# Patient Record
Sex: FEMALE | Race: WHITE | Employment: OTHER | URBAN - METROPOLITAN AREA
[De-identification: names, ages, dates, MRNs, and addresses within clinical notes are randomized per-mention and may not be internally consistent; named-entity substitution may affect disease eponyms.]

---

## 2017-01-06 DIAGNOSIS — F41.9 ANXIETY: ICD-10-CM

## 2017-01-09 RX ORDER — CLONAZEPAM 0.5 MG/1
TABLET ORAL
Qty: 270 TABLET | Refills: 0 | Status: SHIPPED | OUTPATIENT
Start: 2017-01-09 | End: 2017-05-02 | Stop reason: SDUPTHER

## 2017-02-14 ENCOUNTER — OFFICE VISIT (OUTPATIENT)
Dept: FAMILY MEDICINE CLINIC | Age: 72
End: 2017-02-14

## 2017-02-14 DIAGNOSIS — Z72.89 OTHER PROBLEMS RELATED TO LIFESTYLE: ICD-10-CM

## 2017-02-14 DIAGNOSIS — Z72.0 TOBACCO ABUSE: ICD-10-CM

## 2017-02-14 DIAGNOSIS — Z00.00 ROUTINE GENERAL MEDICAL EXAMINATION AT A HEALTH CARE FACILITY: ICD-10-CM

## 2017-02-14 DIAGNOSIS — Z00.00 MEDICARE ANNUAL WELLNESS VISIT, SUBSEQUENT: Primary | ICD-10-CM

## 2017-02-14 DIAGNOSIS — F41.9 ANXIETY: ICD-10-CM

## 2017-02-14 DIAGNOSIS — I10 ESSENTIAL HYPERTENSION: Chronic | ICD-10-CM

## 2017-02-14 DIAGNOSIS — E03.9 HYPOTHYROIDISM, UNSPECIFIED TYPE: ICD-10-CM

## 2017-02-14 DIAGNOSIS — M85.80 OSTEOPENIA: ICD-10-CM

## 2017-02-14 DIAGNOSIS — J44.9 COPD, MODERATE (HCC): ICD-10-CM

## 2017-02-14 DIAGNOSIS — I25.10 CAD IN NATIVE ARTERY: ICD-10-CM

## 2017-02-14 DIAGNOSIS — Z12.11 SCREENING FOR COLORECTAL CANCER: ICD-10-CM

## 2017-02-14 DIAGNOSIS — G45.1 TIA INVOLVING RIGHT INTERNAL CAROTID ARTERY: ICD-10-CM

## 2017-02-14 DIAGNOSIS — F10.21 OTHER AND UNSPECIFIED ALCOHOL DEPENDENCE, IN REMISSION: ICD-10-CM

## 2017-02-14 DIAGNOSIS — J44.9 CHRONIC OBSTRUCTIVE PULMONARY DISEASE, UNSPECIFIED COPD TYPE (HCC): ICD-10-CM

## 2017-02-14 DIAGNOSIS — Z12.12 SCREENING FOR COLORECTAL CANCER: ICD-10-CM

## 2017-02-14 PROCEDURE — G0438 PPPS, INITIAL VISIT: HCPCS | Performed by: FAMILY MEDICINE

## 2017-02-14 RX ORDER — VENLAFAXINE HYDROCHLORIDE 150 MG/1
150 TABLET, EXTENDED RELEASE ORAL
COMMUNITY
End: 2017-05-09 | Stop reason: ALTCHOICE

## 2017-02-14 RX ORDER — ATENOLOL 50 MG/1
50 TABLET ORAL DAILY
COMMUNITY
End: 2017-04-20

## 2017-02-14 RX ORDER — ATORVASTATIN CALCIUM 40 MG/1
40 TABLET, FILM COATED ORAL DAILY
Qty: 90 TABLET | Refills: 3 | Status: SHIPPED | OUTPATIENT
Start: 2017-02-14 | End: 2018-05-15 | Stop reason: DRUGHIGH

## 2017-02-14 ASSESSMENT — PATIENT HEALTH QUESTIONNAIRE - PHQ9
SUM OF ALL RESPONSES TO PHQ QUESTIONS 1-9: 2
SUM OF ALL RESPONSES TO PHQ QUESTIONS 1-9: 2

## 2017-02-14 ASSESSMENT — LIFESTYLE VARIABLES: HOW OFTEN DO YOU HAVE A DRINK CONTAINING ALCOHOL: 0

## 2017-02-14 ASSESSMENT — ANXIETY QUESTIONNAIRES: GAD7 TOTAL SCORE: 1

## 2017-02-23 RX ORDER — ATENOLOL 50 MG/1
TABLET ORAL
Qty: 90 TABLET | Refills: 0 | Status: SHIPPED | OUTPATIENT
Start: 2017-02-23 | End: 2017-03-20 | Stop reason: CLARIF

## 2017-03-16 ENCOUNTER — TELEPHONE (OUTPATIENT)
Dept: FAMILY MEDICINE CLINIC | Age: 72
End: 2017-03-16

## 2017-04-05 DIAGNOSIS — E03.9 HYPOTHYROIDISM: ICD-10-CM

## 2017-04-05 RX ORDER — LEVOTHYROXINE SODIUM 0.1 MG/1
TABLET ORAL
Qty: 90 TABLET | Refills: 3 | Status: SHIPPED | OUTPATIENT
Start: 2017-04-05 | End: 2018-04-04 | Stop reason: SDUPTHER

## 2017-04-23 ENCOUNTER — CARE COORDINATION (OUTPATIENT)
Dept: CASE MANAGEMENT | Age: 72
End: 2017-04-23

## 2017-04-24 ENCOUNTER — TELEPHONE (OUTPATIENT)
Dept: FAMILY MEDICINE CLINIC | Age: 72
End: 2017-04-24

## 2017-04-24 ENCOUNTER — TELEPHONE (OUTPATIENT)
Dept: PHARMACY | Age: 72
End: 2017-04-24

## 2017-05-02 DIAGNOSIS — F41.9 ANXIETY: ICD-10-CM

## 2017-05-02 RX ORDER — CLONAZEPAM 0.5 MG/1
TABLET ORAL
Qty: 30 TABLET | Refills: 0 | Status: SHIPPED | OUTPATIENT
Start: 2017-05-02 | End: 2017-05-09 | Stop reason: SDUPTHER

## 2017-05-09 ENCOUNTER — OFFICE VISIT (OUTPATIENT)
Dept: FAMILY MEDICINE CLINIC | Age: 72
End: 2017-05-09

## 2017-05-09 VITALS
SYSTOLIC BLOOD PRESSURE: 164 MMHG | BODY MASS INDEX: 18.06 KG/M2 | OXYGEN SATURATION: 93 % | HEART RATE: 77 BPM | DIASTOLIC BLOOD PRESSURE: 100 MMHG | WEIGHT: 118.8 LBS

## 2017-05-09 DIAGNOSIS — I10 HTN (HYPERTENSION), BENIGN: ICD-10-CM

## 2017-05-09 DIAGNOSIS — E87.1 HYPONATREMIA: ICD-10-CM

## 2017-05-09 DIAGNOSIS — F41.9 ANXIETY: ICD-10-CM

## 2017-05-09 DIAGNOSIS — N39.0 URINARY TRACT INFECTION WITHOUT HEMATURIA, SITE UNSPECIFIED: Primary | ICD-10-CM

## 2017-05-09 DIAGNOSIS — J18.9 CAP (COMMUNITY ACQUIRED PNEUMONIA): ICD-10-CM

## 2017-05-09 DIAGNOSIS — I10 ESSENTIAL HYPERTENSION: ICD-10-CM

## 2017-05-09 DIAGNOSIS — G47.00 INSOMNIA, UNSPECIFIED TYPE: ICD-10-CM

## 2017-05-09 PROCEDURE — 99214 OFFICE O/P EST MOD 30 MIN: CPT | Performed by: FAMILY MEDICINE

## 2017-05-09 RX ORDER — RISPERIDONE 2 MG/1
2 TABLET, FILM COATED ORAL DAILY
Qty: 90 TABLET | Refills: 3 | Status: SHIPPED | OUTPATIENT
Start: 2017-05-09 | End: 2018-05-04 | Stop reason: SDUPTHER

## 2017-05-09 RX ORDER — SERTRALINE HYDROCHLORIDE 100 MG/1
100 TABLET, FILM COATED ORAL DAILY
Status: ON HOLD | COMMUNITY
End: 2018-05-01 | Stop reason: HOSPADM

## 2017-05-09 RX ORDER — CLONIDINE HYDROCHLORIDE 0.2 MG/1
TABLET ORAL
Qty: 180 TABLET | Refills: 3 | Status: CANCELLED | OUTPATIENT
Start: 2017-05-09

## 2017-05-09 RX ORDER — RISPERIDONE 2 MG/1
TABLET, FILM COATED ORAL
Qty: 30 TABLET | Refills: 4 | Status: CANCELLED | OUTPATIENT
Start: 2017-05-09

## 2017-05-09 RX ORDER — CLONAZEPAM 0.5 MG/1
TABLET ORAL
Qty: 90 TABLET | Refills: 0 | Status: CANCELLED | OUTPATIENT
Start: 2017-05-09

## 2017-05-09 RX ORDER — CLONAZEPAM 0.5 MG/1
0.5 TABLET ORAL 3 TIMES DAILY PRN
Qty: 90 TABLET | Refills: 3 | Status: SHIPPED | OUTPATIENT
Start: 2017-05-09 | End: 2017-10-04 | Stop reason: SDUPTHER

## 2017-05-09 RX ORDER — TRAZODONE HYDROCHLORIDE 50 MG/1
TABLET ORAL
Qty: 90 TABLET | Refills: 3 | Status: ON HOLD | OUTPATIENT
Start: 2017-05-09 | End: 2018-06-19 | Stop reason: HOSPADM

## 2017-05-15 ENCOUNTER — HOSPITAL ENCOUNTER (OUTPATIENT)
Dept: OTHER | Age: 72
Discharge: HOME | End: 2017-05-15
Attending: FAMILY MEDICINE | Admitting: FAMILY MEDICINE

## 2017-05-15 DIAGNOSIS — J18.9 CAP (COMMUNITY ACQUIRED PNEUMONIA): ICD-10-CM

## 2017-05-15 LAB
ANION GAP SERPL CALCULATED.3IONS-SCNC: 17 MMOL/L (ref 3–16)
BUN BLDV-MCNC: 12 MG/DL (ref 7–20)
CALCIUM SERPL-MCNC: 8.8 MG/DL (ref 8.3–10.6)
CHLORIDE BLD-SCNC: 98 MMOL/L (ref 99–110)
CO2: 27 MMOL/L (ref 21–32)
CREAT SERPL-MCNC: 0.7 MG/DL (ref 0.6–1.2)
GFR AFRICAN AMERICAN: >60
GFR NON-AFRICAN AMERICAN: >60
GLUCOSE BLD-MCNC: 132 MG/DL (ref 70–99)
POTASSIUM SERPL-SCNC: 3.9 MMOL/L (ref 3.5–5.1)
SODIUM BLD-SCNC: 142 MMOL/L (ref 136–145)

## 2017-05-16 RX ORDER — AMOXICILLIN AND CLAVULANATE POTASSIUM 875; 125 MG/1; MG/1
1 TABLET, FILM COATED ORAL 2 TIMES DAILY
Qty: 20 TABLET | Refills: 0 | Status: SHIPPED | OUTPATIENT
Start: 2017-05-16 | End: 2017-05-26

## 2017-05-18 ENCOUNTER — OFFICE VISIT (OUTPATIENT)
Dept: PULMONOLOGY | Age: 72
End: 2017-05-18

## 2017-05-18 VITALS
RESPIRATION RATE: 18 BRPM | WEIGHT: 116 LBS | SYSTOLIC BLOOD PRESSURE: 183 MMHG | HEART RATE: 64 BPM | BODY MASS INDEX: 18.21 KG/M2 | TEMPERATURE: 96.9 F | DIASTOLIC BLOOD PRESSURE: 100 MMHG | HEIGHT: 67 IN | OXYGEN SATURATION: 96 %

## 2017-05-18 DIAGNOSIS — J90 PLEURAL EFFUSION ON LEFT: ICD-10-CM

## 2017-05-18 DIAGNOSIS — J98.19 TRAPPED LUNG: ICD-10-CM

## 2017-05-18 DIAGNOSIS — J43.2 CENTRILOBULAR EMPHYSEMA (HCC): ICD-10-CM

## 2017-05-18 DIAGNOSIS — Z09 HOSPITAL DISCHARGE FOLLOW-UP: Primary | ICD-10-CM

## 2017-05-18 DIAGNOSIS — J44.9 COPD, MODERATE (HCC): ICD-10-CM

## 2017-05-18 PROCEDURE — 99214 OFFICE O/P EST MOD 30 MIN: CPT | Performed by: INTERNAL MEDICINE

## 2017-05-24 RX ORDER — ATENOLOL 50 MG/1
TABLET ORAL
Qty: 90 TABLET | Refills: 1 | Status: SHIPPED | OUTPATIENT
Start: 2017-05-24 | End: 2017-08-01 | Stop reason: ALTCHOICE

## 2017-05-31 ENCOUNTER — HOSPITAL ENCOUNTER (OUTPATIENT)
Dept: PULMONOLOGY | Age: 72
Discharge: HOME | End: 2017-05-31
Attending: INTERNAL MEDICINE | Admitting: INTERNAL MEDICINE

## 2017-05-31 VITALS — OXYGEN SATURATION: 96 % | HEART RATE: 70 BPM

## 2017-05-31 DIAGNOSIS — J98.19 TRAPPED LUNG: ICD-10-CM

## 2017-05-31 DIAGNOSIS — J90 PLEURAL EFFUSION, NOT ELSEWHERE CLASSIFIED: ICD-10-CM

## 2017-05-31 DIAGNOSIS — J90 PLEURAL EFFUSION ON LEFT: ICD-10-CM

## 2017-05-31 RX ORDER — ALBUTEROL SULFATE 90 UG/1
4 AEROSOL, METERED RESPIRATORY (INHALATION) ONCE
Status: COMPLETED | OUTPATIENT
Start: 2017-05-31 | End: 2017-05-31

## 2017-05-31 RX ADMIN — ALBUTEROL SULFATE 4 PUFF: 90 AEROSOL, METERED RESPIRATORY (INHALATION) at 15:41

## 2017-06-21 ENCOUNTER — OFFICE VISIT (OUTPATIENT)
Dept: PULMONOLOGY | Age: 72
End: 2017-06-21

## 2017-06-21 VITALS
RESPIRATION RATE: 18 BRPM | OXYGEN SATURATION: 97 % | BODY MASS INDEX: 18.83 KG/M2 | HEIGHT: 67 IN | SYSTOLIC BLOOD PRESSURE: 180 MMHG | HEART RATE: 60 BPM | WEIGHT: 120 LBS | DIASTOLIC BLOOD PRESSURE: 97 MMHG

## 2017-06-21 DIAGNOSIS — J44.9 COPD, MODERATE (HCC): Primary | ICD-10-CM

## 2017-06-21 DIAGNOSIS — J43.2 CENTRILOBULAR EMPHYSEMA (HCC): ICD-10-CM

## 2017-06-21 DIAGNOSIS — J98.19 TRAPPED LUNG: ICD-10-CM

## 2017-06-21 PROCEDURE — 99214 OFFICE O/P EST MOD 30 MIN: CPT | Performed by: INTERNAL MEDICINE

## 2017-07-14 DIAGNOSIS — I10 ESSENTIAL HYPERTENSION: ICD-10-CM

## 2017-07-14 RX ORDER — CLONIDINE HYDROCHLORIDE 0.2 MG/1
TABLET ORAL
Qty: 180 TABLET | Refills: 1 | Status: SHIPPED | OUTPATIENT
Start: 2017-07-14 | End: 2017-11-30 | Stop reason: SDUPTHER

## 2017-07-24 DIAGNOSIS — I10 ESSENTIAL HYPERTENSION: ICD-10-CM

## 2017-07-24 RX ORDER — LISINOPRIL 20 MG/1
TABLET ORAL
Qty: 180 TABLET | Refills: 0 | Status: SHIPPED | OUTPATIENT
Start: 2017-07-24 | End: 2017-11-30 | Stop reason: SDUPTHER

## 2017-08-01 ENCOUNTER — OFFICE VISIT (OUTPATIENT)
Dept: FAMILY MEDICINE CLINIC | Age: 72
End: 2017-08-01

## 2017-08-01 VITALS
SYSTOLIC BLOOD PRESSURE: 138 MMHG | HEART RATE: 66 BPM | OXYGEN SATURATION: 94 % | TEMPERATURE: 96.5 F | BODY MASS INDEX: 18.42 KG/M2 | WEIGHT: 117.6 LBS | DIASTOLIC BLOOD PRESSURE: 100 MMHG

## 2017-08-01 DIAGNOSIS — N39.0 URINARY TRACT INFECTION WITHOUT HEMATURIA, SITE UNSPECIFIED: Primary | ICD-10-CM

## 2017-08-01 DIAGNOSIS — R41.0 CONFUSION: ICD-10-CM

## 2017-08-01 LAB
BACTERIA URINE, POC: NORMAL
BILIRUBIN URINE: 0 MG/DL
BLOOD, URINE: NEGATIVE
CASTS URINE, POC: NEGATIVE
CLARITY: CLEAR
COLOR: YELLOW
CRYSTALS URINE, POC: NEGATIVE
EPI CELLS URINE, POC: NORMAL
GLUCOSE URINE: NEGATIVE
KETONES, URINE: NEGATIVE
LEUKOCYTE EST, POC: NORMAL
NITRITE, URINE: NEGATIVE
PH UA: 7 (ref 4.5–8)
PROTEIN UA: NEGATIVE
RBC URINE, POC: NORMAL
SPECIFIC GRAVITY UA: 1.01 (ref 1–1.03)
UROBILINOGEN, URINE: NORMAL
WBC URINE, POC: NORMAL
YEAST URINE, POC: NEGATIVE

## 2017-08-01 PROCEDURE — 81000 URINALYSIS NONAUTO W/SCOPE: CPT | Performed by: FAMILY MEDICINE

## 2017-08-01 PROCEDURE — 99213 OFFICE O/P EST LOW 20 MIN: CPT | Performed by: FAMILY MEDICINE

## 2017-08-01 RX ORDER — NITROFURANTOIN 25; 75 MG/1; MG/1
100 CAPSULE ORAL 2 TIMES DAILY
Qty: 14 CAPSULE | Refills: 0 | Status: SHIPPED | OUTPATIENT
Start: 2017-08-01 | End: 2017-08-08

## 2017-08-03 LAB
ORGANISM: ABNORMAL
URINE CULTURE, ROUTINE: ABNORMAL

## 2017-10-02 ENCOUNTER — OFFICE VISIT (OUTPATIENT)
Dept: FAMILY MEDICINE CLINIC | Age: 72
End: 2017-10-02

## 2017-10-02 VITALS
OXYGEN SATURATION: 94 % | TEMPERATURE: 96.7 F | WEIGHT: 118 LBS | BODY MASS INDEX: 18.48 KG/M2 | DIASTOLIC BLOOD PRESSURE: 94 MMHG | SYSTOLIC BLOOD PRESSURE: 142 MMHG | HEART RATE: 64 BPM

## 2017-10-02 DIAGNOSIS — N39.0 URINARY TRACT INFECTION WITHOUT HEMATURIA, SITE UNSPECIFIED: Primary | ICD-10-CM

## 2017-10-02 DIAGNOSIS — I10 ESSENTIAL HYPERTENSION: Chronic | ICD-10-CM

## 2017-10-02 LAB
BACTERIA URINE, POC: NEGATIVE
BILIRUBIN URINE: 0 MG/DL
BLOOD, URINE: NEGATIVE
CASTS URINE, POC: NEGATIVE
CLARITY: CLEAR
COLOR: YELLOW
CRYSTALS URINE, POC: NEGATIVE
EPI CELLS URINE, POC: NEGATIVE
GLUCOSE URINE: NEGATIVE
KETONES, URINE: NEGATIVE
LEUKOCYTE EST, POC: NEGATIVE
NITRITE, URINE: NEGATIVE
PH UA: 7 (ref 4.5–8)
PROTEIN UA: NEGATIVE
RBC URINE, POC: NEGATIVE
SPECIFIC GRAVITY UA: 1.01 (ref 1–1.03)
UROBILINOGEN, URINE: NORMAL
WBC URINE, POC: NEGATIVE
YEAST URINE, POC: NEGATIVE

## 2017-10-02 PROCEDURE — 99214 OFFICE O/P EST MOD 30 MIN: CPT | Performed by: PHYSICIAN ASSISTANT

## 2017-10-02 PROCEDURE — 81000 URINALYSIS NONAUTO W/SCOPE: CPT | Performed by: PHYSICIAN ASSISTANT

## 2017-10-02 RX ORDER — CIPROFLOXACIN 500 MG/1
500 TABLET, FILM COATED ORAL 2 TIMES DAILY
Qty: 20 TABLET | Refills: 0 | Status: SHIPPED | OUTPATIENT
Start: 2017-10-02 | End: 2017-10-12

## 2017-10-02 ASSESSMENT — ENCOUNTER SYMPTOMS
DIARRHEA: 0
NAUSEA: 0
CONSTIPATION: 0
VOMITING: 0
BACK PAIN: 0
ABDOMINAL PAIN: 0
SHORTNESS OF BREATH: 0

## 2017-10-02 NOTE — MR AVS SNAPSHOT
After Visit Summary             Francisco Ruth   10/2/2017 9:00 AM   Office Visit    Description:  Female : 1945   Provider:  LESLIE Rocha   Department:  [facility] and Future Appointments         Below is a list of your follow-up and future appointments. This may not be a complete list as you may have made appointments directly with providers that we are not aware of or your providers may have made some for you. Please call your providers to confirm appointments. It is important to keep your appointments. Please bring your current insurance card, photo ID, co-pay, and all medication bottles to your appointment. If self-pay, payment is expected at the time of service. Your To-Do List     Future Appointments Provider Department Dept Phone    10/16/2017 9:20 AM Mikala Arrington, [address] [place] [address] [phone]    Please arrive 15 minutes prior to appointment, bring photo ID and insurance card. Follow-Up    Return in about 2 weeks (around 10/16/2017). Information from Your Visit        Department     Name Address Phone Fax    [address]  [phone]      You Were Seen for:         Comments    Urinary tract infection without hematuria, site unspecified   [[id number]]         Vital Signs     Blood Pressure Pulse Temperature Weight Oxygen Saturation Body Mass Index    142/94 (Site: Left Arm, Position: Sitting, Cuff Size: Medium Adult) 64 96.7 °F (35.9 °C) (Tympanic) 118 lb (53.5 kg) 94% 18.48 kg/m2    Smoking Status                   Current Every Day Smoker           Additional Information about your Body Mass Index (BMI)           Your BMI as listed above is considered underweight (less than 18.5). BMI is an estimate of body fat, calculated from your height and weight. Risks of low BMI include increased risk for infection, anemia, and bone loss. ciprofloxacin (CIPRO) 500 MG tablet Take 1 tablet by mouth 2 times daily for 10 days    lisinopril (PRINIVIL;ZESTRIL) 20 MG tablet TAKE ONE TABLET BY MOUTH TWICE A DAY    cloNIDine (CATAPRES) 0.2 MG tablet TAKE ONE TABLET BY MOUTH TWICE A DAY    sertraline (ZOLOFT) 100 MG tablet Take 100 mg by mouth daily    risperiDONE (RISPERDAL) 2 MG tablet Take 1 tablet by mouth daily    traZODone (DESYREL) 50 MG tablet TAKE ONE TABLET BY MOUTH ONCE NIGHTLY    clonazePAM (KLONOPIN) 0.5 MG tablet Take 1 tablet by mouth 3 times daily as needed for Anxiety    budesonide-formoterol (SYMBICORT) 160-4.5 MCG/ACT AERO Inhale 2 puffs into the lungs 2 times daily    albuterol sulfate  (90 BASE) MCG/ACT inhaler Inhale 2 puffs into the lungs every 6 hours as needed for Wheezing    ipratropium (ATROVENT HFA) 17 MCG/ACT inhaler Inhale 1 puff into the lungs 3 times daily    levothyroxine (SYNTHROID) 100 MCG tablet TAKE ONE TABLET BY MOUTH DAILY    atorvastatin (LIPITOR) 40 MG tablet Take 1 tablet by mouth daily    mirtazapine (REMERON) 30 MG tablet TAKE ONE TABLET BY MOUTH ONCE NIGHTLY    oxybutynin (DITROPAN) 5 MG tablet TAKE ONE TABLET BY MOUTH TWICE A DAY    metoprolol tartrate (LOPRESSOR) 25 MG tablet TAKE ONE TABLET BY MOUTH TWICE A DAY    aspirin 81 MG chewable tablet Take 1 tablet by mouth daily    Multiple Vitamins-Minerals (CENTRUM SILVER ADULT 50+ PO) Take by mouth daily.       Allergies              Lipitor [Atorvastatin] Other (See Comments)    Weakness, severe    Hctz [Hydrochlorothiazide]     Hyponatremia, severe    Hydralazine     Dizzy,fatigue,headaches,palpitations,loss of appetite    Morphine     Norvasc [Amlodipine Besylate] Swelling    Penicillins Hives    Shellfish-derived Products Swelling    rash    Tramadol Itching      We Ordered/Performed the following           POC URINE with Microscopic     URINE CULTURE          Additional Information        Basic Information results, renew your prescriptions, schedule appointments, view visit notes, and more. How Do I Sign Up? 1. In your Internet browser, go to [email or web address]  2. Click on the Sign Up Now link in the Sign In box. You will see the New Member Sign Up page. 3. Enter your [employer] Access Code exactly as it appears below. You will not need to use this code after youve completed the sign-up process. If you do not sign up before the expiration date, you must request a new code. [employer] Access Code: [email or web address]  Expires: 12/1/2017  9:18 AM    4. Enter your Social Security Number ([id number]) and Date of Birth (mm/dd/yyyy) as indicated and click Submit. You will be taken to the next sign-up page. 5. Create a [employer] ID. This will be your [employer] login ID and cannot be changed, so think of one that is secure and easy to remember. 6. Create a [employer] password. You can change your password at any time. 7. Enter your Password Reset Question and Answer. This can be used at a later time if you forget your password. 8. Enter your e-mail address. You will receive e-mail notification when new information is available in [address]. 9. Click Sign Up. You can now view your medical record. Additional Information  If you have questions, please contact the physician practice where you receive care. Remember, [employer] is NOT to be used for urgent needs. For medical emergencies, dial 911. For questions regarding your [employer] account call [phone]. If you have a clinical question, please call your doctor's office.

## 2017-10-02 NOTE — PATIENT INSTRUCTIONS
Urology    [facility]  Mariam Leos, MD Tracie Slim, MD Milan Knapp, MD Pamella Moritz, MD  [facility]  [address]  [phone] Urology, Ev Monae.   Dr. Gibbons [place]  [address]  [facility], [address]  [phone]      Urology  Parul Morrison (female)  Dr. Elsi Cardenas  [phone]      Alycia Covington was seen today for urinary tract infection. Diagnoses and all orders for this visit:    Urinary tract infection without hematuria, site unspecified  -     POC URINE with Microscopic  -     URINE CULTURE  -     ciprofloxacin (CIPRO) 500 MG tablet; Take 1 tablet by mouth 2 times daily for 10 days    Essential hypertension          Monitor BP's and keep a log. Return to see Dr. Sarah Bernard with BP log in about 2 weeks. See urology.

## 2017-10-02 NOTE — PROGRESS NOTES
Subjective:      Patient ID: Andrew Blanco is a 66 y.o. female. HPI Patient is here today with a 2 week history of UTI symptoms. She has urinary frequency, urine odor, and sometimes disoriented. She felt this way the last UTI she had. No fever known. No blood in urine, abdominal or back pain. She does drink a lot of water. 1 Coke daily. She drinks a lot of water. She does wear Depends and has leaking sometimes. She has had 2 other UTI's this year so far. Review of Systems   Constitutional: Negative for appetite change, chills and fever. Respiratory: Negative for shortness of breath. Cardiovascular: Negative for chest pain. Gastrointestinal: Negative for abdominal pain, constipation, diarrhea, nausea and vomiting. Genitourinary: Positive for frequency. Negative for difficulty urinating, dysuria, hematuria and urgency. Urine odor   Musculoskeletal: Negative for back pain. Neurological:        Confusion sometimes       Objective:   Physical Exam   Constitutional: She is oriented to person, place, and time. Vital signs are normal. She appears well-developed and well-nourished. She is cooperative. Cardiovascular: Normal rate, regular rhythm and normal heart sounds. Pulmonary/Chest: Effort normal and breath sounds normal. No respiratory distress. She has no decreased breath sounds. Abdominal: Soft. Normal appearance and bowel sounds are normal. She exhibits no distension and no mass. There is no hepatosplenomegaly. There is no tenderness. There is no rigidity, no rebound, no guarding and no CVA tenderness. No hernia. Neurological: She is alert and oriented to person, place, and time. Assessment:      Trinidad Olvera was seen today for urinary tract infection. Diagnoses and all orders for this visit:    Urinary tract infection without hematuria, site unspecified  -     POC URINE with Microscopic  -     URINE CULTURE  -     ciprofloxacin (CIPRO) 500 MG tablet;  Take 1 tablet by mouth 2 times daily for 10 days    Essential hypertension             Plan:      Monitor BP's and keep a log, return to see Dr. Gian Hernandez in 2 weeks for that. UA negative here but will treat until I get culture back. See urology.

## 2017-10-03 LAB — URINE CULTURE, ROUTINE: NORMAL

## 2017-10-04 DIAGNOSIS — F41.9 ANXIETY: ICD-10-CM

## 2017-10-09 RX ORDER — CLONAZEPAM 0.5 MG/1
TABLET ORAL
Qty: 90 TABLET | Refills: 0 | Status: SHIPPED | OUTPATIENT
Start: 2017-10-09 | End: 2017-11-20 | Stop reason: SDUPTHER

## 2017-10-16 ENCOUNTER — OFFICE VISIT (OUTPATIENT)
Dept: FAMILY MEDICINE CLINIC | Age: 72
End: 2017-10-16

## 2017-10-16 VITALS
DIASTOLIC BLOOD PRESSURE: 102 MMHG | SYSTOLIC BLOOD PRESSURE: 160 MMHG | OXYGEN SATURATION: 98 % | WEIGHT: 118.6 LBS | HEART RATE: 60 BPM | BODY MASS INDEX: 18.58 KG/M2

## 2017-10-16 DIAGNOSIS — I10 HYPERTENSION, UNSPECIFIED TYPE: Primary | ICD-10-CM

## 2017-10-16 DIAGNOSIS — Z23 NEED FOR INFLUENZA VACCINATION: ICD-10-CM

## 2017-10-16 PROCEDURE — G0008 ADMIN INFLUENZA VIRUS VAC: HCPCS | Performed by: FAMILY MEDICINE

## 2017-10-16 PROCEDURE — 99213 OFFICE O/P EST LOW 20 MIN: CPT | Performed by: FAMILY MEDICINE

## 2017-10-16 PROCEDURE — 90662 IIV NO PRSV INCREASED AG IM: CPT | Performed by: FAMILY MEDICINE

## 2017-10-16 RX ORDER — ESCITALOPRAM OXALATE 10 MG/1
10 TABLET ORAL DAILY
COMMUNITY
End: 2018-01-05 | Stop reason: SDUPTHER

## 2017-10-16 NOTE — PROGRESS NOTES
Subjective:      Patient ID: Tai Irwin is a 66 y.o. female. HPI     Patient presents for follow-up UTI symptoms. She was seen on 10/2/2017 with urinary frequency, urine odor, and confusion. Urinalysis and urine culture were negative. However, before culture resulted, she was treated with Cipro and symptoms improved. Currently, energy is improved and urinary symptoms are resolved. Blood pressure noted to be elevated last visit. She does experience fluctuations of blood pressure at home, mainly spikes in blood pressure. When this occurs, she takes an extra clonidine. She denies chest pain, shortness of breath, and headache.     Review of Systems    Patient Active Problem List   Diagnosis    Incontinence    Hypothyroidism    Hypertension    Smoker    Anxiety    History of alcoholism ([facility] [address]    Hyperlipidemia    DIMAS (dyspnea on exertion)    Acute DVT (deep venous thrombosis) (HCC)    DVT (deep venous thrombosis) (HCC)    SOB (shortness of breath)    Fatigue    Pleural effusion    Weakness generalized    Centrilobular emphysema (HCC)    Essential hypertension    Chronic fatigue    Chest pain    COPD, moderate (HCC)    Left arm weakness    TIA involving right internal carotid artery    HTN (hypertension), benign    CAD in native artery    Osteopenia    Community acquired pneumonia    Acute cystitis with hematuria    Recurrent left pleural effusion    Trapped lung     Outpatient Prescriptions Marked as Taking for the 10/16/17 encounter (Office Visit) with Wendy Earl MD   Medication Sig Dispense Refill    clonazePAM (KLONOPIN) 0.5 MG tablet TAKE ONE TABLET BY MOUTH THREE TIMES A DAY AS NEEDED FOR ANXIETY 90 tablet 0    cloNIDine (CATAPRES) 0.2 MG tablet TAKE ONE TABLET BY MOUTH TWICE A  tablet 1    sertraline (ZOLOFT) 100 MG tablet Take 100 mg by mouth daily      risperiDONE (RISPERDAL) 2 MG tablet Take 1 tablet by mouth daily 90 tablet 3    albuterol sulfate HFA 108 (90 BASE) MCG/ACT inhaler Inhale 2 puffs into the lungs every 6 hours as needed for Wheezing 1 Inhaler 2    levothyroxine (SYNTHROID) 100 MCG tablet TAKE ONE TABLET BY MOUTH DAILY 90 tablet 3    aspirin 81 MG chewable tablet Take 1 tablet by mouth daily 30 tablet 3     Allergies   Allergen Reactions    Lipitor [Atorvastatin] Other (See Comments)     Weakness, severe    Hctz [Hydrochlorothiazide]      Hyponatremia, severe      Hydralazine      Dizzy,fatigue,headaches,palpitations,loss of appetite    Morphine     Norvasc [Amlodipine Besylate] Swelling    Penicillins Hives    Shellfish-Derived Products Swelling     rash    Tramadol Itching       Social History   Substance Use Topics    Smoking status: Current Every Day Smoker     Packs/day: 2.00     Years: 52.00     Types: Cigarettes    Smokeless tobacco: Never Used      Comment: started smoking at age 26 / smoked up to 2 p,p,d / now smoking 4 to 8 cigarettes a day,    Alcohol use No      Comment: patient reports she is an alcoholic hasn't had anything to drink 3-4 months     Objective:   BP (!) 160/102   Pulse 60   Wt 118 lb 9.6 oz (53.8 kg)   SpO2 98%   BMI 18.58 kg/m²       Physical Exam   Constitutional: She is oriented to person, place, and time. She appears well-developed and well-nourished. No distress. Cardiovascular: Normal rate, regular rhythm and normal heart sounds. No murmur heard. Pulmonary/Chest: Effort normal and breath sounds normal. She has no wheezes. She has no rales. Neurological: She is alert and oriented to person, place, and time. Psychiatric: She has a normal mood and affect. Her behavior is normal.     Assessment:     1. Hypertension, unspecified type     2. Need for influenza vaccination  INFLUENZA, HIGH DOSE, 65 YRS +, IM, PF, PREFILL SYR, 0.5ML (FLUZONE HD)     Plan:     Pressure elevated today. Unfortunately, allergies and low normal pulse limit options for adjusting medications.  Referral to nephrology for consult hypertension. Influenza vaccine today.

## 2017-10-17 ENCOUNTER — TELEPHONE (OUTPATIENT)
Dept: FAMILY MEDICINE CLINIC | Age: 72
End: 2017-10-17

## 2017-10-17 NOTE — TELEPHONE ENCOUNTER
Patient called concerning her referral to Dr. Reggie Watkins. They told her that she also needs an Insurance referral as well as the referral that was sent to Dr. Reggie Watkins    Please call her at [phone] when this is completed.

## 2017-11-20 DIAGNOSIS — F41.9 ANXIETY: ICD-10-CM

## 2017-11-22 RX ORDER — OXYBUTYNIN CHLORIDE 5 MG/1
TABLET ORAL
Qty: 180 TABLET | Refills: 0 | Status: SHIPPED | OUTPATIENT
Start: 2017-11-22 | End: 2018-02-19 | Stop reason: SDUPTHER

## 2017-11-24 RX ORDER — CLONAZEPAM 0.5 MG/1
TABLET ORAL
Qty: 90 TABLET | Refills: 1 | Status: SHIPPED | OUTPATIENT
Start: 2017-11-24 | End: 2018-02-08 | Stop reason: SDUPTHER

## 2017-11-30 ENCOUNTER — OFFICE VISIT (OUTPATIENT)
Dept: FAMILY MEDICINE CLINIC | Age: 72
End: 2017-11-30

## 2017-11-30 VITALS
WEIGHT: 120.6 LBS | HEART RATE: 62 BPM | SYSTOLIC BLOOD PRESSURE: 114 MMHG | DIASTOLIC BLOOD PRESSURE: 80 MMHG | OXYGEN SATURATION: 97 % | BODY MASS INDEX: 18.89 KG/M2

## 2017-11-30 DIAGNOSIS — E78.5 HYPERLIPIDEMIA, UNSPECIFIED HYPERLIPIDEMIA TYPE: ICD-10-CM

## 2017-11-30 DIAGNOSIS — I10 ESSENTIAL HYPERTENSION: Primary | ICD-10-CM

## 2017-11-30 DIAGNOSIS — E03.9 HYPOTHYROIDISM, UNSPECIFIED TYPE: ICD-10-CM

## 2017-11-30 DIAGNOSIS — Z72.0 TOBACCO USE: ICD-10-CM

## 2017-11-30 PROCEDURE — 99214 OFFICE O/P EST MOD 30 MIN: CPT | Performed by: FAMILY MEDICINE

## 2017-11-30 RX ORDER — LISINOPRIL 20 MG/1
TABLET ORAL
Qty: 180 TABLET | Refills: 3 | Status: ON HOLD | OUTPATIENT
Start: 2017-11-30 | End: 2018-04-24 | Stop reason: HOSPADM

## 2017-11-30 RX ORDER — CLONIDINE HYDROCHLORIDE 0.2 MG/1
TABLET ORAL
Qty: 180 TABLET | Refills: 3 | Status: SHIPPED | OUTPATIENT
Start: 2017-11-30 | End: 2018-04-03 | Stop reason: SDUPTHER

## 2017-11-30 NOTE — PROGRESS NOTES
Subjective:      Patient ID: Irvin Barreto is a 67 y.o. female. HPI    Here because [address] came out to her house and told her it was time for blood work. Patient is a history of hypertension. She reports her blood sugar pressures at home have been running \"better\". She denies chest pain, shortness of breath, palpitations, lightheadedness, and dizziness. Down to 1/2 ppd from 2 ppd smoking. She has noticed improvement of her COPD symptoms since cutting back. She follows with Dr. Lis Goldberg are for COPD and pleural effusion. Patient has a history of hypothyroidism. She is compliant with her levothyroxin. No bowel changes or temperature intolerance. History of hyperlipidemia. Compliant with Lipitor without myalgias. Taking aspirin 81 mg daily.     Review of Systems    Patient Active Problem List   Diagnosis    Incontinence    Hypothyroidism    Hypertension    Smoker    Anxiety    [facility] of [facility]ism ([facility] [address]    Hyperlipidemia    DIMAS (dyspnea on exertion)    Acute DVT (deep venous thrombosis) (HCC)    DVT (deep venous thrombosis) (HCC)    SOB (shortness of breath)    Fatigue    Pleural effusion    Weakness generalized    Centrilobular emphysema (HCC)    Essential hypertension    Chronic fatigue    Chest pain    COPD, moderate (HCC)    Left arm weakness    TIA involving right internal carotid artery    HTN (hypertension), benign    CAD in native artery    Osteopenia    Community acquired pneumonia    Acute cystitis with hematuria    Recurrent left pleural effusion    Trapped lung       Outpatient Prescriptions Marked as Taking for the 11/30/17 encounter (Office Visit) with Nydia Jordan MD   Medication Sig Dispense Refill    clonazePAM (KLONOPIN) 0.5 MG tablet TAKE ONE TABLET BY MOUTH THREE TIMES A DAY AS NEEDED FOR ANXIETY 90 tablet 1    oxybutynin (DITROPAN) 5 MG tablet TAKE ONE TABLET BY MOUTH TWICE A  tablet 0    escitalopram (LEXAPRO) 10 MG tablet Take 10 mg by mouth daily      lisinopril (PRINIVIL;ZESTRIL) 20 MG tablet TAKE ONE TABLET BY MOUTH TWICE A  tablet 0    cloNIDine (CATAPRES) 0.2 MG tablet TAKE ONE TABLET BY MOUTH TWICE A  tablet 1    sertraline (ZOLOFT) 100 MG tablet Take 100 mg by mouth daily      risperiDONE (RISPERDAL) 2 MG tablet Take 1 tablet by mouth daily 90 tablet 3    traZODone (DESYREL) 50 MG tablet TAKE ONE TABLET BY MOUTH ONCE NIGHTLY 90 tablet 3    budesonide-formoterol (SYMBICORT) 160-4.5 MCG/ACT AERO Inhale 2 puffs into the lungs 2 times daily 1 Inhaler 2    albuterol sulfate  (90 BASE) MCG/ACT inhaler Inhale 2 puffs into the lungs every 6 hours as needed for Wheezing 1 Inhaler 2    ipratropium (ATROVENT HFA) 17 MCG/ACT inhaler Inhale 1 puff into the lungs 3 times daily 1 Inhaler 3    levothyroxine (SYNTHROID) 100 MCG tablet TAKE ONE TABLET BY MOUTH DAILY 90 tablet 3    atorvastatin (LIPITOR) 40 MG tablet Take 1 tablet by mouth daily 90 tablet 3    metoprolol tartrate (LOPRESSOR) 25 MG tablet TAKE ONE TABLET BY MOUTH TWICE A  tablet 3    aspirin 81 MG chewable tablet Take 1 tablet by mouth daily 30 tablet 3    Multiple Vitamins-Minerals (CENTRUM SILVER ADULT 50+ PO) Take by mouth daily.          Allergies   Allergen Reactions    Lipitor [Atorvastatin] Other (See Comments)     Weakness, severe    Hctz [Hydrochlorothiazide]      Hyponatremia, severe      Hydralazine      Dizzy,fatigue,headaches,palpitations,loss of appetite    Morphine     Norvasc [Amlodipine Besylate] Swelling    Penicillins Hives    Shellfish-Derived Products Swelling     rash    Tramadol Itching       Social History   Substance Use Topics    Smoking status: Current Every Day Smoker     Packs/day: 2.00     Years: 52.00     Types: Cigarettes    Smokeless tobacco: Never Used      Comment: started smoking at age 26 / smoked up to 2 p,p,d / now smoking 4 to 8 cigarettes a day,    Alcohol use No      Comment: patient reports she

## 2017-12-12 LAB
A/G RATIO: 1.3 (CALC) (ref 1–2.5)
ALBUMIN SERPL-MCNC: 4.1 G/DL (ref 3.6–5.1)
ALP BLD-CCNC: 54 U/L (ref 33–130)
ALT SERPL-CCNC: 9 U/L (ref 6–29)
AST SERPL-CCNC: 15 U/L (ref 10–35)
BILIRUB SERPL-MCNC: 0.5 MG/DL (ref 0.2–1.2)
BUN / CREAT RATIO: NORMAL (CALC) (ref 6–22)
BUN BLDV-MCNC: 11 MG/DL (ref 7–25)
CALCIUM SERPL-MCNC: 9.1 MG/DL (ref 8.6–10.4)
CHLORIDE BLD-SCNC: 100 MMOL/L (ref 98–110)
CHOLESTEROL, TOTAL: 202 MG/DL
CHOLESTEROL/HDL RATIO: 2.9 (CALC)
CHOLESTEROL: 133 MG/DL (CALC)
CO2: 31 MMOL/L (ref 20–31)
CREAT SERPL-MCNC: 0.75 MG/DL (ref 0.6–0.93)
GFR AFRICAN AMERICAN: 92 ML/MIN/1.73M2
GFR SERPL CREATININE-BSD FRML MDRD: 79 ML/MIN/1.73M2
GLOBULIN: 3.2 G/DL (CALC) (ref 1.9–3.7)
GLUCOSE BLD-MCNC: 85 MG/DL (ref 65–99)
HDLC SERPL-MCNC: 69 MG/DL
LDL CHOLESTEROL CALCULATED: 114 MG/DL (CALC)
POTASSIUM SERPL-SCNC: 3.7 MMOL/L (ref 3.5–5.3)
SODIUM BLD-SCNC: 142 MMOL/L (ref 135–146)
TOTAL PROTEIN: 7.3 G/DL (ref 6.1–8.1)
TRIGL SERPL-MCNC: 93 MG/DL
TSH ULTRASENSITIVE: 2.66 MIU/L (ref 0.4–4.5)

## 2018-01-05 RX ORDER — ESCITALOPRAM OXALATE 10 MG/1
10 TABLET ORAL DAILY
Qty: 30 TABLET | Refills: 0 | Status: SHIPPED | OUTPATIENT
Start: 2018-01-05 | End: 2018-02-09 | Stop reason: SDUPTHER

## 2018-02-08 DIAGNOSIS — F41.9 ANXIETY: ICD-10-CM

## 2018-02-08 RX ORDER — CLONAZEPAM 0.5 MG/1
TABLET ORAL
Qty: 90 TABLET | Refills: 0 | OUTPATIENT
Start: 2018-02-08 | End: 2018-03-01 | Stop reason: SDUPTHER

## 2018-02-09 ENCOUNTER — TELEPHONE (OUTPATIENT)
Dept: FAMILY MEDICINE CLINIC | Age: 73
End: 2018-02-09

## 2018-02-09 RX ORDER — ESCITALOPRAM OXALATE 10 MG/1
10 TABLET ORAL DAILY
Qty: 30 TABLET | Refills: 0 | Status: SHIPPED | OUTPATIENT
Start: 2018-02-09 | End: 2018-03-08 | Stop reason: SDUPTHER

## 2018-02-12 ENCOUNTER — TELEPHONE (OUTPATIENT)
Dept: FAMILY MEDICINE CLINIC | Age: 73
End: 2018-02-12

## 2018-02-12 DIAGNOSIS — F41.9 ANXIETY: ICD-10-CM

## 2018-02-12 RX ORDER — CLONAZEPAM 0.5 MG/1
TABLET ORAL
Qty: 90 TABLET | Refills: 0 | OUTPATIENT
Start: 2018-02-12 | End: 2018-03-11

## 2018-02-19 RX ORDER — OXYBUTYNIN CHLORIDE 5 MG/1
TABLET ORAL
Qty: 180 TABLET | Refills: 1 | Status: SHIPPED | OUTPATIENT
Start: 2018-02-19 | End: 2018-07-23 | Stop reason: SDUPTHER

## 2018-02-20 PROBLEM — J44.1 COPD EXACERBATION (HCC): Status: ACTIVE | Noted: 2018-02-20

## 2018-02-22 ENCOUNTER — TELEPHONE (OUTPATIENT)
Dept: FAMILY MEDICINE CLINIC | Age: 73
End: 2018-02-22

## 2018-02-23 ENCOUNTER — CARE COORDINATION (OUTPATIENT)
Dept: CASE MANAGEMENT | Age: 73
End: 2018-02-23

## 2018-02-23 NOTE — CARE COORDINATION
[facility] Transitions Initial Follow Up Call    Call within 2 business days of discharge: Yes    Patient: Fe West Patient : 1945   MRN: [id number]  Reason for Admission: COPD exacerbation  Discharge Date: 18 RARS: [facility] Risk Score: 24.75     Spoke with: pt's daughter    Facility: [facility]  Non-face-to-face services provided:  Obtained and reviewed discharge summary and/or continuity of care documents    Care Transitions 24 Hour Call    Do you have any ongoing symptoms?:  No  Do you have a copy of your discharge instructions?:  Yes  Do you have all of your prescriptions and are they filled?:  Yes  Have you been contacted by a [address]?:  No  Have you scheduled your follow up appointment?:  Yes  How are you going to get to your appointment?:  Car - family or friend to transport  Were you discharged with any Home Care or Post Acute Services:  Yes  Post Acute Services:  Home Health (Comment: [facility])  Do you have support at home?:  Partner/Spouse/SO  Do you feel like you have everything you need to keep you well at home?:  Yes  Are you an active caregiver in your home?:  No  Care Transitions Interventions  No Identified Needs       Pt's daughter states pt is sleeping but doing well, no issues or concerns. Taking the antibiotic that was prescribed. [facility] will be out.  Agreed to more CTC f/u calls      Follow Up  Future Appointments  Date Time Provider Dami Sims   3/1/2018 10:20 AM Clara Rock MD [facility] ADRINANE Bill RN

## 2018-02-26 ENCOUNTER — CARE COORDINATION (OUTPATIENT)
Dept: CASE MANAGEMENT | Age: 73
End: 2018-02-26

## 2018-02-26 NOTE — CARE COORDINATION
[facility] Transitions Follow Up Call    2018    Patient: Brenton Rosario  Patient : 1945   MRN: [id number]  Reason for Admission: COPD exacerbation  Discharge Date: 18 RARS: Risk Score: 24.75     Follow up call attempted, left contact info on vm      Follow Up  Future Appointments  Date Time Provider Dami Sims   3/1/2018 10:20 AM Melva Hitchcock MD [facility] Leopoldo Shingles, RN

## 2018-02-28 NOTE — CARE COORDINATION
[facility] Transitions Follow Up Call    2018    Patient: Ruiz Le  Patient : 1945   MRN: [id number]  Reason for Admission: There are no discharge diagnoses documented for the most recent discharge. Discharge Date: 18 RARS: Risk Score: 24.75       3rd and final attempt at a Follow up call, left contact info on vm.  Has f/u appt with PCP tomorrow, 3/1/18        Follow Up  Future Appointments  Date Time Provider Dami Sims   3/1/2018 10:20 AM Key Calderon MD [facility] ADRIANNE Leiva RN

## 2018-03-01 ENCOUNTER — OFFICE VISIT (OUTPATIENT)
Dept: FAMILY MEDICINE CLINIC | Age: 73
End: 2018-03-01

## 2018-03-01 VITALS
DIASTOLIC BLOOD PRESSURE: 84 MMHG | SYSTOLIC BLOOD PRESSURE: 126 MMHG | HEART RATE: 76 BPM | OXYGEN SATURATION: 95 % | BODY MASS INDEX: 19.01 KG/M2 | WEIGHT: 121.4 LBS

## 2018-03-01 DIAGNOSIS — J44.1 COPD EXACERBATION (HCC): ICD-10-CM

## 2018-03-01 DIAGNOSIS — N39.0 URINARY TRACT INFECTION WITHOUT HEMATURIA, SITE UNSPECIFIED: Primary | ICD-10-CM

## 2018-03-01 DIAGNOSIS — F41.9 ANXIETY: ICD-10-CM

## 2018-03-01 PROCEDURE — 99213 OFFICE O/P EST LOW 20 MIN: CPT | Performed by: FAMILY MEDICINE

## 2018-03-01 RX ORDER — CIPROFLOXACIN 250 MG/1
250 TABLET, FILM COATED ORAL 2 TIMES DAILY
Qty: 10 TABLET | Refills: 0 | Status: CANCELLED | OUTPATIENT
Start: 2018-03-01 | End: 2018-03-06

## 2018-03-01 RX ORDER — ARIPIPRAZOLE 5 MG/1
5 TABLET ORAL DAILY
Status: ON HOLD | COMMUNITY
Start: 2018-02-09 | End: 2018-06-19 | Stop reason: HOSPADM

## 2018-03-01 RX ORDER — CLONAZEPAM 0.5 MG/1
TABLET ORAL
Qty: 90 TABLET | Refills: 0 | Status: SHIPPED | OUTPATIENT
Start: 2018-03-01 | End: 2018-04-19 | Stop reason: ALTCHOICE

## 2018-03-01 ASSESSMENT — PATIENT HEALTH QUESTIONNAIRE - PHQ9
SUM OF ALL RESPONSES TO PHQ QUESTIONS 1-9: 2
SUM OF ALL RESPONSES TO PHQ9 QUESTIONS 1 & 2: 2
2. FEELING DOWN, DEPRESSED OR HOPELESS: 1
1. LITTLE INTEREST OR PLEASURE IN DOING THINGS: 1

## 2018-03-01 NOTE — PROGRESS NOTES
Patient is here for a follow-up with no new concerns.
Comment: started smoking at age 26 / smoked up to 2 p,p,d / now smoking 4 to 8 cigarettes a day,    Alcohol use No      Comment: patient reports she is an alcoholic hasn't had anything to drink 3-4 months       Objective:   /84   Pulse 76   Wt 121 lb 6.4 oz (55.1 kg)   SpO2 95%   BMI 19.01 kg/m²     Physical Exam   Constitutional: She is oriented to person, place, and time. She appears well-developed and well-nourished. No distress. Cardiovascular: Normal rate, regular rhythm and normal heart sounds. No murmur heard. Pulmonary/Chest: Effort normal and breath sounds normal. She has no wheezes. She has no rales. Neurological: She is alert and oriented to person, place, and time. Psychiatric: She has a normal mood and affect. Her behavior is normal.     Assessment:     1. Urinary tract infection without hematuria, site unspecified     2. COPD exacerbation ([address]     3. Anxiety  clonazePAM (KLONOPIN) 0.5 MG tablet     Plan:      Re culture urine to make sure infection is resolved. COPD exacerbation improved. Refilled clonazepam but had stephani discussion with patient and daughter that this can be a sedating medication. Although it would not be wise to abruptly discontinue this medication, we should work towards tapering it down. Would suggest cutting the 0.5mg pills in half. Discussed increased risk of falls with this type of medication. Controlled Substances Monitoring: The Prescription Monitoring Report for this patient was reviewed today. (Flores Emerson MD)    Possible medication side effects, risk of tolerance/dependence & alternative treatments discussed., No signs of potential drug abuse or diversion identified. (Florse Emerson MD)  Medication contract signed today.  (Flores Emerson MD)

## 2018-03-03 LAB — URINE CULTURE, ROUTINE: NORMAL

## 2018-03-09 RX ORDER — ESCITALOPRAM OXALATE 10 MG/1
TABLET ORAL
Qty: 30 TABLET | Refills: 5 | Status: ON HOLD | OUTPATIENT
Start: 2018-03-09 | End: 2018-05-01 | Stop reason: HOSPADM

## 2018-03-12 RX ORDER — ESCITALOPRAM OXALATE 10 MG/1
TABLET ORAL
Qty: 30 TABLET | Refills: 0 | OUTPATIENT
Start: 2018-03-12

## 2018-03-15 ENCOUNTER — TELEPHONE (OUTPATIENT)
Dept: FAMILY MEDICINE CLINIC | Age: 73
End: 2018-03-15

## 2018-03-28 ENCOUNTER — TELEPHONE (OUTPATIENT)
Dept: FAMILY MEDICINE CLINIC | Age: 73
End: 2018-03-28

## 2018-04-03 ENCOUNTER — TELEPHONE (OUTPATIENT)
Dept: FAMILY MEDICINE CLINIC | Age: 73
End: 2018-04-03

## 2018-04-03 ENCOUNTER — OFFICE VISIT (OUTPATIENT)
Dept: FAMILY MEDICINE CLINIC | Age: 73
End: 2018-04-03

## 2018-04-03 VITALS
DIASTOLIC BLOOD PRESSURE: 91 MMHG | WEIGHT: 118.6 LBS | SYSTOLIC BLOOD PRESSURE: 131 MMHG | BODY MASS INDEX: 18.58 KG/M2 | OXYGEN SATURATION: 97 % | HEART RATE: 63 BPM

## 2018-04-03 DIAGNOSIS — I10 HYPERTENSION, UNSPECIFIED TYPE: Primary | ICD-10-CM

## 2018-04-03 DIAGNOSIS — I10 HYPERTENSION, UNSPECIFIED TYPE: ICD-10-CM

## 2018-04-03 PROCEDURE — 1111F DSCHRG MED/CURRENT MED MERGE: CPT | Performed by: FAMILY MEDICINE

## 2018-04-03 PROCEDURE — 99213 OFFICE O/P EST LOW 20 MIN: CPT | Performed by: FAMILY MEDICINE

## 2018-04-03 RX ORDER — CLONIDINE HYDROCHLORIDE 0.2 MG/1
0.2 TABLET ORAL 3 TIMES DAILY
Qty: 270 TABLET | Refills: 3 | Status: ON HOLD
Start: 2018-04-03 | End: 2018-04-24 | Stop reason: HOSPADM

## 2018-04-03 RX ORDER — CLONIDINE HYDROCHLORIDE 0.2 MG/1
0.2 TABLET ORAL 3 TIMES DAILY
Qty: 270 TABLET | Refills: 3 | Status: SHIPPED | OUTPATIENT
Start: 2018-04-03 | End: 2018-04-03 | Stop reason: SDUPTHER

## 2018-04-04 DIAGNOSIS — E03.9 HYPOTHYROIDISM, UNSPECIFIED TYPE: ICD-10-CM

## 2018-04-04 RX ORDER — LEVOTHYROXINE SODIUM 0.1 MG/1
TABLET ORAL
Qty: 90 TABLET | Refills: 1 | Status: SHIPPED | OUTPATIENT
Start: 2018-04-04 | End: 2018-07-23 | Stop reason: SDUPTHER

## 2018-04-17 ENCOUNTER — NURSE ONLY (OUTPATIENT)
Dept: FAMILY MEDICINE CLINIC | Age: 73
End: 2018-04-17

## 2018-04-17 ENCOUNTER — TELEPHONE (OUTPATIENT)
Dept: FAMILY MEDICINE CLINIC | Age: 73
End: 2018-04-17

## 2018-04-17 DIAGNOSIS — R35.0 FREQUENT URINATION: Primary | ICD-10-CM

## 2018-04-17 LAB
BACTERIA URINE, POC: NEGATIVE
BILIRUBIN URINE: 0 MG/DL
BLOOD, URINE: NEGATIVE
CASTS URINE, POC: NEGATIVE
CLARITY: CLEAR
COLOR: YELLOW
CRYSTALS URINE, POC: NEGATIVE
EPI CELLS URINE, POC: NEGATIVE
GLUCOSE URINE: NEGATIVE
KETONES, URINE: NEGATIVE
LEUKOCYTE EST, POC: NEGATIVE
NITRITE, URINE: NEGATIVE
PH UA: 6 (ref 4.5–8)
PROTEIN UA: NEGATIVE
RBC URINE, POC: NEGATIVE
SPECIFIC GRAVITY UA: 1.02 (ref 1–1.03)
UROBILINOGEN, URINE: NORMAL
WBC URINE, POC: NEGATIVE
YEAST URINE, POC: NEGATIVE

## 2018-04-17 PROCEDURE — 81000 URINALYSIS NONAUTO W/SCOPE: CPT | Performed by: FAMILY MEDICINE

## 2018-04-19 PROBLEM — G93.40 ACUTE ENCEPHALOPATHY: Status: ACTIVE | Noted: 2018-04-19

## 2018-04-19 PROBLEM — J44.1 COPD EXACERBATION (HCC): Status: RESOLVED | Noted: 2018-02-20 | Resolved: 2018-04-19

## 2018-04-20 PROBLEM — I63.9 ISCHEMIC STROKE OF FRONTAL LOBE (HCC): Status: ACTIVE | Noted: 2018-04-20

## 2018-04-23 PROBLEM — Z45.09 ENCOUNTER FOR ELECTRONIC ANALYSIS OF REVEAL EVENT RECORDER: Status: ACTIVE | Noted: 2018-04-23

## 2018-04-26 ENCOUNTER — TELEPHONE (OUTPATIENT)
Dept: FAMILY MEDICINE CLINIC | Age: 73
End: 2018-04-26

## 2018-04-26 ENCOUNTER — CARE COORDINATION (OUTPATIENT)
Dept: CASE MANAGEMENT | Age: 73
End: 2018-04-26

## 2018-04-26 DIAGNOSIS — G93.40 ACUTE ENCEPHALOPATHY: Primary | ICD-10-CM

## 2018-04-26 PROCEDURE — 1111F DSCHRG MED/CURRENT MED MERGE: CPT | Performed by: FAMILY MEDICINE

## 2018-04-27 RX ORDER — CEPHALEXIN 500 MG/1
500 CAPSULE ORAL 3 TIMES DAILY
Qty: 30 CAPSULE | Refills: 0 | Status: ON HOLD | OUTPATIENT
Start: 2018-04-27 | End: 2018-04-28

## 2018-04-28 PROBLEM — L03.90 CELLULITIS: Status: ACTIVE | Noted: 2018-04-28

## 2018-04-30 ENCOUNTER — TELEPHONE (OUTPATIENT)
Dept: INFECTIOUS DISEASES | Age: 73
End: 2018-04-30

## 2018-04-30 PROBLEM — Z71.41 ALCOHOL ABUSE COUNSELING AND SURVEILLANCE: Status: ACTIVE | Noted: 2018-04-23

## 2018-05-02 ENCOUNTER — TELEPHONE (OUTPATIENT)
Dept: CARDIOLOGY CLINIC | Age: 73
End: 2018-05-02

## 2018-05-02 ENCOUNTER — CARE COORDINATION (OUTPATIENT)
Dept: CASE MANAGEMENT | Age: 73
End: 2018-05-02

## 2018-05-02 DIAGNOSIS — L03.115 CELLULITIS OF RIGHT LOWER EXTREMITY: Primary | ICD-10-CM

## 2018-05-02 PROCEDURE — 1111F DSCHRG MED/CURRENT MED MERGE: CPT | Performed by: FAMILY MEDICINE

## 2018-05-04 ENCOUNTER — TELEPHONE (OUTPATIENT)
Dept: FAMILY MEDICINE CLINIC | Age: 73
End: 2018-05-04

## 2018-05-04 DIAGNOSIS — F41.9 ANXIETY: ICD-10-CM

## 2018-05-04 RX ORDER — RISPERIDONE 2 MG/1
2 TABLET, FILM COATED ORAL DAILY
Qty: 90 TABLET | Refills: 0 | Status: ON HOLD | OUTPATIENT
Start: 2018-05-04 | End: 2018-06-19 | Stop reason: HOSPADM

## 2018-05-08 ENCOUNTER — PROCEDURE VISIT (OUTPATIENT)
Dept: CARDIOLOGY CLINIC | Age: 73
End: 2018-05-08

## 2018-05-08 ENCOUNTER — CARE COORDINATION (OUTPATIENT)
Dept: CASE MANAGEMENT | Age: 73
End: 2018-05-08

## 2018-05-08 DIAGNOSIS — I63.40 CEREBRAL INFARCTION DUE TO EMBOLISM OF CEREBRAL ARTERY (HCC): ICD-10-CM

## 2018-05-08 DIAGNOSIS — Z45.09 ENCOUNTER FOR ELECTRONIC ANALYSIS OF REVEAL EVENT RECORDER: Primary | ICD-10-CM

## 2018-05-08 PROCEDURE — 93291 INTERROG DEV EVAL SCRMS IP: CPT | Performed by: INTERNAL MEDICINE

## 2018-05-11 ENCOUNTER — CARE COORDINATION (OUTPATIENT)
Dept: CASE MANAGEMENT | Age: 73
End: 2018-05-11

## 2018-05-15 ENCOUNTER — OFFICE VISIT (OUTPATIENT)
Dept: FAMILY MEDICINE CLINIC | Age: 73
End: 2018-05-15

## 2018-05-15 VITALS
HEART RATE: 74 BPM | WEIGHT: 121 LBS | DIASTOLIC BLOOD PRESSURE: 68 MMHG | BODY MASS INDEX: 19.53 KG/M2 | SYSTOLIC BLOOD PRESSURE: 112 MMHG | OXYGEN SATURATION: 93 %

## 2018-05-15 DIAGNOSIS — E78.5 HYPERLIPIDEMIA, UNSPECIFIED HYPERLIPIDEMIA TYPE: Primary | ICD-10-CM

## 2018-05-15 DIAGNOSIS — D64.9 ANEMIA, UNSPECIFIED TYPE: ICD-10-CM

## 2018-05-15 DIAGNOSIS — R35.0 URINARY FREQUENCY: ICD-10-CM

## 2018-05-15 DIAGNOSIS — F41.9 ANXIETY: ICD-10-CM

## 2018-05-15 DIAGNOSIS — I25.10 CAD IN NATIVE ARTERY: ICD-10-CM

## 2018-05-15 DIAGNOSIS — I10 ESSENTIAL HYPERTENSION: ICD-10-CM

## 2018-05-15 LAB
BACTERIA URINE, POC: 0
BILIRUBIN URINE: 0 MG/DL
BLOOD, URINE: NEGATIVE
CASTS URINE, POC: 0
CLARITY: CLEAR
COLOR: YELLOW
CRYSTALS URINE, POC: 0
EPI CELLS URINE, POC: 0
GLUCOSE URINE: NEGATIVE
KETONES, URINE: NEGATIVE
LEUKOCYTE EST, POC: NORMAL
NITRITE, URINE: NEGATIVE
PH UA: 6.5 (ref 4.5–8)
PROTEIN UA: NEGATIVE
RBC URINE, POC: 0
SPECIFIC GRAVITY UA: 1.03 (ref 1–1.03)
UROBILINOGEN, URINE: NORMAL
WBC URINE, POC: 0
YEAST URINE, POC: 0

## 2018-05-15 PROCEDURE — 99214 OFFICE O/P EST MOD 30 MIN: CPT | Performed by: FAMILY MEDICINE

## 2018-05-15 PROCEDURE — 81000 URINALYSIS NONAUTO W/SCOPE: CPT | Performed by: FAMILY MEDICINE

## 2018-05-15 RX ORDER — ATORVASTATIN CALCIUM 40 MG/1
80 TABLET, FILM COATED ORAL DAILY
Qty: 90 TABLET | Refills: 3 | Status: SHIPPED | OUTPATIENT
Start: 2018-05-15 | End: 2018-07-17 | Stop reason: SDUPTHER

## 2018-05-15 RX ORDER — CLONAZEPAM 0.5 MG/1
TABLET ORAL
Qty: 60 TABLET | Refills: 1 | Status: ON HOLD | OUTPATIENT
Start: 2018-05-15 | End: 2018-06-19 | Stop reason: HOSPADM

## 2018-05-16 ENCOUNTER — CARE COORDINATION (OUTPATIENT)
Dept: CASE MANAGEMENT | Age: 73
End: 2018-05-16

## 2018-05-17 ASSESSMENT — ENCOUNTER SYMPTOMS
COUGH: 0
SHORTNESS OF BREATH: 0

## 2018-05-18 ENCOUNTER — TELEPHONE (OUTPATIENT)
Dept: FAMILY MEDICINE CLINIC | Age: 73
End: 2018-05-18

## 2018-05-18 LAB
ORGANISM: ABNORMAL
URINE CULTURE, ROUTINE: ABNORMAL
URINE CULTURE, ROUTINE: ABNORMAL

## 2018-05-18 RX ORDER — CIPROFLOXACIN 250 MG/1
250 TABLET, FILM COATED ORAL 2 TIMES DAILY
Qty: 6 TABLET | Refills: 0 | Status: SHIPPED | OUTPATIENT
Start: 2018-05-18 | End: 2018-05-21

## 2018-06-05 ENCOUNTER — NURSE ONLY (OUTPATIENT)
Dept: CARDIOLOGY CLINIC | Age: 73
End: 2018-06-05

## 2018-06-05 DIAGNOSIS — I63.9 ISCHEMIC STROKE OF FRONTAL LOBE (HCC): ICD-10-CM

## 2018-06-05 DIAGNOSIS — Z45.09 ENCOUNTER FOR ELECTRONIC ANALYSIS OF REVEAL EVENT RECORDER: ICD-10-CM

## 2018-06-05 PROCEDURE — 93299 PR REM INTERROG ICPMS/SCRMS <30 D TECH REVIEW: CPT | Performed by: INTERNAL MEDICINE

## 2018-06-05 PROCEDURE — 93298 REM INTERROG DEV EVAL SCRMS: CPT | Performed by: INTERNAL MEDICINE

## 2018-06-14 PROBLEM — R29.90 STROKE-LIKE SYMPTOMS: Status: ACTIVE | Noted: 2018-06-14

## 2018-06-15 PROBLEM — Z86.73 CHRONIC ISCHEMIC MULTIFOCAL MULTIPLE VASCULAR TERRITORIES STROKE: Status: ACTIVE | Noted: 2018-04-20

## 2018-06-15 PROBLEM — I69.30 CHRONIC ISCHEMIC MULTIFOCAL MULTIPLE VASCULAR TERRITORIES STROKE: Status: ACTIVE | Noted: 2018-04-20

## 2018-07-01 PROBLEM — R26.2 UNABLE TO AMBULATE: Status: ACTIVE | Noted: 2018-07-01

## 2018-07-02 ENCOUNTER — TELEPHONE (OUTPATIENT)
Dept: FAMILY MEDICINE CLINIC | Age: 73
End: 2018-07-02

## 2018-07-02 NOTE — TELEPHONE ENCOUNTER
Joe Ramírez from [employer] called to verify verbal orders, and to check on a follow appt for pt.

## 2018-07-03 NOTE — TELEPHONE ENCOUNTER
I talked to Ashley Encinas and she does not need orders since patient is in the hospital.  She wants to know if you are going to still sign orders?   Please advise

## 2018-07-09 ENCOUNTER — OFFICE VISIT (OUTPATIENT)
Dept: CARDIOLOGY CLINIC | Age: 73
End: 2018-07-09

## 2018-07-09 ENCOUNTER — CARE COORDINATION (OUTPATIENT)
Dept: CASE MANAGEMENT | Age: 73
End: 2018-07-09

## 2018-07-09 ENCOUNTER — PROCEDURE VISIT (OUTPATIENT)
Dept: CARDIOLOGY CLINIC | Age: 73
End: 2018-07-09

## 2018-07-09 VITALS
HEART RATE: 66 BPM | WEIGHT: 124.4 LBS | SYSTOLIC BLOOD PRESSURE: 138 MMHG | DIASTOLIC BLOOD PRESSURE: 88 MMHG | HEIGHT: 68 IN | BODY MASS INDEX: 18.85 KG/M2

## 2018-07-09 DIAGNOSIS — R29.90 STROKE-LIKE SYMPTOMS: ICD-10-CM

## 2018-07-09 DIAGNOSIS — I10 ESSENTIAL HYPERTENSION: ICD-10-CM

## 2018-07-09 DIAGNOSIS — Z45.09 ENCOUNTER FOR ELECTRONIC ANALYSIS OF REVEAL EVENT RECORDER: ICD-10-CM

## 2018-07-09 DIAGNOSIS — R26.2 UNABLE TO AMBULATE: Primary | ICD-10-CM

## 2018-07-09 DIAGNOSIS — E78.2 MIXED HYPERLIPIDEMIA: ICD-10-CM

## 2018-07-09 DIAGNOSIS — J44.9 COPD, MODERATE (HCC): ICD-10-CM

## 2018-07-09 DIAGNOSIS — Z86.73 HISTORY OF STROKE: Primary | ICD-10-CM

## 2018-07-09 PROCEDURE — 99214 OFFICE O/P EST MOD 30 MIN: CPT | Performed by: INTERNAL MEDICINE

## 2018-07-09 PROCEDURE — 1111F DSCHRG MED/CURRENT MED MERGE: CPT

## 2018-07-09 PROCEDURE — 93291 INTERROG DEV EVAL SCRMS IP: CPT | Performed by: INTERNAL MEDICINE

## 2018-07-09 NOTE — PROGRESS NOTES
BASE) MCG/ACT inhaler Inhale 2 puffs into the lungs every 6 hours as needed for Wheezing 1 Inhaler 2    aspirin 81 MG chewable tablet Take 1 tablet by mouth daily 30 tablet 3    Multiple Vitamins-Minerals (CENTRUM SILVER ADULT 50+ PO) Take 1 tablet by mouth daily       clonazePAM (KLONOPIN) 0.5 MG tablet Take 1 tablet by mouth nightly as needed for Anxiety for up to 5 days. Jonna Denison 5 tablet 0    ipratropium (ATROVENT HFA) 17 MCG/ACT inhaler Inhale 1 puff into the lungs 3 times daily 1 Inhaler 3     No current facility-administered medications for this visit. Assessment and plan:     - Stroke, MRI with multiple acute infarctions    No prior history of arrhythmia.     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 She was seen by cardiothoracic surgery and hospital and anticoagulation recommended at the time. I discussed anticoagulation to decrease the risk of thromboembolic events including stroke. Benefits and alternatives were discussed with patient. Risk of bleeding was discussed. Patient verbalized understanding. However she's not interested in taking anticoagulations at this time. She prefers to continue with aspirin and Plavix. If atrial fibrillation is found by ILR, anticoagulations highly recommended. Continue with follow up with device clinic. Lipitor 40 mg daily    - HTN:  Controlled. Continue current medications. - Tobacco abuse. Smoking cessation has been discussed. Discussed option of anticoagulation, at this time patient would prefer to remain on aspirin since no arrhythmia has been found  Continue with statin. FU in 6 months with Mali[facility] PAT    Thank you for allowing me to participate in the care of Zoey Mason. Further evaluation will be based upon the patient's clinical course and testing results.      All questions and concerns were addressed to the patient/family. Alternatives to my treatment were discussed. I have discussed the above stated plan and the patient verbalized understanding and agreed with the plan. NOTE: This report was transcribed using voice recognition software. Every effort was made to ensure accuracy, however, inadvertent computerized transcription errors may be present. Scribe's attestation: This note was scribed in the presence of Fernie Jara M.D. by Tawanda Pham RN.      Physician Attestation: I, Dr. Fernie Jara, confirm that the scribe's documentation has been prepared under my direction and personally reviewed by me in its entirety. I also confirm that the note above accurately reflects all work, treatment, procedures, and medical decision making performed by me.      Fernie Jara MD, MPH  [place] [address]   Office: [phone]

## 2018-07-10 NOTE — CARE COORDINATION
[facility] Transitions Initial Follow Up Call    Call within 2 business days of discharge: Yes    Patient: Kellie Preciado Patient : 1945   MRN: [id number]  Reason for Admission: Weakness, unable to ambulate  Discharge Date: 18 RARS: Readmission Risk Score: 18       Facility: [address] [place] [address] attempted first 24 hour Care transition follow up call, no answer.     Follow Up  Future Appointments  Date Time Provider Dami Sims   2018 3:00 PM Trini WELLER FF [facility]   2018 3:15 PM Joe Cabezas MD [facility] PLANO [facility]   2018 2:20 PM Lin Ivey MD [facility]   2018 10:00 AM SCHEDULE, [place] PHONE TRANSMISSION FF [facility]       Pat Sarah, RN
TRANSMISSION FF Cardio ADRIANNE Pereira, RN

## 2018-07-11 ENCOUNTER — TELEPHONE (OUTPATIENT)
Dept: FAMILY MEDICINE CLINIC | Age: 73
End: 2018-07-11

## 2018-07-17 ENCOUNTER — TELEPHONE (OUTPATIENT)
Dept: FAMILY MEDICINE CLINIC | Age: 73
End: 2018-07-17

## 2018-07-17 RX ORDER — ATORVASTATIN CALCIUM 80 MG/1
80 TABLET, FILM COATED ORAL DAILY
Qty: 30 TABLET | Refills: 0 | Status: SHIPPED | OUTPATIENT
Start: 2018-07-17 | End: 2018-07-23 | Stop reason: SDUPTHER

## 2018-07-17 NOTE — TELEPHONE ENCOUNTER
Per answering service pt's spouse called 7/17/18 @ 8:29a \"would like clarification on his wife's medication. There have been recent changes and he wants to make sure he's doing it correctly\".     Please call

## 2018-07-23 ENCOUNTER — OFFICE VISIT (OUTPATIENT)
Dept: FAMILY MEDICINE CLINIC | Age: 73
End: 2018-07-23

## 2018-07-23 VITALS
DIASTOLIC BLOOD PRESSURE: 80 MMHG | HEART RATE: 67 BPM | OXYGEN SATURATION: 96 % | SYSTOLIC BLOOD PRESSURE: 120 MMHG | WEIGHT: 127.6 LBS | BODY MASS INDEX: 19.4 KG/M2

## 2018-07-23 DIAGNOSIS — F32.A DEPRESSION, UNSPECIFIED DEPRESSION TYPE: ICD-10-CM

## 2018-07-23 DIAGNOSIS — I10 ESSENTIAL HYPERTENSION: Primary | ICD-10-CM

## 2018-07-23 DIAGNOSIS — E03.9 HYPOTHYROIDISM, UNSPECIFIED TYPE: ICD-10-CM

## 2018-07-23 DIAGNOSIS — I67.9 CEREBROVASCULAR DISEASE: ICD-10-CM

## 2018-07-23 DIAGNOSIS — Z23 NEED FOR VACCINATION: ICD-10-CM

## 2018-07-23 PROCEDURE — 99213 OFFICE O/P EST LOW 20 MIN: CPT | Performed by: FAMILY MEDICINE

## 2018-07-24 RX ORDER — OXYBUTYNIN CHLORIDE 5 MG/1
TABLET ORAL
Qty: 180 TABLET | Refills: 3 | Status: SHIPPED | OUTPATIENT
Start: 2018-07-24 | End: 2019-08-07 | Stop reason: SDUPTHER

## 2018-07-24 RX ORDER — ATORVASTATIN CALCIUM 80 MG/1
80 TABLET, FILM COATED ORAL DAILY
Qty: 90 TABLET | Refills: 3 | Status: SHIPPED | OUTPATIENT
Start: 2018-07-24 | End: 2019-10-01 | Stop reason: SDUPTHER

## 2018-07-24 RX ORDER — CALCIUM CARBONATE 500(1250)
500 TABLET ORAL DAILY
Qty: 30 TABLET | Refills: 3 | Status: ON HOLD | OUTPATIENT
Start: 2018-07-24 | End: 2021-04-11 | Stop reason: ALTCHOICE

## 2018-07-24 RX ORDER — MIRTAZAPINE 15 MG/1
15 TABLET, FILM COATED ORAL NIGHTLY
Qty: 30 TABLET | Refills: 3 | Status: SHIPPED | OUTPATIENT
Start: 2018-07-24 | End: 2018-12-01 | Stop reason: SDUPTHER

## 2018-07-24 RX ORDER — CLONIDINE HYDROCHLORIDE 0.3 MG/1
0.3 TABLET ORAL 3 TIMES DAILY
Qty: 90 TABLET | Refills: 3 | Status: SHIPPED | OUTPATIENT
Start: 2018-07-24 | End: 2019-03-12 | Stop reason: SDUPTHER

## 2018-07-24 RX ORDER — LEVOTHYROXINE SODIUM 0.1 MG/1
TABLET ORAL
Qty: 90 TABLET | Refills: 3 | Status: SHIPPED | OUTPATIENT
Start: 2018-07-24 | End: 2019-08-07 | Stop reason: SDUPTHER

## 2018-07-24 RX ORDER — ASPIRIN 81 MG/1
81 TABLET, CHEWABLE ORAL DAILY
Qty: 30 TABLET | Refills: 3 | Status: SHIPPED | OUTPATIENT
Start: 2018-07-24 | End: 2019-01-14 | Stop reason: SDUPTHER

## 2018-07-24 NOTE — PROGRESS NOTES
Subjective:      Patient ID: Donny Alamo is a 67 y.o. female. HPI  Patient presents for hospital follow-up. She was admitted from 7/1/2018 through 7/8/2018 at [facility]. She presented with fatigue and generalized weakness. She also had trouble swallowing.  notes that she had been without her dentures for about 6 weeks. When she finally did get her dentures replaced, she tried to eat a hamburger and had trouble swallowing this. There is some consideration that patient would need a PEG, but, ultimately, her symptoms completely resolved. She is now swallowing well.  is present at today's visit. He is helping patient manage her medications. There is some question previously as to whether or not patient was taking these appropriately. There was some concern that this may have contributed to his altered mental status on and off. History of hypertension. Blood pressure has been better controlled recently. Mood seems overall good. Appetite is good. Patient is feeling mentally clear.   Review of Systems    Patient Active Problem List   Diagnosis    Incontinence    Hypothyroidism    Smoker    Anxiety    History of alcoholism ([address]    Hyperlipidemia    DIMAS (dyspnea on exertion)    Acute DVT (deep venous thrombosis) (HCC)    DVT (deep venous thrombosis) (formerly [facility])    SOB (shortness of breath)    Fatigue    General weakness    Centrilobular emphysema ([address]    Essential hypertension    Chronic fatigue    COPD, moderate ([address]    Encephalopathy    CAD in native artery    Osteopenia    Trapped lung    Acute encephalopathy    Chronic ischemic multifocal multiple vascular territories stroke    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 ([address]    Cardiac arrhythmia    Alcohol abuse counseling and surveillance    Cellulitis    Right forearm cellulitis    Respiratory distress    Superficial thrombophlebitis of right upper extremity    Severe infection    Tobacco abuse counseling    Therapeutic drug monitoring    Complex care coordination    Stroke-like symptoms    History of recent stroke    Depression    Unable to ambulate    [facility] ([facility]A) [facility]    Oropharyngeal dysphagia    HTN (hypertension), benign    Dyslipidemia       Outpatient Prescriptions Marked as Taking for the 7/23/18 encounter (Office Visit) with Fernanda Lynn MD   Medication Sig Dispense Refill    atorvastatin (LIPITOR) 80 MG tablet Take 1 tablet by mouth daily 30 tablet 0    ARIPiprazole (ABILIFY) 2 MG tablet Take 1 tablet by mouth daily 30 tablet 3    calcium elemental (OSCAL) 500 MG TABS tablet Take 1 tablet by mouth daily 30 tablet 3    mirtazapine (REMERON) 15 MG tablet Take 1 tablet by mouth nightly 30 tablet 3    lisinopril (PRINIVIL;ZESTRIL) 40 MG tablet Take 1 tablet by mouth daily 30 tablet 3    escitalopram (LEXAPRO) 10 MG tablet Take 1 tablet by mouth daily 30 tablet 0    clopidogrel (PLAVIX) 75 MG tablet Take 1 tablet by mouth daily 30 tablet 3    labetalol (NORMODYNE) 100 MG tablet Take 1 tablet by mouth every 12 hours 60 tablet 3    cloNIDine (CATAPRES) 0.3 MG tablet Take 1 tablet by mouth 3 times daily 60 tablet 3    levothyroxine (SYNTHROID) 100 MCG tablet TAKE ONE TABLET BY MOUTH DAILY 90 tablet 1    oxybutynin (DITROPAN) 5 MG tablet TAKE ONE TABLET BY MOUTH TWICE A  tablet 1    budesonide-formoterol (SYMBICORT) 160-4.5 MCG/ACT AERO Inhale 2 puffs into the lungs 2 times daily 1 Inhaler 2    albuterol sulfate  (90 BASE) MCG/ACT inhaler Inhale 2 puffs into the lungs every 6 hours as needed for Wheezing 1 Inhaler 2    aspirin 81 MG chewable tablet Take 1 tablet by mouth daily 30 tablet 3    Multiple Vitamins-Minerals (CENTRUM SILVER ADULT 50+ PO) Take 1 tablet by mouth daily          Allergies   Allergen Reactions    Lipitor [Atorvastatin] Other (See Comments)     Weakness, severe    Morphine Shortness Of Breath    Keflex

## 2018-08-21 ENCOUNTER — NURSE ONLY (OUTPATIENT)
Dept: CARDIOLOGY CLINIC | Age: 73
End: 2018-08-21

## 2018-08-21 DIAGNOSIS — Z45.09 ENCOUNTER FOR ELECTRONIC ANALYSIS OF REVEAL EVENT RECORDER: ICD-10-CM

## 2018-08-21 DIAGNOSIS — Z86.73 HISTORY OF RECENT STROKE: ICD-10-CM

## 2018-08-21 PROCEDURE — 93296 REM INTERROG EVL PM/IDS: CPT | Performed by: INTERNAL MEDICINE

## 2018-08-21 PROCEDURE — 93294 REM INTERROG EVL PM/LDLS PM: CPT | Performed by: INTERNAL MEDICINE

## 2018-08-21 NOTE — LETTER
[address] [facility] [phone]  [address] [address] [phone]    [facility]          08/28/18        [address] [id number]        Dear George Patel    This letter is to inform you that we received the transmission from your monitor at home that checks your pacemaker and/or defibrillator, or implanted heart monitor. The next date your monitor will automatically transmit will be 9/25/18. Please do not send additional routine transmissions unless specifically requested. Your device and monitor are wireless and most transmit cellularly, but please periodically check your monitor is still plugged in to the electrical outlet. If you still use the telephone land line to send please ensure the connection to the phone stephanie is secure. This will help to ensure successful automatic transmissions in the future. Also, the monitor needs to be close to you while sleeping at night. Please be aware that the remote device transmission sites are periodically monitored only during regular business hours during which simultaneous in-office device clinics are being run. If your transmission requires attention, we will contact you as soon as possible. Thank you.             [facility]

## 2018-08-27 ENCOUNTER — TELEPHONE (OUTPATIENT)
Dept: FAMILY MEDICINE CLINIC | Age: 73
End: 2018-08-27

## 2018-08-27 NOTE — TELEPHONE ENCOUNTER
[facility] states that pt will be discharged on 8/27 due to completion of care.

## 2018-08-28 PROBLEM — Z45.09 ENCOUNTER FOR ELECTRONIC ANALYSIS OF REVEAL EVENT RECORDER: Status: ACTIVE | Noted: 2018-08-28

## 2018-08-28 NOTE — PROGRESS NOTES
Carelink transmission shows normal Linq/ILR function. Battery function stable. No symptoms recorded. AF: NSR with frequent PAC's. See Paceart report under cardiology tab. Recheck 1 mos.  mk

## 2018-09-17 RX ORDER — LABETALOL 100 MG/1
100 TABLET, FILM COATED ORAL EVERY 12 HOURS SCHEDULED
Qty: 60 TABLET | Refills: 1 | Status: SHIPPED | OUTPATIENT
Start: 2018-09-17 | End: 2018-11-12 | Stop reason: SDUPTHER

## 2018-09-24 RX ORDER — ESCITALOPRAM OXALATE 10 MG/1
10 TABLET ORAL DAILY
Qty: 30 TABLET | Refills: 2 | Status: SHIPPED | OUTPATIENT
Start: 2018-09-24 | End: 2019-01-14 | Stop reason: SDUPTHER

## 2018-09-25 ENCOUNTER — NURSE ONLY (OUTPATIENT)
Dept: CARDIOLOGY CLINIC | Age: 73
End: 2018-09-25
Payer: COMMERCIAL

## 2018-09-25 DIAGNOSIS — I63.9 CEREBROVASCULAR ACCIDENT (CVA), UNSPECIFIED MECHANISM (HCC): ICD-10-CM

## 2018-09-25 DIAGNOSIS — Z45.09 ENCOUNTER FOR ELECTRONIC ANALYSIS OF REVEAL EVENT RECORDER: ICD-10-CM

## 2018-09-25 PROCEDURE — 93299 PR REM INTERROG ICPMS/SCRMS <30 D TECH REVIEW: CPT | Performed by: INTERNAL MEDICINE

## 2018-09-25 PROCEDURE — 93298 REM INTERROG DEV EVAL SCRMS: CPT | Performed by: INTERNAL MEDICINE

## 2018-09-25 NOTE — LETTER
[address] [facility] [phone]  [address] [address] [phone]    [facility]Def[facility]illator Clinic          09/28/18        [address] [address]        Dear Matthew Sanon    This letter is to inform you that we received the transmission from your monitor at home that checks your pacemaker and/or defibrillator, or implanted heart monitor. Your report shows no abnormal rhythms. The next date your monitor will automatically transmit will be 10-30-18. Please do not send additional routine transmissions unless specifically requested. Your device and monitor are wireless and most transmit cellularly, but please periodically check your monitor is still plugged in to the electrical outlet. If you still use the telephone land line to send please ensure the connection to the phone stephanie is secure. This will help to ensure successful automatic transmissions in the future. Also, the monitor needs to be close to you while sleeping at night. Please be aware that the remote device transmission sites are periodically monitored only during regular business hours during which simultaneous in-office device clinics are being run. If your transmission requires attention, we will contact you as soon as possible. Thank you.             [facility] [place]

## 2018-10-30 ENCOUNTER — NURSE ONLY (OUTPATIENT)
Dept: CARDIOLOGY CLINIC | Age: 73
End: 2018-10-30
Payer: COMMERCIAL

## 2018-10-30 DIAGNOSIS — Z45.09 ENCOUNTER FOR ELECTRONIC ANALYSIS OF REVEAL EVENT RECORDER: ICD-10-CM

## 2018-10-30 DIAGNOSIS — R29.90 STROKE-LIKE SYMPTOMS: ICD-10-CM

## 2018-10-30 PROCEDURE — 93298 REM INTERROG DEV EVAL SCRMS: CPT | Performed by: INTERNAL MEDICINE

## 2018-10-30 PROCEDURE — 93299 PR REM INTERROG ICPMS/SCRMS <30 D TECH REVIEW: CPT | Performed by: INTERNAL MEDICINE

## 2018-11-12 RX ORDER — LABETALOL 100 MG/1
100 TABLET, FILM COATED ORAL EVERY 12 HOURS SCHEDULED
Qty: 60 TABLET | Refills: 0 | Status: SHIPPED | OUTPATIENT
Start: 2018-11-12 | End: 2018-12-13 | Stop reason: SDUPTHER

## 2018-12-01 ENCOUNTER — OFFICE VISIT (OUTPATIENT)
Dept: FAMILY MEDICINE CLINIC | Age: 73
End: 2018-12-01
Payer: COMMERCIAL

## 2018-12-01 VITALS
BODY MASS INDEX: 20.1 KG/M2 | HEART RATE: 63 BPM | OXYGEN SATURATION: 97 % | DIASTOLIC BLOOD PRESSURE: 80 MMHG | SYSTOLIC BLOOD PRESSURE: 140 MMHG | WEIGHT: 132.2 LBS

## 2018-12-01 DIAGNOSIS — Z23 NEED FOR VACCINATION: ICD-10-CM

## 2018-12-01 DIAGNOSIS — N39.0 URINARY TRACT INFECTION WITHOUT HEMATURIA, SITE UNSPECIFIED: ICD-10-CM

## 2018-12-01 DIAGNOSIS — E78.5 HYPERLIPIDEMIA, UNSPECIFIED HYPERLIPIDEMIA TYPE: ICD-10-CM

## 2018-12-01 DIAGNOSIS — F10.21 HISTORY OF ALCOHOLISM (HCC): ICD-10-CM

## 2018-12-01 DIAGNOSIS — F41.9 ANXIETY: ICD-10-CM

## 2018-12-01 DIAGNOSIS — E55.9 VITAMIN D DEFICIENCY: ICD-10-CM

## 2018-12-01 DIAGNOSIS — I67.9 CVD (CEREBROVASCULAR DISEASE): ICD-10-CM

## 2018-12-01 DIAGNOSIS — R82.90 BAD ODOR OF URINE: ICD-10-CM

## 2018-12-01 DIAGNOSIS — Z12.39 BREAST CANCER SCREENING: ICD-10-CM

## 2018-12-01 DIAGNOSIS — F32.A DEPRESSION, UNSPECIFIED DEPRESSION TYPE: Primary | ICD-10-CM

## 2018-12-01 LAB
BACTERIA URINE, POC: NORMAL
BILIRUBIN URINE: 0 MG/DL
BLOOD, URINE: POSITIVE
CASTS URINE, POC: 0
CLARITY: CLEAR
COLOR: YELLOW
CRYSTALS URINE, POC: 0
EPI CELLS URINE, POC: 0
GLUCOSE URINE: NEGATIVE
KETONES, URINE: NEGATIVE
LEUKOCYTE EST, POC: NORMAL
NITRITE, URINE: NEGATIVE
PH UA: 7 (ref 4.5–8)
PROTEIN UA: NEGATIVE
RBC URINE, POC: NORMAL
SPECIFIC GRAVITY UA: 1.01 (ref 1–1.03)
UROBILINOGEN, URINE: NORMAL
WBC URINE, POC: NORMAL
YEAST URINE, POC: 0

## 2018-12-01 PROCEDURE — G0008 ADMIN INFLUENZA VIRUS VAC: HCPCS | Performed by: FAMILY MEDICINE

## 2018-12-01 PROCEDURE — 99214 OFFICE O/P EST MOD 30 MIN: CPT | Performed by: FAMILY MEDICINE

## 2018-12-01 PROCEDURE — 90662 IIV NO PRSV INCREASED AG IM: CPT | Performed by: FAMILY MEDICINE

## 2018-12-01 PROCEDURE — 81000 URINALYSIS NONAUTO W/SCOPE: CPT | Performed by: FAMILY MEDICINE

## 2018-12-01 RX ORDER — MIRTAZAPINE 15 MG/1
15 TABLET, FILM COATED ORAL NIGHTLY
Qty: 30 TABLET | Refills: 11 | Status: ON HOLD | OUTPATIENT
Start: 2018-12-01 | End: 2019-07-26 | Stop reason: HOSPADM

## 2018-12-01 RX ORDER — NITROFURANTOIN 25; 75 MG/1; MG/1
100 CAPSULE ORAL 2 TIMES DAILY
Qty: 20 CAPSULE | Refills: 0 | Status: SHIPPED | OUTPATIENT
Start: 2018-12-01 | End: 2018-12-11

## 2018-12-01 RX ORDER — CLONAZEPAM 0.5 MG/1
0.5 TABLET ORAL DAILY PRN
Qty: 10 TABLET | Refills: 2 | Status: SHIPPED | OUTPATIENT
Start: 2018-12-01 | End: 2019-02-11 | Stop reason: SDUPTHER

## 2018-12-01 RX ORDER — CLONAZEPAM 0.5 MG/1
0.5 TABLET ORAL DAILY PRN
Qty: 10 TABLET | Refills: 2 | Status: SHIPPED | OUTPATIENT
Start: 2018-12-01 | End: 2018-12-01 | Stop reason: SDUPTHER

## 2018-12-01 NOTE — PROGRESS NOTES
Vaccine Information Sheet, \"Influenza - Inactivated\"  given to Reilly Gallego, or parent/legal guardian of  Reilly Gallego and verbalized understanding. Patient responses:    Have you ever had a reaction to a flu vaccine? No  Are you able to eat eggs without adverse effects? Yes  Do you have any current illness? No  Have you ever had Guillian Eveleth Syndrome? No    Flu vaccine given per order. Please see immunization tab.     Immunization(s) given during visit:     Immunizations     Name Date Dose Route    Influenza, High Dose (Fluzone 65 yrs and older) 12/1/2018 0.5 mL Intramuscular    Site: Deltoid- Left    Lot: [id number]    NDC: 69268-116-63

## 2018-12-01 NOTE — PROGRESS NOTES
Tobacco abuse counseling    Therapeutic drug monitoring    Complex care coordination    Stroke-like symptoms    History of recent stroke    Depression    Unable to ambulate    C[facility]brovascular [facility] ([facility]A) [facility]    Oropharyngeal dysphagia    HTN (hypertension), benign    Dyslipidemia    Encounter for electronic analysis of reveal event recorder       Outpatient Prescriptions Marked as Taking for the 12/1/18 encounter (Office Visit) with Chiquis Hernandez MD   Medication Sig Dispense Refill    labetalol (NORMODYNE) 100 MG tablet Take 1 tablet by mouth every 12 hours 60 tablet 0    escitalopram (LEXAPRO) 10 MG tablet Take 1 tablet by mouth daily 30 tablet 2    mirtazapine (REMERON) 15 MG tablet Take 1 tablet by mouth nightly 30 tablet 3    cloNIDine (CATAPRES) 0.3 MG tablet Take 1 tablet by mouth 3 times daily 90 tablet 3    calcium elemental (OSCAL) 500 MG TABS tablet Take 1 tablet by mouth daily 30 tablet 3    oxybutynin (DITROPAN) 5 MG tablet TAKE ONE TABLET BY MOUTH TWICE A  tablet 3    aspirin 81 MG chewable tablet Take 1 tablet by mouth daily 30 tablet 3    levothyroxine (SYNTHROID) 100 MCG tablet TAKE ONE TABLET BY MOUTH DAILY 90 tablet 3    lisinopril (PRINIVIL;ZESTRIL) 40 MG tablet Take 1 tablet by mouth daily 30 tablet 3    budesonide-formoterol (SYMBICORT) 160-4.5 MCG/ACT AERO Inhale 2 puffs into the lungs 2 times daily 1 Inhaler 2    Multiple Vitamins-Minerals (CENTRUM SILVER ADULT 50+ PO) Take 1 tablet by mouth daily          Allergies   Allergen Reactions    Lipitor [Atorvastatin] Other (See Comments)     Weakness, severe    Morphine Shortness Of Breath    Keflex [Cephalexin] Other (See Comments)     SOB & bilateral arms shaking     Hctz [Hydrochlorothiazide] Other (See Comments)     Hyponatremia, severe      Norvasc [Amlodipine Besylate] Swelling     Of neck    Penicillins Hives    Tramadol Itching    Hydralazine Palpitations and Other (See Comments) deficiency  VITAMIN D 25 HYDROXY   9. Anxiety  clonazePAM (KLONOPIN) 0.5 MG tablet       10. Urinary tract infection without hematuria, site unspecified  Urine Culture       Plan:     Depression is currently well controlled. Continue Remeron. Will add clonazepam at patient's request for anxiety. Discussed that appropriate use of this would be no more frequent than twice a week. Influenza vaccine today. Mammogram ordered. Check FLP and CMP. Commended on continued abstinence from alcohol. Continue statin, aspirin, and blood pressure control. UA convincing for UTI. Begin Macrobid 100 mg twice a day for 10 days. Send urine for culture. Check vitamin D level. Controlled Substances Monitoring:     RX Monitoring 12/1/2018   Attestation The Prescription Monitoring Report for this patient was reviewed today. Documentation Possible medication side effects, risk of tolerance/dependence & alternative treatments discussed. ;No signs of potential drug abuse or diversion identified.    Medication Contracts -

## 2018-12-03 ENCOUNTER — TELEPHONE (OUTPATIENT)
Dept: FAMILY MEDICINE CLINIC | Age: 73
End: 2018-12-03

## 2018-12-03 LAB
ORGANISM: ABNORMAL
URINE CULTURE, ROUTINE: ABNORMAL
URINE CULTURE, ROUTINE: ABNORMAL

## 2018-12-04 ENCOUNTER — NURSE ONLY (OUTPATIENT)
Dept: CARDIOLOGY CLINIC | Age: 73
End: 2018-12-04
Payer: COMMERCIAL

## 2018-12-04 DIAGNOSIS — Z45.09 ENCOUNTER FOR ELECTRONIC ANALYSIS OF REVEAL EVENT RECORDER: ICD-10-CM

## 2018-12-04 DIAGNOSIS — I63.9 CEREBROVASCULAR ACCIDENT (CVA), UNSPECIFIED MECHANISM (HCC): ICD-10-CM

## 2018-12-04 PROCEDURE — 93298 REM INTERROG DEV EVAL SCRMS: CPT | Performed by: INTERNAL MEDICINE

## 2018-12-04 PROCEDURE — 93299 PR REM INTERROG ICPMS/SCRMS <30 D TECH REVIEW: CPT | Performed by: INTERNAL MEDICINE

## 2018-12-13 RX ORDER — LABETALOL 100 MG/1
100 TABLET, FILM COATED ORAL EVERY 12 HOURS SCHEDULED
Qty: 60 TABLET | Refills: 0 | Status: SHIPPED | OUTPATIENT
Start: 2018-12-13 | End: 2019-01-29 | Stop reason: SDUPTHER

## 2018-12-17 ENCOUNTER — APPOINTMENT (OUTPATIENT)
Dept: CT IMAGING | Age: 73
End: 2018-12-17
Payer: COMMERCIAL

## 2018-12-17 ENCOUNTER — HOSPITAL ENCOUNTER (EMERGENCY)
Age: 73
Discharge: HOME | End: 2018-12-17
Attending: EMERGENCY MEDICINE
Payer: COMMERCIAL

## 2018-12-17 ENCOUNTER — APPOINTMENT (OUTPATIENT)
Dept: GENERAL RADIOLOGY | Age: 73
End: 2018-12-17
Payer: COMMERCIAL

## 2018-12-17 VITALS
HEIGHT: 68 IN | TEMPERATURE: 98 F | HEART RATE: 72 BPM | WEIGHT: 124 LBS | DIASTOLIC BLOOD PRESSURE: 69 MMHG | RESPIRATION RATE: 16 BRPM | SYSTOLIC BLOOD PRESSURE: 158 MMHG | OXYGEN SATURATION: 93 % | BODY MASS INDEX: 18.79 KG/M2

## 2018-12-17 DIAGNOSIS — N30.00 ACUTE CYSTITIS WITHOUT HEMATURIA: Primary | ICD-10-CM

## 2018-12-17 DIAGNOSIS — R42 DIZZINESS: ICD-10-CM

## 2018-12-17 LAB
A/G RATIO: 1.1 (ref 1.1–2.2)
ALBUMIN SERPL-MCNC: 4.1 G/DL (ref 3.4–5)
ALP BLD-CCNC: 53 U/L (ref 40–129)
ALT SERPL-CCNC: 8 U/L (ref 10–40)
ANION GAP SERPL CALCULATED.3IONS-SCNC: 11 MMOL/L (ref 3–16)
AST SERPL-CCNC: 20 U/L (ref 15–37)
BACTERIA: ABNORMAL /HPF
BASOPHILS ABSOLUTE: 0 K/UL (ref 0–0.2)
BASOPHILS RELATIVE PERCENT: 0.2 %
BILIRUB SERPL-MCNC: 0.5 MG/DL (ref 0–1)
BILIRUBIN URINE: NEGATIVE
BLOOD, URINE: ABNORMAL
BUN BLDV-MCNC: 6 MG/DL (ref 7–20)
CALCIUM SERPL-MCNC: 9.4 MG/DL (ref 8.3–10.6)
CHLORIDE BLD-SCNC: 98 MMOL/L (ref 99–110)
CLARITY: CLEAR
CO2: 28 MMOL/L (ref 21–32)
COLOR: YELLOW
CREAT SERPL-MCNC: 0.6 MG/DL (ref 0.6–1.2)
EKG ATRIAL RATE: 66 BPM
EKG DIAGNOSIS: NORMAL
EKG P AXIS: 41 DEGREES
EKG P-R INTERVAL: 106 MS
EKG Q-T INTERVAL: 412 MS
EKG QRS DURATION: 76 MS
EKG QTC CALCULATION (BAZETT): 431 MS
EKG R AXIS: 26 DEGREES
EKG T AXIS: 40 DEGREES
EKG VENTRICULAR RATE: 66 BPM
EOSINOPHILS ABSOLUTE: 0.1 K/UL (ref 0–0.6)
EOSINOPHILS RELATIVE PERCENT: 1.1 %
EPITHELIAL CELLS, UA: 0 /HPF (ref 0–5)
GFR AFRICAN AMERICAN: >60
GFR NON-AFRICAN AMERICAN: >60
GLOBULIN: 3.6 G/DL
GLUCOSE BLD-MCNC: 107 MG/DL (ref 70–99)
GLUCOSE URINE: NEGATIVE MG/DL
HCT VFR BLD CALC: 41 % (ref 36–48)
HEMOGLOBIN: 14.1 G/DL (ref 12–16)
HYALINE CASTS: 0 /LPF (ref 0–8)
KETONES, URINE: NEGATIVE MG/DL
LACTIC ACID: 1.1 MMOL/L (ref 0.4–2)
LEUKOCYTE ESTERASE, URINE: ABNORMAL
LYMPHOCYTES ABSOLUTE: 0.7 K/UL (ref 1–5.1)
LYMPHOCYTES RELATIVE PERCENT: 7.2 %
MCH RBC QN AUTO: 34 PG (ref 26–34)
MCHC RBC AUTO-ENTMCNC: 34.3 G/DL (ref 31–36)
MCV RBC AUTO: 99 FL (ref 80–100)
MICROSCOPIC EXAMINATION: YES
MONOCYTES ABSOLUTE: 0.9 K/UL (ref 0–1.3)
MONOCYTES RELATIVE PERCENT: 8.8 %
NEUTROPHILS ABSOLUTE: 8.2 K/UL (ref 1.7–7.7)
NEUTROPHILS RELATIVE PERCENT: 82.7 %
NITRITE, URINE: NEGATIVE
PDW BLD-RTO: 14.5 % (ref 12.4–15.4)
PH UA: 8
PLATELET # BLD: 318 K/UL (ref 135–450)
PMV BLD AUTO: 8.3 FL (ref 5–10.5)
POTASSIUM REFLEX MAGNESIUM: 3.8 MMOL/L (ref 3.5–5.1)
PROTEIN UA: NEGATIVE MG/DL
RBC # BLD: 4.14 M/UL (ref 4–5.2)
RBC UA: 2 /HPF (ref 0–4)
SODIUM BLD-SCNC: 137 MMOL/L (ref 136–145)
SPECIFIC GRAVITY UA: <1.005
TOTAL PROTEIN: 7.7 G/DL (ref 6.4–8.2)
TROPONIN: <0.01 NG/ML
URINE REFLEX TO CULTURE: YES
URINE TYPE: ABNORMAL
UROBILINOGEN, URINE: 0.2 E.U./DL
WBC # BLD: 9.9 K/UL (ref 4–11)
WBC UA: 22 /HPF (ref 0–5)

## 2018-12-17 PROCEDURE — 80053 COMPREHEN METABOLIC PANEL: CPT

## 2018-12-17 PROCEDURE — 96374 THER/PROPH/DIAG INJ IV PUSH: CPT

## 2018-12-17 PROCEDURE — 6360000002 HC RX W HCPCS: Performed by: EMERGENCY MEDICINE

## 2018-12-17 PROCEDURE — 87186 SC STD MICRODIL/AGAR DIL: CPT

## 2018-12-17 PROCEDURE — 81001 URINALYSIS AUTO W/SCOPE: CPT

## 2018-12-17 PROCEDURE — 99285 EMERGENCY DEPT VISIT HI MDM: CPT

## 2018-12-17 PROCEDURE — 71045 X-RAY EXAM CHEST 1 VIEW: CPT

## 2018-12-17 PROCEDURE — 85025 COMPLETE CBC W/AUTO DIFF WBC: CPT

## 2018-12-17 PROCEDURE — 84484 ASSAY OF TROPONIN QUANT: CPT

## 2018-12-17 PROCEDURE — 93010 ELECTROCARDIOGRAM REPORT: CPT | Performed by: INTERNAL MEDICINE

## 2018-12-17 PROCEDURE — 6370000000 HC RX 637 (ALT 250 FOR IP): Performed by: EMERGENCY MEDICINE

## 2018-12-17 PROCEDURE — 93005 ELECTROCARDIOGRAM TRACING: CPT | Performed by: EMERGENCY MEDICINE

## 2018-12-17 PROCEDURE — 83605 ASSAY OF LACTIC ACID: CPT

## 2018-12-17 PROCEDURE — 87086 URINE CULTURE/COLONY COUNT: CPT

## 2018-12-17 PROCEDURE — 70450 CT HEAD/BRAIN W/O DYE: CPT

## 2018-12-17 PROCEDURE — 2580000003 HC RX 258: Performed by: EMERGENCY MEDICINE

## 2018-12-17 PROCEDURE — 87077 CULTURE AEROBIC IDENTIFY: CPT

## 2018-12-17 RX ORDER — CIPROFLOXACIN 500 MG/1
500 TABLET, FILM COATED ORAL 2 TIMES DAILY
Qty: 20 TABLET | Refills: 0 | Status: SHIPPED | OUTPATIENT
Start: 2018-12-17 | End: 2018-12-27

## 2018-12-17 RX ORDER — LISINOPRIL 10 MG/1
40 TABLET ORAL ONCE
Status: COMPLETED | OUTPATIENT
Start: 2018-12-17 | End: 2018-12-17

## 2018-12-17 RX ORDER — CLONIDINE HYDROCHLORIDE 0.1 MG/1
0.3 TABLET ORAL ONCE
Status: COMPLETED | OUTPATIENT
Start: 2018-12-17 | End: 2018-12-17

## 2018-12-17 RX ORDER — SODIUM CHLORIDE 9 MG/ML
INJECTION, SOLUTION INTRAVENOUS ONCE
Status: COMPLETED | OUTPATIENT
Start: 2018-12-17 | End: 2018-12-17

## 2018-12-17 RX ORDER — CIPROFLOXACIN 500 MG/1
500 TABLET, FILM COATED ORAL ONCE
Status: COMPLETED | OUTPATIENT
Start: 2018-12-17 | End: 2018-12-17

## 2018-12-17 RX ORDER — LABETALOL 200 MG/1
100 TABLET, FILM COATED ORAL ONCE
Status: COMPLETED | OUTPATIENT
Start: 2018-12-17 | End: 2018-12-17

## 2018-12-17 RX ORDER — ONDANSETRON 4 MG/1
4-8 TABLET, ORALLY DISINTEGRATING ORAL EVERY 8 HOURS PRN
Qty: 12 TABLET | Refills: 0 | Status: SHIPPED | OUTPATIENT
Start: 2018-12-17 | End: 2019-02-10 | Stop reason: SDUPTHER

## 2018-12-17 RX ORDER — ONDANSETRON 2 MG/ML
4 INJECTION INTRAMUSCULAR; INTRAVENOUS ONCE
Status: COMPLETED | OUTPATIENT
Start: 2018-12-17 | End: 2018-12-17

## 2018-12-17 RX ADMIN — LABETALOL HYDROCHLORIDE 100 MG: 200 TABLET, FILM COATED ORAL at 08:08

## 2018-12-17 RX ADMIN — CLONIDINE HYDROCHLORIDE 0.3 MG: 0.1 TABLET ORAL at 08:08

## 2018-12-17 RX ADMIN — ONDANSETRON 4 MG: 2 INJECTION INTRAMUSCULAR; INTRAVENOUS at 08:08

## 2018-12-17 RX ADMIN — LISINOPRIL 40 MG: 10 TABLET ORAL at 08:08

## 2018-12-17 RX ADMIN — SODIUM CHLORIDE: 9 INJECTION, SOLUTION INTRAVENOUS at 08:07

## 2018-12-17 RX ADMIN — CIPROFLOXACIN HYDROCHLORIDE 500 MG: 500 TABLET, FILM COATED ORAL at 10:26

## 2018-12-17 NOTE — ED PROVIDER NOTES
TECHNIQUE: CT of the head was performed without the administration of intravenous contrast. Dose modulation, iterative reconstruction, and/or weight based adjustment of the mA/kV was utilized to reduce the radiation dose to as low as reasonably achievable. COMPARISON: 07/02/2018 HISTORY: ORDERING SYSTEM PROVIDED HISTORY: dizzy TECHNOLOGIST PROVIDED HISTORY: Has a \"code stroke\" or \"stroke alert\" been called? ->No Ordering Physician Provided Reason for Exam: dizziness Acuity: Unknown Type of Exam: Unknown FINDINGS: BRAIN/VENTRICLES: There is no acute intracranial hemorrhage, mass effect or midline shift. No abnormal extra-axial fluid collection. The gray-white differentiation is maintained without evidence of an acute infarct. There is no evidence of hydrocephalus. Chronic atrophic and ischemic white matter changes appear similar to the prior exam. ORBITS: The visualized portion of the orbits demonstrate no acute abnormality. SINUSES: The visualized paranasal sinuses and mastoid air cells demonstrate no acute abnormality. SOFT TISSUES/SKULL:  No acute abnormality of the visualized skull or soft tissues. No acute intracranial abnormality. Xr Chest Portable    Result Date: 12/17/2018  EXAMINATION: SINGLE XRAY VIEW OF THE CHEST 12/17/2018 7:25 am COMPARISON: 07/05/2018 HISTORY: ORDERING SYSTEM PROVIDED HISTORY: CP TECHNOLOGIST PROVIDED HISTORY: Reason for exam:->CP Ordering Physician Provided Reason for Exam: Dizziness (Pt brought in by EMS from home. A/ox4 c/o dizziness x 1 week. Pt states increased frequency in urination as well. Pt incontinent of urine. +nausea) Acuity: Unknown Type of Exam: Unknown FINDINGS: Telemetry leads overlie the chest.  Loop recorder projects over the left chest.  Cardiac and mediastinal contours are unchanged with atherosclerotic calcification of the thoracic aorta.   There is a left pleural effusion with associated left basilar airspace disease, unchanged from the previous examination. No pneumothorax. Chronic left basilar pleural-parenchymal disease, unchanged from the previous examination. No new opacities identified. ED COURSE:    Medications administered:  Medications   0.9 % sodium chloride infusion ( Intravenous Stopped 12/17/18 1209)   ondansetron (ZOFRAN) injection 4 mg (4 mg Intravenous Given 12/17/18 0808)   cloNIDine (CATAPRES) tablet 0.3 mg (0.3 mg Oral Given 12/17/18 0808)   labetalol (NORMODYNE) tablet 100 mg (100 mg Oral Given 12/17/18 0808)   lisinopril (PRINIVIL;ZESTRIL) tablet 40 mg (40 mg Oral Given 12/17/18 0808)   ciprofloxacin (CIPRO) tablet 500 mg (500 mg Oral Given 12/17/18 1026)     Vitals:    12/17/18 0812 12/17/18 0815 12/17/18 0830 12/17/18 1130   BP: (!) 180/113 (!) 198/108 (!) 192/85 (!) 158/69   Pulse: 84 73 69 72   Resp:       Temp:       TempSrc:       SpO2: 94% 94% 96% 93%   Weight:       Height:         PROCEDURES:  None    CRITICAL CARE:  None    CONSULTATIONS:  None    MEDICAL DECISION MAKING: Steve Duran is a 75 y.o. female who presented because of dizziness. She has a UTI. She prefers outpatient management and was steady with her gait. Based on history, physical exam, and testing my suspicion is low for hypotension, ACS, malignant dysrhythmia, PE, ICH, CVA, meningitis, vascular occlusion or dissection, sepsis, electrolyte imbalance, overdose, amongst other emergent conditions. Steve Duran was given appropriate discharge instructions. Referral given for follow up care.     Discharge Medication List as of 12/17/2018 12:09 PM      START taking these medications    Details   ciprofloxacin (CIPRO) 500 MG tablet Take 1 tablet by mouth 2 times daily for 10 days, Disp-20 tablet, R-0Print      ondansetron (ZOFRAN ODT) 4 MG disintegrating tablet Take 1-2 tablets by mouth every 8 hours as needed for Nausea May substitute the non ODT tablets if not covered financially by the insurance plan., Disp-12 tablet, R-0Print           FOLLOW UP:

## 2018-12-17 NOTE — ED NOTES
Pt. Presents to ED with c/o dizziness as she reports as side to side dizziness, not room spinning dizziness. Pt. States dizziness x3-4 days. Pt. Also c/o urinary frequency without pain or blood noted to urine. Pt. States just finished abx tx for UTI last week. Pt. Alert and oriented upon assessment. HTN noted , provider notified. Pt. placed in gown and on telemetry monitor per protocol. EKG completed and shown to MD. Updated vs obtained. Bed locked with side rails up x2. Call light within reach. No other needs verbalized at this time. Will continue to monitor.           Ezra Garber RN  12/17/18 3539

## 2018-12-19 LAB
ORGANISM: ABNORMAL
URINE CULTURE, ROUTINE: ABNORMAL
URINE CULTURE, ROUTINE: ABNORMAL

## 2019-01-08 ENCOUNTER — NURSE ONLY (OUTPATIENT)
Dept: CARDIOLOGY CLINIC | Age: 74
End: 2019-01-08
Payer: COMMERCIAL

## 2019-01-08 DIAGNOSIS — Z45.09 ENCOUNTER FOR ELECTRONIC ANALYSIS OF REVEAL EVENT RECORDER: ICD-10-CM

## 2019-01-08 DIAGNOSIS — I63.9 CEREBROVASCULAR ACCIDENT (CVA), UNSPECIFIED MECHANISM (HCC): ICD-10-CM

## 2019-01-08 PROCEDURE — 93298 REM INTERROG DEV EVAL SCRMS: CPT | Performed by: INTERNAL MEDICINE

## 2019-01-08 PROCEDURE — 93299 PR REM INTERROG ICPMS/SCRMS <30 D TECH REVIEW: CPT | Performed by: INTERNAL MEDICINE

## 2019-01-14 RX ORDER — ESCITALOPRAM OXALATE 10 MG/1
10 TABLET ORAL DAILY
Qty: 30 TABLET | Refills: 2 | Status: SHIPPED | OUTPATIENT
Start: 2019-01-14 | End: 2019-03-19 | Stop reason: SDUPTHER

## 2019-01-14 RX ORDER — ASPIRIN 81 MG/1
81 TABLET, CHEWABLE ORAL DAILY
Qty: 30 TABLET | Refills: 3 | Status: SHIPPED | OUTPATIENT
Start: 2019-01-14 | End: 2019-04-15 | Stop reason: SDUPTHER

## 2019-01-29 RX ORDER — LABETALOL 100 MG/1
100 TABLET, FILM COATED ORAL EVERY 12 HOURS SCHEDULED
Qty: 60 TABLET | Refills: 3 | Status: SHIPPED | OUTPATIENT
Start: 2019-01-29 | End: 2019-05-13 | Stop reason: SDUPTHER

## 2019-02-06 ENCOUNTER — TELEPHONE (OUTPATIENT)
Dept: FAMILY MEDICINE CLINIC | Age: 74
End: 2019-02-06

## 2019-02-10 ENCOUNTER — HOSPITAL ENCOUNTER (EMERGENCY)
Age: 74
Discharge: HOME | End: 2019-02-10
Payer: COMMERCIAL

## 2019-02-10 VITALS
RESPIRATION RATE: 14 BRPM | OXYGEN SATURATION: 99 % | BODY MASS INDEX: 18.85 KG/M2 | SYSTOLIC BLOOD PRESSURE: 154 MMHG | TEMPERATURE: 98.6 F | DIASTOLIC BLOOD PRESSURE: 83 MMHG | WEIGHT: 124 LBS | HEART RATE: 62 BPM

## 2019-02-10 DIAGNOSIS — R82.71 BACTERIURIA: ICD-10-CM

## 2019-02-10 DIAGNOSIS — R11.0 NAUSEA: Primary | ICD-10-CM

## 2019-02-10 LAB
A/G RATIO: 1.2 (ref 1.1–2.2)
ALBUMIN SERPL-MCNC: 4.1 G/DL (ref 3.4–5)
ALP BLD-CCNC: 58 U/L (ref 40–129)
ALT SERPL-CCNC: 7 U/L (ref 10–40)
ANION GAP SERPL CALCULATED.3IONS-SCNC: 13 MMOL/L (ref 3–16)
AST SERPL-CCNC: 13 U/L (ref 15–37)
BACTERIA: ABNORMAL /HPF
BASOPHILS ABSOLUTE: 0 K/UL (ref 0–0.2)
BASOPHILS RELATIVE PERCENT: 0.5 %
BILIRUB SERPL-MCNC: 0.3 MG/DL (ref 0–1)
BILIRUBIN URINE: NEGATIVE
BLOOD, URINE: ABNORMAL
BUN BLDV-MCNC: 6 MG/DL (ref 7–20)
CALCIUM SERPL-MCNC: 9 MG/DL (ref 8.3–10.6)
CHLORIDE BLD-SCNC: 95 MMOL/L (ref 99–110)
CLARITY: CLEAR
CO2: 26 MMOL/L (ref 21–32)
COLOR: YELLOW
COMMENT UA: ABNORMAL
CREAT SERPL-MCNC: 0.6 MG/DL (ref 0.6–1.2)
EOSINOPHILS ABSOLUTE: 0.1 K/UL (ref 0–0.6)
EOSINOPHILS RELATIVE PERCENT: 1.1 %
EPITHELIAL CELLS, UA: ABNORMAL /HPF
GFR AFRICAN AMERICAN: >60
GFR NON-AFRICAN AMERICAN: >60
GLOBULIN: 3.5 G/DL
GLUCOSE BLD-MCNC: 125 MG/DL (ref 70–99)
GLUCOSE URINE: NEGATIVE MG/DL
HCT VFR BLD CALC: 42.8 % (ref 36–48)
HEMOGLOBIN: 14.6 G/DL (ref 12–16)
KETONES, URINE: NEGATIVE MG/DL
LEUKOCYTE ESTERASE, URINE: ABNORMAL
LIPASE: 18 U/L (ref 13–60)
LYMPHOCYTES ABSOLUTE: 0.5 K/UL (ref 1–5.1)
LYMPHOCYTES RELATIVE PERCENT: 5.5 %
MCH RBC QN AUTO: 33.5 PG (ref 26–34)
MCHC RBC AUTO-ENTMCNC: 34.1 G/DL (ref 31–36)
MCV RBC AUTO: 98.2 FL (ref 80–100)
MICROSCOPIC EXAMINATION: YES
MONOCYTES ABSOLUTE: 0.7 K/UL (ref 0–1.3)
MONOCYTES RELATIVE PERCENT: 7.8 %
NEUTROPHILS ABSOLUTE: 7.9 K/UL (ref 1.7–7.7)
NEUTROPHILS RELATIVE PERCENT: 85.1 %
NITRITE, URINE: NEGATIVE
PDW BLD-RTO: 13.8 % (ref 12.4–15.4)
PH UA: 6.5
PLATELET # BLD: 335 K/UL (ref 135–450)
PMV BLD AUTO: 8.5 FL (ref 5–10.5)
POTASSIUM REFLEX MAGNESIUM: 3.7 MMOL/L (ref 3.5–5.1)
PROTEIN UA: 30 MG/DL
RBC # BLD: 4.36 M/UL (ref 4–5.2)
RBC UA: ABNORMAL /HPF (ref 0–2)
SODIUM BLD-SCNC: 134 MMOL/L (ref 136–145)
SPECIFIC GRAVITY UA: 1.01
TOTAL PROTEIN: 7.6 G/DL (ref 6.4–8.2)
URINE REFLEX TO CULTURE: YES
URINE TYPE: ABNORMAL
UROBILINOGEN, URINE: 0.2 E.U./DL
WBC # BLD: 9.3 K/UL (ref 4–11)
WBC UA: ABNORMAL /HPF (ref 0–5)

## 2019-02-10 PROCEDURE — 81001 URINALYSIS AUTO W/SCOPE: CPT

## 2019-02-10 PROCEDURE — 99283 EMERGENCY DEPT VISIT LOW MDM: CPT

## 2019-02-10 PROCEDURE — 6360000002 HC RX W HCPCS: Performed by: NURSE PRACTITIONER

## 2019-02-10 PROCEDURE — 80053 COMPREHEN METABOLIC PANEL: CPT

## 2019-02-10 PROCEDURE — 87086 URINE CULTURE/COLONY COUNT: CPT

## 2019-02-10 PROCEDURE — 83690 ASSAY OF LIPASE: CPT

## 2019-02-10 PROCEDURE — 96374 THER/PROPH/DIAG INJ IV PUSH: CPT

## 2019-02-10 PROCEDURE — 85025 COMPLETE CBC W/AUTO DIFF WBC: CPT

## 2019-02-10 RX ORDER — ONDANSETRON 2 MG/ML
4 INJECTION INTRAMUSCULAR; INTRAVENOUS EVERY 30 MIN PRN
Status: DISCONTINUED | OUTPATIENT
Start: 2019-02-10 | End: 2019-02-10 | Stop reason: HOSPADM

## 2019-02-10 RX ORDER — ONDANSETRON 4 MG/1
4-8 TABLET, ORALLY DISINTEGRATING ORAL EVERY 12 HOURS PRN
Qty: 12 TABLET | Refills: 0 | Status: SHIPPED | OUTPATIENT
Start: 2019-02-10 | End: 2019-02-11 | Stop reason: SDUPTHER

## 2019-02-10 RX ORDER — NITROFURANTOIN 25; 75 MG/1; MG/1
100 CAPSULE ORAL 2 TIMES DAILY
Qty: 10 CAPSULE | Refills: 0 | Status: SHIPPED | OUTPATIENT
Start: 2019-02-10 | End: 2019-02-15

## 2019-02-10 RX ADMIN — ONDANSETRON 4 MG: 2 INJECTION INTRAMUSCULAR; INTRAVENOUS at 07:28

## 2019-02-10 ASSESSMENT — ENCOUNTER SYMPTOMS
ABDOMINAL PAIN: 0
SORE THROAT: 0
NAUSEA: 1
RHINORRHEA: 0
DIARRHEA: 0
CONSTIPATION: 0
SHORTNESS OF BREATH: 0
VOMITING: 0
BLOOD IN STOOL: 0

## 2019-02-11 ENCOUNTER — OFFICE VISIT (OUTPATIENT)
Dept: FAMILY MEDICINE CLINIC | Age: 74
End: 2019-02-11
Payer: COMMERCIAL

## 2019-02-11 VITALS
SYSTOLIC BLOOD PRESSURE: 130 MMHG | OXYGEN SATURATION: 93 % | WEIGHT: 130.2 LBS | DIASTOLIC BLOOD PRESSURE: 90 MMHG | BODY MASS INDEX: 19.8 KG/M2 | HEART RATE: 74 BPM

## 2019-02-11 DIAGNOSIS — R11.0 NAUSEA: ICD-10-CM

## 2019-02-11 DIAGNOSIS — F41.9 ANXIETY: ICD-10-CM

## 2019-02-11 DIAGNOSIS — N39.0 URINARY TRACT INFECTION WITHOUT HEMATURIA, SITE UNSPECIFIED: Primary | ICD-10-CM

## 2019-02-11 LAB — URINE CULTURE, ROUTINE: NORMAL

## 2019-02-11 PROCEDURE — 99213 OFFICE O/P EST LOW 20 MIN: CPT | Performed by: FAMILY MEDICINE

## 2019-02-11 RX ORDER — ONDANSETRON 8 MG/1
8 TABLET, ORALLY DISINTEGRATING ORAL EVERY 8 HOURS PRN
Qty: 10 TABLET | Refills: 0 | Status: SHIPPED | OUTPATIENT
Start: 2019-02-11 | End: 2019-06-17 | Stop reason: ALTCHOICE

## 2019-02-11 RX ORDER — CLONAZEPAM 0.5 MG/1
0.5 TABLET ORAL NIGHTLY PRN
Qty: 30 TABLET | Refills: 2 | Status: SHIPPED | OUTPATIENT
Start: 2019-02-11 | End: 2019-05-13

## 2019-02-11 ASSESSMENT — ENCOUNTER SYMPTOMS
NAUSEA: 1
ABDOMINAL PAIN: 0
SHORTNESS OF BREATH: 0
COUGH: 0
BLOOD IN STOOL: 0
CONSTIPATION: 0
ABDOMINAL DISTENTION: 0
DIARRHEA: 0

## 2019-02-12 ENCOUNTER — NURSE ONLY (OUTPATIENT)
Dept: CARDIOLOGY CLINIC | Age: 74
End: 2019-02-12
Payer: COMMERCIAL

## 2019-02-12 DIAGNOSIS — I48.91 ATRIAL FIBRILLATION, UNSPECIFIED TYPE (HCC): ICD-10-CM

## 2019-02-12 DIAGNOSIS — Z45.09 ENCOUNTER FOR ELECTRONIC ANALYSIS OF REVEAL EVENT RECORDER: ICD-10-CM

## 2019-02-12 PROCEDURE — 93298 REM INTERROG DEV EVAL SCRMS: CPT | Performed by: INTERNAL MEDICINE

## 2019-02-12 PROCEDURE — 93299 PR REM INTERROG ICPMS/SCRMS <30 D TECH REVIEW: CPT | Performed by: INTERNAL MEDICINE

## 2019-02-28 ENCOUNTER — OFFICE VISIT (OUTPATIENT)
Dept: CARDIOLOGY CLINIC | Age: 74
End: 2019-02-28
Payer: COMMERCIAL

## 2019-02-28 VITALS
DIASTOLIC BLOOD PRESSURE: 68 MMHG | HEIGHT: 67 IN | BODY MASS INDEX: 21.08 KG/M2 | OXYGEN SATURATION: 95 % | WEIGHT: 134.3 LBS | HEART RATE: 64 BPM | SYSTOLIC BLOOD PRESSURE: 118 MMHG

## 2019-02-28 DIAGNOSIS — I10 HTN (HYPERTENSION), BENIGN: ICD-10-CM

## 2019-02-28 DIAGNOSIS — I63.9 CEREBROVASCULAR ACCIDENT (CVA), UNSPECIFIED MECHANISM (HCC): Primary | ICD-10-CM

## 2019-02-28 PROCEDURE — 99213 OFFICE O/P EST LOW 20 MIN: CPT | Performed by: NURSE PRACTITIONER

## 2019-03-13 RX ORDER — CLONIDINE HYDROCHLORIDE 0.3 MG/1
0.3 TABLET ORAL 3 TIMES DAILY
Qty: 90 TABLET | Refills: 0 | Status: SHIPPED | OUTPATIENT
Start: 2019-03-13 | End: 2019-04-13 | Stop reason: SDUPTHER

## 2019-03-19 RX ORDER — ESCITALOPRAM OXALATE 10 MG/1
10 TABLET ORAL DAILY
Qty: 90 TABLET | Refills: 0 | Status: SHIPPED | OUTPATIENT
Start: 2019-03-19 | End: 2019-05-13 | Stop reason: ALTCHOICE

## 2019-03-26 ENCOUNTER — NURSE ONLY (OUTPATIENT)
Dept: CARDIOLOGY CLINIC | Age: 74
End: 2019-03-26
Payer: COMMERCIAL

## 2019-03-26 ENCOUNTER — HOSPITAL ENCOUNTER (INPATIENT)
Age: 74
LOS: 3 days | Discharge: HOME | DRG: 291 | End: 2019-03-30
Attending: EMERGENCY MEDICINE | Admitting: HOSPITALIST
Payer: COMMERCIAL

## 2019-03-26 ENCOUNTER — APPOINTMENT (OUTPATIENT)
Dept: GENERAL RADIOLOGY | Age: 74
DRG: 291 | End: 2019-03-26
Payer: COMMERCIAL

## 2019-03-26 DIAGNOSIS — I63.9 CEREBROVASCULAR ACCIDENT (CVA), UNSPECIFIED MECHANISM (HCC): ICD-10-CM

## 2019-03-26 DIAGNOSIS — I50.21 ACUTE SYSTOLIC CONGESTIVE HEART FAILURE (HCC): Primary | ICD-10-CM

## 2019-03-26 DIAGNOSIS — Z45.09 ENCOUNTER FOR ELECTRONIC ANALYSIS OF REVEAL EVENT RECORDER: ICD-10-CM

## 2019-03-26 LAB
A/G RATIO: 1.1 (ref 1.1–2.2)
ALBUMIN SERPL-MCNC: 4.3 G/DL (ref 3.4–5)
ALP BLD-CCNC: 64 U/L (ref 40–129)
ALT SERPL-CCNC: 17 U/L (ref 10–40)
ANION GAP SERPL CALCULATED.3IONS-SCNC: 15 MMOL/L (ref 3–16)
AST SERPL-CCNC: 25 U/L (ref 15–37)
BASOPHILS ABSOLUTE: 0 K/UL (ref 0–0.2)
BASOPHILS RELATIVE PERCENT: 0.4 %
BILIRUB SERPL-MCNC: 0.5 MG/DL (ref 0–1)
BUN BLDV-MCNC: 7 MG/DL (ref 7–20)
CALCIUM SERPL-MCNC: 8.6 MG/DL (ref 8.3–10.6)
CHLORIDE BLD-SCNC: 90 MMOL/L (ref 99–110)
CO2: 25 MMOL/L (ref 21–32)
CREAT SERPL-MCNC: 0.5 MG/DL (ref 0.6–1.2)
EOSINOPHILS ABSOLUTE: 0.1 K/UL (ref 0–0.6)
EOSINOPHILS RELATIVE PERCENT: 0.6 %
GFR AFRICAN AMERICAN: >60
GFR NON-AFRICAN AMERICAN: >60
GLOBULIN: 3.8 G/DL
GLUCOSE BLD-MCNC: 154 MG/DL (ref 70–99)
HCT VFR BLD CALC: 45.2 % (ref 36–48)
HEMOGLOBIN: 15.2 G/DL (ref 12–16)
LYMPHOCYTES ABSOLUTE: 0.3 K/UL (ref 1–5.1)
LYMPHOCYTES RELATIVE PERCENT: 2.5 %
MCH RBC QN AUTO: 33.1 PG (ref 26–34)
MCHC RBC AUTO-ENTMCNC: 33.6 G/DL (ref 31–36)
MCV RBC AUTO: 98.6 FL (ref 80–100)
MONOCYTES ABSOLUTE: 0.8 K/UL (ref 0–1.3)
MONOCYTES RELATIVE PERCENT: 7.6 %
NEUTROPHILS ABSOLUTE: 9.6 K/UL (ref 1.7–7.7)
NEUTROPHILS RELATIVE PERCENT: 88.9 %
PDW BLD-RTO: 14.5 % (ref 12.4–15.4)
PLATELET # BLD: 319 K/UL (ref 135–450)
PMV BLD AUTO: 8.2 FL (ref 5–10.5)
POTASSIUM REFLEX MAGNESIUM: 3.9 MMOL/L (ref 3.5–5.1)
PRO-BNP: 1662 PG/ML (ref 0–449)
RBC # BLD: 4.59 M/UL (ref 4–5.2)
SODIUM BLD-SCNC: 130 MMOL/L (ref 136–145)
TOTAL PROTEIN: 8.1 G/DL (ref 6.4–8.2)
TROPONIN: 0.07 NG/ML
WBC # BLD: 10.8 K/UL (ref 4–11)

## 2019-03-26 PROCEDURE — 83880 ASSAY OF NATRIURETIC PEPTIDE: CPT

## 2019-03-26 PROCEDURE — 93005 ELECTROCARDIOGRAM TRACING: CPT | Performed by: EMERGENCY MEDICINE

## 2019-03-26 PROCEDURE — 84484 ASSAY OF TROPONIN QUANT: CPT

## 2019-03-26 PROCEDURE — 6370000000 HC RX 637 (ALT 250 FOR IP): Performed by: EMERGENCY MEDICINE

## 2019-03-26 PROCEDURE — 94640 AIRWAY INHALATION TREATMENT: CPT

## 2019-03-26 PROCEDURE — 2700000000 HC OXYGEN THERAPY PER DAY

## 2019-03-26 PROCEDURE — 80053 COMPREHEN METABOLIC PANEL: CPT

## 2019-03-26 PROCEDURE — 36415 COLL VENOUS BLD VENIPUNCTURE: CPT

## 2019-03-26 PROCEDURE — 85025 COMPLETE CBC W/AUTO DIFF WBC: CPT

## 2019-03-26 PROCEDURE — 93299 PR REM INTERROG ICPMS/SCRMS <30 D TECH REVIEW: CPT | Performed by: INTERNAL MEDICINE

## 2019-03-26 PROCEDURE — 93298 REM INTERROG DEV EVAL SCRMS: CPT | Performed by: INTERNAL MEDICINE

## 2019-03-26 PROCEDURE — 99285 EMERGENCY DEPT VISIT HI MDM: CPT

## 2019-03-26 PROCEDURE — 71045 X-RAY EXAM CHEST 1 VIEW: CPT

## 2019-03-26 RX ORDER — METHYLPREDNISOLONE SODIUM SUCCINATE 125 MG/2ML
125 INJECTION, POWDER, LYOPHILIZED, FOR SOLUTION INTRAMUSCULAR; INTRAVENOUS ONCE
Status: DISCONTINUED | OUTPATIENT
Start: 2019-03-26 | End: 2019-03-26

## 2019-03-26 RX ORDER — FUROSEMIDE 10 MG/ML
40 INJECTION INTRAMUSCULAR; INTRAVENOUS ONCE
Status: COMPLETED | OUTPATIENT
Start: 2019-03-27 | End: 2019-03-27

## 2019-03-26 RX ORDER — FUROSEMIDE 10 MG/ML
40 INJECTION INTRAMUSCULAR; INTRAVENOUS ONCE
Status: DISCONTINUED | OUTPATIENT
Start: 2019-03-27 | End: 2019-03-26

## 2019-03-26 RX ORDER — IPRATROPIUM BROMIDE AND ALBUTEROL SULFATE 2.5; .5 MG/3ML; MG/3ML
1 SOLUTION RESPIRATORY (INHALATION) ONCE
Status: COMPLETED | OUTPATIENT
Start: 2019-03-26 | End: 2019-03-26

## 2019-03-26 RX ORDER — NITROGLYCERIN 0.4 MG/1
0.4 TABLET SUBLINGUAL ONCE
Status: DISCONTINUED | OUTPATIENT
Start: 2019-03-27 | End: 2019-03-30 | Stop reason: HOSPADM

## 2019-03-26 RX ADMIN — IPRATROPIUM BROMIDE AND ALBUTEROL SULFATE 1 AMPULE: .5; 3 SOLUTION RESPIRATORY (INHALATION) at 23:21

## 2019-03-27 LAB
ANION GAP SERPL CALCULATED.3IONS-SCNC: 11 MMOL/L (ref 3–16)
APTT: 26.8 SEC (ref 26–36)
APTT: 40.9 SEC (ref 26–36)
APTT: 47.9 SEC (ref 26–36)
BUN BLDV-MCNC: 7 MG/DL (ref 7–20)
CALCIUM SERPL-MCNC: 8.4 MG/DL (ref 8.3–10.6)
CHLORIDE BLD-SCNC: 93 MMOL/L (ref 99–110)
CO2: 27 MMOL/L (ref 21–32)
CREAT SERPL-MCNC: 0.5 MG/DL (ref 0.6–1.2)
EKG ATRIAL RATE: 130 BPM
EKG DIAGNOSIS: NORMAL
EKG P AXIS: 41 DEGREES
EKG P-R INTERVAL: 114 MS
EKG Q-T INTERVAL: 338 MS
EKG QRS DURATION: 86 MS
EKG QTC CALCULATION (BAZETT): 497 MS
EKG R AXIS: 30 DEGREES
EKG T AXIS: 71 DEGREES
EKG VENTRICULAR RATE: 130 BPM
GFR AFRICAN AMERICAN: >60
GFR NON-AFRICAN AMERICAN: >60
GLUCOSE BLD-MCNC: 127 MG/DL (ref 70–99)
HCT VFR BLD CALC: 38.1 % (ref 36–48)
HEMOGLOBIN: 13.1 G/DL (ref 12–16)
INR BLD: 1.15 (ref 0.86–1.14)
LEFT VENTRICULAR EJECTION FRACTION HIGH VALUE: 50 %
LEFT VENTRICULAR EJECTION FRACTION MODE: NORMAL
LV EF: 50 %
LVEF MODALITY: NORMAL
MAGNESIUM: 1.8 MG/DL (ref 1.8–2.4)
MCH RBC QN AUTO: 33.6 PG (ref 26–34)
MCHC RBC AUTO-ENTMCNC: 34.3 G/DL (ref 31–36)
MCV RBC AUTO: 98 FL (ref 80–100)
PDW BLD-RTO: 14.5 % (ref 12.4–15.4)
PLATELET # BLD: 267 K/UL (ref 135–450)
PMV BLD AUTO: 8.6 FL (ref 5–10.5)
POTASSIUM SERPL-SCNC: 3.9 MMOL/L (ref 3.5–5.1)
PROTHROMBIN TIME: 13.1 SEC (ref 9.8–13)
RBC # BLD: 3.89 M/UL (ref 4–5.2)
SODIUM BLD-SCNC: 131 MMOL/L (ref 136–145)
T4 FREE: 1.4 NG/DL (ref 0.9–1.8)
TROPONIN: 0.12 NG/ML
TROPONIN: 0.21 NG/ML
TSH SERPL DL<=0.05 MIU/L-ACNC: 1.32 UIU/ML (ref 0.27–4.2)
WBC # BLD: 8.4 K/UL (ref 4–11)

## 2019-03-27 PROCEDURE — 93010 ELECTROCARDIOGRAM REPORT: CPT | Performed by: INTERNAL MEDICINE

## 2019-03-27 PROCEDURE — 2140000000 HC CCU INTERMEDIATE R&B

## 2019-03-27 PROCEDURE — 99222 1ST HOSP IP/OBS MODERATE 55: CPT | Performed by: INTERNAL MEDICINE

## 2019-03-27 PROCEDURE — 85027 COMPLETE CBC AUTOMATED: CPT

## 2019-03-27 PROCEDURE — 94762 N-INVAS EAR/PLS OXIMTRY CONT: CPT

## 2019-03-27 PROCEDURE — 84443 ASSAY THYROID STIM HORMONE: CPT

## 2019-03-27 PROCEDURE — 6360000002 HC RX W HCPCS: Performed by: HOSPITALIST

## 2019-03-27 PROCEDURE — 85610 PROTHROMBIN TIME: CPT

## 2019-03-27 PROCEDURE — 80048 BASIC METABOLIC PNL TOTAL CA: CPT

## 2019-03-27 PROCEDURE — 85730 THROMBOPLASTIN TIME PARTIAL: CPT

## 2019-03-27 PROCEDURE — 94760 N-INVAS EAR/PLS OXIMETRY 1: CPT

## 2019-03-27 PROCEDURE — 83735 ASSAY OF MAGNESIUM: CPT

## 2019-03-27 PROCEDURE — 6360000002 HC RX W HCPCS: Performed by: EMERGENCY MEDICINE

## 2019-03-27 PROCEDURE — 2700000000 HC OXYGEN THERAPY PER DAY

## 2019-03-27 PROCEDURE — 6370000000 HC RX 637 (ALT 250 FOR IP): Performed by: HOSPITALIST

## 2019-03-27 PROCEDURE — 93306 TTE W/DOPPLER COMPLETE: CPT

## 2019-03-27 PROCEDURE — 94640 AIRWAY INHALATION TREATMENT: CPT

## 2019-03-27 PROCEDURE — 84439 ASSAY OF FREE THYROXINE: CPT

## 2019-03-27 PROCEDURE — 2580000003 HC RX 258: Performed by: HOSPITALIST

## 2019-03-27 PROCEDURE — 96374 THER/PROPH/DIAG INJ IV PUSH: CPT

## 2019-03-27 PROCEDURE — 84484 ASSAY OF TROPONIN QUANT: CPT

## 2019-03-27 RX ORDER — FAMOTIDINE 20 MG/1
20 TABLET, FILM COATED ORAL 2 TIMES DAILY
Status: DISCONTINUED | OUTPATIENT
Start: 2019-03-27 | End: 2019-03-30 | Stop reason: HOSPADM

## 2019-03-27 RX ORDER — POTASSIUM CHLORIDE 20 MEQ/1
40 TABLET, EXTENDED RELEASE ORAL PRN
Status: DISCONTINUED | OUTPATIENT
Start: 2019-03-27 | End: 2019-03-30 | Stop reason: HOSPADM

## 2019-03-27 RX ORDER — LISINOPRIL 20 MG/1
40 TABLET ORAL DAILY
Status: DISCONTINUED | OUTPATIENT
Start: 2019-03-27 | End: 2019-03-30 | Stop reason: HOSPADM

## 2019-03-27 RX ORDER — ACETAMINOPHEN 325 MG/1
650 TABLET ORAL EVERY 4 HOURS PRN
Status: DISCONTINUED | OUTPATIENT
Start: 2019-03-27 | End: 2019-03-30 | Stop reason: HOSPADM

## 2019-03-27 RX ORDER — IPRATROPIUM BROMIDE AND ALBUTEROL SULFATE 2.5; .5 MG/3ML; MG/3ML
1 SOLUTION RESPIRATORY (INHALATION)
Status: DISCONTINUED | OUTPATIENT
Start: 2019-03-27 | End: 2019-03-30 | Stop reason: HOSPADM

## 2019-03-27 RX ORDER — LEVOTHYROXINE SODIUM 0.1 MG/1
1 TABLET ORAL DAILY
Status: DISCONTINUED | OUTPATIENT
Start: 2019-03-27 | End: 2019-03-27 | Stop reason: SDUPTHER

## 2019-03-27 RX ORDER — ASPIRIN 81 MG/1
81 TABLET, CHEWABLE ORAL DAILY
Status: DISCONTINUED | OUTPATIENT
Start: 2019-03-27 | End: 2019-03-30 | Stop reason: HOSPADM

## 2019-03-27 RX ORDER — HEPARIN SODIUM 1000 [USP'U]/ML
60 INJECTION, SOLUTION INTRAVENOUS; SUBCUTANEOUS PRN
Status: DISCONTINUED | OUTPATIENT
Start: 2019-03-27 | End: 2019-03-28

## 2019-03-27 RX ORDER — ESCITALOPRAM OXALATE 10 MG/1
10 TABLET ORAL DAILY
Status: DISCONTINUED | OUTPATIENT
Start: 2019-03-27 | End: 2019-03-30 | Stop reason: HOSPADM

## 2019-03-27 RX ORDER — HEPARIN SODIUM 1000 [USP'U]/ML
30 INJECTION, SOLUTION INTRAVENOUS; SUBCUTANEOUS PRN
Status: DISCONTINUED | OUTPATIENT
Start: 2019-03-27 | End: 2019-03-28

## 2019-03-27 RX ORDER — HEPARIN SODIUM 10000 [USP'U]/100ML
12 INJECTION, SOLUTION INTRAVENOUS CONTINUOUS
Status: DISCONTINUED | OUTPATIENT
Start: 2019-03-27 | End: 2019-03-28 | Stop reason: ALTCHOICE

## 2019-03-27 RX ORDER — CLONIDINE HYDROCHLORIDE 0.1 MG/1
0.3 TABLET ORAL 3 TIMES DAILY
Status: DISCONTINUED | OUTPATIENT
Start: 2019-03-27 | End: 2019-03-30 | Stop reason: HOSPADM

## 2019-03-27 RX ORDER — ATORVASTATIN CALCIUM 80 MG/1
80 TABLET, FILM COATED ORAL NIGHTLY
Status: DISCONTINUED | OUTPATIENT
Start: 2019-03-27 | End: 2019-03-30 | Stop reason: HOSPADM

## 2019-03-27 RX ORDER — ACETAMINOPHEN 80 MG
TABLET,CHEWABLE ORAL
Status: DISPENSED
Start: 2019-03-27 | End: 2019-03-28

## 2019-03-27 RX ORDER — SODIUM CHLORIDE 0.9 % (FLUSH) 0.9 %
10 SYRINGE (ML) INJECTION PRN
Status: DISCONTINUED | OUTPATIENT
Start: 2019-03-27 | End: 2019-03-30 | Stop reason: HOSPADM

## 2019-03-27 RX ORDER — SODIUM CHLORIDE 0.9 % (FLUSH) 0.9 %
10 SYRINGE (ML) INJECTION EVERY 12 HOURS SCHEDULED
Status: DISCONTINUED | OUTPATIENT
Start: 2019-03-27 | End: 2019-03-30 | Stop reason: HOSPADM

## 2019-03-27 RX ORDER — HEPARIN SODIUM 1000 [USP'U]/ML
60 INJECTION, SOLUTION INTRAVENOUS; SUBCUTANEOUS ONCE
Status: COMPLETED | OUTPATIENT
Start: 2019-03-27 | End: 2019-03-27

## 2019-03-27 RX ORDER — FUROSEMIDE 10 MG/ML
40 INJECTION INTRAMUSCULAR; INTRAVENOUS DAILY
Status: DISCONTINUED | OUTPATIENT
Start: 2019-03-27 | End: 2019-03-29

## 2019-03-27 RX ORDER — NITROGLYCERIN 0.4 MG/1
0.4 TABLET SUBLINGUAL EVERY 5 MIN PRN
Status: DISCONTINUED | OUTPATIENT
Start: 2019-03-27 | End: 2019-03-30 | Stop reason: HOSPADM

## 2019-03-27 RX ORDER — MAGNESIUM SULFATE 1 G/100ML
1 INJECTION INTRAVENOUS PRN
Status: DISCONTINUED | OUTPATIENT
Start: 2019-03-27 | End: 2019-03-30 | Stop reason: HOSPADM

## 2019-03-27 RX ORDER — POTASSIUM CHLORIDE 7.45 MG/ML
10 INJECTION INTRAVENOUS PRN
Status: DISCONTINUED | OUTPATIENT
Start: 2019-03-27 | End: 2019-03-30 | Stop reason: HOSPADM

## 2019-03-27 RX ORDER — CLONAZEPAM 1 MG/1
0.5 TABLET ORAL NIGHTLY PRN
Status: DISCONTINUED | OUTPATIENT
Start: 2019-03-27 | End: 2019-03-30 | Stop reason: HOSPADM

## 2019-03-27 RX ORDER — ONDANSETRON 2 MG/ML
4 INJECTION INTRAMUSCULAR; INTRAVENOUS EVERY 6 HOURS PRN
Status: DISCONTINUED | OUTPATIENT
Start: 2019-03-27 | End: 2019-03-30 | Stop reason: HOSPADM

## 2019-03-27 RX ORDER — METHYLPREDNISOLONE SODIUM SUCCINATE 40 MG/ML
40 INJECTION, POWDER, LYOPHILIZED, FOR SOLUTION INTRAMUSCULAR; INTRAVENOUS EVERY 6 HOURS
Status: DISCONTINUED | OUTPATIENT
Start: 2019-03-27 | End: 2019-03-29

## 2019-03-27 RX ORDER — MIRTAZAPINE 15 MG/1
15 TABLET, FILM COATED ORAL NIGHTLY
Status: DISCONTINUED | OUTPATIENT
Start: 2019-03-27 | End: 2019-03-30 | Stop reason: HOSPADM

## 2019-03-27 RX ORDER — CALCIUM CARBONATE 500(1250)
500 TABLET ORAL DAILY
Status: DISCONTINUED | OUTPATIENT
Start: 2019-03-27 | End: 2019-03-30 | Stop reason: HOSPADM

## 2019-03-27 RX ORDER — LABETALOL 200 MG/1
100 TABLET, FILM COATED ORAL EVERY 12 HOURS SCHEDULED
Status: DISCONTINUED | OUTPATIENT
Start: 2019-03-27 | End: 2019-03-30 | Stop reason: HOSPADM

## 2019-03-27 RX ORDER — IPRATROPIUM BROMIDE AND ALBUTEROL SULFATE 2.5; .5 MG/3ML; MG/3ML
1 SOLUTION RESPIRATORY (INHALATION) EVERY 4 HOURS PRN
Status: DISCONTINUED | OUTPATIENT
Start: 2019-03-27 | End: 2019-03-30 | Stop reason: HOSPADM

## 2019-03-27 RX ORDER — LEVOTHYROXINE SODIUM 0.1 MG/1
100 TABLET ORAL
Status: DISCONTINUED | OUTPATIENT
Start: 2019-03-27 | End: 2019-03-30 | Stop reason: HOSPADM

## 2019-03-27 RX ORDER — OXYBUTYNIN CHLORIDE 5 MG/1
5 TABLET ORAL 2 TIMES DAILY
Status: DISCONTINUED | OUTPATIENT
Start: 2019-03-27 | End: 2019-03-30 | Stop reason: HOSPADM

## 2019-03-27 RX ADMIN — FAMOTIDINE 20 MG: 20 TABLET ORAL at 20:25

## 2019-03-27 RX ADMIN — IPRATROPIUM BROMIDE AND ALBUTEROL SULFATE 1 AMPULE: .5; 3 SOLUTION RESPIRATORY (INHALATION) at 13:01

## 2019-03-27 RX ADMIN — FUROSEMIDE 40 MG: 10 INJECTION, SOLUTION INTRAMUSCULAR; INTRAVENOUS at 10:29

## 2019-03-27 RX ADMIN — LABETALOL HCL 100 MG: 200 TABLET, FILM COATED ORAL at 20:25

## 2019-03-27 RX ADMIN — MIRTAZAPINE 15 MG: 15 TABLET, FILM COATED ORAL at 20:26

## 2019-03-27 RX ADMIN — CALCIUM 500 MG: 500 TABLET ORAL at 10:28

## 2019-03-27 RX ADMIN — Medication 10 ML: at 10:29

## 2019-03-27 RX ADMIN — IPRATROPIUM BROMIDE AND ALBUTEROL SULFATE 1 AMPULE: .5; 3 SOLUTION RESPIRATORY (INHALATION) at 20:08

## 2019-03-27 RX ADMIN — ASPIRIN 81 MG 81 MG: 81 TABLET ORAL at 10:29

## 2019-03-27 RX ADMIN — LISINOPRIL 40 MG: 20 TABLET ORAL at 10:28

## 2019-03-27 RX ADMIN — IPRATROPIUM BROMIDE AND ALBUTEROL SULFATE 1 AMPULE: .5; 3 SOLUTION RESPIRATORY (INHALATION) at 08:32

## 2019-03-27 RX ADMIN — OXYBUTYNIN CHLORIDE 5 MG: 5 TABLET ORAL at 10:28

## 2019-03-27 RX ADMIN — HEPARIN SODIUM 3500 UNITS: 1000 INJECTION INTRAVENOUS; SUBCUTANEOUS at 05:51

## 2019-03-27 RX ADMIN — ACETAMINOPHEN 650 MG: 325 TABLET, FILM COATED ORAL at 01:42

## 2019-03-27 RX ADMIN — METHYLPREDNISOLONE SODIUM SUCCINATE 40 MG: 40 INJECTION, POWDER, FOR SOLUTION INTRAMUSCULAR; INTRAVENOUS at 20:26

## 2019-03-27 RX ADMIN — LEVOTHYROXINE SODIUM 100 MCG: 100 TABLET ORAL at 05:47

## 2019-03-27 RX ADMIN — ATORVASTATIN CALCIUM 80 MG: 80 TABLET, FILM COATED ORAL at 20:26

## 2019-03-27 RX ADMIN — LABETALOL HCL 100 MG: 200 TABLET, FILM COATED ORAL at 10:33

## 2019-03-27 RX ADMIN — Medication 10 ML: at 20:26

## 2019-03-27 RX ADMIN — METHYLPREDNISOLONE SODIUM SUCCINATE 40 MG: 40 INJECTION, POWDER, FOR SOLUTION INTRAMUSCULAR; INTRAVENOUS at 14:29

## 2019-03-27 RX ADMIN — CLONIDINE HYDROCHLORIDE 0.3 MG: 0.1 TABLET ORAL at 14:28

## 2019-03-27 RX ADMIN — HEPARIN SODIUM 1750 UNITS: 1000 INJECTION INTRAVENOUS; SUBCUTANEOUS at 20:13

## 2019-03-27 RX ADMIN — FAMOTIDINE 20 MG: 20 TABLET ORAL at 10:29

## 2019-03-27 RX ADMIN — HEPARIN SODIUM 1750 UNITS: 1000 INJECTION INTRAVENOUS; SUBCUTANEOUS at 11:38

## 2019-03-27 RX ADMIN — ESCITALOPRAM OXALATE 10 MG: 10 TABLET, FILM COATED ORAL at 10:28

## 2019-03-27 RX ADMIN — OXYBUTYNIN CHLORIDE 5 MG: 5 TABLET ORAL at 20:26

## 2019-03-27 RX ADMIN — HEPARIN SODIUM AND DEXTROSE 12 UNITS/KG/HR: 10000; 5 INJECTION INTRAVENOUS at 05:53

## 2019-03-27 RX ADMIN — METHYLPREDNISOLONE SODIUM SUCCINATE 40 MG: 40 INJECTION, POWDER, FOR SOLUTION INTRAMUSCULAR; INTRAVENOUS at 05:47

## 2019-03-27 RX ADMIN — CLONIDINE HYDROCHLORIDE 0.3 MG: 0.1 TABLET ORAL at 10:28

## 2019-03-27 RX ADMIN — FAMOTIDINE 20 MG: 20 TABLET ORAL at 01:42

## 2019-03-27 RX ADMIN — FUROSEMIDE 40 MG: 10 INJECTION, SOLUTION INTRAMUSCULAR; INTRAVENOUS at 00:02

## 2019-03-27 ASSESSMENT — PAIN DESCRIPTION - LOCATION: LOCATION: HEAD

## 2019-03-27 ASSESSMENT — PAIN SCALES - GENERAL
PAINLEVEL_OUTOF10: 0
PAINLEVEL_OUTOF10: 5
PAINLEVEL_OUTOF10: 0

## 2019-03-27 ASSESSMENT — PAIN DESCRIPTION - PAIN TYPE: TYPE: ACUTE PAIN

## 2019-03-27 ASSESSMENT — PAIN DESCRIPTION - DESCRIPTORS: DESCRIPTORS: HEADACHE

## 2019-03-27 NOTE — PROGRESS NOTES
Progress Note  Admit Date: 3/26/2019      PCP: Orville Miles MD     CC: F/U for sob     SUBJECTIVE / Interval History:     Pt breathing better, has some productive cough with white mucoid sputum       Allergies  Lipitor [atorvastatin]; Morphine; Keflex [cephalexin]; Hctz [hydrochlorothiazide]; Norvasc [amlodipine besylate]; Penicillins; Tramadol; Hydralazine; and Shellfish-derived products    Medications    Scheduled Meds:   aspirin  81 mg Oral Daily    atorvastatin  80 mg Oral Nightly    calcium elemental  500 mg Oral Daily    cloNIDine  0.3 mg Oral TID    escitalopram  10 mg Oral Daily    labetalol  100 mg Oral 2 times per day    lisinopril  40 mg Oral Daily    mirtazapine  15 mg Oral Nightly    oxybutynin  5 mg Oral BID    sodium chloride flush  10 mL Intravenous 2 times per day    famotidine  20 mg Oral BID    furosemide  40 mg Intravenous Daily    ipratropium-albuterol  1 ampule Inhalation Q6H WA    methylPREDNISolone  40 mg Intravenous Q6H    levothyroxine  100 mcg Oral QAM AC    nitroGLYCERIN  0.4 mg Sublingual Once       Continuous Infusions:   heparin (porcine) 14 Units/kg/hr (03/27/19 1141)       PRN Meds:  clonazePAM, sodium chloride flush, magnesium hydroxide, ondansetron, potassium chloride **OR** potassium alternative oral replacement **OR** potassium chloride, magnesium sulfate, acetaminophen, nitroGLYCERIN, ipratropium-albuterol, heparin (porcine), heparin (porcine)    Vitals       Vitals:    03/27/19 1302   BP:    Pulse:    Resp: 18   Temp:    SpO2: 98%         24HR INTAKE/OUTPUT:      Intake/Output Summary (Last 24 hours) at 3/27/2019 1335  Last data filed at 3/27/2019 0555  Gross per 24 hour   Intake --   Output 925 ml   Net -925 ml       Exam:    Gen: No distress. NC 02 1L   Eyes: PERRL. No sclera icterus. No conjunctival injection. ENT: No discharge. Pharynx clear. External appearance of ears and nose normal.  Neck: supple   Resp: Clear to auscultation.  No crackles / Rhonchi. No wheezes. CV: Regular rate. Regular rhythm. No murmur or rub  GI: soft, Non-tender. Non-distended. No hernia. BS +  Skin: Warm, dry, normal texture. No rashes   Lymph: No cervical LAD. No supraclavicular LAD. M/S: No joint deformity. Neuro: Moves all four extremities. CN 2-12 tested, no defect noted. Psych: Oriented x 3. No anxiety. Awake. Alert. Intact judgement and insight. Data    LABS  CBC:   Recent Labs     03/26/19 2317 03/27/19  0550   WBC 10.8 8.4   HGB 15.2 13.1   HCT 45.2 38.1   MCV 98.6 98.0    267     BMP:   Recent Labs     03/26/19 2317 03/27/19  0335   * 131*   K 3.9 3.9   CL 90* 93*   CO2 25 27   BUN 7 7   CREATININE 0.5* 0.5*       POC GLUCOSE:  No results for input(s): POCGLU in the last 72 hours. LIVER PROFILE:   Recent Labs     03/26/19 2317   AST 25   ALT 17   LABALBU 4.3   BILITOT 0.5   ALKPHOS 64       PT/INR: @LABRCNT(protime:3,in      Microbiology:  Nasal Culture: )No results for input(s): ORG, MRSAPCR in the last 72 hours. Blood Culture: No results for input(s): BC, BLOODCULT2 in the last 72 hours. UA:No results for input(s): NITRITE, COLORU, PHUR, LABCAST, WBCUA, RBCUA, MUCUS, TRICHOMONAS, YEAST, BACTERIA, CLARITYU, SPECGRAV, LEUKOCYTESUR, UROBILINOGEN, BILIRUBINUR, BLOODU, GLUCOSEU, KETUA, AMORPHOUS in the last 72 hours. Urine Culture:No results for input(s): Price Sorrel in the last 72 hours. RADIOLOGY:    XR CHEST PORTABLE   Final Result   Pulmonary edema.              CONSULTS:    IP CONSULT TO HOSPITALIST  IP CONSULT TO DIETITIAN  IP CONSULT TO CARDIOLOGY  IP CONSULT TO FINANCIAL COUNSELOR    ASSESSMENT AND PLAN:      Active Problems:    Hypothyroidism    Smoker    Anxiety    Hyperlipidemia    DVT (deep venous thrombosis) (HCC)    SOB (shortness of breath)    Centrilobular emphysema (HCC)    Essential hypertension    COPD, moderate (HCC)    CAD in native artery    Respiratory distress    Depression    HTN (hypertension), benign    Dyslipidemia  Resolved Problems:    * No resolved hospital problems. *      Acute exacerbation COPD   IV steroids and nebs   Aggressive pulmonary toilet   Encourage IS    Acute CHF exacerbation :   [place] placement, Strict I/Os, Daily weights  Repeat echo reviewed with EF around 50%  Diuretics, Monitor electrolytes     Elevated Troponin  -trend troponins, on heparin drip   -cardiology consulted     Hyponatremia   -fluid restriction, pt also on lasix, monitor     Hx CVA  -asa, statin     Hypothyroidism  -synthyroid     HTN  -continue home meds     DVT Prophylaxis :  heparin    Diet : cardiac         Code status : full   PT/OT and ambulatory Eval Status: Ambulate with Assist   Probable LOS & future Disposition planned post discharge  - home in 2-3 days     The patient and / or the family were informed of the results of any tests, a time was given to answer questions, a plan was proposed and they agreed with plan. Discussed with consulting physicians, nursing and social work     The note was completed using EMR. Every effort was made to ensure accuracy; however, inadvertent computerized transcription errors may be present.        Ankur Richardson MD

## 2019-03-27 NOTE — PROGRESS NOTES
Perfect serve sent to [facility] regarding bump in trop, 0.07 to 0.21. Verbal verification that he would like the heparin gtt initiated. Initial bolus given and gtt started at 12u/kg/hr. Pt continues to deny chest pain. Resting in bed comfortably, no signs of distress. Weaning o2 as tolerated. Continuing to monitor.

## 2019-03-27 NOTE — CONSULTS
[address] [facility]  [phone]        Reason for Consultation/Chief Complaint: \" Elevated troponin, shortness of breath\"    History of Present Illness:  Héctor Madrigal is a 75 y.o. patient who presented to the hospital with complaints of severe shortness of breath with associated orthopnea. She states that she has COPD and started smoking again. She developed severe shortness of breath last night. No complaints of chest discomfort. Has also noted a cough with productive whitish sputum. Currently breathing has significantly improved. She does have a history of mitral regurgitation and last year underwent transthoracic as well as transesophageal echocardiography overall suggestive of moderate mitral regurgitation. Past Medical History:   has a past medical history of Alcohol abuse, Anxiety, CAD in native artery, Cerebral artery occlusion with cerebral infarction ([address] COPD (chronic obstructive pulmonary disease) ([address] Depression, DVT, lower extremity ([address] Hypercholesteremia, Hypertension, Hyperthyroidism, Mammogram abnormal, Neuromuscular disorder ([address] Pneumonia, Thyroid disease, and Tobacco abuse. Surgical History:   has a past surgical history that includes Tonsillectomy; Colonoscopy (1/19/2012); Toe Surgery (Right); Shoulder arthroscopy (Right, 6/18/14); and eye surgery. Social History:   reports that she has been smoking cigarettes. She has a 26.00 pack-year smoking history. She has never used smokeless tobacco. She reports that she does not drink alcohol or use drugs. Family History:  family history includes Alcohol Abuse in her father; Heart Disease in her father. Home Medications:  Were reviewed and are listed in nursing record. and/or listed below  Prior to Admission medications    Medication Sig Start Date End Date Taking?  Authorizing Provider   escitalopram (LEXAPRO) 10 MG tablet Take 1 tablet by mouth daily 3/19/19  Yes Bailey Wiggins MD   cloNIDine (CATAPRES) 0.3 MG tablet Take 1 tablet by mouth 3 times daily 3/13/19  Yes Suella Ahumada, MD   clonazePAM (KLONOPIN) 0.5 MG tablet Take 1 tablet by mouth nightly as needed (insomnia). . 2/11/19 2/11/20 Yes Sunny Nunez MD   ondansetron (ZOFRAN-ODT) 8 MG TBDP disintegrating tablet Take 1 tablet by mouth every 8 hours as needed for Nausea May Sub regular tablet (non-ODT) if insurance does not cover ODT. 2/11/19  Yes Sunny Nunez MD   labetalol (NORMODYNE) 100 MG tablet Take 1 tablet by mouth every 12 hours 1/29/19  Yes Sunny Nunez MD   aspirin 81 MG chewable tablet Take 1 tablet by mouth daily 1/14/19  Yes Sunny Nunez MD   mirtazapine (REMERON) 15 MG tablet Take 1 tablet by mouth nightly 12/1/18  Yes Sunny Nunez MD   calcium elemental (OSCAL) 500 MG TABS tablet Take 1 tablet by mouth daily 7/24/18  Yes Sunny Nunez MD   oxybutynin (DITROPAN) 5 MG tablet TAKE ONE TABLET BY MOUTH TWICE A DAY 7/24/18  Yes Sunny Nunez MD   atorvastatin (LIPITOR) 80 MG tablet Take 1 tablet by mouth daily 7/24/18  Yes Sunny Nunez MD   levothyroxine (SYNTHROID) 100 MCG tablet TAKE ONE TABLET BY MOUTH DAILY 7/24/18  Yes Sunny Nunez MD   zoster recombinant adjuvanted vaccine [facility]) 50 MCG SUSR injection . 5mL IM X 1 followed by . 5mL IM 2-6 months after dose #1 7/24/18  Yes Sunny Nunez MD   lisinopril (PRINIVIL;ZESTRIL) 40 MG tablet Take 1 tablet by mouth daily 6/20/18  Yes Nicole Epps MD   budesonide-formoterol [facility]) 160-4.5 MCG/ACT AERO Inhale 2 puffs into the lungs 2 times daily 4/21/17  Yes Esvin Wang MD   Multiple Vitamins-Minerals (CENTRUM SILVER ADULT 50+ PO) Take 1 tablet by mouth daily    Yes Sara Hernandez MD   ipratropium (ATROVENT HFA) 17 MCG/ACT inhaler Inhale 1 puff into the lungs 3 times daily 4/21/17 12/17/18  Esvin Wang MD        Allergies:  Lipitor [atorvastatin]; Morphine; Keflex [cephalexin]; Hctz [hydrochlorothiazide]; Norvasc [amlodipine besylate]; Penicillins; Tramadol; Hydralazine; and Shellfish-derived products     Review of Systems:   A complete review of systems has been reviewed and updated today and is negative except as noted in the history of present illness.       Physical Examination:    Vitals:    03/27/19 1033   BP: 129/76   Pulse: 76   Resp:    Temp:    SpO2:     Weight: 128 lb 12 oz (58.4 kg)         General Appearance:  Alert, cooperative, no distress, appears stated age   Head:  Normocephalic, without obvious abnormality, atraumatic   Eyes:  EOMI, conjunctiva/corneas clear       Nose: Nares normal   Throat: Lips normal   Neck: Supple, symmetrical, trachea midline,  no carotid bruit,  JVD 5 cm        Lungs:   Decreased breath sounds bilaterally, respirations unlabored   Chest Wall:  No tenderness or deformity   Heart:  Regular rate and rhythm, S1, S2 normal, 2/6 systolic murmur, rub or gallop   Abdomen:   Soft, non-tender, bowel sounds active all four quadrants,  no masses, no organomegaly           Extremities: Extremities normal, atraumatic, no cyanosis or edema   Pulses: 1+ and symmetric   Skin: Skin color, texture, turgor normal, no rashes or lesions   Pysch: Normal mood and affect   Neurologic: Normal gross motor and sensory exam.         Labs  CBC:   Lab Results   Component Value Date    WBC 8.4 03/27/2019    RBC 3.89 03/27/2019    HGB 13.1 03/27/2019    HCT 38.1 03/27/2019    MCV 98.0 03/27/2019    RDW 14.5 03/27/2019     03/27/2019     CMP:    Lab Results   Component Value Date     03/27/2019    K 3.9 03/27/2019    K 3.9 03/26/2019    CL 93 03/27/2019    CO2 27 03/27/2019    BUN 7 03/27/2019    CREATININE 0.5 03/27/2019    GFRAA >60 03/27/2019    GFRAA >60 05/02/2013    AGRATIO 1.1 03/26/2019    LABGLOM >60 03/27/2019    LABGLOM 79 12/11/2017    GLUCOSE 127 03/27/2019    PROT 8.1 03/26/2019    PROT 7.1 10/18/2012    CALCIUM 8.4 03/27/2019    BILITOT 0.5 03/26/2019    ALKPHOS 64 03/26/2019    AST 25 03/26/2019    ALT 17 03/26/2019     PT/INR:  No results found for: PTINR  Lab Results   Component Value Date    CKTOTAL 37 06/14/2018    TROPONINI 0.12 (H) 03/27/2019       EKG:  I have reviewed EKG with the following interpretation:  Impression:  26-MAR-2019 22:58:15 [facility] ROUTINE RECORD  Sinus tachycardia with frequent Premature ventricular complexes  Baseline artifact  Possible Left atrial enlargement  Abnormal ECG  No significant change except sinus rate    Echo 3/27/19:  Summary   -Borderline global ejection fraction estimated at 50%.  -Basal and mid anteroseptal, basal and mid inferoseptal, basal and mid   inferior, and mid anterior wall hypokinesis noted.   -Moderate septal hypertrophy.   -Aortic valve appears sclerotic but opens adequately. Trivial aortic regurgitation.   -Thickened mitral valve without evidence of stenosis.   -There is at least moderate, eccentric, posteriorly directed mitral regurgitation noted.   -There is mild tricuspid regurgitation with a RVSP estimation of 36 mmHg.   -The IVC is dilated .   -Normal diastolic function. Avg. E/e'=14.05    Carotid US: April '18:  Summary        -The right internal carotid artery appears to have a <50% diameter reducing    stenosis based on velocity criteria.    -The left internal carotid artery demonstrates no significant stenosis.        Echo: April '18:  Summary   Normal left ventricle size and systolic function with an estimated ejection   fraction of 55%.      There are linear mobile density on the aortic valve leaflet consistent with   lambl's Excrescences.      The left atrium appears dilated. There is no evidence of mass or thrombus in   the left atrium or appendage.  A bubble study was performed and showed no   evidence of shunting     Myoview Stress Test: July '15:   Summary       No EKG evidence for ischemia with lexiscan       LV systolic function is preserved     Myocardial perfusion imaging with no evidence for ischemia with lexiscan           Assessment/Plan:  Patient Active Hospital Problem List:   SOB (shortness of breath) (11/3/2015)    Assessment: Etiology unclear. Likely multifactorial.      Plan: Diuresis as tolerated. Suggests pulmonary consultation as well. CAD in native artery ()    Assessment: Details not currently known. Troponin mildly elevated but trending down. Plan: Continue Heparin for now. Will need further cardiac risk stratification once pulmonary status better. Elevated troponin      Assessment: Trending down. Plan: Will need further evaluation. Mitral regurgitation      Assessment: Moderate range previously. Tobacco use was discussed with the patient and educated on the negative effects. Thank you for allowing us to participate in the care of Aretha Calle. Further evaluation will be based upon the patient's clinical course and testing results. All questions and concerns were addressed to the patient/family. Alternatives to my treatment were discussed. The note was completed using EMR. Every effort was made to ensure accuracy; however, inadvertent computerized transcription errors may be present.     Nancy Figueroa M.D.

## 2019-03-27 NOTE — ED NOTES
Bed: 13  Expected date:   Expected time:   Means of arrival: [facility]  Comments:  FF 2020 Alexi Ortiz, [facility]  03/26/19 3800

## 2019-03-27 NOTE — PROGRESS NOTES
APTT 47.9, 1,750 units (half initial bolus) heparin given and infusion increased by 2 u/kg/hr per protocol order. Heparin infusing at 14 u/kg/hr. Pt up to chair. Pt with strong productive cough. continuing to wean O2, pt now on 1.5L, sats 97%.   Will monitor

## 2019-03-27 NOTE — PROGRESS NOTES
Pt admitted to [facility] 177 2095. Attached to tele NSR on monitor. VSS. Gasca catheter in place. High flow NC at 7L. No obvious signs of distress. Educated on call light, oriented to room and unit. Alert and oriented.   Electronically signed by Regina Pineda RN on 3/27/2019 at 1:15 AM

## 2019-03-27 NOTE — ED PROVIDER NOTES
[address] [facility]  eMERGENCY dEPARTMENTeNCOUnter      Pt Name: Liza Lobo  MRN: [id number]  [place] 1945  Date ofevaluation: 3/26/2019  Provider: Celestino Jackson MD    CHIEF COMPLAINT       Chief Complaint   Patient presents with    Shortness of Breath     pt with cough x3 days. pt states she ran out of albuterol inhailer and has been having difficutly breathing x2 hours. pt on non-rebreather per squad. pt hypertensive and tachycardic upon triage. HISTORY OF PRESENT ILLNESS   (Location/Symptom, Timing/Onset,Context/Setting, Quality, Duration, Modifying Factors, Severity)  Note limiting factors. Liza Lobo is a 75 y.o. female who presents to the emergency department shortness of breath and a cough for 3 days. She denies any chest pain. She denies any fevers or chills. She states she ran out of her albuterol inhaler. She denies any chest pain. HPI    NursingNotes were reviewed. REVIEW OF SYSTEMS    (2-9 systems for level 4, 10 or more for level 5)     Review of Systems  Negative for GI  musculoskeletal neurological pulmonary and cardiac complaints and all others reviewed and negative  General Appearance:  Alert, cooperative, no distress, appears stated age. Head:  Normocephalic, without obvious abnormality, atraumatic. Eyes:  conjunctiva/corneas clear, EOM's intact. Sclera anicteric. ENT: Mucous membranes moist.   Neck: Supple, symmetrical, trachea midline, no adenopathy. No jugular venous distention. Lungs:   No Respiratory Distress. Chest Wall:  X-ray wheezes bilaterally. Positive crackles. Heart:  Regular rate rhythm with no murmurs rubs or gallops    Abdomen:   Soft and benign    Extremities:  Full range of motion. Pulses: Good throughout    Skin:  No rashes or lesions to exposed skin. Neurologic: Alert and oriented X 3. Motor grossly normal.  Speech clear.         Except as noted above the remainder of the review of systems was reviewed and negative. PAST MEDICAL HISTORY     Past Medical History:   Diagnosis Date    Alcohol abuse     Anxiety     CAD in native artery     Cerebral artery occlusion with cerebral infarction ([facility]     states hx of multiple mini strokes    COPD (chronic obstructive pulmonary disease) ([facility]     Depression     DVT, lower extremity (HCC)     Hypercholesteremia     Hypertension     Hyperthyroidism     Mammogram abnormal 2/7/2012    Neuromuscular disorder ([facility]     Pneumonia     Thyroid disease     Tobacco abuse          SURGICALHISTORY       Past Surgical History:   Procedure Laterality Date    COLONOSCOPY  1/19/2012    Dr. Dorene Vidal; repeat 5 years    EYE SURGERY      cateracts removed    SHOULDER ARTHROSCOPY Right 6/18/14    SUBACROMIAL DECOMPRESSION, ROTATOR CUFF REPAIR    TOE SURGERY Right     TONSILLECTOMY           CURRENT MEDICATIONS       Previous Medications    ASPIRIN 81 MG CHEWABLE TABLET    Take 1 tablet by mouth daily    ATORVASTATIN (LIPITOR) 80 MG TABLET    Take 1 tablet by mouth daily    BUDESONIDE-FORMOTEROL (SYMBICORT) 160-4.5 MCG/ACT AERO    Inhale 2 puffs into the lungs 2 times daily    CALCIUM ELEMENTAL (OSCAL) 500 MG TABS TABLET    Take 1 tablet by mouth daily    CLONAZEPAM (KLONOPIN) 0.5 MG TABLET    Take 1 tablet by mouth nightly as needed (insomnia). .    CLONIDINE (CATAPRES) 0.3 MG TABLET    Take 1 tablet by mouth 3 times daily    ESCITALOPRAM (LEXAPRO) 10 MG TABLET    Take 1 tablet by mouth daily    IPRATROPIUM (ATROVENT HFA) 17 MCG/ACT INHALER    Inhale 1 puff into the lungs 3 times daily    LABETALOL (NORMODYNE) 100 MG TABLET    Take 1 tablet by mouth every 12 hours    LEVOTHYROXINE (SYNTHROID) 100 MCG TABLET    TAKE ONE TABLET BY MOUTH DAILY    LISINOPRIL (PRINIVIL;ZESTRIL) 40 MG TABLET    Take 1 tablet by mouth daily    MIRTAZAPINE (REMERON) 15 MG TABLET    Take 1 tablet by mouth nightly    MULTIPLE VITAMINS-MINERALS (CENTRUM SILVER ADULT 50+ PO) FOR LOW K - Abnormal; Notable for the following components:    Sodium 130 (*)     Chloride 90 (*)     Glucose 154 (*)     CREATININE 0.5 (*)     All other components within normal limits    Narrative:     Performed at:  [facility] [address],  [facility], [address]   Phone 21  - Abnormal; Notable for the following components:    Pro-BNP 1,662 (*)     All other components within normal limits    Narrative:     Performed at:  [facility] [address],  [facility], [address]   Phone [phone]   TROPONIN - Abnormal; Notable for the following components:    Troponin 0.07 (*)     All other components within normal limits    Narrative:     Performed at:  [facility] [address],  [facility], [address]   Phone [phone]       All other labs were within normal range or not returned as of this dictation. EMERGENCY DEPARTMENT COURSE and DIFFERENTIAL DIAGNOSIS/MDM:   Vitals:    Vitals:    03/26/19 2252 03/26/19 2253 03/26/19 2300 03/26/19 2323   BP: (!) 170/121 (!) 170/121 (!) 166/109    Pulse: 123 120 117    Resp: (!) 31 (!) 40 (!) 37    Temp:  98.5 °F (36.9 °C)     TempSrc:  Oral     SpO2: 99% 97% 95% 97%   Weight:  134 lb (60.8 kg)     Height:  5' 7\" (1.702 m)         The patient has remained stable throughout her hospital course. A workup is consistent with congestive heart failure. She has an elevated troponin and is tachypneic. I started her on nitroglycerin as well as Lasix and she'll be admitted for further care and observation. MDM      REASSESSMENT              CONSULTS:  IP CONSULT TO HOSPITALIST    PROCEDURES:  Unless otherwise noted below, none     Procedures    FINAL IMPRESSION      1.  Acute systolic congestive heart failure [facility])          DISPOSITION/PLAN   DISPOSITION Decision To Admit 03/27/2019 12:02:40 AM      PATIENT REFERREDTO:  No follow-up provider specified. DISCHARGEMEDICATIONS:  New Prescriptions    No medications on file     Controlled Substances Monitoring:     RX Monitoring 2/11/2019   Attestation The Prescription Monitoring Report for this patient was reviewed today. Chronic Pain Routine Monitoring Possible medication side effects, risk of tolerance/dependence & alternative treatments discussed. ;No signs of potential drug abuse or diversion identified.        (Please note that portions of this note were completed with a voice recognition program.  Efforts were made to edit the dictations but occasionally words are mis-transcribed.)    Satnam Cabello MD (electronically signed)  Attending Emergency Physician          Satnam Cabello MD  03/27/19 0002

## 2019-03-27 NOTE — PROGRESS NOTES
Nutrition Assessment (Low Risk)    Type and Reason for Visit: Consult, Patient Education    Nutrition Assessment:  Consult for CHF diet education. Provided heart failure diet education. Education included low sodium diet guidelines (3-4 gm Na+/day) and fluid restriction (64 oz/day). Reviewed foods to choose and foods to avoid, along with label reading and ways to add flavor to food. Pt does not currently follow a low sodium diet at home. However pt has been educated on a low sodium diet before. Pt states understanding of education. Time spent with patient: 5 minutes.      Nutrition Intervention:  Nutrition Education/Counseling/Coordination of Care:  Education Completed(CHF 5 mins)    Electronically signed by Jesus Cope RD, CNSC, LD on 3/27/19 at 9:52 AM    Contact Number: [phone]

## 2019-03-27 NOTE — ED NOTES
Pt seen on monitor by Jennifer Cooper RN, Monitor tech in NSR. Pt name and box number confirmed.       Estelle Kawasaki, RN  03/27/19 9052

## 2019-03-27 NOTE — PLAN OF CARE
Problem: Pain:  Goal: Pain level will decrease  Description  Pain level will decrease  Outcome: Ongoing  Note:   Pt alert and oriented, educated on pain rating scale and verbalizes understanding. Pt able to communicate pain needs appropriately. Pt c/o acute headache, PRN tylenol given for relief (see MAR). Pt resting in bed, instructed to alert RN for persistent and untreated pain, verbalizes understanding. Problem: Falls - Risk of:  Goal: Will remain free from falls  Description  Will remain free from falls  Outcome: Ongoing  Note:   Pt remains free of falls. Fall risk protocol in place. Bed locked in lowest position. Call light in reach. Non skid socks in place, side rails up 3/4. Bed alarm engaged. Gasca in place. Pt instructed to call for assistance, verbalizes understanding. Will continue to monitor. Problem: HEMODYNAMIC STATUS  Goal: Patient has stable vital signs and fluid balance  Outcome: Ongoing  Note:   Pulse rate and rhythm, peripheral pulses, and capillary refill assessed every shift with assessment. General color and body temperature monitored throughout shift and with vitals. Assess for edema with head to toe assessment. Administer treatments and medications as ordered. Monitor patient's weight.

## 2019-03-27 NOTE — H&P
_    Mau Villegas HOSPITALIST HISTORY AND PHYSICAL    3/27/2019 1:00 AM        Patient Information:  Rod Gonzalez is a 75 y.o. female [id number]    PCP:  Rajan Henning MD (Tel: [phone] )        Date of Service:   Pt seen/examined on 3/27/2019   Admitted to Inpatient with expected LOS greater than two midnights due to medical therapy. Chief complaint:    Chief Complaint   Patient presents with    Shortness of Breath     pt with cough x3 days. pt states she ran out of albuterol inhailer and has been having difficutly breathing x2 hours. pt on non-rebreather per squad. pt hypertensive and tachycardic upon triage. History of Present Illness:  Qian Brito is a 75 y.o. female who presented with   · SOB X few days   · Productive cough X few days   · No chest pain reported   · Feeling weak in general X few days   · Home nebulizer nebs ran out few days ago and hasnt been able to use them @ Home   · Came to ED since has not been feeling well at all since evening       History obtained from   · Patient  \   · Prior chart  As well       So far, ED Findings/Workup/Labs shows :   · BP. HTN   · HR. Tachycardia   · Saturations. Hypoxemia   · Temperature. Afebrile     · Imaging done in ED as below. And is/are s/o pulmonary edema   · Pertinent Abnormal Labs and assessment as below. While in ED, patient care and medications given :   · Imaging  CXR  , done in ED   · Lasix X 1   · Gasca placed   · Nebs X 1       Currently, on my evaluation, patient is :   · Since arrival, post above Rx, patient remains on NC O2   · Denies any chest pain at all   · SOB stable and controlled for now   ·       REVIEW OF SYSTEMS:          Constitutional:  Negative for fever, chills or night sweats  ENT:   Negative for rhinorrhea, epistaxis, hoarseness, sore throat.   Respiratory:  + as above   Cardiovascular:  Negative for  chest pain, palpitations   Gastrointestinal:   Negative for nausea, vomiting, diarrhea  Genitourinary:   Negative for polyuria, dysuria   Hematologic/Lymphatic:   Negative for  bleeding tendency, easy bruising  Musculoskeletal:   Negative for myalgias and arthralgias  Neurologic:   Negative for  Confusion, dysarthria. Skin:   Negative for itching,rash  Psychiatric:  Negative for depression, anxiety, agitation. Endocrine:  Negative for polydipsia, polyuria,heat /cold intolerance. Past Medical History:         has a past medical history of Alcohol abuse, Anxiety, CAD in native artery, Cerebral artery occlusion with cerebral infarction ([address] COPD (chronic obstructive pulmonary disease) ([address] Depression, DVT, lower extremity ([address] Hypercholesteremia, Hypertension, Hyperthyroidism, Mammogram abnormal, Neuromuscular disorder ([address] Pneumonia, Thyroid disease, and Tobacco abuse. Past Surgical History:         has a past surgical history that includes Tonsillectomy; Colonoscopy (1/19/2012); Toe Surgery (Right); Shoulder arthroscopy (Right, 6/18/14); and eye surgery. Medications:        Current Outpatient Medications on File Prior to Encounter   Medication Sig Dispense Refill    escitalopram (LEXAPRO) 10 MG tablet Take 1 tablet by mouth daily 90 tablet 0    cloNIDine (CATAPRES) 0.3 MG tablet Take 1 tablet by mouth 3 times daily 90 tablet 0    clonazePAM (KLONOPIN) 0.5 MG tablet Take 1 tablet by mouth nightly as needed (insomnia). . 30 tablet 2    ondansetron (ZOFRAN-ODT) 8 MG TBDP disintegrating tablet Take 1 tablet by mouth every 8 hours as needed for Nausea May Sub regular tablet (non-ODT) if insurance does not cover ODT.  10 tablet 0    labetalol (NORMODYNE) 100 MG tablet Take 1 tablet by mouth every 12 hours 60 tablet 3    aspirin 81 MG chewable tablet Take 1 tablet by mouth daily 30 tablet 3    mirtazapine (REMERON) 15 MG tablet Take 1 tablet by mouth nightly 30 tablet 11    calcium elemental (OSCAL) 500 MG TABS tablet Take 1 tablet by mouth daily 30 tablet 3    Eyes:  Sclera clear, pupils equal  ENT:  Dry  mucus membranes, Trachea midline. Cardiovascular: Regular rhythm, normal S1, S2. No murmur, gallop, rub. No edema in lower extremities   Respiratory:  Noted Dec AE B/ L . Gastrointestinal:  Abdomen soft,  non-tender,  not distended,  normal bowel sounds   Musculoskeletal:  No cyanosis in digits, neck supple  Neurology:  Cranial nerves grossly intact. Alert and oriented in time, place and person. No speech or motor deficits   Psychiatry:  Appropriate affect. Not agitated  Skin:  Warm, dry, normal turgor, no rash       Labs:     Recent Labs     03/26/19 2317   WBC 10.8   HGB 15.2   HCT 45.2        Recent Labs     03/26/19 2317   *   K 3.9   CL 90*   CO2 25   BUN 7   CREATININE 0.5*   CALCIUM 8.6     Recent Labs     03/26/19 2317   AST 25   ALT 17   BILITOT 0.5   ALKPHOS 64     No results for input(s): INR in the last 72 hours. Recent Labs     03/26/19 2317   TROPONINI 0.07*         Urinalysis:      Lab Results   Component Value Date    NITRU Negative 02/10/2019    WBCUA 3-5 02/10/2019    BACTERIA 1+ 02/10/2019    RBCUA 0-2 02/10/2019    BLOODU TRACE-INTACT 02/10/2019    SPECGRAV 1.015 02/10/2019    GLUCOSEU Negative 02/10/2019    GLUCOSEU NEGATIVE 05/08/2011         Radiology:     CXR:   I have reviewed the CXR  Pulmonary edema     EKG/Telemonitor :    I have reviewed the EKG  ST +     IMAGING :     XR CHEST PORTABLE   Final Result   Pulmonary edema.                ASSESSMENT / PLAN     Active Hospital Problems    Diagnosis Date Noted    HTN (hypertension), benign [I10]     Dyslipidemia [E78.5]     Depression [F32.9]     Respiratory distress [R06.03]     CAD in native artery [I25.10]     COPD, moderate ([facility] [J44.9] 06/09/2016    Essential hypertension [I10]     Centrilobular emphysema (HCC) [J43.2]     SOB (shortness of breath) [R06.02] 11/03/2015    DVT (deep venous thrombosis) ([facility] [I82.409]     Hyperlipidemia [E78.5] 03/19/2013    Hypothyroidism [E03.9] 10/16/2012    Smoker [F17.200] 10/16/2012    Anxiety [F41.9] 10/16/2012         · AE COPD : Is Started on systemic steroids and nebs prn/ifrah while in patient. Will c/w Aggressive pulmonary toilet and Pulmonary rehab while IP. Encourage IS. Accapella prn >>  While inpatient, Will target towards improved ambulation and exercise capacity, and decrease and stable O2 needs while IP,  as we are able and as tolerated by patient . · Suspected acute on chronic Diastolic CHF exacerbation : Admission BNP 1600s . Gasca placement . Strict I/Os. Daily weights. Last Echo shows EF 55 % . Repeat echo planned in AM. Diuretics per orders. Monitor electrolytes and RFTs closely while inpatient. · Detectable cardiac enzymes [ Present on arrival/admission ]  : 0.07 on admission . Will f/u trend during inpatient stay and monitor closely. ? Suspected Type II MI in v/o issues [ Hypoxemia + AE COPD + CHF ] above . Cardiology c/sed . Will hold off on AC until trend upwards noted further . Further recommendations per cardiology in AM . NO CHEST PAIN reported per patient and no acute ST T changes s/o acute ischemia noted on EKG in ED   · Anxiety and depression   · Urinary incontinence   · H/o CVA in past : Resumed home medications of ASA + statins   · Hypothyroidism : Resumed home medications of synthroid   · Mixed Hyperlipidemia : Resumed home medications of statins   · Essential Hypertension : Resumed home medications of BB + ACE I + clonidine pO     · Home medications for Chronic medical problems reviewed. · Home medications Held/Resumed, and Pertinent changes made in home medications on admission, as needed, according to current medical status, as mentioned above.  Please see EPIC orders for detailed recommendations of plan and medications       · DVT Prophylaxis :  Lovenox   S/q + SCDs   · GI Prophylaxis : H2RB as per orders   · Diet : cardiac       · Code status : full   · PT/OT and ambulatory Eval Status: Ambulate with Assist   · Probable LOS & future Disposition planned post discharge  - home in 2-3 days       Medical Decision Making : high     Total patient  Care time spent in evaluating the patient an discussing plan with appropriate staff/patient/family members is 62 min       Ike Jara MD    3/27/2019 1:00 AM

## 2019-03-28 LAB
ANION GAP SERPL CALCULATED.3IONS-SCNC: 13 MMOL/L (ref 3–16)
ANION GAP SERPL CALCULATED.3IONS-SCNC: 13 MMOL/L (ref 3–16)
APTT: 61.4 SEC (ref 26–36)
APTT: 95.9 SEC (ref 26–36)
BASOPHILS ABSOLUTE: 0 K/UL (ref 0–0.2)
BASOPHILS RELATIVE PERCENT: 0 %
BILIRUBIN URINE: NEGATIVE
BLOOD, URINE: ABNORMAL
BUN BLDV-MCNC: 18 MG/DL (ref 7–20)
BUN BLDV-MCNC: 24 MG/DL (ref 7–20)
CALCIUM SERPL-MCNC: 8.9 MG/DL (ref 8.3–10.6)
CALCIUM SERPL-MCNC: 9 MG/DL (ref 8.3–10.6)
CHLORIDE BLD-SCNC: 89 MMOL/L (ref 99–110)
CHLORIDE BLD-SCNC: 89 MMOL/L (ref 99–110)
CHOLESTEROL, TOTAL: 138 MG/DL (ref 0–199)
CLARITY: CLEAR
CO2: 27 MMOL/L (ref 21–32)
CO2: 27 MMOL/L (ref 21–32)
COLOR: YELLOW
CREAT SERPL-MCNC: 0.6 MG/DL (ref 0.6–1.2)
CREAT SERPL-MCNC: 0.7 MG/DL (ref 0.6–1.2)
EOSINOPHILS ABSOLUTE: 0 K/UL (ref 0–0.6)
EOSINOPHILS RELATIVE PERCENT: 0 %
EPITHELIAL CELLS, UA: 0 /HPF (ref 0–5)
GFR AFRICAN AMERICAN: >60
GFR AFRICAN AMERICAN: >60
GFR NON-AFRICAN AMERICAN: >60
GFR NON-AFRICAN AMERICAN: >60
GLUCOSE BLD-MCNC: 134 MG/DL (ref 70–99)
GLUCOSE BLD-MCNC: 143 MG/DL (ref 70–99)
GLUCOSE URINE: NEGATIVE MG/DL
HCT VFR BLD CALC: 39.3 % (ref 36–48)
HDLC SERPL-MCNC: 67 MG/DL (ref 40–60)
HEMOGLOBIN: 13.5 G/DL (ref 12–16)
HYALINE CASTS: 0 /LPF (ref 0–8)
KETONES, URINE: NEGATIVE MG/DL
LDL CHOLESTEROL CALCULATED: 63 MG/DL
LEUKOCYTE ESTERASE, URINE: ABNORMAL
LYMPHOCYTES ABSOLUTE: 0.3 K/UL (ref 1–5.1)
LYMPHOCYTES RELATIVE PERCENT: 3.4 %
MAGNESIUM: 2 MG/DL (ref 1.8–2.4)
MCH RBC QN AUTO: 33.1 PG (ref 26–34)
MCHC RBC AUTO-ENTMCNC: 34.2 G/DL (ref 31–36)
MCV RBC AUTO: 96.8 FL (ref 80–100)
MICROSCOPIC EXAMINATION: YES
MONOCYTES ABSOLUTE: 0.4 K/UL (ref 0–1.3)
MONOCYTES RELATIVE PERCENT: 4.5 %
NEUTROPHILS ABSOLUTE: 7.2 K/UL (ref 1.7–7.7)
NEUTROPHILS RELATIVE PERCENT: 92.1 %
NITRITE, URINE: NEGATIVE
PDW BLD-RTO: 14.1 % (ref 12.4–15.4)
PH UA: 6 (ref 5–8)
PLATELET # BLD: 261 K/UL (ref 135–450)
PMV BLD AUTO: 8.2 FL (ref 5–10.5)
POTASSIUM SERPL-SCNC: 3.8 MMOL/L (ref 3.5–5.1)
POTASSIUM SERPL-SCNC: 4.1 MMOL/L (ref 3.5–5.1)
PROTEIN UA: NEGATIVE MG/DL
RBC # BLD: 4.06 M/UL (ref 4–5.2)
RBC UA: 1 /HPF (ref 0–4)
SODIUM BLD-SCNC: 129 MMOL/L (ref 136–145)
SODIUM BLD-SCNC: 129 MMOL/L (ref 136–145)
SPECIFIC GRAVITY UA: 1 (ref 1–1.03)
TRIGL SERPL-MCNC: 41 MG/DL (ref 0–150)
UROBILINOGEN, URINE: 0.2 E.U./DL
VLDLC SERPL CALC-MCNC: 8 MG/DL
WBC # BLD: 7.8 K/UL (ref 4–11)
WBC UA: 2 /HPF (ref 0–5)

## 2019-03-28 PROCEDURE — 83735 ASSAY OF MAGNESIUM: CPT

## 2019-03-28 PROCEDURE — 6360000002 HC RX W HCPCS: Performed by: HOSPITALIST

## 2019-03-28 PROCEDURE — 84300 ASSAY OF URINE SODIUM: CPT

## 2019-03-28 PROCEDURE — 85025 COMPLETE CBC W/AUTO DIFF WBC: CPT

## 2019-03-28 PROCEDURE — 94640 AIRWAY INHALATION TREATMENT: CPT

## 2019-03-28 PROCEDURE — 81001 URINALYSIS AUTO W/SCOPE: CPT

## 2019-03-28 PROCEDURE — 99232 SBSQ HOSP IP/OBS MODERATE 35: CPT | Performed by: INTERNAL MEDICINE

## 2019-03-28 PROCEDURE — 85730 THROMBOPLASTIN TIME PARTIAL: CPT

## 2019-03-28 PROCEDURE — 2580000003 HC RX 258: Performed by: HOSPITALIST

## 2019-03-28 PROCEDURE — 6370000000 HC RX 637 (ALT 250 FOR IP): Performed by: HOSPITALIST

## 2019-03-28 PROCEDURE — 83930 ASSAY OF BLOOD OSMOLALITY: CPT

## 2019-03-28 PROCEDURE — 2140000000 HC CCU INTERMEDIATE R&B

## 2019-03-28 PROCEDURE — 80048 BASIC METABOLIC PNL TOTAL CA: CPT

## 2019-03-28 PROCEDURE — 2700000000 HC OXYGEN THERAPY PER DAY

## 2019-03-28 PROCEDURE — 94762 N-INVAS EAR/PLS OXIMTRY CONT: CPT

## 2019-03-28 PROCEDURE — 6360000002 HC RX W HCPCS: Performed by: INTERNAL MEDICINE

## 2019-03-28 PROCEDURE — 82570 ASSAY OF URINE CREATININE: CPT

## 2019-03-28 PROCEDURE — 80061 LIPID PANEL: CPT

## 2019-03-28 PROCEDURE — 94760 N-INVAS EAR/PLS OXIMETRY 1: CPT

## 2019-03-28 RX ORDER — GUAIFENESIN/DEXTROMETHORPHAN 100-10MG/5
5 SYRUP ORAL EVERY 6 HOURS PRN
Status: DISCONTINUED | OUTPATIENT
Start: 2019-03-28 | End: 2019-03-30 | Stop reason: HOSPADM

## 2019-03-28 RX ORDER — BENZONATATE 100 MG/1
100 CAPSULE ORAL 3 TIMES DAILY PRN
Status: DISCONTINUED | OUTPATIENT
Start: 2019-03-28 | End: 2019-03-30 | Stop reason: HOSPADM

## 2019-03-28 RX ADMIN — CLONIDINE HYDROCHLORIDE 0.3 MG: 0.1 TABLET ORAL at 20:26

## 2019-03-28 RX ADMIN — CLONIDINE HYDROCHLORIDE 0.3 MG: 0.1 TABLET ORAL at 00:39

## 2019-03-28 RX ADMIN — ENOXAPARIN SODIUM 40 MG: 40 INJECTION SUBCUTANEOUS at 20:26

## 2019-03-28 RX ADMIN — Medication 10 ML: at 10:52

## 2019-03-28 RX ADMIN — CALCIUM 500 MG: 500 TABLET ORAL at 10:51

## 2019-03-28 RX ADMIN — GUAIFENESIN AND DEXTROMETHORPHAN 5 ML: 100; 10 SYRUP ORAL at 23:54

## 2019-03-28 RX ADMIN — IPRATROPIUM BROMIDE AND ALBUTEROL SULFATE 1 AMPULE: .5; 3 SOLUTION RESPIRATORY (INHALATION) at 20:21

## 2019-03-28 RX ADMIN — LISINOPRIL 40 MG: 20 TABLET ORAL at 10:50

## 2019-03-28 RX ADMIN — METHYLPREDNISOLONE SODIUM SUCCINATE 40 MG: 40 INJECTION, POWDER, FOR SOLUTION INTRAMUSCULAR; INTRAVENOUS at 02:19

## 2019-03-28 RX ADMIN — IPRATROPIUM BROMIDE AND ALBUTEROL SULFATE 1 AMPULE: .5; 3 SOLUTION RESPIRATORY (INHALATION) at 09:11

## 2019-03-28 RX ADMIN — FUROSEMIDE 40 MG: 10 INJECTION, SOLUTION INTRAMUSCULAR; INTRAVENOUS at 10:51

## 2019-03-28 RX ADMIN — OXYBUTYNIN CHLORIDE 5 MG: 5 TABLET ORAL at 20:26

## 2019-03-28 RX ADMIN — CLONIDINE HYDROCHLORIDE 0.3 MG: 0.1 TABLET ORAL at 10:49

## 2019-03-28 RX ADMIN — IPRATROPIUM BROMIDE AND ALBUTEROL SULFATE 1 AMPULE: .5; 3 SOLUTION RESPIRATORY (INHALATION) at 13:48

## 2019-03-28 RX ADMIN — ASPIRIN 81 MG 81 MG: 81 TABLET ORAL at 10:50

## 2019-03-28 RX ADMIN — FAMOTIDINE 20 MG: 20 TABLET ORAL at 20:26

## 2019-03-28 RX ADMIN — CLONIDINE HYDROCHLORIDE 0.3 MG: 0.1 TABLET ORAL at 16:41

## 2019-03-28 RX ADMIN — OXYBUTYNIN CHLORIDE 5 MG: 5 TABLET ORAL at 10:59

## 2019-03-28 RX ADMIN — ESCITALOPRAM OXALATE 10 MG: 10 TABLET, FILM COATED ORAL at 10:50

## 2019-03-28 RX ADMIN — ATORVASTATIN CALCIUM 80 MG: 80 TABLET, FILM COATED ORAL at 20:26

## 2019-03-28 RX ADMIN — HEPARIN SODIUM AND DEXTROSE 14 UNITS/KG/HR: 10000; 5 INJECTION INTRAVENOUS at 05:56

## 2019-03-28 RX ADMIN — METHYLPREDNISOLONE SODIUM SUCCINATE 40 MG: 40 INJECTION, POWDER, FOR SOLUTION INTRAMUSCULAR; INTRAVENOUS at 16:42

## 2019-03-28 RX ADMIN — MIRTAZAPINE 15 MG: 15 TABLET, FILM COATED ORAL at 20:26

## 2019-03-28 RX ADMIN — FAMOTIDINE 20 MG: 20 TABLET ORAL at 10:50

## 2019-03-28 RX ADMIN — METHYLPREDNISOLONE SODIUM SUCCINATE 40 MG: 40 INJECTION, POWDER, FOR SOLUTION INTRAMUSCULAR; INTRAVENOUS at 10:51

## 2019-03-28 RX ADMIN — LABETALOL HCL 100 MG: 200 TABLET, FILM COATED ORAL at 10:51

## 2019-03-28 RX ADMIN — LABETALOL HCL 100 MG: 200 TABLET, FILM COATED ORAL at 20:26

## 2019-03-28 RX ADMIN — LEVOTHYROXINE SODIUM 100 MCG: 100 TABLET ORAL at 05:56

## 2019-03-28 ASSESSMENT — PAIN SCALES - GENERAL
PAINLEVEL_OUTOF10: 0

## 2019-03-28 NOTE — PROGRESS NOTES
Introduced self to patient. Initial shift assessment completed, see complex assessment in doc flowsheet. Questions answered. Call light within reach. Bed in low position with wheels locked. Encouraged to call for nurse as needed throughout the shift. VSS. Denies pain.

## 2019-03-28 NOTE — PROGRESS NOTES
Department of Internal Medicine  General Internal Medicine   Progress Note      CC: F/U for sob      SUBJECTIVE / Interval History:     Pt's breathing is better, 95% on RA per RN. Cough is improving mostly dry now.  No cp, abd pain, n/v/f/c.     OBJECTIVE      Medications    Current Facility-Administered Medications: aspirin chewable tablet 81 mg, 81 mg, Oral, Daily  atorvastatin (LIPITOR) tablet 80 mg, 80 mg, Oral, Nightly  calcium elemental (OSCAL) tablet 500 mg, 500 mg, Oral, Daily  clonazePAM (KLONOPIN) tablet 0.5 mg, 0.5 mg, Oral, Nightly PRN  cloNIDine (CATAPRES) tablet 0.3 mg, 0.3 mg, Oral, TID  escitalopram (LEXAPRO) tablet 10 mg, 10 mg, Oral, Daily  labetalol (NORMODYNE) tablet 100 mg, 100 mg, Oral, 2 times per day  lisinopril (PRINIVIL;ZESTRIL) tablet 40 mg, 40 mg, Oral, Daily  mirtazapine (REMERON) tablet 15 mg, 15 mg, Oral, Nightly  oxybutynin (DITROPAN) tablet 5 mg, 5 mg, Oral, BID  sodium chloride flush 0.9 % injection 10 mL, 10 mL, Intravenous, 2 times per day  sodium chloride flush 0.9 % injection 10 mL, 10 mL, Intravenous, PRN  magnesium hydroxide (MILK OF MAGNESIA) 400 MG/5ML suspension 30 mL, 30 mL, Oral, Daily PRN  ondansetron (ZOFRAN) injection 4 mg, 4 mg, Intravenous, Q6H PRN  potassium chloride (KLOR-CON M) extended release tablet 40 mEq, 40 mEq, Oral, PRN **OR** potassium bicarb-citric acid (EFFER-K) effervescent tablet 40 mEq, 40 mEq, Oral, PRN **OR** potassium chloride 10 mEq/100 mL IVPB (Peripheral Line), 10 mEq, Intravenous, PRN  magnesium sulfate 1 g in dextrose 5% 100 mL IVPB, 1 g, Intravenous, PRN  famotidine (PEPCID) tablet 20 mg, 20 mg, Oral, BID  acetaminophen (TYLENOL) tablet 650 mg, 650 mg, Oral, Q4H PRN  furosemide (LASIX) injection 40 mg, 40 mg, Intravenous, Daily  nitroGLYCERIN (NITROSTAT) SL tablet 0.4 mg, 0.4 mg, Sublingual, Q5 Min PRN  ipratropium-albuterol (DUONEB) nebulizer solution 1 ampule, 1 ampule, Inhalation, Q4H PRN  ipratropium-albuterol (DUONEB) nebulizer solution 1    HTN  -Continue home meds      DVT Prophylaxis :  heparin    Diet : cardiac         Code status : full

## 2019-03-28 NOTE — PROGRESS NOTES
Results for Meghan Singh (MRN [id number]) as of 3/27/2019 20:12   Ref. Range 3/27/2019 18:45   aPTT Latest Ref Range: 26.0 - 36.0 sec 40.9 (H)     APTT 40.9. Half initial bolus given and Hep infusion increased by 2u/kg/hr. Infusing at 16u/kg/hr. Will recheck in 6 hours. Pt up to chair resting comfortably. Denies presence of pain. Titrated to RA, o2 sats 5%.    Electronically signed by Tom Aguirre RN on 3/27/2019 at 8:29 PM

## 2019-03-28 NOTE — PLAN OF CARE
Problem: Pain:  Goal: Pain level will decrease  Description  Pain level will decrease  Outcome: Ongoing  Note:   Pt is alert and oriented, educated on pain rating scale and verbalizes understanding. Pt continues to deny pain to this shift. Continuing to monitor and assess pain presence and will treat appropriately. Problem: Falls - Risk of:  Goal: Will remain free from falls  Description  Will remain free from falls  Outcome: Ongoing  Note:   Pt remains free of falls. Fall risk protocol in place. Bed locked in lowest position. Call light in reach. Non skid socks in place, side rails up 3/4 while in bed. Bed alarm engaged. Gasca catheter in place. Pt instructed to call for assistance, verbalizes understanding. Will continue to monitor. Problem: HEMODYNAMIC STATUS  Goal: Patient has stable vital signs and fluid balance  Outcome: Ongoing  Note:   Pulse rate and rhythm, peripheral pulses, and capillary refill assessed every shift with assessment. General color and body temperature monitored throughout shift and with vitals. Assess for edema with head to toe assessment. Administer treatments and medications as ordered. Monitor patient's weight.

## 2019-03-28 NOTE — CONSULTS
disease) ([facility]     Depression     DVT, lower extremity ([facility]     Hypercholesteremia     Hypertension     Hyperthyroidism     Mammogram abnormal 2012    Neuromuscular disorder ([facility]     Pneumonia     Thyroid disease     Tobacco abuse        Past Surgical History:        Procedure Laterality Date    COLONOSCOPY  2012    Dr. Sherry Mota; repeat 5 years    EYE SURGERY      cateracts removed    SHOULDER ARTHROSCOPY Right 13    SUBACROMIAL DECOMPRESSION, ROTATOR CUFF REPAIR    TOE SURGERY Right     TONSILLECTOMY         Allergies:  Lipitor [atorvastatin]; Morphine; Keflex [cephalexin]; Hctz [hydrochlorothiazide]; Norvasc [amlodipine besylate];  Penicillins; Tramadol; Hydralazine; and Shellfish-derived products    Social History:    Social History     Socioeconomic History    Marital status:      Spouse name: Not on file    Number of children: Not on file    Years of education: Not on file    Highest education level: Not on file   Occupational History    Not on file   Social Needs    Financial resource strain: Not on file    Food insecurity:     Worry: Not on file     Inability: Not on file    Transportation needs:     Medical: Not on file     Non-medical: Not on file   Tobacco Use    Smoking status: Current Every Day Smoker     Packs/day: 0.50     Years: 52.00     Pack years: 26.00     Types: Cigarettes     Last attempt to quit: 2018     Years since quittin.7    Smokeless tobacco: Never Used    Tobacco comment: started smoking at age 26 / smoked up to 2 p,p,d / now smoking 4 to 8 cigarettes a day,   Substance and Sexual Activity    Alcohol use: No     Alcohol/week: 0.0 oz     Comment: patient reports she is an alcoholic hasn't had anything to drink 3-4 months    Drug use: No    Sexual activity: Yes     Partners: Male   Lifestyle    Physical activity:     Days per week: Not on file     Minutes per session: Not on file    Stress: Not on file   Relationships    Social times daily 3/13/19  Yes Chrystal Lo MD   clonazePAM (KLONOPIN) 0.5 MG tablet Take 1 tablet by mouth nightly as needed (insomnia). . 2/11/19 2/11/20 Yes Aishwarya Izaguirre MD   ondansetron (ZOFRAN-ODT) 8 MG TBDP disintegrating tablet Take 1 tablet by mouth every 8 hours as needed for Nausea May Sub regular tablet (non-ODT) if insurance does not cover ODT. 2/11/19  Yes Aishwarya Izaguirre MD   labetalol (NORMODYNE) 100 MG tablet Take 1 tablet by mouth every 12 hours 1/29/19  Yes Aishwarya Izaguirre MD   aspirin 81 MG chewable tablet Take 1 tablet by mouth daily 1/14/19  Yes Aishwarya Izaguirre MD   mirtazapine (REMERON) 15 MG tablet Take 1 tablet by mouth nightly 12/1/18  Yes Aishwarya Izaguirre MD   calcium elemental (OSCAL) 500 MG TABS tablet Take 1 tablet by mouth daily 7/24/18  Yes Aishwarya Izaguirre MD   oxybutynin (DITROPAN) 5 MG tablet TAKE ONE TABLET BY MOUTH TWICE A DAY 7/24/18  Yes Aishwarya Izaguirre MD   atorvastatin (LIPITOR) 80 MG tablet Take 1 tablet by mouth daily 7/24/18  Yes Aishwarya Izaguirre MD   levothyroxine (SYNTHROID) 100 MCG tablet TAKE ONE TABLET BY MOUTH DAILY 7/24/18  Yes Aishwarya Izaguirre MD   zoster recombinant adjuvanted vaccine [facility]) 50 MCG SUSR injection . 5mL IM X 1 followed by . 5mL IM 2-6 months after dose #1 7/24/18  Yes Aishwarya Izaguirre MD   lisinopril (PRINIVIL;ZESTRIL) 40 MG tablet Take 1 tablet by mouth daily 6/20/18  Yes Joe Marcus MD   budesonide-formoterol [facility]) 160-4.5 MCG/ACT AERO Inhale 2 puffs into the lungs 2 times daily 4/21/17  Yes Shane Vivas MD   Multiple Vitamins-Minerals (CENTRUM SILVER ADULT 50+ PO) Take 1 tablet by mouth daily    Yes Historical MD Mary   ipratropium (ATROVENT HFA) 17 MCG/ACT inhaler Inhale 1 puff into the lungs 3 times daily 4/21/17 12/17/18  Shane Vivas MD     Scheduled Meds:   enoxaparin  40 mg Subcutaneous Daily    aspirin  81 mg Oral Daily    02/10/2019    RBCUA 0-2 02/10/2019    YEAST 0 12/01/2018    BACTERIA 1+ 02/10/2019    CLARITYU Clear 02/10/2019    SPECGRAV 1.015 02/10/2019    LEUKOCYTESUR TRACE 02/10/2019    UROBILINOGEN 0.2 02/10/2019    BILIRUBINUR Negative 02/10/2019    BILIRUBINUR NEGATIVE 05/08/2011    BLOODU TRACE-INTACT 02/10/2019    GLUCOSEU Negative 02/10/2019    GLUCOSEU NEGATIVE 05/08/2011

## 2019-03-28 NOTE — PLAN OF CARE
Problem: Falls - Risk of:  Goal: Will remain free from falls  Description  Will remain free from falls  3/28/2019 1230 by Josemanuel Horne RN  Outcome: Ongoing  Note:   Pt remained free of falls, call light within reach, instructed pt to call for assistance, toiletting in advance, orange blanket on bed, fall risk bracelet on , bed at lowest level   3/28/2019 0121 by Merced Wen RN  Outcome: Ongoing  Note:   Pt remains free of falls. Fall risk protocol in place. Bed locked in lowest position. Call light in reach. Non skid socks in place, side rails up 3/4 while in bed. Bed alarm engaged. Gasca catheter in place. Pt instructed to call for assistance, verbalizes understanding. Will continue to monitor. Problem: HEMODYNAMIC STATUS  Goal: Patient has stable vital signs and fluid balance  3/28/2019 0121 by Merced Wen RN  Outcome: Ongoing  Note:   Pulse rate and rhythm, peripheral pulses, and capillary refill assessed every shift with assessment. General color and body temperature monitored throughout shift and with vitals. Assess for edema with head to toe assessment. Administer treatments and medications as ordered. Monitor patient's weight.

## 2019-03-28 NOTE — PROGRESS NOTES
[address] Daily Progress Note      Admit Date:  3/26/2019    Subjective:  Ms. Doug Benitez is feeling much better. She denies chest pain.     Objective:   BP (!) 154/99   Pulse 68   Temp 97.9 °F (36.6 °C) (Temporal)   Resp 18   Ht 5' 7\" (1.702 m)   Wt 128 lb 12 oz (58.4 kg)   SpO2 99%   BMI 20.16 kg/m²       Intake/Output Summary (Last 24 hours) at 3/28/2019 1205  Last data filed at 3/28/2019 2099  Gross per 24 hour   Intake 270 ml   Output 850 ml   Net -580 ml       TELEMETRY: Sinus     Physical Exam:  General:  Awake, alert, oriented x 3, NAD  Skin:  Warm and dry  Neck:  JVD normal  Chest:  decreased air exchange bilaterally but improving  Cardiovascular:  RRR S1S2, no S3,   Abdomen:  Soft, ND, NT, No HSM  Extremities:  No edema    Medications:    aspirin  81 mg Oral Daily    atorvastatin  80 mg Oral Nightly    calcium elemental  500 mg Oral Daily    cloNIDine  0.3 mg Oral TID    escitalopram  10 mg Oral Daily    labetalol  100 mg Oral 2 times per day    lisinopril  40 mg Oral Daily    mirtazapine  15 mg Oral Nightly    oxybutynin  5 mg Oral BID    sodium chloride flush  10 mL Intravenous 2 times per day    famotidine  20 mg Oral BID    furosemide  40 mg Intravenous Daily    ipratropium-albuterol  1 ampule Inhalation Q6H WA    methylPREDNISolone  40 mg Intravenous Q6H    levothyroxine  100 mcg Oral QAM AC    nitroGLYCERIN  0.4 mg Sublingual Once      heparin (porcine) 14 Units/kg/hr (03/28/19 0939)     clonazePAM, sodium chloride flush, magnesium hydroxide, ondansetron, potassium chloride **OR** potassium alternative oral replacement **OR** potassium chloride, magnesium sulfate, acetaminophen, nitroGLYCERIN, ipratropium-albuterol, heparin (porcine), heparin (porcine)    Lab Data:  CBC:   Recent Labs     03/26/19  2317 03/27/19  0550 03/28/19  0530   WBC 10.8 8.4 7.8   HGB 15.2 13.1 13.5   HCT 45.2 38.1 39.3   MCV 98.6 98.0 96.8    267 261     BMP:   Recent Labs for ischemia with lexiscan              Assessment/Plan:  Patient Active Hospital Problem List:    SOB (shortness of breath) (11/3/2015)    Assessment: Etiology unclear. Likely multifactorial.  Improving. Plan: Diuresis as tolerated. Suggest pulmonary consultation as well. CAD in native artery ()    Assessment: Details not currently known. Troponin mildly elevated but trending down. Plan: Discontinue Heparin. Cardiac risk stratification with Lexiscan myoview stress test tomorrow. cardiac cath discussed but patient would like to proceed with stress testing first.          Elevated troponin      Assessment: Trending down. Plan:  Cardiac risk stratification with Lexiscan myoview stress test tomorrow.        Mitral regurgitation      Assessment: Moderate range previously.         Julia Castrejon MD 3/28/2019 12:05 PM

## 2019-03-29 LAB
ANION GAP SERPL CALCULATED.3IONS-SCNC: 12 MMOL/L (ref 3–16)
ANION GAP SERPL CALCULATED.3IONS-SCNC: 13 MMOL/L (ref 3–16)
ANION GAP SERPL CALCULATED.3IONS-SCNC: 13 MMOL/L (ref 3–16)
BUN BLDV-MCNC: 21 MG/DL (ref 7–20)
BUN BLDV-MCNC: 23 MG/DL (ref 7–20)
BUN BLDV-MCNC: 24 MG/DL (ref 7–20)
CALCIUM SERPL-MCNC: 8.5 MG/DL (ref 8.3–10.6)
CALCIUM SERPL-MCNC: 8.5 MG/DL (ref 8.3–10.6)
CALCIUM SERPL-MCNC: 8.8 MG/DL (ref 8.3–10.6)
CHLORIDE BLD-SCNC: 89 MMOL/L (ref 99–110)
CHLORIDE BLD-SCNC: 91 MMOL/L (ref 99–110)
CHLORIDE BLD-SCNC: 92 MMOL/L (ref 99–110)
CO2: 26 MMOL/L (ref 21–32)
CO2: 27 MMOL/L (ref 21–32)
CO2: 29 MMOL/L (ref 21–32)
CREAT SERPL-MCNC: 0.7 MG/DL (ref 0.6–1.2)
CREAT SERPL-MCNC: 0.7 MG/DL (ref 0.6–1.2)
CREAT SERPL-MCNC: 0.8 MG/DL (ref 0.6–1.2)
CREATININE URINE: 21.8 MG/DL (ref 28–259)
GFR AFRICAN AMERICAN: >60
GFR NON-AFRICAN AMERICAN: >60
GLUCOSE BLD-MCNC: 111 MG/DL (ref 70–99)
GLUCOSE BLD-MCNC: 127 MG/DL (ref 70–99)
GLUCOSE BLD-MCNC: 135 MG/DL (ref 70–99)
LV EF: 59 %
LVEF MODALITY: NORMAL
MAGNESIUM: 2.1 MG/DL (ref 1.8–2.4)
OSMOLALITY URINE: 163 MOSM/KG (ref 390–1070)
OSMOLALITY: 275 MOSM/KG (ref 278–305)
PLATELET # BLD: 293 K/UL (ref 135–450)
POTASSIUM SERPL-SCNC: 4.1 MMOL/L (ref 3.5–5.1)
POTASSIUM SERPL-SCNC: 4.1 MMOL/L (ref 3.5–5.1)
POTASSIUM SERPL-SCNC: 4.3 MMOL/L (ref 3.5–5.1)
PRO-BNP: 7313 PG/ML (ref 0–449)
SODIUM BLD-SCNC: 128 MMOL/L (ref 136–145)
SODIUM BLD-SCNC: 131 MMOL/L (ref 136–145)
SODIUM BLD-SCNC: 133 MMOL/L (ref 136–145)
SODIUM URINE: <20 MMOL/L

## 2019-03-29 PROCEDURE — 6360000002 HC RX W HCPCS: Performed by: INTERNAL MEDICINE

## 2019-03-29 PROCEDURE — 6370000000 HC RX 637 (ALT 250 FOR IP): Performed by: HOSPITALIST

## 2019-03-29 PROCEDURE — 85049 AUTOMATED PLATELET COUNT: CPT

## 2019-03-29 PROCEDURE — 6360000002 HC RX W HCPCS: Performed by: HOSPITALIST

## 2019-03-29 PROCEDURE — 3430000000 HC RX DIAGNOSTIC RADIOPHARMACEUTICAL: Performed by: INTERNAL MEDICINE

## 2019-03-29 PROCEDURE — 83735 ASSAY OF MAGNESIUM: CPT

## 2019-03-29 PROCEDURE — A9502 TC99M TETROFOSMIN: HCPCS | Performed by: INTERNAL MEDICINE

## 2019-03-29 PROCEDURE — 94640 AIRWAY INHALATION TREATMENT: CPT

## 2019-03-29 PROCEDURE — 78452 HT MUSCLE IMAGE SPECT MULT: CPT | Performed by: INTERNAL MEDICINE

## 2019-03-29 PROCEDURE — 83880 ASSAY OF NATRIURETIC PEPTIDE: CPT

## 2019-03-29 PROCEDURE — 94760 N-INVAS EAR/PLS OXIMETRY 1: CPT

## 2019-03-29 PROCEDURE — 36415 COLL VENOUS BLD VENIPUNCTURE: CPT

## 2019-03-29 PROCEDURE — 83935 ASSAY OF URINE OSMOLALITY: CPT

## 2019-03-29 PROCEDURE — 93017 CV STRESS TEST TRACING ONLY: CPT | Performed by: INTERNAL MEDICINE

## 2019-03-29 PROCEDURE — 2140000000 HC CCU INTERMEDIATE R&B

## 2019-03-29 PROCEDURE — 80048 BASIC METABOLIC PNL TOTAL CA: CPT

## 2019-03-29 PROCEDURE — 2580000003 HC RX 258: Performed by: HOSPITALIST

## 2019-03-29 PROCEDURE — 99232 SBSQ HOSP IP/OBS MODERATE 35: CPT | Performed by: INTERNAL MEDICINE

## 2019-03-29 RX ORDER — METHYLPREDNISOLONE SODIUM SUCCINATE 40 MG/ML
40 INJECTION, POWDER, LYOPHILIZED, FOR SOLUTION INTRAMUSCULAR; INTRAVENOUS DAILY
Status: DISCONTINUED | OUTPATIENT
Start: 2019-03-30 | End: 2019-03-30

## 2019-03-29 RX ORDER — METHYLPREDNISOLONE SODIUM SUCCINATE 40 MG/ML
40 INJECTION, POWDER, LYOPHILIZED, FOR SOLUTION INTRAMUSCULAR; INTRAVENOUS EVERY 12 HOURS
Status: DISCONTINUED | OUTPATIENT
Start: 2019-03-29 | End: 2019-03-29 | Stop reason: SDUPTHER

## 2019-03-29 RX ORDER — FUROSEMIDE 10 MG/ML
40 INJECTION INTRAMUSCULAR; INTRAVENOUS 2 TIMES DAILY
Status: DISCONTINUED | OUTPATIENT
Start: 2019-03-30 | End: 2019-03-30 | Stop reason: HOSPADM

## 2019-03-29 RX ORDER — AMINOPHYLLINE DIHYDRATE 25 MG/ML
100 INJECTION, SOLUTION INTRAVENOUS ONCE
Status: COMPLETED | OUTPATIENT
Start: 2019-03-29 | End: 2019-03-29

## 2019-03-29 RX ORDER — PREDNISONE 20 MG/1
40 TABLET ORAL DAILY
Status: DISCONTINUED | OUTPATIENT
Start: 2019-03-30 | End: 2019-03-29

## 2019-03-29 RX ADMIN — Medication 10 ML: at 20:06

## 2019-03-29 RX ADMIN — MIRTAZAPINE 15 MG: 15 TABLET, FILM COATED ORAL at 20:05

## 2019-03-29 RX ADMIN — TETROFOSMIN 10 MILLICURIE: 0.23 INJECTION, POWDER, LYOPHILIZED, FOR SOLUTION INTRAVENOUS at 10:15

## 2019-03-29 RX ADMIN — IPRATROPIUM BROMIDE AND ALBUTEROL SULFATE 1 AMPULE: .5; 3 SOLUTION RESPIRATORY (INHALATION) at 19:40

## 2019-03-29 RX ADMIN — CLONIDINE HYDROCHLORIDE 0.3 MG: 0.1 TABLET ORAL at 14:23

## 2019-03-29 RX ADMIN — LEVOTHYROXINE SODIUM 100 MCG: 100 TABLET ORAL at 07:46

## 2019-03-29 RX ADMIN — POTASSIUM BICARBONATE 40 MEQ: 782 TABLET, EFFERVESCENT ORAL at 16:35

## 2019-03-29 RX ADMIN — ASPIRIN 81 MG 81 MG: 81 TABLET ORAL at 14:22

## 2019-03-29 RX ADMIN — REGADENOSON 0.4 MG: 0.08 INJECTION, SOLUTION INTRAVENOUS at 11:20

## 2019-03-29 RX ADMIN — IPRATROPIUM BROMIDE AND ALBUTEROL SULFATE 1 AMPULE: .5; 3 SOLUTION RESPIRATORY (INHALATION) at 13:57

## 2019-03-29 RX ADMIN — ESCITALOPRAM OXALATE 10 MG: 10 TABLET, FILM COATED ORAL at 15:24

## 2019-03-29 RX ADMIN — CLONIDINE HYDROCHLORIDE 0.3 MG: 0.1 TABLET ORAL at 20:05

## 2019-03-29 RX ADMIN — IPRATROPIUM BROMIDE AND ALBUTEROL SULFATE 1 AMPULE: .5; 3 SOLUTION RESPIRATORY (INHALATION) at 08:52

## 2019-03-29 RX ADMIN — OXYBUTYNIN CHLORIDE 5 MG: 5 TABLET ORAL at 14:22

## 2019-03-29 RX ADMIN — AMINOPHYLLINE 100 MG: 25 INJECTION, SOLUTION INTRAVENOUS at 11:22

## 2019-03-29 RX ADMIN — LABETALOL HCL 100 MG: 200 TABLET, FILM COATED ORAL at 20:05

## 2019-03-29 RX ADMIN — ATORVASTATIN CALCIUM 80 MG: 80 TABLET, FILM COATED ORAL at 20:05

## 2019-03-29 RX ADMIN — FAMOTIDINE 20 MG: 20 TABLET ORAL at 20:05

## 2019-03-29 RX ADMIN — TETROFOSMIN 30 MILLICURIE: 0.23 INJECTION, POWDER, LYOPHILIZED, FOR SOLUTION INTRAVENOUS at 11:25

## 2019-03-29 RX ADMIN — LISINOPRIL 40 MG: 20 TABLET ORAL at 11:50

## 2019-03-29 RX ADMIN — Medication 10 ML: at 14:29

## 2019-03-29 RX ADMIN — METHYLPREDNISOLONE SODIUM SUCCINATE 40 MG: 40 INJECTION, POWDER, FOR SOLUTION INTRAMUSCULAR; INTRAVENOUS at 07:46

## 2019-03-29 RX ADMIN — GUAIFENESIN AND DEXTROMETHORPHAN 5 ML: 100; 10 SYRUP ORAL at 15:24

## 2019-03-29 RX ADMIN — GUAIFENESIN AND DEXTROMETHORPHAN 5 ML: 100; 10 SYRUP ORAL at 08:32

## 2019-03-29 RX ADMIN — ENOXAPARIN SODIUM 40 MG: 40 INJECTION SUBCUTANEOUS at 20:05

## 2019-03-29 RX ADMIN — FUROSEMIDE 40 MG: 10 INJECTION, SOLUTION INTRAMUSCULAR; INTRAVENOUS at 14:28

## 2019-03-29 RX ADMIN — LABETALOL HCL 100 MG: 200 TABLET, FILM COATED ORAL at 11:49

## 2019-03-29 RX ADMIN — FAMOTIDINE 20 MG: 20 TABLET ORAL at 14:22

## 2019-03-29 RX ADMIN — OXYBUTYNIN CHLORIDE 5 MG: 5 TABLET ORAL at 20:05

## 2019-03-29 ASSESSMENT — PAIN SCALES - GENERAL
PAINLEVEL_OUTOF10: 0
PAINLEVEL_OUTOF10: 0

## 2019-03-29 NOTE — PROGRESS NOTES
Instructed on Lexiscan Stress Test Procedure including possible side effects/ adverse reactions. Patient verbalizes  understanding and denies having any questions . See [address] [place] [address] Cardiology

## 2019-03-29 NOTE — PLAN OF CARE
Problem: Pain:  Goal: Pain level will decrease  Description  Pain level will decrease  Outcome: Ongoing     Problem: Pain:  Goal: Control of acute pain  Description  Control of acute pain  Outcome: Ongoing     Problem: Falls - Risk of:  Goal: Will remain free from falls  Description  Will remain free from falls  3/28/2019 2033 by Sandoval Saravia RN  Outcome: Ongoing  3/28/2019 1230 by Kathi Jamil RN  Outcome: Ongoing  Note:   Pt remained free of falls, call light within reach, instructed pt to call for assistance, toiletting in advance, orange blanket on bed, fall risk bracelet on , bed at lowest level      Problem: HEMODYNAMIC STATUS  Goal: Patient has stable vital signs and fluid balance  Outcome: Ongoing

## 2019-03-29 NOTE — PROGRESS NOTES
Nephrology Progress Note        [phone]        [email or web address]          Assessment/Plan:    Hyponatremia:  -Etiology of hyponatremia due to CHF (UOsm 163, Tammy+ <20), with likely component of excess free water consumption  -Na+ slowly improving with diuresis  -Increase IV lasix to BID  -Continue 1L fluid restriction  -Reduce BMPs to a30ulbrt  -Do not correct by >0.5mEq/L per hour or >6-8mEq/L per day     Hypertension:  -Near goal  -Increase lasix as above  -Continue current regimen     COPD Exacerbation:  -On IV steroids  -Management per Primary Team     Acute on Chronic Diastolic CHF Exacerbation:  -Diuresis as above  -Management per Cardiology            Indira Nunez MD, ANGELINA  3/29/2019  [facility]                                                                                                                                                                                                                                                                                                    Summary:  75 y.o. female with no significant renal history, admitted for acute hypoxic respiratory failure attributed to COPD exacerbation and acute on chronic diastolic CHF exacerbation      Subjective:  -No acute events overnight  -Na+ slowly improving this AM  -Stress test planned for today      Review of Systems: No new or active complaints  Social History: No family present at bedside      Medications:  Scheduled Meds:   [START ON 3/30/2019] methylPREDNISolone  40 mg Intravenous Daily    enoxaparin  40 mg Subcutaneous Nightly    aspirin  81 mg Oral Daily    atorvastatin  80 mg Oral Nightly    calcium elemental  500 mg Oral Daily    cloNIDine  0.3 mg Oral TID    escitalopram  10 mg Oral Daily    labetalol  100 mg Oral 2 times per day    lisinopril  40 mg Oral Daily    mirtazapine  15 mg Oral Nightly    oxybutynin  5 mg Oral BID    sodium chloride flush  10 mL Intravenous 2 times per GFRAA >60 03/29/2019    GFRAA >60 05/02/2013    LABGLOM >60 03/29/2019    LABGLOM 79 12/11/2017    GLUCOSE 111 03/29/2019     U/A:    Lab Results   Component Value Date    COLORU YELLOW 03/28/2019    PROTEINU Negative 03/28/2019    PHUR 6.0 03/28/2019    WBCUA 2 03/28/2019    RBCUA 1 03/28/2019    YEAST 0 12/01/2018    BACTERIA 1+ 02/10/2019    CLARITYU Clear 03/28/2019    SPECGRAV 1.005 03/28/2019    LEUKOCYTESUR TRACE 03/28/2019    UROBILINOGEN 0.2 03/28/2019    BILIRUBINUR Negative 03/28/2019    BILIRUBINUR NEGATIVE 05/08/2011    BLOODU SMALL 03/28/2019    GLUCOSEU Negative 03/28/2019    GLUCOSEU NEGATIVE 05/08/2011

## 2019-03-29 NOTE — PROGRESS NOTES
tablet 0.4 mg, 0.4 mg, Sublingual, Q5 Min PRN  ipratropium-albuterol (DUONEB) nebulizer solution 1 ampule, 1 ampule, Inhalation, Q4H PRN  ipratropium-albuterol (DUONEB) nebulizer solution 1 ampule, 1 ampule, Inhalation, Q6H WA  methylPREDNISolone sodium (SOLU-MEDROL) injection 40 mg, 40 mg, Intravenous, Q6H  levothyroxine (SYNTHROID) tablet 100 mcg, 100 mcg, Oral, QAM AC  nitroGLYCERIN (NITROSTAT) SL tablet 0.4 mg, 0.4 mg, Sublingual, Once  Physical    VITALS:  BP (!) 161/94   Pulse 69   Temp 98.4 °F (36.9 °C) (Temporal)   Resp 15   Ht 5' 7\" (1.702 m)   Wt 130 lb 8.2 oz (59.2 kg)   SpO2 92%   BMI 20.44 kg/m²   Gen: No distress. NC 02 1L   Eyes: PERRL. No sclera icterus. No conjunctival injection. ENT: No discharge. Pharynx clear. External appearance of ears and nose normal.  Neck: supple   Resp: No crackles / Rhonchi. Wheezes noted bilaterally   CV: Regular rate. Regular rhythm. No murmur or rub  GI: soft, Non-tender. Non-distended. No hernia. BS +  Skin: Warm, dry, normal texture. No rashes   Lymph: No cervical LAD. No supraclavicular LAD. M/S: No joint deformity. Neuro: Moves all four extremities. CN 2-12 tested, no defect noted. Psych: Oriented x 3. No anxiety. Awake. Alert. Intact judgement and insight.       RADIOLOGY:     XR CHEST PORTABLE   Final Result   Pulmonary edema. ASSESSMENT AND PLAN      Acute exacerbation COPD   -Much improved but still has wheezing   -Cut down IV steroids to once daily tomorrow   -Cont nebs   -Aggressive pulmonary toilet   -Encourage IS  -No O2 use at baseline.  Currently on RA  -F/u with Dr. Rosaline Slater as o/p      Acute CHF exacerbation   -Gasca placement, Strict I/Os, Daily weights  -On iv lasix   -Repeat echo reviewed with EF around 50%  -Diuretics, Monitor electrolytes      Elevated Troponin  -Cardiology consulted   -Stress test today         Hyponatremia   -Na 131 today   -Fluid restriction and iv lasix for now   -Nephrology following      Hx CVA  -Cont asa, statin      Hypothyroidism  -Cont synthyroid      HTN  -Continue home meds      DVT Prophylaxis : lovenox   Diet : cardiac   Code status : full

## 2019-03-29 NOTE — PROGRESS NOTES
[facility] Daily Progress Note      Admit Date:  3/26/2019    Subjective:  Ms. Santiago Mayo is feeling much better. She denies chest pain.     Objective:   BP (!) 161/94   Pulse 69   Temp 98.4 °F (36.9 °C) (Temporal)   Resp 15   Ht 5' 7\" (1.702 m)   Wt 130 lb 8.2 oz (59.2 kg)   SpO2 97%   BMI 20.44 kg/m²       Intake/Output Summary (Last 24 hours) at 3/29/2019 3301  Last data filed at 3/29/2019 2652  Gross per 24 hour   Intake 747 ml   Output 3080 ml   Net -2333 ml       TELEMETRY: Sinus     Physical Exam:  General:  Awake, alert, oriented x 3, NAD  Skin:  Warm and dry  Neck:  JVD normal  Chest:  decreased air exchange bilaterally but improving  Cardiovascular:  RRR S1S2, no S3,   Abdomen:  Soft, ND, NT, No HSM  Extremities:  No edema    Medications:    enoxaparin  40 mg Subcutaneous Nightly    aspirin  81 mg Oral Daily    atorvastatin  80 mg Oral Nightly    calcium elemental  500 mg Oral Daily    cloNIDine  0.3 mg Oral TID    escitalopram  10 mg Oral Daily    labetalol  100 mg Oral 2 times per day    lisinopril  40 mg Oral Daily    mirtazapine  15 mg Oral Nightly    oxybutynin  5 mg Oral BID    sodium chloride flush  10 mL Intravenous 2 times per day    famotidine  20 mg Oral BID    furosemide  40 mg Intravenous Daily    ipratropium-albuterol  1 ampule Inhalation Q6H WA    methylPREDNISolone  40 mg Intravenous Q6H    levothyroxine  100 mcg Oral QAM AC    nitroGLYCERIN  0.4 mg Sublingual Once       regadenoson, guaiFENesin-dextromethorphan, benzonatate, clonazePAM, sodium chloride flush, magnesium hydroxide, ondansetron, potassium chloride **OR** potassium alternative oral replacement **OR** potassium chloride, magnesium sulfate, acetaminophen, nitroGLYCERIN, ipratropium-albuterol    Lab Data:  CBC:   Recent Labs     03/26/19  2317 03/27/19  0550 03/28/19  0530 03/29/19  0655   WBC 10.8 8.4 7.8  --    HGB 15.2 13.1 13.5  --    HCT 45.2 38.1 39.3  --    MCV 98.6 98.0 96.8  --     267 261 293     BMP:   Recent Labs     03/28/19  1630 03/29/19  0000 03/29/19  0655   * 128* 131*   K 3.8 4.1 4.1   CL 89* 89* 92*   CO2 27 26 27   BUN 24* 24* 21*   CREATININE 0.7 0.8 0.7     LIVER PROFILE:   Recent Labs     03/26/19  2317   AST 25   ALT 17   BILITOT 0.5   ALKPHOS 64     PT/INR:   Recent Labs     03/27/19  0550   PROTIME 13.1*   INR 1.15*     APTT:   Recent Labs     03/27/19  1845 03/28/19  0215 03/28/19  0900   APTT 40.9* 95.9* 61.4*     BNP:  No results for input(s): BNP in the last 72 hours. IMAGING:     Echo 3/27/19:  Summary   -Borderline global ejection fraction estimated at 50%.  -Basal and mid anteroseptal, basal and mid inferoseptal, basal and mid   inferior, and mid anterior wall hypokinesis noted.   -Moderate septal hypertrophy.   -Aortic valve appears sclerotic but opens adequately. Trivial aortic regurgitation.   -Thickened mitral valve without evidence of stenosis.   -There is at least moderate, eccentric, posteriorly directed mitral regurgitation noted.   -There is mild tricuspid regurgitation with a RVSP estimation of 36 mmHg.   -The IVC is dilated .   -Normal diastolic function. Avg. E/e'=14.05     Carotid US: April '18:  Summary        -The right internal carotid artery appears to have a <50% diameter reducing    stenosis based on velocity criteria.    -The left internal carotid artery demonstrates no significant stenosis.        Echo: April '18:  Summary   Normal left ventricle size and systolic function with an estimated ejection   fraction of 55%.      There are linear mobile density on the aortic valve leaflet consistent with   lambl's Excrescences.      The left atrium appears dilated. There is no evidence of mass or thrombus in   the left atrium or appendage.  A bubble study was performed and showed no   evidence of shunting     Myoview Stress Test: July '15:   Summary       No EKG evidence for ischemia with lexiscan       LV systolic function is preserved     Myocardial perfusion imaging with no evidence for ischemia with lexiscan              Assessment/Plan:  Patient Active Hospital Problem List:    SOB (shortness of breath) (11/3/2015)    Assessment: Etiology unclear. Likely multifactorial.  Improving. Plan: Diuresis as tolerated. CAD in native artery ()    Assessment: Details not currently known. Troponin mildly elevated but trending down. Plan: Cardiac risk stratification with Lexiscan myoview stress test today. Cardiac cath discussed but patient would like to proceed with stress testing first.          Elevated troponin      Assessment: Trending down. Plan:  Cardiac risk stratification with Lexiscan myoview stress test today.        Mitral regurgitation      Assessment: Moderate range previously. If stress test has no significant ischemia, the patient can be managed medically and would be okay for discharge from a cardiology standpoint.     Luis Gamble MD 3/29/2019 8:33 AM

## 2019-03-30 VITALS
HEIGHT: 67 IN | HEART RATE: 72 BPM | BODY MASS INDEX: 20.17 KG/M2 | RESPIRATION RATE: 20 BRPM | WEIGHT: 128.53 LBS | TEMPERATURE: 98 F | OXYGEN SATURATION: 95 % | DIASTOLIC BLOOD PRESSURE: 66 MMHG | SYSTOLIC BLOOD PRESSURE: 112 MMHG

## 2019-03-30 PROBLEM — I50.33 ACUTE ON CHRONIC DIASTOLIC HEART FAILURE (HCC): Status: ACTIVE | Noted: 2019-03-30

## 2019-03-30 LAB
ANION GAP SERPL CALCULATED.3IONS-SCNC: 11 MMOL/L (ref 3–16)
BUN BLDV-MCNC: 21 MG/DL (ref 7–20)
CALCIUM SERPL-MCNC: 8.5 MG/DL (ref 8.3–10.6)
CHLORIDE BLD-SCNC: 93 MMOL/L (ref 99–110)
CO2: 31 MMOL/L (ref 21–32)
CREAT SERPL-MCNC: 0.7 MG/DL (ref 0.6–1.2)
GFR AFRICAN AMERICAN: >60
GFR NON-AFRICAN AMERICAN: >60
GLUCOSE BLD-MCNC: 94 MG/DL (ref 70–99)
MAGNESIUM: 2.2 MG/DL (ref 1.8–2.4)
POTASSIUM SERPL-SCNC: 3.9 MMOL/L (ref 3.5–5.1)
SODIUM BLD-SCNC: 135 MMOL/L (ref 136–145)

## 2019-03-30 PROCEDURE — 2580000003 HC RX 258: Performed by: HOSPITALIST

## 2019-03-30 PROCEDURE — 94760 N-INVAS EAR/PLS OXIMETRY 1: CPT

## 2019-03-30 PROCEDURE — 6370000000 HC RX 637 (ALT 250 FOR IP): Performed by: HOSPITALIST

## 2019-03-30 PROCEDURE — 6360000002 HC RX W HCPCS: Performed by: INTERNAL MEDICINE

## 2019-03-30 PROCEDURE — 94640 AIRWAY INHALATION TREATMENT: CPT

## 2019-03-30 PROCEDURE — 80048 BASIC METABOLIC PNL TOTAL CA: CPT

## 2019-03-30 PROCEDURE — 83735 ASSAY OF MAGNESIUM: CPT

## 2019-03-30 RX ORDER — DOXAZOSIN 2 MG/1
2 TABLET ORAL EVERY 12 HOURS SCHEDULED
Qty: 60 TABLET | Refills: 0 | Status: SHIPPED | OUTPATIENT
Start: 2019-03-30 | End: 2019-03-30 | Stop reason: HOSPADM

## 2019-03-30 RX ORDER — BENZONATATE 100 MG/1
100 CAPSULE ORAL 3 TIMES DAILY PRN
Qty: 30 CAPSULE | Refills: 0 | Status: SHIPPED | OUTPATIENT
Start: 2019-03-30 | End: 2019-05-13 | Stop reason: ALTCHOICE

## 2019-03-30 RX ORDER — DOXAZOSIN MESYLATE 1 MG/1
2 TABLET ORAL EVERY 12 HOURS SCHEDULED
Status: DISCONTINUED | OUTPATIENT
Start: 2019-03-30 | End: 2019-03-30

## 2019-03-30 RX ORDER — FUROSEMIDE 40 MG/1
40 TABLET ORAL DAILY
Qty: 30 TABLET | Refills: 0 | Status: SHIPPED | OUTPATIENT
Start: 2019-03-30 | End: 2019-05-14 | Stop reason: SDUPTHER

## 2019-03-30 RX ADMIN — LISINOPRIL 40 MG: 20 TABLET ORAL at 07:58

## 2019-03-30 RX ADMIN — LABETALOL HCL 100 MG: 200 TABLET, FILM COATED ORAL at 08:10

## 2019-03-30 RX ADMIN — ESCITALOPRAM OXALATE 10 MG: 10 TABLET, FILM COATED ORAL at 07:58

## 2019-03-30 RX ADMIN — CLONIDINE HYDROCHLORIDE 0.3 MG: 0.1 TABLET ORAL at 14:38

## 2019-03-30 RX ADMIN — METHYLPREDNISOLONE SODIUM SUCCINATE 40 MG: 40 INJECTION, POWDER, FOR SOLUTION INTRAMUSCULAR; INTRAVENOUS at 07:56

## 2019-03-30 RX ADMIN — IPRATROPIUM BROMIDE AND ALBUTEROL SULFATE 1 AMPULE: .5; 3 SOLUTION RESPIRATORY (INHALATION) at 07:43

## 2019-03-30 RX ADMIN — FUROSEMIDE 40 MG: 10 INJECTION, SOLUTION INTRAMUSCULAR; INTRAVENOUS at 07:57

## 2019-03-30 RX ADMIN — Medication 10 ML: at 07:57

## 2019-03-30 RX ADMIN — ASPIRIN 81 MG 81 MG: 81 TABLET ORAL at 07:58

## 2019-03-30 RX ADMIN — CLONAZEPAM 0.5 MG: 1 TABLET ORAL at 07:57

## 2019-03-30 RX ADMIN — CLONIDINE HYDROCHLORIDE 0.3 MG: 0.1 TABLET ORAL at 07:58

## 2019-03-30 RX ADMIN — LEVOTHYROXINE SODIUM 100 MCG: 100 TABLET ORAL at 05:37

## 2019-03-30 RX ADMIN — FAMOTIDINE 20 MG: 20 TABLET ORAL at 07:57

## 2019-03-30 RX ADMIN — OXYBUTYNIN CHLORIDE 5 MG: 5 TABLET ORAL at 07:57

## 2019-03-30 RX ADMIN — CALCIUM 500 MG: 500 TABLET ORAL at 07:58

## 2019-03-30 ASSESSMENT — PAIN SCALES - GENERAL
PAINLEVEL_OUTOF10: 0

## 2019-03-30 NOTE — PROGRESS NOTES
Pt. Refuses discharge to rehab unit. She has discharge orders. Fair po food/fluids. Gasca catheter removed at 1100. Awaiting pt. To void and will discharge to home. OK for discharge from cardiac and nephrology stand point per notes.

## 2019-03-30 NOTE — PROGRESS NOTES
Nephrology Attending  Progress Note        SUMMARY :  We are following this patient for Hyponatremia     75 y.o. female with no significant renal history, admitted for acute hypoxic respiratory failure attributed to COPD exacerbation and acute on chronic diastolic CHF exacerbation. SUBJECTIVE:   Patient progress reviewed.  The patient sitting in a chair  No dyspnea  Non productive cough    Physical Exam:    VITALS:  BP (!) 185/102   Pulse 69   Temp 98.1 °F (36.7 °C) (Oral)   Resp 20   Ht 5' 7\" (1.702 m)   Wt 128 lb 8.5 oz (58.3 kg)   SpO2 99%   BMI 20.13 kg/m²   BLOOD PRESSURE RANGE:  Systolic (16NQJ), AOT:012 , Min:128 , CJC:991   ; Diastolic (16BCN), YMF:75, Min:74, Max:102    24HR INTAKE/OUTPUT:      Intake/Output Summary (Last 24 hours) at 3/30/2019 1004  Last data filed at 3/30/2019 0542  Gross per 24 hour   Intake 780 ml   Output 3750 ml   Net -2970 ml       Constitutional:  Alert, oriented x 3  PERRL , EOM +  Pallor +, No icterus   Cardiovascular/Edema: RRR, no murmur/rub   Respiratory:  B/ L air entry, normal breath sounds, no crackles   Gastrointestinal: soft, bowel sounds +,   No edema  No rash  No focal neurological deficit       DATA:    CBC with Differential:    Lab Results   Component Value Date    WBC 7.8 03/28/2019    RBC 4.06 03/28/2019    HGB 13.5 03/28/2019    HCT 39.3 03/28/2019     03/29/2019    MCV 96.8 03/28/2019    MCH 33.1 03/28/2019    MCHC 34.2 03/28/2019    RDW 14.1 03/28/2019    SEGSPCT 79.1 04/19/2016    BANDSPCT 1 04/28/2018    LYMPHOPCT 3.4 03/28/2019    MONOPCT 4.5 03/28/2019    EOSPCT 1.3 05/08/2011    BASOPCT 0.0 03/28/2019    MONOSABS 0.4 03/28/2019    LYMPHSABS 0.3 03/28/2019    EOSABS 0.0 03/28/2019    BASOSABS 0.0 03/28/2019    DIFFTYPE Auto 05/02/2013     CMP:    Lab Results   Component Value Date     03/30/2019    K 3.9 03/30/2019    K 3.9 03/26/2019    CL 93 03/30/2019    CO2 31 03/30/2019    BUN 21 03/30/2019    CREATININE 0.7 03/30/2019    GFRAA

## 2019-03-30 NOTE — PROGRESS NOTES
Discharge Note:  Pt. Discharged to home with spouse at 26. Pt. Discharged through main entrance per nurse in a wheelchair with all belongings and discharge instructions. Pt. Placed safely in a waiting vehicle.

## 2019-03-30 NOTE — DISCHARGE SUMMARY
Hospital Discharge Summary    Patient's PCP: Felipe Duncan MD  Admit Date: 3/26/2019   Discharge Date: 3/30/2019    Admitting Physician: Dr. Raz Edwards MD  Discharge Physician: Dr. Larissa Mercado:   [facility] CONSULT TO NEPHROLOGY    Brief HPI: Patient admitted with COPD and CHF exacerbation. Brief hospital course: A 2year-old female with history of CHF and COPD, who presents to the emergency room with complaints of shortness of breath, noted to have pulmonary edema on chest imaging. She was admitted for diuretics and breathing treatments for both CHF and COPD exacerbations, respectively. Diuresis, appears euvolemic at this time. She is being changed to by mouth Lasix, with plan to continue 40 mg daily as outpatient. She was on steroid and breathing treatments. She does not have any wheezing at this time. She does not need steroid discharge. She will continue breathing treatments as outpatient. She did have elevated troponin, likely secondary to demand ischemia. Cardiology evaluated, recommended stress test which was done and was negative for reversible ischemia. Cardiology has recommended medical management. Overall, she remained stable and she is discharged home. Invasive procedures:  None.     Discharge Diagnoses:   Principal Problem:    COPD exacerbation ([facility] [address]  Active Problems:    Acute on chronic diastolic heart failure (HCC)    Hypothyroidism    Smoker    Anxiety    Hyperlipidemia    COPD exacerbation (HCC)    Centrilobular emphysema (HCC)    Essential hypertension    COPD, moderate (HCC)    CAD in native artery    Depression    HTN (hypertension), benign    Dyslipidemia  Resolved Problems:    Respiratory distress      Physical Exam: /66   Pulse 72   Temp 98 °F (36.7 °C) (Temporal)   Resp 20   Ht 5' 7\" (1.702 m)   Wt 128 lb 8.5 oz (58.3 kg) Nurse              Mandi Martinez, LIS Interpreting        Ligia Santana MD,                     Kathi Gomez, LIS  Physician           [facility]   Ordering Physician   The procedure was explained in detail to the patient. Risks,  complications and alternative treatments were reviewed. Written consent  was obtained. Procedure Procedure Type:   Nuclear Stress Test:NM MYOCARDIAL SPECT REST EXERCISE OR RX   Study location: [facility] - Nuclear Medicine   Indications: Dyspnea upon exertion and Lightheadedness. Hospital Status: Inpatient. Risk Factors   The patient risk factors include:cerebrovascular disease, physical activity,  Current/Recent(w/in 1 year) tobacco use, treated and controlled  hypercholesterolemia, treated and controlled hypertension, family history of  premature CAD, chronic lung disease and dyslipidemia. Conclusions   Summary  Normal myocardial perfusion study. Normal LV size and systolic function. Stress Protocols   Resting ECG  Normal sinus rhythm. Normal ECG. Resting HR:67 bpm     Resting BP:148/106 mmHg   Pre-stress physical exam: Resting pulse ox 94%. Stress Protocol:Pharmacologic - Lexiscan's  Peak HR:98 bpm                               HR/BP product:72975  Peak BP:167/106 mmHg  Predicted HR: 146 bpm  % of predicted HR: 67  Test duration: 4 min  Reason for termination:Completed   ECG Findings  Normal response to lexiscan . Symptoms  Developed symptoms likely related to [address] [place] [address]. There was stress induced lightheadedness. Symptoms resolved with aminophylline. Denies chest pain or discomfort. Complications  Procedure complication was none. Stress Interpretation  Appropriate hemodynamic response to lexiscan with no significant ST  changes. Procedure Medications   - Lexiscan I.V. 0.4 mg.10 sec   - Aminophylline I.V. 100 .   Imaging Protocols   - One Day   Rest                          Stress   Isotope:Myoview/Tetrofosmin   Isotope: Myoview/Tetrofosmin Isotope dose:10.3 mCi         Isotope dose:32 mCi  Administration Route:I.V. Administration Route:I.V.  Date:03/29/2019 10:15         Date:03/29/2019 11:25                                 Technique:      Gated  Imaging Results    Stress ejection    Ejection fraction:59 %    EDV :112 ml    ESV :46 ml    Stroke volume :66 ml    LV mass :151 gr  Medical History  Signatures   ------------------------------------------------------------------  Electronically signed by Sania Covington MD, [facility] (Interpreting  physician) on 03/29/2019 at 12:59  ------------------------------------------------------------------              Treatments: As above. Discharge Medications:     Medication List      START taking these medications    benzonatate 100 MG capsule  Commonly known as:  TESSALON  Take 1 capsule by mouth 3 times daily as needed for Cough     furosemide 40 MG tablet  Commonly known as:  LASIX  Take 1 tablet by mouth daily     ipratropium 0.02 % nebulizer solution  Commonly known as:  ATROVENT  Take 2.5 mLs by nebulization 3 times daily        CONTINUE taking these medications    aspirin 81 MG chewable tablet  Take 1 tablet by mouth daily     atorvastatin 80 MG tablet  Commonly known as:  LIPITOR  Take 1 tablet by mouth daily     budesonide-formoterol 160-4.5 MCG/ACT Aero  Commonly known as:  SYMBICORT  Inhale 2 puffs into the lungs 2 times daily     calcium elemental 500 MG Tabs tablet  Commonly known as:  OSCAL  Take 1 tablet by mouth daily     CENTRUM SILVER ADULT 50+ PO     clonazePAM 0.5 MG tablet  Commonly known as:  KLONOPIN  Take 1 tablet by mouth nightly as needed (insomnia). .     cloNIDine 0.3 MG tablet  Commonly known as:  CATAPRES  Take 1 tablet by mouth 3 times daily     escitalopram 10 MG tablet  Commonly known as:  LEXAPRO  Take 1 tablet by mouth daily     labetalol 100 MG tablet  Commonly known as:  NORMODYNE  Take 1 tablet by mouth every 12 hours     levothyroxine 100 MCG tablet  Commonly known as: patients care.

## 2019-03-30 NOTE — PROGRESS NOTES
Up around room with standby assist. VSS, afebrile. Productive cough. Gasca out and voided small amount will wait for a good void then discharge to home. Awaiting lunch up in chair.

## 2019-03-31 ENCOUNTER — CARE COORDINATION (OUTPATIENT)
Dept: CASE MANAGEMENT | Age: 74
End: 2019-03-31

## 2019-04-02 ENCOUNTER — TELEPHONE (OUTPATIENT)
Dept: OTHER | Age: 74
End: 2019-04-02

## 2019-04-02 NOTE — TELEPHONE ENCOUNTER
[address] FAILURE PROGRAM  TELEPHONE ENCOUNTER FORM    Dimitri Ivey 1945    Attempted to call patient for HF follow-up. No answer at this time. Next MD/ Clinic appointment: Patient has not follow up scheduled.        GEORGINA LAURA 4/2/2019 10:28 AM

## 2019-04-10 ENCOUNTER — CARE COORDINATION (OUTPATIENT)
Dept: CASE MANAGEMENT | Age: 74
End: 2019-04-10

## 2019-04-15 ENCOUNTER — CARE COORDINATION (OUTPATIENT)
Dept: CASE MANAGEMENT | Age: 74
End: 2019-04-15

## 2019-04-15 NOTE — CARE COORDINATION
[facility], [address]  [address] [place] [address]  [phone]            Chief Complaint:    Chief Complaint   Patient presents with   • Follow-up     medication refill       History of Present Illness:  Mr. Alexander is a 59 year old male who presented to my office for physical. He has no concerns. He has not been eating healthier lately and he has not mentioned his blood sugar. He gained few pounds, his diabetes is uncontrolled, he has elevated microalbumin now. Blood pressure is stable. His thyroid is stable.    He denies fever or chills, no change in vision or hearing.  No chest pain or trouble breathing.  No problems with bowel movements or urination.  Denies any rash.  No joint pain.    Review of Systems:  12 point review of systems is negative other than that mentioned above.    Past Medical History:      Essential (primary) hypertension                              Vertigo, benign paroxysmal                                    Paroxysmal SVT (supraventricular tachycardia) *               Diabetes mellitus (CMS/HCC)                                   Anemia                                                        Anxiety                                                       High cholesterol                                              Past Surgical History/Anesthesia Complications:       TONSILLECTOMY AND ADENOIDECTOMY                               OCCULT BLOOD TEST TUBE                          04/12/2015    HERNIA REPAIR                                                 INGUINAL HERNIA REPAIR                          6-3-2016        Comment: laparoscopic with mesh  Dr Donahue    COLONOSCOPY                                                   COLONOSCOPY                                     12/15/2016      Comment: repeat in 5 years tubular adenoma    Medications:    Current Outpatient Medications   Medication Sig  Frantz 45 Transitions FINAL Call    4/15/2019    Patient: Latonia Harris  Patient : 1945   MRN: <[id number]>  Reason for Admission: CHF, COPD  Discharge Date: 3/30/19 RARS: Readmission Risk Score: 20       Unable to reach for final CTC outreach. Episode resolved.      Follow Up  Future Appointments   Date Time Provider Dami Sims   2019  8:30 AM Nancy Rice PHONE TRANSMISSION  [facility]   2019  9:30 AM QUITA Faulkner - CNP  [facility]       David Lake RN Dispense Refill   • levothyroxine (SYNTHROID, LEVOTHROID) 150 MCG tablet Take 1 tablet by mouth daily. 30 tablet 1   • metoPROLOL succinate (TOPROL-XL) 100 MG 24 hr tablet Take 1 tablet by mouth daily. 30 tablet 1   • glipiZIDE (GLUCOTROL ER) 10 MG 24 hr tablet Take 2 tablets by mouth daily. 30 tablet 1   • ONETOUCH DELICA LANCETS 33G Misc Twice daily 100 each 4   • blood glucose (ONETOUCH VERIO) test strip Test  Blood  Sugars   2 Times  Daily  E 11.65 100 strip 2   • pravastatin (PRAVACHOL) 20 MG tablet TAKE 1 TABLET BY MOUTH DAILY 30 tablet 5   • hydrochlorothiazide (HYDRODIURIL) 25 MG tablet TAKE 1 TABLET BY MOUTH DAILY 90 tablet 3   • dilTIAZem (TIAZAC) 240 MG 24 hr capsule TAKE 1 CAPSULE BY MOUTH DAILY 90 capsule 3   • Multiple Vitamin (MULTIVITAMIN) capsule Take 1 capsule by mouth daily.     • Omega-3 Fatty Acids (FISH OIL) 1000 MG capsule Take 1 g by mouth daily.      • ibuprofen (MOTRIN) 200 MG tablet Take 200 mg by mouth every 6 hours as needed. PRN     • aspirin EC 81 MG EC tablet Take 1 tablet by mouth daily. 30 tablet 6   • sitaGLIPtin (JANUVIA) 100 MG tablet Take 1 tablet by mouth daily. 30 tablet 1   • olmesartan (BENICAR) 5 MG tablet Take 1 tablet by mouth daily. 30 tablet 1     No current facility-administered medications for this visit.         Allergies/Latex allergies:    Allergies as of 02/04/2019 - Reviewed 02/04/2019   Allergen Reaction Noted   • Metformin RASH 11/03/2017       Social History:    Social History     Socioeconomic History   • Marital status: Single     Spouse name: Not on file   • Number of children: Not on file   • Years of education: Not on file   • Highest education level: Not on file   Social Needs   • Financial resource strain: Not on file   • Food insecurity - worry: Not on file   • Food insecurity - inability: Not on file   • Transportation needs - medical: Not on file   • Transportation needs - non-medical: Not on file   Occupational History   • Not on file   Tobacco Use    • Smoking status: Former Smoker   • Smokeless tobacco: Never Used   Substance and Sexual Activity   • Alcohol use: Yes     Alcohol/week: 0.0 oz     Comment: 0 - 1 drink a month on average   • Drug use: No   • Sexual activity: No   Other Topics Concern   • Not on file   Social History Narrative   • Not on file        Family History:    Family History   Problem Relation Age of Onset   • High blood pressure Sister    • Cancer Brother         testicular   • Kidney disease Mother         only has one kidney   • Parkinsonism Mother    • Heart disease Father    • Stroke Father    • Diabetes Paternal Grandmother        Health Maintainace:   Colonoscopy: Up-to-date  Immunization:  Pneumovax and Influenza, declines    Physical Exam:    General:  Patient is sitting up in the chair in no acute distress.  VITAL SIGNS:    Visit Vitals  /82 (BP [place]: [facility] [place] Position: Sitting, Cuff Size: Large Adult)   Pulse 92   Temp 98.2 °F (36.8 °C) (Tympanic)   Ht 6' 2\" (1.88 m)   Wt 116.3 kg   BMI 32.91 kg/m²     HENT:  Head is atraumatic, normocephalic.               Ears:  Tympanic membranes are visualized and intact.                Nose:  Normal mucosa, no septal deviation.               Mouth:  Oral mucosa is moist, no exudates.  Teeth and gums normal.    Eyes:  PERRLA (Pupils equal, round, reactive to light and accommodation), EOMI (extraocular movements intact).  Conjunctivae normal.  No discharge.    Neck:  Supple, trachea in midline.  No goiter.   Cardiovascular:  Heart is regular rate and rhythm, no murmurs.  No edema.  Respiratory:  Breathing is normal.  Lungs are clear to auscultation bilaterally.  No wheezing.  Gastrointestinal:  Abdomen is soft, nontender.  No hepatosplenomegaly.  Lymphatic:  No lymphadenopathy.  Genitourinary:  Deferred.  Neurologic:  Alert and oriented to time, place, and person. No focal deficits noted.  Musculoskeletal:  Good range of motion in all major joints.  Gait is normal.  Skin:  Warm  and dry.  No significant rash or masses.  Psychiatric:  Normal mood and affect.  Behavior is appropriate.    Lab Services on 02/03/2019   Component Date Value Ref Range Status   • TSH 02/03/2019 2.912  0.350 - 5.000 mcUnits/mL Final   • Hemoglobin A1C 02/03/2019 8.3* 4.5 - 5.6 % Final   • FASTING STATUS 02/03/2019 12  hrs Final   • CHOLESTEROL 02/03/2019 171  <200 mg/dL Final   • CALCULATED LDL 02/03/2019 69  <130 mg/dL Final   • HDL 02/03/2019 30* >39 mg/dL Final   • TRIGLYCERIDE 02/03/2019 360* <150 mg/dL Final   • CALCULATED NON HDL 02/03/2019 141  mg/dL Final   • CHOL/HDL 02/03/2019 5.7* <4.5 Final   • Fasting Status 02/03/2019 12  hrs Final   • Sodium 02/03/2019 139  135 - 145 mmol/L Final   • Potassium 02/03/2019 4.3  3.4 - 5.1 mmol/L Final   • Chloride 02/03/2019 98  98 - 107 mmol/L Final   • Carbon Dioxide 02/03/2019 30  21 - 32 mmol/L Final   • Anion Gap 02/03/2019 15  10 - 20 mmol/L Final   • Glucose 02/03/2019 188* 65 - 99 mg/dL Final   • BUN 02/03/2019 14  6 - 20 mg/dL Final   • Creatinine 02/03/2019 0.99  0.67 - 1.17 mg/dL Final   • GFR Estimate,  02/03/2019 >90   Final   • GFR Estimate, Non  02/03/2019 82   Final   • BUN/Creatinine Ratio 02/03/2019 14  7 - 25 Final   • CALCIUM 02/03/2019 9.3  8.4 - 10.2 mg/dL Final   • TOTAL BILIRUBIN 02/03/2019 0.6  0.2 - 1.0 mg/dL Final   • AST/SGOT 02/03/2019 32  <38 Units/L Final   • ALT/SGPT 02/03/2019 48  <79 Units/L Final   • ALK PHOSPHATASE 02/03/2019 93  45 - 117 Units/L Final   • TOTAL PROTEIN 02/03/2019 7.8  6.4 - 8.2 g/dL Final   • Albumin 02/03/2019 4.4  3.6 - 5.1 g/dL Final   • GLOBULIN 02/03/2019 3.4  2.0 - 4.0 g/dL Final   • A/G Ratio, Serum 02/03/2019 1.3  1.0 - 2.4 Final   • MICROALBUMIN, UA (TTL) 02/03/2019 7.38  mg/dL Final   • CREATININE, URINE (TOTAL) 02/03/2019 178.00  mg/dL Final   • MICROALBUMIN/CREATININE 02/03/2019 41.5* <30 mg/g Final        Assessment:  1. Encounter for wellness examination    2. Uncontrolled  diabetes mellitus with microalbuminuria, without long-term current use of insulin (CMS/[facility])    3. Essential hypertension, benign    4. Hyperlipemia, mixed    5. Acquired hypothyroidism    6. Type 2 diabetes mellitus without complication, without long-term current use of insulin (CMS/[facility])        Plan:  Was advised to see dietitian however he declined  He was advised to eat healthy, exercise regularly and lose weight  Orders Placed This Encounter   • levothyroxine (SYNTHROID, LEVOTHROID) 150 MCG tablet   • metoPROLOL succinate (TOPROL-XL) 100 MG 24 hr tablet   • glipiZIDE (GLUCOTROL ER) 10 MG 24 hr tablet   • sitaGLIPtin (JANUVIA) 100 MG tablet   • olmesartan (BENICAR) 5 MG tablet   • ONETOUCH DELICA LANCETS 33G Misc   • blood glucose (ONETOUCH VERIO) test strip        Return in about 4 weeks (around 3/4/2019), or if symptoms worsen or fail to improve.    Patient Instructions   Check your blood sugar 2 times a day:  Before breakfast and 2 hours after a meal, blood sugar should be less than 150 on average.  Please bring the readings with you to next visit.    Take baby Aspirin (80 or 81 mg) 1 pill daily with food to prevent heart disease.    Please remember to have an eye exam at least once every year and as needed.    Please remember to have any scheduled blood work a few days prior to your appointment.    FOR TREATMENT OF HYPOGLYCEMIA    · When you feel symptoms like dizziness, blurry vision, cold sweat:    1. Check blood sugar at the time you feel symptoms.    2. Take 15 - 30 grams of carbohydrates (4 - 5 glucose tablets or glucose gel, 8 ounces regular soda, milk or juice, 5 - 6 Lifesavers).    3. Retest glucose in 15 minutes.    4. Repeat above steps if glucose still under 70.    · To prevent hypoglycemia and complications:    1. Always eat when taking your short-acting insulin (Novolog, Humalog, etc.) and don't skip meals if you take oral hypoglycemic medicines (Glyburide, Glipizide, Glimepiride)    2. Check your  blood sugar each time before you drive, and don't drive if you are less than 80.    3. Carry a sugar source (glucose tablets/gel, hard candy) with you at all times.    · Always carry medical identification that identifies you as being hypoglycemic.          CARING FOR YOUR FEET    Since you have diabetes you need to take special care of your feet.  Poor circulation, nerve damage, and trouble fighting infections can make foot problems very serious.  These simple guidelines will help prevent complications.    Wash your feet every day in warm - not hot - water and dry thoroughly.  Trim nails straight across with a nail clippers after washing and drying feet. Then, smooth the nail with an emery board.  If you have trouble cutting nails because they are thick or you cannot see them, have nails trimmed by a foot doctor.  Trim corns and calluses gently with a pumice stone.  Never use liquid corn and callus remover.  Do not cut them with a razor blade.  If you get a wart, do not treat it yourself.  See your doctor.  Always wear hard soled shoe or slippers.  Keep slippers by your bed in case you get up during the night.  Check inside your shoes before putting them on to make certain there is nothing in the shoe.  Wear clean socks every day.  Choose padded socks.  Look for ones with no seams, or smooth flat seams.  Do not use heating pads or hot water bottles on your feet.  Shop for shoes in the evening when your feet are normally a little swollen.  Use a foot cream on your feet after each bath or shower.  Do not put lotion between your toes as this area is already moist.  Prop your feet up when you are sitting.  Don't cross your legs for long periods of time.  Don't smoke.  Smoking contributes to poor circulation.         The patient indicates understanding of these issues and agrees with the plan.  All questions have been answered to the patient's satisfaction.

## 2019-04-17 ENCOUNTER — TELEPHONE (OUTPATIENT)
Dept: OTHER | Age: 74
End: 2019-04-17

## 2019-04-18 RX ORDER — ASPIRIN 81 MG/1
TABLET, CHEWABLE ORAL
Qty: 30 TABLET | Refills: 2 | Status: SHIPPED | OUTPATIENT
Start: 2019-04-18 | End: 2019-08-05 | Stop reason: SDUPTHER

## 2019-04-18 RX ORDER — CLONIDINE HYDROCHLORIDE 0.3 MG/1
TABLET ORAL
Qty: 90 TABLET | Refills: 0 | Status: SHIPPED | OUTPATIENT
Start: 2019-04-18 | End: 2019-05-13 | Stop reason: SDUPTHER

## 2019-04-18 RX ORDER — ESCITALOPRAM OXALATE 10 MG/1
TABLET ORAL
Qty: 30 TABLET | Refills: 1 | Status: SHIPPED | OUTPATIENT
Start: 2019-04-18 | End: 2019-05-13 | Stop reason: ALTCHOICE

## 2019-04-30 ENCOUNTER — NURSE ONLY (OUTPATIENT)
Dept: CARDIOLOGY CLINIC | Age: 74
End: 2019-04-30
Payer: COMMERCIAL

## 2019-04-30 DIAGNOSIS — Z45.09 ENCOUNTER FOR ELECTRONIC ANALYSIS OF REVEAL EVENT RECORDER: ICD-10-CM

## 2019-04-30 DIAGNOSIS — I63.40 CEREBRAL INFARCTION DUE TO EMBOLISM OF CEREBRAL ARTERY (HCC): ICD-10-CM

## 2019-04-30 DIAGNOSIS — I63.9 CEREBROVASCULAR ACCIDENT (CVA), UNSPECIFIED MECHANISM (HCC): ICD-10-CM

## 2019-04-30 PROCEDURE — 93298 REM INTERROG DEV EVAL SCRMS: CPT | Performed by: INTERNAL MEDICINE

## 2019-04-30 PROCEDURE — 93299 PR REM INTERROG ICPMS/SCRMS <30 D TECH REVIEW: CPT | Performed by: INTERNAL MEDICINE

## 2019-05-08 ENCOUNTER — HOSPITAL ENCOUNTER (OUTPATIENT)
Age: 74
Setting detail: OBSERVATION
Discharge: HOME | End: 2019-05-09
Attending: EMERGENCY MEDICINE | Admitting: INTERNAL MEDICINE
Payer: COMMERCIAL

## 2019-05-08 ENCOUNTER — APPOINTMENT (OUTPATIENT)
Dept: GENERAL RADIOLOGY | Age: 74
End: 2019-05-08
Payer: COMMERCIAL

## 2019-05-08 ENCOUNTER — NURSE TRIAGE (OUTPATIENT)
Dept: OTHER | Facility: CLINIC | Age: 74
End: 2019-05-08

## 2019-05-08 ENCOUNTER — APPOINTMENT (OUTPATIENT)
Dept: CT IMAGING | Age: 74
End: 2019-05-08
Payer: COMMERCIAL

## 2019-05-08 DIAGNOSIS — Z86.73 HISTORY OF STROKE: ICD-10-CM

## 2019-05-08 DIAGNOSIS — R42 DIZZINESS: Primary | ICD-10-CM

## 2019-05-08 DIAGNOSIS — R53.81 PHYSICAL DECONDITIONING: ICD-10-CM

## 2019-05-08 PROBLEM — I48.0 PAF (PAROXYSMAL ATRIAL FIBRILLATION) (HCC): Status: ACTIVE | Noted: 2019-05-08

## 2019-05-08 LAB
A/G RATIO: 1.3 (ref 1.1–2.2)
ALBUMIN SERPL-MCNC: 3.9 G/DL (ref 3.4–5)
ALP BLD-CCNC: 52 U/L (ref 40–129)
ALT SERPL-CCNC: 17 U/L (ref 10–40)
ANION GAP SERPL CALCULATED.3IONS-SCNC: 13 MMOL/L (ref 3–16)
AST SERPL-CCNC: 24 U/L (ref 15–37)
BASOPHILS ABSOLUTE: 0 K/UL (ref 0–0.2)
BASOPHILS RELATIVE PERCENT: 0.5 %
BILIRUB SERPL-MCNC: 0.5 MG/DL (ref 0–1)
BILIRUBIN URINE: NEGATIVE
BLOOD, URINE: NEGATIVE
BUN BLDV-MCNC: 9 MG/DL (ref 7–20)
CALCIUM SERPL-MCNC: 9.2 MG/DL (ref 8.3–10.6)
CHLORIDE BLD-SCNC: 93 MMOL/L (ref 99–110)
CLARITY: CLEAR
CO2: 29 MMOL/L (ref 21–32)
COLOR: YELLOW
CREAT SERPL-MCNC: 0.6 MG/DL (ref 0.6–1.2)
EOSINOPHILS ABSOLUTE: 0.1 K/UL (ref 0–0.6)
EOSINOPHILS RELATIVE PERCENT: 0.8 %
GFR AFRICAN AMERICAN: >60
GFR NON-AFRICAN AMERICAN: >60
GLOBULIN: 2.9 G/DL
GLUCOSE BLD-MCNC: 133 MG/DL (ref 70–99)
GLUCOSE URINE: NEGATIVE MG/DL
HCT VFR BLD CALC: 39.5 % (ref 36–48)
HEMOGLOBIN: 13.6 G/DL (ref 12–16)
KETONES, URINE: NEGATIVE MG/DL
LACTIC ACID, SEPSIS: 0.8 MMOL/L (ref 0.4–1.9)
LACTIC ACID, SEPSIS: 1.1 MMOL/L (ref 0.4–1.9)
LEUKOCYTE ESTERASE, URINE: NEGATIVE
LYMPHOCYTES ABSOLUTE: 0.6 K/UL (ref 1–5.1)
LYMPHOCYTES RELATIVE PERCENT: 6.6 %
MCH RBC QN AUTO: 33.8 PG (ref 26–34)
MCHC RBC AUTO-ENTMCNC: 34.5 G/DL (ref 31–36)
MCV RBC AUTO: 98.1 FL (ref 80–100)
MICROSCOPIC EXAMINATION: NORMAL
MONOCYTES ABSOLUTE: 0.7 K/UL (ref 0–1.3)
MONOCYTES RELATIVE PERCENT: 7.4 %
NEUTROPHILS ABSOLUTE: 7.8 K/UL (ref 1.7–7.7)
NEUTROPHILS RELATIVE PERCENT: 84.7 %
NITRITE, URINE: NEGATIVE
PDW BLD-RTO: 15.3 % (ref 12.4–15.4)
PH UA: 7 (ref 5–8)
PLATELET # BLD: 274 K/UL (ref 135–450)
PMV BLD AUTO: 8.4 FL (ref 5–10.5)
POTASSIUM SERPL-SCNC: 3.8 MMOL/L (ref 3.5–5.1)
PRO-BNP: 676 PG/ML (ref 0–449)
PROTEIN UA: NEGATIVE MG/DL
RBC # BLD: 4.03 M/UL (ref 4–5.2)
SODIUM BLD-SCNC: 135 MMOL/L (ref 136–145)
SPECIFIC GRAVITY UA: <1.005 (ref 1–1.03)
TOTAL PROTEIN: 6.8 G/DL (ref 6.4–8.2)
TROPONIN: <0.01 NG/ML
URINE REFLEX TO CULTURE: NORMAL
URINE TYPE: NORMAL
UROBILINOGEN, URINE: 0.2 E.U./DL
WBC # BLD: 9.2 K/UL (ref 4–11)

## 2019-05-08 PROCEDURE — 6370000000 HC RX 637 (ALT 250 FOR IP): Performed by: NURSE PRACTITIONER

## 2019-05-08 PROCEDURE — 70450 CT HEAD/BRAIN W/O DYE: CPT

## 2019-05-08 PROCEDURE — 71046 X-RAY EXAM CHEST 2 VIEWS: CPT

## 2019-05-08 PROCEDURE — 94640 AIRWAY INHALATION TREATMENT: CPT

## 2019-05-08 PROCEDURE — G0378 HOSPITAL OBSERVATION PER HR: HCPCS

## 2019-05-08 PROCEDURE — 2580000003 HC RX 258: Performed by: INTERNAL MEDICINE

## 2019-05-08 PROCEDURE — 99223 1ST HOSP IP/OBS HIGH 75: CPT | Performed by: PSYCHIATRY & NEUROLOGY

## 2019-05-08 PROCEDURE — 93005 ELECTROCARDIOGRAM TRACING: CPT | Performed by: NURSE PRACTITIONER

## 2019-05-08 PROCEDURE — 83880 ASSAY OF NATRIURETIC PEPTIDE: CPT

## 2019-05-08 PROCEDURE — 83605 ASSAY OF LACTIC ACID: CPT

## 2019-05-08 PROCEDURE — 85025 COMPLETE CBC W/AUTO DIFF WBC: CPT

## 2019-05-08 PROCEDURE — 94760 N-INVAS EAR/PLS OXIMETRY 1: CPT

## 2019-05-08 PROCEDURE — 6370000000 HC RX 637 (ALT 250 FOR IP): Performed by: INTERNAL MEDICINE

## 2019-05-08 PROCEDURE — 99285 EMERGENCY DEPT VISIT HI MDM: CPT

## 2019-05-08 PROCEDURE — 36415 COLL VENOUS BLD VENIPUNCTURE: CPT

## 2019-05-08 PROCEDURE — 6360000002 HC RX W HCPCS: Performed by: INTERNAL MEDICINE

## 2019-05-08 PROCEDURE — 96360 HYDRATION IV INFUSION INIT: CPT

## 2019-05-08 PROCEDURE — 87040 BLOOD CULTURE FOR BACTERIA: CPT

## 2019-05-08 PROCEDURE — 84484 ASSAY OF TROPONIN QUANT: CPT

## 2019-05-08 PROCEDURE — 80053 COMPREHEN METABOLIC PANEL: CPT

## 2019-05-08 PROCEDURE — 81003 URINALYSIS AUTO W/O SCOPE: CPT

## 2019-05-08 PROCEDURE — 99221 1ST HOSP IP/OBS SF/LOW 40: CPT | Performed by: INTERNAL MEDICINE

## 2019-05-08 PROCEDURE — 2580000003 HC RX 258: Performed by: EMERGENCY MEDICINE

## 2019-05-08 RX ORDER — CLONAZEPAM 0.5 MG/1
0.5 TABLET ORAL NIGHTLY PRN
Status: DISCONTINUED | OUTPATIENT
Start: 2019-05-08 | End: 2019-05-09 | Stop reason: HOSPADM

## 2019-05-08 RX ORDER — OXYBUTYNIN CHLORIDE 5 MG/1
5 TABLET ORAL 2 TIMES DAILY
Status: DISCONTINUED | OUTPATIENT
Start: 2019-05-08 | End: 2019-05-09 | Stop reason: HOSPADM

## 2019-05-08 RX ORDER — M-VIT,TX,IRON,MINS/CALC/FOLIC 27MG-0.4MG
1 TABLET ORAL DAILY
Status: DISCONTINUED | OUTPATIENT
Start: 2019-05-09 | End: 2019-05-09 | Stop reason: HOSPADM

## 2019-05-08 RX ORDER — BENZONATATE 100 MG/1
100 CAPSULE ORAL 3 TIMES DAILY PRN
Status: DISCONTINUED | OUTPATIENT
Start: 2019-05-08 | End: 2019-05-09 | Stop reason: HOSPADM

## 2019-05-08 RX ORDER — ONDANSETRON 2 MG/ML
4 INJECTION INTRAMUSCULAR; INTRAVENOUS EVERY 6 HOURS PRN
Status: DISCONTINUED | OUTPATIENT
Start: 2019-05-08 | End: 2019-05-09 | Stop reason: HOSPADM

## 2019-05-08 RX ORDER — ACETAMINOPHEN 80 MG
TABLET,CHEWABLE ORAL
Status: DISPENSED
Start: 2019-05-08 | End: 2019-05-09

## 2019-05-08 RX ORDER — 0.9 % SODIUM CHLORIDE 0.9 %
500 INTRAVENOUS SOLUTION INTRAVENOUS ONCE
Status: COMPLETED | OUTPATIENT
Start: 2019-05-08 | End: 2019-05-08

## 2019-05-08 RX ORDER — CLONIDINE HYDROCHLORIDE 0.3 MG/1
0.3 TABLET ORAL 3 TIMES DAILY
Status: DISCONTINUED | OUTPATIENT
Start: 2019-05-08 | End: 2019-05-09 | Stop reason: HOSPADM

## 2019-05-08 RX ORDER — SODIUM CHLORIDE 0.9 % (FLUSH) 0.9 %
10 SYRINGE (ML) INJECTION PRN
Status: DISCONTINUED | OUTPATIENT
Start: 2019-05-08 | End: 2019-05-09 | Stop reason: HOSPADM

## 2019-05-08 RX ORDER — MIRTAZAPINE 15 MG/1
15 TABLET, FILM COATED ORAL NIGHTLY
Status: DISCONTINUED | OUTPATIENT
Start: 2019-05-08 | End: 2019-05-09 | Stop reason: HOSPADM

## 2019-05-08 RX ORDER — LISINOPRIL 20 MG/1
40 TABLET ORAL DAILY
Status: DISCONTINUED | OUTPATIENT
Start: 2019-05-08 | End: 2019-05-09 | Stop reason: HOSPADM

## 2019-05-08 RX ORDER — FUROSEMIDE 40 MG/1
40 TABLET ORAL DAILY
Status: DISCONTINUED | OUTPATIENT
Start: 2019-05-08 | End: 2019-05-09 | Stop reason: HOSPADM

## 2019-05-08 RX ORDER — CALCIUM CARBONATE 500(1250)
500 TABLET ORAL DAILY
Status: DISCONTINUED | OUTPATIENT
Start: 2019-05-09 | End: 2019-05-09 | Stop reason: HOSPADM

## 2019-05-08 RX ORDER — SODIUM CHLORIDE 0.9 % (FLUSH) 0.9 %
10 SYRINGE (ML) INJECTION EVERY 12 HOURS SCHEDULED
Status: DISCONTINUED | OUTPATIENT
Start: 2019-05-08 | End: 2019-05-09 | Stop reason: HOSPADM

## 2019-05-08 RX ORDER — ATORVASTATIN CALCIUM 80 MG/1
80 TABLET, FILM COATED ORAL NIGHTLY
Status: DISCONTINUED | OUTPATIENT
Start: 2019-05-08 | End: 2019-05-09 | Stop reason: HOSPADM

## 2019-05-08 RX ORDER — LEVOTHYROXINE SODIUM 0.1 MG/1
100 TABLET ORAL DAILY
Status: DISCONTINUED | OUTPATIENT
Start: 2019-05-09 | End: 2019-05-09 | Stop reason: HOSPADM

## 2019-05-08 RX ORDER — ESCITALOPRAM OXALATE 10 MG/1
10 TABLET ORAL DAILY
Status: DISCONTINUED | OUTPATIENT
Start: 2019-05-09 | End: 2019-05-09 | Stop reason: HOSPADM

## 2019-05-08 RX ORDER — LABETALOL 200 MG/1
100 TABLET, FILM COATED ORAL EVERY 12 HOURS SCHEDULED
Status: DISCONTINUED | OUTPATIENT
Start: 2019-05-08 | End: 2019-05-09 | Stop reason: HOSPADM

## 2019-05-08 RX ORDER — ASPIRIN 325 MG
325 TABLET ORAL ONCE
Status: COMPLETED | OUTPATIENT
Start: 2019-05-08 | End: 2019-05-08

## 2019-05-08 RX ADMIN — APIXABAN 5 MG: 5 TABLET, FILM COATED ORAL at 20:45

## 2019-05-08 RX ADMIN — Medication 10 ML: at 20:49

## 2019-05-08 RX ADMIN — Medication 2 PUFF: at 20:23

## 2019-05-08 RX ADMIN — ATORVASTATIN CALCIUM 80 MG: 80 TABLET, FILM COATED ORAL at 20:45

## 2019-05-08 RX ADMIN — IPRATROPIUM BROMIDE 0.5 MG: 0.5 SOLUTION RESPIRATORY (INHALATION) at 20:23

## 2019-05-08 RX ADMIN — CLONIDINE HYDROCHLORIDE 0.3 MG: 0.3 TABLET ORAL at 16:44

## 2019-05-08 RX ADMIN — CLONAZEPAM 0.5 MG: 0.5 TABLET ORAL at 20:45

## 2019-05-08 RX ADMIN — CLONIDINE HYDROCHLORIDE 0.3 MG: 0.3 TABLET ORAL at 20:46

## 2019-05-08 RX ADMIN — IPRATROPIUM BROMIDE 0.5 MG: 0.5 SOLUTION RESPIRATORY (INHALATION) at 16:03

## 2019-05-08 RX ADMIN — LABETALOL HYDROCHLORIDE 100 MG: 200 TABLET, FILM COATED ORAL at 20:46

## 2019-05-08 RX ADMIN — MIRTAZAPINE 15 MG: 15 TABLET, FILM COATED ORAL at 20:46

## 2019-05-08 RX ADMIN — SODIUM CHLORIDE 500 ML: 9 INJECTION, SOLUTION INTRAVENOUS at 12:37

## 2019-05-08 RX ADMIN — OXYBUTYNIN CHLORIDE 5 MG: 5 TABLET ORAL at 20:46

## 2019-05-08 RX ADMIN — ASPIRIN 325 MG ORAL TABLET 325 MG: 325 PILL ORAL at 12:37

## 2019-05-08 ASSESSMENT — ENCOUNTER SYMPTOMS
NAUSEA: 0
VOMITING: 0
CHEST TIGHTNESS: 0
DIARRHEA: 0
SHORTNESS OF BREATH: 0
ABDOMINAL PAIN: 0

## 2019-05-08 ASSESSMENT — PAIN SCALES - GENERAL
PAINLEVEL_OUTOF10: 5
PAINLEVEL_OUTOF10: 0
PAINLEVEL_OUTOF10: 0

## 2019-05-08 ASSESSMENT — PAIN DESCRIPTION - LOCATION: LOCATION: HEAD

## 2019-05-08 NOTE — CONSULTS
NEUROLOGY CONSULTATION     Patient's Name :   Francie Hoffmann        YOB: 1945                    Date of Consultation : 5/8/2019 5:31 PM    Referring Physician :  Julianne Encinas MD    Reason for Consultation :    Memory impairment    HISTORY OF PRESENT ILLNESS :    Aislinn Galarza is a 75 y.o. female   History was obtained from patient and from dictations in the chart. Additional history was obtained from the patient's nurse. Patient states that she had frequent urination and had as strong smelling urine and came to the hospital.  Family had noticed occasional confusion. Patient admits that she gets confused on and off and has decreased memory compared to a few years ago. Onset was gradual and the symptoms have been present for a number of months. No clear aggravating or relieving factors for the memory problems. Patient is at multiple strokes in the past.  She recently was diagnosed with paroxysmal atrial fibrillation. She has just been started on Eliquis by the cardiologist.      REVIEW OF SYSTEMS  Complains of generalized weakness. Denies any chest pain palpitations breathlessness abdominal pain fever or weight loss. Rest of the 14 system review was reviewed as much as possible and was negative except for the symptoms noted above.     Past Medical History:   Diagnosis Date    Alcohol abuse     Anxiety     CAD in native artery     Cerebral artery occlusion with cerebral infarction (HCC)     states hx of multiple mini strokes    COPD (chronic obstructive pulmonary disease) (HCC)     Depression     DVT, lower extremity (HCC)     Hypercholesteremia     Hypertension     Hyperthyroidism     Mammogram abnormal 2/7/2012    Neuromuscular disorder ([facility] [address]     Pneumonia     Thyroid disease     Tobacco abuse      Family History   Problem Relation Age of Onset    Heart Disease Father         MI @ 64    Alcohol Abuse Father      Social History     Socioeconomic History    Marital status:      Spouse name: None    Number of children: None    Years of education: None    Highest education level: None   Occupational History    None   Social Needs    Financial resource strain: None    Food insecurity:     Worry: None     Inability: None    Transportation needs:     Medical: None     Non-medical: None   Tobacco Use    Smoking status: Current Every Day Smoker     Packs/day: 0.50     Years: 52.00     Pack years: 26.00     Types: Cigarettes     Last attempt to quit: 2018     Years since quittin.8    Smokeless tobacco: Never Used    Tobacco comment: started smoking at age 26 / smoked up to 2 p,p,d / now smoking 4 to 8 cigarettes a day,   Substance and Sexual Activity    Alcohol use: No     Alcohol/week: 0.0 oz     Comment: patient reports she is an alcoholic hasn't had anything to drink 3-4 months    Drug use: No    Sexual activity: Yes     Partners: Male   Lifestyle    Physical activity:     Days per week: None     Minutes per session: None    Stress: None   Relationships    Social connections:     Talks on phone: None     Gets together: None     Attends Mu-ism service: None     Active member of club or organization: None     Attends meetings of clubs or organizations: None     Relationship status: None    Intimate partner violence:     Fear of current or ex partner: None     Emotionally abused: None     Physically abused: None     Forced sexual activity: None   Other Topics Concern    None   Social History Narrative    None     Current Facility-Administered Medications   Medication Dose Route Frequency Provider Last Rate Last Dose    atorvastatin (LIPITOR) tablet 80 mg  80 mg Oral Nightly Sahvon Freire MD        benzonatate (TESSALON) capsule 100 mg  100 mg Oral TID PRN Shavon Freire MD        mometasone-formoterol (DULERA) 200-5 MCG/ACT inhaler 2 puff  2 puff Inhalation BID Nita [Hydrochlorothiazide] Other (See Comments)     Hyponatremia, severe      Norvasc [Amlodipine Besylate] Swelling     Of neck    Penicillins Hives    Tramadol Itching    Hydralazine Palpitations and Other (See Comments)     Dizzy,fatigue,headaches,palpitations,loss of appetite    Shellfish-Derived Products Swelling and Rash     Swelling of neck       PHYSICAL EXAMINATION:  BP (!) 190/94   Pulse 62   Temp 97.3 °F (36.3 °C) (Temporal)   Resp 18   Ht 5' 7\" (1.702 m)   Wt 127 lb 6.4 oz (57.8 kg)   SpO2 98%   BMI 19.95 kg/m²   Appearance: Well appearing, well nourished and in no distress  Mental Status Exam: Patient is alert, oriented to person, place and time. Recent memory was impaired. Could recall 1 out of 3 objects in 3 minutes. Patient had difficulty with serial sevens. Fund of Knowledge is normal  Attention/concentration is normal.   Speech : No dysarthria  Language : No aphasia  Funduscopic Exam: sharp disc margins  Cranial Nerves:   II: Visual fields:  Full to confrontation  III: Pupils:  equal, round, reactive to light  III,IV,VI: Extra Ocular Movements are intact. No nystagmus  V: Facial sensation is intact to pin prick and light touch  VII: Facial strength and movements: intact and symmetric smile,cheek puffing and eyebrow elevation  VIII: Hearing:  Intact to finger rub bilaterally  IX: Palate  elevation is symmetric  XI: Shoulder shrug is intact  XII: Tongue movements are normal  Motor:  Muscle tone and bulk are normal.   Strength is symmetrical 4 /5 in all four extremities. Sensory: Intact to light touch and  pin prick in all four extremities  Coordination:  Normal  Finger to Nose and Heel to Shin bilaterally    . Reflexes:  DTR 1 and symmetric bilaterally  Plantar response: Flexor bilaterally  Gait: Gait is slightly impaired  Romberg: negative  Vascular: No carotid bruit bilaterally        DATA:  LABS:  General Labs:    CBC:   Lab Results   Component Value Date    WBC 9.2 05/08/2019    RBC 4.03 05/08/2019    HGB 13.6 05/08/2019    HCT 39.5 05/08/2019    MCV 98.1 05/08/2019    MCH 33.8 05/08/2019    MCHC 34.5 05/08/2019    RDW 15.3 05/08/2019     05/08/2019    MPV 8.4 05/08/2019     BMP:    Lab Results   Component Value Date     05/08/2019    K 3.8 05/08/2019    K 3.9 03/26/2019    CL 93 05/08/2019    CO2 29 05/08/2019    BUN 9 05/08/2019    LABALBU 3.9 05/08/2019    CREATININE 0.6 05/08/2019    CALCIUM 9.2 05/08/2019    GFRAA >60 05/08/2019    GFRAA >60 05/02/2013    LABGLOM >60 05/08/2019    LABGLOM 79 12/11/2017    GLUCOSE 133 05/08/2019     RADIOLOGY REVIEW:  I have reviewed radiology image(s) and reports(s) of:  CT head    IMPRESSION :  Mild dementia probably of the vascular type. CT head shows significant atrophy. There is also chronic white matter changes. There is previous history of alcoholism which could also play a role.   TSH and B12 levels have been checked and were normal.  Patient Active Problem List   Diagnosis    Incontinence    Hypothyroidism    Smoker    Anxiety    History of alcoholism ([address]    Hyperlipidemia    DIMAS (dyspnea on exertion)    Acute DVT (deep venous thrombosis) (HCC)    DVT (deep venous thrombosis) (HCC)    COPD exacerbation (HCC)    Fatigue    General weakness    Centrilobular emphysema ([address]    Essential hypertension    Chronic fatigue    COPD, moderate ([address]    Encephalopathy    CAD in native artery    Osteopenia    Trapped lung    COPD exacerbation (HCC)    Acute encephalopathy    Chronic ischemic multifocal multiple vascular territories stroke    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 ([address]    Cardiac arrhythmia    Alcohol abuse counseling and surveillance    Cellulitis    Right forearm cellulitis    Superficial thrombophlebitis of right upper extremity    Severe infection    Tobacco abuse counseling    Therapeutic drug monitoring    Complex care coordination    Stroke-like symptoms    History of recent stroke    Depression    Unable to ambulate    Cerebrovascular accident ([facility]A) ([facility] [address]    Oropharyngeal dysphagia    HTN (hypertension), benign    Dyslipidemia    Encounter for electronic analysis of reveal event recorder    Acute on chronic diastolic heart failure (HCC)    Physical deconditioning    PAF (paroxysmal atrial fibrillation) ([facility] [address]     RECOMMENDATIONS :  Discussed with patient  Discussed with patient's nurse  Would not recommend starting any medication at this time for her dementia symptoms  Patient has just been started on Eliquis. [place] Her heart rate is in the low 60s and Aricept might worsen bradycardia. Will sign off at this time. Please call if needed. Please note a portion of this chart was generated using dragon dictation software. Although every effort was made to ensure the accuracy of this automated transcription, some errors in transcription may have occurred.

## 2019-05-08 NOTE — PROGRESS NOTES
4 Eyes Skin Assessment     The patient is being assess for  Admission    I agree that 2 RN's have performed a thorough Head to Toe Skin Assessment on the patient. ALL assessment sites listed below have been assessed. Areas assessed by both nurses:   [x]   Head, Face, and Ears   [x]   Shoulders, Back, and Chest  [x]   Arms, Elbows, and Hands   [x]   Coccyx, Sacrum, and IschIum  [x]   Legs, Feet, and Heels        Does the Patient have Skin Breakdown?   Yes LDA WOUND CARE was Initiated documentation include the Carmella-wound, Wound Assessment, Measurements, Dressing Treatment, Drainage, and Color\",         Rosendo Prevention initiated:  Yes   Wound Care Orders initiated:  NA      [facility] nurse consulted for Pressure Injury (Stage 3,4, Unstageable, DTI, NWPT, and Complex wounds), New and Established Ostomies:  NA      Nurse 1 eSignature: Electronically signed by Leila Lira RN on 5/8/19 at 2:52 PM    **SHARE this note so that the co-signing nurse is able to place an eSignature**    Nurse 2 eSignature: Electronically signed by Makenzie Fragoso RN on 5/8/19 at 5:24 PM

## 2019-05-08 NOTE — ED PROVIDER NOTES
I independently performed a history and physical on Aretha Calle. All diagnostic, treatment, and disposition decisions were made by myself in conjunction with the advanced practice provider. Briefly, this is a 75 y.o. female here for multitude of symptoms, but the patient tells me, personally at the most bothersome symptom was actually the dizziness. This really concerned her. It is evident on chart review that she does have history of strokes, sometimes seen on MRI only, often with negative CT head. She is also reporting concern for urinary tract infection but no real true urinary abnormalities including hesitancy, dysuria. Symptoms are mild to moderate. Ongoing for the past few days. .  See OTTO note      On exam:  Constitutional:  Well developed, well nourished, non-toxic appearance   Eyes:  PERRL, conjunctiva normal   HENT:  Atraumatic, external ears normal, nosenormal, oropharynx moist, no pharyngeal exudates. Neck- normal range of motion, supple   Respiratory:  No respiratory distress, normal breath sounds, no rales, no wheezing   Cardiovascular:  Normal rate, normal rhythm, no murmurs,   GI:  Soft, nondistended, nontender, no obvious organomegaly, no mass, no rebound, no guarding   Musculoskeletal:  No tenderness, no deformities. Integument:  no rashes on exposed surfaces  Neurologic:  Alert & oriented x 3,  normalmotor function, normal sensory function, no focal deficits noted   Psychiatric:  Speech and behavior appropriate. No agitation. EKG     EKG 12 Lead (Preliminary result)    Component (Lab Inquiry)   Collection Time Result Time Ventricular Rate Atrial Rate P-R Interval QRS Duration Q-T Interval QTc Calculation (Bazett) P Axis R Axis T Axis Diagnosis   05/08/19 10:45:38 05/08/19 10:52:17 61 61 140 76 450 453 55 31 49 Normal sinus rhythmNormal ECG           Screenings  NIH Stroke Scale  Interval: Hand-off/transfer  Level of Consciousness (1a. ): Alert  LOC Questions (1b. ):  Answers one correctly  LOC Commands (1c. ): Obeys both correctly  Best Gaze (2. ): Normal  Visual (3. ): No visual loss  Facial Palsy (4. ): Normal  Motor Arm, Left (5a. ): No drift  Motor Arm, Right (5b. ): No drift  Motor Leg, Left (6a. ): No drift  Motor Leg, Right (6b. ): No drift  Limb Ataxia (7. ): Absent  Sensory (8. ): (!) Partial loss  Best Language (9. ): No aphasia  Dysarthria (10. ): Normal  Extinction and Inattention (11): No neglect  Total: 2Glasgow Coma Scale  Eye Opening: Spontaneous  Best Verbal Response: Oriented  Best Motor Response: Obeys commands  Ed Coma Scale Score: 15        MDM    Tough disposition on this patient as she has some vague symptoms. Nothing appears overly that within the knee stroke window. Case discussed with hospitalist, who can further evaluate the patient for dizziness and rule out stroke from an inpatient/hospitalized setting. SEE OTTO NOTE      Patient Referrals:  Harriet Romero, [address] [place] [address]  [phone]            Discharge Medications:  Discharge Medication List as of 5/9/2019  4:18 PM      START taking these medications    Details   apixaban (ELIQUIS) 5 MG TABS tablet Take 1 tablet by mouth 2 times daily, Disp-60 tablet, R-2Print             FINAL IMPRESSION  1. Dizziness    2. History of stroke    3. Physical deconditioning        Blood pressure (!) 144/84, pulse 58, temperature 97 °F (36.1 °C), temperature source Temporal, resp. rate 20, height 5' 7\" (1.702 m), weight 126 lb 4 oz (57.3 kg), SpO2 94 %, not currently breastfeeding. For further details of [facility] emergency department encounter, please see documentation by advanced practice provider.         Ethyl Ou III, DO  05/12/19 3300

## 2019-05-08 NOTE — PROGRESS NOTES
Admission assessment complete. Lung sounds clear, diminished at bases. NSR on tele. Sacrum red but blanchable. Has old burn and scars on back-says it is from her heating pad.

## 2019-05-08 NOTE — TELEPHONE ENCOUNTER
Reason for Disposition   Caller has already spoken with the PCP (or office), and has no further questions    Protocols used: NO CONTACT OR DUPLICATE CONTACT CALL-ADULT-[place]    Patient directed to ED by office staff.

## 2019-05-08 NOTE — CONSULTS
History and Physical  [place] [id number]   Cardiology    Chief Complaint:  paf and cvas    HPI:     Patient is a 75 y.o. female presented with strong smelling urine and disorientation. She is now lucid and oriented. Cards asked to revisit the issue of multiple cvsa, paf, and anticoagulation. She had previously been seen by colleagues without paf documented. However, since then 3/9 and 3/10 probably 10 and 14 mintues of paf. She has a [id number] score of 5.            Past Medical History:   Diagnosis Date    Alcohol abuse     Anxiety     CAD in native artery     Cerebral artery occlusion with cerebral infarction ([facility] [address]     states hx of multiple mini strokes    COPD (chronic obstructive pulmonary disease) (HCC)     Depression     DVT, lower extremity (HCC)     Hypercholesteremia     Hypertension     Hyperthyroidism     Mammogram abnormal 2/7/2012    Neuromuscular disorder ([facility] [address]     Pneumonia     Thyroid disease     Tobacco abuse       Past Surgical History:   Procedure Laterality Date    COLONOSCOPY  1/19/2012    Dr. Sotero Conde; repeat 5 years    EYE SURGERY      cateracts removed    SHOULDER ARTHROSCOPY Right 6/18/14    SUBACROMIAL DECOMPRESSION, ROTATOR CUFF REPAIR    TOE SURGERY Right     TONSILLECTOMY          Medications Prior to Admission: aspirin 81 MG chewable tablet, CHEW ONE TABLET BY MOUTH DAILY  ipratropium (ATROVENT) 0.02 % nebulizer solution, Take 2.5 mLs by nebulization 3 times daily  escitalopram (LEXAPRO) 10 MG tablet, Take 1 tablet by mouth daily  clonazePAM (KLONOPIN) 0.5 MG tablet, Take 1 tablet by mouth nightly as needed (insomnia). .  ondansetron (ZOFRAN-ODT) 8 MG TBDP disintegrating tablet, Take 1 tablet by mouth every 8 hours as needed for Nausea May Sub regular tablet (non-ODT) if insurance does not cover ODT.  mirtazapine (REMERON) 15 MG tablet, Take 1 tablet by mouth nightly  budesonide-formoterol (SYMBICORT) 160-4.5 MCG/ACT AERO, Inhale 2 puffs into the lungs 2 times daily  cloNIDine (CATAPRES) 0.3 MG tablet, TAKE ONE TABLET BY MOUTH THREE TIMES A DAY  escitalopram (LEXAPRO) 10 MG tablet, TAKE ONE TABLET BY MOUTH DAILY  benzonatate (TESSALON) 100 MG capsule, Take 1 capsule by mouth 3 times daily as needed for Cough  furosemide (LASIX) 40 MG tablet, Take 1 tablet by mouth daily  labetalol (NORMODYNE) 100 MG tablet, Take 1 tablet by mouth every 12 hours  calcium elemental (OSCAL) 500 MG TABS tablet, Take 1 tablet by mouth daily  oxybutynin (DITROPAN) 5 MG tablet, TAKE ONE TABLET BY MOUTH TWICE A DAY  atorvastatin (LIPITOR) 80 MG tablet, Take 1 tablet by mouth daily  levothyroxine (SYNTHROID) 100 MCG tablet, TAKE ONE TABLET BY MOUTH DAILY  zoster recombinant adjuvanted vaccine (SHINGRIX) 50 MCG SUSR injection, . 5mL IM X 1 followed by . 5mL IM 2-6 months after dose #1  lisinopril (PRINIVIL;ZESTRIL) 40 MG tablet, Take 1 tablet by mouth daily  Multiple Vitamins-Minerals (CENTRUM SILVER ADULT 50+ PO), Take 1 tablet by mouth daily     Allergies   Allergen Reactions    Lipitor [Atorvastatin] Other (See Comments)     Weakness, severe    Morphine Shortness Of Breath    Keflex [Cephalexin] Other (See Comments)     SOB & bilateral arms shaking     Hctz [Hydrochlorothiazide] Other (See Comments)     Hyponatremia, severe      Norvasc [Amlodipine Besylate] Swelling     Of neck    Penicillins Hives    Tramadol Itching    Hydralazine Palpitations and Other (See Comments)     Dizzy,fatigue,headaches,palpitations,loss of appetite    Shellfish-Derived Products Swelling and Rash     Swelling of neck      Social History     Tobacco Use    Smoking status: Current Every Day Smoker     Packs/day: 0.50     Years: 52.00     Pack years: 26.00     Types: Cigarettes     Last attempt to quit: 2018     Years since quittin.8    Smokeless tobacco: Never Used    Tobacco comment: started smoking at age 26 / smoked up to 2 p,p,d / now smoking 4 to 8 cigarettes a day,   Substance Use Topics    Alcohol use: No     Alcohol/week: 0.0 oz     Comment: patient reports she is an alcoholic hasn't had anything to drink 3-4 months      Family History   Problem Relation Age of Onset    Heart Disease Father         MI @ 64    Alcohol Abuse Father         Review of Systems:  · Constitutional: No Fever or Weight Loss  · Eyes: No decreased vision  · ENT: No Headaches, Hearing Loss or Vertigo  · Cardiovascular: No chest pain, dyspnea on exertion, palpitations or loss of consciousness  · Respiratory: No cough or wheezing    · Gastrointestinal: No abdominal pain, appetite loss, blood in stools, constipation, diarrhea or heartburn  · Genitourinary: No dysuria, trouble voiding, or hematuria, urine strong odor per patient  · Musculoskeletal:  No gait disturbance, weakness or joint complaints  · Integumentary: No rash or pruritis  · Neurological: No TIA or stroke symptoms  · Psychiatric: No anxiety or depression  · Endocrine: No malaise, fatigue or temperature intolerance  · Hematologic/Lymphatic: No bleeding problems, blood clots or swollen lymph nodes  · Allergic/Immunologic: No nasal congestion or hives      Objective Data:     BP (!) 178/84   Pulse 63   Temp 96.8 °F (36 °C) (Temporal)   Resp 16   Ht 5' 7\" (1.702 m)   Wt 127 lb 6.4 oz (57.8 kg)   SpO2 (!) 60%   BMI 19.95 kg/m²     General appearance: alert, appears stated age and cooperative  Alert, awake, oriented x 3  Eyes:  No erythema  Head: atraumatic  Neck:  no JVD  Lungs: clear to auscultation bilaterally  Heart: normal apical impulse, regular rate and rhythm and systolic murmur: late systolic 2/6, blowing at 2nd left intercostal space, at apex  Abdomen: soft, non-tender; bowel sounds normal; no masses,  no organomegaly  Extremities: extremities normal, atraumatic, no cyanosis or edema  Skin: Skin color, texture, turgor normal. No rashes or lesions  Hematologic: no remarkable bruising       ECG: normal sinus rhythm     Data Review    MRI 7/3/18 INTRACRANIAL STRUCTURES/VENTRICLES: Roland Racer is no acute infarct. No evidence   of intracranial hemorrhage.  The ventricles are unchanged in size and   configuration.  There remains disproportionate ventricular enlargement. Normal intracranial arterial flow voids are preserved.  Stable remote   bilateral cerebellar lacunar infarcts.  Stable mild pontine white matter   disease.  Unchanged remote right basal ganglia infarct.  Unchanged moderate   confluent periventricular white matter disease.  There is evidence of a small   remote parasagittal right occipital cortical infarct.  Small remote superior   right frontal cortical infarct.  Postcontrast imaging reveals no abnormal   enhancement.  Sellar and suprasellar regions are unremarkable.  The   cerebellar tonsils are normal in position. RANDALL:   Summary   Normal left ventricle size and systolic function with an estimated ejection   fraction of 55%.      There are linear mobile density on the aortic valve leaflet consistent with   lambl's Excrescences.      The left atrium appears dilated. There is no evidence of mass or thrombus in   the left atrium or appendage.  A bubble study was performed and showed no   evidence of shunting.      Signature      ------------------------------------------------------------------   Electronically signed by Tc Watkins MD (Interpreting   physician) on 04/23/2018 at 05:45 PM   ------------------------------------------------------------------    Recent Labs     05/08/19  1037   *   K 3.8   CL 93*   CO2 29   BUN 9   CREATININE 0.6     Recent Labs     05/08/19  1037   WBC 9.2   HGB 13.6   HCT 39.5   MCV 98.1        Lab Results   Component Value Date    CKTOTAL 37 06/14/2018    TROPONINI <0.01 05/08/2019       CC CG = 64 ml/min  Assessment:     Active Problems:    Chronic ischemic multifocal multiple vascular territories stroke    Physical deconditioning    PAF (paroxysmal atrial fibrillation) ([facility] [address]  Resolved Problems:    * No resolved hospital problems. *      Plan:     1. The patient is agreeable to taking anticoagulation thus could start Eliquis 5 mg bid or xarelto 20 mg daily or coumadin. Pharmacy consult to review with patient best option. Suggest stopping Plavix when anticoagulantion started. Reviewed with patient and RN.

## 2019-05-08 NOTE — ED PROVIDER NOTES
[facility]        Pt Name: Rubens Reyes  MRN: [id number]  [place] 1945  Date of evaluation: 5/8/2019  Provider: QUITA Barrett CNP  PCP: Kellie Rowley MD    This patient was seen and evaluated by the attending physician Conrad Garcia III, [address] [place] [address]       Chief Complaint   Patient presents with    Urinary Tract Infection     complains of confusion, and urinary symptoms. States if feels like previous uti. Also with headache. HISTORY OF PRESENT ILLNESS   (Location/Symptom, Timing/Onset, Context/Setting, Quality, Duration, Modifying Factors, Severity)  Note limiting factors. Rubens Reyes is a 75 y.o. female presents to the emergency department complaining of some confusion. Patient's concerned that she may have a urinary tract infection other she denies burning, hesitancy, frequency or urgency. She reports history of stroke ×3, denies any new focal weakness, numbness or tingling. She walks without a cane or a walker although at baseline she hold onto the wall and is wobbly per patient report. She denies double vision or any difficulty seeing. She is concerned about dizziness that is not generally present. States that the dizziness is present at rest.     Denies any headache, fever, lightheadedness, visual disturbances. No chest pain or pressure. No neck or back pain. No shortness of breath, cough, or congestion. No abdominal pain, nausea, vomiting, diarrhea, constipation, or dysuria. No rash. Nursing Notes were all reviewed and agreed with or any disagreements were addressed  in the HPI. REVIEW OF SYSTEMS    (2-9 systems for level 4, 10 or more for level 5)     Review of Systems   Constitutional: Negative for activity change, chills and fever. Respiratory: Negative for chest tightness and shortness of breath. Cardiovascular: Negative for chest pain. ESCITALOPRAM (LEXAPRO) 10 MG TABLET    TAKE ONE TABLET BY MOUTH DAILY    FUROSEMIDE (LASIX) 40 MG TABLET    Take 1 tablet by mouth daily    IPRATROPIUM (ATROVENT) 0.02 % NEBULIZER SOLUTION    Take 2.5 mLs by nebulization 3 times daily    LABETALOL (NORMODYNE) 100 MG TABLET    Take 1 tablet by mouth every 12 hours    LEVOTHYROXINE (SYNTHROID) 100 MCG TABLET    TAKE ONE TABLET BY MOUTH DAILY    LISINOPRIL (PRINIVIL;ZESTRIL) 40 MG TABLET    Take 1 tablet by mouth daily    MIRTAZAPINE (REMERON) 15 MG TABLET    Take 1 tablet by mouth nightly    MULTIPLE VITAMINS-MINERALS (CENTRUM SILVER ADULT 50+ PO)    Take 1 tablet by mouth daily     ONDANSETRON (ZOFRAN-ODT) 8 MG TBDP DISINTEGRATING TABLET    Take 1 tablet by mouth every 8 hours as needed for Nausea May Sub regular tablet (non-ODT) if insurance does not cover ODT. OXYBUTYNIN (DITROPAN) 5 MG TABLET    TAKE ONE TABLET BY MOUTH TWICE A DAY    ZOSTER RECOMBINANT ADJUVANTED VACCINE (SHINGRIX) 50 MCG SUSR INJECTION    . 5mL IM X 1 followed by . 5mL IM 2-6 months after dose #1         ALLERGIES     Lipitor [atorvastatin]; Morphine; Keflex [cephalexin]; Hctz [hydrochlorothiazide]; Norvasc [amlodipine besylate];  Penicillins; Tramadol; Hydralazine; and Shellfish-derived products    FAMILYHISTORY       Family History   Problem Relation Age of Onset    Heart Disease Father         MI @ 64    Alcohol Abuse Father           SOCIAL HISTORY       Social History     Socioeconomic History    Marital status:      Spouse name: None    Number of children: None    Years of education: None    Highest education level: None   Occupational History    None   Social Needs    Financial resource strain: None    Food insecurity:     Worry: None     Inability: None    Transportation needs:     Medical: None     Non-medical: None   Tobacco Use    Smoking status: Current Every Day Smoker     Packs/day: 0.50     Years: 52.00     Pack years: 26.00     Types: Cigarettes     Last attempt to quit: 2018     Years since quittin.8    Smokeless tobacco: Never Used    Tobacco comment: started smoking at age 26 / smoked up to 2 p,p,d / now smoking 4 to 8 cigarettes a day,   Substance and Sexual Activity    Alcohol use: No     Alcohol/week: 0.0 oz     Comment: patient reports she is an alcoholic hasn't had anything to drink 3-4 months    Drug use: No    Sexual activity: Yes     Partners: Male   Lifestyle    Physical activity:     Days per week: None     Minutes per session: None    Stress: None   Relationships    Social connections:     Talks on phone: None     Gets together: None     Attends Protestant service: None     Active member of club or organization: None     Attends meetings of clubs or organizations: None     Relationship status: None    Intimate partner violence:     Fear of current or ex partner: None     Emotionally abused: None     Physically abused: None     Forced sexual activity: None   Other Topics Concern    None   Social History Narrative    None       SCREENINGS             PHYSICAL EXAM    (up to 7 for level 4, 8 or more for level 5)     ED Triage Vitals [19 1014]   BP Temp Temp src Pulse Resp SpO2 Height Weight   (!) 145/88 96.7 °F (35.9 °C) -- 62 15 98 % 5' 8\" (1.727 m) 130 lb (59 kg)       Physical Exam   Constitutional: She is oriented to person, place, and time. She appears well-developed and well-nourished. No distress. HENT:   Head: Normocephalic and atraumatic. Right Ear: External ear normal.   Left Ear: External ear normal.   Eyes: Pupils are equal, round, and reactive to light. Conjunctivae and EOM are normal. Right eye exhibits no discharge. Left eye exhibits no discharge. Neck: Normal range of motion. Neck supple. No JVD present. Cardiovascular: Normal rate and regular rhythm. Exam reveals no friction rub. No murmur heard. Pulmonary/Chest: Effort normal and breath sounds normal. No stridor. No respiratory distress. She has no wheezes. Abdominal: Soft. She exhibits no mass. There is no tenderness. Musculoskeletal: Normal range of motion. Neurological: She is alert and oriented to person, place, and time. She has normal strength. No cranial nerve deficit or sensory deficit. Coordination normal.   Skin: Skin is warm and dry. She is not diaphoretic. No pallor. Psychiatric: She has a normal mood and affect. Her behavior is normal.   Nursing note and vitals reviewed.       DIAGNOSTIC RESULTS   LABS:    Labs Reviewed   CBC WITH AUTO DIFFERENTIAL - Abnormal; Notable for the following components:       Result Value    Neutrophils # 7.8 (*)     Lymphocytes # 0.6 (*)     All other components within normal limits    Narrative:     Performed at:  [facility] [address]   Phone [phone]   COMPREHENSIVE METABOLIC PANEL - Abnormal; Notable for the following components:    Sodium 135 (*)     Chloride 93 (*)     Glucose 133 (*)     All other components within normal limits    Narrative:     Performed at:  [facility] [address]   Phone [phone]   BRAIN NATRIURETIC PEPTIDE - Abnormal; Notable for the following components:    Pro- (*)     All other components within normal limits    Narrative:     Performed at:  [facility] [address] [employer] [employer]   Phone [phone]   CULTURE BLOOD #2   CULTURE BLOOD #1   LACTATE, SEPSIS    Narrative:     Performed at:  [facility] [address] [employer]   Phone [phone]   URINE RT REFLEX TO CULTURE    Narrative:     Performed at:  [facility] [address]   Phone [phone]   TROPONIN    Narrative:     Performed at:  [facility] [address] [place] [employer]   Phone [phone] LACTATE, SEPSIS       All other labs were within normal range or not returned as of this dictation. EKG: All EKG's are interpreted by the Emergency Department Physician who either signs orCo-signs this chart in the absence of a cardiologist.  Please see their note for interpretation of EKG. RADIOLOGY:   Non-plain film images such as CT, Ultrasound and MRI are read by the radiologist. Raquel Leiva radiographic images are visualized andpreliminarily interpreted by the  ED Provider with the below findings:        Interpretation perthe Radiologist below, if available at the time of this note:    CT Head WO Contrast   Final Result   No acute intracranial abnormality. Diffuse atrophic changes with findings suggesting chronic microvascular   ischemia         XR CHEST STANDARD (2 VW)   Final Result   Chronic left basilar airspace disease and pleural effusion. No acute process. Ct Head Wo Contrast    Result Date: 5/8/2019  EXAMINATION: CT OF THE HEAD WITHOUT CONTRAST  5/8/2019 11:27 am TECHNIQUE: CT of the head was performed without the administration of intravenous contrast. Dose modulation, iterative reconstruction, and/or weight based adjustment of the mA/kV was utilized to reduce the radiation dose to as low as reasonably achievable. COMPARISON: 12/17/2018 HISTORY: ORDERING SYSTEM PROVIDED HISTORY: CONFUSION/DELIRIUM, ALTERED LOC, UNEXPLAINED TECHNOLOGIST PROVIDED HISTORY: If patient is on cardiac monitor and/or pulse ox, they may be taken off cardiac monitor and pulse ox, left on O2 if currently on. All monitors reattached when patient returns to room. Has a \"code stroke\" or \"stroke alert\" been called? ->No Ordering Physician Provided Reason for Exam: Confusion/delirium, altered loc, unexplained Acuity: Unknown Type of Exam: Unknown FINDINGS: BRAIN/VENTRICLES: The ventricles and sulci are diffusely enlarged. Low attenuation is seen in the periventricular and subcortical white matter.   No acute intracranial hemorrhage or acute infarct is identified. ORBITS: The visualized portion of the orbits demonstrate no acute abnormality. SINUSES: The visualized paranasal sinuses and mastoid air cells demonstrate no acute abnormality. SOFT TISSUES/SKULL:  No acute abnormality of the visualized skull or soft tissues. No acute intracranial abnormality. Diffuse atrophic changes with findings suggesting chronic microvascular ischemia          PROCEDURES   Unless otherwise noted below, none     Procedures    CRITICAL CARE TIME   N/A    CONSULTS:  None      EMERGENCY DEPARTMENT COURSE and DIFFERENTIALDIAGNOSIS/MDM:   Vitals:    Vitals:    05/08/19 1014   BP: (!) 145/88   Pulse: 62   Resp: 15   Temp: 96.7 °F (35.9 °C)   SpO2: 98%   Weight: 130 lb (59 kg)   Height: 5' 8\" (1.727 m)       Patient was given thefollowing medications:  Medications   0.9 % sodium chloride bolus (has no administration in time range)   aspirin tablet 325 mg (has no administration in time range)       Briefly, this is a 75year old female that presents to the emergency department complaining of some confusion. Patient's concerned that she may have a urinary tract infection other she denies burning, hesitancy, frequency or urgency. She reports history of stroke ×3, denies any new focal weakness, numbness or tingling. She walks without a cane or a walker although at baseline she hold onto the wall and is wobbly per patient report. She denies double vision or any difficulty seeing. She is concerned about dizziness that is not generally present. States that the dizziness is present at rest.     Lactate 0.8. CBC is unremarkable. CMP unremarkable. UA shows no infection. Troponin negative. . CT head without contrast:  Impression:    No acute intracranial abnormality. Diffuse atrophic changes with findings suggesting chronic microvascular  ischemia        Impression:    Chronic left basilar airspace disease and pleural effusion.  No acute process. Patient will be admitted for stroke rule out with dizziness and history of previous strokes. Hospitalist consult for admission this patient. The patient's  regarding plan of care. She was given aspirin in the ER. FINAL IMPRESSION      1. Dizziness          DISPOSITION/PLAN   DISPOSITION Decision To Admit 05/08/2019 12:21:46 PM      PATIENT REFERREDTO:  No follow-up provider specified.     DISCHARGE MEDICATIONS:  New Prescriptions    No medications on file       DISCONTINUED MEDICATIONS:  Discontinued Medications    No medications on file              (Please note that portions ofthis note were completed with a voice recognition program.  Efforts were made to edit the dictations but occasionally words are mis-transcribed.)    QUITA Corral CNP (electronically signed)           QUITA Corral CNP  05/08/19 0074

## 2019-05-08 NOTE — H&P
Hospital Medicine History & Physical      PCP: Angela Guillen MD    Date of Admission: 5/8/2019    Date of Service: Pt seen/examined on 5/8/19  and  Placed in Observation. Chief Complaint:  dizziness      History Of Present Illness:      75 y.o. female with h/o CAD, multiple strokes and TIA's-s/p ILR placement in 2018, COPD, depression, htn, hypothyroidism has been getting confused lately along with memory impairment for few months. She lives at home with daughter and . She noticed her urine has strong odor and her daughter suggested to get to hospital for eval. Denies any fever or abd  Pain or dysuria. Has chronic dizziness that happens while she stands up too quickly. No recent illness like diarrhea or URI. No chest pain or SOB. Past Medical History:          Diagnosis Date    Alcohol abuse     Anxiety     CAD in native artery     Cerebral artery occlusion with cerebral infarction (HCC)     states hx of multiple mini strokes    COPD (chronic obstructive pulmonary disease) (HCC)     Depression     D[facility] lower extremity ([facility]     Hypercholesteremia     Hypertension     Hyperthyroidism     Mammogram abnormal 2/7/2012    Neuromuscular disorder ([facility] [address]     Pneumonia     Thyroid disease     Tobacco abuse        Past Surgical History:          Procedure Laterality Date    COLONOSCOPY  1/19/2012    Dr. Carlton Tyson; repeat 5 years    EYE SURGERY      cateracts removed    SHOULDER ARTHROSCOPY Right 6/18/14    SUBACROMIAL DECOMPRESSION, ROTATOR CUFF REPAIR    TOE SURGERY Right     TONSILLECTOMY         Medications Prior to Admission:      Prior to Admission medications    Medication Sig Start Date End Date Taking?  Authorizing Provider   aspirin 81 MG chewable tablet CHEW ONE TABLET BY MOUTH DAILY 4/18/19  Yes Lore Lin MD   ipratropium (ATROVENT) 0.02 % nebulizer solution Take 2.5 mLs by nebulization 3 times daily 3/30/19  Yes Debbi Banks MD   escitalopram (LEXAPRO) 10 MG tablet Take 1 tablet by mouth daily 3/19/19  Yes Polo Andres MD   clonazePAM [facility].) 0.5 MG tablet Take 1 tablet by mouth nightly as needed (insomnia). . 2/11/19 2/11/20 Yes Polo Andres MD   ondansetron (ZOFRAN-ODT) 8 MG TBDP disintegrating tablet Take 1 tablet by mouth every 8 hours as needed for Nausea May Sub regular tablet (non-ODT) if insurance does not cover ODT. 2/11/19  Yes Polo Andres MD   mirtazapine (REMERON) 15 MG tablet Take 1 tablet by mouth nightly 12/1/18  Yes Polo Andres MD   budesonide-formoterol [facility]) 160-4.5 MCG/ACT AERO Inhale 2 puffs into the lungs 2 times daily 4/21/17  Yes Funmi Parr MD   cloNIDine (CATAPRES) 0.3 MG tablet TAKE ONE TABLET BY MOUTH THREE TIMES A DAY 4/18/19   Polo Andres MD   escitalopram (LEXAPRO) 10 MG tablet TAKE ONE TABLET BY MOUTH DAILY 4/18/19   Polo Andres MD   benzonatate (TESSALON) 100 MG capsule Take 1 capsule by mouth 3 times daily as needed for Cough 3/30/19   Aniket Dougherty MD   furosemide (LASIX) 40 MG tablet Take 1 tablet by mouth daily 3/30/19   Aniket Dougherty MD   labetalol (NORMODYNE) 100 MG tablet Take 1 tablet by mouth every 12 hours 1/29/19   Polo Andres MD   calcium elemental (OSCAL) 500 MG TABS tablet Take 1 tablet by mouth daily 7/24/18   Polo Andres MD   oxybutynin (DITROPAN) 5 MG tablet TAKE ONE TABLET BY MOUTH TWICE A DAY 7/24/18   Polo Andres MD   atorvastatin (LIPITOR) 80 MG tablet Take 1 tablet by mouth daily 7/24/18   Polo Andres MD   levothyroxine (SYNTHROID) 100 MCG tablet TAKE ONE TABLET BY MOUTH DAILY 7/24/18   Polo Andres MD   zoster recombinant adjuvanted vaccine [facility]) 50 MCG SUSR injection . 5mL IM X 1 followed by . 5mL IM 2-6 months after dose #1 7/24/18   Polo Andres MD   lisinopril (PRINIVIL;ZESTRIL) 40 MG tablet Take 1 tablet by mouth daily 6/20/18   Mirna Marin MD   Multiple Vitamins-Minerals (CENTRUM SILVER ADULT 50+ PO) Take 1 tablet by mouth daily     Historical Provider, MD       Allergies:  Lipitor [atorvastatin]; Morphine; Keflex [cephalexin]; Hctz [hydrochlorothiazide]; Norvasc [amlodipine besylate]; Penicillins; Tramadol; Hydralazine; and Shellfish-derived products    Social History:      The patient currently lives with family     TOBACCO:   reports that she has been smoking cigarettes. She has a 26.00 pack-year smoking history. She has never used smokeless tobacco.  ETOH:   reports that she does not drink alcohol. Family History:       Reviewed in detail and negative for DM, CAD, Cancer, CVA. Positive as follows:        Problem Relation Age of Onset    Heart Disease Father         MI @ 64    Alcohol Abuse Father        REVIEW OF SYSTEMS:   Pertinent positives as noted in the HPI. All other systems reviewed and negative. PHYSICAL EXAM PERFORMED:    BP (!) 157/82   Pulse 57   Temp 96.7 °F (35.9 °C)   Resp 15   Ht 5' 8\" (1.727 m)   Wt 130 lb (59 kg)   SpO2 99%   BMI 19.77 kg/m²     General appearance:  No apparent distress, appears stated age and cooperative. HEENT:  Normal cephalic, atraumatic without obvious deformity. Pupils equal, round, and reactive to light. Extra ocular muscles intact. Conjunctivae/corneas clear. Neck: Supple, with full range of motion. No jugular venous distention. Trachea midline. Respiratory:  Normal respiratory effort. Clear to auscultation, bilaterally without Rales/Wheezes/Rhonchi. Cardiovascular:  Regular rate and rhythm with normal S1/S2 without murmurs, rubs or gallops. Abdomen: Soft, non-tender, non-distended with normal bowel sounds. Musculoskeletal:  No clubbing, cyanosis or edema bilaterally. Full range of motion without deformity. Skin: Skin color, texture, turgor normal.  No rashes or lesions.   Neurologic:   ranial nerves: II-XII intact, left leg weakness- chronic from prior stroke. Psychiatric:  Alert and oriented to place and person but not time, thought content appropriate,memory gaps noted. Capillary Refill: Brisk,< 3 seconds   Peripheral Pulses: +2 palpable, equal bilaterally       Labs:     Recent Labs     05/08/19  1037   WBC 9.2   HGB 13.6   HCT 39.5        Recent Labs     05/08/19  1037   *   K 3.8   CL 93*   CO2 29   BUN 9   CREATININE 0.6   CALCIUM 9.2     Recent Labs     05/08/19  1037   AST 24   ALT 17   BILITOT 0.5   ALKPHOS 52     No results for input(s): INR in the last 72 hours. Recent Labs     05/08/19  1037   TROPONINI <0.01       Urinalysis:      Lab Results   Component Value Date    NITRU Negative 05/08/2019    WBCUA 2 03/28/2019    BACTERIA 1+ 02/10/2019    RBCUA 1 03/28/2019    BLOODU Negative 05/08/2019    SPECGRAV <1.005 05/08/2019    GLUCOSEU Negative 05/08/2019    GLUCOSEU NEGATIVE 05/08/2011       Radiology:   EKG:  I have reviewed the EKG with the following interpretation: NSR     CT Head WO Contrast   Final Result   No acute intracranial abnormality. Diffuse atrophic changes with findings suggesting chronic microvascular   ischemia         XR CHEST STANDARD (2 VW)   Final Result   Chronic left basilar airspace disease and pleural effusion. No acute process. ASSESSMENT:    Active Hospital Problems    Diagnosis Date Noted    Physical deconditioning [R53.81] 05/08/2019         PLAN:    Deconditioning    Memory impairment: expect from prior stroke and possibly vascular dementia. Get neuro on board. CT head non acute. Recent TSH ok. Check folate . H/o CVA-multiple: ILR interrogations showed AF -33 events so far. Start on eliquis . Recent echo reviewed. Get EP on board. CAD: stable    COPD: stable. Cont home inhalers    Hypothyroidism: synthroid    Htn: controlled     DVT Prophylaxis: eliquis  Diet: low salt diet  Code Status: full     PT/OT Eval Status: order     Dispo - medical floor. chari Moya De Rubio MD    Thank you Eloy Grant MD for the opportunity to be involved in this patient's care. If you have any questions or concerns please feel free to contact me at [phone].

## 2019-05-09 VITALS
OXYGEN SATURATION: 94 % | HEART RATE: 58 BPM | TEMPERATURE: 97 F | WEIGHT: 126.25 LBS | RESPIRATION RATE: 20 BRPM | HEIGHT: 67 IN | SYSTOLIC BLOOD PRESSURE: 144 MMHG | BODY MASS INDEX: 19.82 KG/M2 | DIASTOLIC BLOOD PRESSURE: 84 MMHG

## 2019-05-09 LAB
EKG ATRIAL RATE: 61 BPM
EKG DIAGNOSIS: NORMAL
EKG P AXIS: 55 DEGREES
EKG P-R INTERVAL: 140 MS
EKG Q-T INTERVAL: 450 MS
EKG QRS DURATION: 76 MS
EKG QTC CALCULATION (BAZETT): 453 MS
EKG R AXIS: 31 DEGREES
EKG T AXIS: 49 DEGREES
EKG VENTRICULAR RATE: 61 BPM

## 2019-05-09 PROCEDURE — 6370000000 HC RX 637 (ALT 250 FOR IP): Performed by: INTERNAL MEDICINE

## 2019-05-09 PROCEDURE — 93010 ELECTROCARDIOGRAM REPORT: CPT | Performed by: INTERNAL MEDICINE

## 2019-05-09 PROCEDURE — 97116 GAIT TRAINING THERAPY: CPT

## 2019-05-09 PROCEDURE — 2580000003 HC RX 258: Performed by: INTERNAL MEDICINE

## 2019-05-09 PROCEDURE — G0378 HOSPITAL OBSERVATION PER HR: HCPCS

## 2019-05-09 PROCEDURE — 97535 SELF CARE MNGMENT TRAINING: CPT

## 2019-05-09 PROCEDURE — 94760 N-INVAS EAR/PLS OXIMETRY 1: CPT

## 2019-05-09 PROCEDURE — 6360000002 HC RX W HCPCS: Performed by: INTERNAL MEDICINE

## 2019-05-09 PROCEDURE — 97165 OT EVAL LOW COMPLEX 30 MIN: CPT

## 2019-05-09 PROCEDURE — 94640 AIRWAY INHALATION TREATMENT: CPT

## 2019-05-09 PROCEDURE — 97161 PT EVAL LOW COMPLEX 20 MIN: CPT

## 2019-05-09 RX ADMIN — MULTIPLE VITAMINS W/ MINERALS TAB 1 TABLET: TAB at 09:10

## 2019-05-09 RX ADMIN — APIXABAN 5 MG: 5 TABLET, FILM COATED ORAL at 09:10

## 2019-05-09 RX ADMIN — LABETALOL HYDROCHLORIDE 100 MG: 200 TABLET, FILM COATED ORAL at 09:10

## 2019-05-09 RX ADMIN — OXYBUTYNIN CHLORIDE 5 MG: 5 TABLET ORAL at 09:10

## 2019-05-09 RX ADMIN — ESCITALOPRAM OXALATE 10 MG: 10 TABLET ORAL at 09:10

## 2019-05-09 RX ADMIN — IPRATROPIUM BROMIDE 0.5 MG: 0.5 SOLUTION RESPIRATORY (INHALATION) at 09:35

## 2019-05-09 RX ADMIN — LISINOPRIL 40 MG: 20 TABLET ORAL at 09:10

## 2019-05-09 RX ADMIN — CLONIDINE HYDROCHLORIDE 0.3 MG: 0.3 TABLET ORAL at 15:19

## 2019-05-09 RX ADMIN — CLONIDINE HYDROCHLORIDE 0.3 MG: 0.3 TABLET ORAL at 09:10

## 2019-05-09 RX ADMIN — FUROSEMIDE 40 MG: 40 TABLET ORAL at 09:10

## 2019-05-09 RX ADMIN — LEVOTHYROXINE SODIUM 100 MCG: 100 TABLET ORAL at 05:09

## 2019-05-09 RX ADMIN — CALCIUM 500 MG: 500 TABLET ORAL at 09:10

## 2019-05-09 RX ADMIN — Medication 2 PUFF: at 09:34

## 2019-05-09 RX ADMIN — Medication 10 ML: at 09:11

## 2019-05-09 ASSESSMENT — PAIN SCALES - GENERAL
PAINLEVEL_OUTOF10: 0
PAINLEVEL_OUTOF10: 0

## 2019-05-09 NOTE — PROGRESS NOTES
Occupational Therapy   Occupational Therapy Initial Assessment  Date: 2019   Patient Name: Rivas Mcconnell  MRN: [id number]     : 1945    Date of Service: 2019    Discharge Recommendations:  Outpatient OT per patient request  OT Equipment Recommendations  Equipment Needed: No   Rivas Mcconnell scored a 21/24 on the [address] form. Current research shows that an AM-PAC score of 18 or greater is typically associated with a discharge to the patient's home setting. Based on the patients AM-PAC score and their current ADL deficits, it is recommended that the patient have 2-3 sessions per week of Occupational Therapy at d/c to increase the patients independence. Assessment   Performance deficits / Impairments: Decreased functional mobility ; Decreased cognition;Decreased high-level IADLs;Decreased ADL status; Decreased ROM; Decreased fine motor control;Decreased strength;Decreased balance;Decreased coordination;Decreased safe awareness  Treatment Diagnosis: decreased independence with ADL due to late affects of multiple CVA, dementia  Prognosis: Good  Decision Making: Low Complexity  Exam: rom, home environment, cognition, ADL  Assistance / Modification: supervision/ stand by assistance  Patient Education: OT eval, plan of care, DC planning   Barriers to Learning: memory  REQUIRES OT FOLLOW UP: Yes  Activity Tolerance  Activity Tolerance: Patient Tolerated treatment well  Safety Devices  Safety Devices in place: Yes  Type of devices: All fall risk precautions in place; Patient at risk for falls;Call light within reach; Left in chair;Chair alarm in place;Nurse notified           Patient Diagnosis(es): The primary encounter diagnosis was Dizziness. A diagnosis of History of stroke was also pertinent to this visit.      has a past medical history of Alcohol abuse, Anxiety, CAD in native artery, Cerebral artery occlusion with cerebral infarction [facility]), COPD (chronic obstructive pulmonary disease) ([address] Depression, DVT, lower extremity ([address] Hypercholesteremia, Hypertension, Hyperthyroidism, Mammogram abnormal, Neuromuscular disorder ([address] Pneumonia, Thyroid disease, and Tobacco abuse.   has a past surgical history that includes Tonsillectomy; Colonoscopy (1/19/2012); Toe Surgery (Right); Shoulder arthroscopy (Right, 6/18/14); and eye surgery. Treatment Diagnosis: decreased independence with ADL due to late affects of multiple CVA, dementia      Restrictions       Subjective   General  Chart Reviewed: Yes  Additional Pertinent Hx: 75 y.o. female with h/o CAD, multiple strokes and TIA's-s/p ILR placement in 2018, COPD, depression, htn, hypothyroidism has been getting confused lately along with memory impairment for few months. She lives at home with daughter and . She noticed her urine has strong odor and her daughter suggested to get to hospital for eval. Denies any fever or abd  Pain or dysuria. Has chronic dizziness that happens while she stands up too quickly. No recent illness like diarrhea or URI. No chest pain or SOB. General Comment  Comments: Griffin Crump is a 75 y.o. female presents to the emergency department complaining of some confusion. Patient's concerned that she may have a urinary tract infection other she denies burning, hesitancy, frequency or urgency. She reports history of stroke ×3, denies any new focal weakness, numbness or tingling. She walks without a cane or a walker although at baseline she hold onto the wall and is wobbly per patient report. She denies double vision or any difficulty seeing. She is concerned about dizziness that is not generally present.   States that the dizziness is present at rest.   Pain Assessment  Pain Assessment: 0-10  Pain Level: 0  Social/Functional History  Social/Functional History  Lives With: Spouse(daughter and two great grand children, 14/15 )  Type of Home: House  Home Layout: Multi-level(tri level)  Home Access: Stairs to enter description: Left UE;Decreased accuracy; Decreased speed     Bed mobility  Supine to Sit: Supervision  Sit to Supine: Supervision  Transfers  Sit to stand: Supervision  Stand to sit: Supervision  Vision - Basic Assessment  Visual Field Cut: Left  Cognition  Overall Cognitive Status: Exceptions  Arousal/Alertness: Appropriate responses to stimuli  Following Commands:  Follows all commands without difficulty  Attention Span: Appears intact  Memory: Decreased recall of recent events  Safety Judgement: Decreased awareness of need for safety  Problem Solving: Assistance required to generate solutions  Insights: Decreased awareness of deficits  Initiation: Does not require cues  Sequencing: Does not require cues  Perception  Overall Perceptual Status: Impaired  Unilateral Attention: Cues to attend to left side of body     Sensation  Overall Sensation Status: WFL        LUE AROM (degrees)  LUE General AROM: limited shoulder flexion to 130 degrees  LUE Strength  LUE Strength Comment: 4- left ue                   Plan   Plan  Times per week: 3-5  Times per day: Daily  Current Treatment Recommendations: Safety Education & Training, Self-Care / ADL    G-Code     OutComes Score                                                  AM-PAC Score        AM-[facility] Inpatient Daily Activity Raw Score: 21  AM-PAC Inpatient ADL T-Scale Score : 44.27  ADL Inpatient CMS 0-100% Score: 32.79  ADL Inpatient CMS G-Code Modifier : CJ    Goals  Short term goals  Short term goal 1: independent functional mobility  Short term goal 2: independent simple meal prep  Short term goal 3: modified independent toileting  Short term goal 4: modified independent bathing and dressing  Short term goal 5: increase am pac score to 23  Long term goals  Long term goal 1: supervision with left ue home exc program to increase scapular stability and shoulder rom  Patient Goals   Patient goals : patient's goal is to improve balance       Therapy Time   Individual Concurrent Group Co-treatment   Time In 0810         Time Out 0850         Minutes 40         Timed Code Treatment Minutes: [address] Arturo Davidson, OT

## 2019-05-09 NOTE — PROGRESS NOTES
Clinical pharmacy note: Eliquis vx. [facility] consulted to run insurance benefits for Eliquis for the patient Kendall Schreiber who is a 75 y.o. female . COST  Cost is $170.53/month for both Eliquis or Xarelto based on patient's current benefit information. Patient is eligible for Eliquis free 30day trial card which can be provided by outpatient pharmacy with an active prescription. If patient cannot afford further payments beyond 30 days, would recommend initiation of warfarin. EDUCATION  Eliquis handout provided. Discussed indication, dose/route/frequency/duration, side effects and methods to prevent/minimize side effects. Discussed signs and symptoms of DVT and PE. The patient verbalized/repeated back an understanding. There were no barriers to the education process.     Anaid Long, PharmD  [id number]

## 2019-05-09 NOTE — PLAN OF CARE
Problem: Falls - Risk of:  Goal: Will remain free from falls  Description  Will remain free from falls  Outcome: Ongoing  Note:   Fall precautions in place, call light and belongings in reach, bed in lowest position, wheels locked in place, side rails up x 2, walkways free of clutter       Problem: Risk for Impaired Skin Integrity  Goal: Tissue integrity - skin and mucous membranes  Description  Structural intactness and normal physiological function of skin and  mucous membranes.   Outcome: Ongoing

## 2019-05-09 NOTE — PROGRESS NOTES
Physical Therapy    Facility/Department: [facility]  Initial Assessment    NAME: Marisa Wen  :   MRN: [id number]    Date of Service: 2019    Discharge Recommendations: Marisa Wen scored a 21/24 on the AM-PAC short mobility form. Current research shows that an AM-PAC score of 18 or greater is typically associated with a discharge to the patient's home setting. Based on the patients AM-PAC score and their current functional mobility deficits, it is recommended that the patient have 2-3 sessions per week of Physical Therapy at d/c to increase the patients independence. Pt would benefit from OP PT to address balance deficits with dynamic functional mobility. PT Equipment Recommendations  Equipment Needed: No(pt has cane at home)    Assessment   Body structures, Functions, Activity limitations: Decreased balance;Decreased endurance  Assessment: Pt presents slightly below baseline functional mobility with impaired endurance and decreased dynamic standing balance. Pt would benefit from acute PT to address deficits. Treatment Diagnosis: impaired gait, transfers, and balance  Prognosis: Good  Decision Making: Low Complexity  Clinical Presentation: stable  Patient Education: PT POC and long, d/c recommendations, use of cane at home for balance  Barriers to Learning: none  REQUIRES PT FOLLOW UP: Yes  Activity Tolerance  Activity Tolerance: Patient Tolerated treatment well       Patient Diagnosis(es): The primary encounter diagnosis was Dizziness. Diagnoses of History of stroke and Physical deconditioning were also pertinent to this visit.      has a past medical history of Alcohol abuse, Anxiety, CAD in native artery, Cerebral artery occlusion with cerebral infarction ([address] COPD (chronic obstructive pulmonary disease) ([address] Depression, DVT, lower extremity ([address] Hypercholesteremia, Hypertension, Hyperthyroidism, Mammogram abnormal, Neuromuscular disorder ([address] Pneumonia, Thyroid disease, and Tobacco abuse.   has a past surgical history that includes Tonsillectomy; Colonoscopy (1/19/2012); Toe Surgery (Right); Shoulder arthroscopy (Right, 6/18/14); and eye surgery. Restrictions  Restrictions/Precautions  Restrictions/Precautions: Fall Risk(HIGH FALL RISK)  Position Activity Restriction  Other position/activity restrictions: Nila Blair is a 75 y.o. female presents to the emergency department complaining of some confusion. Patient's concerned that she may have a urinary tract infection other she denies burning, hesitancy, frequency or urgency. She reports history of stroke ×3, denies any new focal weakness, numbness or tingling. She walks without a cane or a walker although at baseline she hold onto the wall and is wobbly per patient report. She denies double vision or any difficulty seeing. She is concerned about dizziness that is not generally present. States that the dizziness is present at rest.     Vision/Hearing  Vision: Impaired  Vision Exceptions: Wears glasses for reading  Hearing: Within functional limits       Subjective  General  Chart Reviewed: Yes  Response To Previous Treatment: Not applicable  Family / Caregiver Present: No  Diagnosis: Physical deconditioning  Follows Commands: Within Functional Limits  General Comment  Comments: Pt supine in bed upon arrival with bed alarm off. Pt agreeable to PT eval.  Subjective  Subjective: Pt denying pain or dizziness at this time.    Pain Screening  Patient Currently in Pain: Denies  Vital Signs  Patient Currently in Pain: Denies       Orientation  Orientation  Overall Orientation Status: Within Functional Limits     Social/Functional History  Social/Functional History  Lives With: Spouse(daughter and two great grand children, 14/15 )  Type of Home: House  Home Layout: Multi-level(tri level)  Home Access: Stairs to enter without rails  Entrance Stairs - Number of Steps: 1  Bathroom Shower/Tub: Walk-in shower, Shower chair without back  Bathroom Toilet: Standard  Bathroom Accessibility: Walker accessible  Home Equipment: Cane(normally ambulates without a device)  Receives Help From: Family(laundry, cooking, cleaning)  ADL Assistance: Independent  Homemaking Responsibilities: No(sometimes she will dust)  Ambulation Assistance: Independent  Transfer Assistance: Independent  Active : No(pt. made choice to stop driving)  Patient's  Info: Family(patient's whole family drives her )  Leisure & Hobbies: used to love to garden however she has not been able to due to balance and weakness  Additional Comments: no falls in the past three months, she has had near falls, patient has two dogs and a cat    Objective  Observation/Palpation  Posture: Fair  Observation: mild kyphosis    AROM RLE (degrees)  RLE AROM: WFL  AROM LLE (degrees)  LLE AROM : WFL  Strength RLE  Strength RLE: WFL  Comment: grossly 4/5  Strength LLE  Strength LLE: WFL  Comment: grossly 4/5  Tone RLE  RLE Tone: Normotonic  Tone LLE  LLE Tone: Normotonic  Motor Control  Gross Motor?: WFL  Sensation  Overall Sensation Status: WFL     Bed mobility  Supine to Sit: Supervision  Sit to Supine: Supervision  Transfers  Sit to Stand: Supervision  Stand to sit: Supervision  Comment: x1 EOB, x1 toilet with grab bar  Ambulation  Ambulation?: Yes  Ambulation 1  Surface: level tile  Device: No Device  Assistance: Contact guard assistance;Stand by assistance  Quality of Gait: narrow Will, slight scissoring of gait noted, decreased B foot clearance  Distance: 300'  Comments: Pt able to negotiate obstacles without assist. Pt with 1 slight LOB requiring CGA/Min A to correct. Pt intermittently reaching for muir rail to assist with balance. Stairs/Curb  Stairs?: Yes  Stairs  # Steps : 6  Stairs Height: 6\"  Rails: Bilateral  Device: No Device  Assistance: Stand by assistance  Comment: Negotiated with reciprocal gait pattern. No LOB.       Balance  Posture: Good  Sitting - Static: Good  Sitting -

## 2019-05-09 NOTE — DISCHARGE SUMMARY
Hospital Medicine Discharge Summary    Patient ID: Rivas Mcconnell      Patient's PCP: Soha Mae MD    Admit Date: 5/8/2019     Discharge Date:   5/9/19     Admitting Physician: Inez Montelongo MD     Discharge Physician: Inez Montelongo MD     Discharge Diagnoses: Active Hospital Problems    Diagnosis Date Noted    Physical deconditioning [R53.81] 05/08/2019    PAF (paroxysmal atrial fibrillation) (HCC) [I48.0] 05/08/2019    Vascular dementia, uncomplicated [N22.41]     Chronic ischemic multifocal multiple vascular territories stroke [I69.30] 04/20/2018       The patient was seen and examined on day of discharge and this discharge summary is in conjunction with any daily progress note from day of discharge. History Of Present Illness:       75 y.o. female with h/o CAD, multiple strokes and TIA's-s/p ILR placement in 2018, COPD, depression, htn, hypothyroidism has been getting confused lately along with memory impairment for few months. She lives at home with daughter and . She noticed her urine has strong odor and her daughter suggested to get to hospital for eval. Denies any fever or abd  Pain or dysuria. Has chronic dizziness that happens while she stands up too quickly. No recent illness like diarrhea or URI. No chest pain or SOB.            Hospital Course:     Deconditioning: ptot consulted- home      Memory impairment: expected from prior stroke and possibly vascular dementia. neuro consulted. CT head non acute. Recent TSH ok.  folate  Wnl. Advised f/u with neurology as needed       H/o CVA-multiple: prior ILR interrogations showed AF -33 events so far. Started on eliquis . Recent echo reviewed.    EP consulted- agreed with plan.      CAD: stable     COPD: stable. home inhalers continued.      Hypothyroidism: synthroid     Htn: controlled     Physical Exam Performed:     BP (!) 157/95   Pulse 71   Temp 97.1 °F (36.2 °C) (Temporal)   Resp 20   Ht 5' 7\" pleural effusion. No acute process. Consults:     IP CONSULT TO HOSPITALIST  IP CONSULT TO CARDIOLOGY  IP CONSULT TO NEUROLOGY  PHARMACY TO DOSE MEDICATION    Disposition:  Home      Condition at Discharge: Stable    Discharge Instructions/Follow-up:  PCP in 1 week     Code Status:  Full Code     Activity: activity as tolerated    Diet: regular diet      Discharge Medications:     Current Discharge Medication List           Details   apixaban (ELIQUIS) 5 MG TABS tablet Take 1 tablet by mouth 2 times daily  Qty: 60 tablet, Refills: 2              Details   cloNIDine (CATAPRES) 0.3 MG tablet TAKE ONE TABLET BY MOUTH THREE TIMES A DAY  Qty: 90 tablet, Refills: 0      !! escitalopram (LEXAPRO) 10 MG tablet TAKE ONE TABLET BY MOUTH DAILY  Qty: 30 tablet, Refills: 1      aspirin 81 MG chewable tablet CHEW ONE TABLET BY MOUTH DAILY  Qty: 30 tablet, Refills: 2      ipratropium (ATROVENT) 0.02 % nebulizer solution Take 2.5 mLs by nebulization 3 times daily  Qty: 360 mL, Refills: 0      !! escitalopram (LEXAPRO) 10 MG tablet Take 1 tablet by mouth daily  Qty: 90 tablet, Refills: 0    Comments: NEEDS AN APPOINTMENT,PLEASE CALL OFFICE. clonazePAM (KLONOPIN) 0.5 MG tablet Take 1 tablet by mouth nightly as needed (insomnia). Priscella Palin: 30 tablet, Refills: 2    Associated Diagnoses: Anxiety      ondansetron (ZOFRAN-ODT) 8 MG TBDP disintegrating tablet Take 1 tablet by mouth every 8 hours as needed for Nausea May Sub regular tablet (non-ODT) if insurance does not cover ODT.   Qty: 10 tablet, Refills: 0    Associated Diagnoses: Nausea      labetalol (NORMODYNE) 100 MG tablet Take 1 tablet by mouth every 12 hours  Qty: 60 tablet, Refills: 3      mirtazapine (REMERON) 15 MG tablet Take 1 tablet by mouth nightly  Qty: 30 tablet, Refills: 11    Associated Diagnoses: Depression, unspecified depression type      oxybutynin (DITROPAN) 5 MG tablet TAKE ONE TABLET BY MOUTH TWICE A DAY  Qty: 180 tablet, Refills: 3 atorvastatin (LIPITOR) 80 MG tablet Take 1 tablet by mouth daily  Qty: 90 tablet, Refills: 3      levothyroxine (SYNTHROID) 100 MCG tablet TAKE ONE TABLET BY MOUTH DAILY  Qty: 90 tablet, Refills: 3    Associated Diagnoses: Hypothyroidism, unspecified type      budesonide-formoterol (SYMBICORT) 160-4.5 MCG/ACT AERO Inhale 2 puffs into the lungs 2 times daily  Qty: 1 Inhaler, Refills: 2      Multiple Vitamins-Minerals (CENTRUM SILVER ADULT 50+ PO) Take 1 tablet by mouth daily       benzonatate (TESSALON) 100 MG capsule Take 1 capsule by mouth 3 times daily as needed for Cough  Qty: 30 capsule, Refills: 0      furosemide (LASIX) 40 MG tablet Take 1 tablet by mouth daily  Qty: 30 tablet, Refills: 0      calcium elemental (OSCAL) 500 MG TABS tablet Take 1 tablet by mouth daily  Qty: 30 tablet, Refills: 3      zoster recombinant adjuvanted vaccine (SHINGRIX) 50 MCG SUSR injection . 5mL IM X 1 followed by . 5mL IM 2-6 months after dose #1  Qty: 0.5 mL, Refills: 0    Associated Diagnoses: Need for vaccination      lisinopril (PRINIVIL;ZESTRIL) 40 MG tablet Take 1 tablet by mouth daily  Qty: 30 tablet, Refills: 3       !! - Potential duplicate medications found. Please discuss with provider. Time Spent on discharge is more than 30 minutes in the examination, evaluation, counseling and review of medications and discharge plan. Signed:    Maryellen Brannon MD   5/9/2019      Thank you Kira Blackmon MD for the opportunity to be involved in this patient's care. If you have any questions or concerns please feel free to contact me at [phone].

## 2019-05-09 NOTE — PROGRESS NOTES
CLINICAL PHARMACY NOTE: MEDS TO [address] [facility] Patient?: No  Total # of Prescriptions Filled: 1   The following medications were delivered to the patient:  · Eliquis  Total # of Interventions Completed: 1  Time Spent (min): 60    Additional Documentation:    Delivered to patient  Micah

## 2019-05-10 ENCOUNTER — TELEPHONE (OUTPATIENT)
Dept: FAMILY MEDICINE CLINIC | Age: 74
End: 2019-05-10

## 2019-05-10 NOTE — TELEPHONE ENCOUNTER
Pt is needing a hospital f/u and is requesting today or the next soonest The pt was admitted on 05/08/2019 for Chronic ischemic multifocal multiple vascular territories stroke and discharged on 05/09/2019. Per Rodney Zapata is also wanting to talk to provider about the clonazePAM (KLONOPIN) tablet 0.5 mg  as well. Benito Kennedy also wanted to provide her daughters number as well.     [phone]

## 2019-05-11 NOTE — PROGRESS NOTES
Occupational Therapy Discharge Summary    Name: Jacqueline Hinojosa  : 1945    The pt was evaluated by OT on 19 and seen for initial evaluation only, prior to DC home on 19, per MD order. The pt's acute therapy goals were:    Short term goals  Short term goal 1: independent functional mobility  Short term goal 2: independent simple meal prep  Short term goal 3: modified independent toileting  Short term goal 4: modified independent bathing and dressing  Short term goal 5: increase am pac score to 23  Long term goals  Long term goal 1: supervision with left ue home exc program to increase scapular stability and shoulder rom     Patient met 0 goals during stay. Number of Refusals:0  Number of Holds: 0    During this hospitalization, the patient was educated on:  Patient Education: BREANNA alves, plan of care, [address] [place] [address] pt from OT caseload at this time. Thank you!     Yeny Lackey, OTR/L, [id number]

## 2019-05-13 ENCOUNTER — OFFICE VISIT (OUTPATIENT)
Dept: FAMILY MEDICINE CLINIC | Age: 74
End: 2019-05-13
Payer: COMMERCIAL

## 2019-05-13 VITALS
SYSTOLIC BLOOD PRESSURE: 140 MMHG | BODY MASS INDEX: 20.11 KG/M2 | DIASTOLIC BLOOD PRESSURE: 86 MMHG | OXYGEN SATURATION: 98 % | HEART RATE: 81 BPM | WEIGHT: 128.4 LBS

## 2019-05-13 DIAGNOSIS — G47.09 OTHER INSOMNIA: ICD-10-CM

## 2019-05-13 DIAGNOSIS — I10 HYPERTENSION, UNSPECIFIED TYPE: ICD-10-CM

## 2019-05-13 DIAGNOSIS — R40.4 TRANSIENT ALTERATION OF AWARENESS: Primary | ICD-10-CM

## 2019-05-13 DIAGNOSIS — F41.9 ANXIETY: ICD-10-CM

## 2019-05-13 LAB
BLOOD CULTURE, ROUTINE: NORMAL
CULTURE, BLOOD 2: NORMAL

## 2019-05-13 PROCEDURE — 99214 OFFICE O/P EST MOD 30 MIN: CPT | Performed by: FAMILY MEDICINE

## 2019-05-13 RX ORDER — LABETALOL 100 MG/1
100 TABLET, FILM COATED ORAL EVERY 12 HOURS SCHEDULED
Qty: 60 TABLET | Refills: 3 | Status: ON HOLD | OUTPATIENT
Start: 2019-05-13 | End: 2019-07-26 | Stop reason: HOSPADM

## 2019-05-13 RX ORDER — CLONIDINE HYDROCHLORIDE 0.3 MG/1
TABLET ORAL
Qty: 90 TABLET | Refills: 5 | Status: ON HOLD | OUTPATIENT
Start: 2019-05-13 | End: 2019-07-26 | Stop reason: HOSPADM

## 2019-05-13 RX ORDER — CLONAZEPAM 0.5 MG/1
0.25 TABLET ORAL NIGHTLY PRN
Qty: 15 TABLET | Refills: 2 | Status: SHIPPED | OUTPATIENT
Start: 2019-05-13 | End: 2019-08-16 | Stop reason: SDUPTHER

## 2019-05-13 NOTE — PROGRESS NOTES
Patient is here for a hospital follow-up from [facility] for dizziness for 1 day. Patient states that she is no longer dizzy but wobbly and unbalanced.

## 2019-05-13 NOTE — PROGRESS NOTES
Subjective:      Patient ID: Héctor Madrigal is a 75 y.o. female. HPI    Here today for hospital follow up. Admitted 5/8/2019-5/9/2019 after presenting to the ER with increased confusion. Urine had a strong odor. She has a hx of UTIs in the past that present with these symptoms. In hospital, evaluation included CT head with no acute findings but significant atrophy. Thought to have vascular dementia by neurology. EP evaluated patient and ILR interrogations showed PAF. Begun on Eliquis and Plavix d/c. Requesting refill on clonazepam. Has been without it for days and is not able to sleep without it. Urine culture was not performed. Review of Systems   Constitutional: Negative for chills and fever. Genitourinary: Positive for frequency (chronic). Negative for dysuria.         Malodorous urine       Patient Active Problem List   Diagnosis    Incontinence    Hypothyroidism    Smoker    Anxiety    History of alcoholism ([address]    Hyperlipidemia    DIMAS (dyspnea on exertion)    Acute DVT (deep venous thrombosis) (HCC)    [facility]    COPD exacerbation (HCC)    Fatigue    General weakness    Centrilobular emphysema ([address]    Essential hypertension    Chronic fatigue    COPD, moderate ([address]    Encephalopathy    CAD in native artery    Osteopenia    Trapped lung    COPD exacerbation ([address]    Acute encephalopathy    Chronic ischemic multifocal multiple vascular territories stroke    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 (HCC)    Cardiac arrhythmia    Alcohol abuse counseling and surveillance    Cellulitis    Right forearm cellulitis    Superficial thrombophlebitis of right upper extremity    Severe infection    Tobacco abuse counseling    Therapeutic drug monitoring    Complex care coordination    Stroke-like symptoms    History of recent stroke    Depression    Unable to ambulate    Cerebrovascular accident (CVA) ([address]    Oropharyngeal dysphagia    HTN (hypertension), benign    Dyslipidemia    Encounter for electronic analysis of reveal event recorder    Acute on chronic diastolic heart failure (HCC)    Physical deconditioning    PAF (paroxysmal atrial fibrillation) (HCC)    Vascular dementia, uncomplicated       Outpatient Medications Marked as Taking for the 5/13/19 encounter (Office Visit) with Osmar Cormier MD   Medication Sig Dispense Refill    apixaban (ELIQUIS) 5 MG TABS tablet Take 1 tablet by mouth 2 times daily 60 tablet 2    cloNIDine (CATAPRES) 0.3 MG tablet TAKE ONE TABLET BY MOUTH THREE TIMES A DAY 90 tablet 0    aspirin 81 MG chewable tablet CHEW ONE TABLET BY MOUTH DAILY 30 tablet 2    ipratropium (ATROVENT) 0.02 % nebulizer solution Take 2.5 mLs by nebulization 3 times daily 360 mL 0    furosemide (LASIX) 40 MG tablet Take 1 tablet by mouth daily 30 tablet 0    clonazePAM (KLONOPIN) 0.5 MG tablet Take 1 tablet by mouth nightly as needed (insomnia). . 30 tablet 2    ondansetron (ZOFRAN-ODT) 8 MG TBDP disintegrating tablet Take 1 tablet by mouth every 8 hours as needed for Nausea May Sub regular tablet (non-ODT) if insurance does not cover ODT.  10 tablet 0    labetalol (NORMODYNE) 100 MG tablet Take 1 tablet by mouth every 12 hours 60 tablet 3    mirtazapine (REMERON) 15 MG tablet Take 1 tablet by mouth nightly 30 tablet 11    calcium elemental (OSCAL) 500 MG TABS tablet Take 1 tablet by mouth daily 30 tablet 3    oxybutynin (DITROPAN) 5 MG tablet TAKE ONE TABLET BY MOUTH TWICE A  tablet 3    atorvastatin (LIPITOR) 80 MG tablet Take 1 tablet by mouth daily 90 tablet 3    levothyroxine (SYNTHROID) 100 MCG tablet TAKE ONE TABLET BY MOUTH DAILY 90 tablet 3    budesonide-formoterol (SYMBICORT) 160-4.5 MCG/ACT AERO Inhale 2 puffs into the lungs 2 times daily 1 Inhaler 2    Multiple Vitamins-Minerals (CENTRUM SILVER ADULT 50+ PO) Take 1 tablet by mouth daily          Allergies   Allergen Reactions  Lipitor [Atorvastatin] Other (See Comments)     Weakness, severe    Morphine Shortness Of Breath    Keflex [Cephalexin] Other (See Comments)     SOB & bilateral arms shaking     Hctz [Hydrochlorothiazide] Other (See Comments)     Hyponatremia, severe      Norvasc [Amlodipine Besylate] Swelling     Of neck    Penicillins Hives    Tramadol Itching    Hydralazine Palpitations and Other (See Comments)     Dizzy,fatigue,headaches,palpitations,loss of appetite    Shellfish-Derived Products Swelling and Rash     Swelling of neck       Social History     Tobacco Use    Smoking status: Current Every Day Smoker     Packs/day: 0.50     Years: 52.00     Pack years: 26.00     Types: Cigarettes     Last attempt to quit: 2018     Years since quittin.8    Smokeless tobacco: Never Used    Tobacco comment: started smoking at age 26 / smoked up to 2 p,p,d / now smoking 4 to 8 cigarettes a day,   Substance Use Topics    Alcohol use: No     Alcohol/week: 0.0 oz     Comment: patient reports she is an alcoholic hasn't had anything to drink 3-4 months       Objective:   BP (!) 140/86 (Site: Right Upper Arm, Position: Sitting, Cuff Size: Medium Adult)   Pulse 81   Wt 128 lb 6.4 oz (58.2 kg)   SpO2 98%   BMI 20.11 kg/m²     Physical Exam   Constitutional: She appears well-developed and well-nourished. No distress. Cardiovascular: Normal rate, regular rhythm and normal heart sounds. No murmur heard. Pulmonary/Chest: Effort normal and breath sounds normal. No respiratory distress. She has no wheezes. She has no rales. Psychiatric: She has a normal mood and affect. Her behavior is normal.     Assessment:      Diagnosis Orders   1. Transient alteration of awareness  URINE CULTURE   2. Anxiety  clonazePAM (KLONOPIN) 0.5 MG tablet   3. Other insomnia     4. Hypertension, unspecified type  labetalol (NORMODYNE) 100 MG tablet    cloNIDine (CATAPRES) 0.3 MG tablet     Plan:     Urine culture ordered.   Clonazepam cut

## 2019-05-14 RX ORDER — FUROSEMIDE 40 MG/1
40 TABLET ORAL DAILY
Qty: 30 TABLET | Refills: 0 | Status: SHIPPED | OUTPATIENT
Start: 2019-05-14 | End: 2019-06-05 | Stop reason: SDUPTHER

## 2019-05-14 RX ORDER — POTASSIUM CHLORIDE 20 MEQ/1
20 TABLET, EXTENDED RELEASE ORAL DAILY
Qty: 30 TABLET | Refills: 0 | Status: SHIPPED | OUTPATIENT
Start: 2019-05-14 | End: 2019-06-11 | Stop reason: SDUPTHER

## 2019-05-17 LAB
ORGANISM: ABNORMAL
URINE CULTURE, ROUTINE: ABNORMAL
URINE CULTURE, ROUTINE: ABNORMAL

## 2019-06-04 ENCOUNTER — NURSE ONLY (OUTPATIENT)
Dept: CARDIOLOGY CLINIC | Age: 74
End: 2019-06-04
Payer: COMMERCIAL

## 2019-06-04 DIAGNOSIS — I63.9 CEREBROVASCULAR ACCIDENT (CVA), UNSPECIFIED MECHANISM (HCC): ICD-10-CM

## 2019-06-04 DIAGNOSIS — Z45.09 ENCOUNTER FOR ELECTRONIC ANALYSIS OF REVEAL EVENT RECORDER: ICD-10-CM

## 2019-06-04 DIAGNOSIS — I63.40 CEREBRAL INFARCTION DUE TO EMBOLISM OF CEREBRAL ARTERY (HCC): ICD-10-CM

## 2019-06-04 PROCEDURE — 93298 REM INTERROG DEV EVAL SCRMS: CPT | Performed by: INTERNAL MEDICINE

## 2019-06-04 PROCEDURE — 93299 PR REM INTERROG ICPMS/SCRMS <30 D TECH REVIEW: CPT | Performed by: INTERNAL MEDICINE

## 2019-06-04 NOTE — LETTER
[address] [facility] [phone]  [address] [facility] [phone]    Pacemaker/Defibrillator Clinic          06/04/19        [address]  [place] [address]        Dear Jo Meyer    This letter is to inform you that we received the transmission from your monitor at home that checks your pacemaker and/or defibrillator, or implanted heart monitor. Your report shows no abnormal rhythms. The next date your monitor will automatically transmit will be 7/16/19. Your device and monitor are wireless and most transmit cellularly, but please periodically check your monitor is still plugged in to the electrical outlet. If you still use the telephone land line to send please ensure the connection to the phone stephanie is secure. This will help to ensure successful automatic transmissions in the future. Also, the monitor needs to be close to you while sleeping at night. Please be aware that the remote device transmission sites are periodically monitored only during regular business hours during which simultaneous in-office device clinics are being run. If your transmission requires attention, we will contact you as soon as possible. Thank you.             [address]

## 2019-06-04 NOTE — PROGRESS NOTES
Carelink/loop recorder shows normal function. No arrhythmias noted. Implanted for stroke. See PACEART report under cardiology tab.

## 2019-06-06 RX ORDER — FUROSEMIDE 40 MG/1
TABLET ORAL
Qty: 30 TABLET | Refills: 1 | Status: ON HOLD | OUTPATIENT
Start: 2019-06-06 | End: 2019-08-06 | Stop reason: HOSPADM

## 2019-06-12 RX ORDER — POTASSIUM CHLORIDE 1500 MG/1
TABLET, FILM COATED, EXTENDED RELEASE ORAL
Qty: 30 TABLET | Refills: 0 | Status: SHIPPED | OUTPATIENT
Start: 2019-06-12 | End: 2019-07-05 | Stop reason: SDUPTHER

## 2019-06-17 ENCOUNTER — OFFICE VISIT (OUTPATIENT)
Dept: FAMILY MEDICINE CLINIC | Age: 74
End: 2019-06-17
Payer: COMMERCIAL

## 2019-06-17 VITALS
SYSTOLIC BLOOD PRESSURE: 120 MMHG | TEMPERATURE: 98.9 F | HEART RATE: 69 BPM | DIASTOLIC BLOOD PRESSURE: 90 MMHG | OXYGEN SATURATION: 98 % | WEIGHT: 129.8 LBS | BODY MASS INDEX: 20.33 KG/M2

## 2019-06-17 DIAGNOSIS — R35.0 FREQUENT URINATION: ICD-10-CM

## 2019-06-17 DIAGNOSIS — B37.0 THRUSH: Primary | ICD-10-CM

## 2019-06-17 LAB
BACTERIA URINE, POC: NEGATIVE
BILIRUBIN URINE: 0 MG/DL
BLOOD, URINE: POSITIVE
CASTS URINE, POC: NEGATIVE
CLARITY: CLEAR
COLOR: YELLOW
CRYSTALS URINE, POC: NEGATIVE
EPI CELLS URINE, POC: NORMAL
GLUCOSE URINE: NEGATIVE
KETONES, URINE: NEGATIVE
LEUKOCYTE EST, POC: NEGATIVE
NITRITE, URINE: NEGATIVE
PH UA: 5.5 (ref 4.5–8)
PROTEIN UA: NEGATIVE
RBC URINE, POC: NORMAL
SPECIFIC GRAVITY UA: 1.01 (ref 1–1.03)
UROBILINOGEN, URINE: NORMAL
WBC URINE, POC: NORMAL
YEAST URINE, POC: NEGATIVE

## 2019-06-17 PROCEDURE — 99213 OFFICE O/P EST LOW 20 MIN: CPT | Performed by: FAMILY MEDICINE

## 2019-06-17 PROCEDURE — 81000 URINALYSIS NONAUTO W/SCOPE: CPT | Performed by: FAMILY MEDICINE

## 2019-06-17 RX ORDER — CLOTRIMAZOLE 10 MG/1
10 LOZENGE ORAL; TOPICAL
Qty: 50 TABLET | Refills: 0 | Status: SHIPPED | OUTPATIENT
Start: 2019-06-17 | End: 2019-06-27

## 2019-06-17 RX ORDER — FLUCONAZOLE 150 MG/1
150 TABLET ORAL ONCE
Qty: 1 TABLET | Refills: 1 | Status: SHIPPED | OUTPATIENT
Start: 2019-06-17 | End: 2019-06-17

## 2019-06-17 RX ORDER — ESCITALOPRAM OXALATE 10 MG/1
10 TABLET ORAL DAILY
COMMUNITY
End: 2019-08-13 | Stop reason: ALTCHOICE

## 2019-06-17 NOTE — PROGRESS NOTES
Patient complains of a possible UTI  For two weeks. Associated symptoms consist of frequency and her urine has a foul smell. Patient is not currently taking any OTC medication. Patient also complains of gum pain for over a month. It is gradually worsening. It burns when she eats. Patient states that it may be related to her inhaler.

## 2019-06-17 NOTE — PROGRESS NOTES
Subjective:      Patient ID: Melyssa Torres is a 75 y.o. female. HPI     Concern for thrush. Uses symbicort inhaler as directed. Washes mouth out with water afterward. Has noticed white patches under tongue and on cheeks. Painful. Has frequent urination. This is typical for her. Always likes to check her urine while here due to hx of recurrent UTIs. No dysuria. No fever. No urgency. Requesting increase in dose of klonopin. Uses this to help her sleep.     Review of Systems    Patient Active Problem List   Diagnosis    Incontinence    Hypothyroidism    Smoker    Anxiety    History of alcoholism ([address]    Hyperlipidemia    DIMAS (dyspnea on exertion)    [facility]    D[facility] thrombosis) [facility]    COPD exacerbation ([facility])    Fatigue    General weakness    Centrilobular emphysema ([address]    Essential hypertension    Chronic fatigue    COPD, moderate ([address]    Encephalopathy    CAD in native artery    Osteopenia    Trapped lung    COPD exacerbation (HCC)    Acute encephalopathy    Chronic ischemic multifocal multiple vascular territories stroke    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 ([facility])    Cardiac arrhythmia    Alcohol abuse counseling and surveillance    Cellulitis    Right forearm cellulitis    Superficial thrombophlebitis of right upper extremity    Severe infection    Tobacco abuse counseling    Therapeutic drug monitoring    Complex care coordination    Stroke-like symptoms    History of recent stroke    Depression    Unable to ambulate    Cerebrovascular accident (CVA) ([address]    Oropharyngeal dysphagia    HTN (hypertension), benign    Dyslipidemia    Encounter for electronic analysis of reveal event recorder    Acute on chronic diastolic heart failure (HCC)    Physical deconditioning    PAF [facility]ox[facility]mal atrial fibrillation) ([facility]    Vascular dementia, uncomplicated    Other insomnia    Transient alteration of  Hctz [Hydrochlorothiazide] Other (See Comments)     Hyponatremia, severe      Norvasc [Amlodipine Besylate] Swelling     Of neck    Penicillins Hives    Tramadol Itching    Hydralazine Palpitations and Other (See Comments)     Dizzy,fatigue,headaches,palpitations,loss of appetite    Shellfish-Derived Products Swelling and Rash     Swelling of neck       Social History     Tobacco Use    Smoking status: Current Every Day Smoker     Packs/day: 0.50     Years: 52.00     Pack years: 26.00     Types: Cigarettes     Last attempt to quit: 2018     Years since quittin.9    Smokeless tobacco: Never Used    Tobacco comment: started smoking at age 26 / smoked up to 2 p,p,d / now smoking 4 to 8 cigarettes a day,   Substance Use Topics    Alcohol use: No     Alcohol/week: 0.0 oz     Comment: patient reports she is an alcoholic hasn't had anything to drink 3-4 months       Objective:   BP (!) 120/90 (Site: Right Upper Arm, Position: Sitting, Cuff Size: Medium Adult)   Pulse 69   Temp 98.9 °F (37.2 °C) (Temporal)   Wt 129 lb 12.8 oz (58.9 kg)   SpO2 98%   BMI 20.33 kg/m²     Physical Exam   Constitutional: She appears well-developed and well-nourished. No distress. HENT:   White patches under left tongue and in left inner cheek. Psychiatric: She has a normal mood and affect. Her behavior is normal.       Assessment:      Diagnosis Orders   1. Thrush  fluconazole (DIFLUCAN) 150 MG tablet    clotrimazole (MYCELEX) 10 MG adrianna   2. Frequent urination  POC URINE with Microscopic    Urine Culture       Plan:     Treat thrush with diflucan and clotrimazole. Check urine culture due to hx of recurrent UTIs. Discussed reasons that increasing klonopin would not be a good idea due to age.

## 2019-06-20 LAB
ORGANISM: ABNORMAL
URINE CULTURE, ROUTINE: ABNORMAL
URINE CULTURE, ROUTINE: ABNORMAL

## 2019-06-24 RX ORDER — NITROFURANTOIN 25; 75 MG/1; MG/1
100 CAPSULE ORAL 2 TIMES DAILY
Qty: 10 CAPSULE | Refills: 0 | Status: SHIPPED | OUTPATIENT
Start: 2019-06-24 | End: 2019-06-29

## 2019-06-24 NOTE — TELEPHONE ENCOUNTER
----- Message from Nia Griffin MD sent at 6/20/2019  7:12 AM EDT -----  Macrobid 100 mg twice daily #10

## 2019-07-05 RX ORDER — POTASSIUM CHLORIDE 1500 MG/1
TABLET, FILM COATED, EXTENDED RELEASE ORAL
Qty: 30 TABLET | Refills: 0 | Status: ON HOLD | OUTPATIENT
Start: 2019-07-05 | End: 2019-08-06 | Stop reason: HOSPADM

## 2019-07-09 ENCOUNTER — TELEPHONE (OUTPATIENT)
Dept: PHARMACY | Facility: CLINIC | Age: 74
End: 2019-07-09

## 2019-07-15 ENCOUNTER — OFFICE VISIT (OUTPATIENT)
Dept: FAMILY MEDICINE CLINIC | Age: 74
End: 2019-07-15
Payer: COMMERCIAL

## 2019-07-15 VITALS
BODY MASS INDEX: 21.14 KG/M2 | HEART RATE: 73 BPM | SYSTOLIC BLOOD PRESSURE: 109 MMHG | DIASTOLIC BLOOD PRESSURE: 70 MMHG | WEIGHT: 135 LBS | TEMPERATURE: 98.6 F

## 2019-07-15 DIAGNOSIS — K13.6 IRRITATION OF ORAL CAVITY: ICD-10-CM

## 2019-07-15 DIAGNOSIS — N39.0 URINARY TRACT INFECTION WITHOUT HEMATURIA, SITE UNSPECIFIED: Primary | ICD-10-CM

## 2019-07-15 LAB
BACTERIA URINE, POC: ABNORMAL
BILIRUBIN URINE: 0 MG/DL
BLOOD, URINE: POSITIVE
CASTS URINE, POC: ABNORMAL
CLARITY: ABNORMAL
COLOR: YELLOW
CRYSTALS URINE, POC: ABNORMAL
EPI CELLS URINE, POC: ABNORMAL
GLUCOSE URINE: NEGATIVE
KETONES, URINE: NEGATIVE
LEUKOCYTE EST, POC: POSITIVE
NITRITE, URINE: NEGATIVE
PH UA: 6 (ref 4.5–8)
PROTEIN UA: NEGATIVE
RBC URINE, POC: ABNORMAL
SPECIFIC GRAVITY UA: 1.01 (ref 1–1.03)
UROBILINOGEN, URINE: NORMAL
WBC URINE, POC: ABNORMAL
YEAST URINE, POC: ABNORMAL

## 2019-07-15 PROCEDURE — 99213 OFFICE O/P EST LOW 20 MIN: CPT | Performed by: FAMILY MEDICINE

## 2019-07-15 PROCEDURE — 81000 URINALYSIS NONAUTO W/SCOPE: CPT | Performed by: FAMILY MEDICINE

## 2019-07-15 RX ORDER — NITROFURANTOIN 25; 75 MG/1; MG/1
100 CAPSULE ORAL DAILY
Qty: 30 CAPSULE | Refills: 11 | Status: ON HOLD | OUTPATIENT
Start: 2019-07-15 | End: 2019-07-26 | Stop reason: HOSPADM

## 2019-07-15 RX ORDER — CIPROFLOXACIN 250 MG/1
250 TABLET, FILM COATED ORAL 2 TIMES DAILY
Qty: 6 TABLET | Refills: 0 | Status: SHIPPED | OUTPATIENT
Start: 2019-07-15 | End: 2019-07-18

## 2019-07-15 RX ORDER — POTASSIUM CHLORIDE 1500 MG/1
TABLET, FILM COATED, EXTENDED RELEASE ORAL
Qty: 30 TABLET | Refills: 5 | Status: ON HOLD | OUTPATIENT
Start: 2019-07-15 | End: 2019-07-26 | Stop reason: HOSPADM

## 2019-07-15 NOTE — PROGRESS NOTES
Subjective:      Patient ID: Bk Diaz is a 75 y.o. female. HPI  Patient presents with concern for urinary tract infection. She has a history of recurrent urinary tract infections, some of which have required hospitalization. Her only symptoms seem to be foul-smelling urine when she develops an infection. Today, she does have this symptom. She denies dysuria. She does urinate frequently at baseline as she is quite a bit of water throughout the day. She continues to have some sores in her mouth. She was treated for thrush as she does use steroid inhalers, but this did not significantly help.   Review of Systems    Patient Active Problem List   Diagnosis    Incontinence    Hypothyroidism    Smoker    Anxiety    History of alcoholism ([address]    Hyperlipidemia    DIMAS (dyspnea on exertion)    Acute DVT (deep venous thrombosis) (HCC)    DVT (deep venous thrombosis) (HCC)    COPD exacerbation (HCC)    Fatigue    General weakness    Centrilobular emphysema ([address]    Essential hypertension    Chronic fatigue    COPD, moderate ([address]    Encephalopathy    CAD in native artery    Osteopenia    Trapped lung    COPD exacerbation (HCC)    Acute encephalopathy    Chronic ischemic multifocal multiple vascular territories stroke    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 (HCC)    Cardiac arrhythmia    Alcohol abuse counseling and surveillance    Cellulitis    Right forearm cellulitis    Superficial thrombophlebitis of right upper extremity    Severe infection    Tobacco abuse counseling    Therapeutic drug monitoring    Complex care coordination    Stroke-like symptoms    History of recent stroke    Depression    Unable to ambulate    Cerebrovascular accident (CVA) ([address]    Oropharyngeal dysphagia    HTN (hypertension), benign    Dyslipidemia    Encounter for electronic analysis of reveal event recorder    Acute on chronic diastolic heart failure ([address]    Physical

## 2019-07-16 ENCOUNTER — NURSE ONLY (OUTPATIENT)
Dept: CARDIOLOGY CLINIC | Age: 74
End: 2019-07-16
Payer: COMMERCIAL

## 2019-07-16 DIAGNOSIS — I63.9 CEREBROVASCULAR ACCIDENT (CVA), UNSPECIFIED MECHANISM (HCC): ICD-10-CM

## 2019-07-16 DIAGNOSIS — Z45.09 ENCOUNTER FOR ELECTRONIC ANALYSIS OF REVEAL EVENT RECORDER: ICD-10-CM

## 2019-07-16 PROCEDURE — 93298 REM INTERROG DEV EVAL SCRMS: CPT | Performed by: INTERNAL MEDICINE

## 2019-07-16 PROCEDURE — 93299 PR REM INTERROG ICPMS/SCRMS <30 D TECH REVIEW: CPT | Performed by: INTERNAL MEDICINE

## 2019-07-18 LAB
ORGANISM: ABNORMAL
URINE CULTURE, ROUTINE: ABNORMAL
URINE CULTURE, ROUTINE: ABNORMAL

## 2019-07-21 ENCOUNTER — HOSPITAL ENCOUNTER (INPATIENT)
Age: 74
LOS: 6 days | Discharge: HOME HEALTH | DRG: 644 | End: 2019-07-27
Attending: FAMILY MEDICINE | Admitting: INTERNAL MEDICINE
Payer: COMMERCIAL

## 2019-07-21 ENCOUNTER — APPOINTMENT (OUTPATIENT)
Dept: GENERAL RADIOLOGY | Age: 74
DRG: 644 | End: 2019-07-21
Payer: COMMERCIAL

## 2019-07-21 DIAGNOSIS — E87.1 HYPONATREMIA: Primary | ICD-10-CM

## 2019-07-21 LAB
A/G RATIO: 1.3 (ref 1.1–2.2)
ALBUMIN SERPL-MCNC: 4.5 G/DL (ref 3.4–5)
ALP BLD-CCNC: 69 U/L (ref 40–129)
ALT SERPL-CCNC: 15 U/L (ref 10–40)
ANION GAP SERPL CALCULATED.3IONS-SCNC: 16 MMOL/L (ref 3–16)
AST SERPL-CCNC: 27 U/L (ref 15–37)
BASOPHILS ABSOLUTE: 0 K/UL (ref 0–0.2)
BASOPHILS RELATIVE PERCENT: 0.2 %
BILIRUB SERPL-MCNC: 1 MG/DL (ref 0–1)
BUN BLDV-MCNC: 12 MG/DL (ref 7–20)
CALCIUM SERPL-MCNC: 9.2 MG/DL (ref 8.3–10.6)
CHLORIDE BLD-SCNC: 71 MMOL/L (ref 99–110)
CO2: 25 MMOL/L (ref 21–32)
CREAT SERPL-MCNC: 0.5 MG/DL (ref 0.6–1.2)
EOSINOPHILS ABSOLUTE: 0 K/UL (ref 0–0.6)
EOSINOPHILS RELATIVE PERCENT: 0.3 %
GFR AFRICAN AMERICAN: >60
GFR NON-AFRICAN AMERICAN: >60
GLOBULIN: 3.6 G/DL
GLUCOSE BLD-MCNC: 125 MG/DL (ref 70–99)
HCT VFR BLD CALC: 41.7 % (ref 36–48)
HEMOGLOBIN: 14.7 G/DL (ref 12–16)
LACTIC ACID: 1.5 MMOL/L (ref 0.4–2)
LIPASE: 15 U/L (ref 13–60)
LYMPHOCYTES ABSOLUTE: 0.4 K/UL (ref 1–5.1)
LYMPHOCYTES RELATIVE PERCENT: 3.4 %
MCH RBC QN AUTO: 33.6 PG (ref 26–34)
MCHC RBC AUTO-ENTMCNC: 35.3 G/DL (ref 31–36)
MCV RBC AUTO: 95.2 FL (ref 80–100)
MONOCYTES ABSOLUTE: 0.8 K/UL (ref 0–1.3)
MONOCYTES RELATIVE PERCENT: 6.2 %
NEUTROPHILS ABSOLUTE: 11.9 K/UL (ref 1.7–7.7)
NEUTROPHILS RELATIVE PERCENT: 89.9 %
PDW BLD-RTO: 13 % (ref 12.4–15.4)
PLATELET # BLD: 347 K/UL (ref 135–450)
PMV BLD AUTO: 8.5 FL (ref 5–10.5)
POTASSIUM SERPL-SCNC: 3.3 MMOL/L (ref 3.5–5.1)
RBC # BLD: 4.38 M/UL (ref 4–5.2)
SODIUM BLD-SCNC: 112 MMOL/L (ref 136–145)
SODIUM BLD-SCNC: 113 MMOL/L (ref 136–145)
TOTAL PROTEIN: 8.1 G/DL (ref 6.4–8.2)
TROPONIN: <0.01 NG/ML
URINE TYPE: NORMAL
WBC # BLD: 13.2 K/UL (ref 4–11)

## 2019-07-21 PROCEDURE — 80053 COMPREHEN METABOLIC PANEL: CPT

## 2019-07-21 PROCEDURE — 36415 COLL VENOUS BLD VENIPUNCTURE: CPT

## 2019-07-21 PROCEDURE — 6360000002 HC RX W HCPCS: Performed by: PHYSICIAN ASSISTANT

## 2019-07-21 PROCEDURE — 2580000003 HC RX 258: Performed by: PHYSICIAN ASSISTANT

## 2019-07-21 PROCEDURE — 6370000000 HC RX 637 (ALT 250 FOR IP): Performed by: FAMILY MEDICINE

## 2019-07-21 PROCEDURE — 96361 HYDRATE IV INFUSION ADD-ON: CPT

## 2019-07-21 PROCEDURE — 84295 ASSAY OF SERUM SODIUM: CPT

## 2019-07-21 PROCEDURE — 87040 BLOOD CULTURE FOR BACTERIA: CPT

## 2019-07-21 PROCEDURE — 83690 ASSAY OF LIPASE: CPT

## 2019-07-21 PROCEDURE — 83605 ASSAY OF LACTIC ACID: CPT

## 2019-07-21 PROCEDURE — 85025 COMPLETE CBC W/AUTO DIFF WBC: CPT

## 2019-07-21 PROCEDURE — 84484 ASSAY OF TROPONIN QUANT: CPT

## 2019-07-21 PROCEDURE — 71045 X-RAY EXAM CHEST 1 VIEW: CPT

## 2019-07-21 PROCEDURE — 6360000002 HC RX W HCPCS: Performed by: FAMILY MEDICINE

## 2019-07-21 PROCEDURE — 1200000000 HC SEMI PRIVATE

## 2019-07-21 PROCEDURE — 96374 THER/PROPH/DIAG INJ IV PUSH: CPT

## 2019-07-21 PROCEDURE — 99291 CRITICAL CARE FIRST HOUR: CPT

## 2019-07-21 PROCEDURE — 2580000003 HC RX 258: Performed by: FAMILY MEDICINE

## 2019-07-21 PROCEDURE — 93005 ELECTROCARDIOGRAM TRACING: CPT | Performed by: PHYSICIAN ASSISTANT

## 2019-07-21 RX ORDER — ATORVASTATIN CALCIUM 80 MG/1
80 TABLET, FILM COATED ORAL DAILY
Status: DISCONTINUED | OUTPATIENT
Start: 2019-07-21 | End: 2019-07-27 | Stop reason: HOSPADM

## 2019-07-21 RX ORDER — LABETALOL 200 MG/1
100 TABLET, FILM COATED ORAL EVERY 12 HOURS SCHEDULED
Status: DISCONTINUED | OUTPATIENT
Start: 2019-07-21 | End: 2019-07-23

## 2019-07-21 RX ORDER — SODIUM CHLORIDE 0.9 % (FLUSH) 0.9 %
10 SYRINGE (ML) INJECTION PRN
Status: DISCONTINUED | OUTPATIENT
Start: 2019-07-21 | End: 2019-07-25 | Stop reason: SDUPTHER

## 2019-07-21 RX ORDER — LEVOTHYROXINE SODIUM 0.1 MG/1
100 TABLET ORAL
Status: DISCONTINUED | OUTPATIENT
Start: 2019-07-22 | End: 2019-07-27 | Stop reason: HOSPADM

## 2019-07-21 RX ORDER — 0.9 % SODIUM CHLORIDE 0.9 %
1000 INTRAVENOUS SOLUTION INTRAVENOUS ONCE
Status: COMPLETED | OUTPATIENT
Start: 2019-07-21 | End: 2019-07-21

## 2019-07-21 RX ORDER — ACETAMINOPHEN 80 MG
TABLET,CHEWABLE ORAL
Status: COMPLETED
Start: 2019-07-21 | End: 2019-07-21

## 2019-07-21 RX ORDER — ASPIRIN 81 MG/1
81 TABLET, CHEWABLE ORAL DAILY
Status: DISCONTINUED | OUTPATIENT
Start: 2019-07-21 | End: 2019-07-27 | Stop reason: HOSPADM

## 2019-07-21 RX ORDER — ONDANSETRON 2 MG/ML
4 INJECTION INTRAMUSCULAR; INTRAVENOUS EVERY 6 HOURS PRN
Status: DISCONTINUED | OUTPATIENT
Start: 2019-07-21 | End: 2019-07-23

## 2019-07-21 RX ORDER — ONDANSETRON 2 MG/ML
4 INJECTION INTRAMUSCULAR; INTRAVENOUS ONCE
Status: COMPLETED | OUTPATIENT
Start: 2019-07-21 | End: 2019-07-21

## 2019-07-21 RX ORDER — SODIUM CHLORIDE 0.9 % (FLUSH) 0.9 %
10 SYRINGE (ML) INJECTION EVERY 12 HOURS SCHEDULED
Status: DISCONTINUED | OUTPATIENT
Start: 2019-07-21 | End: 2019-07-25 | Stop reason: SDUPTHER

## 2019-07-21 RX ORDER — CLONAZEPAM 0.5 MG/1
0.25 TABLET ORAL NIGHTLY PRN
Status: DISCONTINUED | OUTPATIENT
Start: 2019-07-21 | End: 2019-07-27 | Stop reason: HOSPADM

## 2019-07-21 RX ORDER — BUDESONIDE AND FORMOTEROL FUMARATE DIHYDRATE 160; 4.5 UG/1; UG/1
2 AEROSOL RESPIRATORY (INHALATION) 2 TIMES DAILY
Status: DISCONTINUED | OUTPATIENT
Start: 2019-07-21 | End: 2019-07-21 | Stop reason: CLARIF

## 2019-07-21 RX ORDER — OXYBUTYNIN CHLORIDE 5 MG/1
5 TABLET ORAL 2 TIMES DAILY
Status: DISCONTINUED | OUTPATIENT
Start: 2019-07-21 | End: 2019-07-27 | Stop reason: HOSPADM

## 2019-07-21 RX ORDER — ESCITALOPRAM OXALATE 10 MG/1
10 TABLET ORAL DAILY
Status: DISCONTINUED | OUTPATIENT
Start: 2019-07-22 | End: 2019-07-27 | Stop reason: HOSPADM

## 2019-07-21 RX ORDER — SODIUM CHLORIDE 9 MG/ML
INJECTION, SOLUTION INTRAVENOUS CONTINUOUS
Status: DISPENSED | OUTPATIENT
Start: 2019-07-21 | End: 2019-07-22

## 2019-07-21 RX ADMIN — LABETALOL HYDROCHLORIDE 100 MG: 200 TABLET, FILM COATED ORAL at 21:42

## 2019-07-21 RX ADMIN — APIXABAN 5 MG: 5 TABLET, FILM COATED ORAL at 21:45

## 2019-07-21 RX ADMIN — Medication: at 21:50

## 2019-07-21 RX ADMIN — SODIUM CHLORIDE 1000 ML: 9 INJECTION, SOLUTION INTRAVENOUS at 17:51

## 2019-07-21 RX ADMIN — ATORVASTATIN CALCIUM 80 MG: 80 TABLET, FILM COATED ORAL at 21:45

## 2019-07-21 RX ADMIN — SODIUM CHLORIDE: 9 INJECTION, SOLUTION INTRAVENOUS at 21:47

## 2019-07-21 RX ADMIN — ASPIRIN 81 MG 81 MG: 81 TABLET ORAL at 21:45

## 2019-07-21 RX ADMIN — CLONAZEPAM 0.25 MG: 0.5 TABLET ORAL at 21:46

## 2019-07-21 RX ADMIN — Medication 10 ML: at 21:50

## 2019-07-21 RX ADMIN — ONDANSETRON 4 MG: 2 INJECTION INTRAMUSCULAR; INTRAVENOUS at 17:52

## 2019-07-21 RX ADMIN — ONDANSETRON 4 MG: 2 INJECTION INTRAMUSCULAR; INTRAVENOUS at 20:26

## 2019-07-21 RX ADMIN — OXYBUTYNIN CHLORIDE 5 MG: 5 TABLET ORAL at 21:41

## 2019-07-21 ASSESSMENT — ENCOUNTER SYMPTOMS
ABDOMINAL PAIN: 0
NAUSEA: 1
WHEEZING: 0
SHORTNESS OF BREATH: 0
VOMITING: 1
DIARRHEA: 0
RHINORRHEA: 0
COUGH: 0

## 2019-07-21 NOTE — ED PROVIDER NOTES
History    None   Social Needs    Financial resource strain: None    Food insecurity:     Worry: None     Inability: None    Transportation needs:     Medical: None     Non-medical: None   Tobacco Use    Smoking status: Current Every Day Smoker     Packs/day: 1.50     Years: 52.00     Pack years: 78.00     Types: Cigarettes     Last attempt to quit: 2018     Years since quittin.0    Smokeless tobacco: Never Used    Tobacco comment: started smoking at age 26 / smoked up to 2 p,p,d / now smoking 4 to 8 cigarettes a day,   Substance and Sexual Activity    Alcohol use: No     Alcohol/week: 0.0 standard drinks     Comment: patient reports she is an alcoholic hasn't had anything to drink 3-4 months    Drug use: No    Sexual activity: Yes     Partners: Male   Lifestyle    Physical activity:     Days per week: None     Minutes per session: None    Stress: None   Relationships    Social connections:     Talks on phone: None     Gets together: None     Attends Rastafarian service: None     Active member of club or organization: None     Attends meetings of clubs or organizations: None     Relationship status: None    Intimate partner violence:     Fear of current or ex partner: None     Emotionally abused: None     Physically abused: None     Forced sexual activity: None   Other Topics Concern    None   Social History Narrative    None       SCREENINGS             PHYSICAL EXAM    (up to 7 for level 4, 8 or more for level 5)     ED Triage Vitals [19 1643]   BP Temp Temp Source Pulse Resp SpO2 Height Weight   (!) 182/99 97.9 °F (36.6 °C) Oral 77 22 95 % 5' 8\" (1.727 m) 130 lb (59 kg)       Physical Exam   Constitutional: She is oriented to person, place, and time. She appears well-developed and well-nourished. HENT:   Head: Normocephalic and atraumatic. Right Ear: External ear normal.   Left Ear: External ear normal.   Nose: Nose normal.   Eyes: Right eye exhibits no discharge.  Left eye exhibits

## 2019-07-21 NOTE — ED NOTES
Report called to 5T, RN verbalized understanding and denied any need for further information, patient visible on tele monitor on unit, patient to be transported to unit      Minerva Vaughn RN  07/21/19 9117

## 2019-07-22 ENCOUNTER — APPOINTMENT (OUTPATIENT)
Dept: CT IMAGING | Age: 74
DRG: 644 | End: 2019-07-22
Payer: COMMERCIAL

## 2019-07-22 LAB
ANION GAP SERPL CALCULATED.3IONS-SCNC: 11 MMOL/L (ref 3–16)
ANION GAP SERPL CALCULATED.3IONS-SCNC: 13 MMOL/L (ref 3–16)
BILIRUBIN URINE: NEGATIVE
BLOOD, URINE: ABNORMAL
BUN BLDV-MCNC: 10 MG/DL (ref 7–20)
BUN BLDV-MCNC: 10 MG/DL (ref 7–20)
CALCIUM SERPL-MCNC: 8.1 MG/DL (ref 8.3–10.6)
CALCIUM SERPL-MCNC: 8.3 MG/DL (ref 8.3–10.6)
CHLORIDE BLD-SCNC: 75 MMOL/L (ref 99–110)
CHLORIDE BLD-SCNC: 79 MMOL/L (ref 99–110)
CLARITY: CLEAR
CO2: 21 MMOL/L (ref 21–32)
CO2: 22 MMOL/L (ref 21–32)
COLOR: YELLOW
CREAT SERPL-MCNC: 0.5 MG/DL (ref 0.6–1.2)
CREAT SERPL-MCNC: <0.5 MG/DL (ref 0.6–1.2)
EKG ATRIAL RATE: 71 BPM
EKG DIAGNOSIS: NORMAL
EKG P AXIS: 67 DEGREES
EKG P-R INTERVAL: 160 MS
EKG Q-T INTERVAL: 458 MS
EKG QRS DURATION: 88 MS
EKG QTC CALCULATION (BAZETT): 497 MS
EKG R AXIS: 56 DEGREES
EKG T AXIS: 64 DEGREES
EKG VENTRICULAR RATE: 71 BPM
EPITHELIAL CELLS, UA: 1 /HPF (ref 0–5)
GFR AFRICAN AMERICAN: >60
GFR AFRICAN AMERICAN: >60
GFR NON-AFRICAN AMERICAN: >60
GFR NON-AFRICAN AMERICAN: >60
GLUCOSE BLD-MCNC: 100 MG/DL (ref 70–99)
GLUCOSE BLD-MCNC: 94 MG/DL (ref 70–99)
GLUCOSE URINE: NEGATIVE MG/DL
HYALINE CASTS: 1 /LPF (ref 0–8)
KETONES, URINE: NEGATIVE MG/DL
LEUKOCYTE ESTERASE, URINE: NEGATIVE
MICROSCOPIC EXAMINATION: YES
NITRITE, URINE: NEGATIVE
OSMOLALITY URINE: 367 MOSM/KG (ref 390–1070)
PH UA: 7 (ref 5–8)
POTASSIUM SERPL-SCNC: 3.2 MMOL/L (ref 3.5–5.1)
POTASSIUM SERPL-SCNC: 3.5 MMOL/L (ref 3.5–5.1)
POTASSIUM, UR: 48.8 MMOL/L
PROTEIN UA: NEGATIVE MG/DL
RBC UA: 8 /HPF (ref 0–4)
SODIUM BLD-SCNC: 110 MMOL/L (ref 136–145)
SODIUM BLD-SCNC: 110 MMOL/L (ref 136–145)
SODIUM BLD-SCNC: 111 MMOL/L (ref 136–145)
SODIUM BLD-SCNC: 111 MMOL/L (ref 136–145)
SODIUM BLD-SCNC: 115 MMOL/L (ref 136–145)
SODIUM BLD-SCNC: 115 MMOL/L (ref 136–145)
SODIUM BLD-SCNC: 117 MMOL/L (ref 136–145)
SODIUM BLD-SCNC: 117 MMOL/L (ref 136–145)
SODIUM URINE: 64 MMOL/L
SPECIFIC GRAVITY UA: 1.01 (ref 1–1.03)
TSH REFLEX: 1.5 UIU/ML (ref 0.27–4.2)
URIC ACID, SERUM: 3 MG/DL (ref 2.6–6)
URINE TYPE: ABNORMAL
UROBILINOGEN, URINE: 0.2 E.U./DL
WBC UA: 1 /HPF (ref 0–5)

## 2019-07-22 PROCEDURE — 2060000000 HC ICU INTERMEDIATE R&B

## 2019-07-22 PROCEDURE — 2500000003 HC RX 250 WO HCPCS: Performed by: INTERNAL MEDICINE

## 2019-07-22 PROCEDURE — 6360000002 HC RX W HCPCS: Performed by: FAMILY MEDICINE

## 2019-07-22 PROCEDURE — 84550 ASSAY OF BLOOD/URIC ACID: CPT

## 2019-07-22 PROCEDURE — 02HV33Z INSERTION OF INFUSION DEVICE INTO SUPERIOR VENA CAVA, PERCUTANEOUS APPROACH: ICD-10-PCS | Performed by: INTERNAL MEDICINE

## 2019-07-22 PROCEDURE — 2580000003 HC RX 258: Performed by: FAMILY MEDICINE

## 2019-07-22 PROCEDURE — 6370000000 HC RX 637 (ALT 250 FOR IP): Performed by: INTERNAL MEDICINE

## 2019-07-22 PROCEDURE — 84443 ASSAY THYROID STIM HORMONE: CPT

## 2019-07-22 PROCEDURE — 36415 COLL VENOUS BLD VENIPUNCTURE: CPT

## 2019-07-22 PROCEDURE — C1751 CATH, INF, PER/CENT/MIDLINE: HCPCS

## 2019-07-22 PROCEDURE — 36569 INSJ PICC 5 YR+ W/O IMAGING: CPT

## 2019-07-22 PROCEDURE — 6370000000 HC RX 637 (ALT 250 FOR IP): Performed by: FAMILY MEDICINE

## 2019-07-22 PROCEDURE — 94640 AIRWAY INHALATION TREATMENT: CPT

## 2019-07-22 PROCEDURE — 2580000003 HC RX 258: Performed by: INTERNAL MEDICINE

## 2019-07-22 PROCEDURE — 84295 ASSAY OF SERUM SODIUM: CPT

## 2019-07-22 PROCEDURE — 6360000002 HC RX W HCPCS: Performed by: INTERNAL MEDICINE

## 2019-07-22 PROCEDURE — 93010 ELECTROCARDIOGRAM REPORT: CPT | Performed by: INTERNAL MEDICINE

## 2019-07-22 PROCEDURE — 70450 CT HEAD/BRAIN W/O DYE: CPT

## 2019-07-22 PROCEDURE — 84133 ASSAY OF URINE POTASSIUM: CPT

## 2019-07-22 PROCEDURE — 81001 URINALYSIS AUTO W/SCOPE: CPT

## 2019-07-22 PROCEDURE — 84300 ASSAY OF URINE SODIUM: CPT

## 2019-07-22 PROCEDURE — 94760 N-INVAS EAR/PLS OXIMETRY 1: CPT

## 2019-07-22 PROCEDURE — 80048 BASIC METABOLIC PNL TOTAL CA: CPT

## 2019-07-22 PROCEDURE — 83935 ASSAY OF URINE OSMOLALITY: CPT

## 2019-07-22 RX ORDER — LORAZEPAM 2 MG/ML
2 INJECTION INTRAMUSCULAR EVERY 4 HOURS PRN
Status: DISCONTINUED | OUTPATIENT
Start: 2019-07-22 | End: 2019-07-27 | Stop reason: HOSPADM

## 2019-07-22 RX ORDER — KETOROLAC TROMETHAMINE 30 MG/ML
15 INJECTION, SOLUTION INTRAMUSCULAR; INTRAVENOUS ONCE
Status: COMPLETED | OUTPATIENT
Start: 2019-07-22 | End: 2019-07-22

## 2019-07-22 RX ORDER — CLONIDINE HYDROCHLORIDE 0.1 MG/1
0.1 TABLET ORAL 2 TIMES DAILY
Status: DISCONTINUED | OUTPATIENT
Start: 2019-07-22 | End: 2019-07-23

## 2019-07-22 RX ORDER — 3% SODIUM CHLORIDE 3 G/100ML
50 INJECTION, SOLUTION INTRAVENOUS CONTINUOUS
Status: DISCONTINUED | OUTPATIENT
Start: 2019-07-22 | End: 2019-07-23

## 2019-07-22 RX ORDER — POTASSIUM CHLORIDE 750 MG/1
20 TABLET, FILM COATED, EXTENDED RELEASE ORAL 3 TIMES DAILY
Status: DISCONTINUED | OUTPATIENT
Start: 2019-07-22 | End: 2019-07-23

## 2019-07-22 RX ORDER — LIDOCAINE HYDROCHLORIDE 10 MG/ML
5 INJECTION, SOLUTION EPIDURAL; INFILTRATION; INTRACAUDAL; PERINEURAL ONCE
Status: DISCONTINUED | OUTPATIENT
Start: 2019-07-22 | End: 2019-07-27 | Stop reason: HOSPADM

## 2019-07-22 RX ORDER — SODIUM CHLORIDE 0.9 % (FLUSH) 0.9 %
10 SYRINGE (ML) INJECTION EVERY 12 HOURS SCHEDULED
Status: DISCONTINUED | OUTPATIENT
Start: 2019-07-22 | End: 2019-07-27 | Stop reason: HOSPADM

## 2019-07-22 RX ORDER — PROMETHAZINE HYDROCHLORIDE 25 MG/ML
12.5 INJECTION, SOLUTION INTRAMUSCULAR; INTRAVENOUS EVERY 6 HOURS PRN
Status: DISCONTINUED | OUTPATIENT
Start: 2019-07-22 | End: 2019-07-23

## 2019-07-22 RX ORDER — 3% SODIUM CHLORIDE 3 G/100ML
30 INJECTION, SOLUTION INTRAVENOUS CONTINUOUS
Status: DISCONTINUED | OUTPATIENT
Start: 2019-07-22 | End: 2019-07-22

## 2019-07-22 RX ORDER — LABETALOL HYDROCHLORIDE 5 MG/ML
10 INJECTION, SOLUTION INTRAVENOUS ONCE
Status: COMPLETED | OUTPATIENT
Start: 2019-07-22 | End: 2019-07-22

## 2019-07-22 RX ORDER — SODIUM CHLORIDE 0.9 % (FLUSH) 0.9 %
10 SYRINGE (ML) INJECTION PRN
Status: DISCONTINUED | OUTPATIENT
Start: 2019-07-22 | End: 2019-07-27 | Stop reason: HOSPADM

## 2019-07-22 RX ADMIN — ONDANSETRON 4 MG: 2 INJECTION INTRAMUSCULAR; INTRAVENOUS at 04:25

## 2019-07-22 RX ADMIN — PROMETHAZINE HYDROCHLORIDE 12.5 MG: 25 INJECTION INTRAMUSCULAR; INTRAVENOUS at 09:32

## 2019-07-22 RX ADMIN — APIXABAN 5 MG: 5 TABLET, FILM COATED ORAL at 20:30

## 2019-07-22 RX ADMIN — ASPIRIN 81 MG 81 MG: 81 TABLET ORAL at 07:45

## 2019-07-22 RX ADMIN — Medication 10 ML: at 07:46

## 2019-07-22 RX ADMIN — Medication 10 ML: at 20:30

## 2019-07-22 RX ADMIN — LORAZEPAM 2 MG: 2 INJECTION INTRAMUSCULAR; INTRAVENOUS at 09:33

## 2019-07-22 RX ADMIN — Medication 2 PUFF: at 09:13

## 2019-07-22 RX ADMIN — ATORVASTATIN CALCIUM 80 MG: 80 TABLET, FILM COATED ORAL at 07:46

## 2019-07-22 RX ADMIN — Medication 2 PUFF: at 19:56

## 2019-07-22 RX ADMIN — KETOROLAC TROMETHAMINE 15 MG: 30 INJECTION, SOLUTION INTRAMUSCULAR at 09:32

## 2019-07-22 RX ADMIN — SODIUM CHLORIDE 50 ML/HR: 3 INJECTION, SOLUTION INTRAVENOUS at 10:37

## 2019-07-22 RX ADMIN — ONDANSETRON 4 MG: 2 INJECTION INTRAMUSCULAR; INTRAVENOUS at 17:42

## 2019-07-22 RX ADMIN — POTASSIUM CHLORIDE 20 MEQ: 750 TABLET, FILM COATED, EXTENDED RELEASE ORAL at 14:01

## 2019-07-22 RX ADMIN — APIXABAN 5 MG: 5 TABLET, FILM COATED ORAL at 09:00

## 2019-07-22 RX ADMIN — CLONIDINE HYDROCHLORIDE 0.1 MG: 0.1 TABLET ORAL at 20:30

## 2019-07-22 RX ADMIN — LEVOTHYROXINE SODIUM 100 MCG: 100 TABLET ORAL at 05:42

## 2019-07-22 RX ADMIN — CLONIDINE HYDROCHLORIDE 0.1 MG: 0.1 TABLET ORAL at 10:36

## 2019-07-22 RX ADMIN — LABETALOL HYDROCHLORIDE 100 MG: 200 TABLET, FILM COATED ORAL at 20:29

## 2019-07-22 RX ADMIN — OXYBUTYNIN CHLORIDE 5 MG: 5 TABLET ORAL at 20:29

## 2019-07-22 RX ADMIN — OXYBUTYNIN CHLORIDE 5 MG: 5 TABLET ORAL at 07:45

## 2019-07-22 RX ADMIN — POTASSIUM CHLORIDE 20 MEQ: 750 TABLET, FILM COATED, EXTENDED RELEASE ORAL at 20:28

## 2019-07-22 RX ADMIN — ONDANSETRON 4 MG: 2 INJECTION INTRAMUSCULAR; INTRAVENOUS at 20:28

## 2019-07-22 RX ADMIN — LABETALOL HYDROCHLORIDE 10 MG: 5 INJECTION, SOLUTION INTRAVENOUS at 06:35

## 2019-07-22 RX ADMIN — Medication 10 ML: at 20:29

## 2019-07-22 RX ADMIN — ESCITALOPRAM OXALATE 10 MG: 10 TABLET ORAL at 07:46

## 2019-07-22 RX ADMIN — LABETALOL HYDROCHLORIDE 100 MG: 200 TABLET, FILM COATED ORAL at 07:45

## 2019-07-22 ASSESSMENT — PAIN SCALES - GENERAL
PAINLEVEL_OUTOF10: 9
PAINLEVEL_OUTOF10: 5
PAINLEVEL_OUTOF10: 6
PAINLEVEL_OUTOF10: 0

## 2019-07-22 ASSESSMENT — PAIN DESCRIPTION - LOCATION
LOCATION: HEAD

## 2019-07-22 ASSESSMENT — PAIN DESCRIPTION - PAIN TYPE
TYPE: ACUTE PAIN

## 2019-07-22 NOTE — CONSULTS
Nephrology Attending  Progress Note        SUMMARY :  We are following this patient for Hyponatremia/ Hypokalemia  Na 112 7/21. Na 110 today   On hypertonic saline     Presented to ER with weakness , falls and vomitting  Recent Diagnosis of UTI  Was on Furosemide and Synthyroid     SUBJECTIVE:   Patient progress reviewed.  The patient denies headache, no longer nauseous     Physical Exam:    VITALS:  BP (!) 157/82   Pulse 71   Temp 97.4 °F (36.3 °C) (Oral)   Resp 16   Ht 5' 8\" (1.727 m)   Wt 135 lb 3.2 oz (61.3 kg)   SpO2 93%   BMI 20.56 kg/m²   BLOOD PRESSURE RANGE:  Systolic (98IGS), QIB:449 , Min:150 , YEK:790   ; Diastolic (32OFG), OGY:45, Min:76, Max:99    24HR INTAKE/OUTPUT:      Intake/Output Summary (Last 24 hours) at 7/22/2019 1225  Last data filed at 7/22/2019 0641  Gross per 24 hour   Intake --   Output 150 ml   Net -150 ml       Constitutional:  Alert, oriented x 2  PERRL , EOM +  Pallor +, No icterus   JVP not raised   Cardiovascular/Edema:  RRR, Occ Ectopic, No murmur/ rub   Respiratory:  B/ L air entry, normal breath sounds  Gastrointestinal:  Soft, bowel sounds +, no organomegaly   Other:  No rash  No focal neurological deficit     DATA:    CBC with Differential:    Lab Results   Component Value Date    WBC 13.2 07/21/2019    RBC 4.38 07/21/2019    HGB 14.7 07/21/2019    HCT 41.7 07/21/2019     07/21/2019    MCV 95.2 07/21/2019    MCH 33.6 07/21/2019    MCHC 35.3 07/21/2019    RDW 13.0 07/21/2019    SEGSPCT 79.1 04/19/2016    BANDSPCT 1 04/28/2018    LYMPHOPCT 3.4 07/21/2019    MONOPCT 6.2 07/21/2019    EOSPCT 1.3 05/08/2011    BASOPCT 0.2 07/21/2019    MONOSABS 0.8 07/21/2019    LYMPHSABS 0.4 07/21/2019    EOSABS 0.0 07/21/2019    BASOSABS 0.0 07/21/2019    DIFFTYPE Auto 05/02/2013     CMP:    Lab Results   Component Value Date     07/22/2019    K 3.2 07/22/2019    K 3.9 03/26/2019    CL 75 07/22/2019    CO2 22 07/22/2019    BUN 10 07/22/2019    CREATININE 0.5 07/22/2019

## 2019-07-22 NOTE — PROGRESS NOTES
Nephrology Attending  Progress Note        SUMMARY :  We are following this patient for Hyponatremia/ Hypokalemia  Na 112 /. , Na 110 today   On hypertonic saline     Presented to ER with weakness , falls and vomitting  Recent Diagnosis of UTI  Was on Furosemide and Synthyroid     SUBJECTIVE:   Patient progress reviewed.  The patient denies headache, no longer nauseous   Na up at 115     Physical Exam:    VITALS:  BP (!) 154/89   Pulse 72   Temp 98 °F (36.7 °C) (Axillary)   Resp 16   Ht 5' 8\" (1.727 m)   Wt 135 lb 3.2 oz (61.3 kg)   SpO2 92%   BMI 20.56 kg/m²   BLOOD PRESSURE RANGE:  Systolic (87ULT), CMM:806 , Min:150 , TBI:790   ; Diastolic (61IVN), OH, Min:76, Max:99    24HR INTAKE/OUTPUT:      Intake/Output Summary (Last 24 hours) at 2019 1438  Last data filed at 2019 0641  Gross per 24 hour   Intake --   Output 150 ml   Net -150 ml       Constitutional:  Alert, oriented x 2  PERRL , EOM +  Pallor +, No icterus   JVP not raised   Cardiovascular/Edema:  RRR, Occ Ectopic, No murmur/ rub   Respiratory:  B/ L air entry, normal breath sounds  Gastrointestinal:  Soft, bowel sounds +, no organomegaly   Other:  No rash  No focal neurological deficit     DATA:    CBC with Differential:    Lab Results   Component Value Date    WBC 13.2 2019    RBC 4.38 2019    HGB 14.7 2019    HCT 41.7 2019     2019    MCV 95.2 2019    MCH 33.6 2019    MCHC 35.3 2019    RDW 13.0 2019    SEGSPCT 79.1 2016    BANDSPCT 1 2018    LYMPHOPCT 3.4 2019    MONOPCT 6.2 2019    EOSPCT 1.3 2011    BASOPCT 0.2 2019    MONOSABS 0.8 2019    LYMPHSABS 0.4 2019    EOSABS 0.0 2019    BASOSABS 0.0 2019    DIFFTYPE Auto 2013     CMP:    Lab Results   Component Value Date     2019    K 3.2 2019    K 3.9 2019    CL 75 2019    CO2 22 2019    BUN 10 2019    CREATININE 0.5 07/22/2019    GFRAA >60 07/22/2019    GFRAA >60 05/02/2013    AGRATIO 1.3 07/21/2019    LABGLOM >60 07/22/2019    LABGLOM 79 12/11/2017    GLUCOSE 100 07/22/2019    PROT 8.1 07/21/2019    PROT 7.1 10/18/2012    LABALBU 4.5 07/21/2019    CALCIUM 8.3 07/22/2019    BILITOT 1.0 07/21/2019    ALKPHOS 69 07/21/2019    AST 27 07/21/2019    ALT 15 07/21/2019     Phosphorus:    Lab Results   Component Value Date    PHOS 4.0 06/15/2018     Uric Acid:    Lab Results   Component Value Date    LABURIC 3.0 07/22/2019     Last 3 Troponin:    Lab Results   Component Value Date    TROPONINI <0.01 07/21/2019    TROPONINI <0.01 05/08/2019    TROPONINI 0.12 03/27/2019     U/A:    Lab Results   Component Value Date    COLORU YELLOW 07/22/2019    PROTEINU Negative 07/22/2019    PHUR 7.0 07/22/2019    WBCUA 1 07/22/2019    RBCUA 8 07/22/2019    YEAST none 07/15/2019    BACTERIA 3+ 07/15/2019    BACTERIA 1+ 02/10/2019    CLARITYU Clear 07/22/2019    SPECGRAV 1.011 07/22/2019    LEUKOCYTESUR Negative 07/22/2019    UROBILINOGEN 0.2 07/22/2019    BILIRUBINUR Negative 07/22/2019    BILIRUBINUR NEGATIVE 05/08/2011    BLOODU SMALL 07/22/2019    GLUCOSEU Negative 07/22/2019    GLUCOSEU NEGATIVE 05/08/2011         IMPRESSION/RECOMMENDATIONS:      Diagnosis and Plan     1. Hyponatremia   Severe  Symptomatic  Na 135 in May 2019   Goal - Not to exceed 6-8  meq rise in 24 HRS/ 18 over 24 Hrs   Uosm 367, Serum Na 110-112 Likely SIADH , Serum uric acid- 3   Na up at 115 , Reduce 3 % Sodium chloride  rate to 30 ml/hr - monitor  2. Hypokalemia  To correct   3. UTI  4. Hypothyroidism   5. Hyperlipidemia   6. COPD  7.  dCHF       Prognosis : Guarded     Alice Salguero

## 2019-07-22 NOTE — H&P
extremity ([facility] [address] Hypercholesteremia, Hypertension, Hyperthyroidism, Mammogram abnormal, Neuromuscular disorder ([facility] [address] Pneumonia, Thyroid disease, and Tobacco abuse. Past Surgical History:   has a past surgical history that includes Tonsillectomy; Colonoscopy (1/19/2012); Toe Surgery (Right); Shoulder arthroscopy (Right, 6/18/14); and eye surgery. Medications:  No current facility-administered medications on file prior to encounter. Current Outpatient Medications on File Prior to Encounter   Medication Sig Dispense Refill    potassium chloride (KLOR-CON M) 20 MEQ TBCR extended release tablet TAKE ONE TABLET BY MOUTH DAILY 30 tablet 5    nitrofurantoin, macrocrystal-monohydrate, (MACROBID) 100 MG capsule Take 1 capsule by mouth daily 30 capsule 11    potassium chloride (KLOR-CON M) 20 MEQ TBCR extended release tablet TAKE ONE TABLET BY MOUTH DAILY 30 tablet 0    furosemide (LASIX) 40 MG tablet TAKE ONE TABLET BY MOUTH DAILY 30 tablet 1    labetalol (NORMODYNE) 100 MG tablet Take 1 tablet by mouth every 12 hours 60 tablet 3    cloNIDine (CATAPRES) 0.3 MG tablet TAKE ONE TABLET BY MOUTH THREE TIMES A DAY 90 tablet 5    clonazePAM (KLONOPIN) 0.5 MG tablet Take 0.5 tablets by mouth nightly as needed (insomnia).  15 tablet 2    aspirin 81 MG chewable tablet CHEW ONE TABLET BY MOUTH DAILY 30 tablet 2    ipratropium (ATROVENT) 0.02 % nebulizer solution Take 2.5 mLs by nebulization 3 times daily 360 mL 0    mirtazapine (REMERON) 15 MG tablet Take 1 tablet by mouth nightly 30 tablet 11    calcium elemental (OSCAL) 500 MG TABS tablet Take 1 tablet by mouth daily 30 tablet 3    oxybutynin (DITROPAN) 5 MG tablet TAKE ONE TABLET BY MOUTH TWICE A  tablet 3    atorvastatin (LIPITOR) 80 MG tablet Take 1 tablet by mouth daily 90 tablet 3    levothyroxine (SYNTHROID) 100 MCG tablet TAKE ONE TABLET BY MOUTH DAILY 90 tablet 3    lisinopril (PRINIVIL;ZESTRIL) 40 MG tablet Take 1 tablet by mouth daily 30 tablet 3    budesonide-formoterol (SYMBICORT) 160-4.5 MCG/ACT AERO Inhale 2 puffs into the lungs 2 times daily 1 Inhaler 2    Multiple Vitamins-Minerals (CENTRUM SILVER ADULT 50+ PO) Take 1 tablet by mouth daily       escitalopram (LEXAPRO) 10 MG tablet Take 10 mg by mouth daily      apixaban (ELIQUIS) 5 MG TABS tablet Take 1 tablet by mouth 2 times daily 60 tablet 2    zoster recombinant adjuvanted vaccine (SHINGRIX) 50 MCG SUSR injection . 5mL IM X 1 followed by . 5mL IM 2-6 months after dose #1 0.5 mL 0       Allergies: Allergies   Allergen Reactions    Lipitor [Atorvastatin] Other (See Comments)     Weakness, severe    Morphine Shortness Of Breath    Keflex [Cephalexin] Other (See Comments)     SOB & bilateral arms shaking     Hctz [Hydrochlorothiazide] Other (See Comments)     Hyponatremia, severe      Norvasc [Amlodipine Besylate] Swelling     Of neck    Penicillins Hives    Tramadol Itching    Hydralazine Palpitations and Other (See Comments)     Dizzy,fatigue,headaches,palpitations,loss of appetite    Shellfish-Derived Products Swelling and Rash     Swelling of neck        Social History:   reports that she has been smoking cigarettes. She has a 78.00 pack-year smoking history. She has never used smokeless tobacco. She reports that she does not drink alcohol or use drugs. Family History:  family history includes Alcohol Abuse in her father; Heart Disease in her father. ,     Physical Exam:  BP (!) 170/91   Pulse 70   Temp 97.8 °F (36.6 °C) (Oral)   Resp 18   Ht 5' 8\" (1.727 m)   Wt 130 lb (59 kg)   SpO2 95%   BMI 19.77 kg/m²     General appearance:  Appears comfortable. Well nourished  Eyes: Sclera clear, pupils equal  ENT: Moist mucus membranes, no thrush. Trachea midline. Cardiovascular: Regular rhythm, normal S1, S2. No murmur, gallop, rub.  No edema in lower extremities  Respiratory: Clear to auscultation bilaterally, no wheeze, good inspiratory effort  Gastrointestinal: Abdomen

## 2019-07-22 NOTE — PROGRESS NOTES
midline, no adenopathy. Cardiovascular: Regular rhythm, normal S1, S2. No murmur. No edema in lower extremities  Respiratory: Not using accessory muscles. Good inspiratory effort. Clear to auscultation bilaterally, no wheeze or crackles. GI: Abdomen soft, no tenderness, not distended, normal bowel sounds  Musculoskeletal: No cyanosis in digits, neck supple  Neurology: CN 2-12 grossly intact. No speech or motor deficits  Psych: Normal affect. Alert and oriented in time, place and person  Skin: Warm, dry, normal turgor  Extremity exam shows brisk capillary refill. Peripheral pulses are palpable in lower extremities     Labs and Tests:  CBC:   Recent Labs     07/21/19  1715   WBC 13.2*   HGB 14.7        BMP:  Recent Labs     07/21/19  1715 07/21/19  2045 07/22/19  0800 07/22/19  0936   * 113* 110* 110*   K 3.3*  --  3.2*  --    CL 71*  --  75*  --    CO2 25  --  22  --    BUN 12  --  10  --    CREATININE 0.5*  --  0.5*  --    GLUCOSE 125*  --  100*  --      Hepatic: Recent Labs     07/21/19  1715   AST 27   ALT 15   BILITOT 1.0   ALKPHOS 69     XR CHEST PORTABLE   Final Result   Persistent opacification at the left lung base, not significantly changed   most likely representing scarring or atelectasis. Otherwise stable chest         CT HEAD WO CONTRAST    (Results Pending)         Problem List  Active Problems:    Hyponatremia  Resolved Problems:    * No resolved hospital problems. *       Assessment & Plan:   Symptomatic severe hyponatremia serum sodium 110.   Will obtain TSH and urine sodium this is an emergency received 100 mL of 3% normal saline bolus followed by an hypertonic saline drip at 50 ml per hour sodium every 2 hours target increase 6 mg to 24 hours nephrology consulted received normal saline in the emergency room    Headache most likely related symptomatic hyponatremia could have cerebral edema CT of the head stat    H/o DVT anticoagulated with Eliquis    Hypertension controlled      Diet: DIET GENERAL;  Code:Full Code  DVT PPX eliquis  Disposition DC pending      Yusuf Vernon MD   7/22/2019 12:25 PM

## 2019-07-22 NOTE — PROGRESS NOTES
PICC line education:    -Risks  -Benefits  -Alternatives  -Procedure    Discussed the above with patient, verbalized understanding, answered all questions. Provided with information on PICC care to review. PICC tip verified via 3cg.  Reported off to patient's  Nurse Jessica Campos

## 2019-07-22 NOTE — PROGRESS NOTES
Patient given toradol, ativan and phenergan at this time. C/o severe headache and nausea. Will continue to monitor.

## 2019-07-23 LAB
ANION GAP SERPL CALCULATED.3IONS-SCNC: 11 MMOL/L (ref 3–16)
ANION GAP SERPL CALCULATED.3IONS-SCNC: 9 MMOL/L (ref 3–16)
BUN BLDV-MCNC: 12 MG/DL (ref 7–20)
BUN BLDV-MCNC: 8 MG/DL (ref 7–20)
CALCIUM SERPL-MCNC: 8.3 MG/DL (ref 8.3–10.6)
CALCIUM SERPL-MCNC: 8.4 MG/DL (ref 8.3–10.6)
CHLORIDE BLD-SCNC: 79 MMOL/L (ref 99–110)
CHLORIDE BLD-SCNC: 81 MMOL/L (ref 99–110)
CO2: 27 MMOL/L (ref 21–32)
CO2: 27 MMOL/L (ref 21–32)
CORTISOL - AM: 15.3 UG/DL (ref 4.3–22.4)
CREAT SERPL-MCNC: 0.5 MG/DL (ref 0.6–1.2)
CREAT SERPL-MCNC: <0.5 MG/DL (ref 0.6–1.2)
GFR AFRICAN AMERICAN: >60
GFR AFRICAN AMERICAN: >60
GFR NON-AFRICAN AMERICAN: >60
GFR NON-AFRICAN AMERICAN: >60
GLUCOSE BLD-MCNC: 101 MG/DL (ref 70–99)
GLUCOSE BLD-MCNC: 169 MG/DL (ref 70–99)
POTASSIUM SERPL-SCNC: 3.4 MMOL/L (ref 3.5–5.1)
POTASSIUM SERPL-SCNC: 3.7 MMOL/L (ref 3.5–5.1)
SODIUM BLD-SCNC: 116 MMOL/L (ref 136–145)
SODIUM BLD-SCNC: 116 MMOL/L (ref 136–145)
SODIUM BLD-SCNC: 117 MMOL/L (ref 136–145)
SODIUM BLD-SCNC: 118 MMOL/L (ref 136–145)
SODIUM BLD-SCNC: 119 MMOL/L (ref 136–145)
TSH REFLEX: 1.94 UIU/ML (ref 0.27–4.2)

## 2019-07-23 PROCEDURE — 6370000000 HC RX 637 (ALT 250 FOR IP): Performed by: INTERNAL MEDICINE

## 2019-07-23 PROCEDURE — 94760 N-INVAS EAR/PLS OXIMETRY 1: CPT

## 2019-07-23 PROCEDURE — 82533 TOTAL CORTISOL: CPT

## 2019-07-23 PROCEDURE — 97166 OT EVAL MOD COMPLEX 45 MIN: CPT

## 2019-07-23 PROCEDURE — 2580000003 HC RX 258: Performed by: INTERNAL MEDICINE

## 2019-07-23 PROCEDURE — 2580000003 HC RX 258: Performed by: FAMILY MEDICINE

## 2019-07-23 PROCEDURE — 36415 COLL VENOUS BLD VENIPUNCTURE: CPT

## 2019-07-23 PROCEDURE — 97162 PT EVAL MOD COMPLEX 30 MIN: CPT

## 2019-07-23 PROCEDURE — 6360000002 HC RX W HCPCS: Performed by: INTERNAL MEDICINE

## 2019-07-23 PROCEDURE — 94761 N-INVAS EAR/PLS OXIMETRY MLT: CPT

## 2019-07-23 PROCEDURE — 97530 THERAPEUTIC ACTIVITIES: CPT

## 2019-07-23 PROCEDURE — 6370000000 HC RX 637 (ALT 250 FOR IP): Performed by: FAMILY MEDICINE

## 2019-07-23 PROCEDURE — 84295 ASSAY OF SERUM SODIUM: CPT

## 2019-07-23 PROCEDURE — 94640 AIRWAY INHALATION TREATMENT: CPT

## 2019-07-23 PROCEDURE — 97535 SELF CARE MNGMENT TRAINING: CPT

## 2019-07-23 PROCEDURE — 2060000000 HC ICU INTERMEDIATE R&B

## 2019-07-23 PROCEDURE — 6360000002 HC RX W HCPCS: Performed by: FAMILY MEDICINE

## 2019-07-23 PROCEDURE — 80048 BASIC METABOLIC PNL TOTAL CA: CPT

## 2019-07-23 RX ORDER — CLONIDINE HYDROCHLORIDE 0.1 MG/1
0.1 TABLET ORAL ONCE
Status: COMPLETED | OUTPATIENT
Start: 2019-07-23 | End: 2019-07-23

## 2019-07-23 RX ORDER — ACETAMINOPHEN 325 MG/1
650 TABLET ORAL EVERY 4 HOURS PRN
Status: DISCONTINUED | OUTPATIENT
Start: 2019-07-23 | End: 2019-07-27 | Stop reason: HOSPADM

## 2019-07-23 RX ORDER — CLONIDINE HYDROCHLORIDE 0.1 MG/1
0.1 TABLET ORAL 3 TIMES DAILY
Status: DISCONTINUED | OUTPATIENT
Start: 2019-07-23 | End: 2019-07-27 | Stop reason: HOSPADM

## 2019-07-23 RX ORDER — ONDANSETRON 2 MG/ML
4 INJECTION INTRAMUSCULAR; INTRAVENOUS ONCE
Status: COMPLETED | OUTPATIENT
Start: 2019-07-23 | End: 2019-07-23

## 2019-07-23 RX ORDER — LABETALOL 200 MG/1
200 TABLET, FILM COATED ORAL EVERY 12 HOURS SCHEDULED
Status: DISCONTINUED | OUTPATIENT
Start: 2019-07-23 | End: 2019-07-27 | Stop reason: HOSPADM

## 2019-07-23 RX ORDER — PROMETHAZINE HYDROCHLORIDE 25 MG/ML
12.5 INJECTION, SOLUTION INTRAMUSCULAR; INTRAVENOUS EVERY 4 HOURS PRN
Status: DISCONTINUED | OUTPATIENT
Start: 2019-07-23 | End: 2019-07-27 | Stop reason: HOSPADM

## 2019-07-23 RX ORDER — SODIUM CHLORIDE 1000 MG
1 TABLET, SOLUBLE MISCELLANEOUS
Status: DISCONTINUED | OUTPATIENT
Start: 2019-07-23 | End: 2019-07-27 | Stop reason: HOSPADM

## 2019-07-23 RX ORDER — POTASSIUM CHLORIDE 750 MG/1
40 TABLET, FILM COATED, EXTENDED RELEASE ORAL 3 TIMES DAILY
Status: DISCONTINUED | OUTPATIENT
Start: 2019-07-23 | End: 2019-07-24

## 2019-07-23 RX ORDER — LABETALOL 200 MG/1
200 TABLET, FILM COATED ORAL ONCE
Status: COMPLETED | OUTPATIENT
Start: 2019-07-23 | End: 2019-07-23

## 2019-07-23 RX ORDER — ONDANSETRON 2 MG/ML
4 INJECTION INTRAMUSCULAR; INTRAVENOUS EVERY 4 HOURS PRN
Status: DISCONTINUED | OUTPATIENT
Start: 2019-07-23 | End: 2019-07-27 | Stop reason: HOSPADM

## 2019-07-23 RX ADMIN — ONDANSETRON 4 MG: 2 INJECTION INTRAMUSCULAR; INTRAVENOUS at 12:57

## 2019-07-23 RX ADMIN — ACETAMINOPHEN 650 MG: 325 TABLET, FILM COATED ORAL at 05:16

## 2019-07-23 RX ADMIN — APIXABAN 5 MG: 5 TABLET, FILM COATED ORAL at 09:09

## 2019-07-23 RX ADMIN — OXYBUTYNIN CHLORIDE 5 MG: 5 TABLET ORAL at 09:10

## 2019-07-23 RX ADMIN — ESCITALOPRAM OXALATE 10 MG: 10 TABLET ORAL at 09:10

## 2019-07-23 RX ADMIN — ONDANSETRON 4 MG: 2 INJECTION INTRAMUSCULAR; INTRAVENOUS at 08:58

## 2019-07-23 RX ADMIN — Medication 10 ML: at 20:29

## 2019-07-23 RX ADMIN — LABETALOL HYDROCHLORIDE 200 MG: 200 TABLET, FILM COATED ORAL at 09:23

## 2019-07-23 RX ADMIN — LEVOTHYROXINE SODIUM 100 MCG: 100 TABLET ORAL at 05:16

## 2019-07-23 RX ADMIN — Medication 2 PUFF: at 19:01

## 2019-07-23 RX ADMIN — PROMETHAZINE HYDROCHLORIDE 12.5 MG: 25 INJECTION INTRAMUSCULAR; INTRAVENOUS at 07:04

## 2019-07-23 RX ADMIN — ONDANSETRON 4 MG: 2 INJECTION INTRAMUSCULAR; INTRAVENOUS at 02:54

## 2019-07-23 RX ADMIN — ASPIRIN 81 MG 81 MG: 81 TABLET ORAL at 09:09

## 2019-07-23 RX ADMIN — POTASSIUM CHLORIDE 40 MEQ: 750 TABLET, FILM COATED, EXTENDED RELEASE ORAL at 14:39

## 2019-07-23 RX ADMIN — CLONIDINE HYDROCHLORIDE 0.1 MG: 0.1 TABLET ORAL at 14:39

## 2019-07-23 RX ADMIN — Medication 10 ML: at 20:28

## 2019-07-23 RX ADMIN — POTASSIUM CHLORIDE 20 MEQ: 750 TABLET, FILM COATED, EXTENDED RELEASE ORAL at 09:22

## 2019-07-23 RX ADMIN — CLONIDINE HYDROCHLORIDE 0.1 MG: 0.1 TABLET ORAL at 09:23

## 2019-07-23 RX ADMIN — Medication 10 ML: at 09:11

## 2019-07-23 RX ADMIN — PROMETHAZINE HYDROCHLORIDE 12.5 MG: 25 INJECTION INTRAMUSCULAR; INTRAVENOUS at 21:05

## 2019-07-23 RX ADMIN — ONDANSETRON 4 MG: 2 INJECTION INTRAMUSCULAR; INTRAVENOUS at 19:10

## 2019-07-23 RX ADMIN — OXYBUTYNIN CHLORIDE 5 MG: 5 TABLET ORAL at 20:29

## 2019-07-23 RX ADMIN — APIXABAN 5 MG: 5 TABLET, FILM COATED ORAL at 20:28

## 2019-07-23 RX ADMIN — Medication 2 PUFF: at 07:42

## 2019-07-23 RX ADMIN — SODIUM CHLORIDE TAB 1 GM 1 G: 1 TAB at 14:49

## 2019-07-23 RX ADMIN — SODIUM CHLORIDE TAB 1 GM 1 G: 1 TAB at 17:01

## 2019-07-23 RX ADMIN — POTASSIUM CHLORIDE 40 MEQ: 750 TABLET, FILM COATED, EXTENDED RELEASE ORAL at 20:28

## 2019-07-23 RX ADMIN — PROMETHAZINE HYDROCHLORIDE 12.5 MG: 25 INJECTION INTRAMUSCULAR; INTRAVENOUS at 14:39

## 2019-07-23 RX ADMIN — CLONIDINE HYDROCHLORIDE 0.1 MG: 0.1 TABLET ORAL at 20:28

## 2019-07-23 RX ADMIN — ATORVASTATIN CALCIUM 80 MG: 80 TABLET, FILM COATED ORAL at 09:10

## 2019-07-23 RX ADMIN — LABETALOL HYDROCHLORIDE 200 MG: 200 TABLET, FILM COATED ORAL at 20:28

## 2019-07-23 ASSESSMENT — PAIN SCALES - GENERAL
PAINLEVEL_OUTOF10: 0
PAINLEVEL_OUTOF10: 2
PAINLEVEL_OUTOF10: 6

## 2019-07-23 ASSESSMENT — PAIN DESCRIPTION - LOCATION
LOCATION: HEAD

## 2019-07-23 ASSESSMENT — PAIN DESCRIPTION - PAIN TYPE
TYPE: ACUTE PAIN

## 2019-07-23 NOTE — PROGRESS NOTES
[facility]    7/23/2019 11:41 AM        Name: Sanjuanita Weldon . Admitted: 7/21/2019  Primary Care Provider: Shasta Juarez MD (Tel: [phone])        Subjective: Headache is resolved. Continues have nausea and vomiting.   On hypertonic saline drip today's morning serum sodium was 118      Reviewed interval ancillary notes    Current Medications    acetaminophen (TYLENOL) tablet 650 mg Q4H PRN   cloNIDine (CATAPRES) tablet 0.1 mg TID   labetalol (NORMODYNE) tablet 200 mg 2 times per day   promethazine (PHENERGAN) injection 12.5 mg Q4H PRN   ondansetron (ZOFRAN) injection 4 mg Q4H PRN   LORazepam (ATIVAN) injection 2 mg Q4H PRN   potassium chloride (KLOR-CON) extended release tablet 20 mEq TID   lidocaine PF 1 % injection 5 mL Once   sodium chloride flush 0.9 % injection 10 mL 2 times per day   sodium chloride flush 0.9 % injection 10 mL PRN   apixaban (ELIQUIS) tablet 5 mg BID   aspirin chewable tablet 81 mg Daily   atorvastatin (LIPITOR) tablet 80 mg Daily   clonazePAM (KLONOPIN) tablet 0.25 mg Nightly PRN   escitalopram (LEXAPRO) tablet 10 mg Daily   levothyroxine (SYNTHROID) tablet 100 mcg QAM AC   oxybutynin (DITROPAN) tablet 5 mg BID   sodium chloride flush 0.9 % injection 10 mL 2 times per day   sodium chloride flush 0.9 % injection 10 mL PRN   magnesium hydroxide (MILK OF MAGNESIA) 400 MG/5ML suspension 30 mL Daily PRN   mometasone-formoterol (DULERA) 200-5 MCG/ACT inhaler 2 puff BID       Objective:  BP (!) 187/92   Pulse 70   Temp 98.3 °F (36.8 °C) (Oral)   Resp 16   Ht 5' 8\" (1.727 m)   Wt 136 lb 14.4 oz (62.1 kg)   SpO2 96%   BMI 20.82 kg/m²     Intake/Output Summary (Last 24 hours) at 7/23/2019 1141  Last data filed at 7/23/2019 0928  Gross per 24 hour   Intake 1173 ml   Output --   Net 1173 ml      Wt Readings from Last 3 Encounters:   07/23/19 136 lb 14.4 oz (62.1 kg) received hypertonic saline 100 mL bolus followed by infusion 50 mL/h serum sodium today 118 goal correction 6 to 8 mg to 24 hours. Hypertonic saline currently on fluid restriction BMP every 4 hours if overshoot the target will give hypotonic solution. discussed with nephrology.      Headache resolved CT head does not show any signs of cerebral edema    H/o DVT anticoagulated with Eliquis    Hypertension controlled    Discharge home tomorrow    Physical Therapy      Diet: DIET GENERAL; Daily Fluid Restriction: 1200 ml  Code:Full Code  DVT PPX eliquis  Disposition DC Tomorrow      Vanna Petty MD   7/23/2019 11:41 AM

## 2019-07-23 NOTE — CONSULTS
[address]                     [facility], [address]                                  CONSULTATION    PATIENT NAME: Nicolette Munoz                      :        1945  MED REC NO:   [id number]                          ROOM:       9247  ACCOUNT NO:   [de-identified]                           ADMIT DATE: 2019  PROVIDER:     Fred Caballero MD    [address]:  2019    LOCATION:  Room #5510. REASON FOR CONSULTATION:  Hyponatremia (sodium 110-112). HISTORY OF PRESENT ILLNESS:  This 50-year-old  female was  brought to the ER by [facility]. She has had nausea and  vomiting for two days. Prior to that, she had been diagnosed with a UTI  and was on antibiotics. She has had weakness for a week and one  witnessed mechanical fall. There has been no loss of consciousness. There has been no seizures or tongue bite or urinary or fecal  incontinence. The patient was seen in the ER on  and at 5:15,  sodium was 112 with a K of 3.3 and a creatinine of 0.5. At 10:45 p.m.,  sodium was 113; this morning it is 110 hence the consult. The patient  denies any seizure activity. She had a headache, but none at this time. There has been dysuria, no gross hematuria. REVIEW OF SYSTEMS:  A 10-point review of systems was done. Positive  features as in the history of present illness, rest negative. PAST MEDICAL HISTORY:  1.  CVA. 2.  Coronary artery disease. 3.  COPD. 4.  DVT. 5.  Hyperlipidemia. 6.  Hypothyroidism. 7.  Status post tonsillectomy. 8.  Status post right shoulder surgery. MEDICATIONS AT HOME:  Potassium chloride, nitrofurantoin, Lasix,  Normodyne, Catapres, Klonopin, aspirin, Atrovent, Remeron, _____,  Ditropan, Lipitor, Synthroid, Zestril, multivitamins, Lexapro, Eliquis,  _____. ALLERGIES:  To MORPHINE.   HYDROCHLOROTHIAZIDE is listed because last  time she had hypernatremia

## 2019-07-23 NOTE — PROGRESS NOTES
Occupational Therapy   Occupational Therapy Initial Assessment  Date: 2019   Patient Name: Debby Carmona  MRN: [id number]     : 1945    Date of Service: 2019    Discharge Recommendations:  Debby Carmona scored a 14/24 on the AM-PAC ADL Inpatient form. Current research shows that an AM-PAC score of 17 or less is typically not associated with a discharge to the patient's home setting. Based on the patients AM-PAC score and their current ADL deficits, it is recommended that the patient have 3-5 sessions per week of Occupational Therapy at d/c to increase the patients independence. HOME HEALTH CARE: LEVEL 3 SAFETY     - Initial home health evaluation to occur within 24-48 hours, in patient home   - Therapy evaluations in home within 24-48 hours of discharge; including DME and home safety   - Frontload therapy 5 days, then 3x a week   - Therapy to evaluate if patient has [address] needs for personal care   -  evaluation within 24-48 hours, includes evaluation of resources and insurance to determine [place], [place], LTC, and Medicaid options     Pt declines placement in a SNF upon discharge as recommended, but is open to home therapy services. OT Equipment Recommendations  Equipment Needed: Yes  Mobility Devices: Alfonse Prince; ADL Assistive Devices  Walker: Rolling  ADL Assistive Devices: Long-handled Sponge;Sock-Aid Hard;Long-handled Shoe Horn;Grab Bars - shower;Grab Bars - toilet    Assessment   Performance deficits / Impairments: Decreased functional mobility ; Decreased safe awareness;Decreased balance;Decreased coordination;Decreased ADL status; Decreased cognition;Decreased ROM; Decreased endurance;Decreased high-level IADLs;Decreased strength;Decreased fine motor control  Assessment: Pt is currently limited in ADLs and functional mobility d/t the above deficits. Pt would benefit from skilled acute OT services to address these deficits and increase safety and independence.  Pt declines chair)  Assist Level: Moderate assistance  ADL  Feeding: Setup(Opened packaging and poured water to assist, pt scooped food and brought to mouth independently)  Grooming: Minimal assistance;Setup;Verbal cueing(Seated in chair; Pt used mouthwash, combed hair, and washed face with wash cloth with setup and verbal cues, pt required assistance to wash back of head with washcloth)  UE Bathing: Setup;Stand by assistance(Pt washed arms and underarms with wipes seated EOB)  LE Bathing: Setup; Moderate assistance(Pt washed upper LE with assistance needed to reach feet)  UE Dressing: Minimal assistance;Setup;Verbal cueing  LE Dressing: Maximum assistance;Setup(Pt assisted in pulling up briefs with max A required, pt attempted to don socks with mod-max A required to put over toes and pull up)  Toileting: Dependent/Total(external catheter and briefs; pt wiped perineal area with wipes with setup and VC)  Additional Comments: Pt limited in ADLs by decreased endurance, ROM, strength, cognition, and impaired use of LUE d/t reemerging symptoms of previous CVA. Tone RUE  RUE Tone: Normotonic  Tone LUE  LUE Tone: Hypotonic  Coordination  Movements Are Fluid And Coordinated: No  Coordination and Movement description: Decreased speed;Decreased accuracy; Left UE     Bed mobility  Rolling to Left: Modified independent(HOB elevated, bed rail use)  Rolling to Right: Minimal assistance(Min A of 1, vc/tc to have pt grab handrail, Pt had poor LUE strength )  Supine to Sit: Stand by assistance(Poor trunk control, use of bed rails)  Scooting: Stand by assistance(Poor trunk control.)  Comment: HOB elevated. Pt had poor trunk control with right and mild posterior lean. Transfers  Stand Step Transfers: Moderate assistance  Sit to stand: Minimal assistance  Stand to sit: Contact guard assistance  Transfer Comments: Pt has difficulty maintaining midline and distributing weight equally d/t right lean and mild posterior lean.  Pt had very poor safety awareness when sitting and did not look or reach back before sitting down; therapist had to quickly guide hips into chair to avoid sitting on chair arm. Vision - Basic Assessment  Prior Vision: Wears glasses only for reading  Patient Visual Report: No visual complaint reported. Oculo Motor Control: WNL  Vision Comments: Tracking assessed with no abnormal results; reemerging left neglect suspect rather than left visual field cut  Cognition  Overall Cognitive Status: Exceptions  Arousal/Alertness: Delayed responses to stimuli  Following Commands: Follows one step commands with increased time; Follows one step commands with repetition  Attention Span: Attends with cues to redirect  Memory: Decreased recall of precautions;Decreased short term memory  Safety Judgement: Decreased awareness of need for assistance;Decreased awareness of need for safety  Problem Solving: Decreased awareness of errors;Assistance required to generate solutions;Assistance required to identify errors made;Assistance required to correct errors made;Assistance required to implement solutions  Insights: Decreased awareness of deficits  Initiation: Requires cues for some  Sequencing: Requires cues for some  Cognition Comment: Pt has some reemerging symptoms of a previous R CVA with significant deficits in safety awareness. Perception  Overall Perceptual Status: Impaired  Initiation: Cues to initiate tasks  Motor Planning: Cues to use objects appropriately     Sensation  Overall Sensation Status: WFL        LUE PROM (degrees)  LUE General PROM: Pt increased ROM to ~100 degrees with PROM from ~80 degrees AROM.   Left Hand AROM (degrees)  Left Hand AROM: WFL  RUE AROM (degrees)  RUE AROM : WFL  Right Hand AROM (degrees)  Right Hand AROM: WFL  LUE Strength  L Hand General: 2+/5  RUE Strength  R Hand General: 3-/5                   Plan   Plan  Times per week: 3-5  Times per day: Daily  Current Treatment Recommendations: Strengthening, Endurance

## 2019-07-23 NOTE — PROGRESS NOTES
LLE: WFL  Comment: Generally 3+/5 but able to amb. Sensation  Overall Sensation Status: WFL  Bed mobility  Rolling to Left: Modified independent(Use of Handrail)  Rolling to Right: Minimal assistance(Min A of 1, vc/tc to have pt grab handrail, Pt had poor LUE strength )  Supine to Sit: Stand by assistance(Poor trunk control, use of bed rails)  Scooting: Stand by assistance(Poor trunk control.)  Comment: HOB elevated. Pt had poor trunk control with right and mild posterior lean  Transfers  Sit to Stand: Contact guard assistance  Stand to sit: Contact guard assistance  Bed to Chair: Moderate assistance(Mod A of 1)  Comment: Pt impulsive and began to try to sit in chair with no warning and prior to pt being over the chair. Pt began to try to sit on chair handrail and mod A of 1 was required to get pt to sit in chair properly. Pt then performed a sit to/from stand with CGA and multiple vc/tc for safety. Ambulation  Ambulation?: Yes  More Ambulation?: No  Ambulation 1  Device: Rolling Walker  Assistance: Moderate assistance(Mod A of 1 for walker control and correction of right posterior lean.)  Quality of Gait: Pt unsteady on feet and LOB towards right posterior -- pt was able to correct with Mod A of 1  Gait Deviations: Slow Kerrie; Increased KYLE; Decreased step length;Decreased step height  Distance: 5 Steps to chair  Comments: Pt did not put much weight through LUE and LLE causing the walker to tip towards the R and backwards. Pt noticed the walker coming off the ground with vc/tc and was able to correct for a brief amount of time before reverting. Stairs/Curb  Stairs?: No     Balance  Posture: Fair  Sitting - Static: Fair  Sitting - Dynamic: Fair;-  Standing - Static: Poor  Standing - Dynamic: Poor  Comments: Pt presented with Right posterior lean which was able to be corrected with vc/tc during but would revert back to leaning within a minute.  Pt had excessive leaning during standing and amb and PT needed to

## 2019-07-23 NOTE — PLAN OF CARE
Nausea improved after IM phenergan tolerating po intake -ate frost brought in by - call light in reach bed alarm on

## 2019-07-24 ENCOUNTER — APPOINTMENT (OUTPATIENT)
Dept: CT IMAGING | Age: 74
DRG: 644 | End: 2019-07-24
Payer: COMMERCIAL

## 2019-07-24 LAB
ANION GAP SERPL CALCULATED.3IONS-SCNC: 8 MMOL/L (ref 3–16)
ANION GAP SERPL CALCULATED.3IONS-SCNC: 8 MMOL/L (ref 3–16)
ANION GAP SERPL CALCULATED.3IONS-SCNC: 9 MMOL/L (ref 3–16)
BUN BLDV-MCNC: 12 MG/DL (ref 7–20)
BUN BLDV-MCNC: 12 MG/DL (ref 7–20)
BUN BLDV-MCNC: 13 MG/DL (ref 7–20)
CALCIUM SERPL-MCNC: 8.2 MG/DL (ref 8.3–10.6)
CALCIUM SERPL-MCNC: 8.2 MG/DL (ref 8.3–10.6)
CALCIUM SERPL-MCNC: 8.4 MG/DL (ref 8.3–10.6)
CHLORIDE BLD-SCNC: 85 MMOL/L (ref 99–110)
CHLORIDE BLD-SCNC: 89 MMOL/L (ref 99–110)
CHLORIDE BLD-SCNC: 89 MMOL/L (ref 99–110)
CO2: 25 MMOL/L (ref 21–32)
CO2: 26 MMOL/L (ref 21–32)
CO2: 27 MMOL/L (ref 21–32)
CREAT SERPL-MCNC: 0.5 MG/DL (ref 0.6–1.2)
CREAT SERPL-MCNC: 0.6 MG/DL (ref 0.6–1.2)
CREAT SERPL-MCNC: 0.6 MG/DL (ref 0.6–1.2)
GFR AFRICAN AMERICAN: >60
GFR NON-AFRICAN AMERICAN: >60
GLUCOSE BLD-MCNC: 108 MG/DL (ref 70–99)
GLUCOSE BLD-MCNC: 149 MG/DL (ref 70–99)
GLUCOSE BLD-MCNC: 94 MG/DL (ref 70–99)
MAGNESIUM: 1.9 MG/DL (ref 1.8–2.4)
POTASSIUM SERPL-SCNC: 4.7 MMOL/L (ref 3.5–5.1)
POTASSIUM SERPL-SCNC: 4.8 MMOL/L (ref 3.5–5.1)
POTASSIUM SERPL-SCNC: 5.1 MMOL/L (ref 3.5–5.1)
SODIUM BLD-SCNC: 120 MMOL/L (ref 136–145)
SODIUM BLD-SCNC: 122 MMOL/L (ref 136–145)
SODIUM BLD-SCNC: 124 MMOL/L (ref 136–145)

## 2019-07-24 PROCEDURE — 6370000000 HC RX 637 (ALT 250 FOR IP): Performed by: INTERNAL MEDICINE

## 2019-07-24 PROCEDURE — 6360000004 HC RX CONTRAST MEDICATION: Performed by: PHYSICIAN ASSISTANT

## 2019-07-24 PROCEDURE — 6360000002 HC RX W HCPCS: Performed by: INTERNAL MEDICINE

## 2019-07-24 PROCEDURE — 2580000003 HC RX 258: Performed by: FAMILY MEDICINE

## 2019-07-24 PROCEDURE — 2060000000 HC ICU INTERMEDIATE R&B

## 2019-07-24 PROCEDURE — 6370000000 HC RX 637 (ALT 250 FOR IP): Performed by: FAMILY MEDICINE

## 2019-07-24 PROCEDURE — 80048 BASIC METABOLIC PNL TOTAL CA: CPT

## 2019-07-24 PROCEDURE — 83735 ASSAY OF MAGNESIUM: CPT

## 2019-07-24 PROCEDURE — 74176 CT ABD & PELVIS W/O CONTRAST: CPT

## 2019-07-24 PROCEDURE — 94640 AIRWAY INHALATION TREATMENT: CPT

## 2019-07-24 PROCEDURE — 2580000003 HC RX 258: Performed by: INTERNAL MEDICINE

## 2019-07-24 PROCEDURE — C9113 INJ PANTOPRAZOLE SODIUM, VIA: HCPCS | Performed by: INTERNAL MEDICINE

## 2019-07-24 RX ORDER — PANTOPRAZOLE SODIUM 40 MG/10ML
40 INJECTION, POWDER, LYOPHILIZED, FOR SOLUTION INTRAVENOUS DAILY
Status: DISCONTINUED | OUTPATIENT
Start: 2019-07-24 | End: 2019-07-27 | Stop reason: HOSPADM

## 2019-07-24 RX ADMIN — Medication 10 ML: at 21:22

## 2019-07-24 RX ADMIN — CLONIDINE HYDROCHLORIDE 0.1 MG: 0.1 TABLET ORAL at 21:22

## 2019-07-24 RX ADMIN — APIXABAN 5 MG: 5 TABLET, FILM COATED ORAL at 21:22

## 2019-07-24 RX ADMIN — CLONIDINE HYDROCHLORIDE 0.1 MG: 0.1 TABLET ORAL at 08:19

## 2019-07-24 RX ADMIN — SODIUM CHLORIDE TAB 1 GM 1 G: 1 TAB at 17:03

## 2019-07-24 RX ADMIN — ONDANSETRON 4 MG: 2 INJECTION INTRAMUSCULAR; INTRAVENOUS at 17:46

## 2019-07-24 RX ADMIN — POTASSIUM CHLORIDE 40 MEQ: 750 TABLET, FILM COATED, EXTENDED RELEASE ORAL at 08:19

## 2019-07-24 RX ADMIN — IOHEXOL 50 ML: 240 INJECTION, SOLUTION INTRATHECAL; INTRAVASCULAR; INTRAVENOUS; ORAL at 17:03

## 2019-07-24 RX ADMIN — APIXABAN 5 MG: 5 TABLET, FILM COATED ORAL at 08:20

## 2019-07-24 RX ADMIN — CLONIDINE HYDROCHLORIDE 0.1 MG: 0.1 TABLET ORAL at 13:25

## 2019-07-24 RX ADMIN — CLONAZEPAM 0.25 MG: 0.5 TABLET ORAL at 21:21

## 2019-07-24 RX ADMIN — SODIUM CHLORIDE TAB 1 GM 1 G: 1 TAB at 08:20

## 2019-07-24 RX ADMIN — Medication 2 PUFF: at 19:39

## 2019-07-24 RX ADMIN — ATORVASTATIN CALCIUM 80 MG: 80 TABLET, FILM COATED ORAL at 08:20

## 2019-07-24 RX ADMIN — PROMETHAZINE HYDROCHLORIDE 12.5 MG: 25 INJECTION INTRAMUSCULAR; INTRAVENOUS at 12:05

## 2019-07-24 RX ADMIN — ONDANSETRON 4 MG: 2 INJECTION INTRAMUSCULAR; INTRAVENOUS at 21:21

## 2019-07-24 RX ADMIN — ESCITALOPRAM OXALATE 10 MG: 10 TABLET ORAL at 08:19

## 2019-07-24 RX ADMIN — OXYBUTYNIN CHLORIDE 5 MG: 5 TABLET ORAL at 21:22

## 2019-07-24 RX ADMIN — Medication 10 ML: at 08:20

## 2019-07-24 RX ADMIN — Medication 2 PUFF: at 09:54

## 2019-07-24 RX ADMIN — PANTOPRAZOLE SODIUM 40 MG: 40 INJECTION, POWDER, FOR SOLUTION INTRAVENOUS at 14:14

## 2019-07-24 RX ADMIN — OXYBUTYNIN CHLORIDE 5 MG: 5 TABLET ORAL at 08:20

## 2019-07-24 RX ADMIN — LEVOTHYROXINE SODIUM 100 MCG: 100 TABLET ORAL at 06:04

## 2019-07-24 RX ADMIN — SODIUM CHLORIDE TAB 1 GM 1 G: 1 TAB at 12:06

## 2019-07-24 RX ADMIN — ONDANSETRON 4 MG: 2 INJECTION INTRAMUSCULAR; INTRAVENOUS at 08:19

## 2019-07-24 RX ADMIN — LABETALOL HYDROCHLORIDE 200 MG: 200 TABLET, FILM COATED ORAL at 21:22

## 2019-07-24 RX ADMIN — LABETALOL HYDROCHLORIDE 200 MG: 200 TABLET, FILM COATED ORAL at 08:19

## 2019-07-24 RX ADMIN — ASPIRIN 81 MG 81 MG: 81 TABLET ORAL at 08:19

## 2019-07-24 RX ADMIN — LORAZEPAM 2 MG: 2 INJECTION INTRAMUSCULAR; INTRAVENOUS at 13:25

## 2019-07-24 ASSESSMENT — PAIN SCALES - GENERAL
PAINLEVEL_OUTOF10: 0

## 2019-07-24 NOTE — PLAN OF CARE
Problem: Falls - Risk of:  Goal: Will remain free from falls  Description  Will remain free from falls  Outcome: Ongoing  Note:   Pt remains free of falls at this time. Problem: Risk for Impaired Skin Integrity  Goal: Tissue integrity - skin and mucous membranes  Description  Structural intactness and normal physiological function of skin and  mucous membranes. Outcome: Ongoing  Note:   Skin remains dry and intact, no evidence of skin breakdown. Problem: Nausea/Vomiting:  Goal: Absence of nausea/vomiting  Description  Absence of nausea/vomiting  Outcome: Ongoing  Note:   Pt continues with nausea this morning. PRN Zofran administered.

## 2019-07-24 NOTE — PROGRESS NOTES
07/24/2019    BUN 12 07/24/2019    CREATININE 0.5 07/24/2019    GFRAA >60 07/24/2019    GFRAA >60 05/02/2013    AGRATIO 1.3 07/21/2019    LABGLOM >60 07/24/2019    LABGLOM 79 12/11/2017    GLUCOSE 108 07/24/2019    PROT 8.1 07/21/2019    PROT 7.1 10/18/2012    LABALBU 4.5 07/21/2019    CALCIUM 8.4 07/24/2019    BILITOT 1.0 07/21/2019    ALKPHOS 69 07/21/2019    AST 27 07/21/2019    ALT 15 07/21/2019     Phosphorus:    Lab Results   Component Value Date    PHOS 4.0 06/15/2018     Uric Acid:    Lab Results   Component Value Date    LABURIC 3.0 07/22/2019     Last 3 Troponin:    Lab Results   Component Value Date    TROPONINI <0.01 07/21/2019    TROPONINI <0.01 05/08/2019    TROPONINI 0.12 03/27/2019     U/A:    Lab Results   Component Value Date    COLORU YELLOW 07/22/2019    PROTEINU Negative 07/22/2019    PHUR 7.0 07/22/2019    WBCUA 1 07/22/2019    RBCUA 8 07/22/2019    YEAST none 07/15/2019    BACTERIA 3+ 07/15/2019    BACTERIA 1+ 02/10/2019    CLARITYU Clear 07/22/2019    SPECGRAV 1.011 07/22/2019    LEUKOCYTESUR Negative 07/22/2019    UROBILINOGEN 0.2 07/22/2019    BILIRUBINUR Negative 07/22/2019    BILIRUBINUR NEGATIVE 05/08/2011    BLOODU SMALL 07/22/2019    GLUCOSEU Negative 07/22/2019    GLUCOSEU NEGATIVE 05/08/2011         IMPRESSION/RECOMMENDATIONS:      Diagnosis and Plan     1. Hyponatremia   Severe, Symptomatic  Na 135 in May 2019   Goal - not to exceed 8 meq over  24 Hrs   Uosm 367, U na 64 , U K 49 Serum Na 110-112 Likely SIADH , Serum uric acid- 3   Na 124   On fluid restriction 1200 ml/ 24 HRS and salt tablets  Continue to monitor    2. Hypokalemia  Corrected   Hold replacement and monitor   3. UTI  4. Hypothyroidism   TSH 1.5   5. Hyperlipidemia   6. COPD  7. dCHF   8.  HTN  BP up       Prognosis : Guarded     Donald Duverney

## 2019-07-24 NOTE — PROGRESS NOTES
[facility]    7/24/2019 10:45 AM        Name: Edu Magallanes . Admitted: 7/21/2019  Primary Care Provider: Tim Guajardo MD (Tel: [phone])        Subjective: Headache is resolved. Continues have nausea .   He is on fluid restriction tolerating diet no abdominal pain no fever      Reviewed interval ancillary notes    Current Medications    acetaminophen (TYLENOL) tablet 650 mg Q4H PRN   cloNIDine (CATAPRES) tablet 0.1 mg TID   labetalol (NORMODYNE) tablet 200 mg 2 times per day   promethazine (PHENERGAN) injection 12.5 mg Q4H PRN   ondansetron (ZOFRAN) injection 4 mg Q4H PRN   sodium chloride tablet 1 g TID WC   LORazepam (ATIVAN) injection 2 mg Q4H PRN   lidocaine PF 1 % injection 5 mL Once   sodium chloride flush 0.9 % injection 10 mL 2 times per day   sodium chloride flush 0.9 % injection 10 mL PRN   apixaban (ELIQUIS) tablet 5 mg BID   aspirin chewable tablet 81 mg Daily   atorvastatin (LIPITOR) tablet 80 mg Daily   clonazePAM (KLONOPIN) tablet 0.25 mg Nightly PRN   escitalopram (LEXAPRO) tablet 10 mg Daily   levothyroxine (SYNTHROID) tablet 100 mcg QAM AC   oxybutynin (DITROPAN) tablet 5 mg BID   sodium chloride flush 0.9 % injection 10 mL 2 times per day   sodium chloride flush 0.9 % injection 10 mL PRN   magnesium hydroxide (MILK OF MAGNESIA) 400 MG/5ML suspension 30 mL Daily PRN   mometasone-formoterol (DULERA) 200-5 MCG/ACT inhaler 2 puff BID       Objective:  BP (!) 173/96   Pulse 70   Temp 98.2 °F (36.8 °C) (Oral)   Resp 18   Ht 5' 8\" (1.727 m)   Wt 139 lb 11.2 oz (63.4 kg)   SpO2 99%   BMI 21.24 kg/m²     Intake/Output Summary (Last 24 hours) at 7/24/2019 1045  Last data filed at 7/24/2019 0818  Gross per 24 hour   Intake 780 ml   Output 600 ml   Net 180 ml      Wt Readings from Last 3 Encounters:   07/24/19 139 lb 11.2 oz (63.4 kg)   07/15/19 135 lb (61.2 kg)   06/17/19

## 2019-07-24 NOTE — CONSULTS
Dispense Refill    potassium chloride (KLOR-CON M) 20 MEQ TBCR extended release tablet TAKE ONE TABLET BY MOUTH DAILY 30 tablet 5    nitrofurantoin, macrocrystal-monohydrate, (MACROBID) 100 MG capsule Take 1 capsule by mouth daily 30 capsule 11    potassium chloride (KLOR-CON M) 20 MEQ TBCR extended release tablet TAKE ONE TABLET BY MOUTH DAILY 30 tablet 0    furosemide (LASIX) 40 MG tablet TAKE ONE TABLET BY MOUTH DAILY 30 tablet 1    labetalol (NORMODYNE) 100 MG tablet Take 1 tablet by mouth every 12 hours 60 tablet 3    cloNIDine (CATAPRES) 0.3 MG tablet TAKE ONE TABLET BY MOUTH THREE TIMES A DAY 90 tablet 5    clonazePAM (KLONOPIN) 0.5 MG tablet Take 0.5 tablets by mouth nightly as needed (insomnia). 15 tablet 2    aspirin 81 MG chewable tablet CHEW ONE TABLET BY MOUTH DAILY 30 tablet 2    ipratropium (ATROVENT) 0.02 % nebulizer solution Take 2.5 mLs by nebulization 3 times daily 360 mL 0    mirtazapine (REMERON) 15 MG tablet Take 1 tablet by mouth nightly 30 tablet 11    calcium elemental (OSCAL) 500 MG TABS tablet Take 1 tablet by mouth daily 30 tablet 3    oxybutynin (DITROPAN) 5 MG tablet TAKE ONE TABLET BY MOUTH TWICE A  tablet 3    atorvastatin (LIPITOR) 80 MG tablet Take 1 tablet by mouth daily 90 tablet 3    levothyroxine (SYNTHROID) 100 MCG tablet TAKE ONE TABLET BY MOUTH DAILY 90 tablet 3    lisinopril (PRINIVIL;ZESTRIL) 40 MG tablet Take 1 tablet by mouth daily 30 tablet 3    budesonide-formoterol (SYMBICORT) 160-4.5 MCG/ACT AERO Inhale 2 puffs into the lungs 2 times daily 1 Inhaler 2    Multiple Vitamins-Minerals (CENTRUM SILVER ADULT 50+ PO) Take 1 tablet by mouth daily       escitalopram (LEXAPRO) 10 MG tablet Take 10 mg by mouth daily      apixaban (ELIQUIS) 5 MG TABS tablet Take 1 tablet by mouth 2 times daily 60 tablet 2    zoster recombinant adjuvanted vaccine (SHINGRIX) 50 MCG SUSR injection . 5mL IM X 1 followed by . 5mL IM 2-6 months after dose #1 0.5 mL 0 Allergies  Allergies   Allergen Reactions    Lipitor [Atorvastatin] Other (See Comments)     Weakness, severe    Morphine Shortness Of Breath    Keflex [Cephalexin] Other (See Comments)     SOB & bilateral arms shaking     Hctz [Hydrochlorothiazide] Other (See Comments)     Hyponatremia, severe      Norvasc [Amlodipine Besylate] Swelling     Of neck    Penicillins Hives    Tramadol Itching    Hydralazine Palpitations and Other (See Comments)     Dizzy,fatigue,headaches,palpitations,loss of appetite    Shellfish-Derived Products Swelling and Rash     Swelling of neck      Social   Social History     Tobacco Use    Smoking status: Current Every Day Smoker     Packs/day: 1.50     Years: 52.00     Pack years: 78.00     Types: Cigarettes     Last attempt to quit: 2018     Years since quittin.0    Smokeless tobacco: Never Used    Tobacco comment: started smoking at age 26 / smoked up to 2 p,p,d / now smoking 4 to 8 cigarettes a day,   Substance Use Topics    Alcohol use: No     Alcohol/week: 0.0 standard drinks     Comment: patient reports she is an alcoholic hasn't had anything to drink 3-4 months        Family History   Problem Relation Age of Onset    Heart Disease Father         MI @ 64    Alcohol Abuse Father         Review of Systems  A comprehensive review of systems was negative. Physical Exam  Blood pressure (!) 173/96, pulse 70, temperature 98.2 °F (36.8 °C), temperature source Oral, resp. rate 18, height 5' 8\" (1.727 m), weight 139 lb 11.2 oz (63.4 kg), SpO2 99 %, not currently breastfeeding. General appearance: alert, cooperative, no distress, appears stated age  Eyes: Anicteric  Head: Normocephalic, without obvious abnormality  Lungs: clear to auscultation bilaterally, Normal [place]  Heart: regular rate and rhythm, normal S1 and S2, no murmurs or rubs  Abdomen: soft, non-tender. Bowel sounds normal. No masses,  no organomegaly.    Extremities: atraumatic, no cyanosis or

## 2019-07-25 LAB
ANION GAP SERPL CALCULATED.3IONS-SCNC: 8 MMOL/L (ref 3–16)
BUN BLDV-MCNC: 12 MG/DL (ref 7–20)
BUN BLDV-MCNC: 12 MG/DL (ref 7–20)
BUN BLDV-MCNC: 9 MG/DL (ref 7–20)
CALCIUM SERPL-MCNC: 8.2 MG/DL (ref 8.3–10.6)
CALCIUM SERPL-MCNC: 8.2 MG/DL (ref 8.3–10.6)
CALCIUM SERPL-MCNC: 8.4 MG/DL (ref 8.3–10.6)
CHLORIDE BLD-SCNC: 90 MMOL/L (ref 99–110)
CHLORIDE BLD-SCNC: 91 MMOL/L (ref 99–110)
CHLORIDE BLD-SCNC: 92 MMOL/L (ref 99–110)
CO2: 24 MMOL/L (ref 21–32)
CO2: 26 MMOL/L (ref 21–32)
CO2: 26 MMOL/L (ref 21–32)
CREAT SERPL-MCNC: 0.6 MG/DL (ref 0.6–1.2)
CREAT SERPL-MCNC: 0.6 MG/DL (ref 0.6–1.2)
CREAT SERPL-MCNC: <0.5 MG/DL (ref 0.6–1.2)
GFR AFRICAN AMERICAN: >60
GFR NON-AFRICAN AMERICAN: >60
GLUCOSE BLD-MCNC: 113 MG/DL (ref 70–99)
GLUCOSE BLD-MCNC: 87 MG/DL (ref 70–99)
GLUCOSE BLD-MCNC: 95 MG/DL (ref 70–99)
POTASSIUM SERPL-SCNC: 4.7 MMOL/L (ref 3.5–5.1)
POTASSIUM SERPL-SCNC: 4.8 MMOL/L (ref 3.5–5.1)
POTASSIUM SERPL-SCNC: 4.9 MMOL/L (ref 3.5–5.1)
SODIUM BLD-SCNC: 123 MMOL/L (ref 136–145)
SODIUM BLD-SCNC: 124 MMOL/L (ref 136–145)
SODIUM BLD-SCNC: 126 MMOL/L (ref 136–145)

## 2019-07-25 PROCEDURE — 2060000000 HC ICU INTERMEDIATE R&B

## 2019-07-25 PROCEDURE — 6370000000 HC RX 637 (ALT 250 FOR IP): Performed by: INTERNAL MEDICINE

## 2019-07-25 PROCEDURE — 6370000000 HC RX 637 (ALT 250 FOR IP): Performed by: FAMILY MEDICINE

## 2019-07-25 PROCEDURE — 2580000003 HC RX 258: Performed by: INTERNAL MEDICINE

## 2019-07-25 PROCEDURE — 80048 BASIC METABOLIC PNL TOTAL CA: CPT

## 2019-07-25 PROCEDURE — 94761 N-INVAS EAR/PLS OXIMETRY MLT: CPT

## 2019-07-25 PROCEDURE — C9113 INJ PANTOPRAZOLE SODIUM, VIA: HCPCS | Performed by: INTERNAL MEDICINE

## 2019-07-25 PROCEDURE — 6360000002 HC RX W HCPCS: Performed by: INTERNAL MEDICINE

## 2019-07-25 PROCEDURE — 2580000003 HC RX 258: Performed by: FAMILY MEDICINE

## 2019-07-25 PROCEDURE — 94640 AIRWAY INHALATION TREATMENT: CPT

## 2019-07-25 RX ADMIN — ACETAMINOPHEN 650 MG: 325 TABLET, FILM COATED ORAL at 08:36

## 2019-07-25 RX ADMIN — CLONIDINE HYDROCHLORIDE 0.1 MG: 0.1 TABLET ORAL at 08:36

## 2019-07-25 RX ADMIN — LABETALOL HYDROCHLORIDE 200 MG: 200 TABLET, FILM COATED ORAL at 22:48

## 2019-07-25 RX ADMIN — Medication 2 PUFF: at 19:44

## 2019-07-25 RX ADMIN — Medication 10 ML: at 08:36

## 2019-07-25 RX ADMIN — CLONIDINE HYDROCHLORIDE 0.1 MG: 0.1 TABLET ORAL at 12:44

## 2019-07-25 RX ADMIN — Medication 10 ML: at 08:39

## 2019-07-25 RX ADMIN — Medication 2 PUFF: at 07:06

## 2019-07-25 RX ADMIN — APIXABAN 5 MG: 5 TABLET, FILM COATED ORAL at 22:48

## 2019-07-25 RX ADMIN — CLONIDINE HYDROCHLORIDE 0.1 MG: 0.1 TABLET ORAL at 22:48

## 2019-07-25 RX ADMIN — LABETALOL HYDROCHLORIDE 200 MG: 200 TABLET, FILM COATED ORAL at 08:36

## 2019-07-25 RX ADMIN — SODIUM CHLORIDE TAB 1 GM 1 G: 1 TAB at 08:36

## 2019-07-25 RX ADMIN — OXYBUTYNIN CHLORIDE 5 MG: 5 TABLET ORAL at 08:36

## 2019-07-25 RX ADMIN — OXYBUTYNIN CHLORIDE 5 MG: 5 TABLET ORAL at 22:48

## 2019-07-25 RX ADMIN — Medication 10 ML: at 17:51

## 2019-07-25 RX ADMIN — SODIUM CHLORIDE TAB 1 GM 1 G: 1 TAB at 17:52

## 2019-07-25 RX ADMIN — LEVOTHYROXINE SODIUM 100 MCG: 100 TABLET ORAL at 05:43

## 2019-07-25 RX ADMIN — APIXABAN 5 MG: 5 TABLET, FILM COATED ORAL at 08:36

## 2019-07-25 RX ADMIN — CLONAZEPAM 0.25 MG: 0.5 TABLET ORAL at 19:53

## 2019-07-25 RX ADMIN — ATORVASTATIN CALCIUM 80 MG: 80 TABLET, FILM COATED ORAL at 08:36

## 2019-07-25 RX ADMIN — ONDANSETRON 4 MG: 2 INJECTION INTRAMUSCULAR; INTRAVENOUS at 08:55

## 2019-07-25 RX ADMIN — ESCITALOPRAM OXALATE 10 MG: 10 TABLET ORAL at 08:36

## 2019-07-25 RX ADMIN — PANTOPRAZOLE SODIUM 40 MG: 40 INJECTION, POWDER, FOR SOLUTION INTRAVENOUS at 08:36

## 2019-07-25 RX ADMIN — Medication 10 ML: at 22:49

## 2019-07-25 RX ADMIN — ASPIRIN 81 MG 81 MG: 81 TABLET ORAL at 08:36

## 2019-07-25 RX ADMIN — SODIUM CHLORIDE TAB 1 GM 1 G: 1 TAB at 12:44

## 2019-07-25 ASSESSMENT — PAIN SCALES - GENERAL
PAINLEVEL_OUTOF10: 0
PAINLEVEL_OUTOF10: 5
PAINLEVEL_OUTOF10: 0

## 2019-07-25 NOTE — PROGRESS NOTES
Assessment completed. Pt denies any pain or nausea. Pt has had no vomiting. Denies needs at this time.

## 2019-07-25 NOTE — PROGRESS NOTES
[facility]    7/25/2019 11:38 AM        Name: Tonio Farrar . Admitted: 7/21/2019  Primary Care Provider: Shagufta Hurtado MD (Tel: [phone])        Subjective: Headache is resolved. Nausea resolved tolerating solid diet.   Tolerating PT no other complain fluid restriction removed yesterday      Reviewed interval ancillary notes    Current Medications    pantoprazole (PROTONIX) injection 40 mg Daily   acetaminophen (TYLENOL) tablet 650 mg Q4H PRN   cloNIDine (CATAPRES) tablet 0.1 mg TID   labetalol (NORMODYNE) tablet 200 mg 2 times per day   promethazine (PHENERGAN) injection 12.5 mg Q4H PRN   ondansetron (ZOFRAN) injection 4 mg Q4H PRN   sodium chloride tablet 1 g TID WC   LORazepam (ATIVAN) injection 2 mg Q4H PRN   lidocaine PF 1 % injection 5 mL Once   sodium chloride flush 0.9 % injection 10 mL 2 times per day   sodium chloride flush 0.9 % injection 10 mL PRN   apixaban (ELIQUIS) tablet 5 mg BID   aspirin chewable tablet 81 mg Daily   atorvastatin (LIPITOR) tablet 80 mg Daily   clonazePAM (KLONOPIN) tablet 0.25 mg Nightly PRN   escitalopram (LEXAPRO) tablet 10 mg Daily   levothyroxine (SYNTHROID) tablet 100 mcg QAM AC   oxybutynin (DITROPAN) tablet 5 mg BID   sodium chloride flush 0.9 % injection 10 mL 2 times per day   sodium chloride flush 0.9 % injection 10 mL PRN   magnesium hydroxide (MILK OF MAGNESIA) 400 MG/5ML suspension 30 mL Daily PRN   mometasone-formoterol (DULERA) 200-5 MCG/ACT inhaler 2 puff BID       Objective:  BP (!) 153/73   Pulse 67   Temp 97.6 °F (36.4 °C) (Oral)   Resp 16   Ht 5' 8\" (1.727 m)   Wt 130 lb 11.2 oz (59.3 kg)   SpO2 95%   BMI 19.87 kg/m²     Intake/Output Summary (Last 24 hours) at 7/25/2019 1138  Last data filed at 7/25/2019 0043  Gross per 24 hour   Intake 250 ml   Output 1700 ml   Net -1450 ml      Wt Readings from Last 3 Encounters:   07/25/19

## 2019-07-25 NOTE — PROGRESS NOTES
[id number] home care referral. Spoke with pt and re: home care plan of care/services. Agreeable. Will follow for home care.

## 2019-07-25 NOTE — PROGRESS NOTES
chronic N/V. Takes ASA but no other NSAIDs. TSH normal. CT abd/pelvis without source of nausea. Suspect N/V was from hyponatremia. No further vomiting. Tolerating diet. Denies nausea today. Hyponatremia - Na was 112 at admission. Now up to 124 today. [place] to be from SIADH per nephrology eval.     PLAN :  - antiemetics  - If nausea persists despite normalization of sodium, could plan EGD.      Will sign off. Please call if symptoms recur despite normal sodium. Discussed with Dr. Jigna Ortiz, [address]    I have personally performed a face to face diagnostic evaluation on this patient. I have interviewed and examined the patient and I agree with the findings and recommended plan of care. In summary, my findings and plan are the following: I spoke with aneshtesia today and no egd until na improved unless emergency. In meantime nausea reoslved, tolerating po, abd soft. rec cont tx hyponatremia, diet as tolerated. If nausea recurs and na stable for egd please recall. Otherwise will sign off, hold off egd if no further sx.        Spencer Ivey MD  [address]

## 2019-07-26 LAB
ANION GAP SERPL CALCULATED.3IONS-SCNC: 10 MMOL/L (ref 3–16)
ANION GAP SERPL CALCULATED.3IONS-SCNC: 8 MMOL/L (ref 3–16)
ANION GAP SERPL CALCULATED.3IONS-SCNC: 9 MMOL/L (ref 3–16)
BLOOD CULTURE, ROUTINE: NORMAL
BUN BLDV-MCNC: 12 MG/DL (ref 7–20)
BUN BLDV-MCNC: 13 MG/DL (ref 7–20)
BUN BLDV-MCNC: 15 MG/DL (ref 7–20)
CALCIUM SERPL-MCNC: 7.9 MG/DL (ref 8.3–10.6)
CALCIUM SERPL-MCNC: 8 MG/DL (ref 8.3–10.6)
CALCIUM SERPL-MCNC: 8.3 MG/DL (ref 8.3–10.6)
CHLORIDE BLD-SCNC: 93 MMOL/L (ref 99–110)
CHLORIDE BLD-SCNC: 93 MMOL/L (ref 99–110)
CHLORIDE BLD-SCNC: 95 MMOL/L (ref 99–110)
CO2: 25 MMOL/L (ref 21–32)
CREAT SERPL-MCNC: 0.5 MG/DL (ref 0.6–1.2)
CREAT SERPL-MCNC: 0.5 MG/DL (ref 0.6–1.2)
CREAT SERPL-MCNC: 0.6 MG/DL (ref 0.6–1.2)
CULTURE, BLOOD 2: NORMAL
GFR AFRICAN AMERICAN: >60
GFR NON-AFRICAN AMERICAN: >60
GLUCOSE BLD-MCNC: 126 MG/DL (ref 70–99)
GLUCOSE BLD-MCNC: 132 MG/DL (ref 70–99)
GLUCOSE BLD-MCNC: 165 MG/DL (ref 70–99)
POTASSIUM SERPL-SCNC: 4.1 MMOL/L (ref 3.5–5.1)
POTASSIUM SERPL-SCNC: 4.2 MMOL/L (ref 3.5–5.1)
POTASSIUM SERPL-SCNC: 4.4 MMOL/L (ref 3.5–5.1)
SODIUM BLD-SCNC: 126 MMOL/L (ref 136–145)
SODIUM BLD-SCNC: 127 MMOL/L (ref 136–145)
SODIUM BLD-SCNC: 130 MMOL/L (ref 136–145)

## 2019-07-26 PROCEDURE — C9113 INJ PANTOPRAZOLE SODIUM, VIA: HCPCS | Performed by: INTERNAL MEDICINE

## 2019-07-26 PROCEDURE — 6370000000 HC RX 637 (ALT 250 FOR IP): Performed by: FAMILY MEDICINE

## 2019-07-26 PROCEDURE — 97530 THERAPEUTIC ACTIVITIES: CPT

## 2019-07-26 PROCEDURE — 94640 AIRWAY INHALATION TREATMENT: CPT

## 2019-07-26 PROCEDURE — 6370000000 HC RX 637 (ALT 250 FOR IP): Performed by: INTERNAL MEDICINE

## 2019-07-26 PROCEDURE — 36592 COLLECT BLOOD FROM PICC: CPT

## 2019-07-26 PROCEDURE — 2060000000 HC ICU INTERMEDIATE R&B

## 2019-07-26 PROCEDURE — 6360000002 HC RX W HCPCS: Performed by: INTERNAL MEDICINE

## 2019-07-26 PROCEDURE — 2580000003 HC RX 258: Performed by: INTERNAL MEDICINE

## 2019-07-26 PROCEDURE — 97116 GAIT TRAINING THERAPY: CPT

## 2019-07-26 PROCEDURE — 94761 N-INVAS EAR/PLS OXIMETRY MLT: CPT

## 2019-07-26 PROCEDURE — 80048 BASIC METABOLIC PNL TOTAL CA: CPT

## 2019-07-26 RX ORDER — CLONIDINE HYDROCHLORIDE 0.1 MG/1
0.1 TABLET ORAL 3 TIMES DAILY
Qty: 60 TABLET | Refills: 3 | Status: SHIPPED | OUTPATIENT
Start: 2019-07-26 | End: 2019-10-28 | Stop reason: SDUPTHER

## 2019-07-26 RX ORDER — SODIUM CHLORIDE 1000 MG
1 TABLET, SOLUBLE MISCELLANEOUS
Qty: 90 TABLET | Refills: 0 | Status: SHIPPED | OUTPATIENT
Start: 2019-07-26 | End: 2020-02-28

## 2019-07-26 RX ORDER — LABETALOL 200 MG/1
200 TABLET, FILM COATED ORAL EVERY 12 HOURS SCHEDULED
Qty: 60 TABLET | Refills: 3 | Status: SHIPPED | OUTPATIENT
Start: 2019-07-26 | End: 2019-10-02 | Stop reason: SDUPTHER

## 2019-07-26 RX ADMIN — ATORVASTATIN CALCIUM 80 MG: 80 TABLET, FILM COATED ORAL at 10:19

## 2019-07-26 RX ADMIN — Medication 10 ML: at 21:40

## 2019-07-26 RX ADMIN — OXYBUTYNIN CHLORIDE 5 MG: 5 TABLET ORAL at 10:18

## 2019-07-26 RX ADMIN — SODIUM CHLORIDE TAB 1 GM 1 G: 1 TAB at 14:23

## 2019-07-26 RX ADMIN — APIXABAN 5 MG: 5 TABLET, FILM COATED ORAL at 21:52

## 2019-07-26 RX ADMIN — ASPIRIN 81 MG 81 MG: 81 TABLET ORAL at 10:20

## 2019-07-26 RX ADMIN — APIXABAN 5 MG: 5 TABLET, FILM COATED ORAL at 10:20

## 2019-07-26 RX ADMIN — PANTOPRAZOLE SODIUM 40 MG: 40 INJECTION, POWDER, FOR SOLUTION INTRAVENOUS at 10:19

## 2019-07-26 RX ADMIN — LABETALOL HYDROCHLORIDE 200 MG: 200 TABLET, FILM COATED ORAL at 10:20

## 2019-07-26 RX ADMIN — CLONIDINE HYDROCHLORIDE 0.1 MG: 0.1 TABLET ORAL at 10:19

## 2019-07-26 RX ADMIN — SODIUM CHLORIDE TAB 1 GM 1 G: 1 TAB at 10:18

## 2019-07-26 RX ADMIN — Medication 10 ML: at 10:18

## 2019-07-26 RX ADMIN — ESCITALOPRAM OXALATE 10 MG: 10 TABLET ORAL at 10:18

## 2019-07-26 RX ADMIN — Medication 2 PUFF: at 20:29

## 2019-07-26 RX ADMIN — CLONIDINE HYDROCHLORIDE 0.1 MG: 0.1 TABLET ORAL at 21:52

## 2019-07-26 RX ADMIN — CLONIDINE HYDROCHLORIDE 0.1 MG: 0.1 TABLET ORAL at 14:23

## 2019-07-26 RX ADMIN — LABETALOL HYDROCHLORIDE 200 MG: 200 TABLET, FILM COATED ORAL at 21:52

## 2019-07-26 RX ADMIN — Medication 10 ML: at 21:53

## 2019-07-26 RX ADMIN — Medication 2 PUFF: at 07:37

## 2019-07-26 RX ADMIN — SODIUM CHLORIDE TAB 1 GM 1 G: 1 TAB at 18:01

## 2019-07-26 RX ADMIN — OXYBUTYNIN CHLORIDE 5 MG: 5 TABLET ORAL at 21:52

## 2019-07-26 RX ADMIN — CLONAZEPAM 0.25 MG: 0.5 TABLET ORAL at 22:09

## 2019-07-26 RX ADMIN — LEVOTHYROXINE SODIUM 100 MCG: 100 TABLET ORAL at 05:52

## 2019-07-26 ASSESSMENT — PAIN SCALES - GENERAL
PAINLEVEL_OUTOF10: 0

## 2019-07-26 NOTE — PROGRESS NOTES
1.2L fluid restriction  -Continue NaCl 1g PO TID  -Would hold off DC until Na+>130  -Do not correct by >0.5mEq/L per hour or >6-8mEq/L per day    Hypokalemia:  -Resolved s/p replacement    HTN:  -At goal  -Continue current regimen for now  -Will eventually need to switch off clonidine as will lead to dry mouth and subsequent excess fluid intake        Benton Mae

## 2019-07-26 NOTE — DISCHARGE SUMMARY
serum sodium 110 presentation received hypertonic saline 100 mL bolus followed by infusion 50 mL/h serum sodium today 127  Cause of Hyponatremia  most likely related to SIADH. She will take clonidine which can cause dryness of the mouth into excessive water intake which could also be contributing     Headache resolved CT head does not show any signs of cerebral edema     H/o DVT anticoagulated with Eliquis     Hypertension control improving. Continue antihypertensive medical physician needs to taper clonidine of an outpatient and stop it as this can cause dryness of mouth and excessive water intake     Discharge home today     Physical Therapy     Persistent nausea resolved. CT of the abdomen done yesterday was negative for acute disease     Hyponatremia secondary to SIADH. Patient stayed  for 1 more day and nephrologist wanted to discharge after sodium greater than 130. Consults. IP CONSULT TO HOSPITALIST  IP CONSULT TO NEPHROLOGY  IP CONSULT TO NEPHROLOGY  IP CONSULT TO GI    Physical examination on discharge day. /85   Pulse 65   Temp 98.1 °F (36.7 °C) (Oral)   Resp 18   Ht 5' 8\" (1.727 m)   Wt 130 lb 9.6 oz (59.2 kg)   SpO2 96%   BMI 19.86 kg/m²   General appearance. Alert. Looks comfortable. HEENT. Sclera clear. Moist mucus membranes. Cardiovascular. Regular rate and rhythm, normal S1, S2. No murmur. Respiratory. Not using accessory muscles. Clear to auscultation bilaterally, no wheeze. Gastrointestinal. Abdomen soft, non-tender, not distended, normal bowel sounds  Neurology. Facial symmetry. No speech deficits. Moving all extremities equally. Extremities. No edema in lower extremities. Skin. Warm, dry, normal turgor        Condition at time of discharge stable. Medication instructions provided to patient at discharge.      Medication List      START taking these medications    sodium chloride 1 g tablet  Take 1 tablet by mouth 3 times daily (with meals)        CHANGE how you

## 2019-07-26 NOTE — PLAN OF CARE
Re-Assessment complete, see flow sheet. Informed the patient that nephrologist was paged @ [phone] & awaiting his input for discharge. the patient verbalized understanding. Will continue to monitor. Bobbi Quigley RN, BSN, PCCN.

## 2019-07-26 NOTE — PLAN OF CARE
Re-Assessment complete, see flow sheet. Informed by nephrologist that it is unsafe for her to be discharged today, the patient upset. Dr Chian Daniels informed of Dr Mae's input, discharge cancelled. External female catheter replaced with new wick after kenton care given & diaper changed. Will continue to monitor. Main Tapia RN, BSN, PCCN.

## 2019-07-27 VITALS
HEIGHT: 68 IN | SYSTOLIC BLOOD PRESSURE: 170 MMHG | WEIGHT: 135.7 LBS | DIASTOLIC BLOOD PRESSURE: 85 MMHG | RESPIRATION RATE: 16 BRPM | TEMPERATURE: 97.4 F | BODY MASS INDEX: 20.57 KG/M2 | OXYGEN SATURATION: 96 % | HEART RATE: 64 BPM

## 2019-07-27 LAB
ANION GAP SERPL CALCULATED.3IONS-SCNC: 9 MMOL/L (ref 3–16)
BUN BLDV-MCNC: 13 MG/DL (ref 7–20)
CALCIUM SERPL-MCNC: 8.5 MG/DL (ref 8.3–10.6)
CHLORIDE BLD-SCNC: 95 MMOL/L (ref 99–110)
CO2: 27 MMOL/L (ref 21–32)
CREAT SERPL-MCNC: 0.5 MG/DL (ref 0.6–1.2)
GFR AFRICAN AMERICAN: >60
GFR NON-AFRICAN AMERICAN: >60
GLUCOSE BLD-MCNC: 94 MG/DL (ref 70–99)
POTASSIUM SERPL-SCNC: 4.5 MMOL/L (ref 3.5–5.1)
SODIUM BLD-SCNC: 131 MMOL/L (ref 136–145)

## 2019-07-27 PROCEDURE — 94640 AIRWAY INHALATION TREATMENT: CPT

## 2019-07-27 PROCEDURE — 6370000000 HC RX 637 (ALT 250 FOR IP): Performed by: FAMILY MEDICINE

## 2019-07-27 PROCEDURE — 36592 COLLECT BLOOD FROM PICC: CPT

## 2019-07-27 PROCEDURE — 80048 BASIC METABOLIC PNL TOTAL CA: CPT

## 2019-07-27 PROCEDURE — 6370000000 HC RX 637 (ALT 250 FOR IP): Performed by: INTERNAL MEDICINE

## 2019-07-27 PROCEDURE — C9113 INJ PANTOPRAZOLE SODIUM, VIA: HCPCS | Performed by: INTERNAL MEDICINE

## 2019-07-27 PROCEDURE — 94761 N-INVAS EAR/PLS OXIMETRY MLT: CPT

## 2019-07-27 PROCEDURE — 6360000002 HC RX W HCPCS: Performed by: INTERNAL MEDICINE

## 2019-07-27 PROCEDURE — 2580000003 HC RX 258: Performed by: INTERNAL MEDICINE

## 2019-07-27 RX ADMIN — SODIUM CHLORIDE TAB 1 GM 1 G: 1 TAB at 07:58

## 2019-07-27 RX ADMIN — MAGNESIUM HYDROXIDE 30 ML: 400 SUSPENSION ORAL at 05:36

## 2019-07-27 RX ADMIN — CLONIDINE HYDROCHLORIDE 0.1 MG: 0.1 TABLET ORAL at 07:58

## 2019-07-27 RX ADMIN — OXYBUTYNIN CHLORIDE 5 MG: 5 TABLET ORAL at 07:58

## 2019-07-27 RX ADMIN — PANTOPRAZOLE SODIUM 40 MG: 40 INJECTION, POWDER, FOR SOLUTION INTRAVENOUS at 07:57

## 2019-07-27 RX ADMIN — LEVOTHYROXINE SODIUM 100 MCG: 100 TABLET ORAL at 05:17

## 2019-07-27 RX ADMIN — LABETALOL HYDROCHLORIDE 200 MG: 200 TABLET, FILM COATED ORAL at 07:58

## 2019-07-27 RX ADMIN — APIXABAN 5 MG: 5 TABLET, FILM COATED ORAL at 07:58

## 2019-07-27 RX ADMIN — ESCITALOPRAM OXALATE 10 MG: 10 TABLET ORAL at 07:58

## 2019-07-27 RX ADMIN — Medication 10 ML: at 07:57

## 2019-07-27 RX ADMIN — Medication 2 PUFF: at 09:15

## 2019-07-27 RX ADMIN — ASPIRIN 81 MG 81 MG: 81 TABLET ORAL at 07:58

## 2019-07-27 ASSESSMENT — PAIN SCALES - GENERAL
PAINLEVEL_OUTOF10: 0

## 2019-07-27 NOTE — PROGRESS NOTES
Pt reports last BM was 07/20 and initially stated not able to pass gas but now she is stating that she has been passing gas normally this AM. Pt agreeable to take Milk of Mag at this time. ABD remains soft, nontender and with active bowel sounds x4 quadrants.

## 2019-07-27 NOTE — PROGRESS NOTES
Nephrology Attending  Progress Note        SUMMARY :  We are following this patient for Hyponatremia/ Hypokalemia  Na 112 7/21. , Na 110 at time of consult. S/p hypertonic saline   Presented to ER with weakness , falls and vomitting  Recent Diagnosis of UTI  Was on Furosemide and Synthyroid       Interval History:  -No acute events overnight  -Na+ continues to improve daily      ROS: No new or active complaints  SHx: No visitors present at bedside      Physical Exam:    VITALS:  BP (!) 170/85   Pulse 64   Temp 97.4 °F (36.3 °C) (Axillary)   Resp 16   Ht 5' 8\" (1.727 m)   Wt 135 lb 11.2 oz (61.6 kg)   SpO2 96%   BMI 20.63 kg/m²   BLOOD PRESSURE RANGE:  Systolic (18UJS), RDE:159 , Min:124 , UAD:085   ; Diastolic (34ISY), XPX:87, Min:71, Max:94    24HR INTAKE/OUTPUT:      Intake/Output Summary (Last 24 hours) at 7/27/2019 1040  Last data filed at 7/27/2019 0510  Gross per 24 hour   Intake 270 ml   Output 900 ml   Net -630 ml     Gen: NAD, Frail appearing  HEENT: Sclera anicteric, MMM  Neck: Supple. No JVD  CV: RRR, S1/S2  Pulm: CTAB/L  Abd: Soft, NT, ND, (+)BS  Ext: No edema in B/L LE  Neuro: Awake/Alert, Answers questions appropriately  Skin: Warm.  No visible rashes appreciated      DATA:    CBC with Differential:    Lab Results   Component Value Date    WBC 13.2 07/21/2019    RBC 4.38 07/21/2019    HGB 14.7 07/21/2019    HCT 41.7 07/21/2019     07/21/2019    MCV 95.2 07/21/2019    MCH 33.6 07/21/2019    MCHC 35.3 07/21/2019    RDW 13.0 07/21/2019    SEGSPCT 79.1 04/19/2016    BANDSPCT 1 04/28/2018    LYMPHOPCT 3.4 07/21/2019    MONOPCT 6.2 07/21/2019    EOSPCT 1.3 05/08/2011    BASOPCT 0.2 07/21/2019    MONOSABS 0.8 07/21/2019    LYMPHSABS 0.4 07/21/2019    EOSABS 0.0 07/21/2019    BASOSABS 0.0 07/21/2019    DIFFTYPE Auto 05/02/2013     CMP:    Lab Results   Component Value Date     07/27/2019    K 4.5 07/27/2019    K 3.9 03/26/2019    CL 95 07/27/2019    CO2 27 07/27/2019    BUN 13 07/27/2019

## 2019-07-27 NOTE — PROGRESS NOTES
Discharge instructions reviewed with pt and spouse, and understood; Selena Doe arrangements in place; no DME needs evident at this time; discharged via w/c to home with spouse.

## 2019-07-27 NOTE — PLAN OF CARE
Problem: Falls - Risk of:  Goal: Will remain free from falls  Description  Will remain free from falls  7/27/2019 0611 by Angela Leon  Note:   Patient is a high fall risk. Patient free of falls this shift. Bed low, locked and alarmed at all times. Call light and bedside table is within reach. Orange blanket on bed, arm band on wrist and fall sign posted in the room. Notified patient to ask for assistance when needed. Patient verbalized understanding. Problem: HEMODYNAMIC STATUS  Goal: Patient has stable vital signs and fluid balance  Intervention: Blood pressure monitoring  Note:   VSS. BP meds given per [facility] as ordered. Problem: Pain:  Goal: Control of acute pain  Description  Control of acute pain  7/27/2019 0611 by Angela Leon  Note:   Pt denies any pain upon assessment.    7/26/2019 1950 by Jayesh Sorensen RN  Outcome: Ongoing

## 2019-07-27 NOTE — CARE COORDINATION
CM noted PT/ OT assessment, discussed possible HH w/ pt. Pt agreeable to [facility]. Called [facility].     EVERARDO GilN, CCM, RN  [facility]  [phone]
Discharge Planning. SW reviewed chart. Patient has a discharge order. [facility] was informed of patients discharge plan. JARED and Orders were faxed on 7/26. Per CM note, family will transport patient home. Social work will follow through discharge. D/C Plan- Home w/ [facility]. Orders and [address] [place] faxed.      Electronically signed by GISELLA Martínez, YIFAN on 7/27/19 at 11:19 AM
SCHEDULE, [place] REMOTE TRANSMISSION FF [facility]       Health Maintenance  Health Maintenance Due   Topic Date Due    Breast cancer screen  06/28/2018       Sofiya Mcfarlane RN

## 2019-07-28 ENCOUNTER — CARE COORDINATION (OUTPATIENT)
Dept: CASE MANAGEMENT | Age: 74
End: 2019-07-28

## 2019-07-29 NOTE — PROGRESS NOTES
Occupational Therapy Discharge Summary    Name: Yuval Emerson  : 1945       The pt was evaluated by OT on 19 and seen for initial evaluation only, prior to DC home on 19, per MD order. The pt's acute therapy goals were:    Short term goals  Time Frame for Short term goals: d/c  Short term goal 1: Pt will increase AMPAC score from 14 to 16. Short term goal 2: Pt will complete functional transfer with RW at A. Short term goal 3: Pt will complete LB dressing min A. Patient met 0 goals during stay. Number of Refusals:0  Number of Holds: 1    During this hospitalization, the patient was educated on:  Patient Education: Role of OT, POC, d/c planning    DC pt from OT caseload at this time. Thank you!     Kirk Mabry, OTR/L, [id number]

## 2019-07-30 ENCOUNTER — TELEPHONE (OUTPATIENT)
Dept: FAMILY MEDICINE CLINIC | Age: 74
End: 2019-07-30

## 2019-08-01 ENCOUNTER — TELEPHONE (OUTPATIENT)
Dept: FAMILY MEDICINE CLINIC | Age: 74
End: 2019-08-01

## 2019-08-02 ENCOUNTER — CARE COORDINATION (OUTPATIENT)
Dept: CASE MANAGEMENT | Age: 74
End: 2019-08-02

## 2019-08-02 ENCOUNTER — HOSPITAL ENCOUNTER (INPATIENT)
Age: 74
LOS: 4 days | Discharge: HOME HEALTH | DRG: 690 | End: 2019-08-06
Attending: EMERGENCY MEDICINE | Admitting: INTERNAL MEDICINE
Payer: COMMERCIAL

## 2019-08-02 DIAGNOSIS — R11.2 NAUSEA AND VOMITING, INTRACTABILITY OF VOMITING NOT SPECIFIED, UNSPECIFIED VOMITING TYPE: ICD-10-CM

## 2019-08-02 DIAGNOSIS — E87.1 HYPONATREMIA: Primary | ICD-10-CM

## 2019-08-02 DIAGNOSIS — N30.00 ACUTE CYSTITIS WITHOUT HEMATURIA: ICD-10-CM

## 2019-08-02 LAB
A/G RATIO: 1.1 (ref 1.1–2.2)
ALBUMIN SERPL-MCNC: 4.2 G/DL (ref 3.4–5)
ALP BLD-CCNC: 65 U/L (ref 40–129)
ALT SERPL-CCNC: 17 U/L (ref 10–40)
ANION GAP SERPL CALCULATED.3IONS-SCNC: 14 MMOL/L (ref 3–16)
ANION GAP SERPL CALCULATED.3IONS-SCNC: 16 MMOL/L (ref 3–16)
AST SERPL-CCNC: 26 U/L (ref 15–37)
BACTERIA: ABNORMAL /HPF
BASOPHILS ABSOLUTE: 0 K/UL (ref 0–0.2)
BASOPHILS RELATIVE PERCENT: 0.3 %
BILIRUB SERPL-MCNC: 0.7 MG/DL (ref 0–1)
BILIRUBIN URINE: NEGATIVE
BLOOD, URINE: ABNORMAL
BUN BLDV-MCNC: 18 MG/DL (ref 7–20)
BUN BLDV-MCNC: 21 MG/DL (ref 7–20)
CALCIUM SERPL-MCNC: 8.4 MG/DL (ref 8.3–10.6)
CALCIUM SERPL-MCNC: 9.5 MG/DL (ref 8.3–10.6)
CHLORIDE BLD-SCNC: 81 MMOL/L (ref 99–110)
CHLORIDE BLD-SCNC: 85 MMOL/L (ref 99–110)
CLARITY: ABNORMAL
CO2: 20 MMOL/L (ref 21–32)
CO2: 24 MMOL/L (ref 21–32)
COLOR: YELLOW
CREAT SERPL-MCNC: 0.9 MG/DL (ref 0.6–1.2)
CREAT SERPL-MCNC: 0.9 MG/DL (ref 0.6–1.2)
EKG ATRIAL RATE: 63 BPM
EKG DIAGNOSIS: NORMAL
EKG P AXIS: 71 DEGREES
EKG P-R INTERVAL: 160 MS
EKG Q-T INTERVAL: 398 MS
EKG QRS DURATION: 76 MS
EKG QTC CALCULATION (BAZETT): 407 MS
EKG R AXIS: 56 DEGREES
EKG T AXIS: 60 DEGREES
EKG VENTRICULAR RATE: 63 BPM
EOSINOPHILS ABSOLUTE: 0.2 K/UL (ref 0–0.6)
EOSINOPHILS RELATIVE PERCENT: 1.4 %
EPITHELIAL CELLS, UA: 0 /HPF (ref 0–5)
GFR AFRICAN AMERICAN: >60
GFR AFRICAN AMERICAN: >60
GFR NON-AFRICAN AMERICAN: >60
GFR NON-AFRICAN AMERICAN: >60
GLOBULIN: 3.7 G/DL
GLUCOSE BLD-MCNC: 134 MG/DL (ref 70–99)
GLUCOSE BLD-MCNC: 94 MG/DL (ref 70–99)
GLUCOSE URINE: NEGATIVE MG/DL
HCT VFR BLD CALC: 37.2 % (ref 36–48)
HEMOGLOBIN: 12.9 G/DL (ref 12–16)
HYALINE CASTS: 2 /LPF (ref 0–8)
KETONES, URINE: NEGATIVE MG/DL
LEUKOCYTE ESTERASE, URINE: ABNORMAL
LIPASE: 20 U/L (ref 13–60)
LYMPHOCYTES ABSOLUTE: 0.6 K/UL (ref 1–5.1)
LYMPHOCYTES RELATIVE PERCENT: 5.4 %
MCH RBC QN AUTO: 34.1 PG (ref 26–34)
MCHC RBC AUTO-ENTMCNC: 34.6 G/DL (ref 31–36)
MCV RBC AUTO: 98.6 FL (ref 80–100)
MICROSCOPIC EXAMINATION: YES
MONOCYTES ABSOLUTE: 1.2 K/UL (ref 0–1.3)
MONOCYTES RELATIVE PERCENT: 10.1 %
NEUTROPHILS ABSOLUTE: 9.7 K/UL (ref 1.7–7.7)
NEUTROPHILS RELATIVE PERCENT: 82.8 %
NITRITE, URINE: POSITIVE
PDW BLD-RTO: 13.3 % (ref 12.4–15.4)
PH UA: 7 (ref 5–8)
PLATELET # BLD: 351 K/UL (ref 135–450)
PMV BLD AUTO: 7.9 FL (ref 5–10.5)
POTASSIUM SERPL-SCNC: 3.7 MMOL/L (ref 3.5–5.1)
POTASSIUM SERPL-SCNC: 4.8 MMOL/L (ref 3.5–5.1)
PROTEIN UA: NEGATIVE MG/DL
RBC # BLD: 3.77 M/UL (ref 4–5.2)
RBC UA: 2 /HPF (ref 0–4)
SODIUM BLD-SCNC: 117 MMOL/L (ref 136–145)
SODIUM BLD-SCNC: 123 MMOL/L (ref 136–145)
SODIUM BLD-SCNC: 124 MMOL/L (ref 136–145)
SODIUM BLD-SCNC: 124 MMOL/L (ref 136–145)
SPECIFIC GRAVITY UA: 1.01 (ref 1–1.03)
TOTAL PROTEIN: 7.9 G/DL (ref 6.4–8.2)
TROPONIN: <0.01 NG/ML
URINE REFLEX TO CULTURE: YES
URINE TYPE: ABNORMAL
UROBILINOGEN, URINE: 0.2 E.U./DL
WBC # BLD: 11.7 K/UL (ref 4–11)
WBC UA: 98 /HPF (ref 0–5)

## 2019-08-02 PROCEDURE — 81001 URINALYSIS AUTO W/SCOPE: CPT

## 2019-08-02 PROCEDURE — 6360000002 HC RX W HCPCS: Performed by: PHYSICIAN ASSISTANT

## 2019-08-02 PROCEDURE — 96361 HYDRATE IV INFUSION ADD-ON: CPT

## 2019-08-02 PROCEDURE — 6370000000 HC RX 637 (ALT 250 FOR IP): Performed by: INTERNAL MEDICINE

## 2019-08-02 PROCEDURE — 93005 ELECTROCARDIOGRAM TRACING: CPT | Performed by: EMERGENCY MEDICINE

## 2019-08-02 PROCEDURE — 96374 THER/PROPH/DIAG INJ IV PUSH: CPT

## 2019-08-02 PROCEDURE — 2580000003 HC RX 258: Performed by: INTERNAL MEDICINE

## 2019-08-02 PROCEDURE — G0378 HOSPITAL OBSERVATION PER HR: HCPCS

## 2019-08-02 PROCEDURE — 94761 N-INVAS EAR/PLS OXIMETRY MLT: CPT

## 2019-08-02 PROCEDURE — 87086 URINE CULTURE/COLONY COUNT: CPT

## 2019-08-02 PROCEDURE — 85025 COMPLETE CBC W/AUTO DIFF WBC: CPT

## 2019-08-02 PROCEDURE — 83690 ASSAY OF LIPASE: CPT

## 2019-08-02 PROCEDURE — 96375 TX/PRO/DX INJ NEW DRUG ADDON: CPT

## 2019-08-02 PROCEDURE — 2580000003 HC RX 258: Performed by: PHYSICIAN ASSISTANT

## 2019-08-02 PROCEDURE — 80053 COMPREHEN METABOLIC PANEL: CPT

## 2019-08-02 PROCEDURE — 6360000002 HC RX W HCPCS: Performed by: INTERNAL MEDICINE

## 2019-08-02 PROCEDURE — 36415 COLL VENOUS BLD VENIPUNCTURE: CPT

## 2019-08-02 PROCEDURE — 94640 AIRWAY INHALATION TREATMENT: CPT

## 2019-08-02 PROCEDURE — 87186 SC STD MICRODIL/AGAR DIL: CPT

## 2019-08-02 PROCEDURE — 84484 ASSAY OF TROPONIN QUANT: CPT

## 2019-08-02 PROCEDURE — 93010 ELECTROCARDIOGRAM REPORT: CPT | Performed by: INTERNAL MEDICINE

## 2019-08-02 PROCEDURE — 99285 EMERGENCY DEPT VISIT HI MDM: CPT

## 2019-08-02 PROCEDURE — 1200000000 HC SEMI PRIVATE

## 2019-08-02 PROCEDURE — 96365 THER/PROPH/DIAG IV INF INIT: CPT

## 2019-08-02 PROCEDURE — 84295 ASSAY OF SERUM SODIUM: CPT

## 2019-08-02 PROCEDURE — 87077 CULTURE AEROBIC IDENTIFY: CPT

## 2019-08-02 RX ORDER — DEXTROSE AND SODIUM CHLORIDE 5; .45 G/100ML; G/100ML
INJECTION, SOLUTION INTRAVENOUS CONTINUOUS
Status: DISCONTINUED | OUTPATIENT
Start: 2019-08-02 | End: 2019-08-03

## 2019-08-02 RX ORDER — SODIUM CHLORIDE 1000 MG
1 TABLET, SOLUBLE MISCELLANEOUS
Status: DISCONTINUED | OUTPATIENT
Start: 2019-08-02 | End: 2019-08-06 | Stop reason: HOSPADM

## 2019-08-02 RX ORDER — LEVOTHYROXINE SODIUM 0.1 MG/1
100 TABLET ORAL
Status: DISCONTINUED | OUTPATIENT
Start: 2019-08-03 | End: 2019-08-06 | Stop reason: HOSPADM

## 2019-08-02 RX ORDER — 0.9 % SODIUM CHLORIDE 0.9 %
500 INTRAVENOUS SOLUTION INTRAVENOUS ONCE
Status: COMPLETED | OUTPATIENT
Start: 2019-08-02 | End: 2019-08-02

## 2019-08-02 RX ORDER — ATORVASTATIN CALCIUM 80 MG/1
80 TABLET, FILM COATED ORAL DAILY
Status: DISCONTINUED | OUTPATIENT
Start: 2019-08-02 | End: 2019-08-06 | Stop reason: HOSPADM

## 2019-08-02 RX ORDER — CLONIDINE HYDROCHLORIDE 0.1 MG/1
0.1 TABLET ORAL 3 TIMES DAILY
Status: DISCONTINUED | OUTPATIENT
Start: 2019-08-02 | End: 2019-08-06 | Stop reason: HOSPADM

## 2019-08-02 RX ORDER — LABETALOL 200 MG/1
200 TABLET, FILM COATED ORAL EVERY 12 HOURS SCHEDULED
Status: DISCONTINUED | OUTPATIENT
Start: 2019-08-02 | End: 2019-08-05

## 2019-08-02 RX ORDER — ONDANSETRON 2 MG/ML
4 INJECTION INTRAMUSCULAR; INTRAVENOUS EVERY 30 MIN PRN
Status: DISCONTINUED | OUTPATIENT
Start: 2019-08-02 | End: 2019-08-02

## 2019-08-02 RX ORDER — ONDANSETRON 2 MG/ML
4 INJECTION INTRAMUSCULAR; INTRAVENOUS EVERY 6 HOURS PRN
Status: DISCONTINUED | OUTPATIENT
Start: 2019-08-02 | End: 2019-08-06 | Stop reason: HOSPADM

## 2019-08-02 RX ORDER — ASPIRIN 81 MG/1
81 TABLET, CHEWABLE ORAL DAILY
Status: DISCONTINUED | OUTPATIENT
Start: 2019-08-02 | End: 2019-08-06 | Stop reason: HOSPADM

## 2019-08-02 RX ORDER — CIPROFLOXACIN 2 MG/ML
400 INJECTION, SOLUTION INTRAVENOUS ONCE
Status: COMPLETED | OUTPATIENT
Start: 2019-08-02 | End: 2019-08-02

## 2019-08-02 RX ORDER — ACETAMINOPHEN 325 MG/1
650 TABLET ORAL EVERY 4 HOURS PRN
Status: DISCONTINUED | OUTPATIENT
Start: 2019-08-02 | End: 2019-08-06 | Stop reason: HOSPADM

## 2019-08-02 RX ORDER — SODIUM CHLORIDE 9 MG/ML
INJECTION, SOLUTION INTRAVENOUS CONTINUOUS
Status: DISCONTINUED | OUTPATIENT
Start: 2019-08-02 | End: 2019-08-02

## 2019-08-02 RX ORDER — SODIUM CHLORIDE 0.9 % (FLUSH) 0.9 %
10 SYRINGE (ML) INJECTION PRN
Status: DISCONTINUED | OUTPATIENT
Start: 2019-08-02 | End: 2019-08-06 | Stop reason: HOSPADM

## 2019-08-02 RX ORDER — SODIUM CHLORIDE 0.9 % (FLUSH) 0.9 %
10 SYRINGE (ML) INJECTION EVERY 12 HOURS SCHEDULED
Status: DISCONTINUED | OUTPATIENT
Start: 2019-08-02 | End: 2019-08-06 | Stop reason: HOSPADM

## 2019-08-02 RX ORDER — OXYBUTYNIN CHLORIDE 5 MG/1
5 TABLET ORAL 2 TIMES DAILY
Status: DISCONTINUED | OUTPATIENT
Start: 2019-08-02 | End: 2019-08-06 | Stop reason: HOSPADM

## 2019-08-02 RX ORDER — CLONAZEPAM 0.5 MG/1
0.25 TABLET ORAL NIGHTLY PRN
Status: DISCONTINUED | OUTPATIENT
Start: 2019-08-02 | End: 2019-08-06 | Stop reason: HOSPADM

## 2019-08-02 RX ORDER — CALCIUM CARBONATE 500(1250)
500 TABLET ORAL DAILY
Status: DISCONTINUED | OUTPATIENT
Start: 2019-08-03 | End: 2019-08-06 | Stop reason: HOSPADM

## 2019-08-02 RX ADMIN — OXYBUTYNIN CHLORIDE 5 MG: 5 TABLET ORAL at 20:16

## 2019-08-02 RX ADMIN — ACETAMINOPHEN 650 MG: 325 TABLET, FILM COATED ORAL at 16:41

## 2019-08-02 RX ADMIN — ONDANSETRON 4 MG: 2 INJECTION INTRAMUSCULAR; INTRAVENOUS at 07:23

## 2019-08-02 RX ADMIN — CIPROFLOXACIN 400 MG: 2 INJECTION, SOLUTION INTRAVENOUS at 09:44

## 2019-08-02 RX ADMIN — SODIUM CHLORIDE TAB 1 GM 1 G: 1 TAB at 12:22

## 2019-08-02 RX ADMIN — SODIUM CHLORIDE TAB 1 GM 1 G: 1 TAB at 16:41

## 2019-08-02 RX ADMIN — DEXTROSE AND SODIUM CHLORIDE: 5; 450 INJECTION, SOLUTION INTRAVENOUS at 16:42

## 2019-08-02 RX ADMIN — SODIUM CHLORIDE 500 ML: 9 INJECTION, SOLUTION INTRAVENOUS at 07:23

## 2019-08-02 RX ADMIN — IPRATROPIUM BROMIDE 0.5 MG: 0.5 SOLUTION RESPIRATORY (INHALATION) at 19:34

## 2019-08-02 RX ADMIN — CLONIDINE HYDROCHLORIDE 0.1 MG: 0.1 TABLET ORAL at 12:22

## 2019-08-02 RX ADMIN — CLONIDINE HYDROCHLORIDE 0.1 MG: 0.1 TABLET ORAL at 20:16

## 2019-08-02 RX ADMIN — CLONAZEPAM 0.25 MG: 0.5 TABLET ORAL at 20:16

## 2019-08-02 RX ADMIN — SODIUM CHLORIDE: 9 INJECTION, SOLUTION INTRAVENOUS at 12:22

## 2019-08-02 RX ADMIN — Medication 2 PUFF: at 19:34

## 2019-08-02 RX ADMIN — APIXABAN 5 MG: 5 TABLET, FILM COATED ORAL at 20:16

## 2019-08-02 RX ADMIN — IPRATROPIUM BROMIDE 0.5 MG: 0.5 SOLUTION RESPIRATORY (INHALATION) at 16:34

## 2019-08-02 ASSESSMENT — ENCOUNTER SYMPTOMS
NAUSEA: 1
ABDOMINAL PAIN: 1
VOMITING: 1
SHORTNESS OF BREATH: 0
BACK PAIN: 0
BLOOD IN STOOL: 0
SINUS PAIN: 0
VOMITING: 0
WHEEZING: 0
COUGH: 0
SORE THROAT: 0
ABDOMINAL PAIN: 0
DIARRHEA: 0

## 2019-08-02 ASSESSMENT — PAIN SCALES - GENERAL
PAINLEVEL_OUTOF10: 0
PAINLEVEL_OUTOF10: 3
PAINLEVEL_OUTOF10: 5

## 2019-08-02 ASSESSMENT — PAIN DESCRIPTION - LOCATION
LOCATION: HEAD
LOCATION: HEAD

## 2019-08-02 ASSESSMENT — PAIN DESCRIPTION - PAIN TYPE
TYPE: ACUTE PAIN
TYPE: ACUTE PAIN

## 2019-08-02 NOTE — CONSULTS
Nephrology Consult Note  [phone]  [phone]   [email or web address].        Reason for Consult: Hyponatremia    HISTORY OF PRESENT ILLNESS:                The patient is a 75 y. o.female with significant past medical history of CAD, alcohol abuse, chronic hyponatremia, COPD, Depression, HTN, HPL, presented with generalized weakness, nausea and emesis. Says she feels better now, admits to having head-ache on questioning too. Says she feels better now. Labs revealed severe hyponatremia with a sodium of 117. She has h/o chronic hyponatremia, recently seen y us during her prior similar hospitalization and was discharged on 7/27/19. Her sodium was around 110s on admission and improved to low 130s on discharge. She was discharged on salt tablets. Questionable use of lexapro at home. Denies diarrhea. On lasix at home.      Past Medical History:        Diagnosis Date    Alcohol abuse     Anxiety     CAD in native artery     Cerebral artery occlusion with cerebral infarction (HCC)     states hx of multiple mini strokes    COPD (chronic obstructive pulmonary disease) (HCC)     Depression     DVT, lower extremity (HCC)     Hypercholesteremia     Hypertension     Hyperthyroidism     Mammogram abnormal 2/7/2012    Neuromuscular disorder ([facility] [address]     Pneumonia     Thyroid disease     Tobacco abuse        Past Surgical History:        Procedure Laterality Date    COLONOSCOPY  1/19/2012    Dr. Miah Sinclair; repeat 5 years    EYE SURGERY      cateracts removed    SHOULDER ARTHROSCOPY Right 6/18/14    SUBACROMIAL DECOMPRESSION, ROTATOR CUFF REPAIR    TOE SURGERY Right     TONSILLECTOMY         Scheduled Meds:   mometasone-formoterol  2 puff Inhalation BID    [START ON 8/3/2019] calcium elemental  500 mg Oral Daily    oxybutynin  5 mg Oral BID    atorvastatin  80 mg Oral Daily    [START ON 8/3/2019] levothyroxine  100 mcg Oral QAM AC    ipratropium  0.5 mg Nebulization TID    aspirin  81 mg Oral Daily    apixaban  5 mg Oral BID    cloNIDine  0.1 mg Oral TID    labetalol  200 mg Oral 2 times per day    sodium chloride  1 g Oral TID     sodium chloride flush  10 mL Intravenous 2 times per day     Continuous Infusions:   sodium chloride 50 mL/hr at 08/02/19 1222     PRN Meds:.clonazePAM, sodium chloride flush, magnesium hydroxide, ondansetron, acetaminophen    Current Medications:    No current facility-administered medications on file prior to encounter. Current Outpatient Medications on File Prior to Encounter   Medication Sig Dispense Refill    cloNIDine (CATAPRES) 0.1 MG tablet Take 1 tablet by mouth 3 times daily 60 tablet 3    labetalol (NORMODYNE) 200 MG tablet Take 1 tablet by mouth every 12 hours 60 tablet 3    sodium chloride 1 g tablet Take 1 tablet by mouth 3 times daily (with meals) 90 tablet 0    potassium chloride (KLOR-CON M) 20 MEQ TBCR extended release tablet TAKE ONE TABLET BY MOUTH DAILY 30 tablet 0    escitalopram (LEXAPRO) 10 MG tablet Take 10 mg by mouth daily      furosemide (LASIX) 40 MG tablet TAKE ONE TABLET BY MOUTH DAILY 30 tablet 1    clonazePAM (KLONOPIN) 0.5 MG tablet Take 0.5 tablets by mouth nightly as needed (insomnia).  15 tablet 2    apixaban (ELIQUIS) 5 MG TABS tablet Take 1 tablet by mouth 2 times daily 60 tablet 2    aspirin 81 MG chewable tablet CHEW ONE TABLET BY MOUTH DAILY 30 tablet 2    ipratropium (ATROVENT) 0.02 % nebulizer solution Take 2.5 mLs by nebulization 3 times daily 360 mL 0    calcium elemental (OSCAL) 500 MG TABS tablet Take 1 tablet by mouth daily 30 tablet 3    oxybutynin (DITROPAN) 5 MG tablet TAKE ONE TABLET BY MOUTH TWICE A  tablet 3    atorvastatin (LIPITOR) 80 MG tablet Take 1 tablet by mouth daily 90 tablet 3    levothyroxine (SYNTHROID) 100 MCG tablet TAKE ONE TABLET BY MOUTH DAILY 90 tablet 3    budesonide-formoterol (SYMBICORT) 160-4.5 MCG/ACT AERO Inhale 2 puffs into the lungs 2 times daily 1 Inhaler 2    Multiple Not on file   Other Topics Concern    Not on file   Social History Narrative    Not on file       Family History:       Problem Relation Age of Onset    Heart Disease Father         MI @ 64    Alcohol Abuse Father            Review of Systems   Constitutional: Negative for chills and fever. HENT: Negative for ear discharge, ear pain, hearing loss, sinus pain, sneezing and sore throat. Respiratory: Negative for cough, shortness of breath and wheezing. Cardiovascular: Negative for chest pain, palpitations and leg swelling. Gastrointestinal: Positive for abdominal pain and nausea. Negative for diarrhea and vomiting. Genitourinary: Negative for dysuria, enuresis, frequency and urgency. Musculoskeletal: Positive for myalgias. Negative for back pain. Neurological: Negative for dizziness, seizures and syncope. Psychiatric/Behavioral: Negative for confusion and hallucinations. PHYSICAL EXAM:      Vitals:    BP (!) 146/83   Pulse 61   Temp 98.1 °F (36.7 °C) (Oral)   Resp 16   Ht 5' 8\" (1.727 m)   Wt 126 lb (57.2 kg)   SpO2 99%   BMI 19.16 kg/m²   I/O last 3 completed shifts: In: 500 [IV Piggyback:500]  Out: -   No intake/output data recorded. Physical Exam:  General : AAOx3, not in pain or respiratory distress, resting in bed  HEENT : mucosa moist. PERRL  CVS: S1 S2 normal, regular rhythm, no murmurs or rubs. Lungs: Clear, no wheezing or crackles. Abd: Soft, bowel sounds normal, non-tender. Ext: No edema, no cyanosis  Skin: Warm. No rashes appreciated. : bladder non-distended, no tenderness over the bladder  Neuro: Alert and oriented x 3, nonfocal.  Joints: No erythema noted over joints.     DATA:    CBC with Differential:    Lab Results   Component Value Date    WBC 11.7 08/02/2019    RBC 3.77 08/02/2019    HGB 12.9 08/02/2019    HCT 37.2 08/02/2019     08/02/2019    MCV 98.6 08/02/2019    MCH 34.1 08/02/2019    MCHC 34.6 08/02/2019    RDW 13.3 08/02/2019    SEGSPCT 79.1

## 2019-08-02 NOTE — H&P
does not drink alcohol. Family History:          Problem Relation Age of Onset    Heart Disease Father         MI @ 64    Alcohol Abuse Father        REVIEW OF SYSTEMS:   Pertinent positives as noted in the HPI. All other systems reviewed and negative. PHYSICAL EXAM:    BP (!) 146/83   Pulse 61   Temp 98.1 °F (36.7 °C) (Oral)   Resp 16   Ht 5' 8\" (1.727 m)   Wt 126 lb (57.2 kg)   SpO2 99%   BMI 19.16 kg/m²     Gen/overall appearance: Not in acute distress. Alert. Head: Normocephalic, atraumatic  Eyes: EOMI, no scleral icterus  CVS: regular rate and rhythm, Normal S1S2  Pulm: Clear to auscultation bilaterally. No crackles/wheezes  Gastrointestinal: Soft, nontender, nondistended, no guarding or rebound  Extremities: No edema. No erythema or warmth  Neuro: No gross focal deficits noted  Skin: Warm, dry    Labs:     Recent Labs     08/02/19  0642   WBC 11.7*   HGB 12.9   HCT 37.2        Recent Labs     08/02/19  0642   *   K 4.8   CL 81*   CO2 20*   BUN 21*   CREATININE 0.9   CALCIUM 9.5     Recent Labs     08/02/19  0642   AST 26   ALT 17   BILITOT 0.7   ALKPHOS 65     No results for input(s): INR in the last 72 hours. Recent Labs     08/02/19  0642   TROPONINI <0.01       Urinalysis:      Lab Results   Component Value Date    NITRU POSITIVE 08/02/2019    WBCUA 98 08/02/2019    BACTERIA 1+ 08/02/2019    RBCUA 2 08/02/2019    BLOODU TRACE 08/02/2019    SPECGRAV 1.007 08/02/2019    GLUCOSEU Negative 08/02/2019    GLUCOSEU NEGATIVE 05/08/2011         ASSESSMENT:    Active Hospital Problems    Diagnosis Date Noted    Hyponatremia [E87.1] 07/21/2019     Intractable nausea and vomiting. Denies abdominal pain, gross bleeding, diarrhea. Otherwise asymptomatic. Recent CT abdomen non-acute  - PRN anti-emetics  - gentle IVFs  - monitor and replace lytes  - consider GI eval if persistent    Recurrent hyponatremia.   Possibly hypovolemic.  - gentle IVFs for solute  - c/w home Na tabs  - slow

## 2019-08-02 NOTE — PLAN OF CARE
MD Viktor Washington MD Trellis Pollen, MD                     Office: [phone]                 Fax: [phone]          [employer]                  PATIENT NAME: Sanjuanita Weldon  : 1945  MRN: [id number]    Consult received. But patient seen by kidney and hypertension group recently in the hospital, in 2019. So forwarded the consult to them -- Informed Dr Cielo Hardy from their group. Thank you for contacting [facility] to participate in this patient's care.      Doris Santana

## 2019-08-02 NOTE — ED PROVIDER NOTES
I independently performed a history and physical on Abby Renteria. All diagnostic, treatment, and disposition decisions were made by myself in conjunction with the advanced practice provider. Briefly, this is a 75 y.o. female here for nausea and vomiting. Symptoms are severe. Was also having small amount of dizziness. Not in any pain. Had similar symptoms a couple of weeks ago which she required admission for. On exam,   General: Patient is well appearing  Skin: No cyanosis  HEENT: Dry mucous membranes  Heart: Regular rate, regular rhythm  Lung: No respiratory distress  Abdomen: Soft, nontender  Neuro: Moving all extremities, no facial droop, no slurred speech, answers questions appropriately. Lip smacking    EKG          Screenings            MDM  Patient is a 58-year-old woman who presents with nausea, vomiting, dizziness. Was found to be hyponatremic to 117. Had similar symptoms recently which she required admission for. Will give bolus of normal saline and admit to hospitalist.    Patient Referrals:  No follow-up provider specified. Discharge Medications:  New Prescriptions    No medications on file       FINAL IMPRESSION  1. Hyponatremia    2. Nausea and vomiting, intractability of vomiting not specified, unspecified vomiting type        Blood pressure 125/65, pulse 59, temperature 97.7 °F (36.5 °C), temperature source Oral, resp. rate 23, height 5' 8\" (1.727 m), weight 126 lb (57.2 kg), SpO2 94 %, not currently breastfeeding.      For further details of [facility] emergency department encounter, please see documentation by advanced practice provider, Rema Martinez MD  08/02/19 3629

## 2019-08-02 NOTE — ED NOTES
Bed: 08  Expected date:   Expected time:   Means of arrival:   Comments:  401 Geronimo Melton RN  08/02/19 2572
Pt. Arrived by [employer]. Pt. Is awake, alert and oriented x4. Pt. Arman Fore in a gown. Pt. C/o abd pain with n/v all night. resp easy and regular. Denies any sob or cough. IV placed to left hand. Blood specimen obtained and sent to lab. EKG completed. Call light in reach. Pt. States her  is on the way here. Call light in reach.        Meghana Ruiz RN  08/02/19 1917
90 tablet 3    budesonide-formoterol (SYMBICORT) 160-4.5 MCG/ACT AERO Inhale 2 puffs into the lungs 2 times daily 1 Inhaler 2    Multiple Vitamins-Minerals (CENTRUM SILVER ADULT 50+ PO) Take 1 tablet by mouth daily

## 2019-08-02 NOTE — DISCHARGE INSTR - COC
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 113    Superficial thrombophlebitis of right upper extremity I80.8    Severe infection B99.9    Tobacco abuse counseling Z71.6    Therapeutic drug monitoring Z51.81    Complex care coordination Z71.89    Stroke-like symptoms R29.90    History of recent stroke Z86.73    Depression F32.9    Unable to ambulate R26.2    Cerebrovascular accident (CVA) ([address] I63.9    Oropharyngeal dysphagia R13.12    HTN (hypertension), benign I10    Dyslipidemia E78.5    Encounter for electronic analysis of reveal event recorder Z45.09    Acute on chronic diastolic heart failure (HCC) I50.33    Physical deconditioning R53.81    PAF (paroxysmal atrial fibrillation) ([facility]) I48.0    Vascular dementia, uncomplicated R27.50    Other insomnia G47.09    Transient alteration of awareness R40.4    Hyponatremia E87.1       Isolation/Infection:   Isolation          No Isolation            Nurse Assessment:  Last Vital Signs: /60   Pulse 58   Temp 98.1 °F (36.7 °C) (Oral)   Resp 16   Ht 5' 8\" (1.727 m)   Wt 126 lb (57.2 kg)   SpO2 98%   BMI 19.16 kg/m²     Last documented pain score (0-10 scale):    Last Weight:   Wt Readings from Last 1 Encounters:   08/02/19 126 lb (57.2 kg)     Mental Status:  oriented    IV Access:  - None    Nursing Mobility/ADLs:  Walking Independent  Transfer  Independent  Bathing  Assisted  Dressing  Assisted  Toileting  Independent  Feeding  Independent  Med [address]  Med Delivery   whole    Wound Care Documentation and Therapy:        Elimination:  Continence:   · Bowel: Yes  · Bladder: No  Urinary Catheter: None   Colostomy/Ileostomy/Ileal Conduit: No       Date of Last BM: 8/4/19    Intake/Output Summary (Last 24 hours) at 8/2/2019 1128  Last data filed at 8/2/2019 0844  Gross per 24 hour   Intake 500 ml   Output --   Net 500 ml     No intake/output data recorded. Safety Concerns: At Risk for Falls    Impairments/Disabilities:      None    Nutrition Therapy:  Current Nutrition Therapy:   - Oral Diet:  General    Routes of Feeding: Oral  Liquids: Thin Liquids  Daily Fluid Restriction: yes - amount 1800  Last Modified Barium Swallow with Video (Video Swallowing Test): not done    Treatments at the Time of Hospital Discharge:   Respiratory Treatments: yes  Oxygen Therapy:  is not on home oxygen therapy.   Ventilator:    - No ventilator support    Rehab Therapies: Physical Therapy and Nursing  Weight Bearing Status/Restrictions: No weight bearing restirctions  Other Medical Equipment (for information only, NOT a DME order):  cane  Other Treatments:     Patient's personal belongings (please select all that are sent with patient):  Dentures upper and lower    RN SIGNATURE:  Electronically signed by Chinyere Taylor RN on 8/6/19 at 10:33 AM    CASE MANAGEMENT/SOCIAL WORK SECTION    Inpatient Status Date: 8/2/19    Readmission Risk Assessment Score:  Readmission Risk              Risk of Unplanned Readmission:        19           Discharging to Facility/ Agency   Name: [facility] will call for Appointment  Phone: [phone]  Fax: [phone]        / signature: Electronically signed by YIFAN Camarena on 8/5/19 at 12:01 PM    PHYSICIAN SECTION    Prognosis: Good    Condition at Discharge:

## 2019-08-02 NOTE — ED PROVIDER NOTES
2      aspirin 81 MG chewable tablet CHEW ONE TABLET BY MOUTH DAILY  Qty: 30 tablet, Refills: 2      ipratropium (ATROVENT) 0.02 % nebulizer solution Take 2.5 mLs by nebulization 3 times daily  Qty: 360 mL, Refills: 0      calcium elemental (OSCAL) 500 MG TABS tablet Take 1 tablet by mouth daily  Qty: 30 tablet, Refills: 3      oxybutynin (DITROPAN) 5 MG tablet TAKE ONE TABLET BY MOUTH TWICE A DAY  Qty: 180 tablet, Refills: 3      atorvastatin (LIPITOR) 80 MG tablet Take 1 tablet by mouth daily  Qty: 90 tablet, Refills: 3      levothyroxine (SYNTHROID) 100 MCG tablet TAKE ONE TABLET BY MOUTH DAILY  Qty: 90 tablet, Refills: 3    Associated Diagnoses: Hypothyroidism, unspecified type      budesonide-formoterol (SYMBICORT) 160-4.5 MCG/ACT AERO Inhale 2 puffs into the lungs 2 times daily  Qty: 1 Inhaler, Refills: 2      Multiple Vitamins-Minerals (CENTRUM SILVER ADULT 50+ PO) Take 1 tablet by mouth daily                ALLERGIES     Lipitor [atorvastatin]; Morphine; Keflex [cephalexin]; Hctz [hydrochlorothiazide]; Norvasc [amlodipine besylate];  Penicillins; Tramadol; Hydralazine; and Shellfish-derived products    FAMILYHISTORY       Family History   Problem Relation Age of Onset    Heart Disease Father         MI @ 64    Alcohol Abuse Father           SOCIAL HISTORY       Social History     Socioeconomic History    Marital status:      Spouse name: None    Number of children: None    Years of education: None    Highest education level: None   Occupational History    None   Social Needs    Financial resource strain: None    Food insecurity:     Worry: None     Inability: None    Transportation needs:     Medical: None     Non-medical: None   Tobacco Use    Smoking status: Current Every Day Smoker     Packs/day: 1.50     Years: 52.00     Pack years: 78.00     Types: Cigarettes     Last attempt to quit: 2018     Years since quittin.0    Smokeless tobacco: Never Used    Tobacco comment: started vomiting, intractability of vomiting not specified, unspecified vomiting type    3.  Acute cystitis without hematuria          DISPOSITION/PLAN   DISPOSITION Admitted 08/02/2019 08:59:49 AM      PATIENT REFERREDTO:  Williams Petit, [facility] [address]  [phone]            DISCHARGE MEDICATIONS:  Current Discharge Medication List          DISCONTINUED MEDICATIONS:  Current Discharge Medication List                 (Please note that portions ofthis note were completed with a voice recognition program.  Efforts were made to edit the dictations but occasionally words are mis-transcribed.)    Evelyn Velazquez PA-C (electronically signed)            Evelyn Velazquez PA-C  08/02/19 4917

## 2019-08-02 NOTE — PROGRESS NOTES
Admission assessment complete, VSS at this time. Pt oriented to room and call light system, understands to use the call light for assistance when needed. Pure-wick placed at 1045 this morning, pt is incontinent. Remains with tape rash/abrasion to sacral area, with blanching. Also has discoloration noted to her backside due to her heated pad at home. Pt is SBA when up. States she does have some pain, PRN tylenol ordered per Dr. Collette Barrett. Non-slip socks in place. Call light and belongings within reach, will continue to monitor.

## 2019-08-03 LAB
ANION GAP SERPL CALCULATED.3IONS-SCNC: 11 MMOL/L (ref 3–16)
BUN BLDV-MCNC: 14 MG/DL (ref 7–20)
CALCIUM SERPL-MCNC: 8.9 MG/DL (ref 8.3–10.6)
CHLORIDE BLD-SCNC: 84 MMOL/L (ref 99–110)
CO2: 28 MMOL/L (ref 21–32)
CREAT SERPL-MCNC: 0.8 MG/DL (ref 0.6–1.2)
GFR AFRICAN AMERICAN: >60
GFR NON-AFRICAN AMERICAN: >60
GLUCOSE BLD-MCNC: 99 MG/DL (ref 70–99)
HCT VFR BLD CALC: 35.2 % (ref 36–48)
HEMOGLOBIN: 12.6 G/DL (ref 12–16)
MAGNESIUM: 1.8 MG/DL (ref 1.8–2.4)
MCH RBC QN AUTO: 34.7 PG (ref 26–34)
MCHC RBC AUTO-ENTMCNC: 35.9 G/DL (ref 31–36)
MCV RBC AUTO: 96.7 FL (ref 80–100)
PDW BLD-RTO: 13.5 % (ref 12.4–15.4)
PLATELET # BLD: 320 K/UL (ref 135–450)
PMV BLD AUTO: 7.3 FL (ref 5–10.5)
POTASSIUM REFLEX MAGNESIUM: 3.4 MMOL/L (ref 3.5–5.1)
RBC # BLD: 3.64 M/UL (ref 4–5.2)
SODIUM BLD-SCNC: 121 MMOL/L (ref 136–145)
SODIUM BLD-SCNC: 123 MMOL/L (ref 136–145)
SODIUM BLD-SCNC: 124 MMOL/L (ref 136–145)
SODIUM BLD-SCNC: 125 MMOL/L (ref 136–145)
WBC # BLD: 7.1 K/UL (ref 4–11)

## 2019-08-03 PROCEDURE — 85027 COMPLETE CBC AUTOMATED: CPT

## 2019-08-03 PROCEDURE — 6360000002 HC RX W HCPCS: Performed by: INTERNAL MEDICINE

## 2019-08-03 PROCEDURE — 1200000000 HC SEMI PRIVATE

## 2019-08-03 PROCEDURE — 96376 TX/PRO/DX INJ SAME DRUG ADON: CPT

## 2019-08-03 PROCEDURE — 6370000000 HC RX 637 (ALT 250 FOR IP): Performed by: INTERNAL MEDICINE

## 2019-08-03 PROCEDURE — G0378 HOSPITAL OBSERVATION PER HR: HCPCS

## 2019-08-03 PROCEDURE — 94640 AIRWAY INHALATION TREATMENT: CPT

## 2019-08-03 PROCEDURE — 94761 N-INVAS EAR/PLS OXIMETRY MLT: CPT

## 2019-08-03 PROCEDURE — 36415 COLL VENOUS BLD VENIPUNCTURE: CPT

## 2019-08-03 PROCEDURE — 2500000003 HC RX 250 WO HCPCS: Performed by: INTERNAL MEDICINE

## 2019-08-03 PROCEDURE — 96366 THER/PROPH/DIAG IV INF ADDON: CPT

## 2019-08-03 PROCEDURE — 83735 ASSAY OF MAGNESIUM: CPT

## 2019-08-03 PROCEDURE — 80048 BASIC METABOLIC PNL TOTAL CA: CPT

## 2019-08-03 PROCEDURE — 2580000003 HC RX 258: Performed by: INTERNAL MEDICINE

## 2019-08-03 PROCEDURE — 84295 ASSAY OF SERUM SODIUM: CPT

## 2019-08-03 PROCEDURE — 96375 TX/PRO/DX INJ NEW DRUG ADDON: CPT

## 2019-08-03 RX ORDER — PROMETHAZINE HYDROCHLORIDE 25 MG/ML
25 INJECTION, SOLUTION INTRAMUSCULAR; INTRAVENOUS EVERY 6 HOURS PRN
Status: DISCONTINUED | OUTPATIENT
Start: 2019-08-03 | End: 2019-08-06 | Stop reason: HOSPADM

## 2019-08-03 RX ORDER — CIPROFLOXACIN 2 MG/ML
400 INJECTION, SOLUTION INTRAVENOUS EVERY 12 HOURS
Status: DISCONTINUED | OUTPATIENT
Start: 2019-08-03 | End: 2019-08-06 | Stop reason: HOSPADM

## 2019-08-03 RX ORDER — SODIUM CHLORIDE 9 MG/ML
INJECTION, SOLUTION INTRAVENOUS CONTINUOUS
Status: DISCONTINUED | OUTPATIENT
Start: 2019-08-03 | End: 2019-08-04

## 2019-08-03 RX ORDER — LABETALOL HYDROCHLORIDE 5 MG/ML
10 INJECTION, SOLUTION INTRAVENOUS EVERY 6 HOURS PRN
Status: DISCONTINUED | OUTPATIENT
Start: 2019-08-03 | End: 2019-08-06 | Stop reason: HOSPADM

## 2019-08-03 RX ADMIN — CIPROFLOXACIN 400 MG: 2 INJECTION, SOLUTION INTRAVENOUS at 17:10

## 2019-08-03 RX ADMIN — CLONAZEPAM 0.25 MG: 0.5 TABLET ORAL at 22:05

## 2019-08-03 RX ADMIN — Medication 2 PUFF: at 07:17

## 2019-08-03 RX ADMIN — SODIUM CHLORIDE: 9 INJECTION, SOLUTION INTRAVENOUS at 18:35

## 2019-08-03 RX ADMIN — APIXABAN 5 MG: 5 TABLET, FILM COATED ORAL at 22:05

## 2019-08-03 RX ADMIN — OXYBUTYNIN CHLORIDE 5 MG: 5 TABLET ORAL at 22:06

## 2019-08-03 RX ADMIN — CLONIDINE HYDROCHLORIDE 0.1 MG: 0.1 TABLET ORAL at 22:05

## 2019-08-03 RX ADMIN — IPRATROPIUM BROMIDE 0.5 MG: 0.5 SOLUTION RESPIRATORY (INHALATION) at 07:17

## 2019-08-03 RX ADMIN — ACETAMINOPHEN 650 MG: 325 TABLET, FILM COATED ORAL at 17:10

## 2019-08-03 RX ADMIN — Medication 2 PUFF: at 20:34

## 2019-08-03 RX ADMIN — PROMETHAZINE HYDROCHLORIDE 25 MG: 25 INJECTION INTRAMUSCULAR; INTRAVENOUS at 12:40

## 2019-08-03 RX ADMIN — IPRATROPIUM BROMIDE 0.5 MG: 0.5 SOLUTION RESPIRATORY (INHALATION) at 20:34

## 2019-08-03 RX ADMIN — SODIUM CHLORIDE TAB 1 GM 1 G: 1 TAB at 17:29

## 2019-08-03 RX ADMIN — ONDANSETRON 4 MG: 2 INJECTION INTRAMUSCULAR; INTRAVENOUS at 05:28

## 2019-08-03 RX ADMIN — LEVOTHYROXINE SODIUM 100 MCG: 100 TABLET ORAL at 07:06

## 2019-08-03 RX ADMIN — ONDANSETRON 4 MG: 2 INJECTION INTRAMUSCULAR; INTRAVENOUS at 11:26

## 2019-08-03 RX ADMIN — LABETALOL HYDROCHLORIDE 200 MG: 200 TABLET, FILM COATED ORAL at 22:05

## 2019-08-03 RX ADMIN — LABETALOL HYDROCHLORIDE 10 MG: 5 INJECTION INTRAVENOUS at 12:40

## 2019-08-03 RX ADMIN — ONDANSETRON 4 MG: 2 INJECTION INTRAMUSCULAR; INTRAVENOUS at 18:01

## 2019-08-03 RX ADMIN — IPRATROPIUM BROMIDE 0.5 MG: 0.5 SOLUTION RESPIRATORY (INHALATION) at 14:34

## 2019-08-03 ASSESSMENT — PAIN SCALES - GENERAL
PAINLEVEL_OUTOF10: 3
PAINLEVEL_OUTOF10: 0
PAINLEVEL_OUTOF10: 0
PAINLEVEL_OUTOF10: 3
PAINLEVEL_OUTOF10: 0

## 2019-08-03 ASSESSMENT — PAIN DESCRIPTION - PAIN TYPE: TYPE: ACUTE PAIN

## 2019-08-03 ASSESSMENT — PAIN DESCRIPTION - DESCRIPTORS: DESCRIPTORS: HEADACHE

## 2019-08-03 ASSESSMENT — PAIN DESCRIPTION - LOCATION: LOCATION: HEAD

## 2019-08-03 NOTE — PROGRESS NOTES
Pt /77. Continues to climb. MD notified. IV labetalol ordered. Rn administered. BP monitored, but is hard to get due to patient constantly moving due to dry heaving. Will continue to monitor.   Aydin Hagen

## 2019-08-04 LAB
ANION GAP SERPL CALCULATED.3IONS-SCNC: 11 MMOL/L (ref 3–16)
BASOPHILS ABSOLUTE: 0.1 K/UL (ref 0–0.2)
BASOPHILS RELATIVE PERCENT: 0.7 %
BUN BLDV-MCNC: 8 MG/DL (ref 7–20)
CALCIUM SERPL-MCNC: 8.7 MG/DL (ref 8.3–10.6)
CHLORIDE BLD-SCNC: 87 MMOL/L (ref 99–110)
CO2: 27 MMOL/L (ref 21–32)
CORTISOL - AM: 17.8 UG/DL (ref 4.3–22.4)
CREAT SERPL-MCNC: 0.7 MG/DL (ref 0.6–1.2)
EOSINOPHILS ABSOLUTE: 0.1 K/UL (ref 0–0.6)
EOSINOPHILS RELATIVE PERCENT: 1.6 %
GFR AFRICAN AMERICAN: >60
GFR NON-AFRICAN AMERICAN: >60
GLUCOSE BLD-MCNC: 96 MG/DL (ref 70–99)
HCT VFR BLD CALC: 36.7 % (ref 36–48)
HEMOGLOBIN: 12.9 G/DL (ref 12–16)
LYMPHOCYTES ABSOLUTE: 0.6 K/UL (ref 1–5.1)
LYMPHOCYTES RELATIVE PERCENT: 7.2 %
MCH RBC QN AUTO: 34.1 PG (ref 26–34)
MCHC RBC AUTO-ENTMCNC: 35.2 G/DL (ref 31–36)
MCV RBC AUTO: 96.9 FL (ref 80–100)
MONOCYTES ABSOLUTE: 1.1 K/UL (ref 0–1.3)
MONOCYTES RELATIVE PERCENT: 12.8 %
NEUTROPHILS ABSOLUTE: 6.9 K/UL (ref 1.7–7.7)
NEUTROPHILS RELATIVE PERCENT: 77.7 %
ORGANISM: ABNORMAL
OSMOLALITY: 262 MOSM/KG (ref 278–305)
PDW BLD-RTO: 13 % (ref 12.4–15.4)
PLATELET # BLD: 336 K/UL (ref 135–450)
PMV BLD AUTO: 7.5 FL (ref 5–10.5)
POTASSIUM SERPL-SCNC: 3.8 MMOL/L (ref 3.5–5.1)
RBC # BLD: 3.78 M/UL (ref 4–5.2)
REASON FOR REJECTION: NORMAL
REJECTED TEST: NORMAL
SODIUM BLD-SCNC: 125 MMOL/L (ref 136–145)
SODIUM BLD-SCNC: 128 MMOL/L (ref 136–145)
SODIUM BLD-SCNC: 134 MMOL/L (ref 136–145)
TOTAL CK: 94 U/L (ref 26–192)
URIC ACID, SERUM: 4.5 MG/DL (ref 2.6–6)
URINE CULTURE, ROUTINE: ABNORMAL
URINE CULTURE, ROUTINE: ABNORMAL
WBC # BLD: 8.9 K/UL (ref 4–11)

## 2019-08-04 PROCEDURE — 94640 AIRWAY INHALATION TREATMENT: CPT

## 2019-08-04 PROCEDURE — 6360000002 HC RX W HCPCS: Performed by: INTERNAL MEDICINE

## 2019-08-04 PROCEDURE — 6370000000 HC RX 637 (ALT 250 FOR IP): Performed by: INTERNAL MEDICINE

## 2019-08-04 PROCEDURE — 96366 THER/PROPH/DIAG IV INF ADDON: CPT

## 2019-08-04 PROCEDURE — 84550 ASSAY OF BLOOD/URIC ACID: CPT

## 2019-08-04 PROCEDURE — 36415 COLL VENOUS BLD VENIPUNCTURE: CPT

## 2019-08-04 PROCEDURE — 84295 ASSAY OF SERUM SODIUM: CPT

## 2019-08-04 PROCEDURE — G0378 HOSPITAL OBSERVATION PER HR: HCPCS

## 2019-08-04 PROCEDURE — 83930 ASSAY OF BLOOD OSMOLALITY: CPT

## 2019-08-04 PROCEDURE — 82550 ASSAY OF CK (CPK): CPT

## 2019-08-04 PROCEDURE — 1200000000 HC SEMI PRIVATE

## 2019-08-04 PROCEDURE — 94761 N-INVAS EAR/PLS OXIMETRY MLT: CPT

## 2019-08-04 PROCEDURE — 85025 COMPLETE CBC W/AUTO DIFF WBC: CPT

## 2019-08-04 PROCEDURE — 82533 TOTAL CORTISOL: CPT

## 2019-08-04 PROCEDURE — 80048 BASIC METABOLIC PNL TOTAL CA: CPT

## 2019-08-04 RX ADMIN — CIPROFLOXACIN 400 MG: 2 INJECTION, SOLUTION INTRAVENOUS at 13:11

## 2019-08-04 RX ADMIN — CALCIUM 500 MG: 500 TABLET ORAL at 09:53

## 2019-08-04 RX ADMIN — CLONIDINE HYDROCHLORIDE 0.1 MG: 0.1 TABLET ORAL at 21:19

## 2019-08-04 RX ADMIN — LABETALOL HYDROCHLORIDE 200 MG: 200 TABLET, FILM COATED ORAL at 09:53

## 2019-08-04 RX ADMIN — APIXABAN 5 MG: 5 TABLET, FILM COATED ORAL at 09:51

## 2019-08-04 RX ADMIN — CLONIDINE HYDROCHLORIDE 0.1 MG: 0.1 TABLET ORAL at 13:17

## 2019-08-04 RX ADMIN — IPRATROPIUM BROMIDE 0.5 MG: 0.5 SOLUTION RESPIRATORY (INHALATION) at 15:36

## 2019-08-04 RX ADMIN — LABETALOL HYDROCHLORIDE 200 MG: 200 TABLET, FILM COATED ORAL at 21:19

## 2019-08-04 RX ADMIN — ATORVASTATIN CALCIUM 80 MG: 80 TABLET, FILM COATED ORAL at 09:52

## 2019-08-04 RX ADMIN — LEVOTHYROXINE SODIUM 100 MCG: 100 TABLET ORAL at 07:00

## 2019-08-04 RX ADMIN — APIXABAN 5 MG: 5 TABLET, FILM COATED ORAL at 21:19

## 2019-08-04 RX ADMIN — IPRATROPIUM BROMIDE 0.5 MG: 0.5 SOLUTION RESPIRATORY (INHALATION) at 07:44

## 2019-08-04 RX ADMIN — SODIUM CHLORIDE TAB 1 GM 1 G: 1 TAB at 09:52

## 2019-08-04 RX ADMIN — IPRATROPIUM BROMIDE 0.5 MG: 0.5 SOLUTION RESPIRATORY (INHALATION) at 20:55

## 2019-08-04 RX ADMIN — ASPIRIN 81 MG 81 MG: 81 TABLET ORAL at 09:52

## 2019-08-04 RX ADMIN — OXYBUTYNIN CHLORIDE 5 MG: 5 TABLET ORAL at 21:19

## 2019-08-04 RX ADMIN — CLONIDINE HYDROCHLORIDE 0.1 MG: 0.1 TABLET ORAL at 09:52

## 2019-08-04 RX ADMIN — OXYBUTYNIN CHLORIDE 5 MG: 5 TABLET ORAL at 09:52

## 2019-08-04 RX ADMIN — CLONAZEPAM 0.25 MG: 0.5 TABLET ORAL at 21:19

## 2019-08-04 RX ADMIN — Medication 2 PUFF: at 20:55

## 2019-08-04 RX ADMIN — Medication 2 PUFF: at 07:44

## 2019-08-04 RX ADMIN — CIPROFLOXACIN 400 MG: 2 INJECTION, SOLUTION INTRAVENOUS at 02:41

## 2019-08-04 ASSESSMENT — PAIN SCALES - GENERAL: PAINLEVEL_OUTOF10: 0

## 2019-08-04 NOTE — PROGRESS NOTES
08/04/19  1314   *   < > 123*   < > 125* 134* 125*   K 3.7  --  3.4*  --   --   --  3.8   CL 85*  --  84*  --   --   --  87*   CO2 24  --  28  --   --   --  27   BUN 18  --  14  --   --   --  8   CREATININE 0.9  --  0.8  --   --   --  0.7   CALCIUM 8.4  --  8.9  --   --   --  8.7    < > = values in this interval not displayed. Recent Labs     08/02/19  0642   AST 26   ALT 17   BILITOT 0.7   ALKPHOS 65     No results for input(s): INR in the last 72 hours. Recent Labs     08/02/19  0642 08/04/19  0636   CKTOTAL  --  80   TROPONINI <0.01  --        Assessment/Plan:    Active Hospital Problems    Diagnosis Date Noted    Hyponatremia [E87.1] 07/21/2019     Intractable nausea and vomiting. Denies abdominal pain, gross bleeding, diarrhea. Otherwise asymptomatic. Recent CT abdomen non-acute. Improving  - PRN anti-emetics  - gentle IVFs  - monitor and replace lytes  - GI eval for worsens     Recurrent hyponatremia. Possibly hypovolemic.    - hold IVFs and Na tabs given abrupt change  - slow steady increase in Na; trend BMP  - nephrology consulted and following     Klebsiella UTI.   Recently diagnosed and treated with abx  - on cipro  - follow up urine sensitivities     PMH of CVA, CAD, COPD, htn, hld, DVT, hypothyroidism     DVT Prophylaxis: eliquis  Diet: DIET GENERAL;  Code Status: Full Code    Dispo - [place] MD Roger

## 2019-08-04 NOTE — PLAN OF CARE
Problem: SAFETY  Goal: Free from accidental physical injury  Outcome: Ongoing     Problem: DAILY CARE  Goal: Daily care needs are met  Outcome: Ongoing     Problem: PAIN  Goal: Patient's pain/discomfort is manageable  Outcome: Ongoing     Problem: SKIN INTEGRITY  Goal: Skin integrity is maintained or improved  8/4/2019 1110 by Pratik Carrera  Outcome: Ongoing  Note:   No new findings during initial assessment     Problem: KNOWLEDGE DEFICIT  Goal: Patient/S.O. demonstrates understanding of disease process, treatment plan, medications, and discharge instructions. Outcome: Ongoing     Problem: DISCHARGE BARRIERS  Goal: Patient's continuum of care needs are met  Outcome: Ongoing     Problem: Nausea/Vomiting:  Goal: Absence of nausea/vomiting  Description  Absence of nausea/vomiting  8/4/2019 1110 by Pratik Carrera  Outcome: Ongoing  Note:   PRN phenergan administered by Day shift RN . Continuing to monitor. Goal: Able to drink  Description  Able to drink  8/4/2019 1110 by Pratik Carrera  Outcome: met this shift  Goal: Able to eat  Description  Able to eat  8/4/2019 1110 by Pratik Carrera  Outcome: met this shift  Goal: Ability to achieve adequate nutritional intake will improve  Description  Ability to achieve adequate nutritional intake will improve  Outcome: Ongoing     Problem: Falls - Risk of:  Goal: Will remain free from falls  Description  Will remain free from falls  8/4/2019 1110 by Pratik Carrera  Outcome: Ongoing    Note:   Pt remains free from falls. Safety precautions in place. Bed in lowest position, bed wheels locked, side rails up 2x call light within reach, bed alarm on, yellow cloth in place, fall risk wrist band on. Will continue to monitor.     Goal: Absence of physical injury  Description  Absence of physical injury  Outcome: Ongoing

## 2019-08-05 LAB
ANION GAP SERPL CALCULATED.3IONS-SCNC: 12 MMOL/L (ref 3–16)
BACTERIA: ABNORMAL /HPF
BILIRUBIN URINE: NEGATIVE
BLOOD, URINE: NEGATIVE
BUN BLDV-MCNC: 12 MG/DL (ref 7–20)
CALCIUM SERPL-MCNC: 8.7 MG/DL (ref 8.3–10.6)
CHLORIDE BLD-SCNC: 90 MMOL/L (ref 99–110)
CHLORIDE URINE RANDOM: <20 MMOL/L
CLARITY: ABNORMAL
CO2: 26 MMOL/L (ref 21–32)
COLOR: YELLOW
CREAT SERPL-MCNC: 0.8 MG/DL (ref 0.6–1.2)
CREATININE URINE: 34.5 MG/DL (ref 28–259)
EOSINOPHIL,URINE: NORMAL
EPITHELIAL CELLS, UA: ABNORMAL /HPF
GFR AFRICAN AMERICAN: >60
GFR NON-AFRICAN AMERICAN: >60
GLUCOSE BLD-MCNC: 93 MG/DL (ref 70–99)
GLUCOSE URINE: NEGATIVE MG/DL
KETONES, URINE: NEGATIVE MG/DL
LEUKOCYTE ESTERASE, URINE: ABNORMAL
MICROSCOPIC EXAMINATION: YES
NITRITE, URINE: NEGATIVE
OSMOLALITY URINE: 173 MOSM/KG (ref 390–1070)
OSMOLALITY: 263 MOSM/KG (ref 278–305)
PH UA: 6.5 (ref 5–8)
POTASSIUM SERPL-SCNC: 4 MMOL/L (ref 3.5–5.1)
POTASSIUM, UR: 18.7 MMOL/L
PROTEIN PROTEIN: 5 MG/DL
PROTEIN UA: NEGATIVE MG/DL
RBC UA: ABNORMAL /HPF (ref 0–2)
SODIUM BLD-SCNC: 128 MMOL/L (ref 136–145)
SODIUM BLD-SCNC: 130 MMOL/L (ref 136–145)
SODIUM URINE: <20 MMOL/L
SPECIFIC GRAVITY UA: 1.01 (ref 1–1.03)
UREA NITROGEN, UR: 254.1 MG/DL (ref 800–1666)
UROBILINOGEN, URINE: 0.2 E.U./DL
WBC UA: ABNORMAL /HPF (ref 0–5)

## 2019-08-05 PROCEDURE — 84156 ASSAY OF PROTEIN URINE: CPT

## 2019-08-05 PROCEDURE — 2580000003 HC RX 258: Performed by: INTERNAL MEDICINE

## 2019-08-05 PROCEDURE — 87205 SMEAR GRAM STAIN: CPT

## 2019-08-05 PROCEDURE — 6360000002 HC RX W HCPCS: Performed by: INTERNAL MEDICINE

## 2019-08-05 PROCEDURE — 84295 ASSAY OF SERUM SODIUM: CPT

## 2019-08-05 PROCEDURE — 83930 ASSAY OF BLOOD OSMOLALITY: CPT

## 2019-08-05 PROCEDURE — 6370000000 HC RX 637 (ALT 250 FOR IP): Performed by: INTERNAL MEDICINE

## 2019-08-05 PROCEDURE — 1200000000 HC SEMI PRIVATE

## 2019-08-05 PROCEDURE — 96366 THER/PROPH/DIAG IV INF ADDON: CPT

## 2019-08-05 PROCEDURE — 36415 COLL VENOUS BLD VENIPUNCTURE: CPT

## 2019-08-05 PROCEDURE — 94760 N-INVAS EAR/PLS OXIMETRY 1: CPT

## 2019-08-05 PROCEDURE — 80048 BASIC METABOLIC PNL TOTAL CA: CPT

## 2019-08-05 PROCEDURE — 81001 URINALYSIS AUTO W/SCOPE: CPT

## 2019-08-05 PROCEDURE — 83935 ASSAY OF URINE OSMOLALITY: CPT

## 2019-08-05 PROCEDURE — 82570 ASSAY OF URINE CREATININE: CPT

## 2019-08-05 PROCEDURE — G0378 HOSPITAL OBSERVATION PER HR: HCPCS

## 2019-08-05 PROCEDURE — 84133 ASSAY OF URINE POTASSIUM: CPT

## 2019-08-05 PROCEDURE — 82436 ASSAY OF URINE CHLORIDE: CPT

## 2019-08-05 PROCEDURE — 84540 ASSAY OF URINE/UREA-N: CPT

## 2019-08-05 PROCEDURE — 84300 ASSAY OF URINE SODIUM: CPT

## 2019-08-05 PROCEDURE — 94640 AIRWAY INHALATION TREATMENT: CPT

## 2019-08-05 RX ORDER — CARVEDILOL 6.25 MG/1
12.5 TABLET ORAL 2 TIMES DAILY WITH MEALS
Status: DISCONTINUED | OUTPATIENT
Start: 2019-08-05 | End: 2019-08-06 | Stop reason: HOSPADM

## 2019-08-05 RX ADMIN — SODIUM CHLORIDE TAB 1 GM 1 G: 1 TAB at 12:16

## 2019-08-05 RX ADMIN — CLONIDINE HYDROCHLORIDE 0.1 MG: 0.1 TABLET ORAL at 16:59

## 2019-08-05 RX ADMIN — CLONIDINE HYDROCHLORIDE 0.1 MG: 0.1 TABLET ORAL at 21:52

## 2019-08-05 RX ADMIN — CARVEDILOL 12.5 MG: 6.25 TABLET, FILM COATED ORAL at 16:57

## 2019-08-05 RX ADMIN — IPRATROPIUM BROMIDE 0.5 MG: 0.5 SOLUTION RESPIRATORY (INHALATION) at 13:02

## 2019-08-05 RX ADMIN — APIXABAN 5 MG: 5 TABLET, FILM COATED ORAL at 21:52

## 2019-08-05 RX ADMIN — IPRATROPIUM BROMIDE 0.5 MG: 0.5 SOLUTION RESPIRATORY (INHALATION) at 21:09

## 2019-08-05 RX ADMIN — CIPROFLOXACIN 400 MG: 2 INJECTION, SOLUTION INTRAVENOUS at 00:04

## 2019-08-05 RX ADMIN — APIXABAN 5 MG: 5 TABLET, FILM COATED ORAL at 09:48

## 2019-08-05 RX ADMIN — ATORVASTATIN CALCIUM 80 MG: 80 TABLET, FILM COATED ORAL at 09:40

## 2019-08-05 RX ADMIN — LABETALOL HYDROCHLORIDE 200 MG: 200 TABLET, FILM COATED ORAL at 09:40

## 2019-08-05 RX ADMIN — SODIUM CHLORIDE TAB 1 GM 1 G: 1 TAB at 16:57

## 2019-08-05 RX ADMIN — Medication 10 ML: at 21:53

## 2019-08-05 RX ADMIN — OXYBUTYNIN CHLORIDE 5 MG: 5 TABLET ORAL at 09:40

## 2019-08-05 RX ADMIN — LEVOTHYROXINE SODIUM 100 MCG: 100 TABLET ORAL at 06:47

## 2019-08-05 RX ADMIN — CIPROFLOXACIN 400 MG: 2 INJECTION, SOLUTION INTRAVENOUS at 12:10

## 2019-08-05 RX ADMIN — CLONIDINE HYDROCHLORIDE 0.1 MG: 0.1 TABLET ORAL at 09:40

## 2019-08-05 RX ADMIN — Medication 2 PUFF: at 21:09

## 2019-08-05 RX ADMIN — OXYBUTYNIN CHLORIDE 5 MG: 5 TABLET ORAL at 21:52

## 2019-08-05 RX ADMIN — ASPIRIN 81 MG 81 MG: 81 TABLET ORAL at 09:41

## 2019-08-05 RX ADMIN — Medication 2 PUFF: at 08:25

## 2019-08-05 RX ADMIN — SODIUM CHLORIDE TAB 1 GM 1 G: 1 TAB at 09:52

## 2019-08-05 RX ADMIN — CALCIUM 500 MG: 500 TABLET ORAL at 09:41

## 2019-08-05 RX ADMIN — IPRATROPIUM BROMIDE 0.5 MG: 0.5 SOLUTION RESPIRATORY (INHALATION) at 08:25

## 2019-08-05 ASSESSMENT — PAIN SCALES - GENERAL
PAINLEVEL_OUTOF10: 0

## 2019-08-05 NOTE — PROGRESS NOTES
[facility] Spoke with pt  re: home care and plan of care. Agreeable, wishes to resume home care. Will follow for home care.

## 2019-08-05 NOTE — PROGRESS NOTES
better now. Labs revealed severe hyponatremia with a sodium of 117. She has h/o chronic hyponatremia, recently seen y us during her prior similar hospitalization and was discharged on 7/27/19. Her sodium was around 110s on admission and improved to low 130s on discharge. She was discharged on salt tablets. Questionable use of lexapro at home. Denies diarrhea. On lasix at home. Interval History: SNa improving; pt no longer with nausea, tolerating full meals, no vomiting. Denies vomiting/dirrhea/cp/sob. Nausea/malaise resolved. Review of Systems Significant as listed in the interval history  Past medical, family, and social histories were reviewed as previously documented. Updates were made as necessary. Updated:  family at bedside. Medications   Inpatient Meds:  Medications Prior to Admission: cloNIDine (CATAPRES) 0.1 MG tablet, Take 1 tablet by mouth 3 times daily  labetalol (NORMODYNE) 200 MG tablet, Take 1 tablet by mouth every 12 hours  sodium chloride 1 g tablet, Take 1 tablet by mouth 3 times daily (with meals)  potassium chloride (KLOR-CON M) 20 MEQ TBCR extended release tablet, TAKE ONE TABLET BY MOUTH DAILY  escitalopram (LEXAPRO) 10 MG tablet, Take 10 mg by mouth daily  furosemide (LASIX) 40 MG tablet, TAKE ONE TABLET BY MOUTH DAILY  clonazePAM (KLONOPIN) 0.5 MG tablet, Take 0.5 tablets by mouth nightly as needed (insomnia).   apixaban (ELIQUIS) 5 MG TABS tablet, Take 1 tablet by mouth 2 times daily  aspirin 81 MG chewable tablet, CHEW ONE TABLET BY MOUTH DAILY  ipratropium (ATROVENT) 0.02 % nebulizer solution, Take 2.5 mLs by nebulization 3 times daily  calcium elemental (OSCAL) 500 MG TABS tablet, Take 1 tablet by mouth daily  oxybutynin (DITROPAN) 5 MG tablet, TAKE ONE TABLET BY MOUTH TWICE A DAY  atorvastatin (LIPITOR) 80 MG tablet, Take 1 tablet by mouth daily  levothyroxine (SYNTHROID) 100 MCG tablet, TAKE ONE TABLET BY MOUTH DAILY  budesonide-formoterol (SYMBICORT) 160-4.5 nonspecific and could be secondary to mild anasarca. Low-grade enteritis/colitis could also potentially resultant free fluid. That said, no focal bowel wall thickening is found. Chronic, complex loculated left pleural effusion with adjacent scarring. New small right pleural effusion. Ct Head Wo Contrast    Result Date: 7/22/2019  EXAMINATION: CT OF THE HEAD WITHOUT CONTRAST  7/22/2019 6:21 pm TECHNIQUE: CT of the head was performed without the administration of intravenous contrast. Dose modulation, iterative reconstruction, and/or weight based adjustment of the mA/kV was utilized to reduce the radiation dose to as low as reasonably achievable. COMPARISON: 05/08/2019 HISTORY: ORDERING SYSTEM PROVIDED HISTORY: HEADACHE rULE oUT cEREBRAL EDEMA TECHNOLOGIST PROVIDED HISTORY: Has a \"code stroke\" or \"stroke alert\" been called? ->No Reason for Exam: Headache, r/o cerebral edema. Acuity: Unknown Type of Exam: Unknown FINDINGS: BRAIN/VENTRICLES: There are focal and confluent areas of diminished attenuation in the white matter supratentorially indicative of chronic small vessel ischemic change. This is similar to the prior exam.  There is some prominence of the sulci and ventricles commencement with the patient's age and underlying brain parenchymal atrophy. No mass or hemorrhage is seen intracranially. No midline shift is noted. Atherosclerotic vascular calcifications are present. ORBITS: The visualized portion of the orbits demonstrate no acute abnormality. SINUSES: The visualized paranasal sinuses and mastoid air cells demonstrate no acute abnormality. SOFT TISSUES/SKULL:  No acute abnormality of the visualized skull or soft tissues. No acute intracranial abnormality. Sequela of chronic small vessel ischemic change. Xr Chest Portable    Result Date: 7/21/2019    Persistent opacification at the left lung base, not significantly changed most likely representing scarring or atelectasis.   Otherwise stable

## 2019-08-05 NOTE — PROGRESS NOTES
Patient up to restroom with pca. purewick removed. Patient able to ambulate to toilet. Hat placed in toilet to measure output.

## 2019-08-05 NOTE — PROGRESS NOTES
Hospitalist Progress Note      PCP: Louis Daniel MD    Date of Admission: 8/2/2019    Chief Complaint: N/v      Subjective:   Denies nausea and emesis. Tolerating PO  Denies abdominal pain, gross bleeding. Na with slow trend up. Medications:  Reviewed    Infusion Medications     Scheduled Medications    carvedilol  12.5 mg Oral BID WC    ciprofloxacin  400 mg Intravenous Q12H    mometasone-formoterol  2 puff Inhalation BID    calcium elemental  500 mg Oral Daily    oxybutynin  5 mg Oral BID    atorvastatin  80 mg Oral Daily    levothyroxine  100 mcg Oral QAM AC    ipratropium  0.5 mg Nebulization TID    aspirin  81 mg Oral Daily    apixaban  5 mg Oral BID    cloNIDine  0.1 mg Oral TID    sodium chloride  1 g Oral TID WC    sodium chloride flush  10 mL Intravenous 2 times per day     PRN Meds: labetalol, promethazine, clonazePAM, sodium chloride flush, magnesium hydroxide, ondansetron, acetaminophen      Intake/Output Summary (Last 24 hours) at 8/5/2019 1354  Last data filed at 8/5/2019 1016  Gross per 24 hour   Intake --   Output 350 ml   Net -350 ml       Exam:    BP (!) 171/73   Pulse 64   Temp 98 °F (36.7 °C) (Oral)   Resp 16   Ht 5' 8\" (1.727 m)   Wt 131 lb 12.8 oz (59.8 kg)   SpO2 98%   BMI 20.04 kg/m²     Gen/overall appearance: Not in acute distress. Alert. Head: Normocephalic, atraumatic  Eyes: EOMI, no scleral icterus  CVS: regular rate and rhythm, Normal S1S2  Pulm: Clear to auscultation bilaterally. No crackles/wheezes  Gastrointestinal: Soft, nontender, nondistended, no guarding or rebound  Extremities: No edema.  No erythema or warmth  Neuro: No gross focal deficits noted  Skin: Warm, dry    Labs:   Recent Labs     08/03/19  0414 08/04/19  0636   WBC 7.1 8.9   HGB 12.6 12.9   HCT 35.2* 36.7    336     Recent Labs     08/03/19  0414  08/04/19  1314 08/04/19  2053 08/05/19  0502   *   < > 125* 128* 128*   K 3.4*  --  3.8  --  4.0   CL 84*  --  87*

## 2019-08-05 NOTE — PROGRESS NOTES
[facility])  Nephrology Consult Progress Note  8/5/2019 12:06 PM     Patient: Vega Donald [id number]  [id number]  Date of Admit: 8/2/2019 LOS: 3 days  Referring physician: Rolando Sanchez MD  Outpatient Nephrologist: Dr. Saintclair Mike, has not had chance to f/u in office yet. Assessment and Plan   The patient is a 75 y. o.female with significant past medical history of CAD, alcohol abuse, chronic hyponatremia, COPD, Depression, HTN, HPL, presented with generalized weakness, nausea and emesis. Assessment & Plan  1. Hyponatremia severe, possibly symptomatic, improving. Presumed to be secondary to SIADH in the past. Risk factors include lexapro, COPD. Sna on presentation 117 8/2/19, 131 last 7/27/19  Goal sodium by tomorrow is ~ 131-133  Monitor sodium Q 4 hours. 2. UTI started ceftriaxone, Cx Klebsiella  3. Nausea/dry heaves, unsure if this is driving ADH release or caused by her sodium, I suspect more so causing SNa to lower as even in good ranges was having symptoms. Recommendations:   SNa 128; stable off IVF no more nausea  Urine studies pending  Nutrition consult for fluid restrict  Restarting salt tabs from o/p. Unclear if nausea was driven by low sodium or vice versa; possible contributors UTI/Abd pain? Can space sodium to q12  BP elevated; will change labetalol to coreg 12.5 mg BID and monitor,  May be a good candidate for Lasix/salt tabs, will arrange f/u on DC    Thank you for allowing us to participate in the care of this patient. Please call with any questions. Signed:  Donny Medina M.D.   [facility]  Office Number: [phone]  Fax Number: [phone]  8/5/2019, 12:06 PM    Summary / Interval History   75 y. o.female with significant past medical history of CAD, alcohol abuse, chronic hyponatremia, COPD, Depression, HTN, HPL, presented with generalized weakness, nausea and emesis.  Says she feels better now, admits to having head-ache on questioning too. Says she feels better now. Labs revealed severe hyponatremia with a sodium of 117. She has h/o chronic hyponatremia, recently seen y us during her prior similar hospitalization and was discharged on 7/27/19. Her sodium was around 110s on admission and improved to low 130s on discharge. She was discharged on salt tablets. Questionable use of lexapro at home. Denies diarrhea. On lasix at home. Interval History: SNa improving; pt no longer with nausea, tolerating full meals, no vomiting. Cx sensitivities pending. Reports monitoring her intake of fluids. Denies vomiting/dirrhea/cp/sob. Nausea/malaise resolved. Review of Systems Significant as listed in the interval history  Past medical, family, and social histories were reviewed as previously documented. Updates were made as necessary. Updated:  family at bedside. Medications   Inpatient Meds:  Medications Prior to Admission: cloNIDine (CATAPRES) 0.1 MG tablet, Take 1 tablet by mouth 3 times daily  labetalol (NORMODYNE) 200 MG tablet, Take 1 tablet by mouth every 12 hours  sodium chloride 1 g tablet, Take 1 tablet by mouth 3 times daily (with meals)  potassium chloride (KLOR-CON M) 20 MEQ TBCR extended release tablet, TAKE ONE TABLET BY MOUTH DAILY  escitalopram (LEXAPRO) 10 MG tablet, Take 10 mg by mouth daily  furosemide (LASIX) 40 MG tablet, TAKE ONE TABLET BY MOUTH DAILY  clonazePAM (KLONOPIN) 0.5 MG tablet, Take 0.5 tablets by mouth nightly as needed (insomnia).   apixaban (ELIQUIS) 5 MG TABS tablet, Take 1 tablet by mouth 2 times daily  aspirin 81 MG chewable tablet, CHEW ONE TABLET BY MOUTH DAILY  ipratropium (ATROVENT) 0.02 % nebulizer solution, Take 2.5 mLs by nebulization 3 times daily  calcium elemental (OSCAL) 500 MG TABS tablet, Take 1 tablet by mouth daily  oxybutynin (DITROPAN) 5 MG tablet, TAKE ONE TABLET BY MOUTH TWICE A DAY  atorvastatin (LIPITOR) 80 MG tablet, Take 1 tablet by mouth daily  levothyroxine kg)   07/15/19 135 lb (61.2 kg)   19 129 lb 12.8 oz (58.9 kg)   19 128 lb 6.4 oz (58.2 kg)     Admit Wt: Weight: 126 lb (57.2 kg)   Todays Wt: Weight: 131 lb 12.8 oz (59.8 kg)  Average, Min, and Max for last 24 hours Vitals:  TEMPERATURE:  Temp  Av.9 °F (36.6 °C)  Min: 97.3 °F (36.3 °C)  Max: 98.6 °F (37 °C)  RESPIRATIONS RANGE: Resp  Av.6  Min: 16  Max: 18  PULSE RANGE: Pulse  Av.9  Min: 63  Max: 69  BLOOD PRESSURE RANGE:    Systolic (80WQF), HTW:843 , Min:87 , QMA:911     Diastolic (47DLA), UQ, Min:60, Max:93    PULSE OXIMETRY RANGE: SpO2  Av.9 %  Min: 95 %  Max: 99 %    Estimated body mass index is 20.04 kg/m² as calculated from the following:    Height as of this encounter: 5' 8\" (1.727 m). Weight as of this encounter: 131 lb 12.8 oz (59.8 kg). Intake/Output Summary (Last 24 hours) at 2019 1206  Last data filed at 2019 1016  Gross per 24 hour   Intake --   Output 350 ml   Net -350 ml     No intake/output data recorded. Physical Exam     General Appearance NAD, Alert, Uncomfortable appearing. Appears stated age. Cardiovascular RRR, no heave. Respiratory Clear to auscultation bilaterally, unlabored. MSK No clubbing or cyanosis,  warm well perfused, no obvious edema    Deferred. HEENT NC/AT, Pupils equal & round, Sclera anicteric. Hearing grossly intact, normal external ears and nares, mucous membranes moist.       Chest Symmetric, normal shape and expansion, no tenderness on chest wall.        Skin No rashes seen, skin feels warm and dry   Neurologic No tremors, no myoclonus   Psychiatric Alert, normal mood and affect, normal insight and judgement      Laboratory / Diagnostic Data     Recent Labs     19  0414 19  0636   WBC 7.1 8.9   HGB 12.6 12.9   HCT 35.2* 36.7   MCV 96.7 96.9    336     Recent Labs     19  0414  19  1314 19  2053 19  0502   *   < > 125* 128* 128*   K 3.4*  --  3.8  -- 4.0   CL 84*  --  87*  --  90*   CO2 28  --  27  --  26   BUN 14  --  8  --  12   CREATININE 0.8  --  0.7  --  0.8   GLUCOSE 99  --  96  --  93   CALCIUM 8.9  --  8.7  --  8.7   MG 1.80  --   --   --   --    ANIONGAP 11  --  11  --  12    < > = values in this interval not displayed. Estimated Creatinine Clearance: 58 mL/min (based on SCr of 0.8 mg/dL). Lab Results   Component Value Date    CALCIUM 8.7 08/05/2019    CAION 0.98 (L) 02/09/2014    PHOS 4.0 06/15/2018      No results for input(s): INR, PROTIME, PTT in the last 72 hours. Lab Results   Component Value Date    FERRITIN 125.4 04/20/2018     Lab Results   Component Value Date    HSCHVPNN50 768 06/15/2018       Lab Results   Component Value Date    COLORU YELLOW 08/05/2019    CLARITYU CLOUDY (A) 08/05/2019    PHUR 6.5 08/05/2019    GLUCOSEU Negative 08/05/2019    BLOODU Negative 08/05/2019    LEUKOCYTESUR MODERATE (A) 08/05/2019    BILIRUBINUR Negative 08/05/2019    UROBILINOGEN 0.2 08/05/2019    RBCUA 2 08/02/2019    WBCUA 98 (H) 08/02/2019    BACTERIA 1+ (A) 08/02/2019         No results found for: HAV, HEPAIGM, HEPBIGM, HEPBCAB, HBEAG, HEPCAB   No results for input(s): ALT, AST, GGT, ALKPHOS, BILITOT, LIPASE in the last 72 hours. Recent Labs     08/04/19  0636   CKTOTAL 94     Lab Results   Component Value Date    LABA1C 5.5 04/20/2018       No results for input(s): LACTATE in the last 72 hours. No results for input(s): ESR, CRP in the last 72 hours. Lab Results   Component Value Date    LABGRAM  07/08/2016     4+ WBC's (Mononuclear)  1+ Epithelial Cells  No organisms seen            Ct Abdomen Pelvis Wo Contrast Additional Contrast? Oral    Result Date: 7/24/2019  No definite acute abnormality identified to explain the patient's abdominal pain. There is a small amount of free pelvic fluid, which is unusual for the patient's age. That is nonspecific and could be secondary to mild anasarca.  Low-grade enteritis/colitis could also potentially complication risk is felt to be: moderate

## 2019-08-06 VITALS
HEART RATE: 65 BPM | SYSTOLIC BLOOD PRESSURE: 145 MMHG | HEIGHT: 68 IN | RESPIRATION RATE: 16 BRPM | DIASTOLIC BLOOD PRESSURE: 90 MMHG | BODY MASS INDEX: 20.61 KG/M2 | WEIGHT: 136 LBS | OXYGEN SATURATION: 97 % | TEMPERATURE: 97.6 F

## 2019-08-06 LAB
ANION GAP SERPL CALCULATED.3IONS-SCNC: 9 MMOL/L (ref 3–16)
BUN BLDV-MCNC: 10 MG/DL (ref 7–20)
CALCIUM SERPL-MCNC: 8.5 MG/DL (ref 8.3–10.6)
CHLORIDE BLD-SCNC: 97 MMOL/L (ref 99–110)
CO2: 27 MMOL/L (ref 21–32)
CREAT SERPL-MCNC: 0.7 MG/DL (ref 0.6–1.2)
GFR AFRICAN AMERICAN: >60
GFR NON-AFRICAN AMERICAN: >60
GLUCOSE BLD-MCNC: 117 MG/DL (ref 70–99)
POTASSIUM SERPL-SCNC: 4 MMOL/L (ref 3.5–5.1)
SODIUM BLD-SCNC: 130 MMOL/L (ref 136–145)
SODIUM BLD-SCNC: 132 MMOL/L (ref 136–145)
SODIUM BLD-SCNC: 133 MMOL/L (ref 136–145)

## 2019-08-06 PROCEDURE — 94640 AIRWAY INHALATION TREATMENT: CPT

## 2019-08-06 PROCEDURE — 96366 THER/PROPH/DIAG IV INF ADDON: CPT

## 2019-08-06 PROCEDURE — 80048 BASIC METABOLIC PNL TOTAL CA: CPT

## 2019-08-06 PROCEDURE — 6360000002 HC RX W HCPCS: Performed by: INTERNAL MEDICINE

## 2019-08-06 PROCEDURE — 96376 TX/PRO/DX INJ SAME DRUG ADON: CPT

## 2019-08-06 PROCEDURE — 94761 N-INVAS EAR/PLS OXIMETRY MLT: CPT

## 2019-08-06 PROCEDURE — G0378 HOSPITAL OBSERVATION PER HR: HCPCS

## 2019-08-06 PROCEDURE — 6370000000 HC RX 637 (ALT 250 FOR IP): Performed by: INTERNAL MEDICINE

## 2019-08-06 PROCEDURE — 84295 ASSAY OF SERUM SODIUM: CPT

## 2019-08-06 PROCEDURE — 2580000003 HC RX 258: Performed by: INTERNAL MEDICINE

## 2019-08-06 PROCEDURE — 36415 COLL VENOUS BLD VENIPUNCTURE: CPT

## 2019-08-06 RX ORDER — CIPROFLOXACIN 500 MG/1
500 TABLET, FILM COATED ORAL 2 TIMES DAILY
Qty: 6 TABLET | Refills: 0 | Status: SHIPPED | OUTPATIENT
Start: 2019-08-06 | End: 2019-08-09

## 2019-08-06 RX ADMIN — ASPIRIN 81 MG 81 MG: 81 TABLET ORAL at 08:55

## 2019-08-06 RX ADMIN — CLONIDINE HYDROCHLORIDE 0.1 MG: 0.1 TABLET ORAL at 13:44

## 2019-08-06 RX ADMIN — Medication 2 PUFF: at 14:24

## 2019-08-06 RX ADMIN — OXYBUTYNIN CHLORIDE 5 MG: 5 TABLET ORAL at 08:54

## 2019-08-06 RX ADMIN — ATORVASTATIN CALCIUM 80 MG: 80 TABLET, FILM COATED ORAL at 08:55

## 2019-08-06 RX ADMIN — SODIUM CHLORIDE TAB 1 GM 1 G: 1 TAB at 08:55

## 2019-08-06 RX ADMIN — APIXABAN 5 MG: 5 TABLET, FILM COATED ORAL at 08:55

## 2019-08-06 RX ADMIN — CIPROFLOXACIN 400 MG: 2 INJECTION, SOLUTION INTRAVENOUS at 01:53

## 2019-08-06 RX ADMIN — CLONIDINE HYDROCHLORIDE 0.1 MG: 0.1 TABLET ORAL at 08:54

## 2019-08-06 RX ADMIN — CALCIUM 500 MG: 500 TABLET ORAL at 08:55

## 2019-08-06 RX ADMIN — CARVEDILOL 12.5 MG: 6.25 TABLET, FILM COATED ORAL at 08:55

## 2019-08-06 RX ADMIN — ONDANSETRON 4 MG: 2 INJECTION INTRAMUSCULAR; INTRAVENOUS at 08:51

## 2019-08-06 RX ADMIN — LEVOTHYROXINE SODIUM 100 MCG: 100 TABLET ORAL at 06:13

## 2019-08-06 RX ADMIN — Medication 10 ML: at 08:54

## 2019-08-06 RX ADMIN — CIPROFLOXACIN 400 MG: 2 INJECTION, SOLUTION INTRAVENOUS at 12:00

## 2019-08-06 RX ADMIN — SODIUM CHLORIDE TAB 1 GM 1 G: 1 TAB at 11:59

## 2019-08-06 RX ADMIN — IPRATROPIUM BROMIDE 0.5 MG: 0.5 SOLUTION RESPIRATORY (INHALATION) at 14:24

## 2019-08-06 ASSESSMENT — PAIN SCALES - GENERAL
PAINLEVEL_OUTOF10: 0

## 2019-08-06 NOTE — DISCHARGE SUMMARY
medications were sent to [facility] [address], [address], [address]    Phone:  [phone]   · ciprofloxacin 500 MG tablet         Discharge recommendations given to patient. Follow Up. PCP in 1 week   Disposition. Home with [place] [address]  Activity. activity as tolerated  Diet: DIET GENERAL; Daily Fluid Restriction: 1800 ml      Spent 33 minutes in discharge process.     Signed:  Valentin Amin MD     8/6/2019 2:59 PM
.

## 2019-08-06 NOTE — PROGRESS NOTES
on 7/27/19. Her sodium was around 110s on admission and improved to low 130s on discharge. She was discharged on salt tablets. Questionable use of lexapro at home. Denies diarrhea. On lasix at home. Interval History:   -no acute events ON    Denies vomiting/dirrhea/cp/sob. Nausea/malaise resolved. Review of Systems Significant as listed in the interval history  Past medical, family, and social histories were reviewed as previously documented. Updates were made as necessary. Updated:  family at bedside. Medications   Inpatient Meds:  Medications Prior to Admission: cloNIDine (CATAPRES) 0.1 MG tablet, Take 1 tablet by mouth 3 times daily  labetalol (NORMODYNE) 200 MG tablet, Take 1 tablet by mouth every 12 hours  sodium chloride 1 g tablet, Take 1 tablet by mouth 3 times daily (with meals)  potassium chloride (KLOR-CON M) 20 MEQ TBCR extended release tablet, TAKE ONE TABLET BY MOUTH DAILY  escitalopram (LEXAPRO) 10 MG tablet, Take 10 mg by mouth daily  furosemide (LASIX) 40 MG tablet, TAKE ONE TABLET BY MOUTH DAILY  clonazePAM (KLONOPIN) 0.5 MG tablet, Take 0.5 tablets by mouth nightly as needed (insomnia).   apixaban (ELIQUIS) 5 MG TABS tablet, Take 1 tablet by mouth 2 times daily  aspirin 81 MG chewable tablet, CHEW ONE TABLET BY MOUTH DAILY  ipratropium (ATROVENT) 0.02 % nebulizer solution, Take 2.5 mLs by nebulization 3 times daily  calcium elemental (OSCAL) 500 MG TABS tablet, Take 1 tablet by mouth daily  oxybutynin (DITROPAN) 5 MG tablet, TAKE ONE TABLET BY MOUTH TWICE A DAY  atorvastatin (LIPITOR) 80 MG tablet, Take 1 tablet by mouth daily  levothyroxine (SYNTHROID) 100 MCG tablet, TAKE ONE TABLET BY MOUTH DAILY  budesonide-formoterol (SYMBICORT) 160-4.5 MCG/ACT AERO, Inhale 2 puffs into the lungs 2 times daily  Multiple Vitamins-Minerals (CENTRUM SILVER ADULT 50+ PO), Take 1 tablet by mouth daily     Scheduled Meds:   carvedilol  12.5 mg Oral BID WC    ciprofloxacin  400 mg Intravenous Q12H

## 2019-08-06 NOTE — PROGRESS NOTES
Shift assessment completed, patient requesting to go back to bed. She is anxious about discharge, lab is in drawing 0900 sodium. Fall precautions in place, hourly rounding, call light and belongings in reach, bed in lowest position, wheels locked in place, side rails up x 2, walkways free of clutter,  Bed alarm on.

## 2019-08-07 ENCOUNTER — CARE COORDINATION (OUTPATIENT)
Dept: CASE MANAGEMENT | Age: 74
End: 2019-08-07

## 2019-08-07 DIAGNOSIS — E03.9 HYPOTHYROIDISM, UNSPECIFIED TYPE: ICD-10-CM

## 2019-08-07 RX ORDER — ASPIRIN 81 MG/1
TABLET, CHEWABLE ORAL
Qty: 21 TABLET | Refills: 1 | Status: SHIPPED | OUTPATIENT
Start: 2019-08-07 | End: 2019-10-02 | Stop reason: SDUPTHER

## 2019-08-08 ENCOUNTER — TELEPHONE (OUTPATIENT)
Dept: FAMILY MEDICINE CLINIC | Age: 74
End: 2019-08-08

## 2019-08-08 RX ORDER — OXYBUTYNIN CHLORIDE 5 MG/1
TABLET ORAL
Qty: 60 TABLET | Refills: 2 | Status: SHIPPED | OUTPATIENT
Start: 2019-08-08 | End: 2019-10-28 | Stop reason: SDUPTHER

## 2019-08-08 RX ORDER — LEVOTHYROXINE SODIUM 0.1 MG/1
TABLET ORAL
Qty: 90 TABLET | Refills: 2 | Status: SHIPPED | OUTPATIENT
Start: 2019-08-08 | End: 2020-04-24 | Stop reason: SDUPTHER

## 2019-08-08 RX ORDER — FUROSEMIDE 40 MG/1
TABLET ORAL
Qty: 30 TABLET | Refills: 3 | Status: SHIPPED | OUTPATIENT
Start: 2019-08-08 | End: 2019-11-27 | Stop reason: SDUPTHER

## 2019-08-12 ENCOUNTER — CARE COORDINATION (OUTPATIENT)
Dept: CASE MANAGEMENT | Age: 74
End: 2019-08-12

## 2019-08-12 NOTE — CARE COORDINATION
[facility] Transitions Follow Up Call    2019    Patient: Livan Ledesma  Patient : 1945   MRN: [id number]  Reason for Admission: UTI;hyponatremia  Discharge Date: 19 RARS: Readmission Risk Score: 22    Follow up call attempted, left contact info on vm      Follow Up  Future Appointments   Date Time Provider Dami Sims   2019 10:00 AM LESLIE Saravia [facility]   2019  9:00 AM Sandy Leach DEVICE CHECK [facility]   2019  9:30 AM QUITA Garcia CNP [facility]   10/1/2019  7:30 AM SCHEDULE, [place] REMOTE TRANSMISSION [facility]       Tahir Cartwright RN

## 2019-08-13 ENCOUNTER — OFFICE VISIT (OUTPATIENT)
Dept: FAMILY MEDICINE CLINIC | Age: 74
End: 2019-08-13
Payer: COMMERCIAL

## 2019-08-13 ENCOUNTER — TELEPHONE (OUTPATIENT)
Dept: FAMILY MEDICINE CLINIC | Age: 74
End: 2019-08-13

## 2019-08-13 VITALS
SYSTOLIC BLOOD PRESSURE: 92 MMHG | HEART RATE: 82 BPM | WEIGHT: 129.2 LBS | OXYGEN SATURATION: 98 % | BODY MASS INDEX: 19.64 KG/M2 | DIASTOLIC BLOOD PRESSURE: 70 MMHG

## 2019-08-13 DIAGNOSIS — F41.9 ANXIETY: ICD-10-CM

## 2019-08-13 DIAGNOSIS — E87.1 HYPONATREMIA: Primary | ICD-10-CM

## 2019-08-13 DIAGNOSIS — N39.0 URINARY TRACT INFECTION WITHOUT HEMATURIA, SITE UNSPECIFIED: ICD-10-CM

## 2019-08-13 LAB
BACTERIA URINE, POC: 0
BILIRUBIN URINE: 0 MG/DL
BLOOD, URINE: NEGATIVE
CASTS URINE, POC: 0
CLARITY: CLEAR
COLOR: YELLOW
CRYSTALS URINE, POC: 0
EPI CELLS URINE, POC: NORMAL
GLUCOSE URINE: NEGATIVE
KETONES, URINE: NEGATIVE
LEUKOCYTE EST, POC: NEGATIVE
NITRITE, URINE: NEGATIVE
PH UA: 7 (ref 4.5–8)
PROTEIN UA: NEGATIVE
RBC URINE, POC: 0
SPECIFIC GRAVITY UA: 1.01 (ref 1–1.03)
UROBILINOGEN, URINE: NORMAL
WBC URINE, POC: 0
YEAST URINE, POC: 0

## 2019-08-13 PROCEDURE — 81000 URINALYSIS NONAUTO W/SCOPE: CPT | Performed by: PHYSICIAN ASSISTANT

## 2019-08-13 PROCEDURE — 99214 OFFICE O/P EST MOD 30 MIN: CPT | Performed by: PHYSICIAN ASSISTANT

## 2019-08-13 ASSESSMENT — ENCOUNTER SYMPTOMS
CHEST TIGHTNESS: 0
CONSTIPATION: 0
SHORTNESS OF BREATH: 0
VOICE CHANGE: 0
COUGH: 0
TROUBLE SWALLOWING: 0
DIARRHEA: 0
EYE PAIN: 0
BACK PAIN: 0
ABDOMINAL PAIN: 0
SORE THROAT: 0

## 2019-08-15 ENCOUNTER — CARE COORDINATION (OUTPATIENT)
Dept: CASE MANAGEMENT | Age: 74
End: 2019-08-15

## 2019-08-15 RX ORDER — CLONAZEPAM 0.5 MG/1
0.25 TABLET ORAL NIGHTLY PRN
Qty: 15 TABLET | Refills: 2 | OUTPATIENT
Start: 2019-08-15 | End: 2020-08-14

## 2019-08-16 DIAGNOSIS — F41.9 ANXIETY: ICD-10-CM

## 2019-08-16 RX ORDER — CLONAZEPAM 0.5 MG/1
0.25 TABLET ORAL NIGHTLY PRN
Qty: 15 TABLET | Refills: 2 | Status: SHIPPED | OUTPATIENT
Start: 2019-08-16 | End: 2019-11-12 | Stop reason: SDUPTHER

## 2019-08-21 ENCOUNTER — TELEPHONE (OUTPATIENT)
Dept: FAMILY MEDICINE CLINIC | Age: 74
End: 2019-08-21

## 2019-08-21 ENCOUNTER — HOSPITAL ENCOUNTER (OUTPATIENT)
Age: 74
Setting detail: SPECIMEN
Discharge: HOME | End: 2019-08-21
Payer: COMMERCIAL

## 2019-08-21 LAB
ANION GAP SERPL CALCULATED.3IONS-SCNC: 14 MMOL/L (ref 3–16)
BUN BLDV-MCNC: 14 MG/DL (ref 7–20)
CALCIUM SERPL-MCNC: 9.1 MG/DL (ref 8.3–10.6)
CHLORIDE BLD-SCNC: 100 MMOL/L (ref 99–110)
CO2: 27 MMOL/L (ref 21–32)
CREAT SERPL-MCNC: 0.9 MG/DL (ref 0.6–1.2)
GFR AFRICAN AMERICAN: >60
GFR NON-AFRICAN AMERICAN: >60
GLUCOSE BLD-MCNC: 85 MG/DL (ref 70–99)
POTASSIUM SERPL-SCNC: 3.9 MMOL/L (ref 3.5–5.1)
SODIUM BLD-SCNC: 141 MMOL/L (ref 136–145)

## 2019-08-21 PROCEDURE — 36415 COLL VENOUS BLD VENIPUNCTURE: CPT

## 2019-08-21 PROCEDURE — 80048 BASIC METABOLIC PNL TOTAL CA: CPT

## 2019-08-21 NOTE — TELEPHONE ENCOUNTER
Kristina Caballero from [address] [place] [address] is reporting that the  Patient fell this morning and hit her head before she arrived there. So while she was there the patient almost fell again and she noticed that the patient fainted and she had to continue repeating her name till she came too. [address] did mention that the patient did had low potasium and low sodium and was hospitalized 2 weeks ago. [address] did do labs on the patient this morning she is taking them to the lab this morning [facility]    Patients husbands wants his wife to go to the hospital but she refuses. Jose Enrique ford

## 2019-08-22 ENCOUNTER — HOSPITAL ENCOUNTER (INPATIENT)
Age: 74
LOS: 2 days | Discharge: HOME | DRG: 690 | End: 2019-08-27
Attending: EMERGENCY MEDICINE | Admitting: INTERNAL MEDICINE
Payer: COMMERCIAL

## 2019-08-22 ENCOUNTER — APPOINTMENT (OUTPATIENT)
Dept: ULTRASOUND IMAGING | Age: 74
DRG: 690 | End: 2019-08-22
Payer: COMMERCIAL

## 2019-08-22 ENCOUNTER — APPOINTMENT (OUTPATIENT)
Dept: MRI IMAGING | Age: 74
DRG: 690 | End: 2019-08-22
Payer: COMMERCIAL

## 2019-08-22 ENCOUNTER — APPOINTMENT (OUTPATIENT)
Dept: GENERAL RADIOLOGY | Age: 74
DRG: 690 | End: 2019-08-22
Payer: COMMERCIAL

## 2019-08-22 ENCOUNTER — APPOINTMENT (OUTPATIENT)
Dept: CT IMAGING | Age: 74
DRG: 690 | End: 2019-08-22
Payer: COMMERCIAL

## 2019-08-22 DIAGNOSIS — E87.1 HYPONATREMIA: Primary | ICD-10-CM

## 2019-08-22 PROBLEM — R53.1 WEAKNESS: Status: ACTIVE | Noted: 2019-08-22

## 2019-08-22 LAB
A/G RATIO: 1.3 (ref 1.1–2.2)
ALBUMIN SERPL-MCNC: 4 G/DL (ref 3.4–5)
ALP BLD-CCNC: 61 U/L (ref 40–129)
ALT SERPL-CCNC: <5 U/L (ref 10–40)
ANION GAP SERPL CALCULATED.3IONS-SCNC: 10 MMOL/L (ref 3–16)
AST SERPL-CCNC: 15 U/L (ref 15–37)
BACTERIA: ABNORMAL /HPF
BASOPHILS ABSOLUTE: 0 K/UL (ref 0–0.2)
BASOPHILS RELATIVE PERCENT: 0.4 %
BILIRUB SERPL-MCNC: 0.5 MG/DL (ref 0–1)
BILIRUBIN URINE: NEGATIVE
BLOOD, URINE: ABNORMAL
BUN BLDV-MCNC: 12 MG/DL (ref 7–20)
CALCIUM SERPL-MCNC: 8.9 MG/DL (ref 8.3–10.6)
CHLORIDE BLD-SCNC: 104 MMOL/L (ref 99–110)
CLARITY: ABNORMAL
CO2: 28 MMOL/L (ref 21–32)
COLOR: YELLOW
COMMENT UA: ABNORMAL
CREAT SERPL-MCNC: 1 MG/DL (ref 0.6–1.2)
EKG ATRIAL RATE: 64 BPM
EKG DIAGNOSIS: NORMAL
EKG P AXIS: 58 DEGREES
EKG P-R INTERVAL: 148 MS
EKG Q-T INTERVAL: 428 MS
EKG QRS DURATION: 76 MS
EKG QTC CALCULATION (BAZETT): 441 MS
EKG R AXIS: 59 DEGREES
EKG T AXIS: 70 DEGREES
EKG VENTRICULAR RATE: 64 BPM
EOSINOPHILS ABSOLUTE: 0.2 K/UL (ref 0–0.6)
EOSINOPHILS RELATIVE PERCENT: 1.9 %
EPITHELIAL CELLS, UA: 2 /HPF (ref 0–5)
GFR AFRICAN AMERICAN: >60
GFR NON-AFRICAN AMERICAN: 54
GLOBULIN: 3 G/DL
GLUCOSE BLD-MCNC: 115 MG/DL (ref 70–99)
GLUCOSE URINE: NEGATIVE MG/DL
HCT VFR BLD CALC: 36.3 % (ref 36–48)
HEMOGLOBIN: 12.4 G/DL (ref 12–16)
HYALINE CASTS: 1 /LPF (ref 0–8)
KETONES, URINE: NEGATIVE MG/DL
LEUKOCYTE ESTERASE, URINE: ABNORMAL
LYMPHOCYTES ABSOLUTE: 0.5 K/UL (ref 1–5.1)
LYMPHOCYTES RELATIVE PERCENT: 5.4 %
MCH RBC QN AUTO: 33.9 PG (ref 26–34)
MCHC RBC AUTO-ENTMCNC: 34.3 G/DL (ref 31–36)
MCV RBC AUTO: 99 FL (ref 80–100)
MICROSCOPIC EXAMINATION: YES
MONOCYTES ABSOLUTE: 0.8 K/UL (ref 0–1.3)
MONOCYTES RELATIVE PERCENT: 8.8 %
NEUTROPHILS ABSOLUTE: 7.1 K/UL (ref 1.7–7.7)
NEUTROPHILS RELATIVE PERCENT: 83.5 %
NITRITE, URINE: POSITIVE
PDW BLD-RTO: 13.4 % (ref 12.4–15.4)
PH UA: 6 (ref 5–8)
PLATELET # BLD: 306 K/UL (ref 135–450)
PMV BLD AUTO: 7.8 FL (ref 5–10.5)
POTASSIUM SERPL-SCNC: 3.8 MMOL/L (ref 3.5–5.1)
PRO-BNP: 835 PG/ML (ref 0–449)
PROTEIN UA: NEGATIVE MG/DL
RBC # BLD: 3.67 M/UL (ref 4–5.2)
RBC UA: 2 /HPF (ref 0–4)
SODIUM BLD-SCNC: 142 MMOL/L (ref 136–145)
SPECIFIC GRAVITY UA: 1.01 (ref 1–1.03)
TOTAL PROTEIN: 7 G/DL (ref 6.4–8.2)
TROPONIN: <0.01 NG/ML
URINE REFLEX TO CULTURE: YES
URINE TYPE: ABNORMAL
UROBILINOGEN, URINE: 0.2 E.U./DL
WBC # BLD: 8.6 K/UL (ref 4–11)
WBC UA: 230 /HPF (ref 0–5)

## 2019-08-22 PROCEDURE — 93005 ELECTROCARDIOGRAM TRACING: CPT | Performed by: EMERGENCY MEDICINE

## 2019-08-22 PROCEDURE — 6370000000 HC RX 637 (ALT 250 FOR IP): Performed by: INTERNAL MEDICINE

## 2019-08-22 PROCEDURE — 93010 ELECTROCARDIOGRAM REPORT: CPT | Performed by: INTERNAL MEDICINE

## 2019-08-22 PROCEDURE — 6360000002 HC RX W HCPCS: Performed by: NURSE PRACTITIONER

## 2019-08-22 PROCEDURE — 72148 MRI LUMBAR SPINE W/O DYE: CPT

## 2019-08-22 PROCEDURE — G0378 HOSPITAL OBSERVATION PER HR: HCPCS

## 2019-08-22 PROCEDURE — 87077 CULTURE AEROBIC IDENTIFY: CPT

## 2019-08-22 PROCEDURE — 81001 URINALYSIS AUTO W/SCOPE: CPT

## 2019-08-22 PROCEDURE — 72146 MRI CHEST SPINE W/O DYE: CPT

## 2019-08-22 PROCEDURE — 72141 MRI NECK SPINE W/O DYE: CPT

## 2019-08-22 PROCEDURE — 96365 THER/PROPH/DIAG IV INF INIT: CPT

## 2019-08-22 PROCEDURE — 36415 COLL VENOUS BLD VENIPUNCTURE: CPT

## 2019-08-22 PROCEDURE — 96372 THER/PROPH/DIAG INJ SC/IM: CPT

## 2019-08-22 PROCEDURE — 2580000003 HC RX 258: Performed by: INTERNAL MEDICINE

## 2019-08-22 PROCEDURE — 70450 CT HEAD/BRAIN W/O DYE: CPT

## 2019-08-22 PROCEDURE — 6360000002 HC RX W HCPCS: Performed by: INTERNAL MEDICINE

## 2019-08-22 PROCEDURE — 94640 AIRWAY INHALATION TREATMENT: CPT

## 2019-08-22 PROCEDURE — 87186 SC STD MICRODIL/AGAR DIL: CPT

## 2019-08-22 PROCEDURE — 71046 X-RAY EXAM CHEST 2 VIEWS: CPT

## 2019-08-22 PROCEDURE — 87086 URINE CULTURE/COLONY COUNT: CPT

## 2019-08-22 PROCEDURE — 84484 ASSAY OF TROPONIN QUANT: CPT

## 2019-08-22 PROCEDURE — 99220 PR INITIAL OBSERVATION CARE/DAY 70 MINUTES: CPT | Performed by: PSYCHIATRY & NEUROLOGY

## 2019-08-22 PROCEDURE — 70551 MRI BRAIN STEM W/O DYE: CPT

## 2019-08-22 PROCEDURE — 99223 1ST HOSP IP/OBS HIGH 75: CPT | Performed by: INTERNAL MEDICINE

## 2019-08-22 PROCEDURE — 99285 EMERGENCY DEPT VISIT HI MDM: CPT

## 2019-08-22 PROCEDURE — 87040 BLOOD CULTURE FOR BACTERIA: CPT

## 2019-08-22 PROCEDURE — 85025 COMPLETE CBC W/AUTO DIFF WBC: CPT

## 2019-08-22 PROCEDURE — 80053 COMPREHEN METABOLIC PANEL: CPT

## 2019-08-22 PROCEDURE — 96366 THER/PROPH/DIAG IV INF ADDON: CPT

## 2019-08-22 PROCEDURE — 83880 ASSAY OF NATRIURETIC PEPTIDE: CPT

## 2019-08-22 RX ORDER — OXYBUTYNIN CHLORIDE 5 MG/1
5 TABLET ORAL 2 TIMES DAILY
Status: DISCONTINUED | OUTPATIENT
Start: 2019-08-22 | End: 2019-08-27 | Stop reason: HOSPADM

## 2019-08-22 RX ORDER — ASPIRIN 81 MG/1
81 TABLET, CHEWABLE ORAL DAILY
Status: DISCONTINUED | OUTPATIENT
Start: 2019-08-23 | End: 2019-08-27 | Stop reason: HOSPADM

## 2019-08-22 RX ORDER — ATORVASTATIN CALCIUM 80 MG/1
80 TABLET, FILM COATED ORAL DAILY
Status: DISCONTINUED | OUTPATIENT
Start: 2019-08-23 | End: 2019-08-27 | Stop reason: HOSPADM

## 2019-08-22 RX ORDER — M-VIT,TX,IRON,MINS/CALC/FOLIC 27MG-0.4MG
1 TABLET ORAL DAILY
Status: DISCONTINUED | OUTPATIENT
Start: 2019-08-23 | End: 2019-08-27 | Stop reason: HOSPADM

## 2019-08-22 RX ORDER — LEVOFLOXACIN 5 MG/ML
750 INJECTION, SOLUTION INTRAVENOUS ONCE
Status: COMPLETED | OUTPATIENT
Start: 2019-08-22 | End: 2019-08-22

## 2019-08-22 RX ORDER — CIPROFLOXACIN 2 MG/ML
400 INJECTION, SOLUTION INTRAVENOUS EVERY 12 HOURS
Status: DISCONTINUED | OUTPATIENT
Start: 2019-08-23 | End: 2019-08-26

## 2019-08-22 RX ORDER — LABETALOL 200 MG/1
100 TABLET, FILM COATED ORAL EVERY 12 HOURS SCHEDULED
Status: DISCONTINUED | OUTPATIENT
Start: 2019-08-22 | End: 2019-08-27 | Stop reason: HOSPADM

## 2019-08-22 RX ORDER — DEXAMETHASONE SODIUM PHOSPHATE 10 MG/ML
8 INJECTION, SOLUTION INTRAMUSCULAR; INTRAVENOUS ONCE
Status: DISCONTINUED | OUTPATIENT
Start: 2019-08-22 | End: 2019-08-27 | Stop reason: HOSPADM

## 2019-08-22 RX ORDER — SODIUM CHLORIDE 0.9 % (FLUSH) 0.9 %
10 SYRINGE (ML) INJECTION PRN
Status: DISCONTINUED | OUTPATIENT
Start: 2019-08-22 | End: 2019-08-27 | Stop reason: HOSPADM

## 2019-08-22 RX ORDER — CLONAZEPAM 0.5 MG/1
0.25 TABLET ORAL NIGHTLY PRN
Status: DISCONTINUED | OUTPATIENT
Start: 2019-08-22 | End: 2019-08-27 | Stop reason: HOSPADM

## 2019-08-22 RX ORDER — LEVOTHYROXINE SODIUM 0.1 MG/1
100 TABLET ORAL DAILY
Status: DISCONTINUED | OUTPATIENT
Start: 2019-08-23 | End: 2019-08-27 | Stop reason: HOSPADM

## 2019-08-22 RX ORDER — BUDESONIDE AND FORMOTEROL FUMARATE DIHYDRATE 160; 4.5 UG/1; UG/1
2 AEROSOL RESPIRATORY (INHALATION) 2 TIMES DAILY
Status: DISCONTINUED | OUTPATIENT
Start: 2019-08-22 | End: 2019-08-22 | Stop reason: CLARIF

## 2019-08-22 RX ORDER — SODIUM CHLORIDE 0.9 % (FLUSH) 0.9 %
10 SYRINGE (ML) INJECTION EVERY 12 HOURS SCHEDULED
Status: DISCONTINUED | OUTPATIENT
Start: 2019-08-22 | End: 2019-08-27 | Stop reason: HOSPADM

## 2019-08-22 RX ORDER — ONDANSETRON 2 MG/ML
4 INJECTION INTRAMUSCULAR; INTRAVENOUS EVERY 6 HOURS PRN
Status: DISCONTINUED | OUTPATIENT
Start: 2019-08-22 | End: 2019-08-27 | Stop reason: HOSPADM

## 2019-08-22 RX ORDER — CALCIUM CARBONATE 500(1250)
500 TABLET ORAL DAILY
Status: DISCONTINUED | OUTPATIENT
Start: 2019-08-23 | End: 2019-08-27 | Stop reason: HOSPADM

## 2019-08-22 RX ADMIN — CLONAZEPAM 0.25 MG: 0.5 TABLET ORAL at 22:56

## 2019-08-22 RX ADMIN — Medication 10 ML: at 22:54

## 2019-08-22 RX ADMIN — OXYBUTYNIN CHLORIDE 5 MG: 5 TABLET ORAL at 22:53

## 2019-08-22 RX ADMIN — LEVOFLOXACIN 750 MG: 5 INJECTION, SOLUTION INTRAVENOUS at 12:51

## 2019-08-22 RX ADMIN — LABETALOL HYDROCHLORIDE 100 MG: 200 TABLET, FILM COATED ORAL at 22:54

## 2019-08-22 RX ADMIN — ENOXAPARIN SODIUM 40 MG: 40 INJECTION SUBCUTANEOUS at 22:54

## 2019-08-22 RX ADMIN — Medication 2 PUFF: at 19:11

## 2019-08-22 RX ADMIN — IPRATROPIUM BROMIDE 0.5 MG: 0.5 SOLUTION RESPIRATORY (INHALATION) at 19:11

## 2019-08-22 ASSESSMENT — ENCOUNTER SYMPTOMS
COUGH: 0
CHOKING: 0
SHORTNESS OF BREATH: 0
APNEA: 0
DIARRHEA: 0
EYE REDNESS: 0
RHINORRHEA: 0
STRIDOR: 0
ABDOMINAL PAIN: 0
VOMITING: 0
PHOTOPHOBIA: 0
CHEST TIGHTNESS: 0
EYE DISCHARGE: 0
NAUSEA: 0
COLOR CHANGE: 0
FACIAL SWELLING: 0
TROUBLE SWALLOWING: 0
BLOOD IN STOOL: 0

## 2019-08-22 ASSESSMENT — PAIN DESCRIPTION - LOCATION: LOCATION: HEAD

## 2019-08-22 ASSESSMENT — PAIN DESCRIPTION - PAIN TYPE: TYPE: ACUTE PAIN

## 2019-08-22 ASSESSMENT — PAIN SCALES - GENERAL: PAINLEVEL_OUTOF10: 8

## 2019-08-22 NOTE — ED NOTES
Pt has soiled depends with urine. Depends removed. Carmella care done. New depends placed on pt.       Kaye Marroquin RN  08/22/19 1951

## 2019-08-22 NOTE — DISCHARGE INSTR - COC
Problems:  Patient Active Problem List   Diagnosis Code    Incontinence R32    Hypothyroidism E03.9    Smoker F17.200    Anxiety F41.9    History of alcoholism ([facility] [address] F10.21    Hyperlipidemia E78.5    DIMAS (dyspnea on exertion) R06.09    Acute DVT (deep venous thrombosis) ([facility]) I82.409    DVT (deep venous thrombosis) ([facility]) I82.409    COPD exacerbation ([facility]) J44.1    Fatigue R53.83    Pleural effusion J90    General weakness R53.1    Centrilobular emphysema ([facility]) J43.2    Essential hypertension I10    Chronic fatigue R53.82    COPD, moderate ([facility]) J44.9    Encephalopathy G93.40    CAD in native artery I25.10    Osteopenia M85.80    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 113    Superficial thrombophlebitis of right upper extremity I80.8    Severe infection B99.9    Tobacco abuse counseling Z71.6    Therapeutic drug monitoring Z51.81    Complex care coordination Z71.89    Stroke-like symptoms R29.90    H/O ischemic multifocal multiple vascular territories stroke Z80.78    Depression F32.9    Unable to walk R26.2    Cerebrovascular accident (CVA) ([facility] [address] I63.9    Oropharyngeal dysphagia R13.12    HTN (hypertension), benign I10    Dyslipidemia E78.5    Encounter for electronic analysis of reveal event recorder Z45.09    Acute on chronic diastolic heart failure ([facility]) I50.33    Physical deconditioning R53.81    PAF (paroxysmal atrial fibrillation) ([facility]) I48.0    Vascular dementia, uncomplicated H14.71    Other insomnia G47.09    Transient alteration of awareness R40.4    Hyponatremia E87.1    Generalized weakness T48.9    Complicated UTI (urinary tract infection) N39.0    Recurrent UTI N39.0    Pacemaker Z95.0    Coronary artery disease due to lipid rich plaque I25.10, I25.83       Isolation/Infection:   Isolation          No Isolation            Nurse Assessment:  Last Vital Signs: BP (!) 142/72   Pulse 59   Temp 98 °F (36.7 °C) (Oral)   Resp 19   Ht 5' 8\" (1.727 m)   Wt 125 lb (56.7 kg)   SpO2 99%   BMI 19.01 kg/m²     Last documented pain score (0-10 scale):    Last Weight:   Wt Readings from Last 1 Encounters:   08/22/19 125 lb (56.7 kg)     Mental Status:  oriented and alert    IV Access:  - None    Nursing Mobility/ADLs:  Walking   Assisted  Transfer  Assisted  Bathing  Assisted  Dressing  Assisted  Toileting  Assisted  Feeding  Assisted  Med Admin  Assisted  Med Delivery   whole    Wound Care Documentation and Therapy:        Elimination:  Continence:   · Bowel: Yes  · Bladder: Yes  Urinary Catheter: None   Colostomy/Ileostomy/Ileal Conduit: No       Date of Last BM:   No intake or output data in the 24 hours ending 08/22/19 1520  No intake/output data recorded. Safety Concerns:     Sundowners [place]    Impairments/Disabilities:      None    Nutrition Therapy:  Current Nutrition Therapy:   - Oral Diet:  General    Routes of Feeding: Oral  Liquids: Thin Liquids  Daily Fluid Restriction: no  Last Modified Barium Swallow with Video (Video Swallowing Test): not done    Treatments at the Time of Hospital Discharge:   Respiratory Treatments:   Oxygen Therapy:  is not on home oxygen therapy.   Ventilator:    - No ventilator support    Rehab Therapies: SN,PT,OT  Weight Bearing Status/Restrictions: No weight bearing restirctions  Other Medical Equipment (for information only, NOT a DME order):  walker  Other Treatments:   HOME HEALTH CARE: LEVEL 1 STANDARD     -Initial home health evaluation to occur within 24-48 hours, in patient home    -Home health agency to establish plan of care for patient over 60 day period    -Medication Reconciliation    -PCP Visit scheduled within seven days of discharge    -PT/OT to evaluate with goal of regaining prior level of functioning    -OT to evaluate if patient has [address] needs for personal care       Patient's personal belongings (please select all that are sent with patient):  Duran    RN SIGNATURE:  Electronically signed by Juhi Diaz RN on 8/27/19 at 5:09 PM    CASE MANAGEMENT/SOCIAL WORK SECTION    Inpatient Status Date: OBS on 08/24/19    Readmission Risk Assessment Score:  Readmission Risk              Risk of Unplanned Readmission:        0         Discharging to Facility/ Agency   Name: Lu Pierson will call for Appointment  Phone: [phone]  Fax: [phone]    / signature: Electronically signed by GISELLA Guidry on 8/23/2019 at 1:08 PM      PHYSICIAN SECTION    Prognosis: Fair    Condition at Discharge: Stable    Rehab Potential (if transferring to Rehab): Fair    Recommended Labs or Other Treatments After Discharge: PT/OT/SN    Physician Certification: I certify the above information and transfer of Alex Li  is necessary for the continuing treatment of the diagnosis listed and that she requires Home Care for less 30 days.      Update Admission H&P: No change in H&P    PHYSICIAN SIGNATURE:  Electronically signed by Elsi Olsen MD on 8/27/19 at 2:34 PM

## 2019-08-22 NOTE — ED NOTES
Attempted to ambulate pt per Hernando Oro NP orders. Got pt to standing position but pt unable to stand erect. Pt unable to take even one step and was unable to bear weight independently and unsteady. Had to quickly place pt back into bed to prevent falling. Pt had soiled depends with urine and onto bed linens. Carmella care done. Complete bed linen change done. New depends placed on pt. Noted was stage 1 pressure ulcer to left inner buttock and stage 2 pressure ulcer to right inner buttock regions. Pt states she does not move around much at home. Pt also has what appears to be large scattered areas of scar tissue on her mis and lower back with one fluid filled blister approx 2-2.5 inches by 1/2 inch on her back. Pt states it may be from her heating pad. Advised pt to not use heating pad if its causing burning to her skin. Hernando Oro NP notified of findings and inability to walk and bear weight independently. Also aware of pt skin condition.       Tereza Ceron RN  08/22/19 2318

## 2019-08-22 NOTE — ED NOTES
Bed: 09  Expected date:   Expected time:   Means of arrival: [facility]  Comments:  Chantal Garcia  08/22/19 5979

## 2019-08-22 NOTE — CONSULTS
Infectious Diseases   Consult Note        Admission Date: 8/22/2019  Hospital Day: Hospital Day: 1   Attending: Gildardo Roa MD  Date of service: 8/22/19     Reason for admission: Weakness [R53.1]  Weakness [R53.1]    Chief complaint/ Reason for consult: Generalized weakness, concern for UTI    Microbiology:        I have reviewed allavailable micro lab data and cultures    · Blood culture (2/2) - collected on 8/22/2019: In process    · Urine culture  - collected on 8/22/2019: In process      Antibiotics and immunizations:       Current antibiotics: All antibiotics and their doses were reviewed by me    Recent Abx Admin                   levofloxacin (LEVAQUIN) 750 MG/150ML infusion 750 mg (mg) 750 mg New Bag 08/22/19 1251                  Immunization History: All immunization history was reviewed by me today. Immunization History   Administered Date(s) Administered    Influenza Virus Vaccine 11/04/2015    Influenza, High Dose (Fluzone 65 yrs and older) 10/16/2012, 10/27/2014, 11/28/2016, 10/16/2017, 12/01/2018    Pneumococcal Conjugate 13-valent ([id number]) 11/11/2015    Pneumococcal Conjugate 7-valent (Prevnar7) 11/01/2011    Pneumococcal Polysaccharide ([id number]) 09/16/2011    Tdap (Boostrix, Adacel) 05/02/2013, 04/04/2014       Known drug allergies:      All allergies were reviewed and updated    Allergies   Allergen Reactions    Lipitor [Atorvastatin] Other (See Comments)     Weakness, severe    Morphine Shortness Of Breath    Keflex [Cephalexin] Other (See Comments)     SOB & bilateral arms shaking     Hctz [Hydrochlorothiazide] Other (See Comments)     Hyponatremia, severe      Norvasc [Amlodipine Besylate] Swelling     Of neck    Penicillins Hives    Tramadol Itching    Hydralazine Palpitations and Other (See Comments)     Dizzy,fatigue,headaches,palpitations,loss of appetite    Shellfish-Derived Products Swelling and Rash     Swelling of neck       Assessment:     The patient hives, lip swelling and redness or purulence at vascular access sites. I/v access Management:    · Continue to monitor i.v access sites for erythema, induration, discharge or tenderness. · As always, continue efforts to minimizetubes/lines/drains as clinically appropriate to reduce chances of line associated infections. Current isolation precautions: There are no current isolations documented for this patient. Patient education and counseling:      · The patient was educated in detailabout the side-effects of various antibiotics and things to watch for like new rashes, lip swelling, severe reaction, worsening diarrhea, break through fever etc.  · Discussed patient'scondition and what to expect. All of the patient's questions were addressed in a satisfactory manner and patient verbalized understanding all instructions. Risk of Complications/Morbidity: High     · Illness(es)/ Infection present that pose threat to life/bodily function. · There is potential for severe exacerbation of infection/side effects of treatment. · Therapy requires intensive monitoring for antimicrobial agent toxicity. Thank you for involving me in the care of your patient. I will continue to follow. If you have any additional questions, please do not hesitate to contact me. Subjective:       HPI: Emily Villanueva is a 75 y.o. female patient, who was seen at the request of Dr. Gildardo Roa MD.    History was obtained from chart review and the patient. The patient was admitted on 8/22/2019. I have been consulted to see the patient for above mentioned reason(s). The patient has multiple medical comorbidities, and presented to the ER for generalized weakness and dizziness with difficulty standing that was going on for around 3 days before presentation to the ER. The patient lives with her daughter and granddaughter. Has a weakness was progressing, EMS was called and patient was brought to the ER.     The patient was apparently recently admitted for a UTI. Urinalysis done current admission showed signs of urinary tract infection      I have been asked to see the patient for concerns for complicated UTI                   Past Medical History: All past medical history reviewed today. Past Medical History:   Diagnosis Date    Alcohol abuse     Anxiety     CAD in native artery     Cerebral artery occlusion with cerebral infarction (HCC)     states hx of multiple mini strokes    [facility]     Depression     DVT, lower extremity ([facility]     Hypercholesteremia     Hypertension     Hyperthyroidism     Mammogram abnormal 2/7/2012    Neuromuscular disorder ([facility] [address]     Pneumonia     Thyroid disease     Tobacco abuse          Past Surgical History: All pastsurgical history was reviewed today. Past Surgical History:   Procedure Laterality Date    COLONOSCOPY  1/19/2012    Dr. Lisa Cota; repeat 5 years    EYE SURGERY      cateracts removed    SHOULDER ARTHROSCOPY Right 6/18/14    SUBACROMIAL DECOMPRESSION, ROTATOR CUFF REPAIR    TOE SURGERY Right     TONSILLECTOMY         Social History:  All social andepidemiologic history was reviewed and updated by me today as needed. · Tobacco use:   reports that she has been smoking cigarettes. She has a 78.00 pack-year smoking history. She has never used smokeless tobacco.  · Alcohol use:   reports that she does not drink alcohol. · Currently lives in: [address] [place] [address]  ·  reports that she does not use drugs. Family History: All family history was reviewed today. Problem Relation Age of Onset    Heart Disease Father         MI @ 64    Alcohol Abuse Father          Medications: All current and past medications were reviewed. Not in a hospital admission. REVIEW OF SYSTEMS:       Review of Systems   Constitutional: Positive for fatigue. Negative for chills, diaphoresis and unexpected weight change. She appeared very tired, generalized weakness   HENT: Negative for congestion, ear discharge, ear pain, facial swelling, hearing loss, rhinorrhea and trouble swallowing. Eyes: Negative for photophobia, discharge, redness and visual disturbance. Respiratory: Negative for apnea, cough, choking, chest tightness, shortness of breath and stridor. Cardiovascular: Negative for chest pain and palpitations. Gastrointestinal: Negative for abdominal pain, blood in stool, diarrhea and nausea. Endocrine: Negative for polydipsia, polyphagia and polyuria. Genitourinary: Negative for difficulty urinating, dysuria, frequency, hematuria, menstrual problem and vaginal discharge. Musculoskeletal: Negative for arthralgias, joint swelling, myalgias and neck stiffness. Skin: Negative for color change and rash. Allergic/Immunologic: Negative for immunocompromised state. Neurological: Negative for dizziness, seizures, speech difficulty, light-headedness and headaches. Hematological: Negative for adenopathy. Psychiatric/Behavioral: Negative for agitation, hallucinations and suicidal ideas. Objective:       PHYSICAL EXAM:      Vitals:   Vitals:    08/22/19 1214 08/22/19 1218 08/22/19 1220 08/22/19 1400   BP: (!) 116/91  95/65 (!) 143/77   Pulse:  59 67 61   Resp:  19 17 21   Temp:       TempSrc:       SpO2:  98% 97% 98%   Weight:       Height:           Physical Exam   Constitutional: She appears well-developed. No distress. HENT:   Head: Normocephalic. Right Ear: External ear normal.   Left Ear: External ear normal.   Nose: Nose normal.   Mouth/Throat: Oropharynx is clear and moist.   Eyes: Pupils are equal, round, and reactive to light. Conjunctivae are normal. Right eye exhibits no discharge. Left eye exhibits no discharge. No scleral icterus. Neck: Normal range of motion. Neck supple. Cardiovascular: Normal rate and regular rhythm. Exam reveals no friction rub.    No murmur heard.  Pulmonary/Chest: Effort normal. No stridor. She has no wheezes. She has no rales. She exhibits no tenderness. Abdominal: Soft. She exhibits no mass. There is no tenderness. There is no rebound and no guarding. Musculoskeletal: She exhibits no edema or tenderness. Lymphadenopathy:     She has no cervical adenopathy. Neurological: She is alert. She exhibits normal muscle tone. Was encephalopathic at the time of admission   Skin: Skin is warm and dry. No rash noted. She is not diaphoretic. No erythema. Psychiatric: She has a normal mood and affect. Judgment normal.   Nursing note and vitals reviewed. Lines: All vascular access sites are healthy with no local erythema, discharge or tenderness. Intake and output:     No intake/output data recorded. Lab Data:   All available labs were reviewed by me today. CBC:   Recent Labs     08/22/19  0952   WBC 8.6   RBC 3.67*   HGB 12.4   HCT 36.3      MCV 99.0   MCH 33.9   MCHC 34.3   RDW 13.4        BMP:  Recent Labs     08/21/19  1100 08/22/19  0952    142   K 3.9 3.8    104   CO2 27 28   BUN 14 12   CREATININE 0.9 1.0   CALCIUM 9.1 8.9   GLUCOSE 85 115*        Hepatic FunctionPanel:   Lab Results   Component Value Date    ALKPHOS 61 08/22/2019    ALT <5 08/22/2019    AST 15 08/22/2019    PROT 7.0 08/22/2019    PROT 7.1 10/18/2012    BILITOT 0.5 08/22/2019    BILIDIR <0.2 10/11/2015    IBILI see below 10/11/2015    LABALBU 4.0 08/22/2019       CPK:   Lab Results   Component Value Date    CKTOTAL 94 08/04/2019     ESR: No results found for: SEDRATE  CRP: No results found for: CRP      Imaging: All pertinent images and reports for the current visit were reviewed by [facility] this visit. CT Head WO Contrast   Final Result   No acute intracranial abnormality.          XR CHEST STANDARD (2 VW)   Final Result   Unchanged left pleural effusion with adjacent atelectasis versus pneumonia,   unchanged appearance of the chest over the past 1 month. MRI BRAIN WO CONTRAST    (Results Pending)   MRI LUMBAR SPINE W WO CONTRAST    (Results Pending)   MRI THORACIC SPINE W WO CONTRAST    (Results Pending)   MRI CERVICAL SPINE W WO CONTRAST    (Results Pending)       Outside records:    Labs, Microbiology, Radiology and pertinent results from Care everywhere, if available, were reviewed as a part ofthe consultation. Problem list:       Patient Active Problem List   Diagnosis Code    Incontinence R32    Hypothyroidism E03.9    Smoker F17.200    Anxiety F41.9    History of alcoholism ([facility] [address] F10.21    Hyperlipidemia E78.5    DIMAS (dyspnea on exertion) R06.09    Acute DVT (deep venous thrombosis) ([facility]) I82.409    DVT (deep venous thrombosis) ([facility]) I82.409    COPD exacerbation ([facility]) J44.1    Fatigue R53.83    Pleural effusion J90    General weakness R53.1    Centrilobular emphysema ([facility]) J43.2    Essential hypertension I10    Chronic fatigue R53.82    COPD, moderate ([facility]) J44.9    Encephalopathy G93.40    CAD in native artery I25.10    Osteopenia M85.80    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 113    Superficial thrombophlebitis of right upper extremity I80.8    Severe infection B99.9    Tobacco abuse counseling Z71.6    Therapeutic drug monitoring Z51.81    Complex care coordination Z71.89    Stroke-like symptoms R29.90    H/O ischemic multifocal multiple vascular territories stroke Z80.78    Depression F32.9    Unable to walk R26.2    Cerebrovascular accident (CVA) ([facility]) I63.9    Oropharyngeal dysphagia R13.12    HTN (hypertension), benign I10    Dyslipidemia E78.5    Encounter for electronic analysis of reveal event recorder Z45.09    Acute on chronic diastolic heart failure (HCC) I50.33    Physical deconditioning R53.81    PAF (paroxysmal atrial fibrillation) (HCC) I48.0    Vascular dementia, uncomplicated X30.26    Other insomnia G47.09    Transient alteration of awareness R40.4    Hyponatremia E87.1    Generalized weakness A02.2    Complicated UTI (urinary tract infection) N39.0    Recurrent UTI N39.0    Pacemaker Z95.0    Coronary artery disease due to lipid rich plaque I25.10, I25.83         Please note that this chart was generated using Dragon dictation software. Although every effort was made to ensure the accuracy of this automated transcription, some errors in transcription may have occurred inadvertently. If you may need any clarification, please do not hesitate to contact me through EPIC or at the phone number provided below with my electronic signature. Any pictures or media included in this note were obtained after taking informed verbal consent from the patient and with their approval to include those in the patient's medical record.       Celeste Lynhc MD, MPH  8/22/19, 2:14 PM   [facility] Infectious Disease   Office: [phone]  Fax: [phone]  Tuesday AM clinic:   [address] [address]  Thursday AM clinic: [address] [facility]

## 2019-08-22 NOTE — ED PROVIDER NOTES
pain, diarrhea, nausea and vomiting. Genitourinary: Negative for dysuria. Neurological: Positive for dizziness. All other systems reviewed and are negative. Positives and Pertinent negatives as per HPI. Except as noted abovein the ROS, all other systems were reviewed and negative. PAST MEDICAL HISTORY     Past Medical History:   Diagnosis Date    Alcohol abuse     Anxiety     CAD in native artery     Cerebral artery occlusion with cerebral infarction ([facility] [address]     states hx of multiple mini strokes    COPD (chronic obstructive pulmonary disease) (HCC)     Depression     DVT, lower extremity (HCC)     Hypercholesteremia     Hypertension     Hyperthyroidism     Mammogram abnormal 2/7/2012    Neuromuscular disorder ([facility] [address]     Pneumonia     Thyroid disease     Tobacco abuse          SURGICAL HISTORY     Past Surgical History:   Procedure Laterality Date    COLONOSCOPY  1/19/2012    Dr. Marti Chandler; repeat 5 years    EYE SURGERY      cateracts removed    SHOULDER ARTHROSCOPY Right 6/18/14    SUBACROMIAL DECOMPRESSION, ROTATOR CUFF REPAIR    TOE SURGERY Right     TONSILLECTOMY           CURRENTMEDICATIONS       Previous Medications    ASPIRIN 81 MG CHEWABLE TABLET    CHEW ONE TABLET BY MOUTH DAILY    ATORVASTATIN (LIPITOR) 80 MG TABLET    Take 1 tablet by mouth daily    BUDESONIDE-FORMOTEROL (SYMBICORT) 160-4.5 MCG/ACT AERO    Inhale 2 puffs into the lungs 2 times daily    CALCIUM ELEMENTAL (OSCAL) 500 MG TABS TABLET    Take 1 tablet by mouth daily    CLONAZEPAM (KLONOPIN) 0.5 MG TABLET    Take 0.5 tablets by mouth nightly as needed (insomnia).     CLONIDINE (CATAPRES) 0.1 MG TABLET    Take 1 tablet by mouth 3 times daily    FUROSEMIDE (LASIX) 40 MG TABLET    TAKE ONE TABLET BY MOUTH DAILY    IPRATROPIUM (ATROVENT) 0.02 % NEBULIZER SOLUTION    Take 2.5 mLs by nebulization 3 times daily    LABETALOL (NORMODYNE) 200 MG TABLET    Take 1 tablet by mouth every 12 hours    LEVOTHYROXINE (SYNTHROID) 100 MCG TABLET    TAKE ONE TABLET BY MOUTH DAILY    MULTIPLE VITAMINS-MINERALS (CENTRUM SILVER ADULT 50+ PO)    Take 1 tablet by mouth daily     OXYBUTYNIN (DITROPAN) 5 MG TABLET    TAKE ONE TABLET BY MOUTH TWICE A DAY    SODIUM CHLORIDE 1 G TABLET    Take 1 tablet by mouth 3 times daily (with meals)         ALLERGIES     Lipitor [atorvastatin]; Morphine; Keflex [cephalexin]; Hctz [hydrochlorothiazide]; Norvasc [amlodipine besylate];  Penicillins; Tramadol; Hydralazine; and Shellfish-derived products    FAMILYHISTORY       Family History   Problem Relation Age of Onset    Heart Disease Father         MI @ 64    Alcohol Abuse Father           SOCIAL HISTORY       Social History     Socioeconomic History    Marital status:      Spouse name: None    Number of children: None    Years of education: None    Highest education level: None   Occupational History    None   Social Needs    Financial resource strain: None    Food insecurity:     Worry: None     Inability: None    Transportation needs:     Medical: None     Non-medical: None   Tobacco Use    Smoking status: Current Every Day Smoker     Packs/day: 1.50     Years: 52.00     Pack years: 78.00     Types: Cigarettes     Last attempt to quit: 2018     Years since quittin.1    Smokeless tobacco: Never Used    Tobacco comment: started smoking at age 26 / smoked up to 2 p,p,d / now smoking 4 to 8 cigarettes a day,   Substance and Sexual Activity    Alcohol use: No     Alcohol/week: 0.0 standard drinks     Comment: patient reports she is an alcoholic hasn't had anything to drink 3-4 months    Drug use: No    Sexual activity: Yes     Partners: Male   Lifestyle    Physical activity:     Days per week: None     Minutes per session: None    Stress: None   Relationships    Social connections:     Talks on phone: None     Gets together: None     Attends [employer] service: None     Active member of club or organization: None     Attends meetings Result Value    RBC 3.67 (*)     Lymphocytes # 0.5 (*)     All other components within normal limits    Narrative:     Performed at:  [facility] [address]   Phone [phone]   COMPREHENSIVE METABOLIC PANEL - Abnormal; Notable for the following components:    Glucose 115 (*)     GFR Non- 54 (*)     ALT <5 (*)     All other components within normal limits    Narrative:     Performed at:  [facility] [address]   Phone [phone]   BRAIN NATRIURETIC PEPTIDE - Abnormal; Notable for the following components:    Pro- (*)     All other components within normal limits    Narrative:     Performed at:  [facility] [address]   Phone [phone]   URINE RT REFLEX TO CULTURE - Abnormal; Notable for the following components:    Clarity, UA CLOUDY (*)     Blood, Urine TRACE (*)     Nitrite, Urine POSITIVE (*)     Leukocyte Esterase, Urine LARGE (*)     All other components within normal limits    Narrative:     Performed at:  [facility] [address]   Phone [phone]   MICROSCOPIC URINALYSIS - Abnormal; Notable for the following components:    Bacteria, UA 1+ (*)     WBC,  (*)     All other components within normal limits    Narrative:     Performed at:  [facility] [address]   Phone [phone]   URINE CULTURE   TROPONIN    Narrative:     Performed at:  [facility] [address]   Phone [phone]       All other labs were within normal range or not returned as of this dictation. EKG:  All EKG's are interpreted by the Emergency Department Physician in the absence of a cardiologist.  Please see their note for interpretation of EKG. RADIOLOGY:   Non-plain film images such as CT, Ultrasound and MRI are read by the radiologist. Stas Roque radiographic images are visualized andpreliminarily interpreted by the  ED Provider with the below findings:        Interpretation [facility] Radiologist below, if available at the time of this note:    CT Head WO Contrast   Final Result   No acute intracranial abnormality. XR CHEST STANDARD (2 VW)   Final Result   Unchanged left pleural effusion with adjacent atelectasis versus pneumonia,   unchanged appearance of the chest over the past 1 month. No results found. PROCEDURES   Unless otherwise noted below, none     Procedures    CRITICAL CARE TIME   N/A    CONSULTS:  IP CONSULT TO HOSPITALIST  IP CONSULT TO SOCIAL WORK      EMERGENCY DEPARTMENT COURSE and DIFFERENTIAL DIAGNOSIS/MDM:   Vitals:    Vitals:    08/22/19 1040 08/22/19 1214 08/22/19 1218 08/22/19 1220   BP: 136/75 (!) 116/91  95/65   Pulse: 65  59 67   Resp: 24 19 17   Temp:       TempSrc:       SpO2: 98%  98% 97%   Weight:       Height:           Patient was given thefollowing medications:  Medications   levofloxacin (LEVAQUIN) 750 MG/150ML infusion 750 mg (750 mg Intravenous New Bag 8/22/19 1251)       Briefly, this is a 75year old female that  presents the emergency department with complaint of difficulty standing and dizziness over the past 3 days. Patient does have history of recent 5-day stay in the hospital at the beginning of August for hyponatremia, this was corrected and the patient was discharged on sodium chloride tablets, states that she is compliant with medications. She denies nausea vomiting or other symptoms. Denies focal weakness, moves extremities x4. States that she is more fatigued and has difficulty with standing. Nitrite and large leukocyte positive in urine, patient will be given Levaquin. CBC unremarkable, troponin negative. .          CT Head WO Contrast (Final

## 2019-08-22 NOTE — H&P
HOSPITALISTS HISTORY AND PHYSICAL    8/22/2019 2:03 PM    Patient Information:  Mary Beth Jackson is a 75 y.o. female [id number]  PCP:  Rana Essex, MD (Tel: [phone] )    Chief complaint:    Chief Complaint   Patient presents with    Fatigue     presents by squad. c/o weakness x3 days. states she is unable to get oob without assistance. lives with daughter and granddaughter. no pain. no sob. History of Present Illness:  Alexander Nuno is a 75 y.o. female was admitted to the hospital 2 weeks back for weakness urine tract infection she went home she was able to walk with the help of physical therapy however for the last 3 days she is unable to walk increasing weakness increasing fall associated dizziness lightheadedness. In the ER CT of the head was done was negative for acute disease and was treated with Levaquin for UTI    Patient incontinence of urine and wears a diaper. No back pain        REVIEW OF SYSTEMS:   10 point review system analysis completed and negative unless noted above      Past Medical History:   has a past medical history of Alcohol abuse, Anxiety, CAD in native artery, Cerebral artery occlusion with cerebral infarction ([address] COPD (chronic obstructive pulmonary disease) ([address] Depression, DVT, lower extremity ([address] Hypercholesteremia, Hypertension, Hyperthyroidism, Mammogram abnormal, Neuromuscular disorder ([address] Pneumonia, Thyroid disease, and Tobacco abuse. Past Surgical History:   has a past surgical history that includes Tonsillectomy; Colonoscopy (1/19/2012); Toe Surgery (Right); Shoulder arthroscopy (Right, 6/18/14); and eye surgery. Medications:  No current facility-administered medications on file prior to encounter.       Current Outpatient Medications on File Prior to Encounter   Medication Sig Dispense Refill    clonazePAM (KLONOPIN) 0.5 MG

## 2019-08-22 NOTE — CONSULTS
NEUROLOGY CONSULTATION     Patient's Name :   Ashlee Nevarez        YOB: 1945                    Date of Consultation : 8/22/2019 4:30 PM    Referring Physician :  Boom Valladares MD    Reason for Consultation :  Ataxia and leg weakness    HISTORY OF PRESENT ILLNESS      Debbi Palumbo is a 75 y.o. female   History was obtained from patient and from dictations in the chart. Patient states that recently she was treated with urinary tract infection. Patient states that for the last 3 days he has had some leg weakness and difficulty walking. She states that she feels off balance and wonders if the balance is contributing to her difficulty walking. She is not sure if she truly has weakness or not. She denies any numbness. There is no true vertigo or diplopia. She was admitted to the hospital for further evaluation. Onset of symptoms was subacute. Symptoms are moderately severe. No other clear aggravating or relieving factors. Patient has known paroxysmal atrial fibrillation. She has a loop recorder. She was on Eliquis and was discharged on Eliquis on August 2, 2019 but I do not see that on her home medications and patient is not sure if she is taking it or not. She is being followed by cardiology as an outpatient. REVIEW OF SYSTEMS     Denies any chest pain palpitations breathlessness abdominal pain fever or weight loss. Rest of the 14 system review was reviewed and was negative except for the symptoms noted above.   Past Medical History:   Diagnosis Date    Alcohol abuse     Anxiety     CAD in native artery     Cerebral artery occlusion with cerebral infarction (HCC)     states hx of multiple mini strokes    COPD (chronic obstructive pulmonary disease) (HCC)     Depression     DVT, lower extremity (HCC)     Hypercholesteremia     Hypertension     Hyperthyroidism     Mammogram abnormal 2/7/2012    Neurom[facility] disorder ([facility] [address]     Pneumonia     Thyroid disease     Tobacco abuse      Family History   Problem Relation Age of Onset    Heart Disease Father         MI @ 64    Alcohol Abuse Father      Social History     Socioeconomic History    Marital status:      Spouse name: None    Number of children: None    Years of education: None    Highest education level: None   Occupational History    None   Social Needs    Financial resource strain: None    Food insecurity:     Worry: None     Inability: None    Transportation needs:     Medical: None     Non-medical: None   Tobacco Use    Smoking status: Current Every Day Smoker     Packs/day: 1.50     Years: 52.00     Pack years: 78.00     Types: Cigarettes     Last attempt to quit: 2018     Years since quittin.1    Smokeless tobacco: Never Used    Tobacco comment: started smoking at age 26 / smoked up to 2 p,p,d / now smoking 4 to 8 cigarettes a day,   Substance and Sexual Activity    Alcohol use: No     Alcohol/week: 0.0 standard drinks     Comment: patient reports she is an alcoholic hasn't had anything to drink 3-4 months    Drug use: No    Sexual activity: Yes     Partners: Male   Lifestyle    Physical activity:     Days per week: None     Minutes per session: None    Stress: None   Relationships    Social connections:     Talks on phone: None     Gets together: None     Attends Druze service: None     Active member of club or organization: None     Attends meetings of clubs or organizations: None     Relationship status: None    Intimate partner violence:     Fear of current or ex partner: None     Emotionally abused: None     Physically abused: None     Forced sexual activity: None   Other Topics Concern    None   Social History Narrative    None     Current Facility-Administered Medications   Medication Dose Route Frequency Provider Last Rate Last Dose    dexamethasone (PF) (DECADRON) injection 8 mg  8 mg Intravenous Once [employer] Jaylin Livingston MD         Allergies   Allergen Reactions    Lipitor [Atorvastatin] Other (See Comments)     Weakness, severe    Morphine Shortness Of Breath    Keflex [Cephalexin] Other (See Comments)     SOB & bilateral arms shaking     Hctz [Hydrochlorothiazide] Other (See Comments)     Hyponatremia, severe      Norvasc [Amlodipine Besylate] Swelling     Of neck    Penicillins Hives    Tramadol Itching    Hydralazine Palpitations and Other (See Comments)     Dizzy,fatigue,headaches,palpitations,loss of appetite    Shellfish-Derived Products Swelling and Rash     Swelling of neck       PHYSICAL EXAMINATION:  /77   Pulse 55   Temp 97 °F (36.1 °C) (Axillary)   Resp 18   Ht 5' 8\" (1.727 m)   Wt 125 lb (56.7 kg)   SpO2 99%   BMI 19.01 kg/m²   Appearance: Well appearing, well nourished and in no distress  Mental Status Exam: Patient is alert, oriented to person, place and time. Recent and remote memory is normal  Fund of Knowledge is normal  Attention/concentration is normal.   Speech : No dysarthria  Language : No aphasia  Funduscopic Exam: sharp disc margins  Cranial Nerves:   II: Visual fields:  Full to confrontation  III: Pupils:  equal, round, reactive to light  III,IV,VI: Extra Ocular Movements are intact. No nystagmus  V: Facial sensation is intact to pin prick and light touch  VII: Facial strength and movements: intact and symmetric smile,cheek puffing and eyebrow elevation  VIII: Hearing:  Intact to finger rub bilaterally  IX: Palate  elevation is symmetric  XI: Shoulder shrug is intact  XII: Tongue movements are normal  Motor:  Muscle tone and bulk are normal.   Strength is symmetrical 5/5 in all four extremities. Sensory: Intact to light touch and  pin prick in all four extremities  Coordination:  Normal  Finger to Nose and Heel to Shin bilaterally    . Reflexes:  DTR +2 and symmetric bilaterally  Plantar response: Flexor bilaterally  Gait: Unable to walk without assistance.   Romberg: Could not be tested. Vascular: No carotid bruit bilaterally        DATA:  LABS:  General Labs:    CBC:   Lab Results   Component Value Date    WBC 8.6 08/22/2019    RBC 3.67 08/22/2019    HGB 12.4 08/22/2019    HCT 36.3 08/22/2019    MCV 99.0 08/22/2019    MCH 33.9 08/22/2019    MCHC 34.3 08/22/2019    RDW 13.4 08/22/2019     08/22/2019    MPV 7.8 08/22/2019     BMP:    Lab Results   Component Value Date     08/22/2019    K 3.8 08/22/2019    K 3.4 08/03/2019     08/22/2019    CO2 28 08/22/2019    BUN 12 08/22/2019    LABALBU 4.0 08/22/2019    CREATININE 1.0 08/22/2019    CALCIUM 8.9 08/22/2019    GFRAA >60 08/22/2019    GFRAA >60 05/02/2013    LABGLOM 54 08/22/2019    LABGLOM 79 12/11/2017    GLUCOSE 115 08/22/2019     RADIOLOGY REVIEW:  I have reviewed radiology image(s) and reports(s) of: CT scan of the head    IMPRESSION :  Ataxia  I am not able to appreciate any significant leg weakness on my neurological examination. Patient has very good deep tendon reflexes and I doubt anything like a Guyon Barré syndrome at this time. Patient has known atrial fibrillation and was on Eliquis but has not been taking it now. I am not sure why it was stopped. I would be concerned about posterior circulation ischemia versus a small stroke in the posterior circulation.   Patient Active Problem List   Diagnosis    Incontinence    Hypothyroidism    Smoker    Anxiety    History of alcoholism ([address]    Hyperlipidemia    DIMAS (dyspnea on exertion)    Acute DVT (deep venous thrombosis) (HCC)    DVT (deep venous thrombosis) (HCC)    COPD exacerbation (HCC)    Fatigue    Pleural effusion    General weakness    Centrilobular emphysema ([address]    Essential hypertension    Chronic fatigue    COPD, moderate ([address]    Encephalopathy    CAD in native artery    Osteopenia    Trapped lung    COPD exacerbation ([address]    Acute encephalopathy    Chronic ischemic multifocal multiple vascular territories stroke   

## 2019-08-23 ENCOUNTER — APPOINTMENT (OUTPATIENT)
Dept: ULTRASOUND IMAGING | Age: 74
DRG: 690 | End: 2019-08-23
Payer: COMMERCIAL

## 2019-08-23 LAB
A/G RATIO: 1.2 (ref 1.1–2.2)
ALBUMIN SERPL-MCNC: 3.6 G/DL (ref 3.4–5)
ALP BLD-CCNC: 54 U/L (ref 40–129)
ALT SERPL-CCNC: 10 U/L (ref 10–40)
ANION GAP SERPL CALCULATED.3IONS-SCNC: 12 MMOL/L (ref 3–16)
AST SERPL-CCNC: 13 U/L (ref 15–37)
BILIRUB SERPL-MCNC: 0.6 MG/DL (ref 0–1)
BUN BLDV-MCNC: 11 MG/DL (ref 7–20)
CALCIUM SERPL-MCNC: 8.8 MG/DL (ref 8.3–10.6)
CHLORIDE BLD-SCNC: 97 MMOL/L (ref 99–110)
CO2: 27 MMOL/L (ref 21–32)
CREAT SERPL-MCNC: 0.9 MG/DL (ref 0.6–1.2)
GFR AFRICAN AMERICAN: >60
GFR NON-AFRICAN AMERICAN: >60
GLOBULIN: 2.9 G/DL
GLUCOSE BLD-MCNC: 88 MG/DL (ref 70–99)
HCT VFR BLD CALC: 34.7 % (ref 36–48)
HEMOGLOBIN: 12 G/DL (ref 12–16)
MAGNESIUM: 1.7 MG/DL (ref 1.8–2.4)
MCH RBC QN AUTO: 34.3 PG (ref 26–34)
MCHC RBC AUTO-ENTMCNC: 34.6 G/DL (ref 31–36)
MCV RBC AUTO: 99.1 FL (ref 80–100)
PDW BLD-RTO: 13.5 % (ref 12.4–15.4)
PLATELET # BLD: 273 K/UL (ref 135–450)
PMV BLD AUTO: 8.4 FL (ref 5–10.5)
POTASSIUM REFLEX MAGNESIUM: 3.5 MMOL/L (ref 3.5–5.1)
RBC # BLD: 3.5 M/UL (ref 4–5.2)
SODIUM BLD-SCNC: 136 MMOL/L (ref 136–145)
TOTAL PROTEIN: 6.5 G/DL (ref 6.4–8.2)
WBC # BLD: 6.8 K/UL (ref 4–11)

## 2019-08-23 PROCEDURE — 76770 US EXAM ABDO BACK WALL COMP: CPT

## 2019-08-23 PROCEDURE — 36415 COLL VENOUS BLD VENIPUNCTURE: CPT

## 2019-08-23 PROCEDURE — 2580000003 HC RX 258: Performed by: INTERNAL MEDICINE

## 2019-08-23 PROCEDURE — 99225 PR SBSQ OBSERVATION CARE/DAY 25 MINUTES: CPT | Performed by: PSYCHIATRY & NEUROLOGY

## 2019-08-23 PROCEDURE — 97535 SELF CARE MNGMENT TRAINING: CPT

## 2019-08-23 PROCEDURE — 94640 AIRWAY INHALATION TREATMENT: CPT

## 2019-08-23 PROCEDURE — G0378 HOSPITAL OBSERVATION PER HR: HCPCS

## 2019-08-23 PROCEDURE — 94761 N-INVAS EAR/PLS OXIMETRY MLT: CPT

## 2019-08-23 PROCEDURE — 96366 THER/PROPH/DIAG IV INF ADDON: CPT

## 2019-08-23 PROCEDURE — 6360000002 HC RX W HCPCS: Performed by: INTERNAL MEDICINE

## 2019-08-23 PROCEDURE — 85027 COMPLETE CBC AUTOMATED: CPT

## 2019-08-23 PROCEDURE — 99233 SBSQ HOSP IP/OBS HIGH 50: CPT | Performed by: INTERNAL MEDICINE

## 2019-08-23 PROCEDURE — 96367 TX/PROPH/DG ADDL SEQ IV INF: CPT

## 2019-08-23 PROCEDURE — 97161 PT EVAL LOW COMPLEX 20 MIN: CPT

## 2019-08-23 PROCEDURE — 97165 OT EVAL LOW COMPLEX 30 MIN: CPT

## 2019-08-23 PROCEDURE — 6370000000 HC RX 637 (ALT 250 FOR IP): Performed by: INTERNAL MEDICINE

## 2019-08-23 PROCEDURE — 2580000003 HC RX 258

## 2019-08-23 PROCEDURE — 83735 ASSAY OF MAGNESIUM: CPT

## 2019-08-23 PROCEDURE — 80053 COMPREHEN METABOLIC PANEL: CPT

## 2019-08-23 PROCEDURE — 97530 THERAPEUTIC ACTIVITIES: CPT

## 2019-08-23 PROCEDURE — 97116 GAIT TRAINING THERAPY: CPT

## 2019-08-23 RX ORDER — SODIUM CHLORIDE 9 MG/ML
INJECTION, SOLUTION INTRAVENOUS
Status: COMPLETED
Start: 2019-08-23 | End: 2019-08-23

## 2019-08-23 RX ADMIN — MAGNESIUM HYDROXIDE 30 ML: 400 SUSPENSION ORAL at 08:10

## 2019-08-23 RX ADMIN — LEVOTHYROXINE SODIUM 100 MCG: 100 TABLET ORAL at 05:33

## 2019-08-23 RX ADMIN — CALCIUM 500 MG: 500 TABLET ORAL at 08:10

## 2019-08-23 RX ADMIN — Medication 2 PUFF: at 07:55

## 2019-08-23 RX ADMIN — MULTIPLE VITAMINS W/ MINERALS TAB 1 TABLET: TAB at 08:09

## 2019-08-23 RX ADMIN — SODIUM CHLORIDE 250 ML: 9 INJECTION, SOLUTION INTRAVENOUS at 01:51

## 2019-08-23 RX ADMIN — LABETALOL HYDROCHLORIDE 100 MG: 200 TABLET, FILM COATED ORAL at 21:58

## 2019-08-23 RX ADMIN — Medication 10 ML: at 08:11

## 2019-08-23 RX ADMIN — ASPIRIN 81 MG 81 MG: 81 TABLET ORAL at 08:10

## 2019-08-23 RX ADMIN — CIPROFLOXACIN 400 MG: 2 INJECTION, SOLUTION INTRAVENOUS at 01:51

## 2019-08-23 RX ADMIN — Medication 2 PUFF: at 20:23

## 2019-08-23 RX ADMIN — OXYBUTYNIN CHLORIDE 5 MG: 5 TABLET ORAL at 21:57

## 2019-08-23 RX ADMIN — OXYBUTYNIN CHLORIDE 5 MG: 5 TABLET ORAL at 08:10

## 2019-08-23 RX ADMIN — IPRATROPIUM BROMIDE 0.5 MG: 0.5 SOLUTION RESPIRATORY (INHALATION) at 15:11

## 2019-08-23 RX ADMIN — IPRATROPIUM BROMIDE 0.5 MG: 0.5 SOLUTION RESPIRATORY (INHALATION) at 20:23

## 2019-08-23 RX ADMIN — VANCOMYCIN HYDROCHLORIDE 750 MG: 750 INJECTION, POWDER, LYOPHILIZED, FOR SOLUTION INTRAVENOUS at 12:46

## 2019-08-23 RX ADMIN — CLONAZEPAM 0.25 MG: 0.5 TABLET ORAL at 21:57

## 2019-08-23 RX ADMIN — APIXABAN 5 MG: 5 TABLET, FILM COATED ORAL at 12:45

## 2019-08-23 RX ADMIN — Medication 10 ML: at 22:01

## 2019-08-23 RX ADMIN — LABETALOL HYDROCHLORIDE 100 MG: 200 TABLET, FILM COATED ORAL at 08:10

## 2019-08-23 RX ADMIN — ATORVASTATIN CALCIUM 80 MG: 80 TABLET, FILM COATED ORAL at 08:09

## 2019-08-23 RX ADMIN — APIXABAN 5 MG: 5 TABLET, FILM COATED ORAL at 21:58

## 2019-08-23 RX ADMIN — IPRATROPIUM BROMIDE 0.5 MG: 0.5 SOLUTION RESPIRATORY (INHALATION) at 07:55

## 2019-08-23 RX ADMIN — CIPROFLOXACIN 400 MG: 2 INJECTION, SOLUTION INTRAVENOUS at 14:38

## 2019-08-23 ASSESSMENT — ENCOUNTER SYMPTOMS
EYE REDNESS: 0
COUGH: 0
APNEA: 0
COLOR CHANGE: 0
NAUSEA: 0
FACIAL SWELLING: 0
EYE DISCHARGE: 0
STRIDOR: 0
CHOKING: 0
DIARRHEA: 0
ABDOMINAL PAIN: 0
BLOOD IN STOOL: 0
TROUBLE SWALLOWING: 0
SHORTNESS OF BREATH: 0
RHINORRHEA: 0
PHOTOPHOBIA: 0
CHEST TIGHTNESS: 0

## 2019-08-23 ASSESSMENT — PAIN SCALES - GENERAL
PAINLEVEL_OUTOF10: 0

## 2019-08-23 NOTE — PROGRESS NOTES
S2. No murmur. No edema in lower extremities  Respiratory: Not using accessory muscles. Good inspiratory effort. Clear to auscultation bilaterally, no wheeze or crackles. GI: Abdomen soft, no tenderness, not distended, normal bowel sounds  Musculoskeletal: No cyanosis in digits, neck supple  Neurology: CN 2-12 grossly intact. No speech or motor deficits  Psych: Normal affect. Alert and oriented in time, place and person  Skin: Warm, dry, normal turgor  Extremity exam shows brisk capillary refill. Peripheral pulses are palpable in lower extremities     Labs and Tests:  CBC:   Recent Labs     08/22/19  0952 08/23/19  0547   WBC 8.6 6.8   HGB 12.4 12.0    273     BMP:  Recent Labs     08/21/19  1100 08/22/19  0952 08/23/19  0547    142 136   K 3.9 3.8 3.5    104 97*   CO2 27 28 27   BUN 14 12 11   CREATININE 0.9 1.0 0.9   GLUCOSE 85 115* 88     Hepatic: Recent Labs     08/22/19  0952 08/23/19  0547   AST 15 13*   ALT <5* 10   BILITOT 0.5 0.6   ALKPHOS 61 54     US RENAL COMPLETE   Final Result   1. No acute abnormality. MRI LUMBAR SPINE WO CONTRAST   Final Result   Cervical spine: Severe canal stenosis with moderate size central disc   protrusion at C3-C4 causing mild increased cord signal worrisome for cord   compression versus myelomalacia. Otherwise moderate multilevel degenerate changes cervical spine. Thoracic spine: Moderate severe spondylosis involving the thoracic spine   without acute compression fracture or significant canal stenosis. .      Lumbar spine: Moderate severe degenerative changes lower lumbar spine notably   at L4-5 L5-S1. Evidence of severe central canal stenosis L4-5. Critical results were called by Dr. Emily Lemos to Dr. Dominik Bonilla on 8/22/2019 at   23:06.          MRI THORACIC SPINE WO CONTRAST   Final Result   Cervical spine: Severe canal stenosis with moderate size central disc   protrusion at C3-C4 causing mild increased cord signal worrisome for cord compression versus myelomalacia. Otherwise moderate multilevel degenerate changes cervical spine. Thoracic spine: Moderate severe spondylosis involving the thoracic spine   without acute compression fracture or significant canal stenosis. .      Lumbar spine: Moderate severe degenerative changes lower lumbar spine notably   at L4-5 L5-S1. Evidence of severe central canal stenosis L4-5. Critical results were called by Dr. Emily Lemos to Dr. Dominik Bonilla on 8/22/2019 at   23:06. MRI CERVICAL SPINE WO CONTRAST   Final Result   Cervical spine: Severe canal stenosis with moderate size central disc   protrusion at C3-C4 causing mild increased cord signal worrisome for cord   compression versus myelomalacia. Otherwise moderate multilevel degenerate changes cervical spine. Thoracic spine: Moderate severe spondylosis involving the thoracic spine   without acute compression fracture or significant canal stenosis. .      Lumbar spine: Moderate severe degenerative changes lower lumbar spine notably   at L4-5 L5-S1. Evidence of severe central canal stenosis L4-5. Critical results were called by Dr. Emily Lemos to Dr. Dominik Bonilla on 8/22/2019 at   23:06. MRI BRAIN WO CONTRAST   Final Result   Moderate chronic small ischemic disease with scattered areas of chronic   infarct. No evidence acute infarct, midline shift or mass effect. Stable ventricular prominence may be related to generalized involutional   changes versus normal pressure hydrocephalus in appropriate clinical setting. CT Head WO Contrast   Final Result   No acute intracranial abnormality. XR CHEST STANDARD (2 VW)   Final Result   Unchanged left pleural effusion with adjacent atelectasis versus pneumonia,   unchanged appearance of the chest over the past 1 month.            Problem List  Active Problems:    Pleural effusion    H/O ischemic multifocal multiple vascular territories stroke    Unable to walk

## 2019-08-23 NOTE — PROGRESS NOTES
Infectious Diseases   Progress Note      Admission Date: 8/22/2019  Hospital Day: Hospital Day: 2 .cur  Attending: Gala Smith MD  Date of service: 8/23/19     Chief complaint/ Reason for consult: The patient was seen today for the following:    · Complicated UTI  · Generalized weakness  · Recurrent urinary tract infection  · Chronic kidney disease stage III  · Left pleural effusion  · Pacemaker in place    Microbiology:        I have reviewed all available micro lab data and cultures    · Blood culture (2/2) - collected on 8/22/2019: Negative  · Urine culture  - collected on 8/22/2019: Grade 100,000 CFU per mL of staph aureus    Antibiotics and immunizations:       Current antibiotics: All antibiotics and their doses were reviewed by me    Recent Abx Admin                   ciprofloxacin (CIPRO) IVPB 400 mg (mg) 400 mg New Bag 08/23/19 0151    levofloxacin (LEVAQUIN) 750 MG/150ML infusion 750 mg (mg) 750 mg New Bag 08/22/19 1251                  Immunization History: All immunization history was reviewed by me today. Immunization History   Administered Date(s) Administered    Influenza Virus Vaccine 11/04/2015    Influenza, High Dose (Fluzone 65 yrs and older) 10/16/2012, 10/27/2014, 11/28/2016, 10/16/2017, 12/01/2018    Pneumococcal Conjugate 13-valent ([id number]) 11/11/2015    Pneumococcal Conjugate 7-valent (Prevnar7) 11/01/2011    Pneumococcal Polysaccharide ([id number]) 09/16/2011    Tdap (Boostrix, Adacel) 05/02/2013, 04/04/2014       Known drug allergies:      All allergies were reviewed and updated    Allergies   Allergen Reactions    Lipitor [Atorvastatin] Other (See Comments)     Weakness, severe    Morphine Shortness Of Breath    Keflex [Cephalexin] Other (See Comments)     SOB & bilateral arms shaking     Hctz [Hydrochlorothiazide] Other (See Comments)     Hyponatremia, severe      Norvasc [Amlodipine Besylate] Swelling     Of neck    Penicillins Hives    Tramadol Itching    Hydralazine Palpitations and Other (See Comments)     Dizzy,fatigue,headaches,palpitations,loss of appetite    Shellfish-Derived Products Swelling and Rash     Swelling of neck         Assessment:     The patient is a 75 y.o. old female who  has a past medical history of Alcohol abuse, Anxiety, CAD in native artery, Cerebral artery occlusion with cerebral infarction ([address] COPD (chronic obstructive pulmonary disease) ([facility] [address] Depression, DVT, lower extremity ([address] Hypercholesteremia, Hypertension, Hyperthyroidism, Mammogram abnormal (2/7/2012), Neuromuscular disorder ([facility] [address] Pneumonia, Thyroid disease, and Tobacco abuse. with following problems:    · Complicated UTI-urine culture growing Staphylococcus aureus  · Generalized weakness -improving  · Recurrent urinary tract infection  · Chronic kidney disease stage III-serum creatinine 0.9  · Left pleural effusion  · Pacemaker in place  · History of multifocal stroke  · Coronary artery disease  · Essential hypertension  · Mixed hyperlipidemia  · Smoker      Discussion:      The patient was started on ciprofloxacin yesterday due to concern for UTI. Interestingly, urine culture is growing Staphylococcus aureus. Underlying bacteremia needs to be ruled out. Blood cultures so far are negative, but it is too early. Serum creatinine 0.9. Spine MRI only shows degenerative changes throughout. There is concern for cord compression. Neurosurgery has been consulted by Dr. Jennifer Ozuna      Renal ultrasound reviewed. No acute abnormalities. No history of urinary catheterization at home. No recent urinary interventions. Isolation of Staphylococcus aureus in the urine culture is concerning. Patient has a pacemaker.     Plan:     Diagnostic Workup:    · Will follow-up on blood culture  · Continue to follow  fever curve, WBC count and blood cultures  · Follow up on susceptibility of Staphylococcus aureus isolated from the urine culture  · Will get a 2D echo to rule out any break through fever etc.  · Discussed patient's condition and what to expect. All of the patient's questions were addressed in a satisfactory manner and patient verbalized understanding all instructions. Risk of Complications/Morbidity: High     TIME SPENT TODAY:     - Spent over  36  minutes on visit (including interval history, physical exam, review of data including labs, cultures, imaging, development and implementation of treatment plan and coordination of complex care). - Over 50% of time spent with patient face to face on counseling and education. Thank you for involving me in the care of your patient. I will continue to follow. If you have any additional questions, please do not hesitate to contact me. Subjective: Interval history: Patient was seen and examined at bedside. Interval history was obtained. The patient feels tired. She is tolerating antibiotic okay. Fever curve is come down     REVIEW OF SYSTEMS:      Review of Systems   Constitutional: Positive for fatigue. Negative for chills, diaphoresis and unexpected weight change. HENT: Negative for congestion, ear discharge, ear pain, facial swelling, hearing loss, rhinorrhea and trouble swallowing. Eyes: Negative for photophobia, discharge, redness and visual disturbance. Respiratory: Negative for apnea, cough, choking, chest tightness, shortness of breath and stridor. Cardiovascular: Negative for chest pain and palpitations. Gastrointestinal: Negative for abdominal pain, blood in stool, diarrhea and nausea. Endocrine: Negative for polydipsia, polyphagia and polyuria. Genitourinary: Negative for difficulty urinating, dysuria, frequency, hematuria, menstrual problem and vaginal discharge. Musculoskeletal: Negative for arthralgias, joint swelling, myalgias and neck stiffness. Skin: Negative for color change and rash. Allergic/Immunologic: Negative for immunocompromised state.    Neurological: Negative for dizziness, seizures, speech difficulty, light-headedness and headaches. Hematological: Negative for adenopathy. Psychiatric/Behavioral: Negative for agitation, hallucinations and suicidal ideas. Past Medical History: All past medical history reviewed today. Past Medical History:   Diagnosis Date    Alcohol abuse     Anxiety     CAD in native artery     Cerebral artery occlusion with cerebral infarction (HCC)     states hx of multiple mini strokes    COPD (chronic obstructive pulmonary disease) (HCC)     Depression     DVT[facility] lower extremity ([facility])     Hypercholesteremia     Hypertension     Hyperthyroidism     Mammogram abnormal 2/7/2012    Neuromuscular disorder ([facility] [address]     Pneumonia     Thyroid disease     Tobacco abuse        Past Surgical History: All past surgical history was reviewed today. Past Surgical History:   Procedure Laterality Date    COLONOSCOPY  1/19/2012    Dr. Mary Chisholm; repeat 5 years    EYE SURGERY      cateracts removed    SHOULDER ARTHROSCOPY Right 6/18/14    SUBACROMIAL DECOMPRESSION, ROTATOR CUFF REPAIR    TOE SURGERY Right     TONSILLECTOMY           Immunization History: All immunization history was reviewed by me today. Immunization History   Administered Date(s) Administered    Influenza Virus Vaccine 11/04/2015    Influenza, High Dose (Fluzone 65 yrs and older) 10/16/2012, 10/27/2014, 11/28/2016, 10/16/2017, 12/01/2018    Pneumococcal Conjugate 13-valent ([id number]) 11/11/2015    Pneumococcal Conjugate 7-valent (Prevnar7) 11/01/2011    Pneumococcal Polysaccharide ([id number]) 09/16/2011    Tdap (Boostrix, Adacel) 05/02/2013, 04/04/2014       Family History: All family history was reviewed today.         Problem Relation Age of Onset    Heart Disease Father         MI @ 64    Alcohol Abuse Father        Objective:       PHYSICAL EXAM:      Vitals:   Vitals:    08/23/19 0011 08/23/19 0521 08/23/19 0758 08/23/19 0800   BP: (!) 148/88 125/78  117/81 deconditioning R53.81    PAF (paroxysmal atrial fibrillation) ([facility]) I48.0    Vascular dementia, uncomplicated L59.14    Other insomnia G47.09    Transient alteration of awareness R40.4    Hyponatremia E87.1    Generalized weakness V94.9    Complicated UTI (urinary tract infection) N39.0    Recurrent UTI N39.0    Pacemaker Z95.0    Coronary artery disease due to lipid rich plaque I25.10, I25.83    Ataxia R27.0    Cord compression [facility]) G95.20       Please note that this chart was generated using Dragon dictation software. Although every effort was made to ensure the accuracy of this automated transcription, some errors in transcription may have occurred inadvertently. If you may need any clarification, please do not hesitate to contact me through EPIC or at the phone number provided below with my electronic signature. Any pictures or media included in this note were obtained after taking informed verbal consent from the patient and with their approval to include those in the patient's medical record.     Shasta Putnam MD, MPH  8/23/19, 11:34 AM   [facility] Infectious Disease   Office: [phone]  Fax: [phone]  Tuesday AM clinic:   [address] [place] [address], [facility] [address]  Thursday AM [id number] [facility]

## 2019-08-23 NOTE — PLAN OF CARE
Problem: Falls - Risk of:  Goal: Will remain free from falls  Description  Will remain free from falls  8/23/2019 1422 by Ofelia Nam RN  Outcome: Ongoing  8/23/2019 0222 by Fe Antonio RN  Outcome: Ongoing  Goal: Absence of physical injury  Description  Absence of physical injury  8/23/2019 1422 by Ofelia Nam RN  Outcome: Ongoing  8/23/2019 0222 by Fe Antonio RN  Outcome: Ongoing     Problem: Risk for Impaired Skin Integrity  Goal: Tissue integrity - skin and mucous membranes  Description  Structural intactness and normal physiological function of skin and  mucous membranes.   8/23/2019 1422 by Ofelia Nam RN  Outcome: Ongoing  8/23/2019 0222 by Fe Antonio RN  Outcome: Ongoing     Problem: Pain:  Goal: Pain level will decrease  Description  Pain level will decrease  8/23/2019 1422 by Ofelia Nam RN  Outcome: Ongoing  8/23/2019 0222 by Fe Antonio RN  Outcome: Ongoing  Goal: Control of acute pain  Description  Control of acute pain  8/23/2019 1422 by Ofelia Nam RN  Outcome: Ongoing  8/23/2019 0222 by Fe Antonio RN  Outcome: Ongoing  Goal: Control of chronic pain  Description  Control of chronic pain  8/23/2019 1422 by Ofelia Nam RN  Outcome: Ongoing  8/23/2019 0222 by Fe Antonio RN  Outcome: Ongoing

## 2019-08-23 NOTE — PROGRESS NOTES
NEUROLOGY FOLLOWUP    HISTORY OF PRESENT ILLNESS :     Jaziel Spencer is a 75 y.o. female   History was obtained from the patient and dictations in the chart. Patient states that she is slowly improving. She continues to have some leg weakness. She was admitted with a 3-day history of leg weakness. No bladder or bowel incontinence. MRI brain did not show any significant ischemic acute stroke. MRI of the cervical spine showed severe cervical spinal stenosis which is probably chronic or at least subacute.     REVIEW OF SYSTEMS       Constitutional:  []   Chills   []  Fatigue   []  Fevers   []  Malaise   []  Weight loss     [x] Denies all of the above    Respiratory:   []  Cough    []  Shortness of breath         [x] Denies all of the above     Cardiovascular:   []  Chest pain    []  Exertional chest pressure/discomfort           [] Palpitations    []  Syncope     [x] Denies all of the above    Past Medical History:   Diagnosis Date    Alcohol abuse     Anxiety     CAD in native artery     Cerebral artery occlusion with cerebral infarction ([facility]     states hx of multiple mini strokes    COPD (chronic obstructive pulmonary disease) (HCC)     Depression     DVT, lower extremity ([facility])     Hypercholesteremia     Hypertension     Hyperthyroidism     Mammogram abnormal 2/7/2012    Neuromuscular disorder ([facility] [facility]     Pneumonia     Thyroid disease     Tobacco abuse      Family History   Problem Relation Age of Onset    Heart Disease Father         MI @ 64    Alcohol Abuse Father      Social History     Socioeconomic History    Marital status:      Spouse name: None    Number of children: None    Years of education: None    Highest education level: None   Occupational History    None   Social Needs    Financial resource strain: None    Food insecurity:     Worry: None     Inability: None    Transportation needs: Medical: None     Non-medical: None   Tobacco Use    Smoking status: Current Every Day Smoker     Packs/day: 1.50     Years: 52.00     Pack years: 78.00     Types: Cigarettes     Last attempt to quit: 2018     Years since quittin.1    Smokeless tobacco: Never Used    Tobacco comment: started smoking at age 26 / smoked up to 2 p,p,d / now smoking 4 to 8 cigarettes a day,   Substance and Sexual Activity    Alcohol use: No     Alcohol/week: 0.0 standard drinks     Comment: patient reports she is an alcoholic hasn't had anything to drink 3-4 months    Drug use: No    Sexual activity: Yes     Partners: Male   Lifestyle    Physical activity:     Days per week: None     Minutes per session: None    Stress: None   Relationships    Social connections:     Talks on phone: None     Gets together: None     Attends Anabaptist service: None     Active member of club or organization: None     Attends meetings of clubs or organizations: None     Relationship status: None    Intimate partner violence:     Fear of current or ex partner: None     Emotionally abused: None     Physically abused: None     Forced sexual activity: None   Other Topics Concern    None   Social History Narrative    None        PHYSICAL EXAMINATION      /81   Pulse 68   Temp 97.2 °F (36.2 °C) (Oral)   Resp 16   Ht 5' 8\" (1.727 m)   Wt 132 lb 11.2 oz (60.2 kg)   SpO2 100%   BMI 20.18 kg/m²   This is a well-nourished patient in no acute distress  Patient is awake, alert and oriented x3. Speech is normal.  Pupils are equal round reacting to light. Extraocular movements intact. Face symmetrical. Tongue midline. Motor exam shows normal symmetrical strength. Deep tendon reflexes normal. Plantar reflexes downgoing. Sensory exam normal. Coordination normal. No carotid bruit. No neck stiffness.     DATA :  LABS:  General Labs:    CBC:   Lab Results   Component Value Date    WBC 6.8 2019    RBC 3.50 2019    HGB 12.0 08/23/2019    HCT 34.7 08/23/2019    MCV 99.1 08/23/2019    MCH 34.3 08/23/2019    MCHC 34.6 08/23/2019    RDW 13.5 08/23/2019     08/23/2019    MPV 8.4 08/23/2019     BMP:    Lab Results   Component Value Date     08/23/2019    K 3.5 08/23/2019    CL 97 08/23/2019    CO2 27 08/23/2019    BUN 11 08/23/2019    LABALBU 3.6 08/23/2019    CREATININE 0.9 08/23/2019    CALCIUM 8.8 08/23/2019    GFRAA >60 08/23/2019    GFRAA >60 05/02/2013    LABGLOM >60 08/23/2019    LABGLOM 79 12/11/2017    GLUCOSE 88 08/23/2019     RADIOLOGY REVIEW:  I have reviewed radiology image(s) and reports(s) of: MRI brain cervical thoracic and lumbar spine      IMPRESSION :  MRI cervical spine shows severe spinal stenosis in the cervical region. Neurosurgery has evaluated the patient. According to the nurses neurosurgery feels that this is not emergent and she could be followed by neurosurgeon as outpatient. No evidence of stroke seen on the MRI brain. Patient has been restarted on Eliquis.   Patient Active Problem List   Diagnosis    Incontinence    Hypothyroidism    Smoker    Anxiety    History of alcoholism ([address]    Hyperlipidemia    DIMAS (dyspnea on exertion)    Acute DVT (deep venous thrombosis) (HCC)    DVT (deep venous thrombosis) (HCC)    COPD exacerbation (HCC)    Fatigue    Pleural effusion    General weakness    Centrilobular emphysema ([address]    Essential hypertension    Chronic fatigue    COPD, moderate ([address]    Encephalopathy    CAD in native artery    Osteopenia    Trapped lung    COPD exacerbation ([address]    Acute encephalopathy    Chronic ischemic multifocal multiple vascular territories stroke    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 [address]    Oropharyngeal dysphagia    HTN (hypertension), benign    Dyslipidemia    Encounter for electronic analysis of reveal event recorder    Acute on chronic diastolic heart failure (HCC)    Physical deconditioning    PAF (paroxysmal atrial fibrillation) [facility]    Vascular dementia, uncomplicated    Other insomnia    Transient alteration of awareness    Hyponatremia    Generalized weakness    Complicated UTI (urinary tract infection)    Recurrent UTI    Pacemaker    Coronary artery disease due to lipid rich plaque    Ataxia       RECOMMENDATIONS :  Discussed with patient  Discussed with patient's nurse  Continue Eliquis  Physical therapy evaluation  Okay with discharge when gait improved  Patient will need to follow-up with neurosurgery based on the recommendations and consider surgery for the cervical spinal stenosis          Please note a portion of this chart was generated using dragon dictation software. Although every effort was made to ensure the accuracy of this automated transcription, some errors in transcription may have occurred.          Hailey Tesfaye M.D.

## 2019-08-23 NOTE — PROGRESS NOTES
Pt returned from MRI in stable condition. VSS, assessment completed, and meds given. Pt appears calm with no signs of distress. Breathing is regular and unlabored. Call light is within reach and bed alarm is engaged. No further needs at this time.

## 2019-08-23 NOTE — PROGRESS NOTES
Physical Therapy    Facility/Department: [address] ONCOLOGY  Initial Assessment    NAME: Antoinette Varner  : 1945  MRN: [id number]    Date of Service: 2019    Discharge Recommendations: Antoinette Varner scored a 23/24 on the AM-PAC short mobility form. Current research shows that an AM-PAC score of 18 or greater is typically associated with a discharge to the patient's home setting. Based on the patients AM-PAC score and their current functional mobility deficits, it is recommended that the patient have 2-3 sessions per week of Physical Therapy at d/c to increase the patients independence. HOME HEALTH CARE: LEVEL 1 STANDARD    - Initial home health evaluation to occur within 24-48 hours, in patient home   - Therapy to evaluate with goal of regaining prior level of functioning   - Therapy to evaluate if patient has [address] needs for personal care      PT Equipment Recommendations  Equipment Needed: No  Other: Use RW at home with assistance from family--pt has RW at home    Assessment   Body structures, Functions, Activity limitations: Decreased functional mobility ; Decreased endurance;Decreased strength;Decreased safe awareness  Assessment: Pt presented with above deficits functioning at baseline level. Pt performed bed mobility and transfers with CGA progressing to SBA. Pt ambulated with CGA in hallway 150'. Pt will not benefit therapy in this setting. Treatment Diagnosis: impaired transfers, gait and balance  Prognosis: Good  Decision Making: Low Complexity  History: COPD, anxiety, falls, stroke  Clinical Presentation: stable  PT Education: Goals;PT Role;General Safety;Plan of Care  Patient Education: PT POC, discharge recommendations  REQUIRES PT FOLLOW UP: Yes  Activity Tolerance  Activity Tolerance: Patient Tolerated treatment well       Patient Diagnosis(es): The primary encounter diagnosis was Urinary tract infection in female.  A diagnosis of Unable to walk was also pertinent to Family(, daughter, grand daughter, [place] granddaughter and great grandson)  Type of Home: House  Home Layout: Multi-level, Bed/Bath upstairs(full flight to upstairs; no bathroom on first level)  Home Access: Stairs to enter without rails  Entrance Stairs - Number of Steps: 1 TEO  Bathroom Shower/Tub: Walk-in shower, Shower chair with back  Bathroom Toilet: Standard  Bathroom Equipment: Shower chair  Bathroom Accessibility: Walker accessible  Home Equipment: Cane, Rolling walker, 4 wheeled walker  Receives Help From: Family  ADL Assistance: Needs assistance  Bath: Supervision  Dressing: Independent  Toileting: Needs assistance(family SBA)  Homemaking Assistance: (family completes IADLs)  Homemaking Responsibilities: No  Ambulation Assistance: Needs assistance(furniture walks with supervision/SBA from family)  Transfer Assistance: Needs assistance(daughter or grand daughter assists with shower transfers, otherwise, other transfers are completed with supervision from family)  Active : No  Patient's  Info: family  Leisure & Hobbies: watch TV, going out getting nails done  Additional Comments: Pt has close to 24/7 care - when family runs errands pt reports \"I just don't get up. \" Reports 3 recent falls. Currently receiving HHOT and PT, RN.     Objective     Observation/Palpation  Posture: Good    AROM RLE (degrees)  RLE AROM: WFL  AROM LLE (degrees)  LLE AROM : WFL  Strength RLE  Strength RLE: WFL  Comment: 3+/5 BLE  Strength LLE  Strength LLE: WFL  Comment: 3+/5 BLE     Sensation  Overall Sensation Status: WFL  Bed mobility  Supine to Sit: Stand by assistance  Sit to Supine: Stand by assistance  Scooting: Stand by assistance  Comment: HOB elevated   Transfers  Sit to Stand: Contact guard assistance  Stand to sit: Contact guard assistance  Comment: CGA progressing to SBA during treatment  Ambulation  Ambulation?: Yes  More Ambulation?: No  Ambulation 1  Surface: level tile  Device: Rolling

## 2019-08-23 NOTE — PLAN OF CARE
Problem: Falls - Risk of:  Goal: Will remain free from falls  Description  Will remain free from falls  Outcome: Ongoing     Problem: Pain:  Description  Pain management should include both nonpharmacologic and pharmacologic interventions. Goal: Control of acute pain  Description  Control of acute pain  Outcome: Ongoing     Problem: Risk for Impaired Skin Integrity  Goal: Tissue integrity - skin and mucous membranes  Description  Structural intactness and normal physiological function of skin and  mucous membranes.   Outcome: Ongoing

## 2019-08-23 NOTE — CONSULTS
Clinical Pharmacy Note:    Pharmacy consulted by Dr. Saurav Perez to dose vancomycin for Staph Aureus in urine. Goal trough ~15. Age: 75 y.o. Height: 68 in  Weight: 60.2 kg  Estimated Creatinine Clearance: 52 mL/min (based on SCr of 0.9 mg/dL). Ordered vancomycin 750mg IV q12h. Patient previously with therapeutic troughs on 1000mg q12h, however scr was closer to 0.6 at that time and is currently 0.9. Monitor    Trough ordered prior to the 5th dose (8/25 1200) with instructions to hold dose if trough greater than 20mcg/mL. Pharmacy will continue to follow.      Val CamposD, BCPS

## 2019-08-23 NOTE — CONSULTS
Neurosurgery Consult Note    Reason for Consult: cervical stenosis  Requesting Physician: Dr. Martínez Menjivar    Subjective:  [facility] / HPI:  Letitia Braxton is a 75 y.o. female patient being seen for complaints of generalized weakness for several weeks. Admitted recently for dehydration, and UTI. Patient reports previous CVA with resultant left upper extremity weakness. Over the last 3-4 days she has only been walking with the assistance of her granddaughter or daughter as she did not feel steady. there is no clear aggravating or relieving factors. Past Medical History:   Diagnosis Date    Alcohol abuse     Anxiety     CAD in native artery     Cerebral artery occlusion with [facility] infarction [facility]     states hx of multiple mini strokes    COPD (chronic obstructive pulmonary disease) ([facility]     Depression     DVT, lower extremity ([facility]     Hypercholesteremia     Hypertension     Hyperthyroidism     Mammogram abnormal 2/7/2012    Neuromuscular disorder [facility]     Pneumonia     Thyroid disease     Tobacco abuse      Past Surgical History:   Procedure Laterality Date    COLONOSCOPY  1/19/2012    Dr. Jovani Martinez; repeat 5 years    EYE SURGERY      cateracts removed    SHOULDER ARTHROSCOPY Right 6/18/14    SUBACROMIAL DECOMPRESSION, ROTATOR CUFF REPAIR    TOE SURGERY Right     TONSILLECTOMY       Lipitor [atorvastatin]; Morphine; Keflex [cephalexin]; Hctz [hydrochlorothiazide]; Norvasc [amlodipine besylate];  Penicillins; Tramadol; Hydralazine; and Shellfish-derived products    Current Facility-Administered Medications:     apixaban (ELIQUIS) tablet 5 mg, 5 mg, Oral, BID, Hua Villarreal MD    sodium chloride flush 0.9 % injection 10 mL, 10 mL, Intravenous, 2 times per day, Lauren Christensen MD, 10 mL at 08/23/19 0811    sodium chloride flush 0.9 % injection 10 mL, 10 mL, Intravenous, PRN, Lauren Christensen MD    magnesium hydroxide (MILK OF MAGNESIA) 400 MG/5ML suspension 30 mL, 30 mL, Oral, Daily  Mild-to-moderate bilateral neural foramina. The mild to moderate central canal stenosis       C7-T1: Mild disc space disease.  Moderate neural foramina.  No significant   central canal stenosis.       MRI THORACIC SPINE:       BONES/ALIGNMENT: There is normal alignment of the spine. The vertebral body   heights are maintained. The bone marrow signal appears unremarkable.       SPINAL CORD: No abnormal cord signal is seen.       SOFT TISSUES: No paraspinal mass identified.  Suspect pleural effusions and   bibasilar atelectasis.       DEGENERATIVE CHANGES: No significant spinal canal stenosis or neural   foraminal narrowing of the thoracic spine.  Moderate severe multilevel   degenerate disc spaces identified.  These findings most advanced at T9-T10   T10-11 and T8-T9.  Small central disc protrusion at T9-T10 indenting the   ventral thecal sac.  Circumferential disc bulge at T3-T4 minimally indenting   the ventral thecal sac.  And small right paracentral disc protrusion at T2-T3.       MRI LUMBAR SPINE:       BONES/ALIGNMENT: There is normal alignment of the spine. The vertebral body   heights are maintained. The bone marrow signal appears unremarkable. Congenitally short pedicles identified.       SPINAL CORD: No abnormal cord signal is seen.       SOFT TISSUES: No paraspinal mass identified.       DEGENERATIVE CHANGES:       L1-L2: Moderate disc space is identified with circumferential disc bulge. Mild central canal stenosis.  Moderate degenerate facet arthropathy and   mild-to-moderate central canal stenosis.       L2-L3: Minimal loss of disc space height and signal.  No focal disc   protrusion.  Minimal neural foraminal encroachment.  Moderate to severe facet   arthropathy and mild ligamentum flavum hypertrophy.       L3-L4: Moderate disc space disease with circumferential disc bulge.    Mild-to-moderate bilateral foraminal narrowing.  Moderate to severe central   canal stenosis.  Moderate severe facet

## 2019-08-23 NOTE — SIGNIFICANT EVENT
Called by [facility] to discuss results of MRI. Cervical cord compression. Call placed to [facility]. Case discussed with Dr. Greer Macias by me over the phone. He reviewed images and recommended a soft cervical collar. He agreed to consult and evaluate patient in am at [facility]. Per him, patient does not need emergency transfer to [facility] and can be treated here.

## 2019-08-23 NOTE — PROGRESS NOTES
Occupational Therapy   Occupational Therapy Initial Assessment  Date: 2019   Patient Name: Janett Bonilla  MRN: [id number]     : 1945    Date of Service: 2019    Discharge Recommendations: Janett Bonilla scored a 19/24 on the AM-PAC ADL Inpatient form. Current research shows that an AM-PAC score of 18 or greater is typically associated with a discharge to the patient's home setting. Based on the patients AM-PAC score and their current ADL deficits, it is recommended that the patient have 2-3 sessions per week of Occupational Therapy at d/c to increase the patients independence. HOME HEALTH CARE: LEVEL 3 SAFETY     - Initial home health evaluation to occur within 24-48 hours, in patient home   - Therapy evaluations in home within 24-48 hours of discharge; including DME and home safety   - Frontload therapy 5 days, then 3x a week   - Therapy to evaluate if patient has [address] needs for personal care   -  evaluation within 24-48 hours, includes evaluation of resources and insurance to determine AL, [place], LTC, and Medicaid options   Recommend 24/ care to maximize pt safety     OT Equipment Recommendations  Other: BSC as pt does not have toilet on first floor    Assessment   Performance deficits / Impairments: Decreased functional mobility ; Decreased ADL status; Decreased cognition  Assessment: Pt is not at baseline level of function and will benefit from skilled OT in order to address the above limitations. Treatment Diagnosis: Decreased functional mobility, ADL status, cognition related to weakness  Prognosis: Good  Decision Making: Low Complexity  OT Education: OT Role;Plan of Care;ADL Adaptive Strategies  REQUIRES OT FOLLOW UP: Yes  Activity Tolerance  Activity Tolerance: Patient Tolerated treatment well  Safety Devices  Safety Devices in place: Yes  Type of devices: All fall risk precautions in place; Patient at risk for falls; Left in chair;Call light within reach; Chair alarm in place;Nurse notified           Patient Diagnosis(es): The primary encounter diagnosis was Urinary tract infection in female. A diagnosis of Unable to walk was also pertinent to this visit. has a past medical history of Alcohol abuse, Anxiety, CAD in native artery, Cerebral artery occlusion with cerebral infarction ([address] COPD (chronic obstructive pulmonary disease) ([address] Depression, DVT, lower extremity ([address] Hypercholesteremia, Hypertension, Hyperthyroidism, Mammogram abnormal, Neuromuscular disorder ([facility] Pneumonia, Thyroid disease, and Tobacco abuse.   has a past surgical history that includes Tonsillectomy; Colonoscopy (1/19/2012); Toe Surgery (Right); Shoulder arthroscopy (Right, 6/18/14); and eye surgery. Treatment Diagnosis: Decreased functional mobility, ADL status, cognition related to weakness      Restrictions  Restrictions/Precautions  Restrictions/Precautions: Fall Risk(up with assist)  Required Braces or Orthoses?: Yes  Required Braces or Orthoses  Cervical: [place] JACOBY  Position Activity Restriction  Other position/activity restrictions:  Erin Chandler is a 75 y.o. female was admitted to the hospital 2 weeks back for weakness urine tract infection she went home she was able to walk with the help of physical therapy however for the last 3 days she is unable to walk increasing weakness increasing fall associated dizziness lightheadedness. MRI of brain unremarkable. MRI of the cervical spine showed severe cervical spinal stenosis. Neurosx recommending soft cervical collar    Subjective   General  Chart Reviewed: Yes  Additional Pertinent Hx: alcohol abuse, CVA, COPD, DVT, neuromuscular disorder  Family / Caregiver Present: No  Diagnosis: weakness  Subjective  Subjective: Pt lying supine in bed upon arrival, agreeable to evaluation.   Patient Currently in Pain: Denies  Vital Signs  Patient Currently in Pain: Denies  Social/Functional History  Social/Functional History  Lives With: Family(, Ambulating  Equipment Used: Standard toilet(L grab bar)  Toilet Transfer: Contact guard assistance  ADL  Grooming: Stand by assistance; Increased time to complete;Verbal cueing;Setup(in stance at sink to wash hands, complete oral care, and comb hair; VCs to initiate tasks)  LE Dressing: Contact guard assistance;Setup(to doff/don depends)  Toileting: Contact guard assistance(for clothing management in stance)  Additional Comments: Pt ambulated to/from bathroom to complete the above ADLs. Pt required min/mod verbal cues for problem solving/managing RW and initiating grooming tasks. Tone RUE  RUE Tone: Normotonic  Tone LUE  LUE Tone: Normotonic  Coordination  Movements Are Fluid And Coordinated: Yes     Bed mobility  Supine to Sit: Stand by assistance  Scooting: Stand by assistance  Comment: HOB elevated  Transfers  Sit to stand: Contact guard assistance  Stand to sit: Contact guard assistance  Vision - Basic Assessment  Prior Vision: Wears glasses only for reading  Visual History: No significant visual history  Patient Visual Report: No visual complaint reported. Cognition  Overall Cognitive Status: Exceptions  Arousal/Alertness: Delayed responses to stimuli  Following Commands:  Follows one step commands with increased time  Attention Span: Difficulty attending to directions  Memory: Appears intact  Safety Judgement: Decreased awareness of need for assistance  Problem Solving: Assistance required to generate solutions;Assistance required to implement solutions  Insights: Decreased awareness of deficits  Initiation: Requires cues for some  Sequencing: Requires cues for some        Sensation  Overall Sensation Status: WFL        LUE AROM (degrees)  LUE AROM : WFL  RUE AROM (degrees)  RUE AROM : WFL  LUE Strength  Gross LUE Strength: WFL  LUE Strength Comment: grossly WFL  RUE Strength  Gross RUE Strength: WFL  RUE Strength Comment: grossly WFL                   Plan   Plan  Times per week: 3-5x  Times per day: Daily  Current Treatment Recommendations: Functional Mobility Training, Equipment Evaluation, Education, & procurement, Patient/Caregiver Education & Training, Self-Care / ADL, Safety Education & Training                        AM-PAC Score        AM-PAC Inpatient Daily Activity Raw Score: 19 (08/23/19 1254)  AM-PAC Inpatient ADL T-Scale Score : 40.22 (08/23/19 1254)  ADL Inpatient CMS 0-100% Score: 42.8 (08/23/19 1254)  ADL Inpatient CMS G-Code Modifier : CK (08/23/19 1254)    Goals  Short term goals  Time Frame for Short term goals: Discharge  Short term goal 1: Supervision for all UB ADLs. Short term goal 2: Supervision for all LB ADLs. Short term goal 3: Supervision for functional mobility. Short term goal 4: Supervision for functional transfers.   Long term goals  Time Frame for Long term goals : STG=LTG  Patient Goals   Patient goals : To d/c back home       Therapy Time   Individual Concurrent Group Co-treatment   Time In 1122         Time Out 1202         Minutes 40              Timed Code Treatment Minutes:   25    Total Treatment Minutes:  [id number] [facility] BREANNA Umana (Alysia), [address], [place] [id number]

## 2019-08-24 PROBLEM — A49.02 MRSA INFECTION: Status: ACTIVE | Noted: 2019-08-24

## 2019-08-24 LAB
LV EF: 55 %
LVEF MODALITY: NORMAL
ORGANISM: ABNORMAL
URINE CULTURE, ROUTINE: ABNORMAL

## 2019-08-24 PROCEDURE — 2580000003 HC RX 258: Performed by: INTERNAL MEDICINE

## 2019-08-24 PROCEDURE — 6360000002 HC RX W HCPCS: Performed by: INTERNAL MEDICINE

## 2019-08-24 PROCEDURE — 96376 TX/PRO/DX INJ SAME DRUG ADON: CPT

## 2019-08-24 PROCEDURE — 6370000000 HC RX 637 (ALT 250 FOR IP): Performed by: INTERNAL MEDICINE

## 2019-08-24 PROCEDURE — G0378 HOSPITAL OBSERVATION PER HR: HCPCS

## 2019-08-24 PROCEDURE — 96368 THER/DIAG CONCURRENT INF: CPT

## 2019-08-24 PROCEDURE — 93306 TTE W/DOPPLER COMPLETE: CPT

## 2019-08-24 PROCEDURE — 99232 SBSQ HOSP IP/OBS MODERATE 35: CPT | Performed by: INTERNAL MEDICINE

## 2019-08-24 PROCEDURE — 94640 AIRWAY INHALATION TREATMENT: CPT

## 2019-08-24 PROCEDURE — 96366 THER/PROPH/DIAG IV INF ADDON: CPT

## 2019-08-24 RX ADMIN — IPRATROPIUM BROMIDE 0.5 MG: 0.5 SOLUTION RESPIRATORY (INHALATION) at 19:27

## 2019-08-24 RX ADMIN — Medication 10 ML: at 21:46

## 2019-08-24 RX ADMIN — CLONAZEPAM 0.25 MG: 0.5 TABLET ORAL at 21:47

## 2019-08-24 RX ADMIN — VANCOMYCIN HYDROCHLORIDE 750 MG: 750 INJECTION, POWDER, LYOPHILIZED, FOR SOLUTION INTRAVENOUS at 22:35

## 2019-08-24 RX ADMIN — ASPIRIN 81 MG 81 MG: 81 TABLET ORAL at 09:43

## 2019-08-24 RX ADMIN — Medication 10 ML: at 09:49

## 2019-08-24 RX ADMIN — Medication 2 PUFF: at 19:27

## 2019-08-24 RX ADMIN — Medication 2 PUFF: at 09:04

## 2019-08-24 RX ADMIN — IPRATROPIUM BROMIDE 0.5 MG: 0.5 SOLUTION RESPIRATORY (INHALATION) at 14:24

## 2019-08-24 RX ADMIN — LEVOTHYROXINE SODIUM 100 MCG: 100 TABLET ORAL at 07:37

## 2019-08-24 RX ADMIN — OXYBUTYNIN CHLORIDE 5 MG: 5 TABLET ORAL at 21:47

## 2019-08-24 RX ADMIN — CIPROFLOXACIN 400 MG: 2 INJECTION, SOLUTION INTRAVENOUS at 01:21

## 2019-08-24 RX ADMIN — VANCOMYCIN HYDROCHLORIDE 750 MG: 750 INJECTION, POWDER, LYOPHILIZED, FOR SOLUTION INTRAVENOUS at 14:18

## 2019-08-24 RX ADMIN — LABETALOL HYDROCHLORIDE 100 MG: 200 TABLET, FILM COATED ORAL at 09:42

## 2019-08-24 RX ADMIN — CALCIUM 500 MG: 500 TABLET ORAL at 09:42

## 2019-08-24 RX ADMIN — APIXABAN 5 MG: 5 TABLET, FILM COATED ORAL at 09:42

## 2019-08-24 RX ADMIN — OXYBUTYNIN CHLORIDE 5 MG: 5 TABLET ORAL at 09:42

## 2019-08-24 RX ADMIN — MULTIPLE VITAMINS W/ MINERALS TAB 1 TABLET: TAB at 09:42

## 2019-08-24 RX ADMIN — CIPROFLOXACIN 400 MG: 2 INJECTION, SOLUTION INTRAVENOUS at 14:19

## 2019-08-24 RX ADMIN — Medication 10 ML: at 01:28

## 2019-08-24 RX ADMIN — APIXABAN 5 MG: 5 TABLET, FILM COATED ORAL at 21:47

## 2019-08-24 RX ADMIN — LABETALOL HYDROCHLORIDE 100 MG: 200 TABLET, FILM COATED ORAL at 21:48

## 2019-08-24 RX ADMIN — ATORVASTATIN CALCIUM 80 MG: 80 TABLET, FILM COATED ORAL at 09:42

## 2019-08-24 RX ADMIN — VANCOMYCIN HYDROCHLORIDE 750 MG: 750 INJECTION, POWDER, LYOPHILIZED, FOR SOLUTION INTRAVENOUS at 01:21

## 2019-08-24 RX ADMIN — IPRATROPIUM BROMIDE 0.5 MG: 0.5 SOLUTION RESPIRATORY (INHALATION) at 09:31

## 2019-08-24 ASSESSMENT — PAIN SCALES - GENERAL
PAINLEVEL_OUTOF10: 0

## 2019-08-24 NOTE — PROGRESS NOTES
Shift assessment completed, see flowsheet. Patient awake and alert in bed. POC discussed for the shift. No needs voiced at this time. Call light in reach. Bed alarm on. Will continue to monitor.

## 2019-08-24 NOTE — PROGRESS NOTES
[facility]    8/24/2019 12:06 PM        Name: Alisha Merlos . Admitted: 8/22/2019  Primary Care Provider: Markus Gupta MD (Tel: [phone])      Subjective: Patient complains of discomfort from cervical collar continues to have generalized  weakness no new complaints. Tolerated physical therapy        Reviewed interval ancillary notes    Current Medications    apixaban (ELIQUIS) tablet 5 mg BID   vancomycin (VANCOCIN) 750 mg in dextrose 5 % 250 mL IVPB Q12H   sodium chloride flush 0.9 % injection 10 mL 2 times per day   sodium chloride flush 0.9 % injection 10 mL PRN   magnesium hydroxide (MILK OF MAGNESIA) 400 MG/5ML suspension 30 mL Daily PRN   ondansetron (ZOFRAN) injection 4 mg Q6H PRN   aspirin chewable tablet 81 mg Daily   atorvastatin (LIPITOR) tablet 80 mg Daily   calcium elemental (OSCAL) tablet 500 mg Daily   clonazePAM (KLONOPIN) tablet 0.25 mg Nightly PRN   ipratropium (ATROVENT) 0.02 % nebulizer solution 0.5 mg TID   labetalol (NORMODYNE) tablet 100 mg 2 times per day   levothyroxine (SYNTHROID) tablet 100 mcg Daily   therapeutic multivitamin-minerals 1 tablet Daily   oxybutynin (DITROPAN) tablet 5 mg BID   dexamethasone (PF) (DECADRON) injection 8 mg Once   mometasone-formoterol (DULERA) 200-5 MCG/ACT inhaler 2 puff BID   ciprofloxacin (CIPRO) IVPB 400 mg Q12H       Objective:  BP 99/65   Pulse 80   Temp 98.3 °F (36.8 °C) (Oral)   Resp 16 Comment: Simultaneous filing. User may not have seen previous data.   Ht 5' 8\" (1.727 m)   Wt 132 lb 11.2 oz (60.2 kg)   SpO2 95%   BMI 20.18 kg/m²     Intake/Output Summary (Last 24 hours) at 8/24/2019 1206  Last data filed at 8/23/2019 1444  Gross per 24 hour   Intake 506.94 ml   Output --   Net 506.94 ml      Wt Readings from Last 3 Encounters:   08/23/19 132 lb 11.2 oz (60.2 kg)   08/13/19 129 lb 3.2 oz (58.6 kg)   08/06/19 136 Final Result   Cervical spine: Severe canal stenosis with moderate size central disc   protrusion at C3-C4 causing mild increased cord signal worrisome for cord   compression versus myelomalacia. Otherwise moderate multilevel degenerate changes cervical spine. Thoracic spine: Moderate severe spondylosis involving the thoracic spine   without acute compression fracture or significant canal stenosis. .      Lumbar spine: Moderate severe degenerative changes lower lumbar spine notably   at L4-5 L5-S1. Evidence of severe central canal stenosis L4-5. Critical results were called by Dr. Amadou Will to Dr. Jackie Campos on 8/22/2019 at   23:06. MRI CERVICAL SPINE WO CONTRAST   Final Result   Cervical spine: Severe canal stenosis with moderate size central disc   protrusion at C3-C4 causing mild increased cord signal worrisome for cord   compression versus myelomalacia. Otherwise moderate multilevel degenerate changes cervical spine. Thoracic spine: Moderate severe spondylosis involving the thoracic spine   without acute compression fracture or significant canal stenosis. .      Lumbar spine: Moderate severe degenerative changes lower lumbar spine notably   at L4-5 L5-S1. Evidence of severe central canal stenosis L4-5. Critical results were called by Dr. Amadou Will to Dr. Jackie Campos on 8/22/2019 at   23:06. MRI BRAIN WO CONTRAST   Final Result   Moderate chronic small ischemic disease with scattered areas of chronic   infarct. No evidence acute infarct, midline shift or mass effect. Stable ventricular prominence may be related to generalized involutional   changes versus normal pressure hydrocephalus in appropriate clinical setting. CT Head WO Contrast   Final Result   No acute intracranial abnormality.          XR CHEST STANDARD (2 VW)   Final Result   Unchanged left pleural effusion with adjacent atelectasis versus pneumonia,   unchanged appearance of the chest over the past 1 month. Problem List  Active Problems:    Pleural effusion    H/O ischemic multifocal multiple vascular territories stroke    Unable to walk    Generalized weakness    Ataxia    Cord compression [facility])  Resolved Problems:    * No resolved hospital problems. *       Assessment & Plan:     Severe cervical spinal stenosis. With possible cord compression received Decadron on admission neurosurgery consulted no urgent spine surgery can be followed as an outpatient cervical collar  Prescribed. Urinary tract infection started on Cipro urine culture positive for staph aureus started on IV vancomycin rule out underlying bacteremia patient afebrile cultures negative so far.      Blood pressure controlled clonidine stopped currently on labetalol    Physical therapy    Eliquis restarted      Wants to go home at the time of discharge    Diet: DIET LOW SODIUM 2 GM;  Code:Full Code  DVT PPX eliquis  Disposition awaiting results of blood culture      Ernie Beck MD   8/24/2019 12:06 PM

## 2019-08-24 NOTE — PROGRESS NOTES
cervical spine. Thoracic spine: Moderate severe spondylosis involving the thoracic spine  without acute compression fracture or significant canal stenosis. .    Lumbar spine: Moderate severe degenerative changes lower lumbar     ECHO:  8/22  TTE Summary   Normal left ventricle size and systolic function with an estimated ejection   fraction of 55%. There is mild concentric left ventricular hypertrophy. Grade   I diastolic dysfunction with normal LV filling pressures.   Mitral annular calcification is present. Mild mitral regurgitation.   The aortic valve appears sclerotic but opens well.   Trivial aortic regurgitation.   Mild tricuspid regurgitation with PASP of 27 mmHg.   There is a trivial pericardial effusion noted.     Scheduled Meds:   apixaban  5 mg Oral BID    vancomycin  750 mg Intravenous Q12H    sodium chloride flush  10 mL Intravenous 2 times per day    aspirin  81 mg Oral Daily    atorvastatin  80 mg Oral Daily    calcium elemental  500 mg Oral Daily    ipratropium  0.5 mg Nebulization TID    labetalol  100 mg Oral 2 times per day    levothyroxine  100 mcg Oral Daily    therapeutic multivitamin-minerals  1 tablet Oral Daily    oxybutynin  5 mg Oral BID    dexamethasone  8 mg Intravenous Once    mometasone-formoterol  2 puff Inhalation BID    ciprofloxacin  400 mg Intravenous Q12H       Continuous Infusions:      PRN Meds:  sodium chloride flush, magnesium hydroxide, ondansetron, clonazePAM      Assessment:     CAD, HTN, CVA, elevated lipids    Weakness, fatigue  S aureus / MRSA bacteriuria, concern hematogenous.   - BC no growth, TTE neg     Afebrile   BC no growth to date    Plan:     Cont vancomycin  Consider RANDALL  Check procalcitonin, ESR / CRP    Discussed with pt  Montserrat Calix MD

## 2019-08-24 NOTE — PROGRESS NOTES
Patient sitting up in bed, eating lunch. Family member at bedside. Denied any pain or any other needs. Call light in reach. Bed alarm on. Call light in reach. Will continue to monitor.

## 2019-08-25 PROBLEM — R53.1 WEAKNESS: Status: ACTIVE | Noted: 2019-08-25

## 2019-08-25 LAB
C-REACTIVE PROTEIN: 13.5 MG/L (ref 0–5.1)
PROCALCITONIN: 0.02 NG/ML (ref 0–0.15)
SEDIMENTATION RATE, ERYTHROCYTE: 24 MM/HR (ref 0–30)
VANCOMYCIN TROUGH: 10.6 UG/ML (ref 10–20)

## 2019-08-25 PROCEDURE — G0378 HOSPITAL OBSERVATION PER HR: HCPCS

## 2019-08-25 PROCEDURE — 94640 AIRWAY INHALATION TREATMENT: CPT

## 2019-08-25 PROCEDURE — 36415 COLL VENOUS BLD VENIPUNCTURE: CPT

## 2019-08-25 PROCEDURE — 99222 1ST HOSP IP/OBS MODERATE 55: CPT | Performed by: INTERNAL MEDICINE

## 2019-08-25 PROCEDURE — 80202 ASSAY OF VANCOMYCIN: CPT

## 2019-08-25 PROCEDURE — 6370000000 HC RX 637 (ALT 250 FOR IP): Performed by: INTERNAL MEDICINE

## 2019-08-25 PROCEDURE — 84145 PROCALCITONIN (PCT): CPT

## 2019-08-25 PROCEDURE — 2580000003 HC RX 258: Performed by: INTERNAL MEDICINE

## 2019-08-25 PROCEDURE — 6360000002 HC RX W HCPCS: Performed by: INTERNAL MEDICINE

## 2019-08-25 PROCEDURE — 85652 RBC SED RATE AUTOMATED: CPT

## 2019-08-25 PROCEDURE — 94760 N-INVAS EAR/PLS OXIMETRY 1: CPT

## 2019-08-25 PROCEDURE — 96366 THER/PROPH/DIAG IV INF ADDON: CPT

## 2019-08-25 PROCEDURE — 86140 C-REACTIVE PROTEIN: CPT

## 2019-08-25 PROCEDURE — 94761 N-INVAS EAR/PLS OXIMETRY MLT: CPT

## 2019-08-25 PROCEDURE — 1200000000 HC SEMI PRIVATE

## 2019-08-25 RX ORDER — ACETAMINOPHEN 325 MG/1
650 TABLET ORAL EVERY 6 HOURS PRN
Status: DISCONTINUED | OUTPATIENT
Start: 2019-08-25 | End: 2019-08-27 | Stop reason: HOSPADM

## 2019-08-25 RX ORDER — SODIUM CHLORIDE 9 MG/ML
INJECTION, SOLUTION INTRAVENOUS CONTINUOUS
Status: CANCELLED | OUTPATIENT
Start: 2019-08-25

## 2019-08-25 RX ORDER — SODIUM CHLORIDE 0.9 % (FLUSH) 0.9 %
10 SYRINGE (ML) INJECTION EVERY 12 HOURS SCHEDULED
Status: CANCELLED | OUTPATIENT
Start: 2019-08-25

## 2019-08-25 RX ORDER — SODIUM CHLORIDE 0.9 % (FLUSH) 0.9 %
10 SYRINGE (ML) INJECTION PRN
Status: CANCELLED | OUTPATIENT
Start: 2019-08-25

## 2019-08-25 RX ADMIN — MULTIPLE VITAMINS W/ MINERALS TAB 1 TABLET: TAB at 09:42

## 2019-08-25 RX ADMIN — IPRATROPIUM BROMIDE 0.5 MG: 0.5 SOLUTION RESPIRATORY (INHALATION) at 14:47

## 2019-08-25 RX ADMIN — Medication 2 PUFF: at 09:32

## 2019-08-25 RX ADMIN — CALCIUM 500 MG: 500 TABLET ORAL at 09:42

## 2019-08-25 RX ADMIN — LABETALOL HYDROCHLORIDE 100 MG: 200 TABLET, FILM COATED ORAL at 21:09

## 2019-08-25 RX ADMIN — Medication 10 ML: at 09:44

## 2019-08-25 RX ADMIN — CIPROFLOXACIN 400 MG: 2 INJECTION, SOLUTION INTRAVENOUS at 14:03

## 2019-08-25 RX ADMIN — CIPROFLOXACIN 400 MG: 2 INJECTION, SOLUTION INTRAVENOUS at 00:16

## 2019-08-25 RX ADMIN — LEVOTHYROXINE SODIUM 100 MCG: 100 TABLET ORAL at 06:31

## 2019-08-25 RX ADMIN — IPRATROPIUM BROMIDE 0.5 MG: 0.5 SOLUTION RESPIRATORY (INHALATION) at 21:55

## 2019-08-25 RX ADMIN — OXYBUTYNIN CHLORIDE 5 MG: 5 TABLET ORAL at 09:42

## 2019-08-25 RX ADMIN — VANCOMYCIN HYDROCHLORIDE 750 MG: 750 INJECTION, POWDER, LYOPHILIZED, FOR SOLUTION INTRAVENOUS at 12:31

## 2019-08-25 RX ADMIN — OXYBUTYNIN CHLORIDE 5 MG: 5 TABLET ORAL at 21:09

## 2019-08-25 RX ADMIN — ACETAMINOPHEN 650 MG: 325 TABLET, FILM COATED ORAL at 11:50

## 2019-08-25 RX ADMIN — ASPIRIN 81 MG 81 MG: 81 TABLET ORAL at 09:41

## 2019-08-25 RX ADMIN — APIXABAN 5 MG: 5 TABLET, FILM COATED ORAL at 09:41

## 2019-08-25 RX ADMIN — APIXABAN 5 MG: 5 TABLET, FILM COATED ORAL at 21:09

## 2019-08-25 RX ADMIN — Medication 2 PUFF: at 21:55

## 2019-08-25 RX ADMIN — IPRATROPIUM BROMIDE 0.5 MG: 0.5 SOLUTION RESPIRATORY (INHALATION) at 09:32

## 2019-08-25 RX ADMIN — CLONAZEPAM 0.25 MG: 0.5 TABLET ORAL at 21:09

## 2019-08-25 RX ADMIN — LABETALOL HYDROCHLORIDE 100 MG: 200 TABLET, FILM COATED ORAL at 09:41

## 2019-08-25 RX ADMIN — ATORVASTATIN CALCIUM 80 MG: 80 TABLET, FILM COATED ORAL at 09:42

## 2019-08-25 RX ADMIN — Medication 10 ML: at 12:31

## 2019-08-25 ASSESSMENT — PAIN DESCRIPTION - DESCRIPTORS
DESCRIPTORS: HEADACHE
DESCRIPTORS: HEADACHE

## 2019-08-25 ASSESSMENT — PAIN SCALES - GENERAL
PAINLEVEL_OUTOF10: 2
PAINLEVEL_OUTOF10: 0
PAINLEVEL_OUTOF10: 5
PAINLEVEL_OUTOF10: 0
PAINLEVEL_OUTOF10: 0
PAINLEVEL_OUTOF10: 3

## 2019-08-25 ASSESSMENT — PAIN DESCRIPTION - PAIN TYPE
TYPE: ACUTE PAIN
TYPE: ACUTE PAIN

## 2019-08-25 ASSESSMENT — PAIN DESCRIPTION - LOCATION
LOCATION: HEAD
LOCATION: HEAD

## 2019-08-25 NOTE — CONSULTS
MOUTH DAILY 8/8/19  Yes Racheal Vega MD   aspirin 81 MG chewable tablet CHEW ONE TABLET BY MOUTH DAILY 8/7/19  Yes Racheal Vega MD   cloNIDine (CATAPRES) 0.1 MG tablet Take 1 tablet by mouth 3 times daily 7/26/19  Yes Shama Dave MD   labetalol (NORMODYNE) 200 MG tablet Take 1 tablet by mouth every 12 hours 7/26/19  Yes Shama Dave MD   sodium chloride 1 g tablet Take 1 tablet by mouth 3 times daily (with meals) 7/26/19  Yes Shama Dave MD   ipratropium (ATROVENT) 0.02 % nebulizer solution Take 2.5 mLs by nebulization 3 times daily 3/30/19  Yes Karen Souza MD   calcium elemental (OSCAL) 500 MG TABS tablet Take 1 tablet by mouth daily 7/24/18  Yes Racheal Vega MD   atorvastatin (LIPITOR) 80 MG tablet Take 1 tablet by mouth daily 7/24/18  Yes Racheal Vega MD   budesonide-formoterol [facility]) 160-4.5 MCG/ACT AERO Inhale 2 puffs into the lungs 2 times daily 4/21/17  Yes Sheryl Menon MD   Multiple Vitamins-Minerals (CENTRUM SILVER ADULT 50+ PO) Take 1 tablet by mouth daily    Yes Historical Provider, MD        Allergies:  Lipitor [atorvastatin]; Morphine; Keflex [cephalexin]; Hctz [hydrochlorothiazide]; Norvasc [amlodipine besylate];  Penicillins; Tramadol; Hydralazine; and Shellfish-derived products     Review of Systems: 12 point ROS negative in all areas as listed below except as in [place]  Constitutional, EENT, Cardiovascular, pulmonary, GI, , Musculoskeletal, skin, neurological, hematological, endocrine, Psychiatric    Physical Examination:  /85 ne fever pulse 89/min  Vitals:    08/25/19 0932   BP:    Pulse:    Resp:    Temp:    SpO2: 96%    Weight: 132 lb 11.2 oz (60.2 kg)         General Appearance:  Alert, cooperative, no distress, appears stated age   Head:  Normocephalic, without obvious abnormality, atraumatic   Eyes:  PERRL, conjunctiva/corneas clear       Nose: Nares normal, no drainage or sinus tenderness   Throat: Lips, mucosa, and tongue normal   Neck: Supple, symmetrical, trachea midline, no adenopathy, thyroid: not enlarged, symmetric, no tenderness/mass/nodules, no carotid bruit or JVD       Lungs:   Clear to auscultation bilaterally, respirations unlabored   Chest Wall:  No tenderness or deformity   Heart:  Regular rate and rhythm, S1, S2 normal, no murmur, rub or gallop   Abdomen:   Soft, non-tender, bowel sounds active all four quadrants,  no masses, no organomegaly           Extremities: Extremities normal, atraumatic, no cyanosis or edema   Pulses: Faint  In feet. Skin: Skin color, texture, turgor normal, no rashes or lesions   Pysch: Normal mood and affect   Neurologic: Normal gross motor and sensory exam.         Labs  CBC:   Lab Results   Component Value Date    WBC 6.8 08/23/2019    RBC 3.50 08/23/2019    HGB 12.0 08/23/2019    HCT 34.7 08/23/2019    MCV 99.1 08/23/2019    RDW 13.5 08/23/2019     08/23/2019     CMP:    Lab Results   Component Value Date     08/23/2019    K 3.5 08/23/2019    CL 97 08/23/2019    CO2 27 08/23/2019    BUN 11 08/23/2019    CREATININE 0.9 08/23/2019    GFRAA >60 08/23/2019    GFRAA >60 05/02/2013    AGRATIO 1.2 08/23/2019    LABGLOM >60 08/23/2019    LABGLOM 79 12/11/2017    GLUCOSE 88 08/23/2019    PROT 6.5 08/23/2019    PROT 7.1 10/18/2012    CALCIUM 8.8 08/23/2019    BILITOT 0.6 08/23/2019    ALKPHOS 54 08/23/2019    AST 13 08/23/2019    ALT 10 08/23/2019     PT/INR:  No results found for: PTINR  Lab Results   Component Value Date    CKTOTAL 94 08/04/2019    TROPONINI <0.01 08/22/2019       EKG:  I have reviewed EKG with the following interpretation:  Impression:    Echo 8.24.19   Normal left ventricle size and systolic function with an estimated ejection   fraction of 55%. There is mild concentric left ventricular hypertrophy. Grade   I diastolic dysfunction with normal LV filling pressures.   Mitral annular calcification is present. Mild mitral regurgitation.   The aortic valve appears sclerotic but opens well.   Trivial aortic regurgitation.   Mild tricuspid regurgitation with PASP of 27 mmHg.   There is a trivial pericardial effusion noted.   EKG 8.22.19  Diagnosis   Final 08/22/2019  9:33 AM 14   Normal sinus rhythmNormal ECGConfirmed by Amrita Casillas MD, Francisca Palomares ([id number]) on 8/22/2019 12:53:22 PM    Chest xray 8.22.19       FINDINGS:   Left-sided pleural effusion remains present, unchanged, with adjacent   airspace disease also unchanged.  No evidence of acute pulmonary process   elsewhere             Assessment  \"Staph aureus bacteremia\"    Patient Active Problem List   Diagnosis    Incontinence    Hypothyroidism    Smoker    Anxiety    History of alcoholism ([address]    Hyperlipidemia    DIMAS (dyspnea on exertion)    Acute DVT (deep venous thrombosis) (HCC)    DVT (deep venous thrombosis) (HCC)    COPD exacerbation (HCC)    Fatigue    Pleural effusion    General weakness    Centrilobular emphysema (HCC)    Essential hypertension    Chronic fatigue    COPD, moderate (HCC)    Encephalopathy    CAD in native artery    Osteopenia    Trapped lung    COPD exacerbation (HCC)    Acute encephalopathy    Chronic ischemic multifocal multiple vascular territories stroke    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 ([address]    Cardiac arrhythmia    Alcohol abuse counseling and surveillance    Cellulitis    Right forearm cellulitis    Superficial thrombophlebitis of right upper extremity    Severe infection    Tobacco abuse counseling    Therapeutic drug monitoring    Complex care coordination    Stroke-like symptoms    H/O ischemic multifocal multiple vascular territories stroke    Depression    Unable to walk    Cerebrovascular accident (CVA) ([address]    Oropharyngeal dysphagia    HTN (hypertension), benign    Dyslipidemia    Encounter for electronic analysis of reveal event recorder    Acute on chronic diastolic heart failure ([address]    Physical deconditioning

## 2019-08-25 NOTE — PROGRESS NOTES
Message to Dr. Lucius Maurer: F From: Alok Slaughter RE: Denver Logan 1945 RM: [id number] Pt complaining of headache, can we please have an order for Tylenol?  Need Callback: NO CALLBACK REQ 5T ONCOLOGY

## 2019-08-25 NOTE — PROGRESS NOTES
[facility]    8/25/2019 10:34 AM        Name: Janett Bonilla . Admitted: 8/22/2019  Primary Care Provider: Lala Yeung MD (Tel: [phone])      Subjective: Patient complains of discomfort from cervical collar continues to have generalized  weakness no new complaints.      Tolerated physical therapy        Reviewed interval ancillary notes    Current Medications    apixaban (ELIQUIS) tablet 5 mg BID   vancomycin (VANCOCIN) 750 mg in dextrose 5 % 250 mL IVPB Q12H   sodium chloride flush 0.9 % injection 10 mL 2 times per day   sodium chloride flush 0.9 % injection 10 mL PRN   magnesium hydroxide (MILK OF MAGNESIA) 400 MG/5ML suspension 30 mL Daily PRN   ondansetron (ZOFRAN) injection 4 mg Q6H PRN   aspirin chewable tablet 81 mg Daily   atorvastatin (LIPITOR) tablet 80 mg Daily   calcium elemental (OSCAL) tablet 500 mg Daily   clonazePAM (KLONOPIN) tablet 0.25 mg Nightly PRN   ipratropium (ATROVENT) 0.02 % nebulizer solution 0.5 mg TID   labetalol (NORMODYNE) tablet 100 mg 2 times per day   levothyroxine (SYNTHROID) tablet 100 mcg Daily   therapeutic multivitamin-minerals 1 tablet Daily   oxybutynin (DITROPAN) tablet 5 mg BID   dexamethasone (PF) (DECADRON) injection 8 mg Once   mometasone-formoterol (DULERA) 200-5 MCG/ACT inhaler 2 puff BID   ciprofloxacin (CIPRO) IVPB 400 mg Q12H       Objective:  BP (!) 142/85   Pulse 89   Temp 97.5 °F (36.4 °C) (Oral)   Resp 16   Ht 5' 8\" (1.727 m)   Wt 132 lb 11.2 oz (60.2 kg)   SpO2 96%   BMI 20.18 kg/m²     Intake/Output Summary (Last 24 hours) at 8/25/2019 1034  Last data filed at 8/25/2019 0634  Gross per 24 hour   Intake --   Output 1300 ml   Net -1300 ml      Wt Readings from Last 3 Encounters:   08/23/19 132 lb 11.2 oz (60.2 kg)   08/13/19 129 lb 3.2 oz (58.6 kg)   08/06/19 136 lb (61.7 kg)       General appearance:  Appears comfortable  Eyes: compression versus myelomalacia. Otherwise moderate multilevel degenerate changes cervical spine. Thoracic spine: Moderate severe spondylosis involving the thoracic spine   without acute compression fracture or significant canal stenosis. .      Lumbar spine: Moderate severe degenerative changes lower lumbar spine notably   at L4-5 L5-S1. Evidence of severe central canal stenosis L4-5. Critical results were called by Dr. Tata Andrade to Dr. Julio Rosen on 8/22/2019 at   23:06. MRI CERVICAL SPINE WO CONTRAST   Final Result   Cervical spine: Severe canal stenosis with moderate size central disc   protrusion at C3-C4 causing mild increased cord signal worrisome for cord   compression versus myelomalacia. Otherwise moderate multilevel degenerate changes cervical spine. Thoracic spine: Moderate severe spondylosis involving the thoracic spine   without acute compression fracture or significant canal stenosis. .      Lumbar spine: Moderate severe degenerative changes lower lumbar spine notably   at L4-5 L5-S1. Evidence of severe central canal stenosis L4-5. Critical results were called by Dr. Tata Andrade to Dr. Julio Rosen on 8/22/2019 at   23:06. MRI BRAIN WO CONTRAST   Final Result   Moderate chronic small ischemic disease with scattered areas of chronic   infarct. No evidence acute infarct, midline shift or mass effect. Stable ventricular prominence may be related to generalized involutional   changes versus normal pressure hydrocephalus in appropriate clinical setting. CT Head WO Contrast   Final Result   No acute intracranial abnormality. XR CHEST STANDARD (2 VW)   Final Result   Unchanged left pleural effusion with adjacent atelectasis versus pneumonia,   unchanged appearance of the chest over the past 1 month.            Problem List  Active Problems:    Pleural effusion    H/O ischemic multifocal multiple vascular territories stroke    Unable to walk

## 2019-08-25 NOTE — PHYSICIAN ADVISORY
Short Stay Review       Pt Name:  Stephenie Be   MR#  [id number]   CSN#   [id number]   [address] [place] [address]  [id number]  @ [facility]   Hospitalization date  8/22/2019  9:26 AM     Current Attending Physician  Kamaljit Rosenbaum MD     A discharge order has been placed for this episode of hospital care for Ms. Stephenie Be; since this hospital stay is less than two midnights, I reviewed Ms. Maldonado [facility] chart. Ms. Joel Ness healthcare insurance/benefit include:  Payor: [address] [place] [address] / Plan: [address] [place] [address] / Product Type: *No Product type* /     Utilization Review related case summary:   Age  75 y.o.   BMI  Body mass index is 20.18 kg/m². PMHx includes  PMHx : CAD, HTN, DVT, Tobacco abuse, thyroid disease   Hospital course  MRSA >100,000 in urine culture. Hypotensive upon admission. Complicated UTI per infectious disease. Blood cultures negative to date. Inflammatory markers ordered but pending. Risk of deterioration at the time this patient  was hospitalized     Moderate            On the basis of chart review, this patient's hospitalization status      is appropriate for Mil Lee MD   8/24/2019 8:38 PM   [email or web address]  [phone]      CSN:   [id number]   ANITHA:    [unfilled]  Admitted on :   8/22/2019   Discharge order

## 2019-08-26 LAB
LV EF: 58 %
LVEF MODALITY: NORMAL

## 2019-08-26 PROCEDURE — 93312 ECHO TRANSESOPHAGEAL: CPT

## 2019-08-26 PROCEDURE — G0378 HOSPITAL OBSERVATION PER HR: HCPCS

## 2019-08-26 PROCEDURE — B246ZZ4 ULTRASONOGRAPHY OF RIGHT AND LEFT HEART, TRANSESOPHAGEAL: ICD-10-PCS | Performed by: INTERNAL MEDICINE

## 2019-08-26 PROCEDURE — 96366 THER/PROPH/DIAG IV INF ADDON: CPT

## 2019-08-26 PROCEDURE — 93325 DOPPLER ECHO COLOR FLOW MAPG: CPT

## 2019-08-26 PROCEDURE — 6370000000 HC RX 637 (ALT 250 FOR IP): Performed by: INTERNAL MEDICINE

## 2019-08-26 PROCEDURE — 93325 DOPPLER ECHO COLOR FLOW MAPG: CPT | Performed by: INTERNAL MEDICINE

## 2019-08-26 PROCEDURE — 94640 AIRWAY INHALATION TREATMENT: CPT

## 2019-08-26 PROCEDURE — 1200000000 HC SEMI PRIVATE

## 2019-08-26 PROCEDURE — 96368 THER/DIAG CONCURRENT INF: CPT

## 2019-08-26 PROCEDURE — 93312 ECHO TRANSESOPHAGEAL: CPT | Performed by: INTERNAL MEDICINE

## 2019-08-26 PROCEDURE — 2580000003 HC RX 258: Performed by: INTERNAL MEDICINE

## 2019-08-26 PROCEDURE — 94761 N-INVAS EAR/PLS OXIMETRY MLT: CPT

## 2019-08-26 PROCEDURE — 6360000002 HC RX W HCPCS: Performed by: INTERNAL MEDICINE

## 2019-08-26 PROCEDURE — 99152 MOD SED SAME PHYS/QHP 5/>YRS: CPT

## 2019-08-26 PROCEDURE — 99233 SBSQ HOSP IP/OBS HIGH 50: CPT | Performed by: INTERNAL MEDICINE

## 2019-08-26 RX ORDER — DOXYCYCLINE HYCLATE 100 MG
100 TABLET ORAL EVERY 12 HOURS SCHEDULED
Qty: 20 TABLET | Refills: 0 | Status: SHIPPED | OUTPATIENT
Start: 2019-08-26 | End: 2019-09-05

## 2019-08-26 RX ORDER — DOXYCYCLINE HYCLATE 100 MG
100 TABLET ORAL EVERY 12 HOURS SCHEDULED
Status: DISCONTINUED | OUTPATIENT
Start: 2019-08-26 | End: 2019-08-27 | Stop reason: HOSPADM

## 2019-08-26 RX ADMIN — Medication 2 PUFF: at 21:04

## 2019-08-26 RX ADMIN — Medication 10 ML: at 09:38

## 2019-08-26 RX ADMIN — CIPROFLOXACIN 400 MG: 2 INJECTION, SOLUTION INTRAVENOUS at 01:03

## 2019-08-26 RX ADMIN — VANCOMYCIN HYDROCHLORIDE 750 MG: 750 INJECTION, POWDER, LYOPHILIZED, FOR SOLUTION INTRAVENOUS at 13:33

## 2019-08-26 RX ADMIN — Medication 2 PUFF: at 07:38

## 2019-08-26 RX ADMIN — LABETALOL HYDROCHLORIDE 100 MG: 200 TABLET, FILM COATED ORAL at 21:10

## 2019-08-26 RX ADMIN — VANCOMYCIN HYDROCHLORIDE 750 MG: 750 INJECTION, POWDER, LYOPHILIZED, FOR SOLUTION INTRAVENOUS at 01:02

## 2019-08-26 RX ADMIN — ATORVASTATIN CALCIUM 80 MG: 80 TABLET, FILM COATED ORAL at 09:38

## 2019-08-26 RX ADMIN — CLONAZEPAM 0.25 MG: 0.5 TABLET ORAL at 21:10

## 2019-08-26 RX ADMIN — Medication 10 ML: at 21:10

## 2019-08-26 RX ADMIN — DOXYCYCLINE HYCLATE 100 MG: 100 TABLET, COATED ORAL at 21:10

## 2019-08-26 RX ADMIN — LABETALOL HYDROCHLORIDE 100 MG: 200 TABLET, FILM COATED ORAL at 09:38

## 2019-08-26 RX ADMIN — OXYBUTYNIN CHLORIDE 5 MG: 5 TABLET ORAL at 09:38

## 2019-08-26 RX ADMIN — ACETAMINOPHEN 650 MG: 325 TABLET, FILM COATED ORAL at 09:42

## 2019-08-26 RX ADMIN — CALCIUM 500 MG: 500 TABLET ORAL at 09:37

## 2019-08-26 RX ADMIN — IPRATROPIUM BROMIDE 0.5 MG: 0.5 SOLUTION RESPIRATORY (INHALATION) at 21:03

## 2019-08-26 RX ADMIN — MULTIPLE VITAMINS W/ MINERALS TAB 1 TABLET: TAB at 09:37

## 2019-08-26 RX ADMIN — APIXABAN 5 MG: 5 TABLET, FILM COATED ORAL at 21:10

## 2019-08-26 RX ADMIN — APIXABAN 5 MG: 5 TABLET, FILM COATED ORAL at 09:38

## 2019-08-26 RX ADMIN — OXYBUTYNIN CHLORIDE 5 MG: 5 TABLET ORAL at 21:10

## 2019-08-26 RX ADMIN — IPRATROPIUM BROMIDE 0.5 MG: 0.5 SOLUTION RESPIRATORY (INHALATION) at 13:37

## 2019-08-26 RX ADMIN — ASPIRIN 81 MG 81 MG: 81 TABLET ORAL at 09:37

## 2019-08-26 RX ADMIN — IPRATROPIUM BROMIDE 0.5 MG: 0.5 SOLUTION RESPIRATORY (INHALATION) at 07:37

## 2019-08-26 ASSESSMENT — ENCOUNTER SYMPTOMS
PHOTOPHOBIA: 0
CHEST TIGHTNESS: 0
EYE DISCHARGE: 0
EYE REDNESS: 0
BLOOD IN STOOL: 0
APNEA: 0
COUGH: 0
COLOR CHANGE: 0
TROUBLE SWALLOWING: 0
STRIDOR: 0
DIARRHEA: 0
FACIAL SWELLING: 0
NAUSEA: 0
CHOKING: 0
RHINORRHEA: 0
ABDOMINAL PAIN: 0
SHORTNESS OF BREATH: 0

## 2019-08-26 ASSESSMENT — PAIN SCALES - GENERAL
PAINLEVEL_OUTOF10: 9
PAINLEVEL_OUTOF10: 0
PAINLEVEL_OUTOF10: 5

## 2019-08-26 NOTE — PROGRESS NOTES
of 15-20. · We will stop ciprofloxacin  · RANDALL negative and blood cultures from admission remain negative, will plan to switch her to oral Doxycycline 100 mg q 12 hr for 10 days  · Contact isolation for MRSA  · Maintain good glycemic control  · DVT prophylaxis  · Discussed all above with patient and RN      Drug Monitoring:    · Continue monitoring for antibiotic toxicity as follows: Vanco trough, CMP  · Continue to watch for following: new or worsening fever, new hypotension, hives, lip swelling and redness or purulence at vascular access sites. I/v access Management:    · Continue to monitor i.v access sites for erythema, induration, discharge or tenderness. · As always, continue efforts to minimize tubes/lines/drains as clinically appropriate to reduce chances of line associated infections. Patient education and counseling:        · The patient was educated in detail about the side-effects of various antibiotics and things to watch for like new rashes, lip swelling, severe reaction, worsening diarrhea, break through fever etc.  · Discussed patient's condition and what to expect. All of the patient's questions were addressed in a satisfactory manner and patient verbalized understanding all instructions. Doxycyline/minocycline related instructions: Take Doxycyline/minocycline with a full glass of water and stay upright or sitting up after taking it for 30 minutes as it can cause food pipe irritation (pill esophagitis). Use sunscreen when going in bright sun while on Doxycycline/minocycline as it can cause photosensitivity. Take 1 hour before or 2 hour after dairy, calcium, iron, magnesium, aluminum or zinc.    TIME SPENT TODAY:     - Spent over  35  minutes on visit (including interval history, physical exam, review of data including labs, cultures, imaging, development and implementation of treatment plan and coordination of complex care).   - Over 50% of time spent with patient face to face on extremity ([facility]     Hypercholesteremia     Hypertension     Hyperthyroidism     Mammogram abnormal 2/7/2012    Neuromuscular disorder ([facility] [address] [facility]     Pneumonia     Thyroid disease     Tobacco abuse        Past Surgical History: All past surgical history was reviewed today. Past Surgical History:   Procedure Laterality Date    COLONOSCOPY  1/19/2012    Dr. Conor Samayoa; repeat 5 years    EYE SURGERY      cateracts removed    SHOULDER ARTHROSCOPY Right 6/18/14    SUBACROMIAL DECOMPRESSION, ROTATOR CUFF REPAIR    TOE SURGERY Right     TONSILLECTOMY           Immunization History: All immunization history was reviewed by me today. Immunization History   Administered Date(s) Administered    Influenza Virus Vaccine 11/04/2015    Influenza, High Dose (Fluzone 65 yrs and older) 10/16/2012, 10/27/2014, 11/28/2016, 10/16/2017, 12/01/2018    Pneumococcal Conjugate 13-valent ([id number]) 11/11/2015    Pneumococcal Conjugate 7-valent (Prevnar7) 11/01/2011    Pneumococcal Polysaccharide ([id number]) 09/16/2011    Tdap (Boostrix, Adacel) 05/02/2013, 04/04/2014       Family History: All family history was reviewed today. Problem Relation Age of Onset    Heart Disease Father         MI @ 64    Alcohol Abuse Father        Objective:       PHYSICAL EXAM:      Vitals:   Vitals:    08/26/19 0742 08/26/19 0936 08/26/19 0938 08/26/19 1146   BP:  (!) 191/109 (!) 176/115    Pulse:  81 80 79   Resp:  16     Temp:  98.8 °F (37.1 °C)     TempSrc:  Oral     SpO2: 94% 96%     Weight:       Height:           Physical Exam   Constitutional: She is oriented to person, place, and time. She appears well-developed. No distress. HENT:   Head: Normocephalic. Right Ear: External ear normal.   Left Ear: External ear normal.   Nose: Nose normal.   Mouth/Throat: Oropharynx is clear and moist.   Eyes: Pupils are equal, round, and reactive to light. Conjunctivae are normal. Right eye exhibits no discharge.  Left eye exhibits no notably   at L4-5 L5-S1. Evidence of severe central canal stenosis L4-5. Critical results were called by Dr. Amadou Will to Dr. Jackie Campos on 8/22/2019 at   23:06. MRI BRAIN WO CONTRAST   Final Result   Moderate chronic small ischemic disease with scattered areas of chronic   infarct. No evidence acute infarct, midline shift or mass effect. Stable ventricular prominence may be related to generalized involutional   changes versus normal pressure hydrocephalus in appropriate clinical setting. CT Head WO Contrast   Final Result   No acute intracranial abnormality. XR CHEST STANDARD (2 VW)   Final Result   Unchanged left pleural effusion with adjacent atelectasis versus pneumonia,   unchanged appearance of the chest over the past 1 month. Medications: All current and past medications were reviewed.      apixaban  5 mg Oral BID    vancomycin  750 mg Intravenous Q12H    sodium chloride flush  10 mL Intravenous 2 times per day    aspirin  81 mg Oral Daily    atorvastatin  80 mg Oral Daily    calcium elemental  500 mg Oral Daily    ipratropium  0.5 mg Nebulization TID    labetalol  100 mg Oral 2 times per day    levothyroxine  100 mcg Oral Daily    therapeutic multivitamin-minerals  1 tablet Oral Daily    oxybutynin  5 mg Oral BID    dexamethasone  8 mg Intravenous Once    mometasone-formoterol  2 puff Inhalation BID    ciprofloxacin  400 mg Intravenous Q12H           acetaminophen, sodium chloride flush, magnesium hydroxide, ondansetron, clonazePAM      Problem list:       Patient Active Problem List   Diagnosis Code    Incontinence R32    Hypothyroidism E03.9    Smoker F17.200    Anxiety F41.9    History of alcoholism ([facility]) F10.21    Hyperlipidemia E78.5    DIMAS (dyspnea on exertion) R06.09    Acute DVT (deep venous thrombosis) ([facility]) I82.409    DVT (deep venous thrombosis) ([facility]) I82.409    COPD exacerbation ([facility]) J44.1    Fatigue R53.83    Pleural transcription may have occurred inadvertently. If you may need any clarification, please do not hesitate to contact me through EPIC or at the phone number provided below with my electronic signature. Any pictures or media included in this note were obtained after taking informed verbal consent from the patient and with their approval to include those in the patient's medical record.     Elba Moe MD, MPH  8/23/19, 11:34 AM   [facility] Infectious Disease   Office: [phone]  Fax: [phone]  Tuesday AM clinic:   [address] [place] [address] 120  Thursday AM [id number] [facility]

## 2019-08-26 NOTE — PROGRESS NOTES
Physical Therapy  Janett Bonilla  Hold PT this afternoon. Pt recently returned from procedure. Will follow up at later date.    Thanks, Oscar Jean, [facility]

## 2019-08-26 NOTE — PROGRESS NOTES
[facility] PROGRESS NOTE    8/26/2019 3:31 PM        Name: Ariel Ball . Admitted: 8/22/2019  Primary Care Provider: Iwona Ring MD (Tel: None)    Brief Course:        CC: UTI    Subjective:  .     Status post RANDALL,  at bedside, patient denies any complaint at this time, mention has not walked since admitted    Reviewed interval ancillary notes    Current Medications    doxycycline hyclate (VIBRA-TABS) tablet 100 mg 2 times per day   acetaminophen (TYLENOL) tablet 650 mg Q6H PRN   apixaban (ELIQUIS) tablet 5 mg BID   sodium chloride flush 0.9 % injection 10 mL 2 times per day   sodium chloride flush 0.9 % injection 10 mL PRN   magnesium hydroxide (MILK OF MAGNESIA) 400 MG/5ML suspension 30 mL Daily PRN   ondansetron (ZOFRAN) injection 4 mg Q6H PRN   aspirin chewable tablet 81 mg Daily   atorvastatin (LIPITOR) tablet 80 mg Daily   calcium elemental (OSCAL) tablet 500 mg Daily   clonazePAM (KLONOPIN) tablet 0.25 mg Nightly PRN   ipratropium (ATROVENT) 0.02 % nebulizer solution 0.5 mg TID   labetalol (NORMODYNE) tablet 100 mg 2 times per day   levothyroxine (SYNTHROID) tablet 100 mcg Daily   therapeutic multivitamin-minerals 1 tablet Daily   oxybutynin (DITROPAN) tablet 5 mg BID   dexamethasone (PF) (DECADRON) injection 8 mg Once   mometasone-formoterol (DULERA) 200-5 MCG/ACT inhaler 2 puff BID       Objective:  /83   Pulse 71   Temp 98.1 °F (36.7 °C) (Oral)   Resp 16   Ht 5' 8\" (1.727 m)   Wt 126 lb 14.4 oz (57.6 kg)   SpO2 94%   BMI 19.30 kg/m²     Intake/Output Summary (Last 24 hours) at 8/26/2019 1531  Last data filed at 8/26/2019 0444  Gross per 24 hour   Intake 231 ml   Output 2100 ml   Net -1869 ml    Wt Readings from Last 3 Encounters:   08/26/19 126 lb 14.4 oz (57.6 kg)   08/13/19 129 lb 3.2 oz (58.6 kg)   08/06/19 136 lb (61.7 kg)       General appearance:  Appears comfortable  Eyes: Sclera clear. Pupils equal.  ENT: Moist oral mucosa. Trachea midline, no adenopathy. Cardiovascular: Regular rhythm, normal S1, S2. No murmur. No edema in lower extremities  Respiratory: Not using accessory muscles. Good inspiratory effort. Clear to auscultation bilaterally, no wheeze or crackles. GI: Abdomen soft, no tenderness, not distended, normal bowel sounds  Musculoskeletal: No cyanosis in digits, neck supple  Neurology: CN 2-12 grossly intact. No speech or motor deficits  Psych: Normal affect. Alert and oriented in time, place and person  Skin: Warm, dry, normal turgor  Extremity exam shows brisk capillary refill. Peripheral pulses are palpable in lower extremities     Labs and Tests:  CBC: No results for input(s): WBC, HGB, PLT in the last 72 hours. BMP:  No results for input(s): NA, K, CL, CO2, BUN, CREATININE, GLUCOSE in the last 72 hours. Hepatic: No results for input(s): AST, ALT, ALB, BILITOT, ALKPHOS in the last 72 hours. US RENAL COMPLETE   Final Result   1. No acute abnormality. MRI LUMBAR SPINE WO CONTRAST   Final Result   Cervical spine: Severe canal stenosis with moderate size central disc   protrusion at C3-C4 causing mild increased cord signal worrisome for cord   compression versus myelomalacia. Otherwise moderate multilevel degenerate changes cervical spine. Thoracic spine: Moderate severe spondylosis involving the thoracic spine   without acute compression fracture or significant canal stenosis. .      Lumbar spine: Moderate severe degenerative changes lower lumbar spine notably   at L4-5 L5-S1. Evidence of severe central canal stenosis L4-5. Critical results were called by Dr. Ricci Garcia to Dr. Carlton Nuno on 8/22/2019 at   23:06.          MRI THORACIC SPINE WO CONTRAST   Final Result   Cervical spine: Severe canal stenosis with moderate size central disc   protrusion at C3-C4 causing mild increased cord signal worrisome for cord   compression versus myelomalacia. Otherwise moderate multilevel degenerate changes cervical spine. Thoracic spine: Moderate severe spondylosis involving the thoracic spine   without acute compression fracture or significant canal stenosis. .      Lumbar spine: Moderate severe degenerative changes lower lumbar spine notably   at L4-5 L5-S1. Evidence of severe central canal stenosis L4-5. Critical results were called by Dr. Leeann Turner to Dr. Ian aDvis on 8/22/2019 at   23:06. MRI CERVICAL SPINE WO CONTRAST   Final Result   Cervical spine: Severe canal stenosis with moderate size central disc   protrusion at C3-C4 causing mild increased cord signal worrisome for cord   compression versus myelomalacia. Otherwise moderate multilevel degenerate changes cervical spine. Thoracic spine: Moderate severe spondylosis involving the thoracic spine   without acute compression fracture or significant canal stenosis. .      Lumbar spine: Moderate severe degenerative changes lower lumbar spine notably   at L4-5 L5-S1. Evidence of severe central canal stenosis L4-5. Critical results were called by Dr. Leeann Turner to Dr. Ian Davis on 8/22/2019 at   23:06. MRI BRAIN WO CONTRAST   Final Result   Moderate chronic small ischemic disease with scattered areas of chronic   infarct. No evidence acute infarct, midline shift or mass effect. Stable ventricular prominence may be related to generalized involutional   changes versus normal pressure hydrocephalus in appropriate clinical setting. CT Head WO Contrast   Final Result   No acute intracranial abnormality. XR CHEST STANDARD (2 VW)   Final Result   Unchanged left pleural effusion with adjacent atelectasis versus pneumonia,   unchanged appearance of the chest over the past 1 month.              Discussed care with family      Problem List  Active Problems:    Pleural effusion    H/O ischemic multifocal multiple vascular territories stroke

## 2019-08-26 NOTE — OP NOTE
Patient:  Srikanth Gamino   :   1945    A pre-sedation re-evaluation was performed immediately prior to beginning of  the procedure. Procedure:RANDALL with conscious sedation given by me  Medications: versed and fentanyl  Complications: None  Estimated Blood Loss: none  Specimens: Were not obtained        Sergio Medication and Procedural Reconciliation:  I agree that the documented medications and procedures performed are true. The medications were given under my order. The procedures were performed under my direct supervision. RANDALL with no evidence for a PFO,no vegetation.  Aortic wall with mild to moderate atheroma

## 2019-08-26 NOTE — PROGRESS NOTES
Patient has returned to unit in stable condition. Vitals stable. Patient is awake and alert. Respirations are easy and unlabored. Patient does not appear to be in distress. Call light within reach.

## 2019-08-27 VITALS
DIASTOLIC BLOOD PRESSURE: 87 MMHG | SYSTOLIC BLOOD PRESSURE: 145 MMHG | HEIGHT: 68 IN | TEMPERATURE: 98.4 F | OXYGEN SATURATION: 96 % | BODY MASS INDEX: 18.97 KG/M2 | WEIGHT: 125.2 LBS | RESPIRATION RATE: 18 BRPM | HEART RATE: 74 BPM

## 2019-08-27 LAB
BLOOD CULTURE, ROUTINE: NORMAL
CULTURE, BLOOD 2: NORMAL

## 2019-08-27 PROCEDURE — G0378 HOSPITAL OBSERVATION PER HR: HCPCS

## 2019-08-27 PROCEDURE — 6370000000 HC RX 637 (ALT 250 FOR IP): Performed by: INTERNAL MEDICINE

## 2019-08-27 PROCEDURE — 94640 AIRWAY INHALATION TREATMENT: CPT

## 2019-08-27 PROCEDURE — 97116 GAIT TRAINING THERAPY: CPT

## 2019-08-27 PROCEDURE — 96375 TX/PRO/DX INJ NEW DRUG ADDON: CPT

## 2019-08-27 PROCEDURE — 7100000010 HC PHASE II RECOVERY - FIRST 15 MIN

## 2019-08-27 PROCEDURE — 96366 THER/PROPH/DIAG IV INF ADDON: CPT

## 2019-08-27 PROCEDURE — 97110 THERAPEUTIC EXERCISES: CPT

## 2019-08-27 PROCEDURE — 6360000002 HC RX W HCPCS: Performed by: INTERNAL MEDICINE

## 2019-08-27 PROCEDURE — 2580000003 HC RX 258: Performed by: INTERNAL MEDICINE

## 2019-08-27 PROCEDURE — 99232 SBSQ HOSP IP/OBS MODERATE 35: CPT | Performed by: INTERNAL MEDICINE

## 2019-08-27 RX ORDER — CLONIDINE HYDROCHLORIDE 0.1 MG/1
0.2 TABLET ORAL ONCE
Status: COMPLETED | OUTPATIENT
Start: 2019-08-27 | End: 2019-08-27

## 2019-08-27 RX ORDER — CLONIDINE HYDROCHLORIDE 0.1 MG/1
0.1 TABLET ORAL 3 TIMES DAILY
Status: DISCONTINUED | OUTPATIENT
Start: 2019-08-27 | End: 2019-08-27 | Stop reason: HOSPADM

## 2019-08-27 RX ADMIN — CLONIDINE HYDROCHLORIDE 0.2 MG: 0.1 TABLET ORAL at 10:14

## 2019-08-27 RX ADMIN — Medication 10 ML: at 07:38

## 2019-08-27 RX ADMIN — CALCIUM 500 MG: 500 TABLET ORAL at 08:54

## 2019-08-27 RX ADMIN — ASPIRIN 81 MG 81 MG: 81 TABLET ORAL at 08:54

## 2019-08-27 RX ADMIN — ATORVASTATIN CALCIUM 80 MG: 80 TABLET, FILM COATED ORAL at 08:54

## 2019-08-27 RX ADMIN — IPRATROPIUM BROMIDE 0.5 MG: 0.5 SOLUTION RESPIRATORY (INHALATION) at 07:46

## 2019-08-27 RX ADMIN — MULTIPLE VITAMINS W/ MINERALS TAB 1 TABLET: TAB at 08:54

## 2019-08-27 RX ADMIN — Medication 2 PUFF: at 07:46

## 2019-08-27 RX ADMIN — DOXYCYCLINE HYCLATE 100 MG: 100 TABLET, COATED ORAL at 08:54

## 2019-08-27 RX ADMIN — ONDANSETRON 4 MG: 2 INJECTION INTRAMUSCULAR; INTRAVENOUS at 07:38

## 2019-08-27 RX ADMIN — LABETALOL HYDROCHLORIDE 100 MG: 200 TABLET, FILM COATED ORAL at 07:38

## 2019-08-27 RX ADMIN — OXYBUTYNIN CHLORIDE 5 MG: 5 TABLET ORAL at 08:54

## 2019-08-27 RX ADMIN — APIXABAN 5 MG: 5 TABLET, FILM COATED ORAL at 08:54

## 2019-08-27 RX ADMIN — LEVOTHYROXINE SODIUM 100 MCG: 100 TABLET ORAL at 06:23

## 2019-08-27 ASSESSMENT — ENCOUNTER SYMPTOMS
CHEST TIGHTNESS: 0
BLOOD IN STOOL: 0
SHORTNESS OF BREATH: 0
STRIDOR: 0
CHOKING: 0
APNEA: 0
RHINORRHEA: 0
FACIAL SWELLING: 0
PHOTOPHOBIA: 0
ABDOMINAL PAIN: 0
DIARRHEA: 0
COLOR CHANGE: 0
EYE DISCHARGE: 0
TROUBLE SWALLOWING: 0
NAUSEA: 0
EYE REDNESS: 0
COUGH: 0

## 2019-08-27 ASSESSMENT — PAIN SCALES - GENERAL
PAINLEVEL_OUTOF10: 0

## 2019-08-27 NOTE — PROGRESS NOTES
term goals: discharge  Short term goal 1: pt will complete bed mobility MOD I  Short term goal 2: pt will complete sit to/from stand MOD I  Short term goal 3: pt will amb 80' with RW MOD I  Long term goals  Time Frame for Long term goals : LTG=STG  ** no goals met this date    Plan    Plan  Times per week: 3-5  Times per day: Daily  Current Treatment Recommendations: Strengthening, Transfer Training, Endurance Training, Neuromuscular Re-education, ROM, Pain Management, Balance Training, Gait Training  Safety Devices  Type of devices: All fall risk precautions in place, Call light within reach, Nurse notified, Positioning belt, Chair alarm in place, Patient at risk for falls, Left in chair, Gait belt  Restraints  Initially in place: No     Therapy Time   Individual Concurrent Group Co-treatment   Time In 1340         Time Out 1413         Minutes 33         Timed Code Treatment Minutes: [facility], [address], [address]  PT present/supervised treatment. Agrees with above note. Pt discharge home on 8/27/19. No goals met prior to discharge.    Gaby Xavier, BUTCHT [id number]

## 2019-08-27 NOTE — DISCHARGE SUMMARY
[facility]    Patient Demographics    Patient. Yrn Sherwood  Date of Birth. 1945  MRN. [id number]     Primary care provider. Raquel Marcano MD  (Tel: None)    Admit date: 8/22/2019    Discharge date (blank if same as Note Date): Note Date: 8/27/2019     Reason for Hospitalization. Chief Complaint   Patient presents with    Fatigue     presents by squad. c/o weakness x3 days. states she is unable to get oob without assistance. lives with daughter and granddaughter. no pain. no sob. Significant Findings. Active Problems:    Pleural effusion    H/O ischemic multifocal multiple vascular territories stroke    Unable to walk    Generalized weakness    Complicated UTI (urinary tract infection)    Ataxia    Cord compression (HCC)    MRSA infection    Weakness  Resolved Problems:    * No resolved hospital problems. *       Problems and results from this hospitalization that need follow up. 1. MRSA UTI  2. Significant test results and incidental findings. 1.   US RENAL COMPLETE   Final Result   1. No acute abnormality. MRI LUMBAR SPINE WO CONTRAST   Final Result   Cervical spine: Severe canal stenosis with moderate size central disc   protrusion at C3-C4 causing mild increased cord signal worrisome for cord   compression versus myelomalacia. Otherwise moderate multilevel degenerate changes cervical spine. Thoracic spine: Moderate severe spondylosis involving the thoracic spine   without acute compression fracture or significant canal stenosis. .      Lumbar spine: Moderate severe degenerative changes lower lumbar spine notably   at L4-5 L5-S1. Evidence of severe central canal stenosis L4-5. Critical results were called by Dr. Chetna Nunez to Dr. Ramone Riggins on 8/22/2019 at   23:06.          MRI THORACIC SPINE WO CONTRAST   Final Result   Cervical spine: Severe canal stenosis with moderate size central disc   protrusion at C3-C4 causing mild increased cord signal worrisome for cord   compression versus myelomalacia. Otherwise moderate multilevel degenerate changes cervical spine. Thoracic spine: Moderate severe spondylosis involving the thoracic spine   without acute compression fracture or significant canal stenosis. .      Lumbar spine: Moderate severe degenerative changes lower lumbar spine notably   at L4-5 L5-S1. Evidence of severe central canal stenosis L4-5. Critical results were called by Dr. Jaimie Huddleston to Dr. Parminder Samaniego on 8/22/2019 at   23:06. MRI CERVICAL SPINE WO CONTRAST   Final Result   Cervical spine: Severe canal stenosis with moderate size central disc   protrusion at C3-C4 causing mild increased cord signal worrisome for cord   compression versus myelomalacia. Otherwise moderate multilevel degenerate changes cervical spine. Thoracic spine: Moderate severe spondylosis involving the thoracic spine   without acute compression fracture or significant canal stenosis. .      Lumbar spine: Moderate severe degenerative changes lower lumbar spine notably   at L4-5 L5-S1. Evidence of severe central canal stenosis L4-5. Critical results were called by Dr. Jaimie Huddleston to Dr. Parminder Samaniego on 8/22/2019 at   23:06. MRI BRAIN WO CONTRAST   Final Result   Moderate chronic small ischemic disease with scattered areas of chronic   infarct. No evidence acute infarct, midline shift or mass effect. Stable ventricular prominence may be related to generalized involutional   changes versus normal pressure hydrocephalus in appropriate clinical setting. CT Head WO Contrast   Final Result   No acute intracranial abnormality. XR CHEST STANDARD (2 VW)   Final Result   Unchanged left pleural effusion with adjacent atelectasis versus pneumonia,   unchanged appearance of the chest over the past 1 month. Invasive procedures and treatments.    Mandeep Tony Course. Urinary tract infection secondary to staph aureus  ID input noticed, RANDALL negative for vegetation  Antibiotics doxycycline as per ID recommendations on discharge     Debility PT OT consult place     Severe cervical spinal stenosis  Neurosurgery input obtained during the stay outpatient cervical collar, her symptoms resolved. If she has recurrence will need neurosurgical follow-up     History of paroxysmal atrial fibrillation  Eliquis   Consults. IP CONSULT TO HOSPITALIST  IP CONSULT TO SOCIAL WORK  IP CONSULT TO NEUROLOGY  IP CONSULT TO INFECTIOUS DISEASES  IP CONSULT TO NEUROSURGERY  IP CONSULT TO CARDIOLOGY  IP CONSULT TO HOME CARE NEEDS    Physical examination on discharge day. BP (!) 145/87   Pulse 74   Temp 98.4 °F (36.9 °C) (Oral)   Resp 18   Ht 5' 8\" (1.727 m)   Wt 125 lb 3.2 oz (56.8 kg)   SpO2 96%   BMI 19.04 kg/m²   General appearance. Alert. Looks comfortable. HEENT. Sclera clear. Moist mucus membranes. Cardiovascular. Regular rate and rhythm, normal S1, S2. No murmur. Respiratory. Not using accessory muscles. Clear to auscultation bilaterally, no wheeze. Gastrointestinal. Abdomen soft, non-tender, not distended, normal bowel sounds  Neurology. Facial symmetry. No speech deficits. Moving all extremities equally. Extremities. No edema in lower extremities. Skin. Warm, dry, normal turgor        Condition at time of discharge stable    Medication instructions provided to patient at discharge.      Medication List      START taking these medications    doxycycline hyclate 100 MG tablet  Commonly known as:  VIBRA-TABS  Take 1 tablet by mouth every 12 hours for 10 days  Notes to patient:  Antibiotic to treat staphylococcus Aureus in blood   Side effects: Nausea, rash, diarrhea        CONTINUE taking these medications    aspirin 81 MG chewable tablet  CHEW ONE TABLET BY MOUTH DAILY     atorvastatin 80 MG tablet  Commonly known as:  LIPITOR  Take 1 tablet by mouth daily

## 2019-08-27 NOTE — PROGRESS NOTES
Infectious Diseases   Progress Note      Admission Date: 8/22/2019  Hospital Day: Hospital Day: 6 .cur  Attending: Letty Stephens MD  Date of service: 8/23/19     Chief complaint/ Reason for consult: The patient was seen today for the following:    · Complicated UTI  · Generalized weakness  · Recurrent urinary tract infection  · Chronic kidney disease stage III  · Left pleural effusion  · Pacemaker in place    Microbiology:        I have reviewed all available micro lab data and cultures    · Blood culture (2/2) - collected on 8/22/2019: Negative  · Urine culture  - collected on 8/22/2019: greater than 100,000 CFU per mL of MRSA    Staphylococcus aureus (1)     Antibiotic Interpretation AMANDA Status    ciprofloxacin Resistant >=8 mcg/mL     nitrofurantoin Sensitive <=16 mcg/mL     oxacillin Resistant >=4 mcg/mL     tetracycline Sensitive 2 mcg/mL     trimethoprim-sulfamethoxazole Sensitive <=10 mcg/mL     vancomycin Sensitive <=0.5 mcg/mL           Antibiotics and immunizations:       Current antibiotics: All antibiotics and their doses were reviewed by me    Recent Abx Admin                   doxycycline hyclate (VIBRA-TABS) tablet 100 mg (mg) 100 mg Given 08/27/19 0854     100 mg Given 08/26/19 2110                  Immunization History: All immunization history was reviewed by me today. Immunization History   Administered Date(s) Administered    Influenza Virus Vaccine 11/04/2015    Influenza, High Dose (Fluzone 65 yrs and older) 10/16/2012, 10/27/2014, 11/28/2016, 10/16/2017, 12/01/2018    Pneumococcal Conjugate 13-valent ([id number]) 11/11/2015    Pneumococcal Conjugate 7-valent (Prevnar7) 11/01/2011    Pneumococcal Polysaccharide ([id number]) 09/16/2011    Tdap (Boostrix, Adacel) 05/02/2013, 04/04/2014       Known drug allergies:      All allergies were reviewed and updated    Allergies   Allergen Reactions    Lipitor [Atorvastatin] Other (See Comments)     Weakness, severe    Morphine questions, please do not hesitate to contact me. Subjective: Interval history: Patient was seen and examined at bedside. Interval history was obtained. The patient is afebrile. She is on doxycycline. Tolerating it okay. No diarrhea. REVIEW OF SYSTEMS:      Review of Systems   Constitutional: Negative for chills, diaphoresis and unexpected weight change. HENT: Negative for congestion, ear discharge, ear pain, facial swelling, hearing loss, rhinorrhea and trouble swallowing. Eyes: Negative for photophobia, discharge, redness and visual disturbance. Respiratory: Negative for apnea, cough, choking, chest tightness, shortness of breath and stridor. Cardiovascular: Negative for chest pain and palpitations. Gastrointestinal: Negative for abdominal pain, blood in stool, diarrhea and nausea. Endocrine: Negative for polydipsia, polyphagia and polyuria. Genitourinary: Negative for difficulty urinating, dysuria, frequency, hematuria, menstrual problem and vaginal discharge. Musculoskeletal: Negative for arthralgias, joint swelling, myalgias and neck stiffness. Skin: Negative for color change and rash. Allergic/Immunologic: Negative for immunocompromised state. Neurological: Negative for dizziness, seizures, speech difficulty, light-headedness and headaches. Hematological: Negative for adenopathy. Psychiatric/Behavioral: Negative for agitation, hallucinations and suicidal ideas. Past Medical History: All past medical history reviewed today.     Past Medical History:   Diagnosis Date    Alcohol abuse     Anxiety     CAD in native artery     Cerebral artery occlusion with cerebral infarction (HCC)     states hx of multiple mini strokes    COPD (chronic obstructive pulmonary disease) (HCC)     Depression     DVT, lower extremity ([facility])     Hypercholesteremia     Hypertension     Hyperthyroidism     Mammogram abnormal 2/7/2012    Neuromuscular disorder ([facility])     Pneumonia protrusion at C3-C4 causing mild increased cord signal worrisome for cord   compression versus myelomalacia. Otherwise moderate multilevel degenerate changes cervical spine. Thoracic spine: Moderate severe spondylosis involving the thoracic spine   without acute compression fracture or significant canal stenosis. .      Lumbar spine: Moderate severe degenerative changes lower lumbar spine notably   at L4-5 L5-S1. Evidence of severe central canal stenosis L4-5. Critical results were called by Dr. Crys Walton to Dr. Marco A Norris on 8/22/2019 at   23:06. MRI THORACIC SPINE WO CONTRAST   Final Result   Cervical spine: Severe canal stenosis with moderate size central disc   protrusion at C3-C4 causing mild increased cord signal worrisome for cord   compression versus myelomalacia. Otherwise moderate multilevel degenerate changes cervical spine. Thoracic spine: Moderate severe spondylosis involving the thoracic spine   without acute compression fracture or significant canal stenosis. .      Lumbar spine: Moderate severe degenerative changes lower lumbar spine notably   at L4-5 L5-S1. Evidence of severe central canal stenosis L4-5. Critical results were called by Dr. Crys Walton to Dr. Marco A Norris on 8/22/2019 at   23:06. MRI CERVICAL SPINE WO CONTRAST   Final Result   Cervical spine: Severe canal stenosis with moderate size central disc   protrusion at C3-C4 causing mild increased cord signal worrisome for cord   compression versus myelomalacia. Otherwise moderate multilevel degenerate changes cervical spine. Thoracic spine: Moderate severe spondylosis involving the thoracic spine   without acute compression fracture or significant canal stenosis. .      Lumbar spine: Moderate severe degenerative changes lower lumbar spine notably   at L4-5 L5-S1. Evidence of severe central canal stenosis L4-5.       Critical results were called by Dr. Crys Watlon to Dr. Marco A Norris on 8/22/2019 at J44.9    Encephalopathy G93.40    CAD in native artery I25.10    Osteopenia M85.80    Trapped lung J98.19    COPD exacerbation ([facility]) J44.1    Acute encephalopathy G93.40    Chronic ischemic multifocal multiple vascular territories stroke I69.30    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 ([facility]) I63.40    Cardiac arrhythmia I49.9    Alcohol abuse counseling and surveillance Z71.41    Cellulitis L03.90    Right forearm cellulitis L03. 113    Superficial thrombophlebitis of right upper extremity I80.8    Severe infection B99.9    Tobacco abuse counseling Z71.6    Therapeutic drug monitoring Z51.81    Complex care coordination Z71.89    Stroke-like symptoms R29.90    H/O ischemic multifocal multiple vascular territories stroke Z80.78    Depression F32.9    Unable to walk R26.2    Cerebrovascular accident (CVA) ([facility] [address] I63.9    Oropharyngeal dysphagia R13.12    HTN (hypertension), benign I10    Dyslipidemia E78.5    Encounter for electronic analysis of reveal event recorder Z45.09    Acute on chronic diastolic heart failure ([facility]) I50.33    Physical deconditioning R53.81    PAF (paroxysmal atrial fibrillation) ([facility]) I48.0    Vascular dementia, uncomplicated A70.96    Other insomnia G47.09    Transient alteration of awareness R40.4    Hyponatremia E87.1    Generalized weakness R53.1    Urinary tract infection in female N39.0    Recurrent UTI N39.0    Pacemaker Z95.0    Coronary artery disease due to lipid rich plaque I25.10, I25.83    Ataxia R27.0    Cord compression ([facility]) G95.20    MRSA infection A49.02    Weakness R53.1       Please note that this chart was generated using Dragon dictation software. Although every effort was made to ensure the accuracy of this automated transcription, some errors in transcription may have occurred inadvertently.  If you may need any clarification, please do not hesitate to contact me through [employer] or at the phone number provided below

## 2019-08-27 NOTE — PROGRESS NOTES
Shift assessment completed. VSS except BP elevated. Scheduled AM Labetalol given at this time whole with water. C/o nausea and belching, prn zofran given at this time. PCA at bedside to give bath. Purewick draining. Call light in reach. Bed alarm on.

## 2019-08-27 NOTE — CARE COORDINATION
Patient discharging home with [facility]  Order/ AVS faxed and agency notifed. No other SW needs at this time.   Provided pt with IMM Letter, pt signed- copy provided to pt and original on chart with stickers      All discharge needs met per case management    Jayshree West MSW, [address]

## 2019-08-29 NOTE — PROGRESS NOTES
Occupational Therapy  Occupational Therapy Discharge Summary    Name: Ashlee Nevarez  : 1945    The pt was evaluated by OT on 19 and seen for 0 treatment sessions prior to DC to home on 19 per MD order. The pt's acute therapy goals were:  Short term goals  Time Frame for Short term goals: Discharge  Short term goal 1: Supervision for all UB ADLs. Short term goal 2: Supervision for all LB ADLs. Short term goal 3: Supervision for functional mobility. Short term goal 4: Supervision for functional transfers. Long term goals  Time Frame for Long term goals : STG=LTG     Patient met 0 goals during stay. Number of Refusals:0  Number of Holds: 0  During this hospitalization, the patient was educated on:       DC pt from OT caseload at this time. Thank you!     Itzel Gonzalez, [address] [facility], OTR/L [id number]

## 2019-08-30 NOTE — PROGRESS NOTES
Pt spouse Dennyliliana Mindy inquiring about decreased weakness unable to get in contact with PCP, and has not heard from [facility]. Spoke with Torey Adam from [facility]. [facility] hospital will follow up with spouse.

## 2019-09-09 ENCOUNTER — TELEPHONE (OUTPATIENT)
Dept: FAMILY MEDICINE CLINIC | Age: 74
End: 2019-09-09

## 2019-09-11 ENCOUNTER — APPOINTMENT (OUTPATIENT)
Dept: CT IMAGING | Age: 74
End: 2019-09-11
Payer: COMMERCIAL

## 2019-09-11 ENCOUNTER — HOSPITAL ENCOUNTER (EMERGENCY)
Age: 74
Discharge: HOME | End: 2019-09-11
Attending: EMERGENCY MEDICINE
Payer: COMMERCIAL

## 2019-09-11 ENCOUNTER — APPOINTMENT (OUTPATIENT)
Dept: GENERAL RADIOLOGY | Age: 74
End: 2019-09-11
Payer: COMMERCIAL

## 2019-09-11 VITALS
BODY MASS INDEX: 19.78 KG/M2 | TEMPERATURE: 98.2 F | WEIGHT: 126 LBS | SYSTOLIC BLOOD PRESSURE: 151 MMHG | HEIGHT: 67 IN | RESPIRATION RATE: 17 BRPM | OXYGEN SATURATION: 97 % | HEART RATE: 73 BPM | DIASTOLIC BLOOD PRESSURE: 73 MMHG

## 2019-09-11 DIAGNOSIS — R29.6 FREQUENT FALLS: Primary | ICD-10-CM

## 2019-09-11 DIAGNOSIS — E87.6 HYPOKALEMIA: ICD-10-CM

## 2019-09-11 DIAGNOSIS — B37.41 YEAST CYSTITIS: ICD-10-CM

## 2019-09-11 DIAGNOSIS — R53.81 MALAISE AND FATIGUE: ICD-10-CM

## 2019-09-11 DIAGNOSIS — R53.1 GENERAL WEAKNESS: ICD-10-CM

## 2019-09-11 DIAGNOSIS — R53.83 MALAISE AND FATIGUE: ICD-10-CM

## 2019-09-11 LAB
A/G RATIO: 1.2 (ref 1.1–2.2)
ALBUMIN SERPL-MCNC: 3.8 G/DL (ref 3.4–5)
ALP BLD-CCNC: 68 U/L (ref 40–129)
ALT SERPL-CCNC: 12 U/L (ref 10–40)
ANION GAP SERPL CALCULATED.3IONS-SCNC: 12 MMOL/L (ref 3–16)
AST SERPL-CCNC: 20 U/L (ref 15–37)
BASOPHILS ABSOLUTE: 0 K/UL (ref 0–0.2)
BASOPHILS RELATIVE PERCENT: 0.5 %
BILIRUB SERPL-MCNC: 0.7 MG/DL (ref 0–1)
BILIRUBIN URINE: NEGATIVE
BLOOD, URINE: NEGATIVE
BUN BLDV-MCNC: 10 MG/DL (ref 7–20)
CALCIUM SERPL-MCNC: 9.1 MG/DL (ref 8.3–10.6)
CHLORIDE BLD-SCNC: 104 MMOL/L (ref 99–110)
CLARITY: ABNORMAL
CO2: 25 MMOL/L (ref 21–32)
COLOR: YELLOW
CREAT SERPL-MCNC: 1 MG/DL (ref 0.6–1.2)
EKG ATRIAL RATE: 67 BPM
EKG DIAGNOSIS: NORMAL
EKG P AXIS: 54 DEGREES
EKG P-R INTERVAL: 152 MS
EKG Q-T INTERVAL: 450 MS
EKG QRS DURATION: 74 MS
EKG QTC CALCULATION (BAZETT): 475 MS
EKG R AXIS: 49 DEGREES
EKG T AXIS: 76 DEGREES
EKG VENTRICULAR RATE: 67 BPM
EOSINOPHILS ABSOLUTE: 0.3 K/UL (ref 0–0.6)
EOSINOPHILS RELATIVE PERCENT: 3.2 %
EPITHELIAL CELLS, UA: 11 /HPF (ref 0–5)
GFR AFRICAN AMERICAN: >60
GFR NON-AFRICAN AMERICAN: 54
GLOBULIN: 3.2 G/DL
GLUCOSE BLD-MCNC: 118 MG/DL (ref 70–99)
GLUCOSE URINE: NEGATIVE MG/DL
HCT VFR BLD CALC: 38 % (ref 36–48)
HEMOGLOBIN: 12.9 G/DL (ref 12–16)
HYALINE CASTS: 7 /LPF (ref 0–8)
INR BLD: 1.14 (ref 0.86–1.14)
KETONES, URINE: NEGATIVE MG/DL
LEUKOCYTE ESTERASE, URINE: ABNORMAL
LIPASE: 14 U/L (ref 13–60)
LYMPHOCYTES ABSOLUTE: 0.6 K/UL (ref 1–5.1)
LYMPHOCYTES RELATIVE PERCENT: 7.8 %
MAGNESIUM: 1.9 MG/DL (ref 1.8–2.4)
MCH RBC QN AUTO: 33.9 PG (ref 26–34)
MCHC RBC AUTO-ENTMCNC: 33.8 G/DL (ref 31–36)
MCV RBC AUTO: 100.2 FL (ref 80–100)
MICROSCOPIC EXAMINATION: YES
MONOCYTES ABSOLUTE: 0.7 K/UL (ref 0–1.3)
MONOCYTES RELATIVE PERCENT: 8.8 %
NEUTROPHILS ABSOLUTE: 6.5 K/UL (ref 1.7–7.7)
NEUTROPHILS RELATIVE PERCENT: 79.7 %
NITRITE, URINE: NEGATIVE
PDW BLD-RTO: 14.2 % (ref 12.4–15.4)
PH UA: 6 (ref 5–8)
PLATELET # BLD: 252 K/UL (ref 135–450)
PMV BLD AUTO: 9.2 FL (ref 5–10.5)
POTASSIUM REFLEX MAGNESIUM: 3.3 MMOL/L (ref 3.5–5.1)
PRO-BNP: 1091 PG/ML (ref 0–449)
PROTEIN UA: NEGATIVE MG/DL
PROTHROMBIN TIME: 13 SEC (ref 9.8–13)
RBC # BLD: 3.79 M/UL (ref 4–5.2)
RBC UA: 2 /HPF (ref 0–4)
SODIUM BLD-SCNC: 141 MMOL/L (ref 136–145)
SPECIFIC GRAVITY UA: 1.01 (ref 1–1.03)
TOTAL PROTEIN: 7 G/DL (ref 6.4–8.2)
TROPONIN: <0.01 NG/ML
URINE REFLEX TO CULTURE: YES
URINE TYPE: ABNORMAL
UROBILINOGEN, URINE: 0.2 E.U./DL
WBC # BLD: 8.1 K/UL (ref 4–11)
WBC UA: 3 /HPF (ref 0–5)
YEAST: PRESENT /HPF

## 2019-09-11 PROCEDURE — 96374 THER/PROPH/DIAG INJ IV PUSH: CPT

## 2019-09-11 PROCEDURE — 81001 URINALYSIS AUTO W/SCOPE: CPT

## 2019-09-11 PROCEDURE — 97530 THERAPEUTIC ACTIVITIES: CPT

## 2019-09-11 PROCEDURE — 85025 COMPLETE CBC W/AUTO DIFF WBC: CPT

## 2019-09-11 PROCEDURE — 85610 PROTHROMBIN TIME: CPT

## 2019-09-11 PROCEDURE — 83735 ASSAY OF MAGNESIUM: CPT

## 2019-09-11 PROCEDURE — 87086 URINE CULTURE/COLONY COUNT: CPT

## 2019-09-11 PROCEDURE — 87186 SC STD MICRODIL/AGAR DIL: CPT

## 2019-09-11 PROCEDURE — 93010 ELECTROCARDIOGRAM REPORT: CPT | Performed by: INTERNAL MEDICINE

## 2019-09-11 PROCEDURE — 93005 ELECTROCARDIOGRAM TRACING: CPT | Performed by: PHYSICIAN ASSISTANT

## 2019-09-11 PROCEDURE — 94640 AIRWAY INHALATION TREATMENT: CPT

## 2019-09-11 PROCEDURE — 6370000000 HC RX 637 (ALT 250 FOR IP): Performed by: PHYSICIAN ASSISTANT

## 2019-09-11 PROCEDURE — 2580000003 HC RX 258: Performed by: PHYSICIAN ASSISTANT

## 2019-09-11 PROCEDURE — 80053 COMPREHEN METABOLIC PANEL: CPT

## 2019-09-11 PROCEDURE — 97161 PT EVAL LOW COMPLEX 20 MIN: CPT

## 2019-09-11 PROCEDURE — 72125 CT NECK SPINE W/O DYE: CPT

## 2019-09-11 PROCEDURE — 96361 HYDRATE IV INFUSION ADD-ON: CPT

## 2019-09-11 PROCEDURE — 6360000002 HC RX W HCPCS: Performed by: PHYSICIAN ASSISTANT

## 2019-09-11 PROCEDURE — 83880 ASSAY OF NATRIURETIC PEPTIDE: CPT

## 2019-09-11 PROCEDURE — 83690 ASSAY OF LIPASE: CPT

## 2019-09-11 PROCEDURE — 84484 ASSAY OF TROPONIN QUANT: CPT

## 2019-09-11 PROCEDURE — 71045 X-RAY EXAM CHEST 1 VIEW: CPT

## 2019-09-11 PROCEDURE — 70450 CT HEAD/BRAIN W/O DYE: CPT

## 2019-09-11 PROCEDURE — 97165 OT EVAL LOW COMPLEX 30 MIN: CPT

## 2019-09-11 PROCEDURE — 99285 EMERGENCY DEPT VISIT HI MDM: CPT

## 2019-09-11 PROCEDURE — 87077 CULTURE AEROBIC IDENTIFY: CPT

## 2019-09-11 RX ORDER — SODIUM CHLORIDE 9 MG/ML
INJECTION, SOLUTION INTRAVENOUS CONTINUOUS
Status: DISCONTINUED | OUTPATIENT
Start: 2019-09-11 | End: 2019-09-11 | Stop reason: HOSPADM

## 2019-09-11 RX ORDER — POTASSIUM CHLORIDE 20 MEQ/1
40 TABLET, EXTENDED RELEASE ORAL ONCE
Status: COMPLETED | OUTPATIENT
Start: 2019-09-11 | End: 2019-09-11

## 2019-09-11 RX ORDER — ONDANSETRON 2 MG/ML
4 INJECTION INTRAMUSCULAR; INTRAVENOUS ONCE
Status: COMPLETED | OUTPATIENT
Start: 2019-09-11 | End: 2019-09-11

## 2019-09-11 RX ORDER — IPRATROPIUM BROMIDE AND ALBUTEROL SULFATE 2.5; .5 MG/3ML; MG/3ML
1 SOLUTION RESPIRATORY (INHALATION) ONCE
Status: COMPLETED | OUTPATIENT
Start: 2019-09-11 | End: 2019-09-11

## 2019-09-11 RX ORDER — FLUCONAZOLE 100 MG/1
200 TABLET ORAL ONCE
Status: COMPLETED | OUTPATIENT
Start: 2019-09-11 | End: 2019-09-11

## 2019-09-11 RX ADMIN — SODIUM CHLORIDE: 9 INJECTION, SOLUTION INTRAVENOUS at 07:36

## 2019-09-11 RX ADMIN — IPRATROPIUM BROMIDE AND ALBUTEROL SULFATE 1 AMPULE: .5; 3 SOLUTION RESPIRATORY (INHALATION) at 08:39

## 2019-09-11 RX ADMIN — POTASSIUM CHLORIDE 40 MEQ: 1500 TABLET, EXTENDED RELEASE ORAL at 15:08

## 2019-09-11 RX ADMIN — FLUCONAZOLE 200 MG: 100 TABLET ORAL at 09:23

## 2019-09-11 RX ADMIN — ONDANSETRON 4 MG: 2 INJECTION INTRAMUSCULAR; INTRAVENOUS at 07:36

## 2019-09-11 ASSESSMENT — ENCOUNTER SYMPTOMS
NAUSEA: 0
COUGH: 1
VOMITING: 0
WHEEZING: 1
DIARRHEA: 0
ABDOMINAL PAIN: 0
COLOR CHANGE: 0
SHORTNESS OF BREATH: 1
CHEST TIGHTNESS: 0

## 2019-09-11 NOTE — ED PROVIDER NOTES
labetalol (NORMODYNE) 200 MG tablet Take 1 tablet by mouth every 12 hours 60 tablet 3    sodium chloride 1 g tablet Take 1 tablet by mouth 3 times daily (with meals) 90 tablet 0    ipratropium (ATROVENT) 0.02 % nebulizer solution Take 2.5 mLs by nebulization 3 times daily 360 mL 0    calcium elemental (OSCAL) 500 MG TABS tablet Take 1 tablet by mouth daily 30 tablet 3    atorvastatin (LIPITOR) 80 MG tablet Take 1 tablet by mouth daily 90 tablet 3    budesonide-formoterol (SYMBICORT) 160-4.5 MCG/ACT AERO Inhale 2 puffs into the lungs 2 times daily 1 Inhaler 2    Multiple Vitamins-Minerals (CENTRUM SILVER ADULT 50+ PO) Take 1 tablet by mouth daily        PHYSICAL EXAM  Vitals: BP (!) 145/81   Pulse 74   Temp 97.1 °F (36.2 °C) (Oral)   Resp 18   Ht 5' 7\" (1.702 m)   Wt 126 lb (57.2 kg)   SpO2 100%   BMI 19.73 kg/m²   Constitutional:  75 y.o. female alert  HENT:  Atraumatic, oral mucosa moist  Neck:  No visible JVD, supple  Chest/Lungs:  Respiratory effort normal, fine rales on right, regular  Abdomen:  Non-distended, soft, NT  Back:  No gross deformity  Extremities:  Normal tone and perfusion, no edema  Neurologic:  Alert, speech normal, mentation normal, pupils equal, normal coordination of extremities, no facial asymmetry     Medical Decision Making and Plan: Briefly, this is an 75 y. o.female who presented with fatigue, weakness with hospital discharge within the past two weeks. Yeast noted in urine specimen but otherwise clinically benign exam.  Social service consult in the ED with PT OT evaluation. We anticipate disposition to nursing home but plans have not been finalized at the time of this note. For further details of [facility] [place], please see documentation by advanced practice provider Giorgio Braswell PA-C.     Labs Reviewed   CBC WITH AUTO DIFFERENTIAL - Abnormal; Notable for the following components:       Result Value    RBC 3.79 (*)     .2 (*)

## 2019-09-11 NOTE — ED PROVIDER NOTES
None    Number of children: None    Years of education: None    Highest education level: None   Occupational History    None   Social Needs    Financial resource strain: None    Food insecurity:     Worry: None     Inability: None    Transportation needs:     Medical: None     Non-medical: None   Tobacco Use    Smoking status: Current Every Day Smoker     Packs/day: 1.00     Years: 52.00     Pack years: 52.00     Types: Cigarettes     Last attempt to quit: 2018     Years since quittin.1    Smokeless tobacco: Never Used    Tobacco comment: started smoking at age 26 / smoked up to 2 p,p,d / now smoking 4 to 8 cigarettes a day,   Substance and Sexual Activity    Alcohol use: No     Alcohol/week: 0.0 standard drinks     Comment: patient reports she is an alcoholic hasn't had anything to drink 3-4 months    Drug use: No    Sexual activity: Yes     Partners: Male   Lifestyle    Physical activity:     Days per week: None     Minutes per session: None    Stress: None   Relationships    Social connections:     Talks on phone: None     Gets together: None     Attends Restoration service: None     Active member of club or organization: None     Attends meetings of clubs or organizations: None     Relationship status: None    Intimate partner violence:     Fear of current or ex partner: None     Emotionally abused: None     Physically abused: None     Forced sexual activity: None   Other Topics Concern    None   Social History Narrative    None       SCREENINGS             PHYSICAL EXAM    (up to 7 for level 4, 8 or more for level 5)     ED Triage Vitals   BP Temp Temp Source Pulse Resp SpO2 Height Weight   19 0705 19 0705 19 0705 19 0705 19 0705 19 0707 19 0705 19 0705   (!) 145/81 97.1 °F (36.2 °C) Oral 74 18 100 % 5' 7\" (1.702 m) 126 lb (57.2 kg)       Physical Exam    DIAGNOSTIC RESULTS   LABS:    Labs Reviewed   CBC WITH AUTO DIFFERENTIAL - Abnormal; Notable for the following components:       Result Value    RBC 3.79 (*)     .2 (*)     Lymphocytes Absolute 0.6 (*)     All other components within normal limits    Narrative:     Performed at:  [facility] [address]   Phone [phone]   COMPREHENSIVE METABOLIC PANEL W/ REFLEX TO MG FOR LOW K - Abnormal; Notable for the following components:    Potassium reflex Magnesium 3.3 (*)     Glucose 118 (*)     GFR Non- 54 (*)     All other components within normal limits    Narrative:     Performed at:  [facility] [address]   Phone 21  - Abnormal; Notable for the following components:    Pro-BNP 1,091 (*)     All other components within normal limits    Narrative:     Performed at:  [facility] [address], [place]   Phone [phone]   URINE RT REFLEX TO CULTURE - Abnormal; Notable for the following components:    Clarity, UA CLOUDY (*)     Leukocyte Esterase, Urine SMALL (*)     All other components within normal limits    Narrative:     Performed at:  [facility] [address], [place]   Phone [phone]   MICROSCOPIC URINALYSIS - Abnormal; Notable for the following components:    Yeast, UA Present (*)     Epi Cells 11 (*)     All other components within normal limits    Narrative:     Performed at:  [facility] [address], [place]   Phone [phone]   URINE CULTURE   TROPONIN    Narrative:     Performed at:  [facility] [address], [place]   Phone [phone]   PROTIME-INR    Narrative:     Performed at:  [facility] [address], [place]   Phone [phone]   LIPASE Gerald JURADO) extended release tablet 40 mEq (has no administration in time range)   ondansetron (ZOFRAN) injection 4 mg (4 mg Intravenous Given 9/11/19 7147)   ipratropium-albuterol (DUONEB) nebulizer solution 1 ampule (1 ampule Inhalation Given 9/11/19 8837)   fluconazole (DIFLUCAN) tablet 200 mg (200 mg Oral Given 9/11/19 6411)       The patient's detailed history of present illness is documented as above. Upon arrival to the emergency department the patient's vital signs are as documented. The patient is noted to be hemodynamically stable and afebrile. Physical examination findings are as above. IV access was obtained. Laboratory testing and work-up was initiated. Patient was medicated as above. She was having just a trace amount of wheezing and was given a breathing treatment here in the emergency department with dramatically improved her symptoms. Initial EKG demonstrates a sinus rhythm with a rate of 67. Unifocal PVC noted. She is a ID interval of 152 . Nonspecific T wave changes. Please see attending physician details for further EKG interpretation. The patient's CBC is as documented above demonstrating no evidence of significant leukocytosis, anemia, thrombocytopenia and/or thrombocytosis. Patient's BUN is 10 creatinine is 1.0. Her nonfasting glucose is 118. Her lecture lites are normal with exception of a potassium mildly low at 3.3. Her LFTs are unremarkable. Patient's troponin is less than 0.01.  proBNP is 1091. Alsys demonstrates a small amount of leukocytes but in reviewing the microscopic this is consistent with yeast and she was treated with Diflucan. Her lipase is 14 magnesium is 1.9. Coagulation profile is unremarkable. Because of the previous fall I did proceed with CT imaging of the head as well as cervical spine. CT the head demonstrates no evidence of acute intracranial abnormality while CT of the cervical spine demonstrates no evidence of acute bony abnormality.

## 2019-09-11 NOTE — ED NOTES
Pt. ambulated to restroom to obtain urine sample. Gait steady. Specimen collected, labeled at bedside, and sent directly to lab.         Katty Gonzalez RN  09/11/19 9565

## 2019-09-11 NOTE — ED NOTES
Pt. updated on plan of care at this time. Pt. displaying no s/s of distress. Bed locked with siderails up x2 with call light within reach. No other needs verbalized at this time.        Radu Nam RN  09/11/19 2934

## 2019-09-11 NOTE — PROGRESS NOTES
position/activity restrictions: Recent discharge from hospital due to UTI. Progressive weakness. Returned hoping for discharge to SNF. Vision/Hearing  Vision: Impaired  Vision Exceptions: Wears glasses for reading  Hearing: Exceptions to [facility]  Hearing Exceptions: Hard of hearing/hearing concerns     Subjective  General  Chart Reviewed: Yes  Family / Caregiver Present: No  Diagnosis: weakness   General Comment  Comments: in bed at arrival  Subjective  Subjective: agreed to evaluation   Pain Screening  Patient Currently in Pain: Denies  Vital Signs  Patient Currently in Pain: Denies       Orientation  Orientation  Overall Orientation Status: Impaired  Orientation Level: Disoriented to time;Oriented to place;Oriented to situation;Oriented to person(thought it was 2004)  Social/Functional History  Social/Functional History  Lives With: Spouse(dtg, granddtg, and great granddtg/son)  Type of Home: House  Home Layout: Multi-level, Bed/Bath upstairs(Trilevel)  Home Access: Stairs to enter without rails  Entrance Stairs - Number of Steps: 1  Bathroom Shower/Tub: Walk-in shower  Bathroom Toilet: Standard  Home Equipment: [employer], 4 wheeled walker  ADL Assistance: Independent  Homemaking Responsibilities: (family does IADLs)  Ambulation Assistance: Independent  Transfer Assistance: Independent  Active : No  Patient's  Info: family  Leisure & Hobbies: work in garden, but hasn't done in a while; enjoys TV  Additional Comments: Slid to the floor recently. Maybe 3 falls total in last 6 month.    Cognition        Objective     Observation/Palpation  Posture: Fair    AROM RLE (degrees)  RLE AROM: WFL  AROM LLE (degrees)  LLE AROM : WFL  Strength RLE  Strength RLE: WFL  Comment: grossly 4/5  Strength LLE  Strength LLE: WFL  Comment: however, slightly less strength in LLE compared to RLE (residual effects of CVA), grossly 3+ to 4-/5     Sensation  Overall Sensation Status: WFL  Bed mobility  Supine to Sit: Stand by

## 2019-09-13 LAB
ORGANISM: ABNORMAL
URINE CULTURE, ROUTINE: ABNORMAL

## 2019-09-20 ENCOUNTER — HOSPITAL ENCOUNTER (EMERGENCY)
Age: 74
Discharge: HOME | End: 2019-09-20
Attending: EMERGENCY MEDICINE
Payer: COMMERCIAL

## 2019-09-20 ENCOUNTER — OFFICE VISIT (OUTPATIENT)
Dept: FAMILY MEDICINE CLINIC | Age: 74
End: 2019-09-20
Payer: COMMERCIAL

## 2019-09-20 VITALS
DIASTOLIC BLOOD PRESSURE: 86 MMHG | RESPIRATION RATE: 24 BRPM | HEART RATE: 63 BPM | WEIGHT: 126 LBS | TEMPERATURE: 97.7 F | HEIGHT: 68 IN | OXYGEN SATURATION: 82 % | BODY MASS INDEX: 19.1 KG/M2 | SYSTOLIC BLOOD PRESSURE: 133 MMHG

## 2019-09-20 VITALS
SYSTOLIC BLOOD PRESSURE: 68 MMHG | WEIGHT: 128 LBS | OXYGEN SATURATION: 95 % | HEART RATE: 72 BPM | BODY MASS INDEX: 20.05 KG/M2 | DIASTOLIC BLOOD PRESSURE: 48 MMHG

## 2019-09-20 DIAGNOSIS — R53.1 WEAKNESS: ICD-10-CM

## 2019-09-20 DIAGNOSIS — H53.8 BLURRED VISION: ICD-10-CM

## 2019-09-20 DIAGNOSIS — R42 DIZZINESS: Primary | ICD-10-CM

## 2019-09-20 DIAGNOSIS — I95.9 HYPOTENSION, UNSPECIFIED HYPOTENSION TYPE: ICD-10-CM

## 2019-09-20 DIAGNOSIS — D64.9 ANEMIA, UNSPECIFIED TYPE: ICD-10-CM

## 2019-09-20 DIAGNOSIS — R53.83 FATIGUE, UNSPECIFIED TYPE: Primary | ICD-10-CM

## 2019-09-20 LAB
A/G RATIO: 1.2 (ref 1.1–2.2)
ALBUMIN SERPL-MCNC: 3.4 G/DL (ref 3.4–5)
ALP BLD-CCNC: 46 U/L (ref 40–129)
ALT SERPL-CCNC: 17 U/L (ref 10–40)
ANION GAP SERPL CALCULATED.3IONS-SCNC: 9 MMOL/L (ref 3–16)
AST SERPL-CCNC: 24 U/L (ref 15–37)
BASOPHILS ABSOLUTE: 0 K/UL (ref 0–0.2)
BASOPHILS RELATIVE PERCENT: 0.8 %
BILIRUB SERPL-MCNC: 0.5 MG/DL (ref 0–1)
BILIRUBIN URINE: NEGATIVE
BLOOD, URINE: NEGATIVE
BUN BLDV-MCNC: 9 MG/DL (ref 7–20)
CALCIUM SERPL-MCNC: 8.9 MG/DL (ref 8.3–10.6)
CHLORIDE BLD-SCNC: 104 MMOL/L (ref 99–110)
CLARITY: CLEAR
CO2: 25 MMOL/L (ref 21–32)
COLOR: YELLOW
CREAT SERPL-MCNC: 0.7 MG/DL (ref 0.6–1.2)
EOSINOPHILS ABSOLUTE: 0.2 K/UL (ref 0–0.6)
EOSINOPHILS RELATIVE PERCENT: 3.5 %
GFR AFRICAN AMERICAN: >60
GFR NON-AFRICAN AMERICAN: >60
GLOBULIN: 2.9 G/DL
GLUCOSE BLD-MCNC: 97 MG/DL (ref 70–99)
GLUCOSE URINE: NEGATIVE MG/DL
HCT VFR BLD CALC: 32.4 % (ref 36–48)
HEMOGLOBIN: 10.9 G/DL (ref 12–16)
KETONES, URINE: NEGATIVE MG/DL
LEUKOCYTE ESTERASE, URINE: NEGATIVE
LYMPHOCYTES ABSOLUTE: 0.6 K/UL (ref 1–5.1)
LYMPHOCYTES RELATIVE PERCENT: 9.1 %
MCH RBC QN AUTO: 33.4 PG (ref 26–34)
MCHC RBC AUTO-ENTMCNC: 33.8 G/DL (ref 31–36)
MCV RBC AUTO: 99 FL (ref 80–100)
MICROSCOPIC EXAMINATION: NORMAL
MONOCYTES ABSOLUTE: 0.7 K/UL (ref 0–1.3)
MONOCYTES RELATIVE PERCENT: 10.8 %
NEUTROPHILS ABSOLUTE: 4.6 K/UL (ref 1.7–7.7)
NEUTROPHILS RELATIVE PERCENT: 75.8 %
NITRITE, URINE: NEGATIVE
OCCULT BLOOD DIAGNOSTIC: NORMAL
PDW BLD-RTO: 13.8 % (ref 12.4–15.4)
PH UA: 7 (ref 5–8)
PLATELET # BLD: 257 K/UL (ref 135–450)
PMV BLD AUTO: 9 FL (ref 5–10.5)
POTASSIUM SERPL-SCNC: 4.4 MMOL/L (ref 3.5–5.1)
PROTEIN UA: NEGATIVE MG/DL
RBC # BLD: 3.27 M/UL (ref 4–5.2)
SODIUM BLD-SCNC: 138 MMOL/L (ref 136–145)
SPECIFIC GRAVITY UA: 1.01 (ref 1–1.03)
TOTAL PROTEIN: 6.3 G/DL (ref 6.4–8.2)
URINE REFLEX TO CULTURE: NORMAL
URINE TYPE: NORMAL
UROBILINOGEN, URINE: 0.2 E.U./DL
WBC # BLD: 6.1 K/UL (ref 4–11)

## 2019-09-20 PROCEDURE — 85025 COMPLETE CBC W/AUTO DIFF WBC: CPT

## 2019-09-20 PROCEDURE — 81003 URINALYSIS AUTO W/O SCOPE: CPT

## 2019-09-20 PROCEDURE — 93005 ELECTROCARDIOGRAM TRACING: CPT | Performed by: EMERGENCY MEDICINE

## 2019-09-20 PROCEDURE — 99284 EMERGENCY DEPT VISIT MOD MDM: CPT

## 2019-09-20 PROCEDURE — 96361 HYDRATE IV INFUSION ADD-ON: CPT

## 2019-09-20 PROCEDURE — G0328 FECAL BLOOD SCRN IMMUNOASSAY: HCPCS

## 2019-09-20 PROCEDURE — 80053 COMPREHEN METABOLIC PANEL: CPT

## 2019-09-20 PROCEDURE — 96360 HYDRATION IV INFUSION INIT: CPT

## 2019-09-20 PROCEDURE — 99214 OFFICE O/P EST MOD 30 MIN: CPT | Performed by: PHYSICIAN ASSISTANT

## 2019-09-20 PROCEDURE — 2580000003 HC RX 258: Performed by: PHYSICIAN ASSISTANT

## 2019-09-20 RX ORDER — MIRTAZAPINE 15 MG/1
15 TABLET, FILM COATED ORAL NIGHTLY
Qty: 30 TABLET | Refills: 5
Start: 2019-09-20 | End: 2019-10-31 | Stop reason: SDUPTHER

## 2019-09-20 RX ORDER — ESCITALOPRAM OXALATE 10 MG/1
10 TABLET ORAL DAILY
Qty: 30 TABLET | Refills: 5
Start: 2019-09-20 | End: 2019-10-02 | Stop reason: SDUPTHER

## 2019-09-20 RX ORDER — 0.9 % SODIUM CHLORIDE 0.9 %
1000 INTRAVENOUS SOLUTION INTRAVENOUS ONCE
Status: COMPLETED | OUTPATIENT
Start: 2019-09-20 | End: 2019-09-20

## 2019-09-20 RX ADMIN — SODIUM CHLORIDE 1000 ML: 9 INJECTION, SOLUTION INTRAVENOUS at 14:17

## 2019-09-20 ASSESSMENT — ENCOUNTER SYMPTOMS
COUGH: 0
RHINORRHEA: 0
BACK PAIN: 0
ABDOMINAL PAIN: 0
SHORTNESS OF BREATH: 0
DIARRHEA: 0
NAUSEA: 0
SHORTNESS OF BREATH: 0
ABDOMINAL PAIN: 0
COUGH: 0
VOMITING: 0

## 2019-09-20 NOTE — PROGRESS NOTES
Subjective:      Patient ID: Yuval Emerson is a 75 y.o. female. HPI  Patient is here today for a rehab/ER follow up. She was having frequent falls over the last few weeks and went to ER. Her daughter took her to ER due to frequent falls. There was no specific injury known at that time. The ER was able to get her into [address] [place] [address] rehab facility in [place] and to get her strength back. She was there for 1 week and did physical therapy. She wanted to come home so her  brought her home. She is supposed to start PT at home, they haven't come yet. She has just been home for 2 days. She has not eaten much since being home. She does drink a lot of water though, per patient and daughter. She is not feeling well today at all. Feels weak, lightheaded and BP is really low. She and her daughter says no med changes were made while at rehab facility. She denies SOB or any pain at all at this time. Review of Systems   Constitutional: Positive for fatigue. Negative for unexpected weight change. HENT: Negative for nosebleeds. Eyes: Negative for visual disturbance. Respiratory: Negative for cough and shortness of breath. Cardiovascular: Negative for chest pain, palpitations and leg swelling. Gastrointestinal: Negative for abdominal pain. Musculoskeletal: Negative for back pain. Neurological: Positive for dizziness, weakness, light-headedness and headaches. Objective:   Physical Exam   Constitutional: She appears well-developed and well-nourished. She appears lethargic. She is cooperative. hypotensive   Eyes: Pupils are equal, round, and reactive to light. Neck: Neck supple. Cardiovascular: Normal rate, regular rhythm and normal heart sounds. Pulmonary/Chest: Effort normal and breath sounds normal.   Neurological: She appears lethargic. Skin: There is pallor.        Assessment:      Tae Brown was seen today for follow-up from hospital.    Diagnoses and all orders for this

## 2019-09-20 NOTE — CARE COORDINATION
SW informed by Reggie Buck at [facility] that they will be calling pt tonight to set up a time to come out tomorrow [facility]. Asked to inform the patient. SW informed pt of this information.     Electronically signed by GISELLA Zhu, FRANKW on 9/20/2019 at 3:30 PM

## 2019-09-20 NOTE — ED PROVIDER NOTES
tablet Take 1 tablet by mouth daily, Disp-30 tablet, R-5NO PRINT      clonazePAM (KLONOPIN) 0.5 MG tablet Take 0.5 tablets by mouth nightly as needed (insomnia). , Disp-15 tablet, R-2Normal      levothyroxine (SYNTHROID) 100 MCG tablet TAKE ONE TABLET BY MOUTH DAILY, Disp-90 tablet, R-2Normal      oxybutynin (DITROPAN) 5 MG tablet TAKE ONE TABLET BY MOUTH TWICE A DAY, Disp-60 tablet, R-2Normal      furosemide (LASIX) 40 MG tablet TAKE ONE TABLET BY MOUTH DAILY, Disp-30 tablet, R-3Normal      aspirin 81 MG chewable tablet CHEW ONE TABLET BY MOUTH DAILY, Disp-21 tablet, R-1Normal      cloNIDine (CATAPRES) 0.1 MG tablet Take 1 tablet by mouth 3 times daily, Disp-60 tablet, R-3Normal      labetalol (NORMODYNE) 200 MG tablet Take 1 tablet by mouth every 12 hours, Disp-60 tablet, R-3Normal      sodium chloride 1 g tablet Take 1 tablet by mouth 3 times daily (with meals), Disp-90 tablet, R-0Normal      ipratropium (ATROVENT) 0.02 % nebulizer solution Take 2.5 mLs by nebulization 3 times daily, Disp-360 mL, R-0Normal      calcium elemental (OSCAL) 500 MG TABS tablet Take 1 tablet by mouth daily, Disp-30 tablet, R-3Normal      atorvastatin (LIPITOR) 80 MG tablet Take 1 tablet by mouth daily, Disp-90 tablet, R-3Normal      budesonide-formoterol (SYMBICORT) 160-4.5 MCG/ACT AERO Inhale 2 puffs into the lungs 2 times daily, Disp-1 Inhaler, R-2Print               ALLERGIES     Lipitor [atorvastatin]; Morphine; Keflex [cephalexin]; Hctz [hydrochlorothiazide]; Norvasc [amlodipine besylate];  Penicillins; Tramadol; Hydralazine; and Shellfish-derived products    FAMILYHISTORY       Family History   Problem Relation Age of Onset    Heart Disease Father         MI @ 64    Alcohol Abuse Father           SOCIAL HISTORY       Social History     Socioeconomic History    Marital status:      Spouse name: None    Number of children: None    Years of education: None    Highest education level: None   Occupational History    None Neck: Normal range of motion. Neck supple. No tracheal deviation present. Cardiovascular: Regular rhythm and normal heart sounds. Bradycardia present. Pulmonary/Chest: Effort normal. No stridor. No respiratory distress. She has no wheezes. Abdominal: Soft. Bowel sounds are normal. She exhibits no distension. There is no tenderness. There is no guarding. Musculoskeletal: Normal range of motion. Neurological: She is alert and oriented to person, place, and time. Skin: Skin is warm and dry. She is not diaphoretic. Psychiatric: She has a normal mood and affect. Her behavior is normal.   Nursing note and vitals reviewed. DIAGNOSTIC RESULTS   LABS:    Labs Reviewed   CBC WITH AUTO DIFFERENTIAL - Abnormal; Notable for the following components:       Result Value    RBC 3.27 (*)     Hemoglobin 10.9 (*)     Hematocrit 32.4 (*)     Lymphocytes Absolute 0.6 (*)     All other components within normal limits    Narrative:     Performed at:  [facility] [address], [facility], [address]   Phone [phone]   COMPREHENSIVE METABOLIC PANEL - Abnormal; Notable for the following components:     Total Protein 6.3 (*)     All other components within normal limits    Narrative:     Performed at:  [facility] [address], [facility], [address]   Phone [phone]   URINE RT REFLEX TO CULTURE    Narrative:     Performed at:  [facility] [address], [facility], [address]   Phone [phone]   BLOOD OCCULT STOOL DIAGNOSTIC    Narrative:     ORDER#: [id number]                          ORDERED BY: Audra Dale  SOURCE: Stool                              COLLECTED:  09/20/19 14:07  ANTIBIOTICS AT BRENDA.:                      RECEIVED :  09/20/19 14:09  Performed at:  [facility]  [address] [facility], [address]   Phone [phone]       All other labs were within

## 2019-09-20 NOTE — ED NOTES
Assume patient care at this time. Agree with previous assessment. Pt alert, HEIN, NAD, resps easy and even. -Blood specimens drawn at this time after verifying physician order using aseptic technique.  -Specimens labeled using patient identifiers per protocol.   -Pt tolerated well.   -Blood specimens sent to lab. Pt assisted up to restroom - declined help from this RN. Pt denies feeling dizzy. Steady gait. Urine obtained and sent. Current Plan of Care reviewed with patient. Pt denies any needs or questions at this time. Bed locked and in low position, with side rails up x 2.         Gilberto Pelletier RN  09/20/19 [id number]

## 2019-09-21 LAB
EKG ATRIAL RATE: 54 BPM
EKG DIAGNOSIS: NORMAL
EKG P AXIS: 65 DEGREES
EKG P-R INTERVAL: 158 MS
EKG Q-T INTERVAL: 464 MS
EKG QRS DURATION: 74 MS
EKG QTC CALCULATION (BAZETT): 440 MS
EKG R AXIS: 54 DEGREES
EKG T AXIS: 60 DEGREES
EKG VENTRICULAR RATE: 54 BPM

## 2019-09-21 PROCEDURE — 93010 ELECTROCARDIOGRAM REPORT: CPT | Performed by: INTERNAL MEDICINE

## 2019-09-24 ENCOUNTER — TELEPHONE (OUTPATIENT)
Dept: FAMILY MEDICINE CLINIC | Age: 74
End: 2019-09-24

## 2019-09-27 ENCOUNTER — TELEPHONE (OUTPATIENT)
Dept: FAMILY MEDICINE CLINIC | Age: 74
End: 2019-09-27

## 2019-09-27 NOTE — TELEPHONE ENCOUNTER
Lindsay York from [facility] did a PT evaluation/assessment today, suggesting PT 2 x/week for 3 weeks. Need verbal ok and then they will fax orders.

## 2019-10-01 ENCOUNTER — NURSE ONLY (OUTPATIENT)
Dept: CARDIOLOGY CLINIC | Age: 74
End: 2019-10-01
Payer: COMMERCIAL

## 2019-10-01 DIAGNOSIS — I63.9 CEREBROVASCULAR ACCIDENT (CVA), UNSPECIFIED MECHANISM (HCC): ICD-10-CM

## 2019-10-01 DIAGNOSIS — Z45.09 ENCOUNTER FOR ELECTRONIC ANALYSIS OF REVEAL EVENT RECORDER: ICD-10-CM

## 2019-10-01 PROCEDURE — 93299 PR REM INTERROG ICPMS/SCRMS <30 D TECH REVIEW: CPT | Performed by: INTERNAL MEDICINE

## 2019-10-01 PROCEDURE — 93298 REM INTERROG DEV EVAL SCRMS: CPT | Performed by: INTERNAL MEDICINE

## 2019-10-01 RX ORDER — ATORVASTATIN CALCIUM 80 MG/1
80 TABLET, FILM COATED ORAL DAILY
Qty: 90 TABLET | Refills: 0 | Status: SHIPPED | OUTPATIENT
Start: 2019-10-01 | End: 2020-01-09

## 2019-10-03 ENCOUNTER — TELEPHONE (OUTPATIENT)
Dept: FAMILY MEDICINE CLINIC | Age: 74
End: 2019-10-03

## 2019-10-03 RX ORDER — ESCITALOPRAM OXALATE 10 MG/1
10 TABLET ORAL DAILY
Qty: 30 TABLET | Refills: 0 | Status: SHIPPED | OUTPATIENT
Start: 2019-10-03 | End: 2019-10-03 | Stop reason: SDUPTHER

## 2019-10-03 RX ORDER — LABETALOL 200 MG/1
200 TABLET, FILM COATED ORAL EVERY 12 HOURS SCHEDULED
Qty: 60 TABLET | Refills: 0 | Status: SHIPPED | OUTPATIENT
Start: 2019-10-03 | End: 2021-09-28

## 2019-10-03 RX ORDER — ESCITALOPRAM OXALATE 10 MG/1
10 TABLET ORAL DAILY
Qty: 30 TABLET | Refills: 0 | Status: SHIPPED | OUTPATIENT
Start: 2019-10-03 | End: 2019-11-27 | Stop reason: SDUPTHER

## 2019-10-03 RX ORDER — ASPIRIN 81 MG/1
TABLET, CHEWABLE ORAL
Qty: 30 TABLET | Refills: 0 | Status: SHIPPED | OUTPATIENT
Start: 2019-10-03 | End: 2021-02-23 | Stop reason: CLARIF

## 2019-10-03 RX ORDER — ASPIRIN 81 MG/1
TABLET, CHEWABLE ORAL
Qty: 30 TABLET | Refills: 0 | Status: SHIPPED | OUTPATIENT
Start: 2019-10-03 | End: 2019-10-03 | Stop reason: SDUPTHER

## 2019-10-03 RX ORDER — LABETALOL 200 MG/1
200 TABLET, FILM COATED ORAL EVERY 12 HOURS SCHEDULED
Qty: 60 TABLET | Refills: 0 | Status: SHIPPED | OUTPATIENT
Start: 2019-10-03 | End: 2019-10-03 | Stop reason: SDUPTHER

## 2019-10-04 PROBLEM — R29.90 STROKE-LIKE SYMPTOMS: Status: RESOLVED | Noted: 2018-06-14 | Resolved: 2019-10-04

## 2019-10-04 PROBLEM — L03.90 CELLULITIS: Status: RESOLVED | Noted: 2018-04-28 | Resolved: 2019-10-04

## 2019-10-04 PROBLEM — J98.19 TRAPPED LUNG: Status: RESOLVED | Noted: 2017-05-18 | Resolved: 2019-10-04

## 2019-10-08 ENCOUNTER — OFFICE VISIT (OUTPATIENT)
Dept: FAMILY MEDICINE CLINIC | Age: 74
End: 2019-10-08
Payer: COMMERCIAL

## 2019-10-08 VITALS
OXYGEN SATURATION: 98 % | WEIGHT: 130.2 LBS | DIASTOLIC BLOOD PRESSURE: 72 MMHG | SYSTOLIC BLOOD PRESSURE: 124 MMHG | HEART RATE: 65 BPM | BODY MASS INDEX: 19.8 KG/M2

## 2019-10-08 DIAGNOSIS — Z23 NEED FOR IMMUNIZATION AGAINST INFLUENZA: Primary | ICD-10-CM

## 2019-10-08 PROCEDURE — 90662 IIV NO PRSV INCREASED AG IM: CPT | Performed by: PHYSICIAN ASSISTANT

## 2019-10-08 PROCEDURE — 90471 IMMUNIZATION ADMIN: CPT | Performed by: PHYSICIAN ASSISTANT

## 2019-10-08 RX ORDER — POTASSIUM CHLORIDE 1.5 G/1.77G
20 POWDER, FOR SOLUTION ORAL 2 TIMES DAILY
Status: ON HOLD | COMMUNITY
End: 2021-04-11 | Stop reason: ALTCHOICE

## 2019-10-08 NOTE — PROGRESS NOTES
Vaccine Information Sheet, \"Influenza - Inactivated\"  given to Fang Solares, or parent/legal guardian of  Fang Solares and verbalized understanding. Patient responses:    Have you ever had a reaction to a flu vaccine? No  Do you have any current illness? No  Have you ever had [place] Syndrome? No  Do you have a serious allergy to any of the follow: Neomycin, Polymyxin, Thimerosal, eggs or egg products? No    Flu vaccine given per order. Please see immunization tab. Risks and benefits explained. Current VIS given.

## 2019-10-09 PROBLEM — R26.2 UNABLE TO WALK: Status: RESOLVED | Noted: 2018-07-01 | Resolved: 2019-10-09

## 2019-10-09 PROBLEM — M85.80 OSTEOPENIA: Status: RESOLVED | Noted: 2017-02-14 | Resolved: 2019-10-09

## 2019-10-09 PROBLEM — G93.40 ACUTE ENCEPHALOPATHY: Status: RESOLVED | Noted: 2018-04-19 | Resolved: 2019-10-09

## 2019-10-09 PROBLEM — R53.1 GENERALIZED WEAKNESS: Status: RESOLVED | Noted: 2019-08-22 | Resolved: 2019-10-09

## 2019-10-28 ENCOUNTER — TELEPHONE (OUTPATIENT)
Dept: FAMILY MEDICINE CLINIC | Age: 74
End: 2019-10-28

## 2019-10-28 RX ORDER — CLONIDINE HYDROCHLORIDE 0.1 MG/1
0.1 TABLET ORAL 3 TIMES DAILY
Qty: 90 TABLET | Refills: 1 | Status: SHIPPED | OUTPATIENT
Start: 2019-10-28 | End: 2020-01-24

## 2019-10-28 RX ORDER — OXYBUTYNIN CHLORIDE 5 MG/1
5 TABLET ORAL 2 TIMES DAILY
Qty: 60 TABLET | Refills: 2 | Status: SHIPPED | OUTPATIENT
Start: 2019-10-28 | End: 2020-02-24 | Stop reason: SDUPTHER

## 2019-10-31 RX ORDER — MIRTAZAPINE 15 MG/1
15 TABLET, FILM COATED ORAL NIGHTLY
Qty: 30 TABLET | Refills: 0 | Status: SHIPPED | OUTPATIENT
Start: 2019-10-31 | End: 2019-12-26

## 2019-11-12 ENCOUNTER — OFFICE VISIT (OUTPATIENT)
Dept: FAMILY MEDICINE CLINIC | Age: 74
End: 2019-11-12
Payer: COMMERCIAL

## 2019-11-12 VITALS
OXYGEN SATURATION: 95 % | DIASTOLIC BLOOD PRESSURE: 72 MMHG | WEIGHT: 136.6 LBS | SYSTOLIC BLOOD PRESSURE: 136 MMHG | BODY MASS INDEX: 20.77 KG/M2 | HEART RATE: 72 BPM

## 2019-11-12 DIAGNOSIS — F41.9 ANXIETY: ICD-10-CM

## 2019-11-12 PROCEDURE — 99213 OFFICE O/P EST LOW 20 MIN: CPT | Performed by: NURSE PRACTITIONER

## 2019-11-12 RX ORDER — CLONAZEPAM 0.5 MG/1
0.25 TABLET ORAL NIGHTLY PRN
Qty: 15 TABLET | Refills: 2 | Status: SHIPPED | OUTPATIENT
Start: 2019-11-12 | End: 2020-02-28 | Stop reason: SDUPTHER

## 2019-11-12 ASSESSMENT — ENCOUNTER SYMPTOMS
CONSTIPATION: 0
CHEST TIGHTNESS: 0
SHORTNESS OF BREATH: 0
DIARRHEA: 0

## 2019-11-19 ENCOUNTER — NURSE ONLY (OUTPATIENT)
Dept: CARDIOLOGY CLINIC | Age: 74
End: 2019-11-19
Payer: COMMERCIAL

## 2019-11-19 DIAGNOSIS — Z86.73 H/O ISCHEMIC MULTIFOCAL MULTIPLE VASCULAR TERRITORIES STROKE: ICD-10-CM

## 2019-11-19 DIAGNOSIS — Z45.09 ENCOUNTER FOR ELECTRONIC ANALYSIS OF REVEAL EVENT RECORDER: ICD-10-CM

## 2019-11-19 PROCEDURE — 93298 REM INTERROG DEV EVAL SCRMS: CPT | Performed by: INTERNAL MEDICINE

## 2019-11-19 PROCEDURE — 93299 PR REM INTERROG ICPMS/SCRMS <30 D TECH REVIEW: CPT | Performed by: INTERNAL MEDICINE

## 2019-11-27 RX ORDER — FUROSEMIDE 40 MG/1
TABLET ORAL
Qty: 30 TABLET | Refills: 5 | Status: SHIPPED | OUTPATIENT
Start: 2019-11-27 | End: 2019-12-30 | Stop reason: SDUPTHER

## 2019-11-27 RX ORDER — ESCITALOPRAM OXALATE 10 MG/1
10 TABLET ORAL DAILY
Qty: 30 TABLET | Refills: 5 | Status: SHIPPED | OUTPATIENT
Start: 2019-11-27 | End: 2020-03-17 | Stop reason: SDUPTHER

## 2019-12-26 RX ORDER — MIRTAZAPINE 15 MG/1
TABLET, FILM COATED ORAL
Qty: 30 TABLET | Refills: 0 | Status: SHIPPED | OUTPATIENT
Start: 2019-12-26 | End: 2019-12-30 | Stop reason: SDUPTHER

## 2019-12-30 RX ORDER — FUROSEMIDE 40 MG/1
TABLET ORAL
Qty: 30 TABLET | Refills: 5 | Status: SHIPPED | OUTPATIENT
Start: 2019-12-30 | End: 2020-03-17 | Stop reason: SDUPTHER

## 2019-12-30 RX ORDER — MIRTAZAPINE 15 MG/1
TABLET, FILM COATED ORAL
Qty: 30 TABLET | Refills: 0 | Status: SHIPPED | OUTPATIENT
Start: 2019-12-30 | End: 2020-02-24

## 2020-01-02 ENCOUNTER — NURSE ONLY (OUTPATIENT)
Dept: CARDIOLOGY CLINIC | Age: 75
End: 2020-01-02
Payer: COMMERCIAL

## 2020-01-02 NOTE — LETTER
[address] [phone] [address] [facility],[address] [phone]    Pacemaker/Defibrillator Clinic          01/03/20        [address]  [place] [address]        Dear Henna Omer    This letter is to inform you that we received the transmission from your monitor at home that checks your implanted heart device. The next date your monitor will automatically transmit will be 2-5-20. If your report needs attention we will notify you. Your device and monitor are wireless and most transmit cellularly, but please periodically check your monitor is still plugged in to the electrical outlet. If you still use the telephone land line to send please ensure the connection to the phone stephanie is secure. This will help to ensure successful automatic transmissions in the future. Also, the monitor needs to be close to you while sleeping at night. Please be aware that the remote device transmission sites are periodically monitored only during regular business hours during which simultaneous in-office device clinics are being run. If your transmission requires attention, we will contact you as soon as possible. Thank you.             [facility]

## 2020-01-03 PROCEDURE — 93298 REM INTERROG DEV EVAL SCRMS: CPT | Performed by: INTERNAL MEDICINE

## 2020-01-03 NOTE — PROGRESS NOTES
[facility] remote Linq report shows no arrhythmias. We will continue to monitor remotely.

## 2020-01-09 RX ORDER — ATORVASTATIN CALCIUM 80 MG/1
TABLET, FILM COATED ORAL
Qty: 90 TABLET | Refills: 0 | Status: SHIPPED | OUTPATIENT
Start: 2020-01-09 | End: 2020-03-17 | Stop reason: SDUPTHER

## 2020-01-24 RX ORDER — ATORVASTATIN CALCIUM 80 MG/1
TABLET, FILM COATED ORAL
Qty: 90 TABLET | Refills: 0 | OUTPATIENT
Start: 2020-01-24

## 2020-01-27 RX ORDER — CLONIDINE HYDROCHLORIDE 0.3 MG/1
TABLET ORAL
Qty: 30 TABLET | Refills: 1 | Status: SHIPPED | OUTPATIENT
Start: 2020-01-27 | End: 2020-02-26

## 2020-02-05 ENCOUNTER — NURSE ONLY (OUTPATIENT)
Dept: CARDIOLOGY CLINIC | Age: 75
End: 2020-02-05
Payer: COMMERCIAL

## 2020-02-05 PROCEDURE — 93298 REM INTERROG DEV EVAL SCRMS: CPT | Performed by: INTERNAL MEDICINE

## 2020-02-05 NOTE — LETTER
[address] [facility] [phone] [address] [facility] [phone]    Pacemaker/Defibrillator Clinic          02/03/20        [address]  [place] [address]        Dear Alexander Nuno    This letter is to inform you that we received the transmission from your monitor at home that checks your implanted heart device. The next date your monitor will automatically transmit will be 3-9-20. If your report needs attention we will notify you. Your device and monitor are wireless and most transmit cellularly, but please periodically check your monitor is still plugged in to the electrical outlet. If you still use the telephone land line to send please ensure the connection to the phone stephanie is secure. This will help to ensure successful automatic transmissions in the future. Also, the monitor needs to be close to you while sleeping at night. Please be aware that the remote device transmission sites are periodically monitored only during regular business hours during which simultaneous in-office device clinics are being run. If your transmission requires attention, we will contact you as soon as possible. Thank you.             [facility]

## 2020-02-24 RX ORDER — OXYBUTYNIN CHLORIDE 5 MG/1
5 TABLET ORAL 2 TIMES DAILY
Qty: 60 TABLET | Refills: 2 | Status: SHIPPED | OUTPATIENT
Start: 2020-02-24 | End: 2020-03-02 | Stop reason: SDUPTHER

## 2020-02-24 RX ORDER — MIRTAZAPINE 15 MG/1
TABLET, FILM COATED ORAL
Qty: 30 TABLET | Refills: 0 | Status: SHIPPED | OUTPATIENT
Start: 2020-02-24 | End: 2020-02-26

## 2020-02-27 RX ORDER — CLONIDINE HYDROCHLORIDE 0.3 MG/1
TABLET ORAL
Qty: 30 TABLET | Refills: 0 | Status: SHIPPED | OUTPATIENT
Start: 2020-02-27 | End: 2020-03-25

## 2020-02-27 RX ORDER — MIRTAZAPINE 15 MG/1
TABLET, FILM COATED ORAL
Qty: 30 TABLET | Refills: 0 | Status: SHIPPED | OUTPATIENT
Start: 2020-02-27 | End: 2020-03-17 | Stop reason: SDUPTHER

## 2020-02-28 ENCOUNTER — OFFICE VISIT (OUTPATIENT)
Dept: FAMILY MEDICINE CLINIC | Age: 75
End: 2020-02-28
Payer: COMMERCIAL

## 2020-02-28 VITALS
WEIGHT: 140 LBS | SYSTOLIC BLOOD PRESSURE: 132 MMHG | BODY MASS INDEX: 21.29 KG/M2 | OXYGEN SATURATION: 98 % | DIASTOLIC BLOOD PRESSURE: 92 MMHG | HEART RATE: 84 BPM

## 2020-02-28 PROCEDURE — 99213 OFFICE O/P EST LOW 20 MIN: CPT | Performed by: NURSE PRACTITIONER

## 2020-02-28 RX ORDER — CLONAZEPAM 0.5 MG/1
0.25 TABLET ORAL NIGHTLY PRN
Qty: 15 TABLET | Refills: 0 | Status: SHIPPED | OUTPATIENT
Start: 2020-02-28 | End: 2020-03-26 | Stop reason: SDUPTHER

## 2020-02-28 RX ORDER — CLONAZEPAM 0.5 MG/1
TABLET ORAL
Qty: 15 TABLET | Refills: 1 | OUTPATIENT
Start: 2020-02-28

## 2020-02-28 ASSESSMENT — ENCOUNTER SYMPTOMS
DIARRHEA: 0
CONSTIPATION: 0
NAUSEA: 0
SHORTNESS OF BREATH: 0

## 2020-02-28 NOTE — PROGRESS NOTES
Jessica Host  : 1945  Encounter date: 2020    This efrain 75 y.o. female who presents with  Chief Complaint   Patient presents with    Follow-up     Patient here for a medication check. Medication is working well for her. History of present illness:    HPI Pt is 75year old 75year old female for medication recheck. Pt reports well controlled on 0.25 mg Clonazepam.  Pt is taking for insomnia. Denies current concerns. Pt is due for labs in 3 months, orders placed. Current Outpatient Medications on File Prior to Visit   Medication Sig Dispense Refill    mirtazapine (REMERON) 15 MG tablet TAKE ONE TABLET BY MOUTH ONCE NIGHTLY 30 tablet 0    cloNIDine (CATAPRES) 0.3 MG tablet TAKE ONE TABLET BY MOUTH ONCE NIGHTLY AS NEEDED FOR 30 DAYS 30 tablet 0    oxybutynin (DITROPAN) 5 MG tablet Take 1 tablet by mouth 2 times daily 60 tablet 2    atorvastatin (LIPITOR) 80 MG tablet TAKE ONE TABLET BY MOUTH DAILY 90 tablet 0    furosemide (LASIX) 40 MG tablet TAKE ONE TABLET BY MOUTH DAILY 30 tablet 5    escitalopram (LEXAPRO) 10 MG tablet Take 1 tablet by mouth daily 30 tablet 5    potassium chloride (KLOR-CON) 20 MEQ packet Take 20 mEq by mouth 2 times daily      aspirin 81 MG chewable tablet CHEW ONE TABLET BY MOUTH DAILY 30 tablet 0    labetalol (NORMODYNE) 200 MG tablet Take 1 tablet by mouth every 12 hours 60 tablet 0    mometasone-formoterol (DULERA) 200-5 MCG/ACT inhaler Inhale 2 puffs into the lungs as needed       Multiple Vitamins-Minerals (CENTRUM SILVER 50+MEN PO) Take 1 tablet by mouth      levothyroxine (SYNTHROID) 100 MCG tablet TAKE ONE TABLET BY MOUTH DAILY 90 tablet 2    ipratropium (ATROVENT) 0.02 % nebulizer solution Take 2.5 mLs by nebulization 3 times daily 360 mL 0    calcium elemental (OSCAL) 500 MG TABS tablet Take 1 tablet by mouth daily 30 tablet 3     No current facility-administered medications on file prior to visit.        Allergies   Allergen Reactions    Lipitor [Atorvastatin] Other (See Comments)     Weakness, severe    Morphine Shortness Of Breath    Keflex [Cephalexin] Other (See Comments)     SOB & bilateral arms shaking     Hctz [Hydrochlorothiazide] Other (See Comments)     Hyponatremia, severe      Norvasc [Amlodipine Besylate] Swelling     Of neck    Penicillins Hives    Tramadol Itching    Hydralazine Palpitations and Other (See Comments)     Dizzy,fatigue,headaches,palpitations,loss of appetite    Shellfish-Derived Products Swelling and Rash     Swelling of neck     Past Medical History:   Diagnosis Date    [facility] thrombosis[facility] ([facility]) 7/3/2015    Acute encephalopathy 4/19/2018    Alcohol abuse     Anxiety     CAD in native artery     Cellulitis 4/28/2018    Cerebral artery occlusion with cerebral infarction (HCC)     states hx of multiple mini strokes    Chronic fatigue     Complex care coordination     [facility] [address]     Depression     DIMAS (dyspnea on exertion) 7/3/2015    DVT, lower extremity ([facility])     Fatigue 11/3/2015    General weakness 11/4/2015    Generalized weakness 8/22/2019    Hypercholesteremia     Hypertension     Hyperthyroidism     Incontinence 10/16/2012    Mammogram abnormal 2/7/2012    Neuromuscular disorder ([facility] [address]     Osteopenia 2/14/2017    Pneumonia     Recurrent UTI     Right forearm cellulitis     Superficial thrombophlebitis of right upper extremity     Thyroid disease     Tobacco abuse     Tobacco abuse counseling     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 Conor Samayoa; repeat 5 years    EYE SURGERY      cateracts removed    SHOULDER ARTHROSCOPY Right 6/18/14    SUBACROMIAL DECOMPRESSION, ROTATOR CUFF REPAIR    TOE SURGERY Right     TONSILLECTOMY        Family History   Problem Relation Age of Onset    Heart Disease Father         MI @ 64    Alcohol

## 2020-03-02 RX ORDER — OXYBUTYNIN CHLORIDE 5 MG/1
5 TABLET ORAL 2 TIMES DAILY
Qty: 60 TABLET | Refills: 2 | Status: SHIPPED | OUTPATIENT
Start: 2020-03-02 | End: 2020-03-17 | Stop reason: SDUPTHER

## 2020-03-08 PROCEDURE — 93298 REM INTERROG DEV EVAL SCRMS: CPT | Performed by: INTERNAL MEDICINE

## 2020-03-08 PROCEDURE — G2066 INTER DEVC REMOTE 30D: HCPCS | Performed by: INTERNAL MEDICINE

## 2020-03-09 ENCOUNTER — NURSE ONLY (OUTPATIENT)
Dept: CARDIOLOGY CLINIC | Age: 75
End: 2020-03-09
Payer: COMMERCIAL

## 2020-03-17 ENCOUNTER — OFFICE VISIT (OUTPATIENT)
Dept: FAMILY MEDICINE CLINIC | Age: 75
End: 2020-03-17
Payer: COMMERCIAL

## 2020-03-17 VITALS
BODY MASS INDEX: 21.53 KG/M2 | OXYGEN SATURATION: 97 % | HEART RATE: 77 BPM | DIASTOLIC BLOOD PRESSURE: 82 MMHG | WEIGHT: 141.6 LBS | SYSTOLIC BLOOD PRESSURE: 132 MMHG

## 2020-03-17 PROCEDURE — 99214 OFFICE O/P EST MOD 30 MIN: CPT | Performed by: NURSE PRACTITIONER

## 2020-03-17 RX ORDER — FUROSEMIDE 40 MG/1
TABLET ORAL
Qty: 30 TABLET | Refills: 5 | Status: ON HOLD | OUTPATIENT
Start: 2020-03-17 | End: 2021-04-11 | Stop reason: ALTCHOICE

## 2020-03-17 RX ORDER — OXYBUTYNIN CHLORIDE 5 MG/1
5 TABLET ORAL 2 TIMES DAILY
Qty: 60 TABLET | Refills: 2 | Status: SHIPPED | OUTPATIENT
Start: 2020-03-17 | End: 2020-11-24

## 2020-03-17 RX ORDER — MIRTAZAPINE 15 MG/1
TABLET, FILM COATED ORAL
Qty: 30 TABLET | Refills: 0 | Status: SHIPPED | OUTPATIENT
Start: 2020-03-17 | End: 2020-05-26

## 2020-03-17 RX ORDER — ATORVASTATIN CALCIUM 80 MG/1
TABLET, FILM COATED ORAL
Qty: 90 TABLET | Refills: 0 | Status: SHIPPED | OUTPATIENT
Start: 2020-03-17 | End: 2020-05-26

## 2020-03-17 RX ORDER — ESCITALOPRAM OXALATE 10 MG/1
10 TABLET ORAL DAILY
Qty: 30 TABLET | Refills: 5 | Status: SHIPPED | OUTPATIENT
Start: 2020-03-17 | End: 2020-06-25

## 2020-03-17 RX ORDER — CLONAZEPAM 0.5 MG/1
0.25 TABLET ORAL NIGHTLY PRN
Qty: 15 TABLET | Refills: 0 | Status: CANCELLED | OUTPATIENT
Start: 2020-03-17 | End: 2020-04-16

## 2020-03-17 ASSESSMENT — ENCOUNTER SYMPTOMS
COUGH: 0
DIARRHEA: 0
NAUSEA: 0
CONSTIPATION: 0
CHEST TIGHTNESS: 0
WHEEZING: 0
SHORTNESS OF BREATH: 0

## 2020-03-17 NOTE — PROGRESS NOTES
Alex Li  : 1945  Encounter date: 3/17/2020    This efrain 75 y.o. female who presents with  Chief Complaint   Patient presents with    Medication Check     Medication check and refills. No new concerns. History of present illness:    HPI Pt is 75year old female for medication recheck, pt is taking lexapro for anxiety, cholesterol with atorvastatin, and essential hypertension. Pt reports blood pressure well controlled. Denies current concerns other than anxiety is increasing with current situations. Current Outpatient Medications on File Prior to Visit   Medication Sig Dispense Refill    clonazePAM (KLONOPIN) 0.5 MG tablet Take 0.5 tablets by mouth nightly as needed (insomnia) for up to 30 days. 15 tablet 0    cloNIDine (CATAPRES) 0.3 MG tablet TAKE ONE TABLET BY MOUTH ONCE NIGHTLY AS NEEDED FOR 30 DAYS 30 tablet 0    potassium chloride (KLOR-CON) 20 MEQ packet Take 20 mEq by mouth 2 times daily      aspirin 81 MG chewable tablet CHEW ONE TABLET BY MOUTH DAILY 30 tablet 0    labetalol (NORMODYNE) 200 MG tablet Take 1 tablet by mouth every 12 hours 60 tablet 0    mometasone-formoterol (DULERA) 200-5 MCG/ACT inhaler Inhale 2 puffs into the lungs as needed       Multiple Vitamins-Minerals (CENTRUM SILVER 50+MEN PO) Take 1 tablet by mouth      levothyroxine (SYNTHROID) 100 MCG tablet TAKE ONE TABLET BY MOUTH DAILY 90 tablet 2    ipratropium (ATROVENT) 0.02 % nebulizer solution Take 2.5 mLs by nebulization 3 times daily 360 mL 0    calcium elemental (OSCAL) 500 MG TABS tablet Take 1 tablet by mouth daily 30 tablet 3     No current facility-administered medications on file prior to visit.        Allergies   Allergen Reactions    Lipitor [Atorvastatin] Other (See Comments)     Weakness, severe    Morphine Shortness Of Breath    Keflex [Cephalexin] Other (See Comments)     SOB & bilateral arms shaking     Hctz [Hydrochlorothiazide] Other (See Comments)     Hyponatremia, severe      Mucous membranes are moist.      Pharynx: Oropharynx is clear. Eyes:      Extraocular Movements: Extraocular movements intact. Pupils: Pupils are equal, round, and reactive to light. Neck:      Musculoskeletal: Neck supple. No muscular tenderness. Cardiovascular:      Rate and Rhythm: Normal rate and regular rhythm. Heart sounds: Normal heart sounds. No murmur. Pulmonary:      Effort: Pulmonary effort is normal.      Breath sounds: Normal breath sounds. Abdominal:      General: Bowel sounds are normal.      Palpations: Abdomen is soft. Musculoskeletal:      Right lower leg: No edema. Left lower leg: No edema. Lymphadenopathy:      Cervical: No cervical adenopathy. Skin:     General: Skin is warm and dry. Capillary Refill: Capillary refill takes less than 2 seconds. Neurological:      Mental Status: She is alert and oriented to person, place, and time. Psychiatric:         Mood and Affect: Mood normal.         Assessment/Plan    1. Essential hypertension  Advised routinely checking BP at home  - furosemide (LASIX) 40 MG tablet; TAKE ONE TABLET BY MOUTH DAILY  Dispense: 30 tablet; Refill: 5  - Comprehensive Metabolic Panel, Fasting; Future    2. Anxiety  Discussed taking 1 mg as needed at night vs 0.5 mg clonazepam  - mirtazapine (REMERON) 15 MG tablet; Take one tablet once daily  Dispense: 30 tablet; Refill: 0  - escitalopram (LEXAPRO) 10 MG tablet; Take 1 tablet by mouth daily  Dispense: 30 tablet; Refill: 5    3. Hyperglycemia    - Hemoglobin A1C; Future    4. Hypothyroidism, unspecified type  Continue Levothyroxine 100 mcgs daily  - TSH with Reflex; Future    5. Hyperlipidemia, unspecified hyperlipidemia type  Advised healthy diet   - atorvastatin (LIPITOR) 80 MG tablet; TAKE ONE TABLET BY MOUTH DAILY  Dispense: 90 tablet; Refill: 0  - Lipid, Fasting; Future    6. Urinary incontinence, unspecified type  Continue Ditropan 5 mg daily  - oxybutynin (DITROPAN) 5 MG tablet;  Take 1 tablet by mouth 2 times daily  Dispense: 60 tablet; Refill: 2      Return in about 3 months (around 6/17/2020) for medication re-check. This dictation was generated by voice recognition computer software. Although all attempts are made to edit the dictation for accuracy, there may be errors in the transcription that are not intended.

## 2020-03-25 ENCOUNTER — NURSE ONLY (OUTPATIENT)
Dept: FAMILY MEDICINE CLINIC | Age: 75
End: 2020-03-25
Payer: COMMERCIAL

## 2020-03-25 LAB
BACTERIA URINE, POC: 0
BILIRUBIN URINE: 0 MG/DL
BLOOD, URINE: POSITIVE
CASTS URINE, POC: 0
CLARITY: ABNORMAL
COLOR: YELLOW
CRYSTALS URINE, POC: 0
EPI CELLS URINE, POC: 2
GLUCOSE URINE: NEGATIVE
KETONES, URINE: NEGATIVE
LEUKOCYTE EST, POC: ABNORMAL
NITRITE, URINE: NEGATIVE
PH UA: 7 (ref 4.5–8)
PROTEIN UA: NEGATIVE
RBC URINE, POC: 0
SPECIFIC GRAVITY UA: 1.01 (ref 1–1.03)
UROBILINOGEN, URINE: NORMAL
WBC URINE, POC: ABNORMAL
YEAST URINE, POC: 0

## 2020-03-25 PROCEDURE — 81000 URINALYSIS NONAUTO W/SCOPE: CPT | Performed by: NURSE PRACTITIONER

## 2020-03-25 RX ORDER — CLONIDINE HYDROCHLORIDE 0.3 MG/1
TABLET ORAL
Qty: 30 TABLET | Refills: 0 | Status: SHIPPED | OUTPATIENT
Start: 2020-03-25 | End: 2020-05-26

## 2020-03-26 RX ORDER — CLONAZEPAM 0.5 MG/1
0.25 TABLET ORAL NIGHTLY PRN
Qty: 15 TABLET | Refills: 0 | Status: SHIPPED | OUTPATIENT
Start: 2020-03-26 | End: 2020-05-11 | Stop reason: ALTCHOICE

## 2020-03-26 NOTE — TELEPHONE ENCOUNTER
Patient requesting a medication refill. Medication: clonazePAM [place]) 0.5 MG  Pharmacy: [facility] [address] [phone] Amanda Pleitez [phone]  Last office visit: 3/17/2020  Next office visit: Visit date not found    Patient states the dosage increased, please advise.

## 2020-03-27 LAB
ORGANISM: ABNORMAL
URINE CULTURE, ROUTINE: ABNORMAL

## 2020-03-27 RX ORDER — CIPROFLOXACIN 500 MG/1
500 TABLET, FILM COATED ORAL 2 TIMES DAILY
Qty: 14 TABLET | Refills: 0 | Status: SHIPPED | OUTPATIENT
Start: 2020-03-27 | End: 2020-05-11 | Stop reason: ALTCHOICE

## 2020-04-13 ENCOUNTER — NURSE ONLY (OUTPATIENT)
Dept: CARDIOLOGY CLINIC | Age: 75
End: 2020-04-13
Payer: COMMERCIAL

## 2020-04-13 PROCEDURE — 93298 REM INTERROG DEV EVAL SCRMS: CPT | Performed by: INTERNAL MEDICINE

## 2020-04-13 PROCEDURE — G2066 INTER DEVC REMOTE 30D: HCPCS | Performed by: INTERNAL MEDICINE

## 2020-04-24 RX ORDER — LEVOTHYROXINE SODIUM 0.1 MG/1
TABLET ORAL
Qty: 90 TABLET | Refills: 0 | Status: SHIPPED | OUTPATIENT
Start: 2020-04-24 | End: 2020-07-23 | Stop reason: SDUPTHER

## 2020-05-11 ENCOUNTER — VIRTUAL VISIT (OUTPATIENT)
Dept: FAMILY MEDICINE CLINIC | Age: 75
End: 2020-05-11
Payer: COMMERCIAL

## 2020-05-11 VITALS — DIASTOLIC BLOOD PRESSURE: 80 MMHG | SYSTOLIC BLOOD PRESSURE: 120 MMHG

## 2020-05-11 PROCEDURE — 99442 PR PHYS/QHP TELEPHONE EVALUATION 11-20 MIN: CPT | Performed by: NURSE PRACTITIONER

## 2020-05-11 RX ORDER — CLONAZEPAM 1 MG/1
1 TABLET ORAL NIGHTLY PRN
Qty: 30 TABLET | Refills: 0 | Status: SHIPPED | OUTPATIENT
Start: 2020-05-11 | End: 2020-06-11 | Stop reason: SDUPTHER

## 2020-05-11 ASSESSMENT — ENCOUNTER SYMPTOMS
COUGH: 0
SHORTNESS OF BREATH: 0

## 2020-05-11 NOTE — PROGRESS NOTES
ONCE NIGHTLY AS NEEDED 30 tablet 0    atorvastatin (LIPITOR) 80 MG tablet TAKE ONE TABLET BY MOUTH DAILY 90 tablet 0    mirtazapine (REMERON) 15 MG tablet Take one tablet once daily 30 tablet 0    escitalopram (LEXAPRO) 10 MG tablet Take 1 tablet by mouth daily 30 tablet 5    furosemide (LASIX) 40 MG tablet TAKE ONE TABLET BY MOUTH DAILY 30 tablet 5    oxybutynin (DITROPAN) 5 MG tablet Take 1 tablet by mouth 2 times daily 60 tablet 2    aspirin 81 MG chewable tablet CHEW ONE TABLET BY MOUTH DAILY 30 tablet 0    mometasone-formoterol (DULERA) 200-5 MCG/ACT inhaler Inhale 2 puffs into the lungs as needed       Multiple Vitamins-Minerals (CENTRUM SILVER 50+MEN PO) Take 1 tablet by mouth      ipratropium (ATROVENT) 0.02 % nebulizer solution Take 2.5 mLs by nebulization 3 times daily 360 mL 0    calcium elemental (OSCAL) 500 MG TABS tablet Take 1 tablet by mouth daily 30 tablet 3    potassium chloride (KLOR-CON) 20 MEQ packet Take 20 mEq by mouth 2 times daily      labetalol (NORMODYNE) 200 MG tablet Take 1 tablet by mouth every 12 hours (Patient not taking: Reported on 5/11/2020) 60 tablet 0     No current facility-administered medications on file prior to visit.        Allergies   Allergen Reactions    Lipitor [Atorvastatin] Other (See Comments)     Weakness, severe    Morphine Shortness Of Breath    Keflex [Cephalexin] Other (See Comments)     SOB & bilateral arms shaking     Hctz [Hydrochlorothiazide] Other (See Comments)     Hyponatremia, severe      Norvasc [Amlodipine Besylate] Swelling     Of neck    Penicillins Hives    Tramadol Itching    Hydralazine Palpitations and Other (See Comments)     Dizzy,fatigue,headaches,palpitations,loss of appetite    Shellfish-Derived Products Swelling and Rash     Swelling of neck     Past Medical History:   Diagnosis Date    Acute DVT (deep venous thrombosis) ([facility] [address] 7/3/2015    Acute encephalopathy 4/19/2018    Alcohol abuse     Anxiety     CAD in native

## 2020-05-18 ENCOUNTER — NURSE ONLY (OUTPATIENT)
Dept: CARDIOLOGY CLINIC | Age: 75
End: 2020-05-18
Payer: COMMERCIAL

## 2020-05-18 PROCEDURE — 93298 REM INTERROG DEV EVAL SCRMS: CPT | Performed by: INTERNAL MEDICINE

## 2020-05-18 PROCEDURE — G2066 INTER DEVC REMOTE 30D: HCPCS | Performed by: INTERNAL MEDICINE

## 2020-05-18 NOTE — PROGRESS NOTES
[employer] [employer] remote Linq report shows no arrhythmias. AF recordings are not real, this shows sinus rhythm with ectopy. We will continue to monitor remotely.

## 2020-05-18 NOTE — LETTER
[address] [facility] [phone]  [address] [address] [phone]    Pacemaker/Defibrillator Clinic          05/18/20        [address] [address]        Dear Avelino Lee    This letter is to inform you that we received the transmission from your monitor at home that checks your implanted heart device. The next date your monitor will automatically transmit will be 6-22-20. If your report needs attention we will notify you. Your device and monitor are wireless and most transmit cellularly, but please periodically check your monitor is still plugged in to the electrical outlet. If you still use the telephone land line to send please ensure the connection to the phone stephanie is secure. This will help to ensure successful automatic transmissions in the future. Also, the monitor needs to be close to you while sleeping at night. Please be aware that the remote device transmission sites are periodically monitored only during regular business hours during which simultaneous in-office device clinics are being run. If your transmission requires attention, we will contact you as soon as possible. Thank you.             Isacc

## 2020-06-11 RX ORDER — CLONAZEPAM 1 MG/1
1 TABLET ORAL NIGHTLY PRN
Qty: 30 TABLET | Refills: 0 | Status: SHIPPED | OUTPATIENT
Start: 2020-06-11 | End: 2020-07-14 | Stop reason: SDUPTHER

## 2020-06-22 ENCOUNTER — NURSE ONLY (OUTPATIENT)
Dept: CARDIOLOGY CLINIC | Age: 75
End: 2020-06-22
Payer: COMMERCIAL

## 2020-06-22 PROCEDURE — G2066 INTER DEVC REMOTE 30D: HCPCS | Performed by: INTERNAL MEDICINE

## 2020-06-22 PROCEDURE — 93298 REM INTERROG DEV EVAL SCRMS: CPT | Performed by: INTERNAL MEDICINE

## 2020-06-25 RX ORDER — ESCITALOPRAM OXALATE 10 MG/1
TABLET ORAL
Qty: 30 TABLET | Refills: 4 | Status: ON HOLD
Start: 2020-06-25 | End: 2021-04-15 | Stop reason: HOSPADM

## 2020-07-13 ENCOUNTER — OFFICE VISIT (OUTPATIENT)
Dept: FAMILY MEDICINE CLINIC | Age: 75
End: 2020-07-13
Payer: COMMERCIAL

## 2020-07-13 VITALS
HEART RATE: 113 BPM | BODY MASS INDEX: 21.13 KG/M2 | DIASTOLIC BLOOD PRESSURE: 106 MMHG | WEIGHT: 139 LBS | OXYGEN SATURATION: 97 % | SYSTOLIC BLOOD PRESSURE: 164 MMHG | TEMPERATURE: 97.1 F

## 2020-07-13 LAB
BACTERIA URINE, POC: 0
BILIRUBIN URINE: 0 MG/DL
BLOOD, URINE: POSITIVE
CASTS URINE, POC: 0
CLARITY: CLEAR
COLOR: YELLOW
CRYSTALS URINE, POC: 0
EPI CELLS URINE, POC: 0
GLUCOSE URINE: NEGATIVE
KETONES, URINE: NEGATIVE
LEUKOCYTE EST, POC: POSITIVE
NITRITE, URINE: POSITIVE
PH UA: 7 (ref 4.5–8)
PROTEIN UA: NEGATIVE
RBC URINE, POC: 0
SPECIFIC GRAVITY UA: 1.01 (ref 1–1.03)
UROBILINOGEN, URINE: NORMAL
WBC URINE, POC: NORMAL
YEAST URINE, POC: 0

## 2020-07-13 PROCEDURE — 99214 OFFICE O/P EST MOD 30 MIN: CPT | Performed by: NURSE PRACTITIONER

## 2020-07-13 PROCEDURE — 81000 URINALYSIS NONAUTO W/SCOPE: CPT | Performed by: NURSE PRACTITIONER

## 2020-07-13 RX ORDER — CIPROFLOXACIN 500 MG/1
500 TABLET, FILM COATED ORAL 2 TIMES DAILY
Qty: 14 TABLET | Refills: 0 | Status: SHIPPED | OUTPATIENT
Start: 2020-07-13 | End: 2020-07-20

## 2020-07-13 ASSESSMENT — ENCOUNTER SYMPTOMS
COUGH: 0
NAUSEA: 0
SHORTNESS OF BREATH: 0
VOMITING: 0
DIARRHEA: 0

## 2020-07-13 NOTE — TELEPHONE ENCOUNTER
Medication:   Requested Prescriptions     Pending Prescriptions Disp Refills    clonazePAM (KLONOPIN) 1 MG tablet 30 tablet 0     Sig: Take 1 tablet by mouth nightly as needed for Anxiety for up to 30 days. Last Filled:  6/12/20    Patient Phone Number: [phone] (home) [phone] (work)    Last appt: 7/13/2020   Next appt: 8/6/2020    Last OARRS:   RX Monitoring 5/11/2020   Attestation -   Periodic Controlled Substance Monitoring No signs of potential drug abuse or diversion identified.      PDMP Monitoring:    Last PDMP Sushant Link as Reviewed [facility]):  Review User Review Instant Review Result   Claude Ovens 5/11/2020  2:20 PM Reviewed PDMP [1]     Preferred Pharmacy:   [facility] [address], [address] [phone] Luis Antonio Gale [phone]  [address] [facility]  [place] [address]  Phone: [phone] Fax: [phone]

## 2020-07-13 NOTE — TELEPHONE ENCOUNTER
clonazePAM (KLONOPIN) 1 MG tablet  30 tablet  0  6/11/2020 7/11/2020     Sig - Route: Take 1 tablet by mouth nightly as needed for Anxiety for up to 30 days.       Pharmacy:  [facility] [address] [address] [facility]

## 2020-07-13 NOTE — PROGRESS NOTES
7 days 14 tablet 0    escitalopram (LEXAPRO) 10 MG tablet TAKE ONE TABLET BY MOUTH DAILY 30 tablet 4    atorvastatin (LIPITOR) 80 MG tablet TAKE ONE TABLET BY MOUTH DAILY 90 tablet 0    cloNIDine (CATAPRES) 0.3 MG tablet TAKE ONE TABLET BY MOUTH ONCE NIGHTLY AS NEEDED 90 tablet 0    mirtazapine (REMERON) 15 MG tablet TAKE ONE TABLET BY MOUTH DAILY 90 tablet 0    levothyroxine (SYNTHROID) 100 MCG tablet TAKE ONE TABLET BY MOUTH DAILY 90 tablet 0    furosemide (LASIX) 40 MG tablet TAKE ONE TABLET BY MOUTH DAILY 30 tablet 5    oxybutynin (DITROPAN) 5 MG tablet Take 1 tablet by mouth 2 times daily 60 tablet 2    potassium chloride (KLOR-CON) 20 MEQ packet Take 20 mEq by mouth 2 times daily      aspirin 81 MG chewable tablet CHEW ONE TABLET BY MOUTH DAILY 30 tablet 0    labetalol (NORMODYNE) 200 MG tablet Take 1 tablet by mouth every 12 hours 60 tablet 0    mometasone-formoterol (DULERA) 200-5 MCG/ACT inhaler Inhale 2 puffs into the lungs as needed       Multiple Vitamins-Minerals (CENTRUM SILVER 50+MEN PO) Take 1 tablet by mouth      ipratropium (ATROVENT) 0.02 % nebulizer solution Take 2.5 mLs by nebulization 3 times daily 360 mL 0    calcium elemental (OSCAL) 500 MG TABS tablet Take 1 tablet by mouth daily 30 tablet 3    clonazePAM (KLONOPIN) 1 MG tablet Take 1 tablet by mouth nightly as needed for Anxiety for up to 30 days. 30 tablet 0     No current facility-administered medications for this visit. Review of Systems   Constitutional: Negative for activity change, appetite change, chills, fatigue and fever. Respiratory: Negative for cough and shortness of breath. Cardiovascular: Negative for chest pain and palpitations. Gastrointestinal: Negative for diarrhea, nausea and vomiting. Genitourinary: Positive for frequency and urgency. Negative for dysuria, flank pain, hematuria and vaginal discharge.      Past medical, surgical, family and social history were reviewed and updated with the patient. Objective:    BP (!) 164/106 (Site: Left Upper Arm, Position: Sitting, Cuff Size: Medium Adult)   Pulse 113   Temp 97.1 °F (36.2 °C) (Temporal)   Wt 139 lb (63 kg)   SpO2 97%   BMI 21.13 kg/m²   Weight: 139 lb (63 kg)     BP Readings from Last 3 Encounters:   07/13/20 (!) 164/106   05/11/20 120/80   03/17/20 132/82     Wt Readings from Last 3 Encounters:   07/13/20 139 lb (63 kg)   03/17/20 141 lb 9.6 oz (64.2 kg)   02/28/20 140 lb (63.5 kg)     Physical Exam  Constitutional:       General: She is not in acute distress. Appearance: She is well-developed. HENT:      Head: Normocephalic and atraumatic. Cardiovascular:      Rate and Rhythm: Normal rate and regular rhythm. Heart sounds: Normal heart sounds, S1 normal and S2 normal.   Pulmonary:      Effort: Pulmonary effort is normal. No respiratory distress. Breath sounds: Normal breath sounds. Abdominal:      General: Abdomen is flat. Bowel sounds are normal.      Palpations: Abdomen is soft. Tenderness: There is no abdominal tenderness. There is no right CVA tenderness, left CVA tenderness, guarding or rebound. Skin:     General: Skin is warm and dry. Neurological:      Mental Status: She is alert and oriented to person, place, and time. Gait: Gait abnormal.      Comments: Required assistance with ambulation. Per daughter has been baseline/worsening over the past 4 weeks. Psychiatric:         Thought Content:  Thought content normal.         Judgment: Judgment normal.       Results for POC orders placed in visit on 07/13/20   POC URINE with Microscopic   Result Value Ref Range    Color, UA Yellow     Clarity, UA Clear Clear    Glucose, Ur Negative     Bilirubin Urine 0 mg/dL    Ketones, Urine Negative     Specific Gravity, UA 1.015 1.005 - 1.030    Blood, Urine Positive     pH, UA 7.0 4.5 - 8.0    Protein, UA Negative Negative    Nitrite, Urine Positive     Leukocytes, UA Positive     Urobilinogen, Urine Normal rbc urine, poc 0     wbc urine, poc TNTC     bacteria urine, poc 0     yeast urine, poc 0     casts urine, poc 0     epi cells urine, poc 0     crystals urine, poc 0      Assessment/Plan    1. Acute cystitis with hematuria  Initiate Cipro. Will send for culture for further evaluation. Spoke with daughter out in car and advised her on my concerns for unsteady gait, and blood pressure. Need to monitor closely and if B/P doesn't come down, she becomes more confused or off balance need to report to ED. Daughter comfortable with monitoring at home and aware of red flag symptoms. Will start her on antibiotic ASAP.    - POC URINE with Microscopic  - ciprofloxacin (CIPRO) 500 MG tablet; Take 1 tablet by mouth 2 times daily for 7 days  Dispense: 14 tablet; Refill: 0  - Culture, Urine    2. Elevated blood pressure reading in office with diagnosis of hypertension  Monitor over the next few days - if continues to be elevated call office and schedule a follow up appointment. 3. Ataxia  If continues to worsen needs to schedule a follow up for further evaluation and treatment. Eirca Palumbo was counseled regarding symptoms of current diagnosis, course and complications of disease if inadequately treated. Discussed side effects of medications, diagnosis, treatment options, and prognosis along with risks, benefits, complications, and alternatives of treatment including labs, imaging and other studies/treatment targets and goals. She verbalized understanding of instructions and counseling. Return if symptoms worsen or fail to improve.

## 2020-07-13 NOTE — PATIENT INSTRUCTIONS

## 2020-07-14 RX ORDER — CLONAZEPAM 1 MG/1
1 TABLET ORAL NIGHTLY PRN
Qty: 30 TABLET | Refills: 0 | Status: SHIPPED | OUTPATIENT
Start: 2020-07-14 | End: 2020-08-07

## 2020-07-14 RX ORDER — CLONAZEPAM 1 MG/1
TABLET ORAL
Qty: 30 TABLET | Refills: 0 | OUTPATIENT
Start: 2020-07-14

## 2020-07-15 LAB
ORGANISM: ABNORMAL
URINE CULTURE, ROUTINE: ABNORMAL

## 2020-07-23 RX ORDER — LEVOTHYROXINE SODIUM 0.1 MG/1
TABLET ORAL
Qty: 30 TABLET | Refills: 0 | Status: SHIPPED | OUTPATIENT
Start: 2020-07-23 | End: 2020-08-24

## 2020-07-23 RX ORDER — ATORVASTATIN CALCIUM 80 MG/1
TABLET, FILM COATED ORAL
Qty: 90 TABLET | Refills: 0 | Status: SHIPPED | OUTPATIENT
Start: 2020-07-23 | End: 2020-10-21

## 2020-07-27 ENCOUNTER — NURSE ONLY (OUTPATIENT)
Dept: CARDIOLOGY CLINIC | Age: 75
End: 2020-07-27
Payer: COMMERCIAL

## 2020-07-27 PROCEDURE — G2066 INTER DEVC REMOTE 30D: HCPCS | Performed by: INTERNAL MEDICINE

## 2020-07-27 PROCEDURE — 93298 REM INTERROG DEV EVAL SCRMS: CPT | Performed by: INTERNAL MEDICINE

## 2020-07-27 NOTE — PROGRESS NOTES
We received a remote transmission form patient's monitor at home. Remote Linq report shows no arrhythmias. AF recording is not real. EP physician to review. We will continue to monitor remotely.

## 2020-07-27 NOTE — LETTER
[address] [facility] [phone]  [address] [facility],[address] [phone]    Pacemaker/Defibrillator Clinic          07/27/20        [address] [address]        Dear Jenae Leblanc    This letter is to inform you that we received the transmission from your monitor at home that checks your implanted heart device. The next date your monitor will automatically transmit will be 8-31-20. If your report needs attention we will notify you. Your device and monitor are wireless and most transmit cellularly, but please periodically check your monitor is still plugged in to the electrical outlet. If you still use the telephone land line to send please ensure the connection to the phone stephanie is secure. This will help to ensure successful automatic transmissions in the future. Also, the monitor needs to be close to you while sleeping at night. Please be aware that the remote device transmission sites are periodically monitored only during regular business hours during which simultaneous in-office device clinics are being run. If your transmission requires attention, we will contact you as soon as possible. Thank you.             Isacc

## 2020-08-05 PROBLEM — I50.33 ACUTE ON CHRONIC DIASTOLIC HEART FAILURE (HCC): Status: RESOLVED | Noted: 2019-03-30 | Resolved: 2020-08-05

## 2020-08-05 PROBLEM — J44.1 COPD EXACERBATION (HCC): Status: RESOLVED | Noted: 2018-02-20 | Resolved: 2020-08-05

## 2020-08-07 ENCOUNTER — OFFICE VISIT (OUTPATIENT)
Dept: FAMILY MEDICINE CLINIC | Age: 75
End: 2020-08-07
Payer: COMMERCIAL

## 2020-08-07 VITALS
BODY MASS INDEX: 20.53 KG/M2 | DIASTOLIC BLOOD PRESSURE: 84 MMHG | WEIGHT: 135 LBS | HEART RATE: 83 BPM | SYSTOLIC BLOOD PRESSURE: 126 MMHG | TEMPERATURE: 98.9 F | OXYGEN SATURATION: 98 %

## 2020-08-07 PROCEDURE — 99213 OFFICE O/P EST LOW 20 MIN: CPT | Performed by: NURSE PRACTITIONER

## 2020-08-07 RX ORDER — CLONAZEPAM 1 MG/1
1 TABLET ORAL NIGHTLY PRN
Qty: 30 TABLET | Refills: 0 | Status: SHIPPED | OUTPATIENT
Start: 2020-08-07 | End: 2020-09-11 | Stop reason: SDUPTHER

## 2020-08-07 ASSESSMENT — ENCOUNTER SYMPTOMS: SHORTNESS OF BREATH: 0

## 2020-08-07 NOTE — PROGRESS NOTES
Kalry Gross  : 1945  Encounter date: 2020    This efrain 75 y.o. female who presents with  Chief Complaint   Patient presents with    Anxiety     patient presents today for 3 month follow up on controlled substance. History of present illness:    HPI Pt is here for 3 month medication recheck on clonazepam 1 mg nightly for anxiety and sleep. Pt is due for chronic medication management for essential hypertension, thyroid, and hyperlipidemia but has not had lab drawn since 2019. Will have to reschedule. Current Outpatient Medications on File Prior to Visit   Medication Sig Dispense Refill    atorvastatin (LIPITOR) 80 MG tablet TAKE ONE TABLET BY MOUTH DAILY 90 tablet 0    levothyroxine (SYNTHROID) 100 MCG tablet TAKE ONE TABLET BY MOUTH DAILY, needs blood work prior to any more refills.  30 tablet 0    escitalopram (LEXAPRO) 10 MG tablet TAKE ONE TABLET BY MOUTH DAILY 30 tablet 4    cloNIDine (CATAPRES) 0.3 MG tablet TAKE ONE TABLET BY MOUTH ONCE NIGHTLY AS NEEDED 90 tablet 0    mirtazapine (REMERON) 15 MG tablet TAKE ONE TABLET BY MOUTH DAILY 90 tablet 0    furosemide (LASIX) 40 MG tablet TAKE ONE TABLET BY MOUTH DAILY 30 tablet 5    oxybutynin (DITROPAN) 5 MG tablet Take 1 tablet by mouth 2 times daily 60 tablet 2    potassium chloride (KLOR-CON) 20 MEQ packet Take 20 mEq by mouth 2 times daily      aspirin 81 MG chewable tablet CHEW ONE TABLET BY MOUTH DAILY 30 tablet 0    labetalol (NORMODYNE) 200 MG tablet Take 1 tablet by mouth every 12 hours 60 tablet 0    mometasone-formoterol (DULERA) 200-5 MCG/ACT inhaler Inhale 2 puffs into the lungs as needed       Multiple Vitamins-Minerals (CENTRUM SILVER 50+MEN PO) Take 1 tablet by mouth      ipratropium (ATROVENT) 0.02 % nebulizer solution Take 2.5 mLs by nebulization 3 times daily 360 mL 0    calcium elemental (OSCAL) 500 MG TABS tablet Take 1 tablet by mouth daily 30 tablet 3     No current facility-administered medications on file prior to visit.        Allergies   Allergen Reactions    Lipitor [Atorvastatin] Other (See Comments)     Weakness, severe    Morphine Shortness Of Breath    Keflex [Cephalexin] Other (See Comments)     SOB & bilateral arms shaking     Hctz [Hydrochlorothiazide] Other (See Comments)     Hyponatremia, severe      Norvasc [Amlodipine Besylate] Swelling     Of neck    Penicillins Hives    Tramadol Itching    Hydralazine Palpitations and Other (See Comments)     Dizzy,fatigue,headaches,palpitations,loss of appetite    Shellfish-Derived Products Swelling and Rash     Swelling of neck     Past Medical History:   Diagnosis Date    Acute DVT (deep venous thrombosis) ([facility]) 7/3/2015    Acute encephalopathy 4/19/2018    Acute on chronic diastolic heart failure ([facility]) 3/30/2019    Alcohol abuse     Anxiety     CAD in native artery     Cellulitis 4/28/2018    Cerebral artery occlusion with cerebral infarction ([facility])     states hx of multiple mini strokes    [facility]vascular [facility] ([facility]A) [facility]     Chronic fatigue     Complex care coordination     Complicated UTI (urinary tract infection)     COPD (chronic obstructive pulmonary disease) [facility]     COPD exacerbation ([facility] 2/20/2018    Depression     DIMAS (dyspnea on exertion) 7/3/2015    DVT (deep venous thrombosis) ([facility])     DVT, lower extremity ([facility])     Fatigue 11/3/2015    General weakness 11/4/2015    Generalized weakness 8/22/2019    Hypercholesteremia     Hypertension     Hyperthyroidism     Incontinence 10/16/2012    Mammogram abnormal 2/7/2012    Neuromuscular disorder (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 7/1/2018      Past Surgical History:   Procedure Laterality Date    COLONOSCOPY  1/19/2012     Johann; repeat 5 years    EYE SURGERY      cateracts removed    SHOULDER ARTHROSCOPY Right 14    SUBACROMIAL DECOMPRESSION, ROTATOR CUFF REPAIR    TOE SURGERY Right     TONSILLECTOMY        Family History   Problem Relation Age of Onset    Heart Disease Father         MI @ 64    Alcohol Abuse Father       Social History     Tobacco Use    Smoking status: Current Every Day Smoker     Packs/day: 1.00     Years: 52.00     Pack years: 52.00     Types: Cigarettes     Last attempt to quit: 2018     Years since quittin.1    Smokeless tobacco: Never Used    Tobacco comment: started smoking at age 26 / smoked up to 2 p,p,d / now smoking 4 to 8 cigarettes a day,   Substance Use Topics    Alcohol use: No     Alcohol/week: 0.0 standard drinks     Comment: patient reports she is an alcoholic hasn't had anything to drink 3-4 months        Review of Systems   Constitutional: Negative for activity change, appetite change, fatigue and unexpected weight change. Respiratory: Negative for shortness of breath. Cardiovascular: Negative for chest pain, palpitations and leg swelling. Neurological: Negative for headaches. Psychiatric/Behavioral: Negative for agitation, decreased concentration, dysphoric mood and sleep disturbance. The patient is not nervous/anxious. Objective:    /84 (Site: Right Upper Arm, Position: Sitting, Cuff Size: Medium Adult)   Pulse 83   Temp 98.9 °F (37.2 °C)   Wt 135 lb (61.2 kg)   SpO2 98%   BMI 20.53 kg/m²   Weight: 135 lb (61.2 kg)     BP Readings from Last 3 Encounters:   20 126/84   20 (!) 164/106   20 120/80     Wt Readings from Last 3 Encounters:   20 135 lb (61.2 kg)   20 139 lb (63 kg)   20 141 lb 9.6 oz (64.2 kg)     BMI Readings from Last 3 Encounters:   20 20.53 kg/m²   20 21.13 kg/m²   20 21.53 kg/m²       Physical Exam  Vitals signs reviewed. Constitutional:       Appearance: Normal appearance. She is well-developed. Cardiovascular:      Rate and Rhythm: Normal rate and regular rhythm. Heart sounds: Normal heart sounds. No murmur. Pulmonary:      Effort: Pulmonary effort is normal.      Breath sounds: Normal breath sounds. Skin:     General: Skin is warm and dry. Neurological:      Mental Status: She is alert and oriented to person, place, and time. Psychiatric:         Mood and Affect: Mood normal.         Assessment/Plan    1. Anxiety  OARRS reviewed  - clonazePAM (KLONOPIN) 1 MG tablet; Take 1 tablet by mouth nightly as needed for Anxiety for up to 30 days. Dispense: 30 tablet; Refill: 0      Return in about 1 month (around 9/7/2020) for annual check up, medication re-check, lab review, hypertension re-check. This dictation was generated by voice recognition computer software. Although all attempts are made to edit the dictation for accuracy, there may be errors in the transcription that are not intended.

## 2020-08-24 RX ORDER — CLONIDINE HYDROCHLORIDE 0.3 MG/1
TABLET ORAL
Qty: 30 TABLET | Refills: 0 | Status: SHIPPED | OUTPATIENT
Start: 2020-08-24 | End: 2020-09-21

## 2020-08-24 RX ORDER — MIRTAZAPINE 15 MG/1
TABLET, FILM COATED ORAL
Qty: 30 TABLET | Refills: 0 | Status: SHIPPED | OUTPATIENT
Start: 2020-08-24 | End: 2020-09-21

## 2020-08-24 NOTE — TELEPHONE ENCOUNTER
Medication:   Requested Prescriptions     Pending Prescriptions Disp Refills    mirtazapine (REMERON) 15 MG tablet [Pharmacy Med Name: MIRTAZAPINE 15 MG TABLET] 30 tablet 0     Sig: TAKE ONE TABLET BY MOUTH DAILY    cloNIDine (CATAPRES) 0.3 MG tablet [Pharmacy Med Name: cloNIDine HCL 0.3 MG TABLET] 30 tablet 0     Sig: TAKE ONE TABLET BY MOUTH ONCE NIGHTLY AS NEEDED      Last Filled:      Patient Phone Number: [phone] (home) [phone] (work)    Last appt: 8/7/2020   Next appt:     Last OARRS:   RX Monitoring 5/11/2020   Attestation -   Periodic Controlled Substance Monitoring No signs of potential drug abuse or diversion identified.      PDMP Monitoring:    Last PDMP Héctor Guzman as Reviewed [facility]):  Review User Review Instant Review Result   Roc Ford 8/7/2020 10:45 AM Reviewed PDMP [1]     Preferred Pharmacy:   [facility] [address], [address] [phone] Danny Leader [phone] [address] [address]  Phone: [phone] Fax: [phone]

## 2020-08-31 ENCOUNTER — NURSE ONLY (OUTPATIENT)
Dept: CARDIOLOGY CLINIC | Age: 75
End: 2020-08-31
Payer: COMMERCIAL

## 2020-08-31 PROCEDURE — 93298 REM INTERROG DEV EVAL SCRMS: CPT | Performed by: INTERNAL MEDICINE

## 2020-08-31 PROCEDURE — G2066 INTER DEVC REMOTE 30D: HCPCS | Performed by: INTERNAL MEDICINE

## 2020-08-31 NOTE — LETTER
[address] [facility] [phone]  [address] [facility],[address] [phone]    Pacemaker/Defibrillator Clinic          09/01/20        [address] [address]        Dear Mau Gray    This letter is to inform you that we received the transmission from your monitor at home that checks your implanted heart device. The next date your monitor will automatically transmit will be 10-5-20. If your report needs attention we will notify you. Your device and monitor are wireless and most transmit cellularly, but please periodically check your monitor is still plugged in to the electrical outlet. If you still use the telephone land line to send please ensure the connection to the phone stephanie is secure. This will help to ensure successful automatic transmissions in the future. Also, the monitor needs to be close to you while sleeping at night. Please be aware that the remote device transmission sites are periodically monitored only during regular business hours during which simultaneous in-office device clinics are being run. If your transmission requires attention, we will contact you as soon as possible. Thank you.             [facility]

## 2020-09-01 NOTE — PROGRESS NOTES
We received remote transmission from patient's monitor at home. Transmission shows normal sensing and pacing function. AF recordings are not real. EP physician will review. See interrogation under cardiology tab in the [facility] [address] field for more details.

## 2020-09-11 RX ORDER — CLONAZEPAM 1 MG/1
1 TABLET ORAL NIGHTLY PRN
Qty: 30 TABLET | Refills: 0 | Status: SHIPPED | OUTPATIENT
Start: 2020-09-11 | End: 2020-10-12 | Stop reason: SDUPTHER

## 2020-09-11 NOTE — TELEPHONE ENCOUNTER
Medication:   Requested Prescriptions     Pending Prescriptions Disp Refills    clonazePAM (KLONOPIN) 1 MG tablet 30 tablet 0     Sig: Take 1 tablet by mouth nightly as needed for Anxiety for up to 30 days. Last Filled:      Patient Phone Number: [phone] (home) [phone] (work)    Last appt: 8/7/2020   Next appt: Visit date not found    Last OARRS:   RX Monitoring 5/11/2020   Attestation -   Periodic Controlled Substance Monitoring No signs of potential drug abuse or diversion identified.      PDMP Monitoring:    Last PDMP Deo River as Reviewed [facility]):  Review User Review Instant Review Result   Gerald Robbins 8/7/2020 10:45 AM Reviewed PDMP [1]     Preferred Pharmacy:   [facility], [address] [facility] [phone] Ruth Dodson [phone] [address] [place] [address]  Phone: [phone] Fax: [phone]

## 2020-09-29 RX ORDER — CLONIDINE HYDROCHLORIDE 0.3 MG/1
TABLET ORAL
Qty: 30 TABLET | Refills: 0 | Status: SHIPPED | OUTPATIENT
Start: 2020-09-29 | End: 2020-11-24

## 2020-09-29 RX ORDER — MIRTAZAPINE 15 MG/1
TABLET, FILM COATED ORAL
Qty: 30 TABLET | Refills: 0 | Status: SHIPPED | OUTPATIENT
Start: 2020-09-29 | End: 2020-11-24

## 2020-09-29 RX ORDER — LEVOTHYROXINE SODIUM 0.1 MG/1
TABLET ORAL
Qty: 30 TABLET | Refills: 0 | Status: SHIPPED | OUTPATIENT
Start: 2020-09-29 | End: 2020-11-24

## 2020-10-05 ENCOUNTER — NURSE ONLY (OUTPATIENT)
Dept: CARDIOLOGY CLINIC | Age: 75
End: 2020-10-05
Payer: COMMERCIAL

## 2020-10-05 PROCEDURE — 93298 REM INTERROG DEV EVAL SCRMS: CPT | Performed by: INTERNAL MEDICINE

## 2020-10-05 PROCEDURE — G2066 INTER DEVC REMOTE 30D: HCPCS | Performed by: INTERNAL MEDICINE

## 2020-10-05 NOTE — LETTER
[address] [facility] [phone]  [address] [facility],4[address] Floor [phone]    Pacemaker/Defibrillator Clinic          10/05/20        [address] [address]        Dear Marty Harris    This letter is to inform you that we received the transmission from your monitor at home that checks your implanted heart device. The next date your monitor will automatically transmit will be 11-9-20. If your report needs attention we will notify you. Your device and monitor are wireless and most transmit cellularly, but please periodically check your monitor is still plugged in to the electrical outlet. If you still use the telephone land line to send please ensure the connection to the phone stephanie is secure. This will help to ensure successful automatic transmissions in the future. Also, the monitor needs to be close to you while sleeping at night. Please be aware that the remote device transmission sites are periodically monitored only during regular business hours during which simultaneous in-office device clinics are being run. If your transmission requires attention, we will contact you as soon as possible. Thank you.             [address]

## 2020-10-12 RX ORDER — CLONAZEPAM 1 MG/1
1 TABLET ORAL NIGHTLY PRN
Qty: 30 TABLET | Refills: 0 | Status: SHIPPED | OUTPATIENT
Start: 2020-10-12 | End: 2020-11-10 | Stop reason: SDUPTHER

## 2020-10-12 NOTE — TELEPHONE ENCOUNTER
Medication:   Requested Prescriptions      No prescriptions requested or ordered in this encounter      Last Filled:  9/11/2020    Patient Phone Number: [phone] (home) [phone] (work)    Last appt: 8/7/2020   Next appt: Visit date not found    Last OARRS:   RX Monitoring 5/11/2020   Attestation -   Periodic Controlled Substance Monitoring No signs of potential drug abuse or diversion identified. PDMP Monitoring:    Last PDMP Jairo Rosener as Reviewed [facility]):  Review User Review Instant Review Result   [facility] 8/7/2020 10:45 AM Reviewed PDMP [1]     Preferred Pharmacy:   Norma Peacock [address], [address] [phone] Trenton Vasquez [phone]  [address]  [place] [address]  Phone: [phone] Fax: [phone]  Medication:   Requested Prescriptions      No prescriptions requested or ordered in this encounter      Last Filled:  9/11/2020    Patient Phone Number: [phone] (home) [phone] (work)    Last appt: 8/7/2020   Next appt: Visit date not found    Last OARRS:   RX Monitoring 5/11/2020   Attestation -   Periodic Controlled Substance Monitoring No signs of potential drug abuse or diversion identified.      PDMP Monitoring:    Last PDMP Jairo Ordonez as Reviewed [facility]):  Review User Review Instant Review Result   [facility] 8/7/2020 10:45 AM Reviewed PDMP [1]     Preferred Pharmacy:   Norma Ayush [address], [address] [phone] Trenton Vasquez [phone]  [address]  [place] [address]  Phone: [phone] Fax: [phone]

## 2020-10-21 RX ORDER — ATORVASTATIN CALCIUM 80 MG/1
TABLET, FILM COATED ORAL
Qty: 30 TABLET | Refills: 0 | Status: SHIPPED | OUTPATIENT
Start: 2020-10-21 | End: 2020-11-24

## 2020-11-03 PROBLEM — I10 ESSENTIAL HYPERTENSION: Status: RESOLVED | Noted: 2020-11-03 | Resolved: 2020-11-03

## 2020-11-09 ENCOUNTER — NURSE ONLY (OUTPATIENT)
Dept: CARDIOLOGY CLINIC | Age: 75
End: 2020-11-09
Payer: COMMERCIAL

## 2020-11-09 PROCEDURE — G2066 INTER DEVC REMOTE 30D: HCPCS | Performed by: INTERNAL MEDICINE

## 2020-11-09 PROCEDURE — 93298 REM INTERROG DEV EVAL SCRMS: CPT | Performed by: INTERNAL MEDICINE

## 2020-11-09 NOTE — LETTER
[address] [facility] [phone]  [address] [address] [phone]    Pacemaker/Defibrillator Clinic          11/09/20        [address] [address]        Dear Freddy Castillo    This letter is to inform you that we received the transmission from your monitor at home that checks your implanted heart device. The next date your monitor will automatically transmit will be 12-14-20. If your report needs attention we will notify you. Your device and monitor are wireless and most transmit cellularly, but please periodically check your monitor is still plugged in to the electrical outlet. If you still use the telephone land line to send please ensure the connection to the phone stephanie is secure. This will help to ensure successful automatic transmissions in the future. Also, the monitor needs to be close to you while sleeping at night. Please be aware that the remote device transmission sites are periodically monitored only during regular business hours during which simultaneous in-office device clinics are being run. If your transmission requires attention, we will contact you as soon as possible. Thank you.             Isacc

## 2020-11-10 RX ORDER — CLONAZEPAM 1 MG/1
1 TABLET ORAL NIGHTLY PRN
Qty: 30 TABLET | Refills: 0 | Status: SHIPPED | OUTPATIENT
Start: 2020-11-10 | End: 2020-12-15 | Stop reason: SDUPTHER

## 2020-11-10 NOTE — TELEPHONE ENCOUNTER
Medication:   Requested Prescriptions     Pending Prescriptions Disp Refills    clonazePAM (KLONOPIN) 1 MG tablet 30 tablet 0     Sig: Take 1 tablet by mouth nightly as needed for Anxiety for up to 30 days. Last Filled:  10/12/20    Patient Phone Number: [phone] (home) [phone] (work)    Last appt: 8/7/2020   Next appt: Visit date not found    Last OARRS:   RX Monitoring 5/11/2020   Attestation -   Periodic Controlled Substance Monitoring No signs of potential drug abuse or diversion identified.      PDMP Monitoring:    Last PDMP Angel Neighbours as Reviewed [facility]):  Review User Review Instant Review Result   Dwight Kolton 8/7/2020 10:45 AM Reviewed PDMP [1]     Preferred Pharmacy:   [facility] [address], [address] [facility] [phone] Gloria Lopez [phone]  [address] [address]  Phone: [phone] Fax: [phone]

## 2020-11-10 NOTE — TELEPHONE ENCOUNTER
----- Message from Zahraa Patel sent at 11/10/2020  3:08 PM EST -----  Subject: Refill Request    QUESTIONS  Name of Medication? clonazePAM (KLONOPIN) 1 MG tablet  Patient-reported dosage and instructions? 1 tab daily  How many days do you have left? 2  Preferred Pharmacy? [facility] [address] [phone]  Pharmacy phone number (if available)? [phone]  Additional Information for Provider?   ---------------------------------------------------------------------------  --------------  CALL BACK INFO  What is the best way for the office to contact you? OK to leave message on   voicemail  Preferred Call Back Phone Number?  [phone]

## 2020-11-11 ENCOUNTER — TELEPHONE (OUTPATIENT)
Dept: FAMILY MEDICINE CLINIC | Age: 75
End: 2020-11-11

## 2020-11-11 NOTE — TELEPHONE ENCOUNTER
----- Message from Baljit Perez sent at 11/10/2020  3:08 PM EST -----  Subject: Refill Request    QUESTIONS  Name of Medication? clonazePAM (KLONOPIN) 1 MG tablet  Patient-reported dosage and instructions? 1 tab daily  How many days do you have left? 2  Preferred Pharmacy? [facility] [address] [phone]  Pharmacy phone number (if available)? [phone]  Additional Information for Provider?   ---------------------------------------------------------------------------  --------------  CALL BACK INFO  What is the best way for the office to contact you? OK to leave message on   voicemail  Preferred Call Back Phone Number?  [phone]

## 2020-11-24 RX ORDER — MIRTAZAPINE 15 MG/1
TABLET, FILM COATED ORAL
Qty: 30 TABLET | Refills: 0 | Status: SHIPPED | OUTPATIENT
Start: 2020-11-24 | End: 2020-12-21

## 2020-11-24 RX ORDER — OXYBUTYNIN CHLORIDE 5 MG/1
TABLET ORAL
Qty: 60 TABLET | Refills: 1 | Status: SHIPPED | OUTPATIENT
Start: 2020-11-24 | End: 2021-01-19

## 2020-11-24 RX ORDER — CLONIDINE HYDROCHLORIDE 0.3 MG/1
TABLET ORAL
Qty: 30 TABLET | Refills: 0 | Status: SHIPPED | OUTPATIENT
Start: 2020-11-24 | End: 2020-12-21

## 2020-11-24 RX ORDER — LEVOTHYROXINE SODIUM 0.1 MG/1
TABLET ORAL
Qty: 30 TABLET | Refills: 0 | Status: SHIPPED | OUTPATIENT
Start: 2020-11-24 | End: 2020-12-21

## 2020-11-24 RX ORDER — ATORVASTATIN CALCIUM 80 MG/1
TABLET, FILM COATED ORAL
Qty: 30 TABLET | Refills: 0 | Status: SHIPPED | OUTPATIENT
Start: 2020-11-24 | End: 2020-12-21

## 2020-12-10 RX ORDER — CLONAZEPAM 1 MG/1
1 TABLET ORAL NIGHTLY PRN
Qty: 30 TABLET | Refills: 0 | OUTPATIENT
Start: 2020-12-10 | End: 2021-01-09

## 2020-12-10 NOTE — TELEPHONE ENCOUNTER
Dominguez Brush for this? Medication:   Requested Prescriptions     Pending Prescriptions Disp Refills    clonazePAM (KLONOPIN) 1 MG tablet 30 tablet 0     Sig: Take 1 tablet by mouth nightly as needed for Anxiety for up to 30 days. Last Filled:  11/10/20    Patient Phone Number: [phone] (home) [phone] (work)    Last appt: 8/7/2020   Next appt: Visit date not found    Last OARRS:   RX Monitoring 5/11/2020   Attestation -   Periodic Controlled Substance Monitoring No signs of potential drug abuse or diversion identified.      PDMP Monitoring:    Last PDMP Ashley Tobias as Reviewed [facility]):  Review User Review Instant Review Result   Justina Joycelyn 8/7/2020 10:45 AM Reviewed PDMP [1]     Preferred Pharmacy:   Kamilah Vyas [facility] [address], [address] [facility] [phone] Gila Galvez [phone] [address] [facility] [address]  [facility] [address]  Phone: [phone] Fax: [phone]

## 2020-12-10 NOTE — TELEPHONE ENCOUNTER
----- Message from Steve Mancerakshire sent at 12/10/2020  4:18 PM EST -----  Subject: Refill Request    QUESTIONS  Name of Medication? clonazePAM (KLONOPIN) 1 MG tablet  Patient-reported dosage and instructions? Take 1 tablet by mouth nightly   as needed for Anxiety for up to 30 days. How many days do you have left? 1  Preferred Pharmacy? [facility]  Pharmacy phone number (if available)? [phone]  ---------------------------------------------------------------------------  --------------  To SADLER  What is the best way for the office to contact you? OK to leave message on   voicemail  Preferred Call Back Phone Number?  [phone]

## 2020-12-14 ENCOUNTER — NURSE ONLY (OUTPATIENT)
Dept: CARDIOLOGY CLINIC | Age: 75
End: 2020-12-14
Payer: COMMERCIAL

## 2020-12-14 ENCOUNTER — TELEPHONE (OUTPATIENT)
Dept: FAMILY MEDICINE CLINIC | Age: 75
End: 2020-12-14

## 2020-12-14 PROCEDURE — 93298 REM INTERROG DEV EVAL SCRMS: CPT | Performed by: INTERNAL MEDICINE

## 2020-12-14 PROCEDURE — G2066 INTER DEVC REMOTE 30D: HCPCS | Performed by: INTERNAL MEDICINE

## 2020-12-14 NOTE — LETTER
[address] [facility] [phone]  [address] [address] [phone]    Pacemaker/Defibrillator Clinic          12/15/20        [address] [address]        Dear Rosita Kumari    This letter is to inform you that we received the transmission from your monitor at home that checks your implanted heart device. The next date your monitor will automatically transmit will be 1-18-21. If your report needs attention we will notify you. Your device and monitor are wireless and most transmit cellularly, but please periodically check your monitor is still plugged in to the electrical outlet. If you still use the telephone land line to send please ensure the connection to the phone stephanie is secure. This will help to ensure successful automatic transmissions in the future. Also, the monitor needs to be close to you while sleeping at night. Please be aware that the remote device transmission sites are periodically monitored only during regular business hours during which simultaneous in-office device clinics are being run. If your transmission requires attention, we will contact you as soon as possible. Thank you.             [facility]

## 2020-12-15 RX ORDER — CLONAZEPAM 1 MG/1
1 TABLET ORAL NIGHTLY PRN
Qty: 30 TABLET | Refills: 0 | Status: SHIPPED | OUTPATIENT
Start: 2020-12-15 | End: 2020-12-17 | Stop reason: SDUPTHER

## 2020-12-15 NOTE — PROGRESS NOTES
We received a remote transmission form patient's monitor at home. Remote Linq report shows no arrhythmias. AF is not real. EP physician to review. We will continue to monitor remotely.

## 2020-12-17 ENCOUNTER — OFFICE VISIT (OUTPATIENT)
Dept: FAMILY MEDICINE CLINIC | Age: 75
End: 2020-12-17
Payer: COMMERCIAL

## 2020-12-17 VITALS
DIASTOLIC BLOOD PRESSURE: 76 MMHG | BODY MASS INDEX: 21.07 KG/M2 | OXYGEN SATURATION: 99 % | WEIGHT: 139 LBS | TEMPERATURE: 96.1 F | HEART RATE: 75 BPM | HEIGHT: 68 IN | SYSTOLIC BLOOD PRESSURE: 128 MMHG

## 2020-12-17 PROCEDURE — 99213 OFFICE O/P EST LOW 20 MIN: CPT | Performed by: NURSE PRACTITIONER

## 2020-12-17 RX ORDER — CLONAZEPAM 1 MG/1
1 TABLET ORAL NIGHTLY PRN
Qty: 30 TABLET | Refills: 0 | Status: SHIPPED | OUTPATIENT
Start: 2020-12-17 | End: 2021-01-14 | Stop reason: SDUPTHER

## 2020-12-17 ASSESSMENT — ENCOUNTER SYMPTOMS
CONSTIPATION: 0
SHORTNESS OF BREATH: 0
NAUSEA: 0
DIARRHEA: 0

## 2020-12-17 NOTE — PROGRESS NOTES
Zoë Early  : 1945  Encounter date: 2020    This efrain 67 y.o. female who presents with  Chief Complaint   Patient presents with    Follow-up     Routine office follow up    [address] check     Hypertension     Med check        History of present illness:    HPI Pt is 67year old female for medication recheck on clonazepam.  Pt has been told that will not be able to continue care if health maintenance not completed including labs. Pt voiced understanding. Pt reports anxiety well controlled. Will relay message to /caregiver.     Current Outpatient Medications on File Prior to Visit   Medication Sig Dispense Refill    atorvastatin (LIPITOR) 80 MG tablet TAKE ONE TABLET BY MOUTH DAILY 30 tablet 0    levothyroxine (SYNTHROID) 100 MCG tablet TAKE ONE TABLET BY MOUTH DAILY 30 tablet 0    mirtazapine (REMERON) 15 MG tablet TAKE ONE TABLET BY MOUTH DAILY 30 tablet 0    cloNIDine (CATAPRES) 0.3 MG tablet TAKE ONE TABLET BY MOUTH ONCE NIGHTLY AS NEEDED 30 tablet 0    oxybutynin (DITROPAN) 5 MG tablet TAKE ONE TABLET BY MOUTH TWICE A DAY 60 tablet 1    escitalopram (LEXAPRO) 10 MG tablet TAKE ONE TABLET BY MOUTH DAILY 30 tablet 4    furosemide (LASIX) 40 MG tablet TAKE ONE TABLET BY MOUTH DAILY 30 tablet 5    potassium chloride (KLOR-CON) 20 MEQ packet Take 20 mEq by mouth 2 times daily      aspirin 81 MG chewable tablet CHEW ONE TABLET BY MOUTH DAILY 30 tablet 0    labetalol (NORMODYNE) 200 MG tablet Take 1 tablet by mouth every 12 hours 60 tablet 0    mometasone-formoterol (DULERA) 200-5 MCG/ACT inhaler Inhale 2 puffs into the lungs as needed       Multiple Vitamins-Minerals (CENTRUM SILVER 50+MEN PO) Take 1 tablet by mouth      ipratropium (ATROVENT) 0.02 % nebulizer solution Take 2.5 mLs by nebulization 3 times daily 360 mL 0    calcium elemental (OSCAL) 500 MG TABS tablet Take 1 tablet by mouth daily 30 tablet 3     No current facility-administered medications on file prior to visit.        Allergies   Allergen Reactions    Lipitor [Atorvastatin] Other (See Comments)     Weakness, severe    Morphine Shortness Of Breath    Keflex [Cephalexin] Other (See Comments)     SOB & bilateral arms shaking     Hctz [Hydrochlorothiazide] Other (See Comments)     Hyponatremia, severe      Norvasc [Amlodipine Besylate] Swelling     Of neck    Penicillins Hives    Tramadol Itching    Hydralazine Palpitations and Other (See Comments)     Dizzy,fatigue,headaches,palpitations,loss of appetite    Shellfish-Derived Products Swelling and Rash     Swelling of neck     Past Medical History:   Diagnosis Date    Acute DVT (deep venous thrombosis) ([facility]) 7/3/2015    Acute encephalopathy 4/19/2018    Acute on chronic diastolic heart failure ([facility]) 3/30/2019    Alcohol abuse     Anxiety     CAD in native artery     Cellulitis 4/28/2018    Cerebral artery occlusion with cerebral infarction ([facility])     states hx of multiple mini strokes    [facility]vascular [facility] ([facility]A) [facility]     Chronic fatigue     Complex care coordination     Complicated UTI (urinary tract infection)     COPD (chronic obstructive pulmonary disease) ([facility]     COPD exacerbation ([facility] 2/20/2018    Depression     DIMAS (dyspnea on exertion) 7/3/2015    DVT (deep venous thrombosis) ([facility])     DVT, lower extremity ([facility])     Fatigue 11/3/2015    General weakness 11/4/2015    Generalized weakness 8/22/2019    Hypercholesteremia     Hypertension     Hyperthyroidism     Incontinence 10/16/2012    Mammogram abnormal 2/7/2012    Neuromuscular disorder ([facility]     Osteopenia 2/14/2017    Pneumonia     Recurrent UTI     Right forearm cellulitis     Superficial thrombophlebitis of right upper extremity     Thyroid disease     Tobacco abuse     Tobacco abuse counseling     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 Shasta Mckeon; repeat 5 years    EYE SURGERY      cateracts removed    SHOULDER ARTHROSCOPY Right 14    SUBACROMIAL DECOMPRESSION, ROTATOR CUFF REPAIR    TOE SURGERY Right     TONSILLECTOMY        Family History   Problem Relation Age of Onset    Heart Disease Father         MI @ 64    Alcohol Abuse Father       Social History     Tobacco Use    Smoking status: Current Every Day Smoker     Packs/day: 1.00     Years: 52.00     Pack years: 52.00     Types: Cigarettes     Last attempt to quit: 2018     Years since quittin.4    Smokeless tobacco: Never Used    Tobacco comment: started smoking at age 26 / smoked up to 2 p,p,d / now smoking 4 to 8 cigarettes a day,   Substance Use Topics    Alcohol use: No     Alcohol/week: 0.0 standard drinks     Comment: patient reports she is an alcoholic hasn't had anything to drink 3-4 months        Review of Systems   Constitutional: Negative for activity change, appetite change, fatigue and unexpected weight change. Eyes: Negative for visual disturbance. Respiratory: Negative for shortness of breath. Cardiovascular: Negative for chest pain, palpitations and leg swelling. Gastrointestinal: Negative for constipation, diarrhea and nausea. Neurological: Negative for headaches. Psychiatric/Behavioral: Negative for dysphoric mood and sleep disturbance. The patient is not nervous/anxious.         Objective:    /76 (Site: Left Upper Arm, Position: Sitting, Cuff Size: Medium Adult)   Pulse 75   Temp 96.1 °F (35.6 °C) (Temporal)   Ht 5' 8\" (1.727 m)   Wt 139 lb (63 kg)   SpO2 99%   BMI 21.13 kg/m²   Weight: 139 lb (63 kg)     BP Readings from Last 3 Encounters:   20 128/76   20 126/84   20 (!) 164/106     Wt Readings from Last 3 Encounters:   20 139 lb (63 kg)   20 135 lb (61.2 kg)   20 139 lb (63 kg)     BMI Readings from Last 3 Encounters:   20 21.13 kg/m²   20 20.53 kg/m²   20 21.13 kg/m²       Physical Exam  Vitals signs reviewed. Constitutional:       Appearance: Normal appearance. She is well-developed. Cardiovascular:      Rate and Rhythm: Normal rate and regular rhythm. Heart sounds: Normal heart sounds. No murmur. Pulmonary:      Effort: Pulmonary effort is normal.      Breath sounds: Normal breath sounds. Abdominal:      General: Bowel sounds are normal.      Palpations: Abdomen is soft. Skin:     General: Skin is warm and dry. Neurological:      Mental Status: She is alert. Mental status is at baseline. Psychiatric:         Mood and Affect: Mood normal.         Assessment/Plan    1. Anxiety  OARRS reviewed  - clonazePAM (KLONOPIN) 1 MG tablet; Take 1 tablet by mouth nightly as needed for Anxiety for up to 30 days. Dispense: 30 tablet; Refill: 0      Return in about 3 months (around 3/17/2021) for lab review, annual check up. This dictation was generated by voice recognition computer software. Although all attempts are made to edit the dictation for accuracy, there may be errors in the transcription that are not intended.

## 2020-12-21 RX ORDER — CLONIDINE HYDROCHLORIDE 0.3 MG/1
TABLET ORAL
Qty: 30 TABLET | Refills: 0 | Status: SHIPPED | OUTPATIENT
Start: 2020-12-21 | End: 2021-01-19

## 2020-12-21 RX ORDER — ATORVASTATIN CALCIUM 80 MG/1
TABLET, FILM COATED ORAL
Qty: 30 TABLET | Refills: 0 | Status: SHIPPED | OUTPATIENT
Start: 2020-12-21 | End: 2021-01-19

## 2020-12-21 RX ORDER — MIRTAZAPINE 15 MG/1
TABLET, FILM COATED ORAL
Qty: 30 TABLET | Refills: 0 | Status: SHIPPED | OUTPATIENT
Start: 2020-12-21 | End: 2021-01-19

## 2020-12-21 RX ORDER — CLONIDINE HYDROCHLORIDE 0.3 MG/1
TABLET ORAL
Qty: 30 TABLET | Refills: 0 | OUTPATIENT
Start: 2020-12-21

## 2020-12-21 RX ORDER — LEVOTHYROXINE SODIUM 0.1 MG/1
TABLET ORAL
Qty: 30 TABLET | Refills: 0 | Status: SHIPPED | OUTPATIENT
Start: 2020-12-21 | End: 2021-01-19

## 2020-12-21 NOTE — TELEPHONE ENCOUNTER
Pt needs med refill on cloNIDine (CATAPRES) 0.3 MG tablet     Last seen on 12/17/20     Last office note states   Return in about 3 months (around 3/17/2021) for lab review, annual check up  No f/u appt scheduled at this time

## 2020-12-21 NOTE — TELEPHONE ENCOUNTER
Pt needs med refills on her   atorvastatin (LIPITOR) 80 MG tablet  And   levothyroxine (SYNTHROID) 100 MCG tablet   And   mirtazapine (REMERON) 15 MG tablet     Last seen on 12/17/20    Last office note states Return in about 3 months (around 3/17/2021) for lab review, annual check up  No f/u appt scheduled at this time.

## 2020-12-23 ENCOUNTER — HOSPITAL ENCOUNTER (OUTPATIENT)
Age: 75
Discharge: HOME | End: 2020-12-23
Payer: COMMERCIAL

## 2020-12-23 LAB
A/G RATIO: 1.4 (ref 1.1–2.2)
ALBUMIN SERPL-MCNC: 4.3 G/DL (ref 3.4–5)
ALP BLD-CCNC: 60 U/L (ref 40–129)
ALT SERPL-CCNC: 11 U/L (ref 10–40)
ANION GAP SERPL CALCULATED.3IONS-SCNC: 11 MMOL/L (ref 3–16)
AST SERPL-CCNC: 21 U/L (ref 15–37)
BASOPHILS ABSOLUTE: 0.1 K/UL (ref 0–0.2)
BASOPHILS RELATIVE PERCENT: 1.3 %
BILIRUB SERPL-MCNC: 0.3 MG/DL (ref 0–1)
BUN BLDV-MCNC: 9 MG/DL (ref 7–20)
CALCIUM SERPL-MCNC: 9.8 MG/DL (ref 8.3–10.6)
CHLORIDE BLD-SCNC: 103 MMOL/L (ref 99–110)
CHOLESTEROL, FASTING: 150 MG/DL (ref 0–199)
CO2: 27 MMOL/L (ref 21–32)
CREAT SERPL-MCNC: 0.8 MG/DL (ref 0.6–1.2)
EOSINOPHILS ABSOLUTE: 0.1 K/UL (ref 0–0.6)
EOSINOPHILS RELATIVE PERCENT: 1.3 %
GFR AFRICAN AMERICAN: >60
GFR NON-AFRICAN AMERICAN: >60
GLOBULIN: 3.1 G/DL
GLUCOSE FASTING: 93 MG/DL (ref 70–99)
HCT VFR BLD CALC: 40.4 % (ref 36–48)
HDLC SERPL-MCNC: 58 MG/DL (ref 40–60)
HEMOGLOBIN: 13.8 G/DL (ref 12–16)
LDL CHOLESTEROL CALCULATED: 73 MG/DL
LYMPHOCYTES ABSOLUTE: 0.7 K/UL (ref 1–5.1)
LYMPHOCYTES RELATIVE PERCENT: 11.4 %
MCH RBC QN AUTO: 34.5 PG (ref 26–34)
MCHC RBC AUTO-ENTMCNC: 34.1 G/DL (ref 31–36)
MCV RBC AUTO: 101.1 FL (ref 80–100)
MONOCYTES ABSOLUTE: 0.6 K/UL (ref 0–1.3)
MONOCYTES RELATIVE PERCENT: 9.3 %
NEUTROPHILS ABSOLUTE: 4.9 K/UL (ref 1.7–7.7)
NEUTROPHILS RELATIVE PERCENT: 76.7 %
PDW BLD-RTO: 14.1 % (ref 12.4–15.4)
PLATELET # BLD: 249 K/UL (ref 135–450)
PMV BLD AUTO: 9.4 FL (ref 5–10.5)
POTASSIUM SERPL-SCNC: 4.3 MMOL/L (ref 3.5–5.1)
RBC # BLD: 4 M/UL (ref 4–5.2)
SODIUM BLD-SCNC: 141 MMOL/L (ref 136–145)
TOTAL PROTEIN: 7.4 G/DL (ref 6.4–8.2)
TRIGLYCERIDE, FASTING: 97 MG/DL (ref 0–150)
TSH REFLEX: 0.89 UIU/ML (ref 0.27–4.2)
VLDLC SERPL CALC-MCNC: 19 MG/DL
WBC # BLD: 6.4 K/UL (ref 4–11)

## 2020-12-23 PROCEDURE — 84443 ASSAY THYROID STIM HORMONE: CPT

## 2020-12-23 PROCEDURE — 83036 HEMOGLOBIN GLYCOSYLATED A1C: CPT

## 2020-12-23 PROCEDURE — 85025 COMPLETE CBC W/AUTO DIFF WBC: CPT

## 2020-12-23 PROCEDURE — 36415 COLL VENOUS BLD VENIPUNCTURE: CPT

## 2020-12-23 PROCEDURE — 80061 LIPID PANEL: CPT

## 2020-12-23 PROCEDURE — 80053 COMPREHEN METABOLIC PANEL: CPT

## 2020-12-24 LAB
ESTIMATED AVERAGE GLUCOSE: 102.5 MG/DL
HBA1C MFR BLD: 5.2 %

## 2021-01-14 DIAGNOSIS — F41.9 ANXIETY: ICD-10-CM

## 2021-01-14 RX ORDER — CLONAZEPAM 1 MG/1
1 TABLET ORAL NIGHTLY PRN
Qty: 30 TABLET | Refills: 0 | Status: SHIPPED | OUTPATIENT
Start: 2021-01-14 | End: 2021-02-12 | Stop reason: SDUPTHER

## 2021-01-14 NOTE — TELEPHONE ENCOUNTER
Medication:   Requested Prescriptions     Pending Prescriptions Disp Refills    clonazePAM (KLONOPIN) 1 MG tablet 30 tablet 0     Sig: Take 1 tablet by mouth nightly as needed for Anxiety for up to 30 days. Patient Phone Number: [phone]     Last appt: 12/17/2020   Next appt: Visit date not found    Last OARRS:   RX Monitoring 5/11/2020   Attestation -   Periodic Controlled Substance Monitoring No signs of potential drug abuse or diversion identified.        Last PDMP Joanne Abdullahi as Reviewed [facility]):  Review User Review Instant Review Result   Dali Husbands 12/15/2020 10:02 AM Reviewed PDMP [1]     Preferred Pharmacy:   [facility], [address] [facility] [phone] - F [phone]

## 2021-01-14 NOTE — TELEPHONE ENCOUNTER
clonazePAM (KLONOPIN) 1 MG tablet (Discontinued) 30 tablet 0 12/15/2020 12/17/2020    Sig - Route:  Take 1 tablet by mouth nightly as needed for Anxiety for up to 30 days. - Oral          [facility] in chart

## 2021-01-18 ENCOUNTER — NURSE ONLY (OUTPATIENT)
Dept: CARDIOLOGY CLINIC | Age: 76
End: 2021-01-18
Payer: COMMERCIAL

## 2021-01-18 ENCOUNTER — TELEPHONE (OUTPATIENT)
Dept: FAMILY MEDICINE CLINIC | Age: 76
End: 2021-01-18

## 2021-01-18 DIAGNOSIS — R32 URINARY INCONTINENCE, UNSPECIFIED TYPE: ICD-10-CM

## 2021-01-18 DIAGNOSIS — Z45.09 ENCOUNTER FOR ELECTRONIC ANALYSIS OF REVEAL EVENT RECORDER: ICD-10-CM

## 2021-01-18 DIAGNOSIS — F41.9 ANXIETY: ICD-10-CM

## 2021-01-18 DIAGNOSIS — E78.5 HYPERLIPIDEMIA, UNSPECIFIED HYPERLIPIDEMIA TYPE: ICD-10-CM

## 2021-01-18 DIAGNOSIS — I48.0 PAF (PAROXYSMAL ATRIAL FIBRILLATION) (HCC): ICD-10-CM

## 2021-01-18 DIAGNOSIS — E03.9 HYPOTHYROIDISM, UNSPECIFIED TYPE: ICD-10-CM

## 2021-01-18 PROCEDURE — 93298 REM INTERROG DEV EVAL SCRMS: CPT | Performed by: INTERNAL MEDICINE

## 2021-01-18 PROCEDURE — G2066 INTER DEVC REMOTE 30D: HCPCS | Performed by: INTERNAL MEDICINE

## 2021-01-18 NOTE — PROGRESS NOTES
We received a remote transmission from patient's monitor at home. Remote Linq report shows no arrhythmias. AF is not real. EP physician to review. We will continue to monitor remotely.

## 2021-01-18 NOTE — TELEPHONE ENCOUNTER
----- Message from Sindy Nunez sent at 1/16/2021  3:16 PM EST -----  Subject: Message to Provider    QUESTIONS  Information for Provider? PT daughter antibiotic for UTI  ---------------------------------------------------------------------------  --------------  CALL BACK INFO  What is the best way for the office to contact you? OK to leave message on   voicemail  Preferred Call Back Phone Number? [phone]  ---------------------------------------------------------------------------  --------------  SCRIPT ANSWERS  Relationship to Patient?  Self

## 2021-01-18 NOTE — TELEPHONE ENCOUNTER
----- Message from Brady Sanabria sent at 1/16/2021  3:16 PM EST -----  Subject: Message to Provider    QUESTIONS  Information for Provider? PT daughter antibiotic for UTI  ---------------------------------------------------------------------------  --------------  CALL BACK INFO  What is the best way for the office to contact you? OK to leave message on   voicemail  Preferred Call Back Phone Number? [phone]  ---------------------------------------------------------------------------  --------------  SCRIPT ANSWERS  Relationship to Patient?  Self

## 2021-01-18 NOTE — TELEPHONE ENCOUNTER
Medication:   Requested Prescriptions     Pending Prescriptions Disp Refills    cloNIDine (CATAPRES) 0.3 MG tablet [Pharmacy Med Name: cloNIDine HCL 0.3 MG TABLET] 30 tablet 0     Sig: TAKE ONE TABLET BY MOUTH EVERY NIGHT AT BEDTIME AS NEEDED    atorvastatin (LIPITOR) 80 MG tablet [Pharmacy Med Name: ATORVASTATIN 80 MG TABLET] 30 tablet 0     Sig: TAKE ONE TABLET BY MOUTH DAILY    mirtazapine (REMERON) 15 MG tablet [Pharmacy Med Name: MIRTAZAPINE 15 MG TABLET] 30 tablet 0     Sig: TAKE ONE TABLET BY MOUTH DAILY    oxybutynin (DITROPAN) 5 MG tablet [Pharmacy Med Name: OXYBUTYNIN 5 MG TABLET] 60 tablet 0     Sig: TAKE ONE TABLET BY MOUTH TWICE A DAY    levothyroxine (SYNTHROID) 100 MCG tablet [Pharmacy Med Name: LEVOTHYROXINE 100 MCG TABLET] 30 tablet 0     Sig: TAKE ONE TABLET BY MOUTH DAILY          Patient Phone Number: [phone] (home) [phone] (work)    Last appt: 12/17/2020   Next appt: 1/18/2021    Last OARRS:   RX Monitoring 5/11/2020   Attestation -   Periodic Controlled Substance Monitoring No signs of potential drug abuse or diversion identified.      PDMP Monitoring:    Last PDMP Connie Salgado as Reviewed [facility]):  Review User Review Instant Review Result   Jerome Cedeno 12/15/2020 10:02 AM Reviewed PDMP [1]     Preferred Pharmacy:   [facility] [address], [address] [phone] John Beth [phone]  [address] [facility] [address]  [address] [place], [place] [id number]  Phone: [phone] Fax: [phone]

## 2021-01-19 DIAGNOSIS — R30.0 DYSURIA: Primary | ICD-10-CM

## 2021-01-19 RX ORDER — MIRTAZAPINE 15 MG/1
TABLET, FILM COATED ORAL
Qty: 30 TABLET | Refills: 0 | Status: SHIPPED | OUTPATIENT
Start: 2021-01-19 | End: 2021-02-16

## 2021-01-19 RX ORDER — OXYBUTYNIN CHLORIDE 5 MG/1
TABLET ORAL
Qty: 60 TABLET | Refills: 0 | Status: SHIPPED | OUTPATIENT
Start: 2021-01-19 | End: 2021-02-16

## 2021-01-19 RX ORDER — CLONIDINE HYDROCHLORIDE 0.3 MG/1
TABLET ORAL
Qty: 30 TABLET | Refills: 0 | Status: SHIPPED | OUTPATIENT
Start: 2021-01-19 | End: 2021-02-16

## 2021-01-19 RX ORDER — NITROFURANTOIN 25; 75 MG/1; MG/1
100 CAPSULE ORAL 2 TIMES DAILY
Qty: 20 CAPSULE | Refills: 0 | Status: SHIPPED | OUTPATIENT
Start: 2021-01-19 | End: 2021-01-29

## 2021-01-19 RX ORDER — ATORVASTATIN CALCIUM 80 MG/1
TABLET, FILM COATED ORAL
Qty: 30 TABLET | Refills: 0 | Status: SHIPPED | OUTPATIENT
Start: 2021-01-19 | End: 2021-02-16

## 2021-01-19 RX ORDER — LEVOTHYROXINE SODIUM 0.1 MG/1
TABLET ORAL
Qty: 30 TABLET | Refills: 0 | Status: SHIPPED | OUTPATIENT
Start: 2021-01-19 | End: 2021-02-16

## 2021-02-12 DIAGNOSIS — F41.9 ANXIETY: ICD-10-CM

## 2021-02-12 RX ORDER — CLONAZEPAM 1 MG/1
1 TABLET ORAL NIGHTLY PRN
Qty: 30 TABLET | Refills: 0 | Status: SHIPPED | OUTPATIENT
Start: 2021-02-12 | End: 2021-02-23

## 2021-02-12 NOTE — TELEPHONE ENCOUNTER
Disp Refills Start End    clonazePAM (KLONOPIN) 1 MG tablet 30 tablet 0 1/14/2021 2/13/2021    Sig - Route:  Take 1 tablet by mouth nightly as needed for Anxiety for up to 30 days. - [address], [place] - [address] [place] [phone] - F [phone]     Pt out of meds      Provider out of office      Please advise

## 2021-02-12 NOTE — TELEPHONE ENCOUNTER
Medication:   Requested Prescriptions     Pending Prescriptions Disp Refills    clonazePAM (KLONOPIN) 1 MG tablet 30 tablet 0     Sig: Take 1 tablet by mouth nightly as needed for Anxiety for up to 30 days. Last Filled:      Patient Phone Number: [phone] (home) [phone] (work)    Last appt: 12/17/2020   Next appt: Visit date not found    Last OARRS:   RX Monitoring 5/11/2020   Attestation -   Periodic Controlled Substance Monitoring No signs of potential drug abuse or diversion identified.      PDMP Monitoring:    Last PDMP Knute Holter as Reviewed [facility]):  Review User Review Instant Review Result   Idania Reyez 12/15/2020 10:02 AM Reviewed PDMP [1]     Preferred Pharmacy:   [facility] [address], [address] [facility] [phone] Cheryl Lanes [phone] [address] [facility] [address]  [place] [address]  Phone: [phone] Fax: [phone]

## 2021-02-15 DIAGNOSIS — F41.9 ANXIETY: ICD-10-CM

## 2021-02-15 DIAGNOSIS — E03.9 HYPOTHYROIDISM, UNSPECIFIED TYPE: ICD-10-CM

## 2021-02-15 DIAGNOSIS — R32 URINARY INCONTINENCE, UNSPECIFIED TYPE: ICD-10-CM

## 2021-02-15 DIAGNOSIS — E78.5 HYPERLIPIDEMIA, UNSPECIFIED HYPERLIPIDEMIA TYPE: ICD-10-CM

## 2021-02-16 RX ORDER — OXYBUTYNIN CHLORIDE 5 MG/1
TABLET ORAL
Qty: 60 TABLET | Refills: 0 | Status: SHIPPED | OUTPATIENT
Start: 2021-02-16 | End: 2021-03-29

## 2021-02-16 RX ORDER — MIRTAZAPINE 15 MG/1
TABLET, FILM COATED ORAL
Qty: 30 TABLET | Refills: 0 | Status: SHIPPED | OUTPATIENT
Start: 2021-02-16 | End: 2021-03-29

## 2021-02-16 RX ORDER — LEVOTHYROXINE SODIUM 0.1 MG/1
TABLET ORAL
Qty: 30 TABLET | Refills: 0 | Status: SHIPPED | OUTPATIENT
Start: 2021-02-16 | End: 2021-03-29

## 2021-02-16 RX ORDER — ATORVASTATIN CALCIUM 80 MG/1
TABLET, FILM COATED ORAL
Qty: 30 TABLET | Refills: 0 | Status: SHIPPED | OUTPATIENT
Start: 2021-02-16 | End: 2021-03-29

## 2021-02-16 RX ORDER — CLONIDINE HYDROCHLORIDE 0.3 MG/1
TABLET ORAL
Qty: 30 TABLET | Refills: 0 | Status: SHIPPED | OUTPATIENT
Start: 2021-02-16 | End: 2021-03-25

## 2021-02-18 ENCOUNTER — TELEPHONE (OUTPATIENT)
Dept: FAMILY MEDICINE CLINIC | Age: 76
End: 2021-02-18

## 2021-02-18 NOTE — TELEPHONE ENCOUNTER
I called and spoke with MELITA CRONIN [facility]3] and informed her that pt would need to be seen. Pt is scheduled for 2/23/21 for UTI.

## 2021-02-18 NOTE — TELEPHONE ENCOUNTER
Patients daughter called with concerns that she has a UTI and would like to know if you will call in an anti-biotic for her        [facility] [address], [address] [phone] - F [phone]       Please advise and give her a callback

## 2021-02-18 NOTE — TELEPHONE ENCOUNTER
Sent over prescription on 1/18/21 for UTI, advised that if symptoms persist or return would need to be seen, patient needs to be assessed, have UA and culture sent due to being so soon after last symptoms. Please schedule appointment.

## 2021-02-22 ENCOUNTER — NURSE ONLY (OUTPATIENT)
Dept: CARDIOLOGY CLINIC | Age: 76
End: 2021-02-22
Payer: COMMERCIAL

## 2021-02-22 DIAGNOSIS — Z45.09 ENCOUNTER FOR ELECTRONIC ANALYSIS OF REVEAL EVENT RECORDER: ICD-10-CM

## 2021-02-22 DIAGNOSIS — I48.0 PAF (PAROXYSMAL ATRIAL FIBRILLATION) (HCC): ICD-10-CM

## 2021-02-22 PROCEDURE — 93298 REM INTERROG DEV EVAL SCRMS: CPT | Performed by: INTERNAL MEDICINE

## 2021-02-22 PROCEDURE — G2066 INTER DEVC REMOTE 30D: HCPCS | Performed by: INTERNAL MEDICINE

## 2021-02-23 ENCOUNTER — OFFICE VISIT (OUTPATIENT)
Dept: FAMILY MEDICINE CLINIC | Age: 76
End: 2021-02-23
Payer: COMMERCIAL

## 2021-02-23 VITALS
DIASTOLIC BLOOD PRESSURE: 102 MMHG | HEIGHT: 68 IN | HEART RATE: 92 BPM | BODY MASS INDEX: 20 KG/M2 | TEMPERATURE: 96.8 F | OXYGEN SATURATION: 97 % | WEIGHT: 132 LBS | SYSTOLIC BLOOD PRESSURE: 142 MMHG

## 2021-02-23 DIAGNOSIS — R82.90 MALODOROUS URINE: Primary | ICD-10-CM

## 2021-02-23 DIAGNOSIS — F41.9 ANXIETY: ICD-10-CM

## 2021-02-23 LAB
BACTERIA URINE, POC: ABNORMAL
BILIRUBIN URINE: 0 MG/DL
BLOOD, URINE: POSITIVE
CASTS URINE, POC: ABNORMAL
CLARITY: ABNORMAL
COLOR: ABNORMAL
CRYSTALS URINE, POC: ABNORMAL
EPI CELLS URINE, POC: ABNORMAL
GLUCOSE URINE: NEGATIVE
KETONES, URINE: NEGATIVE
LEUKOCYTE EST, POC: NEGATIVE
NITRITE, URINE: NEGATIVE
PH UA: 6 (ref 4.5–8)
PROTEIN UA: NEGATIVE
RBC URINE, POC: ABNORMAL
SPECIFIC GRAVITY UA: 1.01 (ref 1–1.03)
UROBILINOGEN, URINE: NORMAL
WBC URINE, POC: ABNORMAL
YEAST URINE, POC: ABNORMAL

## 2021-02-23 PROCEDURE — 99214 OFFICE O/P EST MOD 30 MIN: CPT | Performed by: NURSE PRACTITIONER

## 2021-02-23 PROCEDURE — 81000 URINALYSIS NONAUTO W/SCOPE: CPT | Performed by: NURSE PRACTITIONER

## 2021-02-23 RX ORDER — CLONAZEPAM 1 MG/1
1 TABLET ORAL 2 TIMES DAILY PRN
Qty: 60 TABLET | Refills: 0
Start: 2021-02-23 | End: 2021-03-09 | Stop reason: SDUPTHER

## 2021-02-23 ASSESSMENT — ENCOUNTER SYMPTOMS
ABDOMINAL PAIN: 0
DIARRHEA: 0
VOMITING: 0
NAUSEA: 0
CONSTIPATION: 0

## 2021-02-23 NOTE — PROGRESS NOTES
Candance Butcher  : 1945  Encounter date: 2021    This efrain 67 y.o. female who presents with  Chief Complaint   Patient presents with    Urinary Tract Infection     UTI        History of present illness:    HPI PT is 67year old female with daughter with concerns for UTI, daughter reports increased odor. Pt does not report burning with urination, pelvic pain or LBP. Reports increased frequency and incontinence. Pt taking ditropan 5 mg twice daily and remains hydrated. Denies loose stools or diarrhea. Pt wears depends. Pt treated with macrobid on 1/19/21 x 7 days for dysuria. Pt also reports concerns about increasing anxiety, pt has been prescribed clonazepam 1 mg, reports uncontrolled, recent refill 21. Current Outpatient Medications on File Prior to Visit   Medication Sig Dispense Refill    atorvastatin (LIPITOR) 80 MG tablet TAKE ONE TABLET BY MOUTH DAILY 30 tablet 0    oxybutynin (DITROPAN) 5 MG tablet TAKE ONE TABLET BY MOUTH TWICE A DAY 60 tablet 0    levothyroxine (SYNTHROID) 100 MCG tablet TAKE ONE TABLET BY MOUTH DAILY 30 tablet 0    cloNIDine (CATAPRES) 0.3 MG tablet TAKE ONE TABLET BY MOUTH EVERY NIGHT AT BEDTIME AS NEEDED 30 tablet 0    mirtazapine (REMERON) 15 MG tablet TAKE ONE TABLET BY MOUTH DAILY 30 tablet 0    escitalopram (LEXAPRO) 10 MG tablet TAKE ONE TABLET BY MOUTH DAILY 30 tablet 4    furosemide (LASIX) 40 MG tablet TAKE ONE TABLET BY MOUTH DAILY 30 tablet 5    potassium chloride (KLOR-CON) 20 MEQ packet Take 20 mEq by mouth 2 times daily      labetalol (NORMODYNE) 200 MG tablet Take 1 tablet by mouth every 12 hours 60 tablet 0    mometasone-formoterol (DULERA) 200-5 MCG/ACT inhaler Inhale 2 puffs into the lungs as needed       calcium elemental (OSCAL) 500 MG TABS tablet Take 1 tablet by mouth daily 30 tablet 3     No current facility-administered medications on file prior to visit.        Allergies   Allergen Reactions    Lipitor [Atorvastatin] Other (See Comments)     Weakness, severe    Morphine Shortness Of Breath    Keflex [Cephalexin] Other (See Comments)     SOB & bilateral arms shaking     Hctz [Hydrochlorothiazide] Other (See Comments)     Hyponatremia, severe      Norvasc [Amlodipine Besylate] Swelling     Of neck    Penicillins Hives    Tramadol Itching    Hydralazine Palpitations and Other (See Comments)     Dizzy,fatigue,headaches,palpitations,loss of appetite    Shellfish-Derived Products Swelling and Rash     Swelling of neck     Past Medical History:   Diagnosis Date    Acute D[facility] thrombosis) ([facility] 7/3/2015    Acute encephalopathy 4/19/2018    Acute on chronic diastolic heart failure ([address] 3/30/2019    Alcohol abuse     Anxiety     CAD in native artery     Cellulitis 4/28/2018    Cerebral artery occlusion with cerebral infarction ([facility])     states hx of multiple mini strokes    [facility]     Chronic fatigue     Complex care coordination     Complicated UTI (urinary tract infection)     COPD (chronic obstructive pulmonary disease) [facility]     COPD exacerbation ([facility] 2/20/2018    Depression     DIMAS (dyspnea on exertion) 7/3/2015    DVT (deep venous thrombosis) ([facility])     DVT, lower extremity ([facility])     Fatigue 11/3/2015    General weakness 11/4/2015    Generalized weakness 8/22/2019    Hypercholesteremia     Hypertension     Hyperthyroidism     Incontinence 10/16/2012    Mammogram abnormal 2/7/2012    Neuromuscular disorder [facility]     Osteopenia 2/14/2017    Pneumonia     Recurrent UTI     Right forearm cellulitis     Superficial thrombophlebitis of right upper extremity     Thyroid disease     Tobacco abuse     Tobacco abuse counseling     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 Lisbeth Snellen; repeat 5 years    EYE SURGERY      cateracts removed    SHOULDER ARTHROSCOPY Right 6/18/14 well-developed. Cardiovascular:      Rate and Rhythm: Normal rate and regular rhythm. Heart sounds: Normal heart sounds. No murmur. Pulmonary:      Effort: Pulmonary effort is normal.      Breath sounds: Normal breath sounds. Abdominal:      General: Bowel sounds are normal. There is no distension. Palpations: Abdomen is soft. Tenderness: There is no abdominal tenderness. There is no right CVA tenderness or left CVA tenderness. Musculoskeletal:      Right lower leg: No edema. Left lower leg: No edema. Skin:     General: Skin is warm and dry. Neurological:      Mental Status: She is alert and oriented to person, place, and time. Psychiatric:         Mood and Affect: Mood normal.         Assessment/Plan    1. Malodorous urine  - POCT Urine with Microscopic  - Culture, Urine    2. Anxiety  OARRS reviewed  - clonazePAM (KLONOPIN) 1 MG tablet; Take 1 tablet by mouth 2 times daily as needed for Anxiety for up to 30 days. Dispense: 60 tablet; Refill: 0      Return in about 3 months (around 5/23/2021) for medication re-check. This dictation was generated by voice recognition computer software. Although all attempts are made to edit the dictation for accuracy, there may be errors in the transcription that are not intended.

## 2021-02-24 LAB — URINE CULTURE, ROUTINE: NORMAL

## 2021-03-06 ENCOUNTER — HOSPITAL ENCOUNTER (EMERGENCY)
Age: 76
Discharge: HOME | End: 2021-03-06
Attending: EMERGENCY MEDICINE
Payer: COMMERCIAL

## 2021-03-06 ENCOUNTER — APPOINTMENT (OUTPATIENT)
Dept: GENERAL RADIOLOGY | Age: 76
End: 2021-03-06
Payer: COMMERCIAL

## 2021-03-06 ENCOUNTER — APPOINTMENT (OUTPATIENT)
Dept: CT IMAGING | Age: 76
End: 2021-03-06
Payer: COMMERCIAL

## 2021-03-06 VITALS
BODY MASS INDEX: 18.19 KG/M2 | DIASTOLIC BLOOD PRESSURE: 78 MMHG | RESPIRATION RATE: 18 BRPM | HEIGHT: 68 IN | TEMPERATURE: 98.7 F | HEART RATE: 76 BPM | WEIGHT: 120 LBS | SYSTOLIC BLOOD PRESSURE: 140 MMHG | OXYGEN SATURATION: 96 %

## 2021-03-06 DIAGNOSIS — R11.2 NON-INTRACTABLE VOMITING WITH NAUSEA, UNSPECIFIED VOMITING TYPE: Primary | ICD-10-CM

## 2021-03-06 DIAGNOSIS — I10 ESSENTIAL HYPERTENSION: ICD-10-CM

## 2021-03-06 DIAGNOSIS — R51.9 ACUTE NONINTRACTABLE HEADACHE, UNSPECIFIED HEADACHE TYPE: ICD-10-CM

## 2021-03-06 LAB
A/G RATIO: 1.2 (ref 1.1–2.2)
ALBUMIN SERPL-MCNC: 4.4 G/DL (ref 3.4–5)
ALP BLD-CCNC: 57 U/L (ref 40–129)
ALT SERPL-CCNC: 12 U/L (ref 10–40)
ANION GAP SERPL CALCULATED.3IONS-SCNC: 10 MMOL/L (ref 3–16)
AST SERPL-CCNC: 19 U/L (ref 15–37)
BASOPHILS ABSOLUTE: 0 K/UL (ref 0–0.2)
BASOPHILS RELATIVE PERCENT: 0.5 %
BILIRUB SERPL-MCNC: 0.4 MG/DL (ref 0–1)
BILIRUBIN URINE: NEGATIVE
BLOOD, URINE: ABNORMAL
BUN BLDV-MCNC: 7 MG/DL (ref 7–20)
CALCIUM SERPL-MCNC: 9.6 MG/DL (ref 8.3–10.6)
CHLORIDE BLD-SCNC: 94 MMOL/L (ref 99–110)
CLARITY: ABNORMAL
CO2: 27 MMOL/L (ref 21–32)
COLOR: YELLOW
CREAT SERPL-MCNC: 0.6 MG/DL (ref 0.6–1.2)
EOSINOPHILS ABSOLUTE: 0 K/UL (ref 0–0.6)
EOSINOPHILS RELATIVE PERCENT: 0.5 %
EPITHELIAL CELLS, UA: 1 /HPF (ref 0–5)
ETHANOL: NORMAL MG/DL (ref 0–0.08)
GFR AFRICAN AMERICAN: >60
GFR NON-AFRICAN AMERICAN: >60
GLOBULIN: 3.6 G/DL
GLUCOSE BLD-MCNC: 110 MG/DL (ref 70–99)
GLUCOSE URINE: NEGATIVE MG/DL
HCT VFR BLD CALC: 41.2 % (ref 36–48)
HEMOGLOBIN: 13.6 G/DL (ref 12–16)
HYALINE CASTS: 9 /LPF (ref 0–8)
KETONES, URINE: NEGATIVE MG/DL
LEUKOCYTE ESTERASE, URINE: ABNORMAL
LIPASE: 13 U/L (ref 13–60)
LYMPHOCYTES ABSOLUTE: 0.5 K/UL (ref 1–5.1)
LYMPHOCYTES RELATIVE PERCENT: 6.4 %
MCH RBC QN AUTO: 33.4 PG (ref 26–34)
MCHC RBC AUTO-ENTMCNC: 33.1 G/DL (ref 31–36)
MCV RBC AUTO: 101.1 FL (ref 80–100)
MICROSCOPIC EXAMINATION: YES
MONOCYTES ABSOLUTE: 0.5 K/UL (ref 0–1.3)
MONOCYTES RELATIVE PERCENT: 6.6 %
NEUTROPHILS ABSOLUTE: 7 K/UL (ref 1.7–7.7)
NEUTROPHILS RELATIVE PERCENT: 86 %
NITRITE, URINE: NEGATIVE
PDW BLD-RTO: 14.1 % (ref 12.4–15.4)
PH UA: 7 (ref 5–8)
PLATELET # BLD: 292 K/UL (ref 135–450)
PMV BLD AUTO: 8.6 FL (ref 5–10.5)
POTASSIUM SERPL-SCNC: 3.9 MMOL/L (ref 3.5–5.1)
PRO-BNP: 1852 PG/ML (ref 0–449)
PROTEIN UA: ABNORMAL MG/DL
RBC # BLD: 4.07 M/UL (ref 4–5.2)
RBC UA: 1 /HPF (ref 0–4)
SODIUM BLD-SCNC: 131 MMOL/L (ref 136–145)
SPECIFIC GRAVITY UA: <1.005 (ref 1–1.03)
TOTAL PROTEIN: 8 G/DL (ref 6.4–8.2)
TROPONIN: <0.01 NG/ML
URINE REFLEX TO CULTURE: ABNORMAL
URINE TYPE: ABNORMAL
UROBILINOGEN, URINE: 0.2 E.U./DL
WBC # BLD: 8.2 K/UL (ref 4–11)
WBC UA: 6 /HPF (ref 0–5)

## 2021-03-06 PROCEDURE — 80053 COMPREHEN METABOLIC PANEL: CPT

## 2021-03-06 PROCEDURE — 6370000000 HC RX 637 (ALT 250 FOR IP): Performed by: PHYSICIAN ASSISTANT

## 2021-03-06 PROCEDURE — 36415 COLL VENOUS BLD VENIPUNCTURE: CPT

## 2021-03-06 PROCEDURE — 96375 TX/PRO/DX INJ NEW DRUG ADDON: CPT

## 2021-03-06 PROCEDURE — 6360000002 HC RX W HCPCS: Performed by: PHYSICIAN ASSISTANT

## 2021-03-06 PROCEDURE — 70450 CT HEAD/BRAIN W/O DYE: CPT

## 2021-03-06 PROCEDURE — 99284 EMERGENCY DEPT VISIT MOD MDM: CPT

## 2021-03-06 PROCEDURE — 82077 ASSAY SPEC XCP UR&BREATH IA: CPT

## 2021-03-06 PROCEDURE — 74176 CT ABD & PELVIS W/O CONTRAST: CPT

## 2021-03-06 PROCEDURE — 85025 COMPLETE CBC W/AUTO DIFF WBC: CPT

## 2021-03-06 PROCEDURE — 93005 ELECTROCARDIOGRAM TRACING: CPT | Performed by: PHYSICIAN ASSISTANT

## 2021-03-06 PROCEDURE — 71045 X-RAY EXAM CHEST 1 VIEW: CPT

## 2021-03-06 PROCEDURE — 2580000003 HC RX 258: Performed by: PHYSICIAN ASSISTANT

## 2021-03-06 PROCEDURE — 81001 URINALYSIS AUTO W/SCOPE: CPT

## 2021-03-06 PROCEDURE — 83880 ASSAY OF NATRIURETIC PEPTIDE: CPT

## 2021-03-06 PROCEDURE — 84484 ASSAY OF TROPONIN QUANT: CPT

## 2021-03-06 PROCEDURE — 96374 THER/PROPH/DIAG INJ IV PUSH: CPT

## 2021-03-06 PROCEDURE — 83690 ASSAY OF LIPASE: CPT

## 2021-03-06 RX ORDER — 0.9 % SODIUM CHLORIDE 0.9 %
1000 INTRAVENOUS SOLUTION INTRAVENOUS ONCE
Status: COMPLETED | OUTPATIENT
Start: 2021-03-06 | End: 2021-03-06

## 2021-03-06 RX ORDER — CLONIDINE HYDROCHLORIDE 0.1 MG/1
0.2 TABLET ORAL ONCE
Status: COMPLETED | OUTPATIENT
Start: 2021-03-06 | End: 2021-03-06

## 2021-03-06 RX ORDER — DIPHENHYDRAMINE HYDROCHLORIDE 50 MG/ML
12.5 INJECTION INTRAMUSCULAR; INTRAVENOUS ONCE
Status: COMPLETED | OUTPATIENT
Start: 2021-03-06 | End: 2021-03-06

## 2021-03-06 RX ORDER — ACETAMINOPHEN 325 MG/1
325 TABLET ORAL ONCE
Status: COMPLETED | OUTPATIENT
Start: 2021-03-06 | End: 2021-03-06

## 2021-03-06 RX ORDER — METOCLOPRAMIDE HYDROCHLORIDE 5 MG/ML
10 INJECTION INTRAMUSCULAR; INTRAVENOUS ONCE
Status: COMPLETED | OUTPATIENT
Start: 2021-03-06 | End: 2021-03-06

## 2021-03-06 RX ORDER — ONDANSETRON 2 MG/ML
4 INJECTION INTRAMUSCULAR; INTRAVENOUS ONCE
Status: DISCONTINUED | OUTPATIENT
Start: 2021-03-06 | End: 2021-03-06

## 2021-03-06 RX ORDER — ONDANSETRON 4 MG/1
4-8 TABLET, ORALLY DISINTEGRATING ORAL EVERY 12 HOURS PRN
Qty: 12 TABLET | Refills: 0 | Status: SHIPPED | OUTPATIENT
Start: 2021-03-06 | End: 2021-04-09 | Stop reason: SDUPTHER

## 2021-03-06 RX ORDER — ONDANSETRON 2 MG/ML
4 INJECTION INTRAMUSCULAR; INTRAVENOUS ONCE
Status: COMPLETED | OUTPATIENT
Start: 2021-03-06 | End: 2021-03-06

## 2021-03-06 RX ADMIN — METOCLOPRAMIDE 10 MG: 5 INJECTION, SOLUTION INTRAMUSCULAR; INTRAVENOUS at 10:43

## 2021-03-06 RX ADMIN — ONDANSETRON 4 MG: 2 INJECTION INTRAMUSCULAR; INTRAVENOUS at 13:20

## 2021-03-06 RX ADMIN — CLONIDINE HYDROCHLORIDE 0.2 MG: 0.1 TABLET ORAL at 12:55

## 2021-03-06 RX ADMIN — SODIUM CHLORIDE 1000 ML: 9 INJECTION, SOLUTION INTRAVENOUS at 10:40

## 2021-03-06 RX ADMIN — ACETAMINOPHEN 325 MG: 325 TABLET ORAL at 13:48

## 2021-03-06 RX ADMIN — DIPHENHYDRAMINE HYDROCHLORIDE 12.5 MG: 50 INJECTION, SOLUTION INTRAMUSCULAR; INTRAVENOUS at 10:43

## 2021-03-06 ASSESSMENT — ENCOUNTER SYMPTOMS
SHORTNESS OF BREATH: 0
CONSTIPATION: 0
STRIDOR: 0
ANAL BLEEDING: 0
COLOR CHANGE: 0
ABDOMINAL PAIN: 0
NAUSEA: 1
COUGH: 0
DIARRHEA: 0
VOMITING: 1
RECTAL PAIN: 0
BACK PAIN: 0
BLOOD IN STOOL: 0
WHEEZING: 0
ABDOMINAL DISTENTION: 0

## 2021-03-06 ASSESSMENT — PAIN DESCRIPTION - LOCATION
LOCATION: HEAD
LOCATION: HEAD

## 2021-03-06 ASSESSMENT — PAIN SCALES - GENERAL: PAINLEVEL_OUTOF10: 2

## 2021-03-06 ASSESSMENT — PAIN DESCRIPTION - DESCRIPTORS
DESCRIPTORS: DULL
DESCRIPTORS: HEADACHE

## 2021-03-06 ASSESSMENT — PAIN SCALES - WONG BAKER: WONGBAKER_NUMERICALRESPONSE: 2

## 2021-03-06 ASSESSMENT — PAIN DESCRIPTION - PAIN TYPE: TYPE: ACUTE PAIN

## 2021-03-06 ASSESSMENT — PAIN DESCRIPTION - FREQUENCY: FREQUENCY: INTERMITTENT

## 2021-03-06 NOTE — ED NOTES
Patient resting in bed, has complaints of migraine for 3 days. Per patient she has no history of migraines and nothing is helping. Patient endorses nausea and vomiting with HA. Patient is alert and oriented x4 with sensitivity to light and sounds. Respirations are e.u with clear lung sounds in all fields. Abdomen is soft and non tender. Patient has 20g PIV placed in RFA patency confirmed with 10 cc flush prior to medication administration. Denies further needs at this time.       Lydia Cooper RN  03/06/21 2019

## 2021-03-06 NOTE — ED NOTES
Patient ambulatory to restroom. Patient unable to provide sample due to continence issues. Patient assisted back to bed and PA informed.       Ellie Penny RN  03/06/21 3405

## 2021-03-06 NOTE — ED NOTES
Patient able to provide sample at this time. Urine labeled at bedside and sent to lab.       Henrey Jeans, RN  03/06/21 9221

## 2021-03-06 NOTE — ED PROVIDER NOTES
[facility]        Pt Name: Kyra Frederick  MRN: [id number]  [place] 1945  Date of evaluation: 3/6/2021  Provider: Conner Underwood PA-C  PCP: QUITA Monzon NP     I have seen and evaluated this patient with my supervising physician Tammi Hernandez MD.    [facility]       Chief Complaint   Patient presents with    Emesis     Pt to ER from home for complaint of vomiting starting 2 days ago, now with dry heaves today. States unable to keep anything down. Having increased frequency urine, but denies changes to stool.  Headache     Pt having headache x3 days as well, generalized. Feels \"like its gonna explode. \" Tried taking Tylenol PMs for relief at home, no help. HISTORY OF PRESENT ILLNESS   (Location, Timing/Onset, Context/Setting, Quality, Duration, Modifying Factors, Severity, Associated Signs and Symptoms)  Note limiting factors. Kyra Frederick is a 67 y.o. female who presents to the emergency department complaining of nausea and vomiting for the past 3 days. She denies any associated abdominal pain with this. She believes that she is dehydrated because she has a generalized headache. The headache started after nausea and vomiting. Denies dizziness, chest pain, shortness of breath, cough, fever, chills, bloody, bilious or coffee-ground emesis. Denies diarrhea, constipation, bloody or black stool. She reports urinary frequency. Patient has history of migraine headaches. She has had headaches like this in the past.  This is not the worst headache of her life, thunderclap description, abrupt onset or temporal in origin. Nursing Notes were all reviewed and agreed with or any disagreements were addressed in the HPI. REVIEW OF SYSTEMS    (2-9 systems for level 4, 10 or more for level 5)     Review of Systems   Constitutional: Negative for chills and fever. HENT: Negative.     Eyes: Negative for visual disturbance. Respiratory: Negative for cough, shortness of breath, wheezing and stridor. Cardiovascular: Negative for chest pain, palpitations and leg swelling. Gastrointestinal: Positive for nausea and vomiting. Negative for abdominal distention, abdominal pain, anal bleeding, blood in stool, constipation, diarrhea and rectal pain. Endocrine: Negative. Genitourinary: Positive for frequency. Negative for dysuria, flank pain, hematuria, pelvic pain, vaginal bleeding and vaginal discharge. Musculoskeletal: Negative for back pain, neck pain and neck stiffness. Skin: Negative for color change, pallor, rash and wound. Neurological: Positive for headaches. Negative for dizziness, tremors, seizures, syncope, facial asymmetry, speech difficulty, weakness, light-headedness and numbness. Psychiatric/Behavioral: Negative for confusion. All other systems reviewed and are negative. Positives and Pertinent negatives as per HPI. Except as noted above in the ROS, all other systems were reviewed and negative.        PAST MEDICAL HISTORY     Past Medical History:   Diagnosis Date    Acute D[facility]deep [facility]) [facility] 7/3/2015    Acute encephalopathy 4/19/2018    Acute on chronic diastolic heart failure ([facility] [facility] 3/30/2019    Alcohol abuse     Anxiety     CAD in native artery     Cellulitis 4/28/2018    Cerebral artery occlusion with cerebral infarction ([facility])     states hx of multiple mini strokes    [facility] accident ([facility]A) [facility]     Chronic fatigue     Complex care coordination     Complicated UTI (urinary tract infection)     COPD [facility]chronic obstructive pulmonary disease) ([facility])     COPD exacerbation ([facility] [facility] 2/20/2018    Depression     DIMAS (dyspnea on exertion) 7/3/2015    DVT (deep venous thrombosis) ([facility])     DVT, lower extremity ([facility])     Fatigue 11/3/2015    General weakness 11/4/2015    Generalized weakness 8/22/2019    Hypercholesteremia     Hypertension  Hyperthyroidism     Incontinence 10/16/2012    Mammogram abnormal 2/7/2012    Neuromuscular disorder [facility]     Osteopenia 2/14/2017    Pneumonia     Recurrent UTI     Right forearm cellulitis     Superficial thrombophlebitis of right upper extremity     Thyroid disease     Tobacco abuse     Tobacco abuse counseling     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 Niranjan Nieves; repeat 5 years    EYE SURGERY      cateracts removed    SHOULDER ARTHROSCOPY Right 6/18/14    SUBACROMIAL DECOMPRESSION, ROTATOR CUFF REPAIR    TOE SURGERY Right     TONSILLECTOMY           CURRENTMEDICATIONS       Previous Medications    ATORVASTATIN (LIPITOR) 80 MG TABLET    TAKE ONE TABLET BY MOUTH DAILY    CALCIUM ELEMENTAL (OSCAL) 500 MG TABS TABLET    Take 1 tablet by mouth daily    CLONAZEPAM (KLONOPIN) 1 MG TABLET    Take 1 tablet by mouth 2 times daily as needed for Anxiety for up to 30 days.     CLONIDINE (CATAPRES) 0.3 MG TABLET    TAKE ONE TABLET BY MOUTH EVERY NIGHT AT BEDTIME AS NEEDED    ESCITALOPRAM (LEXAPRO) 10 MG TABLET    TAKE ONE TABLET BY MOUTH DAILY    FUROSEMIDE (LASIX) 40 MG TABLET    TAKE ONE TABLET BY MOUTH DAILY    LABETALOL (NORMODYNE) 200 MG TABLET    Take 1 tablet by mouth every 12 hours    LEVOTHYROXINE (SYNTHROID) 100 MCG TABLET    TAKE ONE TABLET BY MOUTH DAILY    MIRTAZAPINE (REMERON) 15 MG TABLET    TAKE ONE TABLET BY MOUTH DAILY    MOMETASONE-FORMOTEROL (DULERA) 200-5 MCG/ACT INHALER    Inhale 2 puffs into the lungs as needed     OXYBUTYNIN (DITROPAN) 5 MG TABLET    TAKE ONE TABLET BY MOUTH TWICE A DAY    POTASSIUM CHLORIDE (KLOR-CON) 20 MEQ PACKET    Take 20 mEq by mouth 2 times daily         ALLERGIES     Lipitor [atorvastatin], Morphine, Keflex [cephalexin], Hctz [hydrochlorothiazide], Norvasc [amlodipine besylate], Penicillins, Tramadol, Hydralazine, and Shellfish-derived products    FAMILYHISTORY       Family History   Problem Relation Age of Onset    Heart Disease Father         MI @ 64    Alcohol Abuse Father           SOCIAL HISTORY       Social History     Tobacco Use    Smoking status: Current Every Day Smoker     Packs/day: 1.00     Years: 52.00     Pack years: 52.00     Types: Cigarettes     Last attempt to quit: 2018     Years since quittin.6    Smokeless tobacco: Never Used    Tobacco comment: started smoking at age 26 / smoked up to 2 p,p,d / now smoking 4 to 8 cigarettes a day,   Substance Use Topics    Alcohol use: No     Alcohol/week: 0.0 standard drinks     Comment: patient reports she is an alcoholic hasn't had anything to drink 3-4 months    Drug use: No       SCREENINGS   NIH Stroke Scale  NIH Stroke Scale Assessed: Yes  Interval: Baseline  Level of Consciousness (1a. ): Alert  LOC Questions (1b. ): Answers both correctly  LOC Commands (1c. ): Performs both tasks correctly  Best Gaze (2. ): Normal  Visual (3. ): No visual loss  Facial Palsy (4. ): Normal symmetrical movement  Motor Arm, Left (5a. ): No drift  Motor Arm, Right (5b. ): No drift  Motor Leg, Left (6a. ): No drift  Motor Leg, Right (6b. ): No drift  Limb Ataxia (7. ): Absent  Sensory (8. ): Normal  Best Language (9. ): No aphasia  Dysarthria (10. ): Normal  Extinction and Inattention (11): No abnormality  Total: 0         PHYSICAL EXAM    (up to 7 for level 4, 8 or more for level 5)     ED Triage Vitals   BP Temp Temp src Pulse Resp SpO2 Height Weight   -- -- -- -- -- -- -- --       Physical Exam  Vitals signs and nursing note reviewed. Constitutional:       Appearance: Normal appearance. She is well-developed. She is not toxic-appearing or diaphoretic. HENT:      Head: Normocephalic and atraumatic.       Right Ear: External ear normal.      Left Ear: External ear normal.      Nose: Nose normal.      Mouth/Throat:      Mouth: Mucous membranes are moist.      Pharynx: Oropharynx is clear.   Eyes:      General: No scleral icterus. Right eye: No discharge. Left eye: No discharge. Extraocular Movements: Extraocular movements intact. Conjunctiva/sclera: Conjunctivae normal.      Pupils: Pupils are equal, round, and reactive to light. Neck:      Musculoskeletal: Normal range of motion. Cardiovascular:      Rate and Rhythm: Normal rate. Pulmonary:      Effort: Pulmonary effort is normal.      Breath sounds: Normal breath sounds. Abdominal:      General: Bowel sounds are normal. There is no abdominal bruit. Palpations: Abdomen is soft. There is no pulsatile mass. Tenderness: There is no abdominal tenderness. There is no right CVA tenderness, left CVA tenderness, guarding or rebound. Negative signs include Almeida's sign, Rovsing's sign, McBurney's sign, psoas sign and obturator sign. Musculoskeletal: Normal range of motion. Skin:     General: Skin is warm and dry. Capillary Refill: Capillary refill takes less than 2 seconds. Coloration: Skin is not jaundiced or pale. Findings: No bruising, erythema, lesion or rash. Neurological:      General: No focal deficit present. Mental Status: She is alert and oriented to person, place, and time.    Psychiatric:         Mood and Affect: Mood normal.         Behavior: Behavior normal.         DIAGNOSTIC RESULTS   LABS:    Labs Reviewed   CBC WITH AUTO DIFFERENTIAL - Abnormal; Notable for the following components:       Result Value    .1 (*)     Lymphocytes Absolute 0.5 (*)     All other components within normal limits    Narrative:     Performed at:  [facility] [address], [place], [facility] [address]   Phone [phone]   COMPREHENSIVE METABOLIC PANEL - Abnormal; Notable for the following components:    Sodium 131 (*)     Chloride 94 (*)     Glucose 110 (*)     All other components within normal limits    Narrative:     Performed at:  [facility]  [address] [place], [address]   Phone [phone]   BRAIN NATRIURETIC PEPTIDE - Abnormal; Notable for the following components:    Pro-BNP 1,852 (*)     All other components within normal limits    Narrative:     Performed at:  [facility]  [address], [address]   Phone [phone]   URINE RT REFLEX TO CULTURE - Abnormal; Notable for the following components:    Clarity, UA CLOUDY (*)     Blood, Urine SMALL (*)     Protein, UA TRACE (*)     Leukocyte Esterase, Urine TRACE (*)     All other components within normal limits    Narrative:     Performed at:  [facility] [address]  [place], [place]   Phone [phone]   MICROSCOPIC URINALYSIS - Abnormal; Notable for the following components:    Hyaline Casts, UA 9 (*)     WBC, UA 6 (*)     All other components within normal limits    Narrative:     Performed at:  [facility] [address]  [place], [address]   Phone [phone]   LIPASE    Narrative:     Performed at:  [facility] [address]  [place], [address]   Phone [phone]   TROPONIN    Narrative:     Performed at:  [facility] [address]  [place], [address]   Phone [phone]   ETHANOL    Narrative:     Performed at:  [facility] [address]  [place], [place]   Phone [phone]       All other labs were within normal range or not returned as of this dictation. EKG: All EKG's are interpreted by the Emergency Department Physician in the absence of a cardiologist.  Please see their note for interpretation of EKG.       RADIOLOGY:   Non-plain film images such as CT, Ultrasound and MRI are read by the radiologist. Plain radiographic images are visualized and preliminarily interpreted by the ED Provider with the below findings:        Interpretation per the Radiologist below, if available at the time of this note:    CT ABDOMEN PELVIS WO CONTRAST Additional Contrast? None   Final Result   1. Unchanged small complex left pleural effusion. CT HEAD WO CONTRAST   Final Result   No acute intracranial abnormality. Diffuse atrophic changes with findings suggesting chronic microvascular   ischemia         XR CHEST PORTABLE   Final Result   No acute cardiopulmonary process      Unchanged pleuroparenchymal scarring at the left lung base               PROCEDURES   Unless otherwise noted below, none     Procedures    CRITICAL CARE TIME   N/A    CONSULTS:  None      EMERGENCY DEPARTMENT COURSE and DIFFERENTIAL DIAGNOSIS/MDM:   Vitals:    Vitals:    03/06/21 1046 03/06/21 1130 03/06/21 1200 03/06/21 1231   BP: (!) 189/94 (!) 182/103 (!) 190/102 (!) 184/92   Pulse: 78 82 80 78   Resp: 18   18   Temp:       TempSrc:       SpO2: 98% 97% 98% 95%   Weight:       Height:           Patient was given the following medications:  Medications   ondansetron (ZOFRAN) injection 4 mg (has no administration in time range)   acetaminophen (TYLENOL) tablet 325 mg (has no administration in time range)   0.9 % sodium chloride bolus (0 mLs Intravenous Stopped 3/6/21 1216)   metoclopramide (REGLAN) injection 10 mg (10 mg Intravenous Given 3/6/21 1043)   diphenhydrAMINE (BENADRYL) injection 12.5 mg (12.5 mg Intravenous Given 3/6/21 1043)   cloNIDine (CATAPRES) tablet 0.2 mg (0.2 mg Oral Given 3/6/21 1255)           This patient presents complaining of headache, nausea, vomiting. Abdomen is soft and nontender in all 4 quadrants without pulsatile mass or CVA tenderness. NIH scale 0. CT head shows no acute intracranial abnormality. CT abdomen and pelvis does not show any acute intrathoracic or intra-abdominal abnormality. Urinalysis does not suggest overt evidence of infection. Blood work appears stable otherwise.   Patient is feeling much

## 2021-03-06 NOTE — ED NOTES
Patient discharged alert and oriented, VVS, patient assisted into wheel chair for discharge and assisted out to husbands vehicle. Prescriptions given and explained in detail usage and the need to get filled once discharged, patient verbalized understanding. Denies further questions or needs at this time. Patient discharged with safe ride home. Will return for worsening symptoms.         Barbara Eugene RN  03/06/21 1953

## 2021-03-08 ENCOUNTER — CARE COORDINATION (OUTPATIENT)
Dept: CARE COORDINATION | Age: 76
End: 2021-03-08

## 2021-03-08 LAB
EKG ATRIAL RATE: 75 BPM
EKG DIAGNOSIS: NORMAL
EKG P AXIS: 61 DEGREES
EKG P-R INTERVAL: 138 MS
EKG Q-T INTERVAL: 396 MS
EKG QRS DURATION: 74 MS
EKG QTC CALCULATION (BAZETT): 442 MS
EKG R AXIS: 45 DEGREES
EKG T AXIS: 45 DEGREES
EKG VENTRICULAR RATE: 75 BPM

## 2021-03-08 PROCEDURE — 93010 ELECTROCARDIOGRAM REPORT: CPT | Performed by: INTERNAL MEDICINE

## 2021-03-08 NOTE — CARE COORDINATION
waiting room from getting infected or exposed. Ask your healthcare provider to call the local or [facility] health department. Persons who are placed under If you have a medical emergency and need to call 911, notify the dispatch personnel that you have, or are being evaluated for COVID-19. If possible, put on a facemask before emergency medical services arrive. The patient agrees to contact the [employer] exposure line [phone], local [facility] department [place] [facility]: [phone]) and PCP office for questions related to their healthcare. Author provided contact information for future reference. Patient/family/caregiver given information for [employer] and agrees to enroll no, declined  Patient's preferred e-mail:    Patient's preferred phone number:   Based on Loop alert triggers, patient will be contacted by nurse care manager for worsening symptoms.

## 2021-03-09 ENCOUNTER — OFFICE VISIT (OUTPATIENT)
Dept: FAMILY MEDICINE CLINIC | Age: 76
End: 2021-03-09
Payer: COMMERCIAL

## 2021-03-09 VITALS — HEART RATE: 77 BPM | TEMPERATURE: 97.9 F | OXYGEN SATURATION: 98 %

## 2021-03-09 DIAGNOSIS — R11.2 NAUSEA AND VOMITING, INTRACTABILITY OF VOMITING NOT SPECIFIED, UNSPECIFIED VOMITING TYPE: Primary | ICD-10-CM

## 2021-03-09 DIAGNOSIS — I50.9 CONGESTIVE HEART FAILURE, UNSPECIFIED HF CHRONICITY, UNSPECIFIED HEART FAILURE TYPE (HCC): ICD-10-CM

## 2021-03-09 DIAGNOSIS — E87.1 HYPONATREMIA: ICD-10-CM

## 2021-03-09 DIAGNOSIS — F41.9 ANXIETY: ICD-10-CM

## 2021-03-09 PROCEDURE — 99214 OFFICE O/P EST MOD 30 MIN: CPT | Performed by: FAMILY MEDICINE

## 2021-03-09 RX ORDER — CLONAZEPAM 1 MG/1
1 TABLET ORAL 2 TIMES DAILY PRN
Qty: 60 TABLET | Refills: 0 | Status: ON HOLD | OUTPATIENT
Start: 2021-03-09 | End: 2021-04-12 | Stop reason: SDUPTHER

## 2021-03-11 ENCOUNTER — TELEPHONE (OUTPATIENT)
Dept: FAMILY MEDICINE CLINIC | Age: 76
End: 2021-03-11

## 2021-03-11 DIAGNOSIS — I10 ESSENTIAL HYPERTENSION: Primary | ICD-10-CM

## 2021-03-11 RX ORDER — LOSARTAN POTASSIUM 100 MG/1
100 TABLET ORAL DAILY
Qty: 30 TABLET | Refills: 0 | Status: SHIPPED | OUTPATIENT
Start: 2021-03-11 | End: 2021-04-07

## 2021-03-11 RX ORDER — CLONIDINE HYDROCHLORIDE 0.3 MG/1
TABLET ORAL 2 TIMES DAILY
Status: CANCELLED | OUTPATIENT
Start: 2021-03-11

## 2021-03-18 ENCOUNTER — TELEPHONE (OUTPATIENT)
Dept: FAMILY MEDICINE CLINIC | Age: 76
End: 2021-03-18

## 2021-03-18 NOTE — TELEPHONE ENCOUNTER
Patients spouse stopped in and states that she needs an order for Home Care      She received her first COVID Vaccine yesterday and is extremely weak in her legs.       She has had [facility] provide her service    Patients provider is out of office

## 2021-03-20 ENCOUNTER — APPOINTMENT (OUTPATIENT)
Dept: GENERAL RADIOLOGY | Age: 76
End: 2021-03-20
Payer: COMMERCIAL

## 2021-03-20 ENCOUNTER — HOSPITAL ENCOUNTER (EMERGENCY)
Age: 76
Discharge: HOME | End: 2021-03-20
Payer: COMMERCIAL

## 2021-03-20 VITALS
DIASTOLIC BLOOD PRESSURE: 99 MMHG | OXYGEN SATURATION: 97 % | HEART RATE: 91 BPM | WEIGHT: 120 LBS | HEIGHT: 68 IN | SYSTOLIC BLOOD PRESSURE: 182 MMHG | TEMPERATURE: 97.7 F | BODY MASS INDEX: 18.19 KG/M2 | RESPIRATION RATE: 24 BRPM

## 2021-03-20 DIAGNOSIS — N30.00 ACUTE CYSTITIS WITHOUT HEMATURIA: Primary | ICD-10-CM

## 2021-03-20 LAB
A/G RATIO: 1.4 (ref 1.1–2.2)
ALBUMIN SERPL-MCNC: 4.2 G/DL (ref 3.4–5)
ALP BLD-CCNC: 60 U/L (ref 40–129)
ALT SERPL-CCNC: 7 U/L (ref 10–40)
ANION GAP SERPL CALCULATED.3IONS-SCNC: 9 MMOL/L (ref 3–16)
AST SERPL-CCNC: 20 U/L (ref 15–37)
BACTERIA: ABNORMAL /HPF
BASOPHILS ABSOLUTE: 0 K/UL (ref 0–0.2)
BASOPHILS RELATIVE PERCENT: 0.5 %
BILIRUB SERPL-MCNC: 0.4 MG/DL (ref 0–1)
BILIRUBIN URINE: NEGATIVE
BLOOD, URINE: ABNORMAL
BUN BLDV-MCNC: 16 MG/DL (ref 7–20)
CALCIUM SERPL-MCNC: 9.5 MG/DL (ref 8.3–10.6)
CHLORIDE BLD-SCNC: 102 MMOL/L (ref 99–110)
CLARITY: ABNORMAL
CO2: 28 MMOL/L (ref 21–32)
COLOR: YELLOW
CREAT SERPL-MCNC: 0.8 MG/DL (ref 0.6–1.2)
EOSINOPHILS ABSOLUTE: 0.1 K/UL (ref 0–0.6)
EOSINOPHILS RELATIVE PERCENT: 1.3 %
EPITHELIAL CELLS, UA: 2 /HPF (ref 0–5)
GFR AFRICAN AMERICAN: >60
GFR NON-AFRICAN AMERICAN: >60
GLOBULIN: 3.1 G/DL
GLUCOSE BLD-MCNC: 123 MG/DL (ref 70–99)
GLUCOSE URINE: NEGATIVE MG/DL
HCT VFR BLD CALC: 41.6 % (ref 36–48)
HEMOGLOBIN: 13.9 G/DL (ref 12–16)
HYALINE CASTS: 0 /LPF (ref 0–8)
KETONES, URINE: NEGATIVE MG/DL
LEUKOCYTE ESTERASE, URINE: ABNORMAL
LYMPHOCYTES ABSOLUTE: 0.7 K/UL (ref 1–5.1)
LYMPHOCYTES RELATIVE PERCENT: 10.5 %
MAGNESIUM: 2.2 MG/DL (ref 1.8–2.4)
MCH RBC QN AUTO: 34 PG (ref 26–34)
MCHC RBC AUTO-ENTMCNC: 33.4 G/DL (ref 31–36)
MCV RBC AUTO: 101.7 FL (ref 80–100)
MICROSCOPIC EXAMINATION: YES
MONOCYTES ABSOLUTE: 0.9 K/UL (ref 0–1.3)
MONOCYTES RELATIVE PERCENT: 12.2 %
NEUTROPHILS ABSOLUTE: 5.4 K/UL (ref 1.7–7.7)
NEUTROPHILS RELATIVE PERCENT: 75.5 %
NITRITE, URINE: NEGATIVE
PDW BLD-RTO: 14 % (ref 12.4–15.4)
PH UA: 6 (ref 5–8)
PLATELET # BLD: 246 K/UL (ref 135–450)
PMV BLD AUTO: 8.4 FL (ref 5–10.5)
POTASSIUM REFLEX MAGNESIUM: 3.8 MMOL/L (ref 3.5–5.1)
PROTEIN UA: NEGATIVE MG/DL
RBC # BLD: 4.09 M/UL (ref 4–5.2)
RBC UA: 1 /HPF (ref 0–4)
SODIUM BLD-SCNC: 139 MMOL/L (ref 136–145)
SPECIFIC GRAVITY UA: 1.01 (ref 1–1.03)
TOTAL PROTEIN: 7.3 G/DL (ref 6.4–8.2)
TROPONIN: <0.01 NG/ML
URINE REFLEX TO CULTURE: YES
URINE TYPE: ABNORMAL
UROBILINOGEN, URINE: 0.2 E.U./DL
WBC # BLD: 7.1 K/UL (ref 4–11)
WBC UA: 210 /HPF (ref 0–5)

## 2021-03-20 PROCEDURE — 6370000000 HC RX 637 (ALT 250 FOR IP): Performed by: PHYSICIAN ASSISTANT

## 2021-03-20 PROCEDURE — 87086 URINE CULTURE/COLONY COUNT: CPT

## 2021-03-20 PROCEDURE — 81001 URINALYSIS AUTO W/SCOPE: CPT

## 2021-03-20 PROCEDURE — 87077 CULTURE AEROBIC IDENTIFY: CPT

## 2021-03-20 PROCEDURE — 84484 ASSAY OF TROPONIN QUANT: CPT

## 2021-03-20 PROCEDURE — 80053 COMPREHEN METABOLIC PANEL: CPT

## 2021-03-20 PROCEDURE — 93005 ELECTROCARDIOGRAM TRACING: CPT | Performed by: EMERGENCY MEDICINE

## 2021-03-20 PROCEDURE — 2580000003 HC RX 258: Performed by: PHYSICIAN ASSISTANT

## 2021-03-20 PROCEDURE — 85025 COMPLETE CBC W/AUTO DIFF WBC: CPT

## 2021-03-20 PROCEDURE — 71045 X-RAY EXAM CHEST 1 VIEW: CPT

## 2021-03-20 PROCEDURE — 83735 ASSAY OF MAGNESIUM: CPT

## 2021-03-20 PROCEDURE — 99285 EMERGENCY DEPT VISIT HI MDM: CPT

## 2021-03-20 RX ORDER — ACETAMINOPHEN 500 MG
1000 TABLET ORAL ONCE
Status: COMPLETED | OUTPATIENT
Start: 2021-03-20 | End: 2021-03-20

## 2021-03-20 RX ORDER — CEPHALEXIN 250 MG/1
500 CAPSULE ORAL ONCE
Status: COMPLETED | OUTPATIENT
Start: 2021-03-20 | End: 2021-03-20

## 2021-03-20 RX ORDER — CEPHALEXIN 500 MG/1
500 CAPSULE ORAL 2 TIMES DAILY
Qty: 14 CAPSULE | Refills: 0 | Status: SHIPPED | OUTPATIENT
Start: 2021-03-20 | End: 2021-03-27

## 2021-03-20 RX ORDER — 0.9 % SODIUM CHLORIDE 0.9 %
500 INTRAVENOUS SOLUTION INTRAVENOUS ONCE
Status: COMPLETED | OUTPATIENT
Start: 2021-03-20 | End: 2021-03-20

## 2021-03-20 RX ADMIN — CEPHALEXIN 500 MG: 250 CAPSULE ORAL at 15:13

## 2021-03-20 RX ADMIN — ACETAMINOPHEN 1000 MG: 500 TABLET ORAL at 13:51

## 2021-03-20 RX ADMIN — SODIUM CHLORIDE 500 ML: 9 INJECTION, SOLUTION INTRAVENOUS at 13:53

## 2021-03-20 ASSESSMENT — PAIN SCALES - GENERAL
PAINLEVEL_OUTOF10: 6
PAINLEVEL_OUTOF10: 2
PAINLEVEL_OUTOF10: 6

## 2021-03-20 ASSESSMENT — PAIN DESCRIPTION - LOCATION: LOCATION: HEAD

## 2021-03-20 NOTE — ED NOTES
Pt placed on bed pan. Foul smelling concentrated urine returned. Sent for analysis. Pt denies further needs at this time.      Justin Swift RN  03/20/21 4244

## 2021-03-20 NOTE — ED NOTES
Writer discussed discharge instructions with patient as directed by ED provider. Pt verbalized understanding of follow-up and medications. Pt denies further questions and left ED in stable condition, escorted from department in wheelchair.        Eusebio Dobson RN  03/20/21 1238

## 2021-03-20 NOTE — ED NOTES
Pt ambulated with assistance from walker. Pt weak but able to ambulate approx 40 feet with walker.      Neema Kelly RN  03/20/21 5999

## 2021-03-20 NOTE — ED PROVIDER NOTES
[address] [facility]        Pt Name: Queenie White  MRN: [id number]  [place] 1945  Date of evaluation: 3/20/2021  Provider: Ginette Rodriguez PA-C  PCP: QUITA Dutton - NP    OTTO. I have evaluated this patient. My supervising physician was available for consultation. CHIEF COMPLAINT       Chief Complaint   Patient presents with    Extremity Weakness     Pt in via [facility] from Home. Pt states she had the COVID vaccine 3 days ago and has been feeling increasingly weak since. Pt also has hx of left sided weakness from previous stroke       HISTORY OF PRESENT ILLNESS   (Location, Timing/Onset, Context/Setting, Quality, Duration, Modifying Factors, Severity, Associated Signs and Symptoms)  Note limiting factors. Queenie White is a 67 y.o. female presents to the emergency department with a chief complaint of general weakness. She received her first Covid vaccination 2 days ago. She states she has a headache that she rates it a 5 out of 10 and just feels generally weak. She denies decreased oral intake, nausea, vomiting, visual changes, fevers. Has a chronic cough with COPD and denies any change in this. Denies shortness of breath, abdominal pain, dysuria, hematuria, diarrhea, bloody stool or any other symptoms. Typically ambulates around the house holding onto furniture but states she required assistance with her family at home as she felt too weak to even walk by herself. Nursing Notes were all reviewed and agreed with or any disagreements were addressed in the HPI. REVIEW OF SYSTEMS    (2-9 systems for level 4, 10 or more for level 5)     Review of Systems    Positives and Pertinent negatives as per HPI. Except as noted above in the ROS, all other systems were reviewed and negative.        PAST MEDICAL HISTORY     Past Medical History:   Diagnosis Date    Acute DVT (deep venous thrombosis) ([facility] 7/3/2015    Acute encephalopathy 4/19/2018    Acute on chronic diastolic heart failure ([facility] 3/30/2019    Alcohol abuse     Anxiety     CAD in native artery     Cellulitis 4/28/2018    Cerebral artery occlusion with cerebral infarction ([facility]     states hx of multiple mini strokes    Cerebrovascular accident (CVA) ([facility]     Chronic fatigue     Complex care coordination     Complicated UTI (urinary tract infection)     COPD (chronic obstructive pulmonary disease) ([facility]     COPD exacerbation ([facility] 2/20/2018    Depression     DIMAS (dyspnea on exertion) 7/3/2015    DVT (deep venous thrombosis) ([facility]     DVT, lower extremity ([facility])     Fatigue 11/3/2015    General weakness 11/4/2015    Generalized weakness 8/22/2019    Hypercholesteremia     Hypertension     Hyperthyroidism     Incontinence 10/16/2012    Mammogram abnormal 2/7/2012    Neuromuscular disorder ([facility]     Osteopenia 2/14/2017    Pneumonia     Recurrent UTI     Right forearm cellulitis     Superficial thrombophlebitis of right upper extremity     Thyroid disease     Tobacco abuse     Tobacco abuse counseling     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 Niru Torres; repeat 5 years    EYE SURGERY      cateracts removed    SHOULDER ARTHROSCOPY Right 6/18/14    SUBACROMIAL DECOMPRESSION, ROTATOR CUFF REPAIR    TOE SURGERY Right     TONSILLECTOMY           CURRENTMEDICATIONS       Discharge Medication List as of 3/20/2021  4:06 PM      CONTINUE these medications which have NOT CHANGED    Details   losartan (COZAAR) 100 MG tablet Take 1 tablet by mouth daily, Disp-30 tablet, R-0Normal      clonazePAM (KLONOPIN) 1 MG tablet Take 1 tablet by mouth 2 times daily as needed for Anxiety for up to 30 days. , Disp-60 tablet, R-0Normal      ondansetron (ZOFRAN ODT) 4 MG disintegrating tablet Take 1-2 tablets by mouth every 12 hours as needed for Nausea May Sub regular tablet (non-ODT) if insurance does not cover ODT., Disp-12 tablet, R-0Normal      atorvastatin (LIPITOR) 80 MG tablet TAKE ONE TABLET BY MOUTH DAILY, Disp-30 tablet, R-0Normal      oxybutynin (DITROPAN) 5 MG tablet TAKE ONE TABLET BY MOUTH TWICE A DAY, Disp-60 tablet, R-0Normal      mirtazapine (REMERON) 15 MG tablet TAKE ONE TABLET BY MOUTH DAILY, Disp-30 tablet, R-0Normal      levothyroxine (SYNTHROID) 100 MCG tablet TAKE ONE TABLET BY MOUTH DAILY, Disp-30 tablet, R-0Normal      cloNIDine (CATAPRES) 0.3 MG tablet TAKE ONE TABLET BY MOUTH EVERY NIGHT AT BEDTIME AS NEEDED, Disp-30 tablet, R-0Normal      escitalopram (LEXAPRO) 10 MG tablet TAKE ONE TABLET BY MOUTH DAILY, Disp-30 tablet,R-4Normal      furosemide (LASIX) 40 MG tablet TAKE ONE TABLET BY MOUTH DAILY, Disp-30 tablet, R-5Normal      potassium chloride (KLOR-CON) 20 MEQ packet Take 20 mEq by mouth 2 times dailyHistorical Med      labetalol (NORMODYNE) 200 MG tablet Take 1 tablet by mouth every 12 hours, Disp-60 tablet, R-0Needs appointmentNormal      mometasone-formoterol (DULERA) 200-5 MCG/ACT inhaler Inhale 2 puffs into the lungs as needed Historical Med      calcium elemental (OSCAL) 500 MG TABS tablet Take 1 tablet by mouth daily, Disp-30 tablet, R-3Normal               ALLERGIES     Lipitor [atorvastatin], Morphine, Keflex [cephalexin], Hctz [hydrochlorothiazide], Norvasc [amlodipine besylate], Penicillins, Tramadol, Hydralazine, and Shellfish-derived products    FAMILYHISTORY       Family History   Problem Relation Age of Onset    Heart Disease Father         MI @ 64    Alcohol Abuse Father           SOCIAL HISTORY       Social History     Tobacco Use    Smoking status: Current Every Day Smoker     Packs/day: 1.00     Years: 52.00     Pack years: 52.00     Types: Cigarettes     Last attempt to quit: 2018     Years since quittin.7    Smokeless tobacco: Never Used    Tobacco comment: started smoking at age 26 / smoked up to 2 p,p,d / now smoking 4 to 8 cigarettes a day,   Substance Use Topics    Alcohol use: No     Alcohol/week: 0.0 standard drinks     Comment: patient reports she is an alcoholic hasn't had anything to drink 3-4 months    Drug use: No       SCREENINGS    Ed Coma Scale  Eye Opening: Spontaneous  Best Verbal Response: Oriented  Best Motor Response: Obeys commands  Ed Coma Scale Score: 15        PHYSICAL EXAM    (up to 7 for level 4, 8 or more for level 5)     ED Triage Vitals   BP Temp Temp Source Pulse Resp SpO2 Height Weight   03/20/21 1313 03/20/21 1316 03/20/21 1316 03/20/21 1313 03/20/21 1313 03/20/21 1313 03/20/21 1313 03/20/21 1313   (!) 170/106 97.7 °F (36.5 °C) Oral 95 25 99 % 5' 8\" (1.727 m) 120 lb (54.4 kg)       Physical Exam  Vitals signs and nursing note reviewed. Constitutional:       Appearance: She is well-developed. She is not diaphoretic. HENT:      Head: Atraumatic. Nose: Nose normal.      Mouth/Throat:      Mouth: Mucous membranes are dry. Eyes:      General:         Right eye: No discharge. Left eye: No discharge. Neck:      Musculoskeletal: Normal range of motion. Cardiovascular:      Rate and Rhythm: Normal rate and regular rhythm. Heart sounds: No murmur. No friction rub. No gallop. Pulmonary:      Effort: Pulmonary effort is normal. No respiratory distress. Breath sounds: No stridor. No wheezing, rhonchi or rales. Abdominal:      General: Bowel sounds are normal. There is no distension. Palpations: Abdomen is soft. There is no mass. Tenderness: There is no abdominal tenderness. There is no guarding or rebound. Hernia: No hernia is present. Musculoskeletal: Normal range of motion. General: No swelling. Skin:     General: Skin is warm and dry. Findings: No erythema or rash. Neurological:      Mental Status: She is alert and oriented to person, place, and time.       Cranial Nerves: cardiologist.  Please see their note for interpretation of EKG. RADIOLOGY:   Non-plain film images such as CT, Ultrasound and MRI are read by the radiologist. Plain radiographic images are visualized and preliminarily interpreted by the ED Provider with the below findings:        Interpretation per the Radiologist below, if available at the time of this note:    XR CHEST PORTABLE   Final Result   Unchanged appearance of left basilar airspace disease and small adjacent left   pleural effusion. Lungs otherwise clear. Cardiomegaly. No results found. PROCEDURES   Unless otherwise noted below, none     Procedures    CRITICAL CARE TIME   N/A    CONSULTS:  None      EMERGENCY DEPARTMENT COURSE and DIFFERENTIAL DIAGNOSIS/MDM:   Vitals:    Vitals:    03/20/21 1313 03/20/21 1316 03/20/21 1400 03/20/21 1516   BP: (!) 170/106  (!) 182/96 (!) 182/99   Pulse: 95  93 91   Resp: 25  24    Temp:  97.7 °F (36.5 °C)     TempSrc:  Oral     SpO2: 99%  97%    Weight: 120 lb (54.4 kg)      Height: 5' 8\" (1.727 m)          Patient was given the following medications:  Medications   acetaminophen (TYLENOL) tablet 1,000 mg (1,000 mg Oral Given 3/20/21 1351)   0.9 % sodium chloride bolus (0 mLs Intravenous Stopped 3/20/21 1456)   cephALEXin (KEFLEX) capsule 500 mg (500 mg Oral Given 3/20/21 1513)           Patient present with some general weakness. Recheck of the patient and after fluid she feels much better. She is now smiling and laughing. She states she wishes to go home. Laboratory testing does reveal possible UTI with 2+ bacteria 210 white blood cells. She was started on Keflex. She has this listed as a allergy but reviewing records patient states she is only allergic to penicillin and she has been on Keflex, cefepime and Rocephin in the past.  She was given her first Keflex here and has no reaction. X-ray imaging is unchanged.   She is able to ambulate here and states she feels much better and wishes to go home. Low suspicion for infected stone, meningitis, subarachnoid hemorrhage, encephalitis, thrombosis, mass lesion or other emergent etiology. She will follow-up with her family physician return here for any worsening symptoms or problems at home. Her general weakness and symptoms could be related both to her recent Covid vaccine and the UTI. She is educated on symptomatic treatment at home. FINAL IMPRESSION      1.  Acute cystitis without hematuria          DISPOSITION/PLAN   DISPOSITION Decision To Discharge 03/20/2021 04:05:44 PM      PATIENT REFERREDTO:  QUITA Rodriguez NP  [address] [facility] [address]  [phone]    Schedule an appointment as soon as possible for a visit in 3 days  For re-check    [facility] Emergency Department  [address] [facility]  [phone]    As needed      DISCHARGE MEDICATIONS:  Discharge Medication List as of 3/20/2021  4:06 PM      START taking these medications    Details   cephALEXin (KEFLEX) 500 MG capsule Take 1 capsule by mouth 2 times daily for 7 days, Disp-14 capsule, R-0Normal             DISCONTINUED MEDICATIONS:  Discharge Medication List as of 3/20/2021  4:06 PM                 (Please note that portions of this note were completed with a voice recognition program.  Efforts were made to edit the dictations but occasionally words are mis-transcribed.)    Sweetie Sanders PA-C (electronically signed)            Sweetie Sanders PA-C  03/20/21 2731

## 2021-03-20 NOTE — ED NOTES
Bed: 11  Expected date:   Expected time:   Means of arrival: [facility]  Comments:     Amanda Chen RN  03/20/21 2789

## 2021-03-21 LAB
EKG ATRIAL RATE: 95 BPM
EKG DIAGNOSIS: NORMAL
EKG P-R INTERVAL: 130 MS
EKG Q-T INTERVAL: 348 MS
EKG QRS DURATION: 68 MS
EKG QTC CALCULATION (BAZETT): 437 MS
EKG R AXIS: 140 DEGREES
EKG T AXIS: 251 DEGREES
EKG VENTRICULAR RATE: 95 BPM
ORGANISM: ABNORMAL
URINE CULTURE, ROUTINE: ABNORMAL

## 2021-03-21 PROCEDURE — 93010 ELECTROCARDIOGRAM REPORT: CPT | Performed by: INTERNAL MEDICINE

## 2021-03-23 DIAGNOSIS — Z86.73 H/O ISCHEMIC MULTIFOCAL MULTIPLE VASCULAR TERRITORIES STROKE: Primary | ICD-10-CM

## 2021-03-23 DIAGNOSIS — R53.81 PHYSICAL DECONDITIONING: ICD-10-CM

## 2021-03-23 DIAGNOSIS — J43.2 CENTRILOBULAR EMPHYSEMA (HCC): ICD-10-CM

## 2021-03-25 RX ORDER — CLONIDINE HYDROCHLORIDE 0.3 MG/1
TABLET ORAL
Qty: 30 TABLET | Refills: 0 | Status: SHIPPED | OUTPATIENT
Start: 2021-03-25 | End: 2021-03-29

## 2021-03-25 NOTE — TELEPHONE ENCOUNTER
Medication:   Requested Prescriptions     Pending Prescriptions Disp Refills    cloNIDine (CATAPRES) 0.3 MG tablet [Pharmacy Med Name: cloNIDine HCL 0.3 MG TABLET] 30 tablet 0     Sig: TAKE ONE TABLET BY MOUTH EVERY NIGHT AT BEDTIME AS NEEDED      Last Filled:      Patient Phone Number: [phone] (home) [phone] (work)    Last appt: 3/9/2021   Next appt: 4/12/2021    Last OARRS:   RX Monitoring 5/11/2020   Attestation -   Periodic Controlled Substance Monitoring No signs of potential drug abuse or diversion identified.      PDMP Monitoring:    Last PDMP Leatha Regan as Reviewed [facility]):  Review User Review Instant Review Result   Emerson Pena 3/9/2021  4:00 PM Reviewed PDMP [1]     Preferred Pharmacy:   [address] [place] [address] [address], [address] [facility] [phone] [facility] [phone] [address] [facility] [address]  Phone: [phone] Fax: [phone]

## 2021-03-28 DIAGNOSIS — E03.9 HYPOTHYROIDISM, UNSPECIFIED TYPE: ICD-10-CM

## 2021-03-28 DIAGNOSIS — R32 URINARY INCONTINENCE, UNSPECIFIED TYPE: ICD-10-CM

## 2021-03-28 DIAGNOSIS — F41.9 ANXIETY: ICD-10-CM

## 2021-03-28 DIAGNOSIS — E78.5 HYPERLIPIDEMIA, UNSPECIFIED HYPERLIPIDEMIA TYPE: ICD-10-CM

## 2021-03-29 ENCOUNTER — NURSE ONLY (OUTPATIENT)
Dept: CARDIOLOGY CLINIC | Age: 76
End: 2021-03-29
Payer: COMMERCIAL

## 2021-03-29 DIAGNOSIS — I48.0 PAF (PAROXYSMAL ATRIAL FIBRILLATION) (HCC): ICD-10-CM

## 2021-03-29 DIAGNOSIS — Z45.09 ENCOUNTER FOR ELECTRONIC ANALYSIS OF REVEAL EVENT RECORDER: ICD-10-CM

## 2021-03-29 PROCEDURE — 93298 REM INTERROG DEV EVAL SCRMS: CPT | Performed by: INTERNAL MEDICINE

## 2021-03-29 PROCEDURE — G2066 INTER DEVC REMOTE 30D: HCPCS | Performed by: INTERNAL MEDICINE

## 2021-03-29 RX ORDER — LEVOTHYROXINE SODIUM 0.1 MG/1
TABLET ORAL
Qty: 30 TABLET | Refills: 0 | Status: SHIPPED | OUTPATIENT
Start: 2021-03-29 | End: 2021-04-07

## 2021-03-29 RX ORDER — CLONIDINE HYDROCHLORIDE 0.3 MG/1
TABLET ORAL
Qty: 30 TABLET | Refills: 0 | Status: SHIPPED | OUTPATIENT
Start: 2021-03-29 | End: 2021-04-06 | Stop reason: SDUPTHER

## 2021-03-29 RX ORDER — ATORVASTATIN CALCIUM 80 MG/1
TABLET, FILM COATED ORAL
Qty: 30 TABLET | Refills: 0 | Status: SHIPPED | OUTPATIENT
Start: 2021-03-29 | End: 2021-04-07

## 2021-03-29 RX ORDER — OXYBUTYNIN CHLORIDE 5 MG/1
TABLET ORAL
Qty: 60 TABLET | Refills: 0 | Status: ON HOLD | OUTPATIENT
Start: 2021-03-29 | End: 2021-04-12

## 2021-03-29 RX ORDER — MIRTAZAPINE 15 MG/1
TABLET, FILM COATED ORAL
Qty: 30 TABLET | Refills: 0 | Status: SHIPPED | OUTPATIENT
Start: 2021-03-29 | End: 2021-04-07

## 2021-03-29 NOTE — PROGRESS NOTES
We received a remote transmission from patient's monitor at home. Remote Linq report shows no arrhythmias. AF recordings are not real. EP physician to review. We will continue to monitor remotely.

## 2021-03-29 NOTE — LETTER
[address] [facility] [phone]  [address] [facility],[address] [phone]    Pacemaker/Defibrillator Clinic          03/29/21        [address]  [place] [address]        Dear Ingrid Brock    This letter is to inform you that we received the transmission from your monitor at home that checks your implanted heart device. The next date your monitor will automatically transmit will be 5-3-21. If your report needs attention we will notify you. Your device and monitor are wireless and most transmit cellularly, but please periodically check your monitor is still plugged in to the electrical outlet. If you still use the telephone land line to send please ensure the connection to the phone stephanie is secure. This will help to ensure successful automatic transmissions in the future. Also, the monitor needs to be close to you while sleeping at night. Please be aware that the remote device transmission sites are periodically monitored only during regular business hours during which simultaneous in-office device clinics are being run. If your transmission requires attention, we will contact you as soon as possible. Thank you.             [address]

## 2021-04-06 DIAGNOSIS — E78.5 HYPERLIPIDEMIA, UNSPECIFIED HYPERLIPIDEMIA TYPE: ICD-10-CM

## 2021-04-06 DIAGNOSIS — F41.9 ANXIETY: ICD-10-CM

## 2021-04-06 DIAGNOSIS — E03.9 HYPOTHYROIDISM, UNSPECIFIED TYPE: ICD-10-CM

## 2021-04-06 NOTE — TELEPHONE ENCOUNTER
Medication:   Requested Prescriptions     Pending Prescriptions Disp Refills    levothyroxine (SYNTHROID) 100 MCG tablet [Pharmacy Med Name: LEVOTHYROXINE 100 MCG TABLET] 30 tablet 0     Sig: TAKE ONE TABLET BY MOUTH DAILY    mirtazapine (REMERON) 15 MG tablet [Pharmacy Med Name: MIRTAZAPINE 15 MG TABLET] 30 tablet 0     Sig: TAKE ONE TABLET BY MOUTH DAILY    atorvastatin (LIPITOR) 80 MG tablet [Pharmacy Med Name: ATORVASTATIN 80 MG TABLET] 30 tablet 0     Sig: TAKE ONE TABLET BY MOUTH DAILY    cloNIDine (CATAPRES) 0.3 MG tablet 30 tablet 5     Sig: TAKE ONE TABLET BY MOUTH EVERY NIGHT AT BEDTIME AS NEEDED          Patient Phone Number: [phone] (home) [phone] (work)    Last appt: 3/9/2021   Next appt: 4/12/2021    Last OARRS:   RX Monitoring 5/11/2020   Attestation -   Periodic Controlled Substance Monitoring No signs of potential drug abuse or diversion identified.      PDMP Monitoring:    Last PDMP Ana Elizabeth as Reviewed [facility]):  Review User Review Instant Review Result   [facility] 3/9/2021  4:00 PM Reviewed PDMP [1]     Preferred Pharmacy:   [facility] myDocket [facility] [address], [address] [facility] [phone] [facility] [phone] [address] [facility] [place] [address]  Phone: [phone] Fax: [phone]

## 2021-04-07 DIAGNOSIS — I10 ESSENTIAL HYPERTENSION: ICD-10-CM

## 2021-04-07 RX ORDER — LEVOTHYROXINE SODIUM 0.1 MG/1
TABLET ORAL
Qty: 30 TABLET | Refills: 0 | Status: SHIPPED | OUTPATIENT
Start: 2021-04-07 | End: 2021-05-23

## 2021-04-07 RX ORDER — LOSARTAN POTASSIUM 100 MG/1
TABLET ORAL
Qty: 30 TABLET | Refills: 0 | Status: SHIPPED | OUTPATIENT
Start: 2021-04-07 | End: 2021-04-21

## 2021-04-07 RX ORDER — CLONIDINE HYDROCHLORIDE 0.3 MG/1
TABLET ORAL
Qty: 30 TABLET | Refills: 5 | Status: ON HOLD | OUTPATIENT
Start: 2021-04-07 | End: 2021-04-12

## 2021-04-07 RX ORDER — ATORVASTATIN CALCIUM 80 MG/1
TABLET, FILM COATED ORAL
Qty: 30 TABLET | Refills: 0 | Status: SHIPPED | OUTPATIENT
Start: 2021-04-07 | End: 2021-05-23

## 2021-04-07 RX ORDER — MIRTAZAPINE 15 MG/1
TABLET, FILM COATED ORAL
Qty: 30 TABLET | Refills: 0 | Status: SHIPPED | OUTPATIENT
Start: 2021-04-07 | End: 2021-05-23

## 2021-04-09 ENCOUNTER — TELEPHONE (OUTPATIENT)
Dept: FAMILY MEDICINE CLINIC | Age: 76
End: 2021-04-09

## 2021-04-09 DIAGNOSIS — F41.9 ANXIETY: ICD-10-CM

## 2021-04-09 DIAGNOSIS — R11.2 NAUSEA AND VOMITING, INTRACTABILITY OF VOMITING NOT SPECIFIED, UNSPECIFIED VOMITING TYPE: Primary | ICD-10-CM

## 2021-04-09 RX ORDER — ONDANSETRON 4 MG/1
4-8 TABLET, ORALLY DISINTEGRATING ORAL EVERY 12 HOURS PRN
Qty: 12 TABLET | Refills: 0 | Status: SHIPPED | OUTPATIENT
Start: 2021-04-09 | End: 2021-08-29

## 2021-04-09 NOTE — TELEPHONE ENCOUNTER
Looks as though this is a recurrent issue. Refilled Zofran. Continue to monitor. To ED if symptoms worsen. Otherwise follow up with Nathalie Kim on Monday for visit.

## 2021-04-09 NOTE — TELEPHONE ENCOUNTER
----- Message from Jess Gagnon sent at 4/9/2021 12:00 PM EDT -----  Subject: Refill Request    QUESTIONS  Name of Medication? clonazePAM (KLONOPIN) 1 MG tablet  Patient-reported dosage and instructions? 1 mg twice a day   How many days do you have left? 0  Preferred Pharmacy? [facility] [address]  Pharmacy phone number (if available)? [phone]  Additional Information for Provider? Patient needs refill  ---------------------------------------------------------------------------  --------------  CALL BACK INFO  What is the best way for the office to contact you? OK to leave message on   voicemail  Preferred Call Back Phone Number?  [phone]

## 2021-04-09 NOTE — TELEPHONE ENCOUNTER
Farhana Carrera from [facility] states patient complains of nausea, upset stomach and dry heaving - started this morning. She states yesterday patient was okay, bowel movements were fine and had an appetite. Today her BP was 148/78. Patient would like something called into pharmacy if possible.     [facility] [address], [address] [phone] - F [phone]    Provider out of office    Please advise

## 2021-04-09 NOTE — TELEPHONE ENCOUNTER
Medication:   Requested Prescriptions     Pending Prescriptions Disp Refills    clonazePAM (KLONOPIN) 1 MG tablet 60 tablet 0     Sig: Take 1 tablet by mouth 2 times daily as needed for Anxiety for up to 30 days. Last Filled:  \3/9/2021    Patient Phone Number: [phone] (home) [phone] (work)    Last appt: 3/9/2021   Next appt: 4/12/2021    Last OARRS:   RX Monitoring 5/11/2020   Attestation -   Periodic Controlled Substance Monitoring No signs of potential drug abuse or diversion identified.      PDMP Monitoring:    Last PDMP Yrn Long as Reviewed [facility]):  Review User Review Instant Review Result   Kym Cordova 3/9/2021  4:00 PM Reviewed PDMP [1]     Preferred Pharmacy:   [facility] [address], [address] [facility] [phone] Mirna Prakash [phone]  [address] [facility] [address]  Phone: [phone] Fax: [phone]

## 2021-04-09 NOTE — TELEPHONE ENCOUNTER
----- Message from Gena Velazquez sent at 4/9/2021 12:00 PM EDT -----  Subject: Refill Request    QUESTIONS  Name of Medication? clonazePAM (KLONOPIN) 1 MG tablet  Patient-reported dosage and instructions? 1 mg twice a day   How many days do you have left? 0  Preferred Pharmacy? [facility] [address]  Pharmacy phone number (if available)? [phone]  Additional Information for Provider? Patient needs refill  ---------------------------------------------------------------------------  --------------  CALL BACK INFO  What is the best way for the office to contact you? OK to leave message on   voicemail  Preferred Call Back Phone Number?  [phone]

## 2021-04-10 ENCOUNTER — APPOINTMENT (OUTPATIENT)
Dept: CT IMAGING | Age: 76
DRG: 641 | End: 2021-04-10
Payer: COMMERCIAL

## 2021-04-10 ENCOUNTER — HOSPITAL ENCOUNTER (INPATIENT)
Age: 76
LOS: 5 days | Discharge: HOME HEALTH | DRG: 641 | End: 2021-04-15
Attending: STUDENT IN AN ORGANIZED HEALTH CARE EDUCATION/TRAINING PROGRAM | Admitting: INTERNAL MEDICINE
Payer: COMMERCIAL

## 2021-04-10 ENCOUNTER — APPOINTMENT (OUTPATIENT)
Dept: GENERAL RADIOLOGY | Age: 76
DRG: 641 | End: 2021-04-10
Payer: COMMERCIAL

## 2021-04-10 DIAGNOSIS — R53.83 OTHER FATIGUE: Primary | ICD-10-CM

## 2021-04-10 DIAGNOSIS — E87.1 HYPONATREMIA: ICD-10-CM

## 2021-04-10 LAB
ALBUMIN SERPL-MCNC: 4.6 G/DL (ref 3.4–5)
ALP BLD-CCNC: 68 U/L (ref 40–129)
ALT SERPL-CCNC: 13 U/L (ref 10–40)
AMPHETAMINE SCREEN, URINE: NORMAL
ANION GAP SERPL CALCULATED.3IONS-SCNC: 10 MMOL/L (ref 3–16)
ANION GAP SERPL CALCULATED.3IONS-SCNC: 11 MMOL/L (ref 3–16)
ANION GAP SERPL CALCULATED.3IONS-SCNC: 9 MMOL/L (ref 3–16)
AST SERPL-CCNC: 26 U/L (ref 15–37)
BACTERIA: ABNORMAL /HPF
BARBITURATE SCREEN URINE: NORMAL
BASOPHILS ABSOLUTE: 0.2 K/UL (ref 0–0.2)
BASOPHILS RELATIVE PERCENT: 2.5 %
BENZODIAZEPINE SCREEN, URINE: NORMAL
BILIRUB SERPL-MCNC: 0.5 MG/DL (ref 0–1)
BILIRUBIN DIRECT: <0.2 MG/DL (ref 0–0.3)
BILIRUBIN URINE: NEGATIVE
BILIRUBIN, INDIRECT: NORMAL MG/DL (ref 0–1)
BLOOD, URINE: ABNORMAL
BUN BLDV-MCNC: 4 MG/DL (ref 7–20)
BUN BLDV-MCNC: 5 MG/DL (ref 7–20)
BUN BLDV-MCNC: 5 MG/DL (ref 7–20)
CALCIUM SERPL-MCNC: 8.3 MG/DL (ref 8.3–10.6)
CALCIUM SERPL-MCNC: 8.4 MG/DL (ref 8.3–10.6)
CALCIUM SERPL-MCNC: 8.8 MG/DL (ref 8.3–10.6)
CANNABINOID SCREEN URINE: NORMAL
CHLORIDE BLD-SCNC: 80 MMOL/L (ref 99–110)
CHLORIDE BLD-SCNC: 86 MMOL/L (ref 99–110)
CHLORIDE BLD-SCNC: 87 MMOL/L (ref 99–110)
CLARITY: CLEAR
CO2: 27 MMOL/L (ref 21–32)
CO2: 27 MMOL/L (ref 21–32)
CO2: 28 MMOL/L (ref 21–32)
COCAINE METABOLITE SCREEN URINE: NORMAL
COLOR: YELLOW
COMMENT UA: ABNORMAL
CREAT SERPL-MCNC: <0.5 MG/DL (ref 0.6–1.2)
CREATININE URINE: 24 MG/DL (ref 28–259)
EOSINOPHILS ABSOLUTE: 0 K/UL (ref 0–0.6)
EOSINOPHILS RELATIVE PERCENT: 0.2 %
EPITHELIAL CELLS, UA: 10 /HPF (ref 0–5)
ETHANOL: NORMAL MG/DL (ref 0–0.08)
GFR AFRICAN AMERICAN: >60
GFR NON-AFRICAN AMERICAN: >60
GLUCOSE BLD-MCNC: 127 MG/DL (ref 70–99)
GLUCOSE BLD-MCNC: 84 MG/DL (ref 70–99)
GLUCOSE BLD-MCNC: 90 MG/DL (ref 70–99)
GLUCOSE URINE: NEGATIVE MG/DL
HCT VFR BLD CALC: 42.8 % (ref 36–48)
HEMOGLOBIN: 14.5 G/DL (ref 12–16)
KETONES, URINE: 40 MG/DL
LEUKOCYTE ESTERASE, URINE: NEGATIVE
LIPASE: 16 U/L (ref 13–60)
LYMPHOCYTES ABSOLUTE: 0.3 K/UL (ref 1–5.1)
LYMPHOCYTES RELATIVE PERCENT: 5.1 %
Lab: NORMAL
MAGNESIUM: 1.8 MG/DL (ref 1.8–2.4)
MCH RBC QN AUTO: 33.7 PG (ref 26–34)
MCHC RBC AUTO-ENTMCNC: 34 G/DL (ref 31–36)
MCV RBC AUTO: 99.1 FL (ref 80–100)
METHADONE SCREEN, URINE: NORMAL
MICROSCOPIC EXAMINATION: YES
MONOCYTES ABSOLUTE: 0.7 K/UL (ref 0–1.3)
MONOCYTES RELATIVE PERCENT: 10.8 %
NEUTROPHILS ABSOLUTE: 5.3 K/UL (ref 1.7–7.7)
NEUTROPHILS RELATIVE PERCENT: 81.4 %
NITRITE, URINE: NEGATIVE
OPIATE SCREEN URINE: NORMAL
OSMOLALITY URINE: 256 MOSM/KG (ref 390–1070)
OXYCODONE URINE: NORMAL
PDW BLD-RTO: 13.3 % (ref 12.4–15.4)
PH UA: 6.5
PH UA: 6.5 (ref 5–8)
PHENCYCLIDINE SCREEN URINE: NORMAL
PLATELET # BLD: 344 K/UL (ref 135–450)
PMV BLD AUTO: 8.7 FL (ref 5–10.5)
POTASSIUM REFLEX MAGNESIUM: 3.1 MMOL/L (ref 3.5–5.1)
POTASSIUM SERPL-SCNC: 3.1 MMOL/L (ref 3.5–5.1)
POTASSIUM SERPL-SCNC: 3.4 MMOL/L (ref 3.5–5.1)
PRO-BNP: 806 PG/ML (ref 0–449)
PROPOXYPHENE SCREEN: NORMAL
PROTEIN UA: 30 MG/DL
RBC # BLD: 4.32 M/UL (ref 4–5.2)
RBC UA: 3 /HPF (ref 0–4)
SODIUM BLD-SCNC: 118 MMOL/L (ref 136–145)
SODIUM BLD-SCNC: 123 MMOL/L (ref 136–145)
SODIUM BLD-SCNC: 124 MMOL/L (ref 136–145)
SODIUM URINE: 60 MMOL/L
SPECIFIC GRAVITY UA: 1.01 (ref 1–1.03)
TOTAL PROTEIN: 7.7 G/DL (ref 6.4–8.2)
TROPONIN: <0.01 NG/ML
URINE TYPE: ABNORMAL
UROBILINOGEN, URINE: 0.2 E.U./DL
WBC # BLD: 6.5 K/UL (ref 4–11)
WBC UA: 2 /HPF (ref 0–5)

## 2021-04-10 PROCEDURE — 71045 X-RAY EXAM CHEST 1 VIEW: CPT

## 2021-04-10 PROCEDURE — 85025 COMPLETE CBC W/AUTO DIFF WBC: CPT

## 2021-04-10 PROCEDURE — 6370000000 HC RX 637 (ALT 250 FOR IP): Performed by: INTERNAL MEDICINE

## 2021-04-10 PROCEDURE — 6360000002 HC RX W HCPCS: Performed by: STUDENT IN AN ORGANIZED HEALTH CARE EDUCATION/TRAINING PROGRAM

## 2021-04-10 PROCEDURE — 81001 URINALYSIS AUTO W/SCOPE: CPT

## 2021-04-10 PROCEDURE — 84484 ASSAY OF TROPONIN QUANT: CPT

## 2021-04-10 PROCEDURE — 99284 EMERGENCY DEPT VISIT MOD MDM: CPT

## 2021-04-10 PROCEDURE — 82077 ASSAY SPEC XCP UR&BREATH IA: CPT

## 2021-04-10 PROCEDURE — 83880 ASSAY OF NATRIURETIC PEPTIDE: CPT

## 2021-04-10 PROCEDURE — 83935 ASSAY OF URINE OSMOLALITY: CPT

## 2021-04-10 PROCEDURE — 6360000002 HC RX W HCPCS: Performed by: INTERNAL MEDICINE

## 2021-04-10 PROCEDURE — 94640 AIRWAY INHALATION TREATMENT: CPT

## 2021-04-10 PROCEDURE — 36415 COLL VENOUS BLD VENIPUNCTURE: CPT

## 2021-04-10 PROCEDURE — 94761 N-INVAS EAR/PLS OXIMETRY MLT: CPT

## 2021-04-10 PROCEDURE — 80076 HEPATIC FUNCTION PANEL: CPT

## 2021-04-10 PROCEDURE — 70450 CT HEAD/BRAIN W/O DYE: CPT

## 2021-04-10 PROCEDURE — 96365 THER/PROPH/DIAG IV INF INIT: CPT

## 2021-04-10 PROCEDURE — 82570 ASSAY OF URINE CREATININE: CPT

## 2021-04-10 PROCEDURE — 83735 ASSAY OF MAGNESIUM: CPT

## 2021-04-10 PROCEDURE — 84300 ASSAY OF URINE SODIUM: CPT

## 2021-04-10 PROCEDURE — 2000000000 HC ICU R&B

## 2021-04-10 PROCEDURE — 2500000003 HC RX 250 WO HCPCS: Performed by: INTERNAL MEDICINE

## 2021-04-10 PROCEDURE — 93005 ELECTROCARDIOGRAM TRACING: CPT | Performed by: STUDENT IN AN ORGANIZED HEALTH CARE EDUCATION/TRAINING PROGRAM

## 2021-04-10 PROCEDURE — 2580000003 HC RX 258: Performed by: STUDENT IN AN ORGANIZED HEALTH CARE EDUCATION/TRAINING PROGRAM

## 2021-04-10 PROCEDURE — 83690 ASSAY OF LIPASE: CPT

## 2021-04-10 PROCEDURE — 6370000000 HC RX 637 (ALT 250 FOR IP): Performed by: STUDENT IN AN ORGANIZED HEALTH CARE EDUCATION/TRAINING PROGRAM

## 2021-04-10 PROCEDURE — 2580000003 HC RX 258: Performed by: INTERNAL MEDICINE

## 2021-04-10 PROCEDURE — 80307 DRUG TEST PRSMV CHEM ANLYZR: CPT

## 2021-04-10 PROCEDURE — 80048 BASIC METABOLIC PNL TOTAL CA: CPT

## 2021-04-10 PROCEDURE — 84443 ASSAY THYROID STIM HORMONE: CPT

## 2021-04-10 RX ORDER — POTASSIUM CHLORIDE 20 MEQ/1
40 TABLET, EXTENDED RELEASE ORAL PRN
Status: DISCONTINUED | OUTPATIENT
Start: 2021-04-10 | End: 2021-04-15 | Stop reason: HOSPADM

## 2021-04-10 RX ORDER — POLYETHYLENE GLYCOL 3350 17 G/17G
17 POWDER, FOR SOLUTION ORAL DAILY PRN
Status: DISCONTINUED | OUTPATIENT
Start: 2021-04-10 | End: 2021-04-15 | Stop reason: HOSPADM

## 2021-04-10 RX ORDER — ACETAMINOPHEN 325 MG/1
650 TABLET ORAL EVERY 6 HOURS PRN
Status: DISCONTINUED | OUTPATIENT
Start: 2021-04-10 | End: 2021-04-15 | Stop reason: HOSPADM

## 2021-04-10 RX ORDER — SODIUM CHLORIDE 9 MG/ML
25 INJECTION, SOLUTION INTRAVENOUS PRN
Status: DISCONTINUED | OUTPATIENT
Start: 2021-04-10 | End: 2021-04-15 | Stop reason: HOSPADM

## 2021-04-10 RX ORDER — LABETALOL 200 MG/1
TABLET, FILM COATED ORAL
Status: DISPENSED
Start: 2021-04-10 | End: 2021-04-11

## 2021-04-10 RX ORDER — SODIUM CHLORIDE 0.9 % (FLUSH) 0.9 %
5-40 SYRINGE (ML) INJECTION PRN
Status: DISCONTINUED | OUTPATIENT
Start: 2021-04-10 | End: 2021-04-15 | Stop reason: HOSPADM

## 2021-04-10 RX ORDER — MIRTAZAPINE 15 MG/1
15 TABLET, FILM COATED ORAL NIGHTLY
Status: DISCONTINUED | OUTPATIENT
Start: 2021-04-10 | End: 2021-04-15 | Stop reason: HOSPADM

## 2021-04-10 RX ORDER — POTASSIUM CHLORIDE 7.45 MG/ML
10 INJECTION INTRAVENOUS
Status: COMPLETED | OUTPATIENT
Start: 2021-04-10 | End: 2021-04-10

## 2021-04-10 RX ORDER — SODIUM CHLORIDE 450 MG/100ML
INJECTION, SOLUTION INTRAVENOUS CONTINUOUS
Status: DISCONTINUED | OUTPATIENT
Start: 2021-04-10 | End: 2021-04-11

## 2021-04-10 RX ORDER — LABETALOL 200 MG/1
200 TABLET, FILM COATED ORAL EVERY 12 HOURS SCHEDULED
Status: DISCONTINUED | OUTPATIENT
Start: 2021-04-10 | End: 2021-04-15 | Stop reason: HOSPADM

## 2021-04-10 RX ORDER — NITROGLYCERIN 20 MG/100ML
5-200 INJECTION INTRAVENOUS CONTINUOUS
Status: DISCONTINUED | OUTPATIENT
Start: 2021-04-10 | End: 2021-04-12

## 2021-04-10 RX ORDER — LOSARTAN POTASSIUM 25 MG/1
100 TABLET ORAL DAILY
Status: DISCONTINUED | OUTPATIENT
Start: 2021-04-10 | End: 2021-04-10

## 2021-04-10 RX ORDER — LABETALOL 200 MG/1
200 TABLET, FILM COATED ORAL EVERY 12 HOURS SCHEDULED
Status: DISCONTINUED | OUTPATIENT
Start: 2021-04-10 | End: 2021-04-10

## 2021-04-10 RX ORDER — CLONIDINE HYDROCHLORIDE 0.3 MG/1
0.3 TABLET ORAL NIGHTLY
Status: DISCONTINUED | OUTPATIENT
Start: 2021-04-10 | End: 2021-04-13

## 2021-04-10 RX ORDER — SODIUM CHLORIDE 9 MG/ML
1000 INJECTION, SOLUTION INTRAVENOUS ONCE
Status: COMPLETED | OUTPATIENT
Start: 2021-04-10 | End: 2021-04-10

## 2021-04-10 RX ORDER — ONDANSETRON 2 MG/ML
4 INJECTION INTRAMUSCULAR; INTRAVENOUS EVERY 6 HOURS PRN
Status: DISCONTINUED | OUTPATIENT
Start: 2021-04-10 | End: 2021-04-15 | Stop reason: HOSPADM

## 2021-04-10 RX ORDER — SODIUM CHLORIDE 0.9 % (FLUSH) 0.9 %
5-40 SYRINGE (ML) INJECTION EVERY 12 HOURS SCHEDULED
Status: DISCONTINUED | OUTPATIENT
Start: 2021-04-10 | End: 2021-04-15 | Stop reason: HOSPADM

## 2021-04-10 RX ORDER — MAGNESIUM SULFATE IN WATER 40 MG/ML
2000 INJECTION, SOLUTION INTRAVENOUS PRN
Status: DISCONTINUED | OUTPATIENT
Start: 2021-04-10 | End: 2021-04-15 | Stop reason: HOSPADM

## 2021-04-10 RX ORDER — ESCITALOPRAM OXALATE 10 MG/1
10 TABLET ORAL DAILY
Status: DISCONTINUED | OUTPATIENT
Start: 2021-04-10 | End: 2021-04-14

## 2021-04-10 RX ORDER — POTASSIUM CHLORIDE 7.45 MG/ML
10 INJECTION INTRAVENOUS PRN
Status: DISCONTINUED | OUTPATIENT
Start: 2021-04-10 | End: 2021-04-15 | Stop reason: HOSPADM

## 2021-04-10 RX ORDER — ACETAMINOPHEN 650 MG/1
650 SUPPOSITORY RECTAL EVERY 6 HOURS PRN
Status: DISCONTINUED | OUTPATIENT
Start: 2021-04-10 | End: 2021-04-15 | Stop reason: HOSPADM

## 2021-04-10 RX ORDER — LEVOTHYROXINE SODIUM 0.1 MG/1
100 TABLET ORAL
Status: DISCONTINUED | OUTPATIENT
Start: 2021-04-10 | End: 2021-04-15 | Stop reason: HOSPADM

## 2021-04-10 RX ORDER — PROMETHAZINE HYDROCHLORIDE 25 MG/1
12.5 TABLET ORAL EVERY 6 HOURS PRN
Status: DISCONTINUED | OUTPATIENT
Start: 2021-04-10 | End: 2021-04-15 | Stop reason: HOSPADM

## 2021-04-10 RX ORDER — CLONAZEPAM 1 MG/1
1 TABLET ORAL 2 TIMES DAILY PRN
Status: DISCONTINUED | OUTPATIENT
Start: 2021-04-10 | End: 2021-04-15 | Stop reason: HOSPADM

## 2021-04-10 RX ORDER — BUDESONIDE AND FORMOTEROL FUMARATE DIHYDRATE 160; 4.5 UG/1; UG/1
2 AEROSOL RESPIRATORY (INHALATION) 2 TIMES DAILY
Status: DISCONTINUED | OUTPATIENT
Start: 2021-04-10 | End: 2021-04-15 | Stop reason: HOSPADM

## 2021-04-10 RX ORDER — OXYBUTYNIN CHLORIDE 5 MG/1
5 TABLET ORAL 2 TIMES DAILY
Status: DISCONTINUED | OUTPATIENT
Start: 2021-04-10 | End: 2021-04-15 | Stop reason: HOSPADM

## 2021-04-10 RX ADMIN — Medication 10 MEQ: at 13:04

## 2021-04-10 RX ADMIN — Medication 10 MEQ: at 15:56

## 2021-04-10 RX ADMIN — SODIUM CHLORIDE 1000 ML: 9 INJECTION, SOLUTION INTRAVENOUS at 13:03

## 2021-04-10 RX ADMIN — MIRTAZAPINE 15 MG: 15 TABLET, FILM COATED ORAL at 20:16

## 2021-04-10 RX ADMIN — Medication 2 PUFF: at 19:47

## 2021-04-10 RX ADMIN — OXYBUTYNIN CHLORIDE 5 MG: 5 TABLET ORAL at 20:16

## 2021-04-10 RX ADMIN — NITROGLYCERIN 5 MCG/MIN: 20 INJECTION INTRAVENOUS at 15:48

## 2021-04-10 RX ADMIN — ENOXAPARIN SODIUM 40 MG: 40 INJECTION SUBCUTANEOUS at 17:55

## 2021-04-10 RX ADMIN — LOSARTAN POTASSIUM 100 MG: 25 TABLET, FILM COATED ORAL at 13:36

## 2021-04-10 RX ADMIN — CLONIDINE HYDROCHLORIDE 0.3 MG: 0.3 TABLET ORAL at 20:16

## 2021-04-10 RX ADMIN — SODIUM CHLORIDE: 4.5 INJECTION, SOLUTION INTRAVENOUS at 17:31

## 2021-04-10 RX ADMIN — CLONAZEPAM 1 MG: 1 TABLET ORAL at 20:16

## 2021-04-10 RX ADMIN — ACETAMINOPHEN 650 MG: 325 TABLET ORAL at 17:06

## 2021-04-10 RX ADMIN — POTASSIUM CHLORIDE 40 MEQ: 1500 TABLET, EXTENDED RELEASE ORAL at 22:05

## 2021-04-10 RX ADMIN — NITROGLYCERIN 5 MCG/MIN: 20 INJECTION INTRAVENOUS at 15:42

## 2021-04-10 RX ADMIN — LABETALOL HYDROCHLORIDE 200 MG: 200 TABLET, FILM COATED ORAL at 18:11

## 2021-04-10 RX ADMIN — Medication 10 ML: at 20:25

## 2021-04-10 ASSESSMENT — ENCOUNTER SYMPTOMS
COUGH: 0
ABDOMINAL PAIN: 0
SHORTNESS OF BREATH: 0
SORE THROAT: 0
SINUS PRESSURE: 0
PHOTOPHOBIA: 0
DIARRHEA: 1
VOMITING: 1
NAUSEA: 1

## 2021-04-10 ASSESSMENT — PAIN DESCRIPTION - LOCATION: LOCATION: HEAD

## 2021-04-10 ASSESSMENT — PAIN SCALES - GENERAL: PAINLEVEL_OUTOF10: 0

## 2021-04-10 ASSESSMENT — PAIN DESCRIPTION - PAIN TYPE: TYPE: ACUTE PAIN

## 2021-04-10 NOTE — ED PROVIDER NOTES
[facility]      Pt Name: Joselito Khan  MRN: [id number]  [place] 1945  Date of evaluation: 4/10/2021  Provider: Farhana Perry MD    CHIEF COMPLAINT       Chief Complaint   Patient presents with    Fatigue     Arrived by [facility] rt generlized weakness lasting several days of duration. Reports 2-3 days of headaches, vomitting & diarrhea; problems not present now. Denies fevers. BP elevated at 216/107. HISTORY OF PRESENT ILLNESS   (Location/Symptom, Timing/Onset, Context/Setting, Quality, Duration, Modifying Factors, Severity)  Note limiting factors. Joselito Khan is a 67 y.o. female who presents to the emergency department with complaints of fatigue. She feels very weak. She endorses that she has been throwing up and having vomiting for the last several days resulting in her weakness. She states that she felt like she was about to fall down her stairs today at home. she did not fall and denies passing out. She is not having any associated abdominal pain or fever. Started on cozaar 4/7/21 by her PCP for HTN, she is unsure if she is taking this because her daughter sets aside her pills for her. . Treated for UTI 3/20/21 with keflex which she states she completed. Nursing Notes were reviewed. REVIEW OF SYSTEMS    (2-9 systems for level 4, 10 or more for level 5)     Review of Systems   Constitutional: Negative for chills and fever. HENT: Negative for sinus pressure and sore throat. Eyes: Negative for photophobia and visual disturbance. Respiratory: Negative for cough and shortness of breath. Cardiovascular: Negative for chest pain and leg swelling. Gastrointestinal: Positive for diarrhea, nausea and vomiting. Negative for abdominal pain. Genitourinary: Negative for vaginal discharge and vaginal pain. Musculoskeletal: Negative for arthralgias and neck stiffness. Skin: Negative for rash and wound. Tobacco Use    Smoking status: Current Every Day Smoker     Packs/day: 1.00     Years: 52.00     Pack years: 52.00     Types: Cigarettes     Last attempt to quit: 2018     Years since quittin.7    Smokeless tobacco: Never Used    Tobacco comment: started smoking at age 26 / smoked up to 2 p,p,d / now smoking 4 to 8 cigarettes a day,   Substance and Sexual Activity    Alcohol use: No     Alcohol/week: 0.0 standard drinks     Comment: patient reports she is an alcoholic hasn't had anything to drink 3-4 months    Drug use: No    Sexual activity: Yes     Partners: Male   Lifestyle    Physical activity     Days per week: None     Minutes per session: None    Stress: None   Relationships    Social connections     Talks on phone: None     Gets together: None     Attends [employer] service: None     Active member of club or organization: None     Attends meetings of clubs or organizations: None     Relationship status: None    Intimate partner violence     Fear of current or ex partner: None     Emotionally abused: None     Physically abused: None     Forced sexual activity: None   Other Topics Concern    None   Social History Narrative    None       SCREENINGS                        PHYSICAL EXAM    (up to 7 for level 4, 8 or more for level 5)     ED Triage Vitals [04/10/21 1054]   BP Temp Temp src Pulse Resp SpO2 Height Weight   (!) 216/107 -- -- 83 18 98 % 5' 8\" (1.727 m) 125 lb (56.7 kg)       Physical Exam  Constitutional:       Appearance: Normal appearance. HENT:      Head: Normocephalic and atraumatic. Mouth/Throat:      Comments: Dry mucous membranes   Eyes:      Conjunctiva/sclera: Conjunctivae normal.   Neck:      Musculoskeletal: Normal range of motion. No neck rigidity. Cardiovascular:      Rate and Rhythm: Normal rate and regular rhythm. Pulses: Normal pulses. Heart sounds: Normal heart sounds.    Pulmonary:      Effort: Pulmonary effort is normal.      Breath sounds: Normal breath sounds. Abdominal:      General: Abdomen is flat. There is no distension. Palpations: Abdomen is soft. There is no mass. Tenderness: There is no abdominal tenderness. Skin:     General: Skin is warm and dry. Capillary Refill: Capillary refill takes less than 2 seconds. Neurological:      Mental Status: She is alert and oriented to person, place, and time. Comments: No speech difficulty, no extremity drift, unsteady on feet when attempting to ambulate   Psychiatric:         Mood and Affect: Mood normal.         Behavior: Behavior normal.         DIAGNOSTIC RESULTS     EKG: All EKG's are interpreted by the Emergency Department Physician who either signs or Co-signs this chart in the absence of a cardiologist.  The Ekg interpreted by me in the absence of a cardiologist shows. normal sinus rhythm with a rate of 78 though significant artifact is limiting interpretation  Axis is   Normal  QTc is  normal  Intervals and Durations are unremarkable. No specific ST-T wave changes appreciated. RADIOLOGY:   Non-plain film images such as CT, Ultrasound and MRI are read by the radiologist. Plain radiographic images are visualized and preliminarily interpreted by the emergency physician with the below findings:        Interpretation per the Radiologist below, if available at the time of this note:    CT Head WO Contrast   Final Result   1. No acute intracranial abnormality. XR CHEST PORTABLE   Final Result   1. Unchanged small left pleural effusion.                LABS:  Labs Reviewed   BASIC METABOLIC PANEL W/ REFLEX TO MG FOR LOW K - Abnormal; Notable for the following components:       Result Value    Sodium 118 (*)     Potassium reflex Magnesium 3.1 (*)     Chloride 80 (*)     BUN 5 (*)     CREATININE <0.5 (*)     All other components within normal limits    Narrative:     Princess Landa  [facility] tel. [phone],  Chemistry results called to and read back by Nika Carter 04/10/2021 12:33, by  PADMA DAY JR. [facility]  Performed at:  [facility] [address]  [employer]   Phone [phone]   CBC WITH AUTO DIFFERENTIAL - Abnormal; Notable for the following components:    Lymphocytes Absolute 0.3 (*)     All other components within normal limits    Narrative:     Performed at:  [facility] [address] [employer]   Phone 21  - Abnormal; Notable for the following components:    Pro- (*)     All other components within normal limits    Narrative:     Nadia Piper  [facility] tel. [phone],  Chemistry results called to and read back by Cayetano Estrada, 04/10/2021 12:33, by  [facility]  Performed at:  [facility] [address] [employer]   Phone [phone]   URINALYSIS - Abnormal; Notable for the following components:    Ketones, Urine 40 (*)     Blood, Urine TRACE (*)     Protein, UA 30 (*)     All other components within normal limits    Narrative:     Performed at:  [facility] [address] [employer]   Phone [phone]   MICROSCOPIC URINALYSIS - Abnormal; Notable for the following components:    Bacteria, UA RARE (*)     Epithelial Cells, UA 10 (*)     All other components within normal limits    Narrative:     Performed at:  [facility] [address]  [employer]   Phone [phone]   GASTROINTESTINAL PANEL, MOLECULAR   C DIFF TOXIN/ANTIGEN   O&P PANEL ([employer]) #1   GIARDIA ANTIGEN   HEPATIC FUNCTION PANEL    Narrative:     Nadia Piper  [facility] tel. [phone],  Chemistry results called to and read back by Cayetano Estrada, 04/10/2021 12:33, by  PADMA DAY JR. [facility]  Performed at:  [facility] [address]  [employer]   Phone [phone]   LIPASE    Narrative:     Nadia Piper  [employer] tel. [phone],  Chemistry results called to and read back by De Larios, 04/10/2021 12:33, by  PADMA DAY JR. [facility]  Performed at:  [facility] [address], [facility], [address]   Phone [phone]   TROPONIN    Narrative:     Ernestine Rowan  [facility] tel. [phone],  Chemistry results called to and read back by De Larios, 04/10/2021 12:33, by  PADMA DAY JR. [facility]  Performed at:  [facility] [address], [facility], [address]   Phone [phone]   URINE DRUG SCREEN    Narrative:     Performed at:  [facility] [address], [facility], [address]   Phone [phone]   ETHANOL    Narrative:     Performed at:  [facility] [address], [facility], [address]   Phone [phone]   MAGNESIUM    Narrative:     [facility]  [facility] tel. [phone],  Chemistry results called to and read back by De Larios, 04/10/2021 12:33, by  PADMA DAY JR. [facility]  Performed at:  [facility] [address], [facility], [address]   Phone [phone]   BLOOD OCCULT STOOL SCREEN #1   ROTAVIRUS ANTIGEN, STOOL   OSMOLALITY, URINE   SODIUM, URINE, RANDOM   CREATININE, RANDOM URINE     Severe hyponatremia new from last month.   Mild hypokalemia repleted IV    EMERGENCY DEPARTMENT COURSE and DIFFERENTIAL DIAGNOSIS/MDM:   Vitals:    Vitals:    04/10/21 1230 04/10/21 1245 04/10/21 1300 04/10/21 1315   BP: (!) 216/102 (!) 199/99 (!) 200/106 (!) 206/104   Pulse: 80 78 81 87   Resp: 26 29 27 17   Temp:       TempSrc:       SpO2: 97% 96% 96% 95%   Weight:       Height:         Medications   potassium chloride 10 mEq/100 mL IVPB (Peripheral Line) (10 mEq Intravenous New Bag 4/10/21 1304)   losartan (COZAAR) tablet 100 mg (100 mg Oral Given 4/10/21 8107)   0.9 % sodium chloride infusion (1,000 mLs Intravenous New Bag 4/10/21 1303)     Course and MDM:  Patient is 58-year-old female presented to the emergency room for nausea, vomiting, diarrhea and fatigue. On arrival she is hypertensive and does not think she took her morning medications. She is dry appearing. Work-up is concerning for severe hyponatremia new from last month. Suspect hypovolemic hyponatremia. UTI from March seems to be resolved today. Her abdomen is soft and nontender and I have low suspicion for acute appendicitis, perforation, obstruction or diverticulitis. Repletion of potassium and sodium was started IV. Dry CT of her head is not showing any large bleed or mass. She does have old stroke but I do not appreciate any significant weakness on her exam.  Will water restrict and admit for further work-up of hyponatremia. She is agreeable to admission for the above. PROCEDURES:  Unless otherwise noted below, none     Procedures    FINAL IMPRESSION      1. Other fatigue    2. Hyponatremia          DISPOSITION/PLAN   DISPOSITION        PATIENT REFERRED TO:  No follow-up provider specified. DISCHARGE MEDICATIONS:  New Prescriptions    No medications on file     Controlled Substances Monitoring:     RX Monitoring 5/11/2020   Attestation -   Periodic Controlled Substance Monitoring No signs of potential drug abuse or diversion identified.        (Please note that portions of this note were completed with a voice recognition program.  Efforts were made to edit the dictations but occasionally words are mis-transcribed.)    Annamaria Gottron, MD (electronically signed)  Attending Emergency Physician         Asia Szymanski MD  04/10/21 6533

## 2021-04-10 NOTE — CONSULTS
[facility]  Consult Note      Referring Physician:  Asif Kaplan MD    Reason:    Hyponatremia     HPI:  66 y/o with history of hypertension, anxiety, and previous possible alcohol use presents to the hospital with feeling weak. She reports 2-3 days of nausea and vomiting. Reports unable to keep anything down. She does report drinking \"mostly water\". Denies any ETOH use. Her sodium here is 118 on presentation. Sodium was 138 3 weeks ago. Has had previous episodes of hyponatremia that appear to be hypovolemic in nature. She was given a saline bolus in the ED. BP is high. Her home medications are not clear.        Past Medical History   Active Ambulatory Problems     Diagnosis Date Noted    Hypothyroidism 10/16/2012    Smoker 10/16/2012    Anxiety 10/16/2012    History of alcoholism ([address] 11/27/2012    Pleural effusion 11/03/2015    Centrilobular emphysema (HCC)     COPD, moderate (HCC) 06/09/2016    Encephalopathy     CAD in native artery     Chronic ischemic multifocal multiple vascular territories stroke 04/20/2018    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 ([address]     Cardiac arrhythmia     Alcohol abuse counseling and surveillance 04/23/2018    Severe infection     Therapeutic drug monitoring     H/O ischemic multifocal multiple vascular territories stroke     Depression     Oropharyngeal dysphagia     HTN (hypertension), benign     Dyslipidemia     Encounter for electronic analysis of reveal event recorder 08/28/2018    Physical deconditioning 05/08/2019    PAF (paroxysmal atrial fibrillation) ([address] 05/08/2019    Vascular dementia, uncomplicated (HCC)     Other insomnia 05/13/2019    Transient alteration of awareness 05/13/2019    Hyponatremia 07/21/2019    Coronary artery disease due to lipid rich plaque     Ataxia     Cord compression ([address]     MRSA infection 08/24/2019    Weakness 08/25/2019     Resolved Ambulatory Problems     Diagnosis Date Noted    Incontinence 10/16/2012    Hyperlipidemia 03/19/2013    Alcohol intoxication ([address] 07/15/2013    Suicide ideation 07/15/2013    Hypokalemia 07/15/2013    Hypokalemia 04/06/2014    Alcohol abuse 04/06/2014    Rotator cuff tear 05/29/2014    DIMAS (dyspnea on exertion) 07/03/2015    Chest pain 07/03/2015    Acute DVT (deep venous thrombosis) ([address] 07/03/2015    DVT (deep venous thrombosis) ([facility]     COPD exacerbation ([address] 11/03/2015    Fatigue 11/03/2015    General weakness 11/04/2015    Essential hypertension 11/03/2020    Chronic fatigue     Chest pain 04/28/2016    Left arm weakness 08/15/2016    Osteopenia 02/14/2017    Community acquired pneumonia     Acute cystitis with hematuria     Recurrent left pleural effusion     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    COPD exacerbation ([address] 02/20/2018    Acute encephalopathy 04/19/2018    Cellulitis 04/28/2018    Right forearm cellulitis     Respiratory distress     Superficial thrombophlebitis of right upper extremity     Tobacco abuse counseling     Complex care coordination     Stroke-like symptoms 06/14/2018    Unable to walk 07/01/2018    Cerebrovascular accident (CVA) ([address]     Acute on chronic diastolic heart failure ([address] 03/30/2019    Generalized weakness 22/22/9284    Complicated UTI (urinary tract infection)     Recurrent UTI      Past Medical History:   Diagnosis Date    Cerebral artery occlusion with cerebral infarction ([address]     COPD (chronic obstructive pulmonary disease) ([address]     DVT, lower extremity ([address]     Hypercholesteremia     Hypertension     Hyperthyroidism     Mammogram abnormal 2/7/2012    Neuromuscular disorder ([address]     Pneumonia     Thyroid disease     Tobacco abuse        Past Surgical History  Past Surgical History:   Procedure Laterality Date    COLONOSCOPY  1/19/2012    Dr. Shamar Avitia; repeat 5 years    EYE SURGERY      cateracts removed    SHOULDER ARTHROSCOPY Right 6/18/14    SUBACROMIAL DECOMPRESSION, ROTATOR CUFF REPAIR    TOE SURGERY Right     TONSILLECTOMY           Family History  family history includes Alcohol Abuse in her father; Heart Disease in her father.     Social History  Social History     Socioeconomic History    Marital status:      Spouse name: Not on file    Number of children: Not on file    Years of education: Not on file    Highest education level: Not on file   Occupational History    Not on file   Social Needs    Financial resource strain: Not on file    Food insecurity     Worry: Not on file     Inability: Not on file    Transportation needs     Medical: Not on file     Non-medical: Not on file   Tobacco Use    Smoking status: Current Every Day Smoker     Packs/day: 1.00     Years: 52.00     Pack years: 52.00     Types: Cigarettes     Last attempt to quit: 2018     Years since quittin.7    Smokeless tobacco: Never Used    Tobacco comment: started smoking at age 26 / smoked up to 2 p,p,d / now smoking 4 to 8 cigarettes a day,   Substance and Sexual Activity    Alcohol use: No     Alcohol/week: 0.0 standard drinks     Comment: patient reports she is an alcoholic hasn't had anything to drink 3-4 months    Drug use: No    Sexual activity: Yes     Partners: Male   Lifestyle    Physical activity     Days per week: Not on file     Minutes per session: Not on file    Stress: Not on file   Relationships    Social connections     Talks on phone: Not on file     Gets together: Not on file     Attends Bahai service: Not on file     Active member of club or organization: Not on file     Attends meetings of clubs or organizations: Not on file     Relationship status: Not on file    Intimate partner violence     Fear of current or ex partner: Not on file     Emotionally abused: Not on file     Physically abused: Not on file     Forced sexual activity: Not on file   Other Topics Concern    Not on file   Social History Narrative    Not on file Allergies  Allergies   Allergen Reactions    Lipitor [Atorvastatin] Other (See Comments)     Weakness, severe    Morphine Shortness Of Breath    Keflex [Cephalexin] Other (See Comments)     SOB & bilateral arms shaking     Hctz [Hydrochlorothiazide] Other (See Comments)     Hyponatremia, severe      Norvasc [Amlodipine Besylate] Swelling     Of neck    Penicillins Hives    Tramadol Itching    Hydralazine Palpitations and Other (See Comments)     Dizzy,fatigue,headaches,palpitations,loss of appetite    Shellfish-Derived Products Swelling and Rash     Swelling of neck       Current Medications  Current Facility-Administered Medications   Medication Dose Route Frequency Provider Last Rate Last Admin    clonazePAM (KLONOPIN) tablet 1 mg  1 mg Oral BID PRN Sidney Grady MD        cloNIDine (CATAPRES) tablet 0.3 mg  0.3 mg Oral Nightly Sidney Grady MD        escitalopram (LEXAPRO) tablet 10 mg  1 tablet Oral Daily Sidney Grady MD        levothyroxine (SYNTHROID) tablet 100 mcg  1 tablet Oral Daily Sidney Grady MD        mirtazapine (REMERON) tablet 15 mg  1 tablet Oral Daily Sidney Grady MD        budesonide-formoterol (SYMBICORT) 160-4.5 MCG/ACT inhaler 2 puff  2 puff Inhalation BID Sidney Grady MD        oxybutynin (DITROPAN) tablet 5 mg  1 tablet Oral BID Sidney RAMIREZ MD        sodium chloride flush 0.9 % injection 5-40 mL  5-40 mL Intravenous 2 times per day [facility] Jaki RAMIREZ MD        sodium chloride flush 0.9 % injection 5-40 mL  5-40 mL Intravenous PRN Sidney RAMIREZ MD        0.9 % sodium chloride infusion  25 mL Intravenous PRN Sidney RAMIREZ MD        enoxaparin (LOVENOX) injection 40 mg  40 mg Subcutaneous Daily Sidney RAMIREZ MD        polyethylene glycol (GLYCOLAX) packet 17 g  17 g Oral Daily PRN Sidney RAMIREZ MD        acetaminophen (TYLENOL) tablet 650 mg  650 mg Oral Q6H PRN Sidney RAMIREZ MD   650 mg at 04/10/21 1706    Or    acetaminophen but may has had edema with amlodipine. May have been on labetalol. Resume that PO for now     3) hypokalemia- replaced. Repeat pending     4) nausea/vomiting- seems to be better since arrival       Thanks       Dr. [facility]    Addendum:  First sodium of 124. Stop 0.9 saline and change to 0.45 saline. Goal to not increase sodium overnight to above 124.

## 2021-04-10 NOTE — H&P
Hospital Medicine History & Physical      PCP: QUITA Lerner NP    Date of Admission: 4/10/2021    Date of Service: Pt seen/examined on 4/10/2021 and Admitted to Inpatient with expected LOS greater than two midnights due to medical therapy. Chief Complaint: Nausea, vomiting, weakness      History Of Present Illness: 67 y.o. female with past medical history of CAD, COPD, hypertension, hypothyroidism, history of DVT, history of alcohol abuse presents for evaluation of several days of progressive weakness associated with nausea vomiting and diarrhea. Patient is a poor historian. She is having trouble describing her quantifying her emesis or diarrhea. At the ED patient found to be hyponatremic with sodium of 119 and hypertensive with SBP as high as over 200. She denies any chest pain, shortness of breath, dizziness, blurry vision, tremors or numbness. She does admit to generalized body weakness, states she is barely able to get out of bed. When asked about medications that she takes she states that her daughter feels her med boxes. I attempted to get into contact with her daughter Linwood Baldwin but she was at work. I spoke to her spouse who stated that about a week ago she got prescription for new medications (he does not know which ones) which approximately coincided with onset of her symptoms. He gave me the number to reach daughter Linwood Baldwin at work, however, she was not able to help me with med rec as she did not have the list with her.       Past Medical History:          Diagnosis Date    Acute DVT (deep venous thrombosis) ([address] 7/3/2015    Acute encephalopathy 4/19/2018    Acute on chronic diastolic heart failure ([address] 3/30/2019    Alcohol abuse     Anxiety     CAD in native artery     Cellulitis 4/28/2018    Cerebral artery occlusion with cerebral infarction (HCC)     states hx of multiple mini strokes    Cerebrovascular accident (CVA) (HCC)     Chronic fatigue     Complex care coordination     Complicated UTI (urinary tract infection)     [facility]     COPD exacerbation [facility] 2/20/2018    Depression     DIMAS (dyspnea on exertion) 7/3/2015    DVT [facility]deep venous thrombosis) ([facility]     DVT, lower extremity ([facility]     Fatigue 11/3/2015    General weakness 11/4/2015    Generalized weakness 8/22/2019    Hypercholesteremia     Hypertension     Hyperthyroidism     Incontinence 10/16/2012    Mammogram abnormal 2/7/2012    Neuromuscular disorder ([facility]     Osteopenia 2/14/2017    Pneumonia     Recurrent UTI     Right forearm cellulitis     Superficial thrombophlebitis of right upper extremity     Thyroid disease     Tobacco abuse     Tobacco abuse counseling     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 Dania Posey; repeat 5 years    EYE SURGERY      cateracts removed    SHOULDER ARTHROSCOPY Right 6/18/14    SUBACROMIAL DECOMPRESSION, ROTATOR CUFF REPAIR    TOE SURGERY Right     TONSILLECTOMY         Medications Prior to Admission:      Prior to Admission medications    Medication Sig Start Date End Date Taking?  Authorizing Provider   ondansetron (ZOFRAN ODT) 4 MG disintegrating tablet Take 1-2 tablets by mouth every 12 hours as needed for Nausea May Sub regular tablet (non-ODT) if insurance does not cover ODT. 4/9/21   QUITA Bush CNP   levothyroxine (SYNTHROID) 100 MCG tablet TAKE ONE TABLET BY MOUTH DAILY 4/7/21   QUITA Bearden NP   mirtazapine (REMERON) 15 MG tablet TAKE ONE TABLET BY MOUTH DAILY 4/7/21   QUITA Bearden NP   atorvastatin (LIPITOR) 80 MG tablet TAKE ONE TABLET BY MOUTH DAILY 4/7/21   QUITA Bearden NP   cloNIDine (CATAPRES) 0.3 MG tablet TAKE ONE TABLET BY MOUTH EVERY NIGHT AT BEDTIME AS NEEDED 4/7/21   Danielle Pepper APRN - NP   losartan (COZAAR) 100 MG tablet TAKE ONE TABLET BY MOUTH DAILY 4/7/21   QUITA Valverde - NP   oxybutynin (DITROPAN) 5 MG tablet TAKE ONE TABLET BY MOUTH TWICE A DAY 3/29/21   QUITA Wilson - NP   clonazePAM (KLONOPIN) 1 MG tablet Take 1 tablet by mouth 2 times daily as needed for Anxiety for up to 30 days. 3/9/21 4/8/21  Elizabeth Jean MD   escitalopram (LEXAPRO) 10 MG tablet TAKE ONE TABLET BY MOUTH DAILY 6/25/20   QUITA Valverde - NP   furosemide (LASIX) 40 MG tablet TAKE ONE TABLET BY MOUTH DAILY 3/17/20   QUITA Delgado - NP   potassium chloride (KLOR-CON) 20 MEQ packet Take 20 mEq by mouth 2 times daily    Historical Provider, MD   labetalol (NORMODYNE) 200 MG tablet Take 1 tablet by mouth every 12 hours 10/3/19   Rossi Antis, APRN - LIZY   mometasone-formoterol (DULERA) 200-5 MCG/ACT inhaler Inhale 2 puffs into the lungs as needed  8/22/19   Historical Provider, MD   calcium elemental (OSCAL) 500 MG TABS tablet Take 1 tablet by mouth daily 7/24/18   Rinku Quintana MD       Allergies:  Lipitor [atorvastatin], Morphine, Keflex [cephalexin], Hctz [hydrochlorothiazide], Norvasc [amlodipine besylate], Penicillins, Tramadol, Hydralazine, and Shellfish-derived products    Social History:      The patient currently lives home    TOBACCO:   reports that she has been smoking cigarettes. She has a 52.00 pack-year smoking history. She has never used smokeless tobacco.  ETOH:   reports no history of alcohol use. E-Cigarettes/Vaping Use     Questions Responses    E-Cigarette/Vaping Use Never User    Start Date     Passive Exposure     Quit Date     Counseling Given     Comments             Family History:       Reviewed in detail and negative for DM, CAD, Cancer, CVA.  Positive as follows:        Problem Relation Age of Onset    Heart Disease Father         MI @ 64    Alcohol Abuse Father        REVIEW OF SYSTEMS: Pertinent positives as noted in the HPI. All other systems reviewed and negative. PHYSICAL EXAM PERFORMED:    BP (!) 188/90   Pulse 78   Temp 97.6 °F (36.4 °C) (Oral)   Resp 17   Ht 5' 8\" (1.727 m)   Wt 125 lb (56.7 kg)   SpO2 96%   BMI 19.01 kg/m²     General appearance:  No apparent distress, appears stated age and cooperative. HEENT:  Normal cephalic, atraumatic without obvious deformity. Pupils equal, round, and reactive to light. Extra ocular muscles intact. Conjunctivae/corneas clear. Neck: Supple, with full range of motion. No jugular venous distention. Trachea midline. Respiratory:  Normal respiratory effort. Clear to auscultation, bilaterally without Rales/Wheezes/Rhonchi. Cardiovascular:  Regular rate and rhythm with normal S1/S2 without murmurs, rubs or gallops. Abdomen: Soft, non-tender, non-distended with normal bowel sounds. Musculoskeletal:  No clubbing, cyanosis or edema bilaterally. Full range of motion without deformity. Skin: Skin color, texture, turgor normal.  No rashes or lesions. Neurologic:  Neurovascularly intact without any focal sensory/motor deficits. Cranial nerves: II-XII intact, grossly non-focal.  Psychiatric:  Alert and oriented, thought content appropriate, normal insight  Peripheral Pulses: +2 palpable, equal bilaterally       Labs:     Recent Labs     04/10/21  1145   WBC 6.5   HGB 14.5   HCT 42.8        Recent Labs     04/10/21  1145   *   K 3.1*   CL 80*   CO2 27   BUN 5*   CREATININE <0.5*   CALCIUM 8.8     Recent Labs     04/10/21  1145   AST 26   ALT 13   BILIDIR <0.2   BILITOT 0.5   ALKPHOS 68     No results for input(s): INR in the last 72 hours.   Recent Labs     04/10/21  1145   TROPONINI <0.01       Urinalysis:      Lab Results   Component Value Date    NITRU Negative 04/10/2021    WBCUA 2 04/10/2021    BACTERIA RARE 04/10/2021    RBCUA 3 04/10/2021    BLOODU TRACE 04/10/2021    SPECGRAV 1.009 04/10/2021    GLUCOSEU Negative 04/10/2021 Kaur Trinidad 994 NEGATIVE 05/08/2011       Radiology:     CXR: I have reviewed the CXR with the following interpretation: Small left pleural effusion present on prior study on 3/20/2021  EKG:  I have reviewed the EKG with the following interpretation: Appears to be NSR with baseline artifact. CT Head WO Contrast   Final Result   1. No acute intracranial abnormality. XR CHEST PORTABLE   Final Result   1. Unchanged small left pleural effusion.              ASSESSMENT:    Active Hospital Problems    Diagnosis Date Noted    Hyponatremia [E87.1] 07/21/2019         PLAN:    Hyponatremia  Severe symptomatic  Clinically hypovolemic  Admit to ICU  BMP every 4 hours  Sodium correction goal rate 4-6 mEq per 24 hours  Volume is resuscitation with NS  Free water restriction  Hold losartan  Nephrology consultation    Hypertensive urgency  Patient was given losartan 100 in the ED  Resume clonidine at home dose right  Start nitroglycerin drip  Goal blood pressure correction 20% in 24 hours  Pharmacy to verify home medications and dosages  Reintroduce oral medications and titrate off nitro drip    Nausea vomiting  Could be secondary to hyponatremia  GI pathogens ordered in the ED including C. difficile    Generalized weakness  Could be related to hyponatremia  PT OT to evaluate the patient as she recovers      DVT Prophylaxis: Lovenox  Diet: Diet NPO Effective Now  Code Status: Prior    PT/OT Eval Status: Pending    Electronically signed by Zahira Bess MD on 4/10/2021 at 3:12 PM

## 2021-04-10 NOTE — ED NOTES
Bed: 23  Expected date:   Expected time:   Means of arrival: [facility]  Comments:     Trinity Tariq RN  04/10/21 1048

## 2021-04-10 NOTE — ED NOTES
Assisted to toilet by wc. Pt very unsteady on her feet; urine sample collected. Placed back in bed & on monitors without incident.        Amanda Lopez RN  04/10/21 1441

## 2021-04-10 NOTE — ED NOTES
Transported to room by Lambert Bowie with Vinny Drummond RN. Tele in place & chart provided.   VSS & alert at this time     Sadie Koroma RN  04/10/21 [id number]

## 2021-04-10 NOTE — ED NOTES
One episode of urinary incontinence. Carmella care & linen change provided.        Benny Durbin RN  04/10/21 0111

## 2021-04-10 NOTE — PROGRESS NOTES
Pt admitted to ICU at 1500. Pt A/Ox4, sp02 WNL on RA, HR WNL, SR on telemetry. BP elevated to 200's on arrival. Nitro drip started and adjusted accordingly. IVF infusing as ordered. purewick placed for urinary incontinence. CHG bath given. No diarrhea/ bm since admit. Pt has nonblanchable redness to buttocks, preexisting on admit. Redness noted to lower back also, nonblanchable. Pt states it is from her heating pad she uses at home. Will cont to monitor.

## 2021-04-11 DIAGNOSIS — R32 URINARY INCONTINENCE, UNSPECIFIED TYPE: ICD-10-CM

## 2021-04-11 LAB
ANION GAP SERPL CALCULATED.3IONS-SCNC: 10 MMOL/L (ref 3–16)
ANION GAP SERPL CALCULATED.3IONS-SCNC: 11 MMOL/L (ref 3–16)
ANION GAP SERPL CALCULATED.3IONS-SCNC: 8 MMOL/L (ref 3–16)
ANION GAP SERPL CALCULATED.3IONS-SCNC: 9 MMOL/L (ref 3–16)
ANION GAP SERPL CALCULATED.3IONS-SCNC: 9 MMOL/L (ref 3–16)
BASOPHILS ABSOLUTE: 0 K/UL (ref 0–0.2)
BASOPHILS RELATIVE PERCENT: 0.7 %
BUN BLDV-MCNC: 5 MG/DL (ref 7–20)
BUN BLDV-MCNC: 6 MG/DL (ref 7–20)
BUN BLDV-MCNC: <2 MG/DL (ref 7–20)
CALCIUM SERPL-MCNC: 8 MG/DL (ref 8.3–10.6)
CALCIUM SERPL-MCNC: 8.3 MG/DL (ref 8.3–10.6)
CALCIUM SERPL-MCNC: 8.5 MG/DL (ref 8.3–10.6)
CHLORIDE BLD-SCNC: 88 MMOL/L (ref 99–110)
CHLORIDE BLD-SCNC: 89 MMOL/L (ref 99–110)
CHLORIDE BLD-SCNC: 92 MMOL/L (ref 99–110)
CHLORIDE BLD-SCNC: 94 MMOL/L (ref 99–110)
CHLORIDE BLD-SCNC: 95 MMOL/L (ref 99–110)
CO2: 24 MMOL/L (ref 21–32)
CO2: 25 MMOL/L (ref 21–32)
CO2: 26 MMOL/L (ref 21–32)
CREAT SERPL-MCNC: 0.5 MG/DL (ref 0.6–1.2)
CREAT SERPL-MCNC: 0.6 MG/DL (ref 0.6–1.2)
CREAT SERPL-MCNC: <0.5 MG/DL (ref 0.6–1.2)
EKG ATRIAL RATE: 250 BPM
EKG DIAGNOSIS: NORMAL
EKG P AXIS: 80 DEGREES
EKG Q-T INTERVAL: 398 MS
EKG QRS DURATION: 102 MS
EKG QTC CALCULATION (BAZETT): 453 MS
EKG R AXIS: 75 DEGREES
EKG T AXIS: 42 DEGREES
EKG VENTRICULAR RATE: 78 BPM
EOSINOPHILS ABSOLUTE: 0.1 K/UL (ref 0–0.6)
EOSINOPHILS RELATIVE PERCENT: 1.5 %
GFR AFRICAN AMERICAN: >60
GFR NON-AFRICAN AMERICAN: >60
GLUCOSE BLD-MCNC: 107 MG/DL (ref 70–99)
GLUCOSE BLD-MCNC: 81 MG/DL (ref 70–99)
GLUCOSE BLD-MCNC: 86 MG/DL (ref 70–99)
GLUCOSE BLD-MCNC: 93 MG/DL (ref 70–99)
GLUCOSE BLD-MCNC: 94 MG/DL (ref 70–99)
HCT VFR BLD CALC: 36.2 % (ref 36–48)
HEMOGLOBIN: 12.3 G/DL (ref 12–16)
LYMPHOCYTES ABSOLUTE: 0.8 K/UL (ref 1–5.1)
LYMPHOCYTES RELATIVE PERCENT: 13.5 %
MAGNESIUM: 1.8 MG/DL (ref 1.8–2.4)
MCH RBC QN AUTO: 33.8 PG (ref 26–34)
MCHC RBC AUTO-ENTMCNC: 34 G/DL (ref 31–36)
MCV RBC AUTO: 99.4 FL (ref 80–100)
MONOCYTES ABSOLUTE: 0.8 K/UL (ref 0–1.3)
MONOCYTES RELATIVE PERCENT: 14.2 %
NEUTROPHILS ABSOLUTE: 4 K/UL (ref 1.7–7.7)
NEUTROPHILS RELATIVE PERCENT: 70.1 %
PDW BLD-RTO: 13.3 % (ref 12.4–15.4)
PHOSPHORUS: 2.8 MG/DL (ref 2.5–4.9)
PLATELET # BLD: 304 K/UL (ref 135–450)
PMV BLD AUTO: 8.2 FL (ref 5–10.5)
POTASSIUM SERPL-SCNC: 3.5 MMOL/L (ref 3.5–5.1)
POTASSIUM SERPL-SCNC: 3.5 MMOL/L (ref 3.5–5.1)
POTASSIUM SERPL-SCNC: 4.3 MMOL/L (ref 3.5–5.1)
RBC # BLD: 3.64 M/UL (ref 4–5.2)
SODIUM BLD-SCNC: 123 MMOL/L (ref 136–145)
SODIUM BLD-SCNC: 124 MMOL/L (ref 136–145)
SODIUM BLD-SCNC: 127 MMOL/L (ref 136–145)
SODIUM BLD-SCNC: 128 MMOL/L (ref 136–145)
SODIUM BLD-SCNC: 128 MMOL/L (ref 136–145)
TSH REFLEX: 2.14 UIU/ML (ref 0.27–4.2)
WBC # BLD: 5.8 K/UL (ref 4–11)

## 2021-04-11 PROCEDURE — 36415 COLL VENOUS BLD VENIPUNCTURE: CPT

## 2021-04-11 PROCEDURE — 6370000000 HC RX 637 (ALT 250 FOR IP): Performed by: INTERNAL MEDICINE

## 2021-04-11 PROCEDURE — 84100 ASSAY OF PHOSPHORUS: CPT

## 2021-04-11 PROCEDURE — 2580000003 HC RX 258: Performed by: INTERNAL MEDICINE

## 2021-04-11 PROCEDURE — 6360000002 HC RX W HCPCS: Performed by: INTERNAL MEDICINE

## 2021-04-11 PROCEDURE — 80048 BASIC METABOLIC PNL TOTAL CA: CPT

## 2021-04-11 PROCEDURE — 83735 ASSAY OF MAGNESIUM: CPT

## 2021-04-11 PROCEDURE — 93010 ELECTROCARDIOGRAM REPORT: CPT | Performed by: INTERNAL MEDICINE

## 2021-04-11 PROCEDURE — 1200000000 HC SEMI PRIVATE

## 2021-04-11 PROCEDURE — 94761 N-INVAS EAR/PLS OXIMETRY MLT: CPT

## 2021-04-11 PROCEDURE — 94640 AIRWAY INHALATION TREATMENT: CPT

## 2021-04-11 PROCEDURE — 85025 COMPLETE CBC W/AUTO DIFF WBC: CPT

## 2021-04-11 PROCEDURE — 2000000000 HC ICU R&B

## 2021-04-11 RX ORDER — DEXTROSE MONOHYDRATE 50 MG/ML
INJECTION, SOLUTION INTRAVENOUS CONTINUOUS
Status: DISCONTINUED | OUTPATIENT
Start: 2021-04-11 | End: 2021-04-11

## 2021-04-11 RX ADMIN — OXYBUTYNIN CHLORIDE 5 MG: 5 TABLET ORAL at 21:52

## 2021-04-11 RX ADMIN — OXYBUTYNIN CHLORIDE 5 MG: 5 TABLET ORAL at 09:28

## 2021-04-11 RX ADMIN — LEVOTHYROXINE SODIUM 100 MCG: 100 TABLET ORAL at 06:56

## 2021-04-11 RX ADMIN — MIRTAZAPINE 15 MG: 15 TABLET, FILM COATED ORAL at 21:52

## 2021-04-11 RX ADMIN — Medication 2 PUFF: at 09:16

## 2021-04-11 RX ADMIN — ENOXAPARIN SODIUM 40 MG: 40 INJECTION SUBCUTANEOUS at 09:28

## 2021-04-11 RX ADMIN — POTASSIUM CHLORIDE 40 MEQ: 1500 TABLET, EXTENDED RELEASE ORAL at 04:22

## 2021-04-11 RX ADMIN — CLONIDINE HYDROCHLORIDE 0.3 MG: 0.3 TABLET ORAL at 21:52

## 2021-04-11 RX ADMIN — Medication 2 PUFF: at 19:32

## 2021-04-11 RX ADMIN — ESCITALOPRAM OXALATE 10 MG: 10 TABLET ORAL at 09:28

## 2021-04-11 RX ADMIN — LABETALOL HYDROCHLORIDE 200 MG: 200 TABLET, FILM COATED ORAL at 09:28

## 2021-04-11 RX ADMIN — CLONAZEPAM 1 MG: 1 TABLET ORAL at 19:28

## 2021-04-11 RX ADMIN — ACETAMINOPHEN 650 MG: 325 TABLET ORAL at 14:13

## 2021-04-11 RX ADMIN — DEXTROSE MONOHYDRATE: 50 INJECTION, SOLUTION INTRAVENOUS at 11:12

## 2021-04-11 RX ADMIN — LABETALOL HYDROCHLORIDE 200 MG: 200 TABLET, FILM COATED ORAL at 21:52

## 2021-04-11 ASSESSMENT — PAIN SCALES - GENERAL
PAINLEVEL_OUTOF10: 4
PAINLEVEL_OUTOF10: 7

## 2021-04-11 NOTE — FLOWSHEET NOTE
Pt's Na at 1530 was 128. Per Nephrology, IV fluids have been stopped as long as pt continues drinking suitable fluids. Notify Nephrology with next Na value (at 2130).     Electronically signed by Minda Bonds RN BSN

## 2021-04-11 NOTE — FLOWSHEET NOTE
No acute changes noted on reassessment, see flowsheets. VSS and afebrile. Denies additional needs. Will continue to monitor.      Electronically signed by Duke Wan RN BSN

## 2021-04-11 NOTE — FLOWSHEET NOTE
Assessment complete. VSS, see flowsheet. Medications administered and side effects discussed, see MAR. Pt denies needs, appears comfortable. Pt has call light in reach. Bed in lowest position, wheels locked, and bed alarm on. No visible needs at this time. Will continue to monitor.     Electronically signed by Kimberlyn Garber RN BSN

## 2021-04-11 NOTE — FLOWSHEET NOTE
Pts Nitro gtt was stopped at 2200 due to hypotension. Pt awoken for lab draw at 0200 and BP normalized as pt is awake. Will continue to hold gtt but it seems BP was also decreased due to sleep not just BP meds. Will continue to monitor.

## 2021-04-11 NOTE — FLOWSHEET NOTE
No acute changes noted on reassessment, see flowsheets. VSS and afebrile. Denies additional needs. Will continue to monitor.      Electronically signed by Val Murray RN BSN

## 2021-04-11 NOTE — PROGRESS NOTES
[facility]  Progress Note      Referring Physician:  Kaylyn Aburto MD    Reason:    Hyponatremia     HPI:  66 y/o with history of hypertension, anxiety, and previous possible alcohol use presents to the hospital with feeling weak. She reports 2-3 days of nausea and vomiting. Reports unable to keep anything down. She does report drinking \"mostly water\". Denies any ETOH use. Her sodium here is 118 on presentation. Sodium was 138 3 weeks ago. Has had previous episodes of hyponatremia that appear to be hypovolemic in nature. She was given a saline bolus in the ED. BP is high. Her home medications are not clear.        Subjective:  Feeling better  Still weak but better overall  Blood pressure low last night but better now         Physical Exam:  Vitals:    04/11/21 0630   BP: (!) 151/68   Pulse: 63   Resp: 20   Temp:    SpO2: 98%     General appearance - alert, well appearing, and in no distress  Mental status - alert, oriented to person, place, and time  Eyes - pupils equal and reactive, extraocular eye movements intact   Mouth - mucous membranes moist, pharynx normal without lesions  Neck - supple, no significant adenopathy, trachea midline  Chest - clear to auscultation, no wheezes, rales or rhonchi  Heart - normal rate, regular rhythm, normal S1, S2, no murmurs  Abdomen - soft, nontender, nondistended, no masses or organomegaly  Neurological - alert, oriented, normal speech, no focal findings or movement disorder noted  Extremities - peripheral pulses normal, no pedal edema, no clubbing or cyanosis        Labs:  CBC:   Lab Results   Component Value Date    WBC 5.8 04/11/2021    RBC 3.64 04/11/2021    HGB 12.3 04/11/2021    HCT 36.2 04/11/2021    MCV 99.4 04/11/2021    MCH 33.8 04/11/2021    MCHC 34.0 04/11/2021    RDW 13.3 04/11/2021     04/11/2021    MPV 8.2 04/11/2021     BMP:    Lab Results   Component Value Date     04/11/2021    K 4.3 04/11/2021    K 3.1 04/10/2021    CL 92 04/11/2021    CO2 25 04/11/2021    BUN 6 04/11/2021    LABALBU 4.6 04/10/2021    CREATININE 0.6 04/11/2021    CALCIUM 8.5 04/11/2021    GFRAA >60 04/11/2021    GFRAA >60 05/02/2013    LABGLOM >60 04/11/2021    LABGLOM 79 12/11/2017    GLUCOSE 86 04/11/2021     U/A:    Lab Results   Component Value Date    COLORU YELLOW 04/10/2021    PHUR 6.5 04/10/2021    PHUR 6.5 04/10/2021    WBCUA 2 04/10/2021    RBCUA 3 04/10/2021    YEAST 0 07/13/2020    YEAST Present 09/11/2019    BACTERIA RARE 04/10/2021    CLARITYU Clear 04/10/2021    SPECGRAV 1.009 04/10/2021    LEUKOCYTESUR Negative 04/10/2021    UROBILINOGEN 0.2 04/10/2021    BILIRUBINUR Negative 04/10/2021    BILIRUBINUR NEGATIVE 05/08/2011    BLOODU TRACE 04/10/2021    GLUCOSEU Negative 04/10/2021    GLUCOSEU NEGATIVE 05/08/2011           Assessment/Plan:    1) hyponatremia- has previous history of. Most recent sodium 138. Suspect hypovolemic. Sodium of 118 on presentation. Improving nicely. Will reduce frequency of lab draws. Continue current 0.45 saline. 2) hypertension - elevated. Un able to keep down medications. NTG gtt started and now off.  Continue labetalol     3) hypokalemia- replaced     4) nausea/vomiting- seems to be better since arrival     Dr[facility]

## 2021-04-11 NOTE — PROGRESS NOTES
Hospitalist Progress Note      PCP: QUITA Coleman NP    Date of Admission: 4/10/2021    Chief Complaint: Nausea vomiting and weakness    Hospital Course: 67 y.o. female with past medical history of CAD, COPD, hypertension, hypothyroidism, history of DVT, history of alcohol abuse presents for evaluation of several days of progressive weakness associated with nausea vomiting and diarrhea. Patient is a poor historian. She is having trouble describing her quantifying her emesis or diarrhea. At the ED patient found to be hyponatremic with sodium of 119 and hypertensive with SBP as high as over 200. She denies any chest pain, shortness of breath, dizziness, blurry vision, tremors or numbness. She does admit to generalized body weakness, states she is barely able to get out of bed. When asked about medications that she takes she states that her daughter feels her med boxes. I attempted to get into contact with her daughter Yuly Lynch but she was at work. I spoke to her spouse who stated that about a week ago she got prescription for new medications (he does not know which ones) which approximately coincided with onset of her symptoms. Subjective: Patient seen and examined at bedside. Has no complaints. States nausea and vomiting have resolved. Headache has also resolved.       Medications:  Reviewed    Infusion Medications    dextrose 50 mL/hr at 04/11/21 1112    sodium chloride      nitroGLYCERIN Stopped (04/10/21 2159)     Scheduled Medications    cloNIDine  0.3 mg Oral Nightly    escitalopram  10 mg Oral Daily    levothyroxine  100 mcg Oral QAM AC    mirtazapine  15 mg Oral Nightly    budesonide-formoterol  2 puff Inhalation BID    oxybutynin  5 mg Oral BID    sodium chloride flush  5-40 mL Intravenous 2 times per day    enoxaparin  40 mg Subcutaneous Daily    labetalol  200 mg Oral 2 times per day     PRN Meds: clonazePAM, sodium chloride flush, sodium chloride, BILITOT 0.5   ALKPHOS 68     No results for input(s): INR in the last 72 hours. Recent Labs     04/10/21  1145   TROPONINI <0.01       Urinalysis:      Lab Results   Component Value Date    NITRU Negative 04/10/2021    WBCUA 2 04/10/2021    BACTERIA RARE 04/10/2021    RBCUA 3 04/10/2021    BLOODU TRACE 04/10/2021    SPECGRAV 1.009 04/10/2021    GLUCOSEU Negative 04/10/2021    GLUCOSEU NEGATIVE 05/08/2011       Radiology:  CT Head WO Contrast   Final Result   1. No acute intracranial abnormality. XR CHEST PORTABLE   Final Result   1. Unchanged small left pleural effusion. Assessment/Plan:    Active Hospital Problems    Diagnosis    Hyponatremia [E87.1]     Hyponatremia  Severe symptomatic  Clinically hypovolemic  Admit to ICU  BMP every 4 hours  Sodium correction goal rate 4-6 mEq per 24 hours  Hold losartan  Nephrology consultation  Sodium this a.m. 127 which is above goal, patient on D5 half NS already at that point.   Switch to D5 W, RN will get in touch with nephrology to adjust fluids after next BMP at 3 PM.     Hypertensive urgencyresolved   patient was given losartan 100 in the ED  Resume clonidine at home dose right  Labetalol also resumed  Was on nitro gtt. now off     Nausea vomiting  Improved  Could be secondary to hyponatremia  GI pathogens ordered in the ED including C. difficile     Generalized weakness  Could be related to hyponatremia  PT OT to evaluate the patient as she recovers    DVT Prophylaxis: Lovenox  Diet: DIET CLEAR LIQUID; Daily Fluid Restriction: 1800 ml  Code Status: Full Code        Electronically signed by Leonor Leahy MD on 4/11/2021 at 2:38 PM

## 2021-04-12 LAB
ANION GAP SERPL CALCULATED.3IONS-SCNC: 6 MMOL/L (ref 3–16)
ANION GAP SERPL CALCULATED.3IONS-SCNC: 7 MMOL/L (ref 3–16)
ANION GAP SERPL CALCULATED.3IONS-SCNC: 7 MMOL/L (ref 3–16)
BASOPHILS ABSOLUTE: 0.1 K/UL (ref 0–0.2)
BASOPHILS RELATIVE PERCENT: 0.9 %
BUN BLDV-MCNC: 4 MG/DL (ref 7–20)
BUN BLDV-MCNC: 5 MG/DL (ref 7–20)
BUN BLDV-MCNC: 5 MG/DL (ref 7–20)
CALCIUM SERPL-MCNC: 8.8 MG/DL (ref 8.3–10.6)
CALCIUM SERPL-MCNC: 9 MG/DL (ref 8.3–10.6)
CALCIUM SERPL-MCNC: 9.2 MG/DL (ref 8.3–10.6)
CHLORIDE BLD-SCNC: 94 MMOL/L (ref 99–110)
CHLORIDE BLD-SCNC: 97 MMOL/L (ref 99–110)
CHLORIDE BLD-SCNC: 97 MMOL/L (ref 99–110)
CO2: 26 MMOL/L (ref 21–32)
CO2: 26 MMOL/L (ref 21–32)
CO2: 28 MMOL/L (ref 21–32)
CREAT SERPL-MCNC: <0.5 MG/DL (ref 0.6–1.2)
EOSINOPHILS ABSOLUTE: 0.1 K/UL (ref 0–0.6)
EOSINOPHILS RELATIVE PERCENT: 2.4 %
GFR AFRICAN AMERICAN: >60
GFR NON-AFRICAN AMERICAN: >60
GLUCOSE BLD-MCNC: 86 MG/DL (ref 70–99)
GLUCOSE BLD-MCNC: 90 MG/DL (ref 70–99)
GLUCOSE BLD-MCNC: 90 MG/DL (ref 70–99)
HCT VFR BLD CALC: 37.5 % (ref 36–48)
HEMOGLOBIN: 12.5 G/DL (ref 12–16)
LYMPHOCYTES ABSOLUTE: 0.7 K/UL (ref 1–5.1)
LYMPHOCYTES RELATIVE PERCENT: 12.4 %
MAGNESIUM: 1.9 MG/DL (ref 1.8–2.4)
MCH RBC QN AUTO: 33.4 PG (ref 26–34)
MCHC RBC AUTO-ENTMCNC: 33.4 G/DL (ref 31–36)
MCV RBC AUTO: 100.1 FL (ref 80–100)
MONOCYTES ABSOLUTE: 0.9 K/UL (ref 0–1.3)
MONOCYTES RELATIVE PERCENT: 15.7 %
NEUTROPHILS ABSOLUTE: 4 K/UL (ref 1.7–7.7)
NEUTROPHILS RELATIVE PERCENT: 68.6 %
PDW BLD-RTO: 13.5 % (ref 12.4–15.4)
PHOSPHORUS: 2.2 MG/DL (ref 2.5–4.9)
PLATELET # BLD: 285 K/UL (ref 135–450)
PMV BLD AUTO: 8.1 FL (ref 5–10.5)
POTASSIUM SERPL-SCNC: 4.4 MMOL/L (ref 3.5–5.1)
POTASSIUM SERPL-SCNC: 4.7 MMOL/L (ref 3.5–5.1)
POTASSIUM SERPL-SCNC: 4.9 MMOL/L (ref 3.5–5.1)
RBC # BLD: 3.74 M/UL (ref 4–5.2)
SODIUM BLD-SCNC: 126 MMOL/L (ref 136–145)
SODIUM BLD-SCNC: 130 MMOL/L (ref 136–145)
SODIUM BLD-SCNC: 132 MMOL/L (ref 136–145)
WBC # BLD: 5.9 K/UL (ref 4–11)

## 2021-04-12 PROCEDURE — 97535 SELF CARE MNGMENT TRAINING: CPT

## 2021-04-12 PROCEDURE — 80048 BASIC METABOLIC PNL TOTAL CA: CPT

## 2021-04-12 PROCEDURE — 2500000003 HC RX 250 WO HCPCS: Performed by: INTERNAL MEDICINE

## 2021-04-12 PROCEDURE — 97166 OT EVAL MOD COMPLEX 45 MIN: CPT

## 2021-04-12 PROCEDURE — 94640 AIRWAY INHALATION TREATMENT: CPT

## 2021-04-12 PROCEDURE — 6370000000 HC RX 637 (ALT 250 FOR IP): Performed by: INTERNAL MEDICINE

## 2021-04-12 PROCEDURE — 83735 ASSAY OF MAGNESIUM: CPT

## 2021-04-12 PROCEDURE — 84100 ASSAY OF PHOSPHORUS: CPT

## 2021-04-12 PROCEDURE — 97116 GAIT TRAINING THERAPY: CPT

## 2021-04-12 PROCEDURE — 94760 N-INVAS EAR/PLS OXIMETRY 1: CPT

## 2021-04-12 PROCEDURE — 2000000000 HC ICU R&B

## 2021-04-12 PROCEDURE — 97530 THERAPEUTIC ACTIVITIES: CPT

## 2021-04-12 PROCEDURE — 2580000003 HC RX 258: Performed by: INTERNAL MEDICINE

## 2021-04-12 PROCEDURE — 97161 PT EVAL LOW COMPLEX 20 MIN: CPT

## 2021-04-12 PROCEDURE — 36415 COLL VENOUS BLD VENIPUNCTURE: CPT

## 2021-04-12 PROCEDURE — 6360000002 HC RX W HCPCS: Performed by: INTERNAL MEDICINE

## 2021-04-12 PROCEDURE — 85025 COMPLETE CBC W/AUTO DIFF WBC: CPT

## 2021-04-12 RX ORDER — NITROGLYCERIN 20 MG/100ML
5-200 INJECTION INTRAVENOUS CONTINUOUS
Status: DISCONTINUED | OUTPATIENT
Start: 2021-04-12 | End: 2021-04-13

## 2021-04-12 RX ORDER — CLONAZEPAM 1 MG/1
1 TABLET ORAL 2 TIMES DAILY PRN
Qty: 60 TABLET | Refills: 0 | Status: SHIPPED | OUTPATIENT
Start: 2021-04-12 | End: 2021-05-13 | Stop reason: SDUPTHER

## 2021-04-12 RX ORDER — LOSARTAN POTASSIUM 100 MG/1
100 TABLET ORAL DAILY
Status: DISCONTINUED | OUTPATIENT
Start: 2021-04-12 | End: 2021-04-15 | Stop reason: HOSPADM

## 2021-04-12 RX ORDER — CLONIDINE HYDROCHLORIDE 0.3 MG/1
TABLET ORAL
Qty: 30 TABLET | Refills: 0 | Status: SHIPPED
Start: 2021-04-12 | End: 2021-04-15 | Stop reason: HOSPADM

## 2021-04-12 RX ORDER — OXYBUTYNIN CHLORIDE 5 MG/1
TABLET ORAL
Qty: 60 TABLET | Refills: 0 | Status: SHIPPED | OUTPATIENT
Start: 2021-04-12 | End: 2021-05-23

## 2021-04-12 RX ADMIN — OXYBUTYNIN CHLORIDE 5 MG: 5 TABLET ORAL at 09:50

## 2021-04-12 RX ADMIN — LOSARTAN POTASSIUM 100 MG: 100 TABLET, FILM COATED ORAL at 14:55

## 2021-04-12 RX ADMIN — ENOXAPARIN SODIUM 40 MG: 40 INJECTION SUBCUTANEOUS at 09:50

## 2021-04-12 RX ADMIN — OXYBUTYNIN CHLORIDE 5 MG: 5 TABLET ORAL at 20:02

## 2021-04-12 RX ADMIN — Medication 2 PUFF: at 08:38

## 2021-04-12 RX ADMIN — ESCITALOPRAM OXALATE 10 MG: 10 TABLET ORAL at 09:50

## 2021-04-12 RX ADMIN — CLONIDINE HYDROCHLORIDE 0.3 MG: 0.3 TABLET ORAL at 20:01

## 2021-04-12 RX ADMIN — Medication 2 PUFF: at 19:23

## 2021-04-12 RX ADMIN — CLONAZEPAM 1 MG: 1 TABLET ORAL at 06:22

## 2021-04-12 RX ADMIN — Medication 10 ML: at 09:52

## 2021-04-12 RX ADMIN — LEVOTHYROXINE SODIUM 100 MCG: 100 TABLET ORAL at 06:22

## 2021-04-12 RX ADMIN — Medication 10 ML: at 20:02

## 2021-04-12 RX ADMIN — MIRTAZAPINE 15 MG: 15 TABLET, FILM COATED ORAL at 20:02

## 2021-04-12 RX ADMIN — CLONAZEPAM 1 MG: 1 TABLET ORAL at 17:37

## 2021-04-12 ASSESSMENT — PAIN SCALES - GENERAL
PAINLEVEL_OUTOF10: 0
PAINLEVEL_OUTOF10: 0

## 2021-04-12 NOTE — PROGRESS NOTES
Physician Progress Note      Luke Pabon  CSN #:                  [id number]  :                       1945  ADMIT DATE:       4/10/2021 10:49 AM  DISCH DATE:  RESPONDING  PROVIDER #:        Eduardo Guerrero MD          QUERY TEXT:    Patient admitted with Hyponatremia. If possible, please document in progress   notes and discharge summary if you are evaluating and /or treating any of the   following: The medical record reflects the following:  Risk Factors: Hypovolemic, Hypernatremia  Clinical Indicators: BMI 19.01  Treatment: Weights & monitoring  Options provided:  -- Underweight with BMI 19.01  -- Other - I will add my own diagnosis  -- Disagree - Not applicable / Not valid  -- Disagree - Clinically unable to determine / Unknown  -- Refer to Clinical Documentation Reviewer    PROVIDER RESPONSE TEXT:    This patient is underweight with a BMI of 19.01.     Query created by: Gilda Alatorre on 4/10/2021 4:02 PM      Electronically signed by:  Eduardo Guerrero MD 2021 1:39 PM

## 2021-04-12 NOTE — PROGRESS NOTES
Occupational Therapy   Occupational Therapy Initial Assessment  Date: 2021   Patient Name: Abdullahi Georeg  MRN: [id number]     : 1945    Date of Service: 2021    Discharge Recommendations:Sharon Kitchen scored a 15 on the AM-PAC ADL Inpatient form. Current research shows that an AM-PAC score of 17 or less is typically not associated with a discharge to the patient's home setting. Based on the patient's AM-PAC score and their current ADL deficits, it is recommended that the patient have 3-5 sessions per week of Occupational Therapy at d/c to increase the patient's independence. Please see assessment section for further patient specific details. If patient discharges prior to next session this note will serve as a discharge summary. Please see below for the latest assessment towards goals. OT Equipment Recommendations  Equipment Needed: No    Assessment   Performance deficits / Impairments: Decreased functional mobility ; Decreased ADL status; Decreased safe awareness;Decreased cognition;Decreased endurance;Decreased balance  Assessment: Patient presents with the above deficits, which are below baseline, and would benefit from continued skilled OT to address  Treatment Diagnosis: Decreased ADLs, IADLS, transfers, mobility associated with Hyponatremia  Prognosis: Good;Fair  Decision Making: Medium Complexity  History: I PTA per pt, recently had therapy at home (last Friday) then to hospital  Exam: as above  Assistance / Modification: PURA Giron, decreased cognition  OT Education: OT Role;Plan of Care  Patient Education: Eval, discharge recommendations-patient will require reinforcement  REQUIRES OT FOLLOW UP: Yes  Activity Tolerance  Activity Tolerance: Patient Tolerated treatment well;Patient limited by fatigue  Safety Devices  Safety Devices in place: Yes  Type of devices: All fall risk precautions in place;Call light within reach; Chair alarm in place; Patient at risk for falls; Left in emesis or diarrhea. At the ED patient found to be hyponatremic with sodium of 119 and hypertensive with SBP as high as over 200. She denies any chest pain, shortness of breath, dizziness, blurry vision, tremors or numbness. She does admit to generalized body weakness, states she is barely able to get out of bed.     Subjective   General  Chart Reviewed: Yes  Family / Caregiver Present: No  Diagnosis: Hyponatremia  Subjective  Subjective: Patient supine in bed, agreeable to evaluation  Patient Currently in Pain: Denies  Pain Assessment  Pain Assessment: 0-10  Pain Level: 0  Pre Treatment Pain Screening  Intervention List: Patient able to continue with treatment  Vital Signs  Temp: 97 °F (36.1 °C)  Temp Source: Temporal  Heart Rate Source: Monitor  BP Location: Right upper arm  Patient Position: Semi fowlers  Level of Consciousness: Alert (0)  Patient Currently in Pain: Denies  Oxygen Therapy  Pulse Oximeter Device Mode: Continuous  Pulse Oximeter Device Location: Finger  O2 Device: None (Room air)  Social/Functional History  Social/Functional History  Lives With: Family(Spouse, Daughter, several great grandchildren, Daughter's friend and child)  Type of Home: House  Home Layout: Multi-level(4-5 steps to lower level with B rails, 5-7 steps to upper level, single rail)  Home Access: Stairs to enter without rails  Entrance Stairs - Number of Steps: 1  Bathroom Shower/Tub: Walk-in shower, Shower chair with back  Bathroom Toilet: Standard  Home Equipment: [employer], 4 wheeled walker, Reacher  Receives Help From: (Recently had home therapy (had stopped ~2 weeks ago))  ADL Assistance: Independent(Daughter provides supervision)  Homemaking Responsibilities: No(Daughter and spouse perform these tasks)  Ambulation Assistance: Independent(uses 4WW in community, no device in home)  Transfer Assistance: Needs assistance(in/out of car, shower)  Active : No  Patient's  Info: family  Occupation: Retired       Objective some  Perception  Overall Perceptual Status: WFL     Sensation  Overall Sensation Status: WFL        LUE AROM (degrees)  LUE AROM : WFL  RUE AROM (degrees)  RUE AROM : WFL  LUE Strength  L Hand General: 5/5  RUE Strength  R Hand General: 5/5                   Plan   Plan  Times per week: 3-5  Times per day: Daily  Current Treatment Recommendations: Strengthening, Balance Training, Functional Mobility Training, Endurance Training, Safety Education & Training, Self-Care / ADL, Equipment Evaluation, Education, & procurement, Patient/Caregiver Education & Training, Home Management Training      AM-PAC Score        AM-[facility] Inpatient Daily Activity Raw Score: 15 (04/12/21 1330)  AM-PAC Inpatient ADL T-Scale Score : 34.69 (04/12/21 1330)  ADL Inpatient CMS 0-100% Score: 56.46 (04/12/21 1330)  ADL Inpatient CMS G-Code Modifier : CK (04/12/21 1330)    Goals  Short term goals  Time Frame for Short term goals: Discharge  Short term goal 1: Bed mobility I  Short term goal 2: Functional ADL transfers supervision  Short term goal 3: Functional mobility with RW supervision  Short term goal 4: UB ADLs setup, LB Bree  Long term goals  Time Frame for Long term goals : LTG=STG  Patient Goals   Patient goals : Home       Therapy Time   Individual Concurrent Group Co-treatment   Time In 5500         Time Out 3995         Minutes 54              Timed Code Treatment Minutes:   39    Total Treatment Minutes:  Dana 88, OTR/L [id number]

## 2021-04-12 NOTE — PROGRESS NOTES
Physical Therapy    Facility/Department: [facility] ICU  Initial Assessment    NAME: Queen Ahsan  :   MRN: [id number]    Date of Service: 2021    Discharge Recommendations: Queen Ahsan scored a 17/24 on the AM-PAC short mobility form. Current research shows that an AM-PAC score of 17 or less is typically not associated with a discharge to the patient's home setting. Based on the patient's AM-PAC score and their current functional mobility deficits, it is recommended that the patient have 3-5 sessions per week of Physical Therapy at d/c to increase the patient's independence. Please see assessment section for further patient specific details. Should pt d/c therapy recommendations and d/c home, recommend the below DME and HHPT (S3) to address deficits in the home environment. HOME HEALTH CARE: LEVEL 3 SAFETY  - Initial home health evaluation to occur within 24-48 hours, in patient home   - Therapy evaluations in home within 24-48 hours of discharge; including DME and home safety   - Frontload therapy 5 days, then 3x a week   - Therapy to evaluate if patient has [address] needs for personal care   -  evaluation within 24-48 hours, includes evaluation of resources and insurance to determine [place], [place], LTC, and Medicaid options     If patient discharges prior to next session this note will serve as a discharge summary. Please see below for the latest assessment towards goals. PT Equipment Recommendations  Equipment Needed: Yes(should pt d/c home)  Mobility Devices: Joe Mccray: Rolling    Assessment   Body structures, Functions, Activity limitations: Decreased functional mobility ; Decreased strength;Decreased safe awareness;Decreased balance;Decreased endurance;Decreased cognition  Assessment: Pt presents with impaired functional strength and endurance and decreased standing balance impairing her ability to perform functional mobility safely and independently.  Pt with PLOF of independence however currently requires CGA/Min A for mobility with RW and fatiguing quickly with tasks putting her at risk for potential falls in her tri-level home. Pt does have family living with her at home however would benefit from skilled PT services to address deficits and facilitate return to independent PLOF. Treatment Diagnosis: decreased activity tolerance  Prognosis: Good  Decision Making: Low Complexity  Clinical Presentation: stable  PT Education: Goals;PT Role;Plan of Care;Precautions; Family Education;Orientation;General Safety;Transfer Training;Gait Training;Functional Mobility Training  Patient Education: d/c recommendations--pt verbalizing understanding but will require reinforcement  Barriers to Learning: cognition  REQUIRES PT FOLLOW UP: Yes  Activity Tolerance  Activity Tolerance: Patient limited by endurance  Activity Tolerance: required increased time to perform all tasks       Patient Diagnosis(es): The primary encounter diagnosis was Other fatigue. A diagnosis of Hyponatremia was also pertinent to this visit.      has a past medical history of Acute DVT (deep venous thrombosis) ([facility]), Acute encephalopathy, Acute on chronic diastolic heart failure ([address] Alcohol abuse, Anxiety, Arthritis, CAD in native artery, Cellulitis, Cerebral artery occlusion with cerebral infarction [facility]), Cerebrovascular accident (CVA) ([address] Chronic fatigue, Complex care coordination, Complicated UTI (urinary tract infection), COPD (chronic obstructive pulmonary disease) ([address] COPD exacerbation ([address] Depression, Diabetes mellitus ([address] DIMAS (dyspnea on exertion), DVT (deep venous thrombosis) ([address] DVT, lower extremity ([address] Fatigue, General weakness, Generalized weakness, Hypercholesteremia, Hypertension, Hyperthyroidism, Incontinence, Mammogram abnormal, Neuromuscular disorder ([address] Osteopenia, Pneumonia, Recurrent UTI, Right forearm cellulitis, Superficial thrombophlebitis of right upper extremity, Thyroid disease, Tobacco abuse, Tobacco abuse counseling, Trapped lung, and Unable to walk.   has a past surgical history that includes Tonsillectomy; Colonoscopy (1/19/2012); Toe Surgery (Right); Shoulder arthroscopy (Right, 6/18/14); and eye surgery. Restrictions  Restrictions/Precautions  Restrictions/Precautions: Fall Risk, Modified Diet(High fall risk, 1800 mL fluid restriction, full liquid)  Position Activity Restriction  Other position/activity restrictions: 67 y.o. female with past medical history of CAD, COPD, hypertension, hypothyroidism, history of DVT, history of alcohol abuse presents for evaluation of several days of progressive weakness associated with nausea vomiting and diarrhea. Patient is a poor historian. She is having trouble describing her quantifying her emesis or diarrhea. At the ED patient found to be hyponatremic with sodium of 119 and hypertensive with SBP as high as over 200. She denies any chest pain, shortness of breath, dizziness, blurry vision, tremors or numbness. She does admit to generalized body weakness, states she is barely able to get out of bed. Vision/Hearing  Vision: Impaired  Vision Exceptions: Wears glasses for reading  Hearing: Exceptions to [facility]  Hearing Exceptions: Hard of hearing/hearing concerns       Subjective  General  Chart Reviewed: Yes  Response To Previous Treatment: Not applicable  Family / Caregiver Present: Yes( arriving during session)  Diagnosis: hyponatremia  Follows Commands: Within Functional Limits  General Comment  Comments: Pt supine in bed upon arrival.  Subjective  Subjective: Pt agreeable to PT/OT eval, reporting no pain at this time.   Pain Screening  Patient Currently in Pain: Denies  Vital Signs  Patient Currently in Pain: Denies       Orientation  Orientation  Overall Orientation Status: Within Functional Limits     Social/Functional History  Social/Functional History  Lives With: Family(Spouse, Daughter, several great grandchildren, Daughter's friend and child)  Type of Home: House  Home Layout: Multi-level(4-5 steps to lower level with B rails, 5-7 steps to upper level, single rail)  Home Access: Stairs to enter without rails  Entrance Stairs - Number of Steps: 1  Bathroom Shower/Tub: Walk-in shower, Shower chair with back  Bathroom Toilet: Standard  Home Equipment: [employer], 4 wheeled walker, [address] Help From: (Recently had home therapy (had stopped ~2 weeks ago))  ADL Assistance: Independent(Daughter provides supervision)  Homemaking Responsibilities: No(Daughter and spouse perform these tasks)  Ambulation Assistance: Independent(uses 4WW in community, no device in home)  Transfer Assistance: Needs assistance(in/out of car, shower)  Active : No  Patient's  Info: family  Occupation: Retired     Cognition   Cognition  Overall Cognitive Status: Exceptions  Arousal/Alertness: Appropriate responses to stimuli  Following Commands:  Follows one step commands with increased time  Attention Span: Appears intact  Memory: Decreased recall of biographical Information;Decreased recall of recent events;Decreased short term memory  Safety Judgement: Decreased awareness of need for assistance;Decreased awareness of need for safety  Problem Solving: Assistance required to generate solutions;Assistance required to identify errors made  Insights: Decreased awareness of deficits  Initiation: Requires cues for some  Sequencing: Requires cues for some    Objective  AROM RLE (degrees)  RLE AROM: WFL  AROM LLE (degrees)  LLE AROM : WFL  Strength RLE  Strength RLE: WFL  Comment: grossly 3/5  Strength LLE  Strength LLE: WFL  Comment: grossly 3/5  Tone RLE  RLE Tone: Normotonic  Tone LLE  LLE Tone: Normotonic  Motor Control  Gross Motor?: WFL  Sensation  Overall Sensation Status: WFL  Bed mobility  Supine to Sit: Supervision  Scooting: Supervision  Transfers  Sit to Stand: Contact guard assistance(x1 EOB)  Stand to sit: Contact guard assistance(x1 recliner)  Comment: Mod VC for safe hand placement when transferring with RW with min carryover noted. Ambulation  Ambulation?: Yes  Ambulation 1  Surface: level tile  Device: Rolling Walker  Assistance: Contact guard assistance;Minimal assistance(CGA>>>Min A)  Quality of Gait: narrow Will, decreased risa, decreased B step lengths and heights, decreased B foot clearance, progressing B knee buckling noted as ambulation progresses  Distance: [de-identified]'  Comments: Pt required increased time to perform with mod VC and progressing assist for walker management, especially with turns with min carryover noted. No LOB despite intermittent knee buckling. Stairs/Curb  Stairs?: No(safety concerns)     Balance  Posture: Fair  Sitting - Static: Good;-(Supervision seated at EOB ~10-12 minutes with ADLs)  Sitting - Dynamic: Good;-  Standing - Static: Fair;+(CGA standing with RW for UE support)  Standing - Dynamic: Fair;+(CGA for transfers with RW and CGA/Min A for ambulation with RW)        Plan   Plan  Times per week: 3-5x  Times per day: Daily  Current Treatment Recommendations: Strengthening, Balance Training, Functional Mobility Training, Transfer Training, Endurance Training, Gait Training, Stair training, Neuromuscular Re-education, Safety Education & Training, Home Exercise Program, Positioning, Modalities, Equipment Evaluation, Education, & procurement, Patient/Caregiver Education & Training  Safety Devices  Type of devices:  All fall risk precautions in place, Patient at risk for falls, Call light within reach, Chair alarm in place, Gait belt, Nurse notified, Left in chair  Restraints  Initially in place: No    G-Code       OutComes Score       AM-PAC Score  AM-PAC Inpatient Mobility Raw Score : 17 (04/12/21 1305)  AM-PAC Inpatient T-Scale Score : 42.13 (04/12/21 1305)  Mobility Inpatient CMS 0-100% Score: 50.57 (04/12/21 1305)  Mobility Inpatient CMS G-Code Modifier : CK (04/12/21 1305)          Goals  Short term goals  Time Frame for Short term goals: upon d/c  Short term goal 1: Pt will perform bed mobility MOD I  Short term goal 2: Pt will perform transfers with LRAD and SBA  Short term goal 3: Pt will ambulate 150' with LRAD and CGA  Short term goal 4: Pt will negotiate 7 steps with HR and CGA  Patient Goals   Patient goals : to go home       Therapy Time   Individual Concurrent Group Co-treatment   Time In 5955         Time Out 1232         Minutes 54              Timed Code Treatment Minutes:   39    Total Treatment Minutes:  [address], [address] [place], [place] [id number]

## 2021-04-12 NOTE — PROGRESS NOTES
Pt in bed. Denies any pain. Would like to eat food. B/p elevated. Losartan given. Call light within reach. Bed locked in lowest position.

## 2021-04-12 NOTE — PROGRESS NOTES
Nephrology Attending  Progress Note        SUMMARY :  We are following this patient for  Hyponatremia     hypertension, anxiety, and previous possible alcohol use presents to the hospital with feeling weak. She reports 2-3 days of nausea and vomiting. Reports unable to keep anything down. She does report drinking \"mostly water\". Denies any ETOH use. Her sodium here is 118 on presentation. Sodium was 138 3 weeks ago      SUBJECTIVE:   Patient progress reviewed. The patient feels better than yesterday   in the room  No dysuria  Low Pulse rate     Physical Exam:    VITALS:  BP (!) 146/80   Pulse 57   Temp 97 °F (36.1 °C) (Temporal)   Resp 24   Ht 5' 8\" (1.727 m)   Wt 122 lb 2.2 oz (55.4 kg)   SpO2 100%   BMI 18.57 kg/m²   BLOOD PRESSURE RANGE:  Systolic (01IVC), QOS:497 , Min:100 , JPK:341   ; Diastolic (91TJI), VQQ:03, Min:65, Max:83    24HR INTAKE/OUTPUT:      Intake/Output Summary (Last 24 hours) at 4/12/2021 1148  Last data filed at 4/12/2021 0950  Gross per 24 hour   Intake 730 ml   Output    Net 730 ml       Gen:  alert, oriented x 3  PERRL , EOM +  Pallor +, No icterus  JVP not raised   CV: RRR no murmur or rub . Lungs:B/ L air entry, Normal breath sounds   Abd: soft, bowel sounds + , No organomegaly   Ext: No edema, no cyanosis  Skin: Warm.   No rash  Neuro: nonfocal.      DATA:    CBC with Differential:    Lab Results   Component Value Date    WBC 5.9 04/12/2021    RBC 3.74 04/12/2021    HGB 12.5 04/12/2021    HCT 37.5 04/12/2021     04/12/2021    .1 04/12/2021    MCH 33.4 04/12/2021    MCHC 33.4 04/12/2021    RDW 13.5 04/12/2021    SEGSPCT 79.1 04/19/2016    BANDSPCT 1 04/28/2018    LYMPHOPCT 12.4 04/12/2021    MONOPCT 15.7 04/12/2021    EOSPCT 1.3 05/08/2011    BASOPCT 0.9 04/12/2021    MONOSABS 0.9 04/12/2021    LYMPHSABS 0.7 04/12/2021    EOSABS 0.1 04/12/2021    BASOSABS 0.1 04/12/2021    DIFFTYPE Auto 05/02/2013     CMP:    Lab Results   Component Value Date     04/12/2021    K 4.7 04/12/2021    K 3.1 04/10/2021    CL 97 04/12/2021    CO2 26 04/12/2021    BUN 5 04/12/2021    CREATININE <0.5 04/12/2021    GFRAA >60 04/12/2021    GFRAA >60 05/02/2013    AGRATIO 1.4 03/20/2021    LABGLOM >60 04/12/2021    LABGLOM 79 12/11/2017    GLUCOSE 90 04/12/2021    PROT 7.7 04/10/2021    PROT 7.1 10/18/2012    LABALBU 4.6 04/10/2021    CALCIUM 9.0 04/12/2021    BILITOT 0.5 04/10/2021    ALKPHOS 68 04/10/2021    AST 26 04/10/2021    ALT 13 04/10/2021     Magnesium:    Lab Results   Component Value Date    MG 1.90 04/12/2021     Phosphorus:    Lab Results   Component Value Date    PHOS 2.2 04/12/2021     Uric Acid:    Lab Results   Component Value Date    LABURIC 4.5 08/04/2019     Troponin:    Lab Results   Component Value Date    TROPONINI <0.01 04/10/2021     U/A:    Lab Results   Component Value Date    COLORU YELLOW 04/10/2021    PROTEINU 30 04/10/2021    PHUR 6.5 04/10/2021    PHUR 6.5 04/10/2021    WBCUA 2 04/10/2021    RBCUA 3 04/10/2021    YEAST 0 07/13/2020    YEAST Present 09/11/2019    BACTERIA RARE 04/10/2021    CLARITYU Clear 04/10/2021    SPECGRAV 1.009 04/10/2021    LEUKOCYTESUR Negative 04/10/2021    UROBILINOGEN 0.2 04/10/2021    BILIRUBINUR Negative 04/10/2021    BILIRUBINUR NEGATIVE 05/08/2011    BLOODU TRACE 04/10/2021    GLUCOSEU Negative 04/10/2021    GLUCOSEU NEGATIVE 05/08/2011         IMPRESSION/RECOMMENDATIONS:      Diagnosis and Plan     1. Hyponatremia   Na 130 , better    2. Hypokalemia   K 4.7 , corrected    3.  HTN : not controlled  Add Losartan     Jannette Calixto

## 2021-04-12 NOTE — CARE COORDINATION
Discharge Planning Assessment    RN/SW discharge planner met with patient/ (and family member) to discuss reason for admission, current living situation, and potential needs at the time of discharge    Demographics/Insurance verified: Yes    Current type of dwelling:  house    Patient from ECF/SW confirmed with: N/A    Living arrangements: with spouse    Level of function/Support: with min assist    PCP:  Lalito Vargas    Last Visit to PCP:  3/9/21    DME: shower chiar    Active with any community resources/agencies/skilled home care:YES    FACILITY:     [address] [place] [address]:   [address] [place] [address] [address] [address]   PHONE:        [phone]  FAX:              [phone]      Medication compliance issues: No - uses [address] on [employer] in [address] issues that could impact healthcare: NO    Tentative discharge plan:  Home w/hhc resumed    Discussed with patient and/or family that on the day of discharge home tentative time of discharge will be between 10 AM and noon. Transportation at the time of discharge:   Spouse    Met with patient at bedside and confirmed information. PT/OT evaluations completed. AM-PAC Scores:    OT- 15  PT - 17    Patient denies need for SNF and prefers to discharge to home with continued services through [facility]. Case management will follow progress and assist with discharge planning as needed.     Suzy Pace RN BSN  Case Management  [phone]

## 2021-04-12 NOTE — TELEPHONE ENCOUNTER
Medication:   Pending Prescriptions Disp Refills    cloNIDine (CATAPRES) 0.3 MG tablet [Pharmacy Med Name: cloNIDine HCL 0.3 MG TABLET] 30 tablet 0     Sig: TAKE ONE TABLET BY MOUTH EVERY NIGHT AT BEDTIME AS NEEDED    oxybutynin (DITROPAN) 5 MG tablet [Pharmacy Med Name: OXYBUTYNIN 5 MG TABLET] 60 tablet 0     Sig: TAKE ONE TABLET BY MOUTH TWICE A DAY        Patient Phone Number: [phone] (home) [phone] (work)    Last appt: 3/9/2021   Next appt: Visit date not found    Preferred Pharmacy:   [facility] [address], [address] [facility] [phone] - F [phone]

## 2021-04-12 NOTE — PLAN OF CARE
Problem: Falls - Risk of:  Goal: Will remain free from falls  Description: Will remain free from falls  Outcome: Ongoing  Note: Was able to stand and pivot from bed to chair and back     Problem: Skin Integrity:  Goal: Will show no infection signs and symptoms  Description: Will show no infection signs and symptoms  Outcome: Ongoing

## 2021-04-12 NOTE — PROGRESS NOTES
2.8  --   --  2.2*  --     < > = values in this interval not displayed. Recent Labs     04/10/21  1145   AST 26   ALT 13   BILIDIR <0.2   BILITOT 0.5   ALKPHOS 68     No results for input(s): INR in the last 72 hours. Recent Labs     04/10/21  1145   TROPONINI <0.01       Urinalysis:      Lab Results   Component Value Date    NITRU Negative 04/10/2021    WBCUA 2 04/10/2021    BACTERIA RARE 04/10/2021    RBCUA 3 04/10/2021    BLOODU TRACE 04/10/2021    SPECGRAV 1.009 04/10/2021    GLUCOSEU Negative 04/10/2021    GLUCOSEU NEGATIVE 05/08/2011       Radiology:  CT Head WO Contrast   Final Result   1. No acute intracranial abnormality. XR CHEST PORTABLE   Final Result   1. Unchanged small left pleural effusion.                  Assessment/Plan:    Active Hospital Problems    Diagnosis    Hyponatremia [E87.1]     Hyponatremiaimproved  Severe symptomatic  Clinically hypovolemic  Admit to ICU  BMP every 4 hours  Sodium correction goal rate 4-6 mEq per 24 hours  Hold losartan  Nephrology input appreciated  Sodium this a.m. 130 currently on fluids     Hypertensive urgencyresolved   patient was given losartan 100 in the ED  Resume clonidine at home dose  Labetalol also resumed  Was on nitro gtt. now off     Nausea vomiting  Improved  Could be secondary to hyponatremia  GI pathogens ordered in the ED including C. difficile     Generalized weakness  Could be related to hyponatremia  PT OT to evaluate the patient as she recovers    DVT Prophylaxis: Lovenox  Diet: DIET CLEAR LIQUID; Daily Fluid Restriction: 1800 ml  Code Status: Full Code        Electronically signed by Angie Gamble MD on 4/12/2021 at 11:59 AM

## 2021-04-13 ENCOUNTER — APPOINTMENT (OUTPATIENT)
Dept: MRI IMAGING | Age: 76
DRG: 641 | End: 2021-04-13
Payer: COMMERCIAL

## 2021-04-13 LAB
ANION GAP SERPL CALCULATED.3IONS-SCNC: 7 MMOL/L (ref 3–16)
ANION GAP SERPL CALCULATED.3IONS-SCNC: 7 MMOL/L (ref 3–16)
ANION GAP SERPL CALCULATED.3IONS-SCNC: 8 MMOL/L (ref 3–16)
BASOPHILS ABSOLUTE: 0 K/UL (ref 0–0.2)
BASOPHILS RELATIVE PERCENT: 0.8 %
BUN BLDV-MCNC: 10 MG/DL (ref 7–20)
BUN BLDV-MCNC: 7 MG/DL (ref 7–20)
BUN BLDV-MCNC: 7 MG/DL (ref 7–20)
CALCIUM SERPL-MCNC: 8.5 MG/DL (ref 8.3–10.6)
CALCIUM SERPL-MCNC: 8.9 MG/DL (ref 8.3–10.6)
CALCIUM SERPL-MCNC: 9.1 MG/DL (ref 8.3–10.6)
CHLORIDE BLD-SCNC: 95 MMOL/L (ref 99–110)
CHLORIDE BLD-SCNC: 98 MMOL/L (ref 99–110)
CHLORIDE BLD-SCNC: 99 MMOL/L (ref 99–110)
CO2: 25 MMOL/L (ref 21–32)
CO2: 26 MMOL/L (ref 21–32)
CO2: 26 MMOL/L (ref 21–32)
CREAT SERPL-MCNC: 0.6 MG/DL (ref 0.6–1.2)
CREAT SERPL-MCNC: 0.6 MG/DL (ref 0.6–1.2)
CREAT SERPL-MCNC: <0.5 MG/DL (ref 0.6–1.2)
EOSINOPHILS ABSOLUTE: 0.2 K/UL (ref 0–0.6)
EOSINOPHILS RELATIVE PERCENT: 3 %
GFR AFRICAN AMERICAN: >60
GFR NON-AFRICAN AMERICAN: >60
GLUCOSE BLD-MCNC: 81 MG/DL (ref 70–99)
GLUCOSE BLD-MCNC: 90 MG/DL (ref 70–99)
GLUCOSE BLD-MCNC: 98 MG/DL (ref 70–99)
HCT VFR BLD CALC: 40.4 % (ref 36–48)
HEMOGLOBIN: 13.6 G/DL (ref 12–16)
LYMPHOCYTES ABSOLUTE: 0.6 K/UL (ref 1–5.1)
LYMPHOCYTES RELATIVE PERCENT: 10.8 %
MAGNESIUM: 2 MG/DL (ref 1.8–2.4)
MCH RBC QN AUTO: 33.7 PG (ref 26–34)
MCHC RBC AUTO-ENTMCNC: 33.7 G/DL (ref 31–36)
MCV RBC AUTO: 100.2 FL (ref 80–100)
MONOCYTES ABSOLUTE: 0.7 K/UL (ref 0–1.3)
MONOCYTES RELATIVE PERCENT: 12.4 %
NEUTROPHILS ABSOLUTE: 3.9 K/UL (ref 1.7–7.7)
NEUTROPHILS RELATIVE PERCENT: 73 %
PDW BLD-RTO: 13.7 % (ref 12.4–15.4)
PHOSPHORUS: 3.1 MG/DL (ref 2.5–4.9)
PLATELET # BLD: 266 K/UL (ref 135–450)
PMV BLD AUTO: 8.3 FL (ref 5–10.5)
POTASSIUM SERPL-SCNC: 4.5 MMOL/L (ref 3.5–5.1)
POTASSIUM SERPL-SCNC: 4.6 MMOL/L (ref 3.5–5.1)
POTASSIUM SERPL-SCNC: 4.7 MMOL/L (ref 3.5–5.1)
RBC # BLD: 4.03 M/UL (ref 4–5.2)
SODIUM BLD-SCNC: 128 MMOL/L (ref 136–145)
SODIUM BLD-SCNC: 131 MMOL/L (ref 136–145)
SODIUM BLD-SCNC: 132 MMOL/L (ref 136–145)
WBC # BLD: 5.3 K/UL (ref 4–11)

## 2021-04-13 PROCEDURE — 92526 ORAL FUNCTION THERAPY: CPT

## 2021-04-13 PROCEDURE — 94761 N-INVAS EAR/PLS OXIMETRY MLT: CPT

## 2021-04-13 PROCEDURE — 6370000000 HC RX 637 (ALT 250 FOR IP): Performed by: INTERNAL MEDICINE

## 2021-04-13 PROCEDURE — 70551 MRI BRAIN STEM W/O DYE: CPT

## 2021-04-13 PROCEDURE — 97116 GAIT TRAINING THERAPY: CPT

## 2021-04-13 PROCEDURE — 2580000003 HC RX 258: Performed by: INTERNAL MEDICINE

## 2021-04-13 PROCEDURE — 85025 COMPLETE CBC W/AUTO DIFF WBC: CPT

## 2021-04-13 PROCEDURE — 84100 ASSAY OF PHOSPHORUS: CPT

## 2021-04-13 PROCEDURE — 94760 N-INVAS EAR/PLS OXIMETRY 1: CPT

## 2021-04-13 PROCEDURE — 94640 AIRWAY INHALATION TREATMENT: CPT

## 2021-04-13 PROCEDURE — 80048 BASIC METABOLIC PNL TOTAL CA: CPT

## 2021-04-13 PROCEDURE — 36415 COLL VENOUS BLD VENIPUNCTURE: CPT

## 2021-04-13 PROCEDURE — 97530 THERAPEUTIC ACTIVITIES: CPT

## 2021-04-13 PROCEDURE — 6360000002 HC RX W HCPCS: Performed by: INTERNAL MEDICINE

## 2021-04-13 PROCEDURE — 83735 ASSAY OF MAGNESIUM: CPT

## 2021-04-13 PROCEDURE — 92610 EVALUATE SWALLOWING FUNCTION: CPT

## 2021-04-13 PROCEDURE — 2060000000 HC ICU INTERMEDIATE R&B

## 2021-04-13 RX ORDER — CLONIDINE HYDROCHLORIDE 0.1 MG/1
0.1 TABLET ORAL NIGHTLY
Status: DISCONTINUED | OUTPATIENT
Start: 2021-04-13 | End: 2021-04-15 | Stop reason: HOSPADM

## 2021-04-13 RX ADMIN — MIRTAZAPINE 15 MG: 15 TABLET, FILM COATED ORAL at 20:27

## 2021-04-13 RX ADMIN — Medication 2 PUFF: at 07:40

## 2021-04-13 RX ADMIN — Medication 10 ML: at 20:27

## 2021-04-13 RX ADMIN — CLONAZEPAM 1 MG: 1 TABLET ORAL at 20:27

## 2021-04-13 RX ADMIN — ESCITALOPRAM OXALATE 10 MG: 10 TABLET ORAL at 08:48

## 2021-04-13 RX ADMIN — LOSARTAN POTASSIUM 100 MG: 100 TABLET, FILM COATED ORAL at 08:49

## 2021-04-13 RX ADMIN — CLONIDINE HYDROCHLORIDE 0.1 MG: 0.1 TABLET ORAL at 20:26

## 2021-04-13 RX ADMIN — Medication 10 ML: at 08:49

## 2021-04-13 RX ADMIN — ENOXAPARIN SODIUM 40 MG: 40 INJECTION SUBCUTANEOUS at 08:48

## 2021-04-13 RX ADMIN — LEVOTHYROXINE SODIUM 100 MCG: 100 TABLET ORAL at 08:48

## 2021-04-13 RX ADMIN — OXYBUTYNIN CHLORIDE 5 MG: 5 TABLET ORAL at 08:48

## 2021-04-13 RX ADMIN — Medication 2 PUFF: at 19:35

## 2021-04-13 RX ADMIN — LABETALOL HYDROCHLORIDE 200 MG: 200 TABLET, FILM COATED ORAL at 20:26

## 2021-04-13 RX ADMIN — CLONAZEPAM 1 MG: 1 TABLET ORAL at 15:57

## 2021-04-13 RX ADMIN — LABETALOL HYDROCHLORIDE 200 MG: 200 TABLET, FILM COATED ORAL at 08:48

## 2021-04-13 RX ADMIN — OXYBUTYNIN CHLORIDE 5 MG: 5 TABLET ORAL at 20:27

## 2021-04-13 ASSESSMENT — PAIN SCALES - GENERAL
PAINLEVEL_OUTOF10: 0

## 2021-04-13 NOTE — PROGRESS NOTES
Physical Therapy  Facility/Department: [facility] ICU  Daily Treatment Note  NAME: Abdullahi George  :   MRN: [id number]    Date of Service: 2021    Discharge Recommendations:    Abdullahi George scored a 18/24 on the AM-PAC short mobility form. Current research shows that an AM-PAC score of 17 or less is typically not associated with a discharge to the patient's home setting. Based on the patient's AM-PAC score and their current functional mobility deficits, it is recommended that the patient have 3-5 sessions per week of Physical Therapy at d/c to increase the patient's independence. Please see assessment section for further patient specific details. If D/C home 24 hour supervision recommended. If patient discharges prior to next session this note will serve as a discharge summary. Please see below for the latest assessment towards goals. PT Equipment Recommendations  Equipment Needed: Yes  Mobility Devices: Raoul Knee: Rolling    Assessment   Body structures, Functions, Activity limitations: Decreased functional mobility ; Decreased strength;Decreased safe awareness;Decreased balance;Decreased endurance;Decreased cognition  Assessment: Pt presents with impaired functional strength and endurance and decreased standing balance impairing her ability to perform functional mobility safely and independently. Pt with PLOF of independence however currently requires CGA/Min A for mobility with RW and fatiguing quickly with tasks putting her at risk for potential falls in her tri-level home. Pt does have family living with her at home however would benefit from skilled PT services to address deficits and facilitate return to independent PLOF. Treatment Diagnosis: decreased activity tolerance  Prognosis: Good  Decision Making: Low Complexity  Clinical Presentation: stable  PT Education: Goals;PT Role;Plan of Care;Precautions; Family Education;Orientation;General Safety;Transfer Training;Gait reach, Chair alarm in place, Gait belt, Nurse notified, Left in chair  Restraints  Initially in place: No     Therapy Time   Individual Concurrent Group Co-treatment   Time In 1108         Time Out 1146         Minutes 38         Timed Code Treatment Minutes: [address] [facility],First Floor, [address]     I agree with the note above. Goals addressed by PT this session.    [address] [facility], [facility] [address] [id number]

## 2021-04-13 NOTE — PLAN OF CARE
Problem: Falls - Risk of:  Goal: Will remain free from falls  Description: Will remain free from falls  4/13/2021 0037 by Sharon Dave RN  Outcome: Ongoing     Problem: Skin Integrity:  Goal: Absence of new skin breakdown  Description: Absence of new skin breakdown  Outcome: Ongoing

## 2021-04-13 NOTE — PROGRESS NOTES
Low UOP noted in suction container. Pt checked for leaking around purewick, Pt was dry. No needs at this time.

## 2021-04-13 NOTE — PROGRESS NOTES
Assessment complete, VSS. Breathing regular and unlabored at rest. Bowel sounds present. Clonidine given for BP of 143/79 as Pt takes this regularly at home. Labetolol held to prevent hypotension. Pt turns self, all needs addressed at this time.

## 2021-04-13 NOTE — PROGRESS NOTES
Pt. resting quietly now with eyes closed. Repositioned to comfort pt. Would like to nap, reports poor sleep through the night.

## 2021-04-13 NOTE — PROGRESS NOTES
Pt. Sitting up eating breakfast now. Pt. With good appetite. She is not fond of full liquid diet. Pt. incont large amount of urine. Purewick changed and in place. Depend changed. Noted skin tear to gluteal cleft 2.5 cm in length, area reddened as well as buttocks chapped. Pericare with  Zinc oxide to effected area and foam/silicone dressing placed. Denies any stools since admission.

## 2021-04-13 NOTE — PROGRESS NOTES
Speech Language Pathology  CLINICAL BEDSIDE SWALLOWING EVALUATION  Speech Therapy Department    Patient Name:  Janneth Sheikh  :  1945  Pain level: Pt denies pain at this time  Medical Diagnosis:   Hyponatremia [E87.1]     HPI:75 y.o. female with past medical history of CAD, COPD, hypertension, hypothyroidism, history of DVT, history of alcohol abuse presents for evaluation of several days of progressive weakness associated with nausea vomiting and diarrhea. Patient is a poor historian. She is having trouble describing her quantifying her emesis or diarrhea. At the ED patient found to be hyponatremic with sodium of 119 and hypertensive with SBP as high as over 200. She denies any chest pain, shortness of breath, dizziness, blurry vision, tremors or numbness. She does admit to generalized body weakness, states she is barely able to get out of bed. When asked about medications that she takes she states that her daughter feels her med boxes. I attempted to get into contact with her daughter Jasmeet Yusuf but she was at work. I spoke to her spouse who stated that about a week ago she got prescription for new medications (he does not know which ones) which approximately coincided with onset of her symptoms. He gave me the number to reach daughter Jasmeet Yusuf at work, however, she was not able to help me with med rec as she did not have the list with her. Chest X-ray completed on 4/10/21 with the following results:  Impression   1. Unchanged small left pleural effusion. Speech Therapy/Clinical Swallow Evaluation order received. This Pt is known to us with a remote history of dysphagia with aspiration per MBS results of 7/3/18 (see report). Currently the Pt reports poor recall of prior dysphagia history or any treatment she may have received. Pt reports she was consuming a regular texture diet with thin liquids prior to this admission.  MRI has been ordered and is pending, due to acute onset of increased weakness. Treatment Diagnosis: Mild/moderate Oropharyngeal Dysphagia    Impressions: Pt demonstrates adequate oral motor strength and ROM with no overt facial asymmetry noted. Mild reduced bolus control and A-P propulsion noted with all textures. Prolonged mastication with delayed oral clearing noted with solids. Clinical symptoms of pharyngeal pooling, delayed swallow initiation, reduced laryngeal excursion and suspected delayed/reduced pharyngeal clearing noted with all textures. No overt signs of aspiration noted with any texture. However, precautions should be followed due to suspected delayed airway protection and reduced pharyngeal clearing as well as due to a remote history of SILENT aspiration. Dietary Recommendations:  Dysphagia III Soft and Bite-Sized with thin liquids/no straws/meds with applesauce    Strategies: 90 degree positioning with all p.o. intake; small bites/sips; alternate textures through meal; reduce rate of intake    Treatment/Goals: Speech therapy for dysphagia tx 3-5 times per week. ST.) Pt will tolerate recommended diet without s/s of aspiration   2.) If clinical symptoms of penetration/aspiration continue to be noted,Pt will tolerate MBS to r/o aspiration and determine appropriate diet/liquid level.    3.) Pt will tolerate diet advance to least restricted diet, as clinically indicated, with no signs of aspiration     Oral motor Exam:  Dentition: dentures  Labial/Facial: no overt facial asymmetry  Lingual: adequate strength and ROM  Voice: mild weakness    Oral Phase:   Reduced mastication  Reduced A-P propulsion  Apparent premature bolus loss to pharynx    Pharyngeal Phase:  Apparent pharyngeal pooling  Delayed swallow initiation  Decreased laryngeal elevation via palpation   Apparent decreased pharyngeal clearing  No overt symptoms of aspiration noted     Patient/Family Education:Education, results and recommendations given to the Pt and nurse, who verbalized understanding    Timed Code Treatment: 0 min    Total Treatment Time: 30 min    Discharge Recommendations: Speech Therapy for Speech/Dysphagia treatment at discharge. If patient discharges prior to next session this note will serve as a discharge summary.       [facility]#[id number]

## 2021-04-13 NOTE — PROGRESS NOTES
BMP drawn at 1630 resulted with Na level of 128. May on-coming nurse to report to MD. Sodium levels checked Q6H.

## 2021-04-13 NOTE — PROGRESS NOTES
04/13/2021    K 3.1 04/10/2021    CL 99 04/13/2021    CO2 25 04/13/2021    BUN 7 04/13/2021    CREATININE 0.6 04/13/2021    GFRAA >60 04/13/2021    GFRAA >60 05/02/2013    AGRATIO 1.4 03/20/2021    LABGLOM >60 04/13/2021    LABGLOM 79 12/11/2017    GLUCOSE 98 04/13/2021    PROT 7.7 04/10/2021    PROT 7.1 10/18/2012    LABALBU 4.6 04/10/2021    CALCIUM 9.1 04/13/2021    BILITOT 0.5 04/10/2021    ALKPHOS 68 04/10/2021    AST 26 04/10/2021    ALT 13 04/10/2021     Magnesium:    Lab Results   Component Value Date    MG 2.00 04/13/2021     Phosphorus:    Lab Results   Component Value Date    PHOS 3.1 04/13/2021     Uric Acid:    Lab Results   Component Value Date    LABURIC 4.5 08/04/2019     Troponin:    Lab Results   Component Value Date    TROPONINI <0.01 04/10/2021     U/A:    Lab Results   Component Value Date    COLORU YELLOW 04/10/2021    PROTEINU 30 04/10/2021    PHUR 6.5 04/10/2021    PHUR 6.5 04/10/2021    WBCUA 2 04/10/2021    RBCUA 3 04/10/2021    YEAST 0 07/13/2020    YEAST Present 09/11/2019    BACTERIA RARE 04/10/2021    CLARITYU Clear 04/10/2021    SPECGRAV 1.009 04/10/2021    LEUKOCYTESUR Negative 04/10/2021    UROBILINOGEN 0.2 04/10/2021    BILIRUBINUR Negative 04/10/2021    BILIRUBINUR NEGATIVE 05/08/2011    BLOODU TRACE 04/10/2021    GLUCOSEU Negative 04/10/2021    GLUCOSEU NEGATIVE 05/08/2011         IMPRESSION/RECOMMENDATIONS:      Diagnosis and Plan     1. Hyponatremia   Na 132 , better    2. Hypokalemia   K 4.5 , corrected    3.  HTN : Added Losartan 4/12, spaced BP medications   Reduce night clonidine to 0.1 mg from 0.3 mg     [facility]

## 2021-04-14 LAB
ANION GAP SERPL CALCULATED.3IONS-SCNC: 11 MMOL/L (ref 3–16)
ANION GAP SERPL CALCULATED.3IONS-SCNC: 7 MMOL/L (ref 3–16)
ANION GAP SERPL CALCULATED.3IONS-SCNC: 9 MMOL/L (ref 3–16)
BASOPHILS ABSOLUTE: 0 K/UL (ref 0–0.2)
BASOPHILS RELATIVE PERCENT: 0.4 %
BUN BLDV-MCNC: 6 MG/DL (ref 7–20)
BUN BLDV-MCNC: 8 MG/DL (ref 7–20)
BUN BLDV-MCNC: 9 MG/DL (ref 7–20)
CALCIUM SERPL-MCNC: 8.4 MG/DL (ref 8.3–10.6)
CALCIUM SERPL-MCNC: 8.6 MG/DL (ref 8.3–10.6)
CALCIUM SERPL-MCNC: 8.7 MG/DL (ref 8.3–10.6)
CHLORIDE BLD-SCNC: 94 MMOL/L (ref 99–110)
CHLORIDE BLD-SCNC: 94 MMOL/L (ref 99–110)
CHLORIDE BLD-SCNC: 96 MMOL/L (ref 99–110)
CO2: 23 MMOL/L (ref 21–32)
CO2: 25 MMOL/L (ref 21–32)
CO2: 26 MMOL/L (ref 21–32)
CREAT SERPL-MCNC: 0.5 MG/DL (ref 0.6–1.2)
CREAT SERPL-MCNC: 0.6 MG/DL (ref 0.6–1.2)
CREAT SERPL-MCNC: 0.6 MG/DL (ref 0.6–1.2)
EOSINOPHILS ABSOLUTE: 0.2 K/UL (ref 0–0.6)
EOSINOPHILS RELATIVE PERCENT: 3 %
GFR AFRICAN AMERICAN: >60
GFR NON-AFRICAN AMERICAN: >60
GLUCOSE BLD-MCNC: 100 MG/DL (ref 70–99)
GLUCOSE BLD-MCNC: 120 MG/DL (ref 70–99)
GLUCOSE BLD-MCNC: 93 MG/DL (ref 70–99)
HCT VFR BLD CALC: 38 % (ref 36–48)
HEMOGLOBIN: 12.9 G/DL (ref 12–16)
LYMPHOCYTES ABSOLUTE: 0.5 K/UL (ref 1–5.1)
LYMPHOCYTES RELATIVE PERCENT: 10 %
MAGNESIUM: 1.8 MG/DL (ref 1.8–2.4)
MCH RBC QN AUTO: 33.8 PG (ref 26–34)
MCHC RBC AUTO-ENTMCNC: 33.8 G/DL (ref 31–36)
MCV RBC AUTO: 99.8 FL (ref 80–100)
MONOCYTES ABSOLUTE: 0.5 K/UL (ref 0–1.3)
MONOCYTES RELATIVE PERCENT: 9.2 %
NEUTROPHILS ABSOLUTE: 3.8 K/UL (ref 1.7–7.7)
NEUTROPHILS RELATIVE PERCENT: 77.4 %
PDW BLD-RTO: 14.1 % (ref 12.4–15.4)
PHOSPHORUS: 3.6 MG/DL (ref 2.5–4.9)
PLATELET # BLD: 259 K/UL (ref 135–450)
PMV BLD AUTO: 8.6 FL (ref 5–10.5)
POTASSIUM SERPL-SCNC: 4.1 MMOL/L (ref 3.5–5.1)
POTASSIUM SERPL-SCNC: 4.5 MMOL/L (ref 3.5–5.1)
POTASSIUM SERPL-SCNC: 5 MMOL/L (ref 3.5–5.1)
RBC # BLD: 3.81 M/UL (ref 4–5.2)
SODIUM BLD-SCNC: 126 MMOL/L (ref 136–145)
SODIUM BLD-SCNC: 129 MMOL/L (ref 136–145)
SODIUM BLD-SCNC: 130 MMOL/L (ref 136–145)
WBC # BLD: 5 K/UL (ref 4–11)

## 2021-04-14 PROCEDURE — 94640 AIRWAY INHALATION TREATMENT: CPT

## 2021-04-14 PROCEDURE — 80048 BASIC METABOLIC PNL TOTAL CA: CPT

## 2021-04-14 PROCEDURE — 85025 COMPLETE CBC W/AUTO DIFF WBC: CPT

## 2021-04-14 PROCEDURE — 6370000000 HC RX 637 (ALT 250 FOR IP): Performed by: INTERNAL MEDICINE

## 2021-04-14 PROCEDURE — 84300 ASSAY OF URINE SODIUM: CPT

## 2021-04-14 PROCEDURE — 2580000003 HC RX 258: Performed by: INTERNAL MEDICINE

## 2021-04-14 PROCEDURE — 83735 ASSAY OF MAGNESIUM: CPT

## 2021-04-14 PROCEDURE — 97535 SELF CARE MNGMENT TRAINING: CPT

## 2021-04-14 PROCEDURE — 92526 ORAL FUNCTION THERAPY: CPT

## 2021-04-14 PROCEDURE — 6360000002 HC RX W HCPCS: Performed by: INTERNAL MEDICINE

## 2021-04-14 PROCEDURE — 36415 COLL VENOUS BLD VENIPUNCTURE: CPT

## 2021-04-14 PROCEDURE — 84100 ASSAY OF PHOSPHORUS: CPT

## 2021-04-14 PROCEDURE — 83935 ASSAY OF URINE OSMOLALITY: CPT

## 2021-04-14 PROCEDURE — 94761 N-INVAS EAR/PLS OXIMETRY MLT: CPT

## 2021-04-14 PROCEDURE — 84133 ASSAY OF URINE POTASSIUM: CPT

## 2021-04-14 PROCEDURE — 2060000000 HC ICU INTERMEDIATE R&B

## 2021-04-14 RX ORDER — ASPIRIN 81 MG/1
81 TABLET, CHEWABLE ORAL DAILY
Status: DISCONTINUED | OUTPATIENT
Start: 2021-04-14 | End: 2021-04-15 | Stop reason: HOSPADM

## 2021-04-14 RX ADMIN — Medication 10 ML: at 20:41

## 2021-04-14 RX ADMIN — OXYBUTYNIN CHLORIDE 5 MG: 5 TABLET ORAL at 20:31

## 2021-04-14 RX ADMIN — MIRTAZAPINE 15 MG: 15 TABLET, FILM COATED ORAL at 20:31

## 2021-04-14 RX ADMIN — ENOXAPARIN SODIUM 40 MG: 40 INJECTION SUBCUTANEOUS at 09:54

## 2021-04-14 RX ADMIN — LABETALOL HYDROCHLORIDE 200 MG: 200 TABLET, FILM COATED ORAL at 20:31

## 2021-04-14 RX ADMIN — LEVOTHYROXINE SODIUM 100 MCG: 100 TABLET ORAL at 09:54

## 2021-04-14 RX ADMIN — CLONIDINE HYDROCHLORIDE 0.1 MG: 0.1 TABLET ORAL at 20:31

## 2021-04-14 RX ADMIN — LOSARTAN POTASSIUM 100 MG: 100 TABLET, FILM COATED ORAL at 09:54

## 2021-04-14 RX ADMIN — LABETALOL HYDROCHLORIDE 200 MG: 200 TABLET, FILM COATED ORAL at 09:54

## 2021-04-14 RX ADMIN — Medication 2 PUFF: at 09:42

## 2021-04-14 RX ADMIN — ASPIRIN 81 MG: 81 TABLET, CHEWABLE ORAL at 20:41

## 2021-04-14 RX ADMIN — OXYBUTYNIN CHLORIDE 5 MG: 5 TABLET ORAL at 09:53

## 2021-04-14 RX ADMIN — ACETAMINOPHEN 650 MG: 325 TABLET ORAL at 12:41

## 2021-04-14 RX ADMIN — CLONAZEPAM 1 MG: 1 TABLET ORAL at 20:31

## 2021-04-14 RX ADMIN — Medication 10 ML: at 09:55

## 2021-04-14 ASSESSMENT — PAIN SCALES - GENERAL
PAINLEVEL_OUTOF10: 6
PAINLEVEL_OUTOF10: 0
PAINLEVEL_OUTOF10: 4

## 2021-04-14 ASSESSMENT — PAIN DESCRIPTION - LOCATION: LOCATION: HEAD

## 2021-04-14 ASSESSMENT — PAIN DESCRIPTION - DESCRIPTORS: DESCRIPTORS: HEADACHE

## 2021-04-14 ASSESSMENT — PAIN DESCRIPTION - PAIN TYPE: TYPE: ACUTE PAIN

## 2021-04-14 NOTE — PROGRESS NOTES
Hospitalist Progress Note      PCP: Aida Clark APRN - NP    Date of Admission: 4/10/2021    Chief Complaint: Nausea vomiting and weakness    Hospital Course: 67 y.o. female with past medical history of CAD, COPD, hypertension, hypothyroidism, history of DVT, history of alcohol abuse presents for evaluation of several days of progressive weakness associated with nausea vomiting and diarrhea. Admitted as inpatient for acute hyponatremia and hypertensive urgency. Started on NTG gtt and followed by Renal.  Lexapro stopped for recurrent hyponatremia. Claims she does not drink EtOH. Subjective: Patient sitting in bed. No CP, SOB, HA or abdominal pain. Medications:  Reviewed    Infusion Medications    sodium chloride       Scheduled Medications    cloNIDine  0.1 mg Oral Nightly    losartan  100 mg Oral Daily    levothyroxine  100 mcg Oral QAM AC    mirtazapine  15 mg Oral Nightly    budesonide-formoterol  2 puff Inhalation BID    oxybutynin  5 mg Oral BID    sodium chloride flush  5-40 mL Intravenous 2 times per day    enoxaparin  40 mg Subcutaneous Daily    labetalol  200 mg Oral 2 times per day     PRN Meds: clonazePAM, sodium chloride flush, sodium chloride, polyethylene glycol, acetaminophen **OR** acetaminophen, potassium chloride **OR** potassium alternative oral replacement **OR** potassium chloride, magnesium sulfate, promethazine **OR** ondansetron      Intake/Output Summary (Last 24 hours) at 4/14/2021 1441  Last data filed at 4/14/2021 0953  Gross per 24 hour   Intake 690 ml   Output 600 ml   Net 90 ml       Physical Exam Performed:    /66   Pulse 60   Temp 96.9 °F (36.1 °C) (Temporal)   Resp 10   Ht 5' 8\" (1.727 m)   Wt 126 lb (57.2 kg)   SpO2 98%   BMI 19.16 kg/m²     General appearance: No apparent distress, appears stated age and cooperative. HEENT: Pupils equal, round, and reactive to light. Conjunctivae/corneas clear.   Neck: Supple, with full range of motion. No jugular venous distention. Trachea midline. Respiratory:  Normal respiratory effort. Clear to auscultation, bilaterally without Rales/Wheezes/Rhonchi. Cardiovascular: Regular rate and rhythm with normal S1/S2 without murmurs, rubs or gallops. Abdomen: Soft, non-tender, non-distended with normal bowel sounds. Musculoskeletal: No clubbing, cyanosis or edema bilaterally. Full range of motion without deformity. Skin: Skin color, texture, turgor normal.  No rashes or lesions. Neurologic:  Neurovascularly intact without any focal sensory/motor deficits. Cranial nerves: II-XII intact, grossly non-focal.  Psychiatric: Alert and oriented, thought content appropriate, normal insight  Capillary Refill: Brisk,< 3 seconds   Peripheral Pulses: +2 palpable, equal bilaterally       Labs:   Recent Labs     04/12/21  0246 04/13/21  0903 04/14/21  1055   WBC 5.9 5.3 5.0   HGB 12.5 13.6 12.9   HCT 37.5 40.4 38.0    266 259     Recent Labs     04/12/21  0246 04/12/21  0246 04/13/21  0904 04/13/21  1738 04/14/21  0108 04/14/21  1055   *   < > 132* 128* 126* 130*   K 4.4   < > 4.5 4.6 4.5 4.1   CL 94*   < > 99 95* 94* 96*   CO2 26   < > 25 26 25 23   BUN 4*   < > 7 10 9 6*   CREATININE <0.5*   < > 0.6 0.6 0.6 0.5*   CALCIUM 8.8   < > 9.1 8.9 8.4 8.7   PHOS 2.2*  --  3.1  --   --  3.6    < > = values in this interval not displayed. No results for input(s): AST, ALT, BILIDIR, BILITOT, ALKPHOS in the last 72 hours. No results for input(s): INR in the last 72 hours. No results for input(s): [place] Angie in the last 72 hours.     Urinalysis:      Lab Results   Component Value Date    NITRU Negative 04/10/2021    WBCUA 2 04/10/2021    BACTERIA RARE 04/10/2021    RBCUA 3 04/10/2021    BLOODU TRACE 04/10/2021    SPECGRAV 1.009 04/10/2021    GLUCOSEU Negative 04/10/2021    GLUCOSEU NEGATIVE 05/08/2011       Radiology:  MRI BRAIN WO CONTRAST   Final Result   Chronic white matter microvascular ischemic changes and remote infarcts. CT Head WO Contrast   Final Result   1. No acute intracranial abnormality. XR CHEST PORTABLE   Final Result   1. Unchanged small left pleural effusion. Assessment/Plan:    Active Hospital Problems    Diagnosis    Coronary artery disease due to lipid rich plaque [I25.10, I25.83]    Hyponatremia [E87.1]    PAF (paroxysmal atrial fibrillation) (HCC) [I48.0]    Vascular dementia, uncomplicated (HCC) [R53.23]    HTN (hypertension), benign [I10]    CAD in native artery [I25.10]    COPD, moderate ([address] [J44.9]    Hypothyroidism [E03.9]     Hyponatremiaimproving  Improving  Renal following  Na 119-->126  Stop Lexapro  Has h/o sodium in low 130s in past     Hypertensive urgencyresolved   Off NTG gtt  Cont Clonidine, Labetalol, Cozaar     Nausea vomiting  Improving  Could be secondary to hyponatremia     Generalized weakness  Could be related to hyponatremia  Acutely negative MRI Brain     CAD/history of stroke  ASA 81 mg daily started, has statin allergy of muscle weakness listed as HIGH    DVT Prophylaxis: Lovenox  Diet: DIET DYSPHAGIA SOFT AND BITE-SIZED; Daily Fluid Restriction: 1800 ml; No Drinking Straw  Code Status: Full Code    Discussed with patient, nursing and CM    Hopefully back home tomorrow if sodium better.     Electronically signed by Becky Avila MD on 4/14/2021 at 2:41 PM

## 2021-04-14 NOTE — ACP (ADVANCE CARE PLANNING)
Advanced Care Planning Note. Purpose of Encounter: Advanced care planning in light of CAD  Parties In Attendance: Patient,   Decisional Capacity: Yes  Subjective: Patient is weak and tired  Objective: Cr 0.6  Goals of Care Determination: Patient wants full support (CPR, vent, surgery, HD, no trach, no PEG, no SNF)  Plan:  MRI Brain. Renal consult  Code Status: Full code   Time spent on Advanced care Plannin minutes  Advanced Care Planning Documents: Completed advanced directives on chart,  is the POA.     Luis Lutz MD  2021 9:25 PM

## 2021-04-14 NOTE — PROGRESS NOTES
Nephrology Attending  Progress Note        SUMMARY :  We are following this patient for  Hyponatremia     hypertension, anxiety, and previous possible alcohol use presents to the hospital with feeling weak. She reports 2-3 days of nausea and vomiting. Reports unable to keep anything down. She does report drinking \"mostly water\". Denies any ETOH use. Her sodium here is 118 on presentation. Sodium was 138 3 weeks ago      SUBJECTIVE:   Patient progress reviewed. Feels weak   MRI Brain 4/13:      Chronic white matter microvascular ischemic changes and remote infarcts. Physical Exam:    VITALS:  /74   Pulse 67   Temp 97.4 °F (36.3 °C) (Temporal)   Resp 20   Ht 5' 8\" (1.727 m)   Wt 126 lb (57.2 kg)   SpO2 97%   BMI 19.16 kg/m²   BLOOD PRESSURE RANGE:  Systolic (15KYG), XWR:125 , Min:107 , KBO:937   ; Diastolic (97FGR), TCN:61, Min:64, Max:84    24HR INTAKE/OUTPUT:      Intake/Output Summary (Last 24 hours) at 4/14/2021 1052  Last data filed at 4/14/2021 0953  Gross per 24 hour   Intake 810 ml   Output 800 ml   Net 10 ml       Gen:  alert, oriented x 3  PERRL , EOM +  Pallor +, No icterus  JVP not raised   CV: RRR no murmur or rub . Lungs:B/ L air entry, Normal breath sounds   Abd: soft, bowel sounds + , No organomegaly   Ext: No edema, no cyanosis  Skin: Warm.   No rash  Neuro: nonfocal.      DATA:    CBC with Differential:    Lab Results   Component Value Date    WBC 5.3 04/13/2021    RBC 4.03 04/13/2021    HGB 13.6 04/13/2021    HCT 40.4 04/13/2021     04/13/2021    .2 04/13/2021    MCH 33.7 04/13/2021    MCHC 33.7 04/13/2021    RDW 13.7 04/13/2021    SEGSPCT 79.1 04/19/2016    BANDSPCT 1 04/28/2018    LYMPHOPCT 10.8 04/13/2021    MONOPCT 12.4 04/13/2021    EOSPCT 1.3 05/08/2011    BASOPCT 0.8 04/13/2021    MONOSABS 0.7 04/13/2021    LYMPHSABS 0.6 04/13/2021    EOSABS 0.2 04/13/2021    BASOSABS 0.0 04/13/2021    DIFFTYPE Auto 05/02/2013     CMP:    Lab Results   Component Value Date     04/14/2021    K 4.5 04/14/2021    K 3.1 04/10/2021    CL 94 04/14/2021    CO2 25 04/14/2021    BUN 9 04/14/2021    CREATININE 0.6 04/14/2021    GFRAA >60 04/14/2021    GFRAA >60 05/02/2013    AGRATIO 1.4 03/20/2021    LABGLOM >60 04/14/2021    LABGLOM 79 12/11/2017    GLUCOSE 100 04/14/2021    PROT 7.7 04/10/2021    PROT 7.1 10/18/2012    LABALBU 4.6 04/10/2021    CALCIUM 8.4 04/14/2021    BILITOT 0.5 04/10/2021    ALKPHOS 68 04/10/2021    AST 26 04/10/2021    ALT 13 04/10/2021     Magnesium:    Lab Results   Component Value Date    MG 2.00 04/13/2021     Phosphorus:    Lab Results   Component Value Date    PHOS 3.1 04/13/2021     Uric Acid:    Lab Results   Component Value Date    LABURIC 4.5 08/04/2019     Troponin:    Lab Results   Component Value Date    TROPONINI <0.01 04/10/2021     U/A:    Lab Results   Component Value Date    COLORU YELLOW 04/10/2021    PROTEINU 30 04/10/2021    PHUR 6.5 04/10/2021    PHUR 6.5 04/10/2021    WBCUA 2 04/10/2021    RBCUA 3 04/10/2021    YEAST 0 07/13/2020    YEAST Present 09/11/2019    BACTERIA RARE 04/10/2021    CLARITYU Clear 04/10/2021    SPECGRAV 1.009 04/10/2021    LEUKOCYTESUR Negative 04/10/2021    UROBILINOGEN 0.2 04/10/2021    BILIRUBINUR Negative 04/10/2021    BILIRUBINUR NEGATIVE 05/08/2011    BLOODU TRACE 04/10/2021    GLUCOSEU Negative 04/10/2021    GLUCOSEU NEGATIVE 05/08/2011         IMPRESSION/RECOMMENDATIONS:      Diagnosis and Plan     1. Hyponatremia   Na  Dropped to 126 ( was 132, 130 ) ? Reason   Recheck Uosm , U na and K     2. Hypokalemia   K 4.5 , corrected    3.  HTN : was in with Hypertensive Urgency   Controlled       Lashonda Lewis

## 2021-04-14 NOTE — PROGRESS NOTES
Speech Language Pathology  Dysphagia Treatment Note    Name:  Vincenzo Malagon  :   1945  Medical Diagnosis:  Hyponatremia [E87.1]  Treatment Diagnosis: Oropharyngeal Dysphagia  Pain level:  Pt denies pain at this time    Current Diet Level: Dysphagia III Soft and Bite-Sized with thin liquids/no straws/meds with applesauce    Tolerance of Current Diet Level: Pt and nurse report good tolerance with no symptoms of aspiration reported    Assessment of Texture Tolerance:  -Impressions: Pt awake/alert and positioned upright in chair on RA. With po trials, Pt demonstrates spontaneous bolus hold with thin liquids prior to oropharyngeal bolus transfer with thin liquids. Mild prolonged mastication but adequate oral clearing noted with solids. No overt signs of aspiration with good vocal quality noted with liquids and solids. Pt request diet advance. Pt advised that diet will be advanced to a regular texture. However, selection of softer/easier to chew items is recommended. Pt verbalized understanding and agreement. Diet and Treatment Recommendations:  Advance to regular texture diet with thin liquids/no straws/meds with puree    (Dysphagia Goals addressed, if appropriate)  1.) Pt will tolerate recommended diet without s/s of aspiration (ongoing 2021)   2.) If clinical symptoms of penetration/aspiration continue to be noted,Pt will tolerate MBS to r/o aspiration and determine appropriate diet/liquid level. (ongoing 2021)   3.) Pt will tolerate diet advance to least restricted diet, as clinically indicated, with no signs of aspiration (ongoing 2021)     Plan:  Continued daily Dysphagia treatment with goals per plan of care. Patient/Family Education:Education given to the Pt and nurse, who verbalized understanding    Discharge Recommendations:  Pt will benefit from continued skilled Speech Therapy for Dysphagia services, prior to returning home.     Timed Code Treatment:  0 min    Total Treatment Time: 10 min    If patient discharges prior to next session this note will serve as a discharge summary.      Kelsea Willoughby [id number]

## 2021-04-14 NOTE — PROGRESS NOTES
Hospitalist Progress Note      PCP: QUITA Martel NP    Date of Admission: 4/10/2021    Chief Complaint: Nausea vomiting and weakness    Hospital Course: 67 y.o. female with past medical history of CAD, COPD, hypertension, hypothyroidism, history of DVT, history of alcohol abuse presents for evaluation of several days of progressive weakness associated with nausea vomiting and diarrhea. Admitted as inpatient for acute hyponatremia and hypertensive urgency. Started on NTG gtt and followed by Renal.    Subjective: Patient sitting in chair. No CP, SOB, HA or abdominal pain. Medications:  Reviewed    Infusion Medications    sodium chloride       Scheduled Medications    cloNIDine  0.1 mg Oral Nightly    losartan  100 mg Oral Daily    escitalopram  10 mg Oral Daily    levothyroxine  100 mcg Oral QAM AC    mirtazapine  15 mg Oral Nightly    budesonide-formoterol  2 puff Inhalation BID    oxybutynin  5 mg Oral BID    sodium chloride flush  5-40 mL Intravenous 2 times per day    enoxaparin  40 mg Subcutaneous Daily    labetalol  200 mg Oral 2 times per day     PRN Meds: clonazePAM, sodium chloride flush, sodium chloride, polyethylene glycol, acetaminophen **OR** acetaminophen, potassium chloride **OR** potassium alternative oral replacement **OR** potassium chloride, magnesium sulfate, promethazine **OR** ondansetron      Intake/Output Summary (Last 24 hours) at 4/14/2021 0717  Last data filed at 4/14/2021 0500  Gross per 24 hour   Intake 810 ml   Output 900 ml   Net -90 ml       Physical Exam Performed:    /64   Pulse 55   Temp 97.6 °F (36.4 °C) (Temporal)   Resp 19   Ht 5' 8\" (1.727 m)   Wt 126 lb (57.2 kg)   SpO2 98%   BMI 19.16 kg/m²     General appearance: No apparent distress, appears stated age and cooperative. HEENT: Pupils equal, round, and reactive to light. Conjunctivae/corneas clear. Neck: Supple, with full range of motion.  No jugular venous distention. Trachea midline. Respiratory:  Normal respiratory effort. Clear to auscultation, bilaterally without Rales/Wheezes/Rhonchi. Cardiovascular: Regular rate and rhythm with normal S1/S2 without murmurs, rubs or gallops. Abdomen: Soft, non-tender, non-distended with normal bowel sounds. Musculoskeletal: No clubbing, cyanosis or edema bilaterally. Full range of motion without deformity. Skin: Skin color, texture, turgor normal.  No rashes or lesions. Neurologic:  Neurovascularly intact without any focal sensory/motor deficits. Cranial nerves: II-XII intact, grossly non-focal.  Psychiatric: Alert and oriented, thought content appropriate, normal insight  Capillary Refill: Brisk,< 3 seconds   Peripheral Pulses: +2 palpable, equal bilaterally       Labs:   Recent Labs     04/12/21 0246 04/13/21  0903   WBC 5.9 5.3   HGB 12.5 13.6   HCT 37.5 40.4    266     Recent Labs     04/12/21  0246 04/12/21  0246 04/13/21  0904 04/13/21  1738 04/14/21  0108   *   < > 132* 128* 126*   K 4.4   < > 4.5 4.6 4.5   CL 94*   < > 99 95* 94*   CO2 26   < > 25 26 25   BUN 4*   < > 7 10 9   CREATININE <0.5*   < > 0.6 0.6 0.6   CALCIUM 8.8   < > 9.1 8.9 8.4   PHOS 2.2*  --  3.1  --   --     < > = values in this interval not displayed. No results for input(s): AST, ALT, BILIDIR, BILITOT, ALKPHOS in the last 72 hours. No results for input(s): INR in the last 72 hours. No results for input(s): Ashleigh Burdock in the last 72 hours. Urinalysis:      Lab Results   Component Value Date    NITRU Negative 04/10/2021    WBCUA 2 04/10/2021    BACTERIA RARE 04/10/2021    RBCUA 3 04/10/2021    BLOODU TRACE 04/10/2021    SPECGRAV 1.009 04/10/2021    GLUCOSEU Negative 04/10/2021    GLUCOSEU NEGATIVE 05/08/2011       Radiology:  MRI BRAIN WO CONTRAST   Final Result   Chronic white matter microvascular ischemic changes and remote infarcts. CT Head WO Contrast   Final Result   1. No acute intracranial abnormality. XR CHEST PORTABLE   Final Result   1. Unchanged small left pleural effusion. Assessment/Plan:    Active Hospital Problems    Diagnosis    Coronary artery disease due to lipid rich plaque [I25.10, I25.83]    Hyponatremia [E87.1]    PAF (paroxysmal atrial fibrillation) (HCC) [I48.0]    Vascular dementia, uncomplicated (HCC) [S16.90]    HTN (hypertension), benign [I10]    CAD in native artery [I25.10]    COPD, moderate ([address] [J44.9]    Hypothyroidism [E03.9]     Hyponatremiaimproving  Improving  Renal following  Na 119-->131     Hypertensive urgencyresolved   Off NTG gtt  Cont Clonidine, Labetalol, Cozaar     Nausea vomiting  Improving  Could be secondary to hyponatremia     Generalized weakness  Could be related to hyponatremia  Check MRI Brain to exclude new stroke    CAD/history of stroke  Will need at least ASA and statin    DVT Prophylaxis: Lovenox  Diet: DIET DYSPHAGIA SOFT AND BITE-SIZED; Daily Fluid Restriction: 1800 ml; No Drinking Straw  Code Status: Full Code    Discussed with patient and nursing. D/W  at bedside. Hopefully back home in next 24-48 hrs.     Electronically signed by Rodrigue Cooper MD on 4/14/2021 at 7:17 AM

## 2021-04-14 NOTE — PROGRESS NOTES
Occupational Therapy  Facility/Department: [facility] ICU  Daily Treatment Note  NAME: Nancy Cr  :   MRN: [id number]    Date of Service: 2021    Discharge Recommendations:    Nancy Cr scored a 16/24 on the AM-PAC ADL Inpatient form. Current research shows that an AM-PAC score of 17 or less is typically not associated with a discharge to the patient's home setting. Based on the patient's AM-PAC score and their current ADL deficits, it is recommended that the patient have 3-5 sessions per week of Occupational Therapy at d/c to increase the patient's independence. Please see assessment section for further patient specific details. If patient discharges prior to next session this note will serve as a discharge summary. Please see below for the latest assessment towards goals. If D/C home 24 hour supervision recommended. OT Equipment Recommendations  Equipment Needed: No  Other: refer to next level of care    Assessment   Performance deficits / Impairments: Decreased functional mobility ; Decreased ADL status; Decreased safe awareness;Decreased cognition;Decreased endurance;Decreased balance;Decreased strength  Assessment: ADL's were addressed while seated in recliner d/t pt reporting increased fatigue and weakness in B legs. Patient presents with the above deficits, which are below baseline. Patient would benefit from continued skilled OT to address endurance, strength and functional mobility.   Treatment Diagnosis: Decreased ADLs, IADLS, transfers, mobility associated with Hyponatremia  Prognosis: Good  Decision Making: Medium Complexity  History: I PTA per pt, recently had therapy at home (last Friday) then to hospital  Exam: as above  OT Education: OT Role;Plan of Care;Energy Conservation;Transfer Training;IADL Safety  Patient Education: pt verbalized understanding of safety during transfers and would benefit from reinforcement for carryover  REQUIRES OT FOLLOW UP: Yes  Activity Tolerance  Activity Tolerance: Patient Tolerated treatment well;Patient limited by fatigue  Activity Tolerance: pt reported fatigue and weakness in B legs  Safety Devices  Safety Devices in place: Yes  Type of devices: All fall risk precautions in place;Call light within reach; Chair alarm in place; Patient at risk for falls; Left in chair;Nurse notified;Gait belt         Patient Diagnosis(es): The primary encounter diagnosis was Other fatigue. A diagnosis of Hyponatremia was also pertinent to this visit. has a past medical history of Acute DVT (deep venous thrombosis) ([facility]), Acute encephalopathy, Acute on chronic diastolic heart failure ([address] Alcohol abuse, Anxiety, Arthritis, CAD in native artery, Cellulitis, Cerebral artery occlusion with cerebral infarction [facility]), Cerebrovascular accident (CVA) ([facility] Chronic fatigue, Complex care coordination, Complicated UTI (urinary tract infection), COPD (chronic obstructive pulmonary disease) ([address] COPD exacerbation ([facility] Depression, Diabetes mellitus ([address] DIMAS (dyspnea on exertion), DVT (deep venous thrombosis) ([address] DVT, lower extremity ([address] Fatigue, General weakness, Generalized weakness, Hypercholesteremia, Hypertension, Hyperthyroidism, Incontinence, Mammogram abnormal, Neuromuscular disorder ([address] Osteopenia, Pneumonia, Recurrent UTI, Right forearm cellulitis, Superficial thrombophlebitis of right upper extremity, Thyroid disease, Tobacco abuse, Tobacco abuse counseling, Trapped lung, and Unable to walk.   has a past surgical history that includes Tonsillectomy; Colonoscopy (1/19/2012); Toe Surgery (Right); Shoulder arthroscopy (Right, 6/18/14); and eye surgery.     Restrictions  Restrictions/Precautions  Restrictions/Precautions: Fall Risk, Modified Diet(High fall risk, 1800 mL fluid restriction, full liquid)  Position Activity Restriction  Other position/activity restrictions: 67 y.o. female with past medical history of CAD, COPD, hypertension, hypothyroidism, history of DVT, history of alcohol abuse presents for evaluation of several days of progressive weakness associated with nausea vomiting and diarrhea. Patient is a poor historian. She is having trouble describing her quantifying her emesis or diarrhea. At the ED patient found to be hyponatremic with sodium of 119 and hypertensive with SBP as high as over 200. She denies any chest pain, shortness of breath, dizziness, blurry vision, tremors or numbness. She does admit to generalized body weakness, states she is barely able to get out of bed. Subjective   General  Chart Reviewed: Yes  Response to previous treatment: Patient with no complaints from previous session  Family / Caregiver Present: Yes( was present for beginning of treatment)  Diagnosis: Hyponatremia  Subjective  Subjective: pt in semi fowlers position in bed upon arrival, pleasant and agreeable to OT treatment.   Patient Currently in Pain: Denies   Orientation  Orientation  Overall Orientation Status: Within Functional Limits; increased time and prompting required   Objective    ADL  Grooming: Stand by assistance;Setup(oral hygiene, face washing, shampoo cap, combing and deodorizing seated in recliner)  UE Bathing: Stand by assistance(wipes seated in recliner)  LE Bathing: Stand by assistance(wipes seated in recliner for LE, CGA for safety in stance for kenton care)  UE Dressing: Stand by assistance(don/doff gown seated in recliner)  LE Dressing: Contact guard assistance(CGA in stance to doff brief and pull new brief over hips, SBA in recliner to thread brief through B legs)  Toileting: Contact guard assistance(purwick present, CGA for safety in stance to perform hygiene for kenton care and to pull briefs over hips), purwick placed at end of session        Balance  Sitting Balance: Stand by assistance  Standing Balance: Contact guard assistance  Standing Balance  Time: ~2 minutes  Activity: functional transfer, in stance for ADL task  Comment: verbal and tactile cueing to promote upright posture (forward flexed posture)  Functional Mobility  Functional - Mobility Device: Rolling Walker  Assist Level: Contact guard assistance  Functional Mobility Comments: multiple steps from EOB>recliner  Bed mobility  Supine to Sit: Supervision  Scooting: Stand by assistance  Transfers  Stand Step Transfers: Contact guard assistance  Sit to stand: Contact guard assistance(x4)  Stand to sit: Minimal assistance(Galilea d/t decreased eccentric control)  Transfer Comments: verbal cueing for hand placement           Cognition  Overall Cognitive Status: WFL  Arousal/Alertness: Appropriate responses to stimuli  Following Commands:  Follows one step commands with increased time  Attention Span: Appears intact  Memory: Decreased short term memory  Safety Judgement: Decreased awareness of need for assistance;Decreased awareness of need for safety  Problem Solving: Assistance required to generate solutions;Assistance required to identify errors made  Insights: Decreased awareness of deficits  Initiation: Requires cues for some  Sequencing: Requires cues for some     Perception  Overall Perceptual Status: [facility]        Plan   Plan  Times per week: 3-5  Times per day: Daily  Current Treatment Recommendations: Strengthening, Balance Training, Functional Mobility Training, Endurance Training, Safety Education & Training, Self-Care / ADL, Equipment Evaluation, Education, & procurement, Patient/Caregiver Education & Training, Home Management Training    AM-PAC Score        AM-[facility] Inpatient Daily Activity Raw Score: 16 (04/14/21 1339)  AM-PAC Inpatient ADL T-Scale Score : 35.96 (04/14/21 1339)  ADL Inpatient CMS 0-100% Score: 53.32 (04/14/21 1339)  ADL Inpatient CMS G-Code Modifier : CK (04/14/21 1339)    Goals  Short term goals  Time Frame for Short term goals: Discharge  Short term goal 1: Bed mobility I--Supervision \"continue to assess\" 4/14  Short term goal 2: Functional ADL

## 2021-04-15 VITALS
SYSTOLIC BLOOD PRESSURE: 124 MMHG | TEMPERATURE: 97 F | WEIGHT: 137.35 LBS | RESPIRATION RATE: 18 BRPM | HEART RATE: 64 BPM | DIASTOLIC BLOOD PRESSURE: 71 MMHG | HEIGHT: 68 IN | OXYGEN SATURATION: 95 % | BODY MASS INDEX: 20.82 KG/M2

## 2021-04-15 LAB
ANION GAP SERPL CALCULATED.3IONS-SCNC: 9 MMOL/L (ref 3–16)
BASOPHILS ABSOLUTE: 0.1 K/UL (ref 0–0.2)
BASOPHILS RELATIVE PERCENT: 1.4 %
BUN BLDV-MCNC: 9 MG/DL (ref 7–20)
CALCIUM SERPL-MCNC: 8.7 MG/DL (ref 8.3–10.6)
CHLORIDE BLD-SCNC: 100 MMOL/L (ref 99–110)
CO2: 25 MMOL/L (ref 21–32)
CREAT SERPL-MCNC: 0.6 MG/DL (ref 0.6–1.2)
EOSINOPHILS ABSOLUTE: 0.1 K/UL (ref 0–0.6)
EOSINOPHILS RELATIVE PERCENT: 2.4 %
GFR AFRICAN AMERICAN: >60
GFR NON-AFRICAN AMERICAN: >60
GLUCOSE BLD-MCNC: 87 MG/DL (ref 70–99)
HCT VFR BLD CALC: 38.5 % (ref 36–48)
HEMOGLOBIN: 12.9 G/DL (ref 12–16)
LYMPHOCYTES ABSOLUTE: 0.6 K/UL (ref 1–5.1)
LYMPHOCYTES RELATIVE PERCENT: 13 %
MAGNESIUM: 2 MG/DL (ref 1.8–2.4)
MCH RBC QN AUTO: 33.5 PG (ref 26–34)
MCHC RBC AUTO-ENTMCNC: 33.4 G/DL (ref 31–36)
MCV RBC AUTO: 100.2 FL (ref 80–100)
MONOCYTES ABSOLUTE: 0.5 K/UL (ref 0–1.3)
MONOCYTES RELATIVE PERCENT: 10.2 %
NEUTROPHILS ABSOLUTE: 3.6 K/UL (ref 1.7–7.7)
NEUTROPHILS RELATIVE PERCENT: 73 %
OSMOLALITY URINE: 125 MOSM/KG (ref 390–1070)
PDW BLD-RTO: 14 % (ref 12.4–15.4)
PHOSPHORUS: 4.4 MG/DL (ref 2.5–4.9)
PLATELET # BLD: 260 K/UL (ref 135–450)
PMV BLD AUTO: 8.2 FL (ref 5–10.5)
POTASSIUM SERPL-SCNC: 4.4 MMOL/L (ref 3.5–5.1)
POTASSIUM, UR: 20.3 MMOL/L
RBC # BLD: 3.84 M/UL (ref 4–5.2)
SODIUM BLD-SCNC: 134 MMOL/L (ref 136–145)
SODIUM URINE: 22 MMOL/L
WBC # BLD: 5 K/UL (ref 4–11)

## 2021-04-15 PROCEDURE — 92526 ORAL FUNCTION THERAPY: CPT

## 2021-04-15 PROCEDURE — 6370000000 HC RX 637 (ALT 250 FOR IP): Performed by: INTERNAL MEDICINE

## 2021-04-15 PROCEDURE — 94761 N-INVAS EAR/PLS OXIMETRY MLT: CPT

## 2021-04-15 PROCEDURE — 94640 AIRWAY INHALATION TREATMENT: CPT

## 2021-04-15 PROCEDURE — 83735 ASSAY OF MAGNESIUM: CPT

## 2021-04-15 PROCEDURE — 36415 COLL VENOUS BLD VENIPUNCTURE: CPT

## 2021-04-15 PROCEDURE — 80048 BASIC METABOLIC PNL TOTAL CA: CPT

## 2021-04-15 PROCEDURE — 6360000002 HC RX W HCPCS: Performed by: INTERNAL MEDICINE

## 2021-04-15 PROCEDURE — 85025 COMPLETE CBC W/AUTO DIFF WBC: CPT

## 2021-04-15 PROCEDURE — 2580000003 HC RX 258: Performed by: INTERNAL MEDICINE

## 2021-04-15 PROCEDURE — 84100 ASSAY OF PHOSPHORUS: CPT

## 2021-04-15 RX ORDER — BUDESONIDE AND FORMOTEROL FUMARATE DIHYDRATE 160; 4.5 UG/1; UG/1
2 AEROSOL RESPIRATORY (INHALATION) 2 TIMES DAILY
Qty: 1 INHALER | Refills: 0 | Status: ON HOLD | OUTPATIENT
Start: 2021-04-15 | End: 2022-01-23

## 2021-04-15 RX ORDER — CLONIDINE HYDROCHLORIDE 0.1 MG/1
0.1 TABLET ORAL NIGHTLY
Qty: 30 TABLET | Refills: 0 | Status: SHIPPED | OUTPATIENT
Start: 2021-04-15 | End: 2021-06-09 | Stop reason: SDUPTHER

## 2021-04-15 RX ORDER — ASPIRIN 81 MG/1
81 TABLET, CHEWABLE ORAL DAILY
Qty: 30 TABLET | Refills: 0 | Status: SHIPPED | OUTPATIENT
Start: 2021-04-16

## 2021-04-15 RX ADMIN — ASPIRIN 81 MG: 81 TABLET, CHEWABLE ORAL at 09:33

## 2021-04-15 RX ADMIN — Medication 10 ML: at 09:33

## 2021-04-15 RX ADMIN — OXYBUTYNIN CHLORIDE 5 MG: 5 TABLET ORAL at 09:33

## 2021-04-15 RX ADMIN — LABETALOL HYDROCHLORIDE 200 MG: 200 TABLET, FILM COATED ORAL at 09:33

## 2021-04-15 RX ADMIN — LOSARTAN POTASSIUM 100 MG: 100 TABLET, FILM COATED ORAL at 09:33

## 2021-04-15 RX ADMIN — ACETAMINOPHEN 650 MG: 325 TABLET ORAL at 11:05

## 2021-04-15 RX ADMIN — LEVOTHYROXINE SODIUM 100 MCG: 100 TABLET ORAL at 05:52

## 2021-04-15 RX ADMIN — Medication 2 PUFF: at 08:01

## 2021-04-15 RX ADMIN — ENOXAPARIN SODIUM 40 MG: 40 INJECTION SUBCUTANEOUS at 09:33

## 2021-04-15 ASSESSMENT — PAIN DESCRIPTION - PROGRESSION: CLINICAL_PROGRESSION: NOT CHANGED

## 2021-04-15 ASSESSMENT — PAIN SCALES - GENERAL: PAINLEVEL_OUTOF10: 0

## 2021-04-15 ASSESSMENT — PAIN DESCRIPTION - PAIN TYPE: TYPE: ACUTE PAIN

## 2021-04-15 ASSESSMENT — PAIN DESCRIPTION - ONSET: ONSET: SUDDEN

## 2021-04-15 NOTE — PROGRESS NOTES
CLINICAL PHARMACY NOTE: MEDS TO [address] Select Patient?: Yes  Total # of Prescriptions Filled: 2   The following medications were delivered to the patient:  · Clonidine 0.1 mg  · Aspirin 81 mg  Total # of Interventions Completed: 0  Time Spent (min): 15    Additional Documentation:  Daughter picked up from [address] [place] [address]

## 2021-04-15 NOTE — DISCHARGE SUMMARY
cloNIDine (CATAPRES) 0.1 MG tablet Take 1 tablet by mouth nightly, Disp-30 tablet, R-0Normal           Discharge Medication List as of 4/15/2021  2:19 PM      CONTINUE these medications which have NOT CHANGED    Details   ondansetron (ZOFRAN ODT) 4 MG disintegrating tablet Take 1-2 tablets by mouth every 12 hours as needed for Nausea May Sub regular tablet (non-ODT) if insurance does not cover ODT., Disp-12 tablet, R-0Normal      levothyroxine (SYNTHROID) 100 MCG tablet TAKE ONE TABLET BY MOUTH DAILY, Disp-30 tablet, R-0Normal      mirtazapine (REMERON) 15 MG tablet TAKE ONE TABLET BY MOUTH DAILY, Disp-30 tablet, R-0Normal      atorvastatin (LIPITOR) 80 MG tablet TAKE ONE TABLET BY MOUTH DAILY, Disp-30 tablet, R-0Normal      losartan (COZAAR) 100 MG tablet TAKE ONE TABLET BY MOUTH DAILY, Disp-30 tablet, R-0Normal      clonazePAM (KLONOPIN) 1 MG tablet Take 1 tablet by mouth 2 times daily as needed for Anxiety for up to 30 days. , Disp-60 tablet, R-0Normal      oxybutynin (DITROPAN) 5 MG tablet TAKE ONE TABLET BY MOUTH TWICE A DAY, Disp-60 tablet, R-0Normal      labetalol (NORMODYNE) 200 MG tablet Take 1 tablet by mouth every 12 hours, Disp-60 tablet, R-0Needs appointmentNormal           Discharge Medication List as of 4/15/2021  2:19 PM      STOP taking these medications       escitalopram (LEXAPRO) 10 MG tablet Comments:   Reason for Stopping:         mometasone-formoterol (DULERA) 200-5 MCG/ACT inhaler Comments:   Reason for Stopping:               Discharge Exam:    /71   Pulse 64   Temp 97 °F (36.1 °C) (Temporal)   Resp 18   Ht 5' 8\" (1.727 m)   Wt 137 lb 5.6 oz (62.3 kg)   SpO2 95%   BMI 20.88 kg/m²   General appearance: No apparent distress, appears stated age and cooperative. HEENT: Pupils equal, round, and reactive to light. Conjunctivae/corneas clear. Neck: Supple, with full range of motion. No jugular venous distention. Trachea midline. Respiratory:  Normal respiratory effort.  Clear to auscultation, bilaterally without Rales/Wheezes/Rhonchi. Cardiovascular: Regular rate and rhythm with normal S1/S2 without murmurs, rubs or gallops. Abdomen: Soft, non-tender, non-distended with normal bowel sounds. Musculoskeletal: No clubbing, cyanosis or edema bilaterally. Full range of motion without deformity. Skin: Skin color, texture, turgor normal.  No rashes or lesions. Neurologic:  Neurovascularly intact without any focal sensory/motor deficits. Cranial nerves: II-XII intact, grossly non-focal.  Psychiatric: Alert and oriented, thought content appropriate, normal insight  Capillary Refill: Brisk,< 3 seconds   Peripheral Pulses: +2 palpable, equal bilaterally     Labs: For convenience and continuity at follow-up the following most recent labs are provided:    Lab Results   Component Value Date    WBC 5.0 04/15/2021    HGB 12.9 04/15/2021    HCT 38.5 04/15/2021    .2 04/15/2021     04/15/2021     04/15/2021    K 4.4 04/15/2021    K 3.1 04/10/2021     04/15/2021    CO2 25 04/15/2021    BUN 9 04/15/2021    CREATININE 0.6 04/15/2021    CALCIUM 8.7 04/15/2021    PHOS 4.4 04/15/2021    BNP 86 04/19/2016    ALKPHOS 68 04/10/2021    ALT 13 04/10/2021    AST 26 04/10/2021    BILITOT 0.5 04/10/2021    BILIDIR <0.2 04/10/2021    LABALBU 4.6 04/10/2021    LDLCALC 73 12/23/2020    TRIG 41 03/28/2019     Lab Results   Component Value Date    INR 1.14 09/11/2019    INR 1.15 (H) 03/27/2019    INR 1.05 03/28/2018       Radiology:  Ct Head Wo Contrast    Result Date: 4/10/2021  EXAMINATION: CT OF THE HEAD WITHOUT CONTRAST  4/10/2021 12:00 pm TECHNIQUE: CT of the head was performed without the administration of intravenous contrast. Dose modulation, iterative reconstruction, and/or weight based adjustment of the mA/kV was utilized to reduce the radiation dose to as low as reasonably achievable.  COMPARISON: 03/06/2021 HISTORY: ORDERING SYSTEM PROVIDED HISTORY: headache hypertension TECHNOLOGIST PROVIDED HISTORY: Reason for exam:->headache hypertension Has a \"code stroke\" or \"stroke alert\" been called? ->No Decision Support Exception->Emergency Medical Condition (MA) Reason for Exam: headache hypertension Acuity: Acute Type of Exam: Initial FINDINGS: BRAIN/VENTRICLES: There is no acute intracerebral hemorrhage or extra-axial fluid collection. There is mild cerebral atrophy with moderate periventricular, subcortical and deep white matter small vessel ischemic disease. Old lacunar infarcts involve the bilateral basal ganglia. ORBITS: Status post bilateral cataract removal. SINUSES: The visualized paranasal sinuses and mastoid air cells are clear. SOFT TISSUES/SKULL:  The calvarium is intact. 1. No acute intracranial abnormality. Xr Chest Portable    Result Date: 4/10/2021  EXAMINATION: ONE XRAY VIEW OF THE CHEST 4/10/2021 11:13 am COMPARISON: 03/20/2021 HISTORY: ORDERING SYSTEM PROVIDED HISTORY: fatigue TECHNOLOGIST PROVIDED HISTORY: Reason for exam:->fatigue Reason for Exam: generlized weakness lasting several days of duration. Reports 2-3 days of headaches, vomitting & diarrhea; problems not present now. Denies fevers. BP elevated at 216/107 Acuity: Acute Type of Exam: Initial FINDINGS: No change in the small left pleural effusion with atelectasis. There is left apical scarring. Cardiomegaly is stable. Cardiac digital recorder device is in situ. There is no pneumothorax. 1. Unchanged small left pleural effusion. Xr Chest Portable    Result Date: 3/20/2021  EXAMINATION: ONE XRAY VIEW OF THE CHEST 3/20/2021 12:35 pm COMPARISON: March 6, 2021. HISTORY: ORDERING SYSTEM PROVIDED HISTORY: Weakness TECHNOLOGIST PROVIDED HISTORY: Reason for exam:->Weakness Acuity: Unknown FINDINGS: A portable upright frontal view chest radiograph was obtained. The heart is enlarged. Stable small left pleural effusion. The mediastinal contour and pleural spaces are otherwise within normal limits.   Airspace disease is again seen at the left lung base, possibly atelectasis or pneumonia. The lungs are otherwise clear. No pneumothorax. The pulmonary vascular pattern is within normal limits. No acute thoracic osseous abnormality. Unchanged appearance of left basilar airspace disease and small adjacent left pleural effusion. Lungs otherwise clear. Cardiomegaly. Mri Brain Wo Contrast    Result Date: 4/13/2021  EXAMINATION: MRI OF THE BRAIN WITHOUT CONTRAST  4/13/2021 4:57 pm TECHNIQUE: Multiplanar multisequence MRI of the brain was performed without the administration of intravenous contrast. COMPARISON: None. HISTORY: ORDERING SYSTEM PROVIDED HISTORY: Gait changes, falling to Left side TECHNOLOGIST PROVIDED HISTORY: Reason for exam:->Gait changes, falling to Left side Reason for Exam: Gait changes, falling to Left side Acuity: Acute Type of Exam: Unknown FINDINGS: INTRACRANIAL STRUCTURES/VENTRICLES: There is no acute infarct. There is volume loss with mild-to-moderate chronic white matter microvascular ischemic disease characterized by confluent periventricular white matter signal abnormality. No acute intracranial mass, shift, or bleed is identified. Chronic lacunar infarcts are noted in the cerebellum. Normal expected signal voids are seen within the vessels at the base of skull. ORBITS: The visualized portion of the orbits demonstrate no acute abnormality. SINUSES: The visualized paranasal sinuses and mastoid air cells are well aerated. BONES/SOFT TISSUES: The bone marrow signal intensity appears normal. The soft tissues demonstrate no acute abnormality. Chronic white matter microvascular ischemic changes and remote infarcts. The patient was seen and examined on day of discharge and this discharge summary is in conjunction with any daily progress note from day of discharge. Time Spent on discharge is 45 minutes  in the examination, evaluation, counseling and review of medications and discharge plan.      Note that more than 30 minutes was spent in preparing discharge papers, discussing discharge with patient, medication review, etc.       Signed:    Briana Hoffmann MD   4/15/2021      Thank you QUITA Ortega NP for the opportunity to be involved in this patient's care.  If you have any questions or concerns please feel free to contact me at [address] [place] [address]

## 2021-04-15 NOTE — DISCHARGE INSTR - COC
Continuity of Care Form    Patient Name: Joselito Khan   :    MRN:  [id number]    Admit date:  4/10/2021  Discharge date:  4/15/2021    Code Status Order: Full Code   Advance Directives:   [facility] Documentation       Date/Time Healthcare Directive Type of Healthcare Directive Copy in [address] [facility] [address] Agent's Name Healthcare Agent's Phone Number    [phone]  Yes, patient has an advance directive for healthcare treatment  Living will  No, copy requested from family  [facility]            Admitting Physician:  Ninfa Pastrana MD  PCP: QUITA Kraft NP    Discharging Nurse: Keerthi Gunn Unit/Room#: VSG-8587/8617-03  Discharging Unit Phone Number: [phone]    Emergency Contact:   Extended Emergency Contact Information  Primary Emergency Contact: Bruce Taylor  Address: [id number] [place] [address] Phone: [phone]  Work Phone: [phone]  Mobile Phone: [phone]  Relation: Spouse  Secondary Emergency Contact: Guera Shukla  [place] Phone: [phone]  Mobile Phone: [phone]  Relation: Child    Past Surgical History:  Past Surgical History:   Procedure Laterality Date    COLONOSCOPY  2012    Dr. Shanelle Oreilly; repeat 5 years    EYE SURGERY      cateracts removed    SHOULDER ARTHROSCOPY Right 14    SUBACROMIAL DECOMPRESSION, ROTATOR CUFF REPAIR    TOE SURGERY Right     TONSILLECTOMY         Immunization History:   Immunization History   Administered Date(s) Administered    Influenza Virus Vaccine 2015    Influenza, High Dose (Fluzone 65 yrs and older) 10/16/2012, 10/27/2014, 2016, 10/16/2017, 2018, 10/08/2019    Pneumococcal Conjugate 13-valent (Arolymw99) 2015    Pneumococcal Conjugate 7-valent (Prevnar7) 2011    Pneumococcal Polysaccharide (Sttwaxjzm85) 2011    Tdap (Boostrix, Adacel) 2013, 2014 Active Problems:  Patient Active Problem List   Diagnosis Code    Hypothyroidism E03.9    Smoker F17.200    Anxiety F41.9    History of alcoholism ([facility] [address] F10.21    Pleural effusion J90    Centrilobular emphysema ([facility] [address] J43.2    COPD, moderate ([address] J44.9    Encephalopathy G93.40    CAD in native artery I25.10    Chronic ischemic multifocal multiple vascular territories stroke I69.30    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 (HCC) I63.40    Cardiac arrhythmia I49.9    Alcohol abuse counseling and surveillance Z71.41    Severe infection B99.9    Therapeutic drug monitoring Z51.81    H/O ischemic multifocal multiple vascular territories stroke Z80.78    Depression F32.9    Oropharyngeal dysphagia R13.12    HTN (hypertension), benign I10    Dyslipidemia E78.5    Encounter for electronic analysis of reveal event recorder Z45.09    Physical deconditioning R53.81    PAF (paroxysmal atrial fibrillation) ([facility] I48.0    Vascular dementia, uncomplicated (HCC) N33.53    Other insomnia G47.09    Transient alteration of awareness R40.4    Hyponatremia E87.1    Coronary artery disease due to lipid rich plaque I25.10, I25.83    Ataxia R27.0    Cord compression (HCC) G95.20    MRSA infection A49.02    Weakness R53.1       Isolation/Infection:   Isolation            No Isolation          Patient Infection Status       Infection Onset Added Last Indicated Last Indicated By Review Planned Expiration Resolved Resolved By    None active    Resolved    C-diff Rule Out 04/10/21 04/10/21 04/10/21 Clostridium Difficile Toxin/Antigen (Ordered)   04/13/21 Vinod Donnelly RN    Order discontinued    MRSA 08/22/19 08/24/19 08/22/19 Urine Culture   04/12/21 Vinod Donnelly RN            Nurse Assessment:  Last Vital Signs: BP (!) 157/93   Pulse 62   Temp 97.4 °F (36.3 °C) (Temporal)   Resp 18   Ht 5' 8\" (1.727 m)   Wt 137 lb 5.6 oz (62.3 kg)   SpO2 95%   BMI 20.88 kg/m²     Last NOT a DME order):  none  Other Treatments: HOME HEALTH CARE: LEVEL 3 SAFETY        -Initial home health evaluation to occur within 24-48 hours, in patient home    -Home health agency to establish plan of care for patient over 60 day period    -Medication Reconciliation    -PT/OT/Speech evaluations in home within 24-48 hours of discharge; including  -DME and home safety    -Frontload therapy 5 days, then 3x a week    -OT to evaluate if patient has [address] needs for personal care    - evaluation within 24-48 hours, includes evaluation of resources   and insurance to determine [place], [place], LTC, and Medicaid options    -PCP Visit scheduled within three to seven days of discharge       Patient's personal belongings (please select all that are sent with patient):  with patient    RN SIGNATURE:  Electronically signed by Tyler Garcia RN on 4/15/21 at 12:49 PM EDT    CASE MANAGEMENT/SOCIAL WORK SECTION    Inpatient Status Date: ***    Readmission Risk Assessment Score:  Readmission Risk              Risk of Unplanned Readmission:        17           Discharging to Facility/ Agency   Name: Lu Pierson will call for Appointment  Phone: [phone]  Fax: [phone]    Dialysis Facility (if applicable)   · Name:  · Address:  · Dialysis Schedule:  · Phone:  · Fax:    / signature: {Esignature:[id number]}    PHYSICIAN SECTION    Prognosis: Fair    Condition at Discharge: Stable    Rehab Potential (if transferring to Rehab): Fair    Recommended Labs or Other Treatments After Discharge:     Physician Certification: I certify the above information and transfer of Abdullahi George  is necessary for the continuing treatment of the diagnosis listed and that she requires Home Care for less 30 days.      Update Admission H&P: No change in H&P    PHYSICIAN SIGNATURE:  Electronically signed by Janey Valle MD on 4/15/21 at 12:20 PM EDT

## 2021-04-15 NOTE — PROGRESS NOTES
Nephrology Attending  Progress Note        SUMMARY :  We are following this patient for  Hyponatremia     hypertension, anxiety, and previous possible alcohol use presents to the hospital with feeling weak. She reports 2-3 days of nausea and vomiting. Reports unable to keep anything down. She does report drinking \"mostly water\". Denies any ETOH use. Her sodium here is 118 on presentation. Sodium was 138 3 weeks ago      SUBJECTIVE:   Patient progress reviewed. Feels weak   MRI Brain 4/13:      Chronic white matter microvascular ischemic changes and remote infarcts. Physical Exam:    VITALS:  BP (!) 157/93   Pulse 62   Temp 97.4 °F (36.3 °C) (Temporal)   Resp 18   Ht 5' 8\" (1.727 m)   Wt 137 lb 5.6 oz (62.3 kg)   SpO2 95%   BMI 20.88 kg/m²   BLOOD PRESSURE RANGE:  Systolic (22KGS), JSF:313 , Min:112 , JONAS:207   ; Diastolic (50OJL), UTC:92, Min:60, Max:93    24HR INTAKE/OUTPUT:      Intake/Output Summary (Last 24 hours) at 4/15/2021 1136  Last data filed at 4/15/2021 1058  Gross per 24 hour   Intake 470 ml   Output 850 ml   Net -380 ml       Gen:  alert, oriented x 3  PERRL , EOM +  Pallor +, No icterus  JVP not raised   CV: RRR no murmur or rub . Lungs:B/ L air entry, Normal breath sounds   Abd: soft, bowel sounds + , No organomegaly   Ext: No edema, no cyanosis  Skin: Warm.   No rash  Neuro: nonfocal.      DATA:    CBC with Differential:    Lab Results   Component Value Date    WBC 5.0 04/15/2021    RBC 3.84 04/15/2021    HGB 12.9 04/15/2021    HCT 38.5 04/15/2021     04/15/2021    .2 04/15/2021    MCH 33.5 04/15/2021    MCHC 33.4 04/15/2021    RDW 14.0 04/15/2021    SEGSPCT 79.1 04/19/2016    BANDSPCT 1 04/28/2018    LYMPHOPCT 13.0 04/15/2021    MONOPCT 10.2 04/15/2021    EOSPCT 1.3 05/08/2011    BASOPCT 1.4 04/15/2021    MONOSABS 0.5 04/15/2021    LYMPHSABS 0.6 04/15/2021    EOSABS 0.1 04/15/2021    BASOSABS 0.1 04/15/2021    DIFFTYPE Auto 05/02/2013     CMP:    Lab Results Component Value Date     04/15/2021    K 4.4 04/15/2021    K 3.1 04/10/2021     04/15/2021    CO2 25 04/15/2021    BUN 9 04/15/2021    CREATININE 0.6 04/15/2021    GFRAA >60 04/15/2021    GFRAA >60 05/02/2013    AGRATIO 1.4 03/20/2021    LABGLOM >60 04/15/2021    LABGLOM 79 12/11/2017    GLUCOSE 87 04/15/2021    PROT 7.7 04/10/2021    PROT 7.1 10/18/2012    LABALBU 4.6 04/10/2021    CALCIUM 8.7 04/15/2021    BILITOT 0.5 04/10/2021    ALKPHOS 68 04/10/2021    AST 26 04/10/2021    ALT 13 04/10/2021     Magnesium:    Lab Results   Component Value Date    MG 2.00 04/15/2021     Phosphorus:    Lab Results   Component Value Date    PHOS 4.4 04/15/2021     Uric Acid:    Lab Results   Component Value Date    LABURIC 4.5 08/04/2019     Troponin:    Lab Results   Component Value Date    TROPONINI <0.01 04/10/2021     U/A:    Lab Results   Component Value Date    COLORU YELLOW 04/10/2021    PROTEINU 30 04/10/2021    PHUR 6.5 04/10/2021    PHUR 6.5 04/10/2021    WBCUA 2 04/10/2021    RBCUA 3 04/10/2021    YEAST 0 07/13/2020    YEAST Present 09/11/2019    BACTERIA RARE 04/10/2021    CLARITYU Clear 04/10/2021    SPECGRAV 1.009 04/10/2021    LEUKOCYTESUR Negative 04/10/2021    UROBILINOGEN 0.2 04/10/2021    BILIRUBINUR Negative 04/10/2021    BILIRUBINUR NEGATIVE 05/08/2011    BLOODU TRACE 04/10/2021    GLUCOSEU Negative 04/10/2021    GLUCOSEU NEGATIVE 05/08/2011         IMPRESSION/RECOMMENDATIONS:      Diagnosis and Plan     1. Hyponatremia   Na  134 ( was 126, 132, 130 ) Unclear Reason   Recheck Uosm 125  , U na 22 and K 20 < appropriate to Serum Na of 126 4/14     2. Hypokalemia   K 4.4 , corrected    3.  HTN : was in with Hypertensive Urgency   Controlled , BP at exam controlled    Stable to discharge from renal point of view , will arrange follow up for HTN and low na as an outpatient         Francisco Aranda

## 2021-04-15 NOTE — PLAN OF CARE
Problem: Falls - Risk of:  Goal: Will remain free from falls  Description: Will remain free from falls  Outcome: Completed  Goal: Absence of physical injury  Description: Absence of physical injury  Outcome: Completed     Problem: Skin Integrity:  Goal: Will show no infection signs and symptoms  Description: Will show no infection signs and symptoms  Outcome: Completed  Goal: Absence of new skin breakdown  Description: Absence of new skin breakdown  Outcome: Completed     Problem: Pain:  Description: Pain management should include both nonpharmacologic and pharmacologic interventions.   Goal: Pain level will decrease  Description: Pain level will decrease  Outcome: Completed  Goal: Control of acute pain  Description: Control of acute pain  Outcome: Completed  Goal: Control of chronic pain  Description: Control of chronic pain  Outcome: Completed     Problem: Physical Regulation:  Goal: Ability to maintain clinical measurements within normal limits will improve  Description: Ability to maintain clinical measurements within normal limits will improve  Outcome: Completed  Goal: Will show no signs and symptoms of electrolyte imbalance  Description: Will show no signs and symptoms of electrolyte imbalance  Outcome: Completed

## 2021-04-15 NOTE — PROGRESS NOTES
Speech Language Pathology  Dysphagia Treatment Note    Name:  Kenny Pete  :   1945  Medical Diagnosis:  Hyponatremia [E87.1]  Treatment Diagnosis: Oropharyngeal Dysphagia  Pain level:  Pt denies pain at this time    Current Diet Level: Regular texture diet with thin liquids/no straws/meds with puree    Tolerance of Current Diet Level: Pt seemingly tolerating current diet. Assessment of Texture Tolerance:  -Impressions:  Pt seen semi-elevated in bed eating breakfast tray consisting of scrambled eggs, chopped sasuage, and coffee. Repositioned pt more upright in bed for PO trials. When asked, pt denies any difficulty with current diet. She reported having to wear dentures at an early age and is able to chew all textures without difficulty. Scrambled eggs and chopped sausage revealed prolonged mastication (>20 seconds), mild right pocketing, mild diffuse oral residue, reduced a-p propulsion, and reduced but functional laryngeal elevation. Pt independently utilized liquid rinse of coffee to clear oral stasis; states she must do this during all meals to moisten the bolus. No overt clinical s/s of aspiration noted. Discussed aspiration precautions and self selecting softer food items, pt v/u. Recommend continuation of current diet. Will continue to monitor pt's diet tolerance. Diet and Treatment Recommendations:  Continue Regular texture diet with thin liquids/no straws/meds with puree    Dysphagia Goals  1.) Pt will tolerate recommended diet without s/s of aspiration (ongoing 4/15/2021)   2.) If clinical symptoms of penetration/aspiration continue to be noted, Pt will tolerate MBS to r/o aspiration and determine appropriate diet/liquid level. (ongoing 4/15/2021)   3.) Pt will tolerate diet advance to least restricted diet, as clinically indicated, with no signs of aspiration (ongoing 4/15/2021)     Plan: Continued daily Dysphagia treatment with goals per plan of care.     Patient/Family Education: Education given to the Pt and nurse, who verbalized understanding    Discharge Recommendations:  Pt will benefit from continued skilled Speech Therapy for Dysphagia services, prior to returning home. Timed Code Treatment:  0 min    Total Treatment Time: 10 min    If patient discharges prior to next session this note will serve as a discharge summary. Amy Topete M.A., Kyle Larose .[id number]  Speech-Language Pathologist    EVERARDO Dean    Clinician    The speech-language pathologist was present, directed the patient's care, made skilled judgment and was responsible for assessment and treatment.

## 2021-04-15 NOTE — PROGRESS NOTES
Aware of discharge with home care. Home care orders sent to [facility]. Discharge planner notified.

## 2021-04-15 NOTE — CARE COORDINATION
Discharge Note:    Referral called to:    FACILITY:     [address]  ADDRESS:   [address] [place] [address]   PHONE:        [phone]:              [phone]    They are already active with patient.  will transport home. No further discharge planning needs.     Aspen Cisse RN BSN  Case Management  [phone]

## 2021-04-19 ENCOUNTER — TELEPHONE (OUTPATIENT)
Dept: FAMILY MEDICINE CLINIC | Age: 76
End: 2021-04-19

## 2021-04-20 ENCOUNTER — OFFICE VISIT (OUTPATIENT)
Dept: FAMILY MEDICINE CLINIC | Age: 76
End: 2021-04-20
Payer: COMMERCIAL

## 2021-04-20 VITALS
SYSTOLIC BLOOD PRESSURE: 132 MMHG | WEIGHT: 127 LBS | OXYGEN SATURATION: 97 % | HEART RATE: 53 BPM | TEMPERATURE: 97.3 F | BODY MASS INDEX: 19.31 KG/M2 | DIASTOLIC BLOOD PRESSURE: 77 MMHG

## 2021-04-20 DIAGNOSIS — E87.1 HYPONATREMIA: Primary | ICD-10-CM

## 2021-04-20 DIAGNOSIS — Z09 HOSPITAL DISCHARGE FOLLOW-UP: ICD-10-CM

## 2021-04-20 PROCEDURE — 1111F DSCHRG MED/CURRENT MED MERGE: CPT | Performed by: NURSE PRACTITIONER

## 2021-04-20 PROCEDURE — 99215 OFFICE O/P EST HI 40 MIN: CPT | Performed by: NURSE PRACTITIONER

## 2021-04-20 ASSESSMENT — ENCOUNTER SYMPTOMS
ABDOMINAL DISTENTION: 0
DIARRHEA: 0
VOMITING: 0
ABDOMINAL PAIN: 0
NAUSEA: 0
CONSTIPATION: 0
SHORTNESS OF BREATH: 0

## 2021-04-20 NOTE — TELEPHONE ENCOUNTER
[facility] for Milena informing her that [employer] [facility] said yes okay to give a verbal consent

## 2021-04-20 NOTE — TELEPHONE ENCOUNTER
Please call Vinod Lugo and find out what company she is affiliated, met with patient and  today and they did not know which nursing facility to place orders.

## 2021-04-20 NOTE — PROGRESS NOTES
Vira Woodward  : 1945  Encounter date: 2021    This efrain 67 y.o. female who presents with  Chief Complaint   Patient presents with   [address] from [facility]     ER f/u from 4/10/21 till 4/15/21 pt  could not walk        History of present illness:    HPI Pt is 67year old female for hospital follow up from [facility] for hyponatremia and hypertensive urgency from 4/10/21-4/15/21. Lowest NA- 126, discharged 134. Pt reported excessive nausea and vomiting prior to ED visit, dehydrated. UA negative for cystitis. Pt admitted and treated with electrolyte replacement and bowel rest.  Received EKG. head CT, chest xray and head MRI. No acute abnormality. Lexapro stopped by nephrology, possible contributing factor to hyponatremia. Current Outpatient Medications on File Prior to Visit   Medication Sig Dispense Refill    aspirin 81 MG chewable tablet Take 1 tablet by mouth daily 30 tablet 0    budesonide-formoterol (SYMBICORT) 160-4.5 MCG/ACT AERO Inhale 2 puffs into the lungs 2 times daily 1 Inhaler 0    cloNIDine (CATAPRES) 0.1 MG tablet Take 1 tablet by mouth nightly 30 tablet 0    clonazePAM (KLONOPIN) 1 MG tablet Take 1 tablet by mouth 2 times daily as needed for Anxiety for up to 30 days. 60 tablet 0    oxybutynin (DITROPAN) 5 MG tablet TAKE ONE TABLET BY MOUTH TWICE A DAY 60 tablet 0    ondansetron (ZOFRAN ODT) 4 MG disintegrating tablet Take 1-2 tablets by mouth every 12 hours as needed for Nausea May Sub regular tablet (non-ODT) if insurance does not cover ODT.  12 tablet 0    levothyroxine (SYNTHROID) 100 MCG tablet TAKE ONE TABLET BY MOUTH DAILY 30 tablet 0    mirtazapine (REMERON) 15 MG tablet TAKE ONE TABLET BY MOUTH DAILY 30 tablet 0    atorvastatin (LIPITOR) 80 MG tablet TAKE ONE TABLET BY MOUTH DAILY 30 tablet 0    losartan (COZAAR) 100 MG tablet TAKE ONE TABLET BY MOUTH DAILY 30 tablet 0    labetalol (NORMODYNE) 200 MG tablet Take 1 tablet by mouth every 12 hours 60 tablet 0     No current facility-administered medications on file prior to visit.        Allergies   Allergen Reactions    Lipitor [Atorvastatin] Other (See Comments)     Weakness, severe    Morphine Shortness Of Breath    Keflex [Cephalexin] Other (See Comments)     SOB & bilateral arms shaking     Hctz [Hydrochlorothiazide] Other (See Comments)     Hyponatremia, severe      Norvasc [Amlodipine Besylate] Swelling     Of neck    Penicillins Hives    Tramadol Itching    Hydralazine Palpitations and Other (See Comments)     Dizzy,fatigue,headaches,palpitations,loss of appetite    Shellfish-Derived Products Swelling and Rash     Swelling of neck     Past Medical History:   Diagnosis Date    Acute DVT (deep venous thrombosis) ([facility] 7/3/2015    Acute encephalopathy 4/19/2018    Acute on chronic diastolic heart failure ([facility]) 3/30/2019    Alcohol abuse     Anxiety     Arthritis     CAD in native artery     Cellulitis 4/28/2018    Cerebral artery occlusion with cerebral infarction ([facility])     states hx of multiple mini strokes    Cerebrovascular accident (CVA) ([address]     Chronic fatigue     Complex care coordination     Complicated UTI (urinary tract infection)     COPD (chronic obstructive pulmonary disease) ([facility])     COPD exacerbation ([address] 2/20/2018    Depression     Diabetes mellitus ([address]     DIMAS (dyspnea on exertion) 7/3/2015    DVT (deep venous thrombosis) ([facility]     DVT, lower extremity ([facility])     Fatigue 11/3/2015    General weakness 11/4/2015    Generalized weakness 8/22/2019    Hypercholesteremia     Hypertension     Hyperthyroidism     Incontinence 10/16/2012    Mammogram abnormal 2/7/2012    Neuromuscular disorder ([address]     Osteopenia 2/14/2017    Pneumonia     Recurrent UTI     Right forearm cellulitis     Superficial thrombophlebitis of right upper extremity     Thyroid disease     Tobacco abuse     Tobacco abuse counseling     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

## 2021-04-20 NOTE — TELEPHONE ENCOUNTER
I called and spoke with Jeannie Brown she is also with [facility] she states that all orders have to have a signed or verbal order. Jeannie Brown is from [facility] she does Skilled Nursing.

## 2021-04-21 DIAGNOSIS — I10 ESSENTIAL HYPERTENSION: ICD-10-CM

## 2021-04-21 RX ORDER — LOSARTAN POTASSIUM 100 MG/1
TABLET ORAL
Qty: 30 TABLET | Refills: 0 | Status: SHIPPED | OUTPATIENT
Start: 2021-04-21 | End: 2021-07-13 | Stop reason: SDUPTHER

## 2021-04-23 ENCOUNTER — TELEPHONE (OUTPATIENT)
Dept: FAMILY MEDICINE CLINIC | Age: 76
End: 2021-04-23

## 2021-04-23 NOTE — TELEPHONE ENCOUNTER
Patients spouse came in to the office and states that you need to call Nancy Villa at [facility] to state that she needs the [address]        Please advise      Patients Provider is out of office

## 2021-04-26 ENCOUNTER — TELEPHONE (OUTPATIENT)
Dept: FAMILY MEDICINE CLINIC | Age: 76
End: 2021-04-26

## 2021-04-26 NOTE — TELEPHONE ENCOUNTER
I called and spoke with Rancho Owens and informed him of note. Rancho Owens stated that he is going to have Doris call back tomorrow for our office to inform her of this change due to she is Sonia Garcia the one that gets pt meds in order for her.

## 2021-04-27 ENCOUNTER — TELEPHONE (OUTPATIENT)
Dept: FAMILY MEDICINE CLINIC | Age: 76
End: 2021-04-27

## 2021-04-27 NOTE — TELEPHONE ENCOUNTER
Pt's  came into the office to fill out a Release of Medical Records to send to [facility]. He states that the PT falls constantly and needs more care. . Please send the info to Nahun Moreno at [facility] # [phone]. Medical Release is scanned into the chart. Brandon Eden

## 2021-04-27 NOTE — TELEPHONE ENCOUNTER
Pt's daughter advise and she stated that they are waiting on [place] call to see if she can get in as she has been falling a lot

## 2021-04-27 NOTE — TELEPHONE ENCOUNTER
PT's  came into the Office and Filled out a Release of Medical Records to be sent to Paul Paula from [facility] Fax # [phone]. Pt has fallen many times and needs more care. Jermain Mccollum

## 2021-04-29 ENCOUNTER — TELEPHONE (OUTPATIENT)
Dept: FAMILY MEDICINE CLINIC | Age: 76
End: 2021-04-29

## 2021-04-29 NOTE — TELEPHONE ENCOUNTER
Su Fuller called to inform you that the patient fell 4 times since 4/26/21. She states that she feels weak when she is falling and her legs collapse. Her most recent fall was last night in the bathroom with no apparent injuries. She has a bruise and redness on her right eye and a healing bruise on her left knee.     Su Fuller would like verbal orders for PT      Please advise

## 2021-04-29 NOTE — TELEPHONE ENCOUNTER
I called and LVM for Linwood Cristina from [facility] that verbal order was okay to do.

## 2021-05-03 ENCOUNTER — NURSE ONLY (OUTPATIENT)
Dept: CARDIOLOGY CLINIC | Age: 76
End: 2021-05-03
Payer: COMMERCIAL

## 2021-05-03 DIAGNOSIS — Z86.73 H/O ISCHEMIC MULTIFOCAL MULTIPLE VASCULAR TERRITORIES STROKE: ICD-10-CM

## 2021-05-03 DIAGNOSIS — I48.0 PAF (PAROXYSMAL ATRIAL FIBRILLATION) (HCC): ICD-10-CM

## 2021-05-03 DIAGNOSIS — Z45.09 ENCOUNTER FOR ELECTRONIC ANALYSIS OF REVEAL EVENT RECORDER: ICD-10-CM

## 2021-05-03 PROCEDURE — 93298 REM INTERROG DEV EVAL SCRMS: CPT | Performed by: INTERNAL MEDICINE

## 2021-05-03 PROCEDURE — G2066 INTER DEVC REMOTE 30D: HCPCS | Performed by: INTERNAL MEDICINE

## 2021-05-13 DIAGNOSIS — F41.9 ANXIETY: ICD-10-CM

## 2021-05-13 RX ORDER — CLONAZEPAM 1 MG/1
1 TABLET ORAL 2 TIMES DAILY PRN
Qty: 60 TABLET | Refills: 0 | Status: SHIPPED | OUTPATIENT
Start: 2021-05-13 | End: 2021-06-09 | Stop reason: SDUPTHER

## 2021-05-13 RX ORDER — CLONAZEPAM 1 MG/1
1 TABLET ORAL 2 TIMES DAILY PRN
Qty: 60 TABLET | Refills: 0 | Status: CANCELLED | OUTPATIENT
Start: 2021-05-13 | End: 2021-06-12

## 2021-05-13 NOTE — TELEPHONE ENCOUNTER
Medication:   Pending Prescriptions Disp Refills    clonazePAM (KLONOPIN) 1 MG tablet 60 tablet 0     Sig: Take 1 tablet by mouth 2 times daily as needed for Anxiety for up to 30 days. Patient Phone Number: [phone] (home) [phone] (work)    Last appt: 4/20/2021   Next appt: Visit date not found    Last OARRS:   RX Monitoring 5/11/2020   Attestation -   Periodic Controlled Substance Monitoring No signs of potential drug abuse or diversion identified.      PDMP Monitoring:    Last PDMP Jay Peña as Reviewed [facility]):  Review User Review Instant Review Result   Bart Farley 4/20/2021 12:35 PM Reviewed PDMP [1]     Preferred Pharmacy:   [facility] [address], [address] [phone] - F [phone]

## 2021-05-13 NOTE — TELEPHONE ENCOUNTER
----- Message from [facility] sent at 5/13/2021  2:59 PM EDT -----  Subject: Refill Request    QUESTIONS  Name of Medication? Other - Clonazepam  Patient-reported dosage and instructions? twice a day 1mg   How many days do you have left? 2  Preferred Pharmacy? [facility]  Pharmacy phone number (if available)? [phone]  Additional Information for Provider? pt needs refill only 2 left.   ---------------------------------------------------------------------------  --------------  CALL BACK INFO  What is the best way for the office to contact you? OK to leave message on   voicemail  Preferred Call Back Phone Number?  [phone]

## 2021-05-13 NOTE — TELEPHONE ENCOUNTER
----- Message from [facility] sent at 5/13/2021  2:59 PM EDT -----  Subject: Refill Request    QUESTIONS  Name of Medication? Other - Clonazepam  Patient-reported dosage and instructions? twice a day 1mg   How many days do you have left? 2  Preferred Pharmacy? [facility]  Pharmacy phone number (if available)? [phone]  Additional Information for Provider? pt needs refill only 2 left.   ---------------------------------------------------------------------------  --------------  CALL BACK INFO  What is the best way for the office to contact you? OK to leave message on   voicemail  Preferred Call Back Phone Number?  [phone]

## 2021-05-18 ENCOUNTER — TELEPHONE (OUTPATIENT)
Dept: FAMILY MEDICINE CLINIC | Age: 76
End: 2021-05-18

## 2021-05-18 NOTE — TELEPHONE ENCOUNTER
Albert Mon from [facility] called to inform provider that patient is discharged from skilled nursing today 05/18/21    Patient will also be discharged from therapy later tonight     Please advise

## 2021-05-21 DIAGNOSIS — F41.9 ANXIETY: ICD-10-CM

## 2021-05-21 DIAGNOSIS — E78.5 HYPERLIPIDEMIA, UNSPECIFIED HYPERLIPIDEMIA TYPE: ICD-10-CM

## 2021-05-21 DIAGNOSIS — R32 URINARY INCONTINENCE, UNSPECIFIED TYPE: ICD-10-CM

## 2021-05-21 DIAGNOSIS — E03.9 HYPOTHYROIDISM, UNSPECIFIED TYPE: ICD-10-CM

## 2021-05-21 NOTE — TELEPHONE ENCOUNTER
Medication:   Requested Prescriptions     Pending Prescriptions Disp Refills    atorvastatin (LIPITOR) 80 MG tablet [Pharmacy Med Name: ATORVASTATIN 80 MG TABLET] 30 tablet 0     Sig: TAKE ONE TABLET BY MOUTH DAILY    oxybutynin (DITROPAN) 5 MG tablet [Pharmacy Med Name: OXYBUTYNIN 5 MG TABLET] 60 tablet 0     Sig: TAKE ONE TABLET BY MOUTH TWICE A DAY    levothyroxine (SYNTHROID) 100 MCG tablet [Pharmacy Med Name: LEVOTHYROXINE 100 MCG TABLET] 30 tablet 0     Sig: TAKE ONE TABLET BY MOUTH DAILY    mirtazapine (REMERON) 15 MG tablet [Pharmacy Med Name: MIRTAZAPINE 15 MG TABLET] 30 tablet 0     Sig: TAKE ONE TABLET BY MOUTH DAILY      Last Filled:      Patient Phone Number: [phone] (home) [phone] (work)    Last appt: 4/20/2021   Next appt: Visit date not found    Last OARRS:   RX Monitoring 5/11/2020   Attestation -   Periodic Controlled Substance Monitoring No signs of potential drug abuse or diversion identified.      PDMP Monitoring:    Last PDMP Rama Calles as Reviewed [facility]):  Review User Review Instant Review Result   Teresita Culver 4/20/2021 12:35 PM Reviewed PDMP [1]     Preferred Pharmacy:   [facility] [address], [address] [phone] Dg Dominguez [phone] [address] [facility] [place] [address]  Phone: [phone] Fax: [phone]

## 2021-05-23 RX ORDER — LEVOTHYROXINE SODIUM 0.1 MG/1
TABLET ORAL
Qty: 30 TABLET | Refills: 0 | Status: SHIPPED | OUTPATIENT
Start: 2021-05-23 | End: 2021-08-09

## 2021-05-23 RX ORDER — OXYBUTYNIN CHLORIDE 5 MG/1
TABLET ORAL
Qty: 60 TABLET | Refills: 0 | Status: SHIPPED | OUTPATIENT
Start: 2021-05-23 | End: 2021-07-13 | Stop reason: SDUPTHER

## 2021-05-23 RX ORDER — MIRTAZAPINE 15 MG/1
TABLET, FILM COATED ORAL
Qty: 30 TABLET | Refills: 0 | Status: SHIPPED | OUTPATIENT
Start: 2021-05-23 | End: 2021-07-13 | Stop reason: SDUPTHER

## 2021-05-23 RX ORDER — ATORVASTATIN CALCIUM 80 MG/1
TABLET, FILM COATED ORAL
Qty: 30 TABLET | Refills: 0 | Status: SHIPPED | OUTPATIENT
Start: 2021-05-23 | End: 2021-08-09

## 2021-05-25 ENCOUNTER — TELEPHONE (OUTPATIENT)
Dept: FAMILY MEDICINE CLINIC | Age: 76
End: 2021-05-25

## 2021-05-25 NOTE — TELEPHONE ENCOUNTER
called stating patient is showing signs of having a UTI, she tends to get them often. Patient's urine has a strong odor, she has been acting out the ordinary and having frequent urination. They would like a Rx sent to [facility].     Please advise

## 2021-05-26 DIAGNOSIS — R30.0 DYSURIA: Primary | ICD-10-CM

## 2021-05-26 RX ORDER — CIPROFLOXACIN 500 MG/1
500 TABLET, FILM COATED ORAL 2 TIMES DAILY
Qty: 6 TABLET | Refills: 0 | Status: SHIPPED | OUTPATIENT
Start: 2021-05-26 | End: 2021-07-15 | Stop reason: ALTCHOICE

## 2021-05-26 NOTE — TELEPHONE ENCOUNTER
I called and informed pt  that a Rx was sent over to [facility] and if symptoms don't start to get better Simmie Men will need to come in for a appt.  Magi Hauser voiced understanding

## 2021-06-07 ENCOUNTER — NURSE ONLY (OUTPATIENT)
Dept: CARDIOLOGY CLINIC | Age: 76
End: 2021-06-07
Payer: COMMERCIAL

## 2021-06-07 DIAGNOSIS — I63.40 CEREBRAL INFARCTION DUE TO EMBOLISM OF CEREBRAL ARTERY (HCC): ICD-10-CM

## 2021-06-07 DIAGNOSIS — Z45.09 ENCOUNTER FOR ELECTRONIC ANALYSIS OF REVEAL EVENT RECORDER: ICD-10-CM

## 2021-06-08 ENCOUNTER — PATIENT MESSAGE (OUTPATIENT)
Dept: CARDIOLOGY CLINIC | Age: 76
End: 2021-06-08

## 2021-06-08 NOTE — PROGRESS NOTES
We received a remote transmission from patient's monitor at home. Remote Linq report shows no arrhythmias. AF recordings are not real (NSR w ectopy). EP physician to review. We will continue to monitor remotely.

## 2021-06-09 DIAGNOSIS — F41.9 ANXIETY: ICD-10-CM

## 2021-06-09 RX ORDER — CLONAZEPAM 1 MG/1
1 TABLET ORAL 2 TIMES DAILY PRN
Qty: 60 TABLET | Refills: 0 | Status: SHIPPED | OUTPATIENT
Start: 2021-06-09 | End: 2021-07-08 | Stop reason: SDUPTHER

## 2021-06-09 RX ORDER — CLONIDINE HYDROCHLORIDE 0.1 MG/1
0.1 TABLET ORAL NIGHTLY
Qty: 30 TABLET | Refills: 0 | Status: SHIPPED | OUTPATIENT
Start: 2021-06-09 | End: 2021-08-09

## 2021-06-09 NOTE — TELEPHONE ENCOUNTER
cloNIDine (CATAPRES) 0.1 MG tablet [[id number]     [facility], [place] - [address] [place] [id number]   Phone:  [phone]  Fax:  [phone]

## 2021-06-09 NOTE — TELEPHONE ENCOUNTER
clonazePAM (KLONOPIN) 1 MG tablet [[id number]      cloNIDine (CATAPRES) 0.1 MG tablet [[id number]            [facility] [address], [place] - [place] [address] [facility], [address] [place] [address]   Phone:  [phone]  Fax:  [phone] previous with [facility]. Call dropped before I could verify the pharmacy.   She called back to add another prescription and wants them sent to [facility]

## 2021-06-11 ENCOUNTER — TELEPHONE (OUTPATIENT)
Dept: CARDIOLOGY CLINIC | Age: 76
End: 2021-06-11

## 2021-06-11 NOTE — TELEPHONE ENCOUNTER
Hx of episodes of PAT with SR and on review on strip appears to be ST with PAT. Will have EP physician review prior to resuming AC.      Tony Ardon, APRN-CNP

## 2021-06-11 NOTE — TELEPHONE ENCOUNTER
Carelink/Iddiction alert for NEW AF. She is not on [facility]    AF/Tachy 10-Nilton-2021 @ 07:13 x 1MIN and 16 min w/ max v rate of 222 bpm     ILR implanted for CVA 4/23/18. [id number] (age, gender, htn,cva)  Old AF recordings showed sinus w/ ectopy. MXA to review. See PACEART report under Cardiology tab.

## 2021-06-11 NOTE — TELEPHONE ENCOUNTER
Will have MXA review. Patient was previously on eliquis 5 mg BID, has not seen EP in several years.  Appears the episode was only 1 minute and 16 seconds long

## 2021-06-12 PROCEDURE — G2066 INTER DEVC REMOTE 30D: HCPCS | Performed by: INTERNAL MEDICINE

## 2021-06-12 PROCEDURE — 93298 REM INTERROG DEV EVAL SCRMS: CPT | Performed by: INTERNAL MEDICINE

## 2021-07-08 DIAGNOSIS — F41.9 ANXIETY: ICD-10-CM

## 2021-07-08 RX ORDER — CLONAZEPAM 1 MG/1
1 TABLET ORAL 2 TIMES DAILY PRN
Qty: 60 TABLET | Refills: 0 | Status: SHIPPED | OUTPATIENT
Start: 2021-07-08 | End: 2021-08-05 | Stop reason: SDUPTHER

## 2021-07-09 ENCOUNTER — TELEPHONE (OUTPATIENT)
Dept: FAMILY MEDICINE CLINIC | Age: 76
End: 2021-07-09

## 2021-07-09 NOTE — TELEPHONE ENCOUNTER
Patient C/o foul oder to urine and per daughter patient seems slightly confused which occurs when she gets UTI.  Please advise

## 2021-07-09 NOTE — TELEPHONE ENCOUNTER
PT is requesting a refill on UTI antibiotics PT is bed written and Can't come in please send meds to [address] [place] [address] [address], [address] [facility]

## 2021-07-12 ENCOUNTER — TELEPHONE (OUTPATIENT)
Dept: FAMILY MEDICINE CLINIC | Age: 76
End: 2021-07-12

## 2021-07-12 ENCOUNTER — NURSE ONLY (OUTPATIENT)
Dept: CARDIOLOGY CLINIC | Age: 76
End: 2021-07-12

## 2021-07-12 DIAGNOSIS — Z45.09 ENCOUNTER FOR ELECTRONIC ANALYSIS OF REVEAL EVENT RECORDER: ICD-10-CM

## 2021-07-12 DIAGNOSIS — I63.40 CEREBRAL INFARCTION DUE TO EMBOLISM OF CEREBRAL ARTERY (HCC): ICD-10-CM

## 2021-07-12 DIAGNOSIS — I69.30 CHRONIC ISCHEMIC MULTIFOCAL MULTIPLE VASCULAR TERRITORIES STROKE: ICD-10-CM

## 2021-07-12 PROCEDURE — 93298 REM INTERROG DEV EVAL SCRMS: CPT | Performed by: INTERNAL MEDICINE

## 2021-07-12 PROCEDURE — G2066 INTER DEVC REMOTE 30D: HCPCS | Performed by: INTERNAL MEDICINE

## 2021-07-12 NOTE — PROGRESS NOTES
We received a remote transmission from patient's monitor at home. Remote Linq report shows SVT. AF doesn't appear to be real. EP physician to review. We will continue to monitor remotely.

## 2021-07-13 DIAGNOSIS — F41.9 ANXIETY: ICD-10-CM

## 2021-07-13 DIAGNOSIS — I10 ESSENTIAL HYPERTENSION: ICD-10-CM

## 2021-07-13 DIAGNOSIS — R32 URINARY INCONTINENCE, UNSPECIFIED TYPE: ICD-10-CM

## 2021-07-13 RX ORDER — OXYBUTYNIN CHLORIDE 5 MG/1
TABLET ORAL
Qty: 60 TABLET | Refills: 0 | Status: SHIPPED | OUTPATIENT
Start: 2021-07-13 | End: 2021-08-09

## 2021-07-13 RX ORDER — MIRTAZAPINE 15 MG/1
TABLET, FILM COATED ORAL
Qty: 30 TABLET | Refills: 0 | Status: SHIPPED | OUTPATIENT
Start: 2021-07-13 | End: 2021-08-06

## 2021-07-13 RX ORDER — LOSARTAN POTASSIUM 100 MG/1
TABLET ORAL
Qty: 30 TABLET | Refills: 0 | Status: SHIPPED | OUTPATIENT
Start: 2021-07-13 | End: 2021-08-06

## 2021-07-15 ENCOUNTER — OFFICE VISIT (OUTPATIENT)
Dept: FAMILY MEDICINE CLINIC | Age: 76
End: 2021-07-15
Payer: COMMERCIAL

## 2021-07-15 VITALS
HEART RATE: 80 BPM | BODY MASS INDEX: 19.19 KG/M2 | OXYGEN SATURATION: 98 % | TEMPERATURE: 97.3 F | SYSTOLIC BLOOD PRESSURE: 110 MMHG | WEIGHT: 126.2 LBS | DIASTOLIC BLOOD PRESSURE: 80 MMHG

## 2021-07-15 DIAGNOSIS — R32 URINARY INCONTINENCE, UNSPECIFIED TYPE: ICD-10-CM

## 2021-07-15 DIAGNOSIS — R30.0 DYSURIA: Primary | ICD-10-CM

## 2021-07-15 DIAGNOSIS — N39.0 CHRONIC UTI: ICD-10-CM

## 2021-07-15 LAB
BILIRUBIN, POC: 0
BLOOD URINE, POC: NEGATIVE
CLARITY, POC: CLEAR
COLOR, POC: YELLOW
GLUCOSE URINE, POC: NEGATIVE
KETONES, POC: NEGATIVE
LEUKOCYTE EST, POC: NEGATIVE
NITRITE, POC: NEGATIVE
PH, POC: 5.5
PROTEIN, POC: NEGATIVE
SPECIFIC GRAVITY, POC: 1.02
UROBILINOGEN, POC: 0.2

## 2021-07-15 PROCEDURE — 99213 OFFICE O/P EST LOW 20 MIN: CPT | Performed by: NURSE PRACTITIONER

## 2021-07-15 PROCEDURE — 81002 URINALYSIS NONAUTO W/O SCOPE: CPT | Performed by: NURSE PRACTITIONER

## 2021-07-15 RX ORDER — CIPROFLOXACIN 500 MG/1
500 TABLET, FILM COATED ORAL 2 TIMES DAILY
Qty: 6 TABLET | Refills: 0 | Status: SHIPPED | OUTPATIENT
Start: 2021-07-15 | End: 2021-08-29

## 2021-07-15 NOTE — PROGRESS NOTES
Alfredo Penaloza  : 1945  Encounter date: 7/15/2021    This efrain 67 y.o. female who presents with  Chief Complaint   Patient presents with    Urinary Tract Infection       History of present illness:    HPI Pt is 67year old female with .  reports that she has seemed weaker and unsteady on feet, usually signs of UTI. Pt typically tests negative for UA and positive for culture. Pt with nightly incontinence, prescribed ditropan 5 mg BID but does not know if taking. Pt remains hydrated, constantly with dry mouth. Pt has had 3 UTI in past year. Current Outpatient Medications on File Prior to Visit   Medication Sig Dispense Refill    oxybutynin (DITROPAN) 5 MG tablet TAKE ONE TABLET BY MOUTH TWICE A DAY 60 tablet 0    mirtazapine (REMERON) 15 MG tablet TAKE ONE TABLET BY MOUTH DAILY 30 tablet 0    losartan (COZAAR) 100 MG tablet TAKE ONE TABLET BY MOUTH DAILY 30 tablet 0    clonazePAM (KLONOPIN) 1 MG tablet Take 1 tablet by mouth 2 times daily as needed for Anxiety for up to 30 days. 60 tablet 0    cloNIDine (CATAPRES) 0.1 MG tablet Take 1 tablet by mouth nightly 30 tablet 0    atorvastatin (LIPITOR) 80 MG tablet TAKE ONE TABLET BY MOUTH DAILY 30 tablet 0    levothyroxine (SYNTHROID) 100 MCG tablet TAKE ONE TABLET BY MOUTH DAILY 30 tablet 0    aspirin 81 MG chewable tablet Take 1 tablet by mouth daily 30 tablet 0    budesonide-formoterol (SYMBICORT) 160-4.5 MCG/ACT AERO Inhale 2 puffs into the lungs 2 times daily 1 Inhaler 0    ondansetron (ZOFRAN ODT) 4 MG disintegrating tablet Take 1-2 tablets by mouth every 12 hours as needed for Nausea May Sub regular tablet (non-ODT) if insurance does not cover ODT. 12 tablet 0    labetalol (NORMODYNE) 200 MG tablet Take 1 tablet by mouth every 12 hours 60 tablet 0     No current facility-administered medications on file prior to visit.       Allergies   Allergen Reactions    Lipitor [Atorvastatin] Other (See Comments)     Weakness, severe    Morphine Shortness Of Breath    Keflex [Cephalexin] Other (See Comments)     SOB & bilateral arms shaking     Hctz [Hydrochlorothiazide] Other (See Comments)     Hyponatremia, severe      Norvasc [Amlodipine Besylate] Swelling     Of neck    Penicillins Hives    Tramadol Itching    Hydralazine Palpitations and Other (See Comments)     Dizzy,fatigue,headaches,palpitations,loss of appetite    Shellfish-Derived Products Swelling and Rash     Swelling of neck     Past Medical History:   Diagnosis Date    Acute DVT (deep venous thrombosis) ([facility]) 7/3/2015    Acute encephalopathy 4/19/2018    Acute on chronic diastolic heart failure ([facility] 3/30/2019    Alcohol abuse     Anxiety     Arthritis     CAD in native artery     Cellulitis 4/28/2018    Cerebral artery occlusion with cerebral infarction ([facility])     states hx of multiple mini strokes    Cerebrovascular accident (CVA) ([facility]     Chronic fatigue     Complex care coordination     Complicated UTI (urinary tract infection)     COPD (chronic obstructive pulmonary disease) ([facility])     COPD exacerbation ([facility] 2/20/2018    Depression     Diabetes mellitus ([facility]     DIMAS (dyspnea on exertion) 7/3/2015    DVT (deep venous thrombosis) ([facility])     DVT, lower extremity ([facility])     Fatigue 11/3/2015    General weakness 11/4/2015    Generalized weakness 8/22/2019    Hypercholesteremia     Hypertension     Hyperthyroidism     Incontinence 10/16/2012    Mammogram abnormal 2/7/2012    Neuromuscular disorder ([facility]     Osteopenia 2/14/2017    Pneumonia     Recurrent UTI     Right forearm cellulitis     Superficial thrombophlebitis of right upper extremity     Thyroid disease     Tobacco abuse     Tobacco abuse counseling     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 Lynsey Washington; repeat 5 years    EYE SURGERY      cateracts removed    SHOULDER ARTHROSCOPY Right dry.   Neurological:      Mental Status: She is alert and oriented to person, place, and time. Mental status is at baseline. Psychiatric:         Mood and Affect: Mood normal.         Assessment/Plan    1. Dysuria  Advised probiotic  - ciprofloxacin (CIPRO) 500 MG tablet; Take 1 tablet by mouth 2 times daily  Dispense: 6 tablet; Refill: 0  - [facility] Green [facility]  - Culture, Urine  - POCT Urinalysis no Micro    2. Chronic UTI  Discussed prophylactic ABX  Suggested OTC cranberry pills  - [facility] Green [facility]  - Culture, Urine  - POCT Urinalysis no Micro    3. Urinary incontinence, unspecified type  - [facility] Green [facility]  - Culture, Urine  - POCT Urinalysis no Micro      Return in about 6 months (around 1/15/2022). This dictation was generated by voice recognition computer software. Although all attempts are made to edit the dictation for accuracy, there may be errors in the transcription that are not intended.

## 2021-07-15 NOTE — PATIENT INSTRUCTIONS
heart, or liver disease and have to limit fluids, talk with your doctor before you increase the amount of fluids you drink.)  · Avoid drinks that are carbonated or have caffeine. They can irritate the bladder. · Urinate often. Try to empty your bladder each time. · To relieve pain, take a hot bath or lay a heating pad set on low over your lower belly or genital area. Never go to sleep with a heating pad in place. To prevent UTIs  · Drink plenty of water each day. This helps you urinate often, which clears bacteria from your system. (If you have kidney, heart, or liver disease and have to limit fluids, talk with your doctor before you increase the amount of fluids you drink.)  · Urinate when you need to. · If you are sexually active, urinate right after you have sex. · Change sanitary pads often. · Avoid douches, bubble baths, feminine hygiene sprays, and other feminine hygiene products that have deodorants. · After going to the bathroom, wipe from front to back. When should you call for help? Call your doctor now or seek immediate medical care if:    · Symptoms such as fever, chills, nausea, or vomiting get worse or appear for the first time.     · You have new pain in your back just below your rib cage. This is called flank pain.     · There is new blood or pus in your urine.     · You have any problems with your antibiotic medicine. Watch closely for changes in your health, and be sure to contact your doctor if:    · You are not getting better after taking an antibiotic for 2 days.     · Your symptoms go away but then come back. Where can you learn more? Go to [email or web address] and sign in to your [employer] account. Enter Y813 in the Gozent box to learn more about \"Urinary Tract Infection (UTI) in Women: Care Instructions. \"     If you do not have an account, please click on the \"Sign Up Now\" link.   Current as of: February 10, 2021               Content Version: 12.9  © 2006-2021 Healthwise, Incorporated. Care instructions adapted under license by [facility] If you have questions about a medical condition or this instruction, always ask your healthcare professional. [address] any warranty or liability for your use of this information.

## 2021-07-16 LAB
ORGANISM: ABNORMAL
URINE CULTURE, ROUTINE: ABNORMAL

## 2021-08-05 DIAGNOSIS — F41.9 ANXIETY: ICD-10-CM

## 2021-08-05 RX ORDER — CLONAZEPAM 1 MG/1
1 TABLET ORAL 2 TIMES DAILY PRN
Qty: 60 TABLET | Refills: 0 | Status: SHIPPED | OUTPATIENT
Start: 2021-08-05 | End: 2021-09-22 | Stop reason: SDUPTHER

## 2021-08-05 NOTE — TELEPHONE ENCOUNTER
Received: Today  [address] [facility] [address] [facility] Clinical Staff  Subject: Refill Request     QUESTIONS   Name of Medication? clonazePAM (KLONOPIN) 1 MG tablet   Patient-reported dosage and instructions? 1 mg tablet, twice a day   How many days do you have left? 2   Preferred Pharmacy? [address] [facility] [address]   Pharmacy phone number (if available)? [phone]   ---------------------------------------------------------------------------   --------------   To SADLER   What is the best way for the office to contact you? OK to leave message on   voicemail   Preferred Call Back Phone Number?  [phone]

## 2021-08-06 ENCOUNTER — HOSPITAL ENCOUNTER (OUTPATIENT)
Dept: ULTRASOUND IMAGING | Age: 76
Discharge: HOME | End: 2021-08-06
Payer: COMMERCIAL

## 2021-08-06 DIAGNOSIS — R33.9 RETENTION OF URINE, UNSPECIFIED: ICD-10-CM

## 2021-08-06 DIAGNOSIS — I10 ESSENTIAL HYPERTENSION: ICD-10-CM

## 2021-08-06 DIAGNOSIS — R39.15 URGENCY OF URINATION: ICD-10-CM

## 2021-08-06 DIAGNOSIS — F41.9 ANXIETY: ICD-10-CM

## 2021-08-06 DIAGNOSIS — N39.0 URINARY TRACT INFECTION WITHOUT HEMATURIA, SITE UNSPECIFIED: ICD-10-CM

## 2021-08-06 DIAGNOSIS — I63.9 CEREBRAL INFARCTION, UNSPECIFIED MECHANISM (HCC): ICD-10-CM

## 2021-08-06 PROCEDURE — 76770 US EXAM ABDO BACK WALL COMP: CPT

## 2021-08-06 RX ORDER — LOSARTAN POTASSIUM 100 MG/1
TABLET ORAL
Qty: 90 TABLET | Refills: 1 | Status: SHIPPED | OUTPATIENT
Start: 2021-08-06 | End: 2021-08-09

## 2021-08-06 RX ORDER — MIRTAZAPINE 15 MG/1
TABLET, FILM COATED ORAL
Qty: 30 TABLET | Refills: 0 | Status: SHIPPED | OUTPATIENT
Start: 2021-08-06 | End: 2021-08-09

## 2021-08-06 NOTE — TELEPHONE ENCOUNTER
Medication:   Requested Prescriptions     Pending Prescriptions Disp Refills    losartan (COZAAR) 100 MG tablet [Pharmacy Med Name: LOSARTAN POTASSIUM 100 MG TAB] 90 tablet 1     Sig: TAKE ONE TABLET BY MOUTH DAILY    mirtazapine (REMERON) 15 MG tablet [Pharmacy Med Name: MIRTAZAPINE 15 MG TABLET] 30 tablet 0     Sig: TAKE ONE TABLET BY MOUTH DAILY        Last Filled:      Patient Phone Number: [phone] (home) [phone] (work)    Last appt: 7/15/2021   Next appt: 1/17/2022    Last OARRS:   RX Monitoring 5/11/2020   Attestation -   Periodic Controlled Substance Monitoring No signs of potential drug abuse or diversion identified.

## 2021-08-09 DIAGNOSIS — F41.9 ANXIETY: ICD-10-CM

## 2021-08-09 DIAGNOSIS — I10 ESSENTIAL HYPERTENSION: ICD-10-CM

## 2021-08-09 DIAGNOSIS — R32 URINARY INCONTINENCE, UNSPECIFIED TYPE: ICD-10-CM

## 2021-08-09 DIAGNOSIS — E03.9 HYPOTHYROIDISM, UNSPECIFIED TYPE: ICD-10-CM

## 2021-08-09 DIAGNOSIS — E78.5 HYPERLIPIDEMIA, UNSPECIFIED HYPERLIPIDEMIA TYPE: ICD-10-CM

## 2021-08-09 RX ORDER — LOSARTAN POTASSIUM 100 MG/1
TABLET ORAL
Qty: 30 TABLET | Refills: 0 | Status: SHIPPED | OUTPATIENT
Start: 2021-08-09 | End: 2021-09-28 | Stop reason: SDUPTHER

## 2021-08-09 RX ORDER — MIRTAZAPINE 15 MG/1
TABLET, FILM COATED ORAL
Qty: 30 TABLET | Refills: 0 | Status: SHIPPED | OUTPATIENT
Start: 2021-08-09 | End: 2021-09-28 | Stop reason: SDUPTHER

## 2021-08-09 RX ORDER — LEVOTHYROXINE SODIUM 0.1 MG/1
TABLET ORAL
Qty: 30 TABLET | Refills: 0 | Status: SHIPPED | OUTPATIENT
Start: 2021-08-09 | End: 2021-09-28 | Stop reason: SDUPTHER

## 2021-08-09 RX ORDER — CLONIDINE HYDROCHLORIDE 0.1 MG/1
TABLET ORAL
Qty: 30 TABLET | Refills: 0 | Status: SHIPPED | OUTPATIENT
Start: 2021-08-09 | End: 2021-09-28 | Stop reason: SDUPTHER

## 2021-08-09 RX ORDER — ATORVASTATIN CALCIUM 80 MG/1
TABLET, FILM COATED ORAL
Qty: 30 TABLET | Refills: 0 | Status: SHIPPED | OUTPATIENT
Start: 2021-08-09 | End: 2021-09-28 | Stop reason: SDUPTHER

## 2021-08-09 RX ORDER — OXYBUTYNIN CHLORIDE 5 MG/1
TABLET ORAL
Qty: 60 TABLET | Refills: 0 | Status: SHIPPED | OUTPATIENT
Start: 2021-08-09 | End: 2021-09-28 | Stop reason: SDUPTHER

## 2021-08-09 NOTE — TELEPHONE ENCOUNTER
Medication:   Pending Prescriptions Disp Refills    levothyroxine (SYNTHROID) 100 MCG tablet 30 tablet 0     Sig: TAKE ONE TABLET BY MOUTH DAILY    atorvastatin (LIPITOR) 80 MG tablet  30 tablet 0     Sig: TAKE ONE TABLET BY MOUTH DAILY    cloNIDine (CATAPRES) 0.1 MG tablet 30 tablet 0     Sig: TAKE ONE TABLET BY MOUTH ONCE NIGHTLY    losartan (COZAAR) 100 MG tablet  30 tablet 0     Sig: TAKE ONE TABLET BY MOUTH DAILY    oxybutynin (DITROPAN) 5 MG tablet  60 tablet 0     Sig: TAKE ONE TABLET BY MOUTH TWICE A DAY    mirtazapine (REMERON) 15 MG tablet  30 tablet 0     Sig: TAKE ONE TABLET BY MOUTH DAILY     Patient Phone Number: [phone] (home) [phone] (work)    Last appt: 7/15/2021   Next appt: 1/17/2022     Last PDMP Yoni Kendrick as Reviewed [facility]):  Review User Review Instant Review Result   Terri England 4/20/2021 12:35 PM Reviewed PDMP [1]     Preferred Pharmacy:   [facility] [address], [address] [facility] [phone] - F [phone]

## 2021-08-16 ENCOUNTER — NURSE ONLY (OUTPATIENT)
Dept: CARDIOLOGY CLINIC | Age: 76
End: 2021-08-16
Payer: COMMERCIAL

## 2021-08-16 DIAGNOSIS — I63.40 CEREBRAL INFARCTION DUE TO EMBOLISM OF CEREBRAL ARTERY (HCC): ICD-10-CM

## 2021-08-16 DIAGNOSIS — Z45.09 ENCOUNTER FOR ELECTRONIC ANALYSIS OF REVEAL EVENT RECORDER: ICD-10-CM

## 2021-08-17 PROCEDURE — 93298 REM INTERROG DEV EVAL SCRMS: CPT | Performed by: INTERNAL MEDICINE

## 2021-08-17 PROCEDURE — G2066 INTER DEVC REMOTE 30D: HCPCS | Performed by: INTERNAL MEDICINE

## 2021-08-17 NOTE — PROGRESS NOTES
We received a remote transmission from patient's monitor at home. Remote Linq report shows ST with ectopy. AF doesn't appear to be real. EP physician to review. We will continue to monitor remotely.

## 2021-08-23 NOTE — TELEPHONE ENCOUNTER
PT is requesting refill on clonazePAM (KLONOPIN) 1 MG tablet to please be sent to [facility] [address], [address] [phone]

## 2021-08-29 ENCOUNTER — APPOINTMENT (OUTPATIENT)
Dept: CT IMAGING | Age: 76
End: 2021-08-29
Payer: COMMERCIAL

## 2021-08-29 ENCOUNTER — HOSPITAL ENCOUNTER (EMERGENCY)
Age: 76
Discharge: HOME | End: 2021-08-29
Attending: EMERGENCY MEDICINE
Payer: COMMERCIAL

## 2021-08-29 VITALS
HEIGHT: 67 IN | BODY MASS INDEX: 19.62 KG/M2 | SYSTOLIC BLOOD PRESSURE: 165 MMHG | TEMPERATURE: 98.7 F | RESPIRATION RATE: 17 BRPM | WEIGHT: 125 LBS | DIASTOLIC BLOOD PRESSURE: 103 MMHG | HEART RATE: 82 BPM

## 2021-08-29 DIAGNOSIS — E86.0 DEHYDRATION: ICD-10-CM

## 2021-08-29 DIAGNOSIS — R11.2 NAUSEA AND VOMITING, INTRACTABILITY OF VOMITING NOT SPECIFIED, UNSPECIFIED VOMITING TYPE: Primary | ICD-10-CM

## 2021-08-29 LAB
A/G RATIO: 1.3 (ref 1.1–2.2)
ALBUMIN SERPL-MCNC: 5 G/DL (ref 3.4–5)
ALP BLD-CCNC: 60 U/L (ref 40–129)
ALT SERPL-CCNC: 17 U/L (ref 10–40)
AMPHETAMINE SCREEN, URINE: NORMAL
ANION GAP SERPL CALCULATED.3IONS-SCNC: 16 MMOL/L (ref 3–16)
AST SERPL-CCNC: 32 U/L (ref 15–37)
BARBITURATE SCREEN URINE: NORMAL
BASOPHILS ABSOLUTE: 0 K/UL (ref 0–0.2)
BASOPHILS RELATIVE PERCENT: 0.2 %
BENZODIAZEPINE SCREEN, URINE: NORMAL
BILIRUB SERPL-MCNC: 0.9 MG/DL (ref 0–1)
BILIRUBIN URINE: ABNORMAL
BLOOD, URINE: ABNORMAL
BUN BLDV-MCNC: 11 MG/DL (ref 7–20)
CALCIUM SERPL-MCNC: 9.5 MG/DL (ref 8.3–10.6)
CANNABINOID SCREEN URINE: NORMAL
CHLORIDE BLD-SCNC: 79 MMOL/L (ref 99–110)
CLARITY: CLEAR
CO2: 27 MMOL/L (ref 21–32)
COCAINE METABOLITE SCREEN URINE: NORMAL
COLOR: YELLOW
CREAT SERPL-MCNC: <0.5 MG/DL (ref 0.6–1.2)
EKG ATRIAL RATE: 92 BPM
EKG DIAGNOSIS: NORMAL
EKG P AXIS: 79 DEGREES
EKG P-R INTERVAL: 136 MS
EKG Q-T INTERVAL: 398 MS
EKG QRS DURATION: 78 MS
EKG QTC CALCULATION (BAZETT): 492 MS
EKG R AXIS: 67 DEGREES
EKG T AXIS: 69 DEGREES
EKG VENTRICULAR RATE: 92 BPM
EOSINOPHILS ABSOLUTE: 0 K/UL (ref 0–0.6)
EOSINOPHILS RELATIVE PERCENT: 0 %
EPITHELIAL CELLS, UA: 2 /HPF (ref 0–5)
ETHANOL: NORMAL MG/DL (ref 0–0.08)
GFR AFRICAN AMERICAN: >60
GFR NON-AFRICAN AMERICAN: >60
GLOBULIN: 3.8 G/DL
GLUCOSE BLD-MCNC: 121 MG/DL (ref 70–99)
GLUCOSE URINE: NEGATIVE MG/DL
HCT VFR BLD CALC: 43.5 % (ref 36–48)
HEMOGLOBIN: 15.3 G/DL (ref 12–16)
HYALINE CASTS: 2 /LPF (ref 0–8)
KETONES, URINE: >=80 MG/DL
LACTIC ACID: 1.3 MMOL/L (ref 0.4–2)
LEUKOCYTE ESTERASE, URINE: NEGATIVE
LYMPHOCYTES ABSOLUTE: 0.5 K/UL (ref 1–5.1)
LYMPHOCYTES RELATIVE PERCENT: 4 %
Lab: NORMAL
MAGNESIUM: 2 MG/DL (ref 1.8–2.4)
MCH RBC QN AUTO: 34.9 PG (ref 26–34)
MCHC RBC AUTO-ENTMCNC: 35.3 G/DL (ref 31–36)
MCV RBC AUTO: 98.9 FL (ref 80–100)
METHADONE SCREEN, URINE: NORMAL
MICROSCOPIC EXAMINATION: YES
MONOCYTES ABSOLUTE: 1 K/UL (ref 0–1.3)
MONOCYTES RELATIVE PERCENT: 8.4 %
NEUTROPHILS ABSOLUTE: 10.8 K/UL (ref 1.7–7.7)
NEUTROPHILS RELATIVE PERCENT: 87.4 %
NITRITE, URINE: NEGATIVE
OPIATE SCREEN URINE: NORMAL
OXYCODONE URINE: NORMAL
PDW BLD-RTO: 13.6 % (ref 12.4–15.4)
PH UA: 6
PH UA: 6 (ref 5–8)
PHENCYCLIDINE SCREEN URINE: NORMAL
PLATELET # BLD: 341 K/UL (ref 135–450)
PMV BLD AUTO: 8.6 FL (ref 5–10.5)
POTASSIUM REFLEX MAGNESIUM: 3.5 MMOL/L (ref 3.5–5.1)
PROPOXYPHENE SCREEN: NORMAL
PROTEIN UA: >=300 MG/DL
RBC # BLD: 4.4 M/UL (ref 4–5.2)
RBC UA: 11 /HPF (ref 0–4)
SODIUM BLD-SCNC: 122 MMOL/L (ref 136–145)
SPECIFIC GRAVITY UA: 1.02 (ref 1–1.03)
TOTAL PROTEIN: 8.8 G/DL (ref 6.4–8.2)
TROPONIN: <0.01 NG/ML
TROPONIN: <0.01 NG/ML
URINE REFLEX TO CULTURE: ABNORMAL
URINE TYPE: ABNORMAL
UROBILINOGEN, URINE: 0.2 E.U./DL
WBC # BLD: 12.4 K/UL (ref 4–11)
WBC UA: 2 /HPF (ref 0–5)

## 2021-08-29 PROCEDURE — 99284 EMERGENCY DEPT VISIT MOD MDM: CPT

## 2021-08-29 PROCEDURE — 6360000002 HC RX W HCPCS: Performed by: EMERGENCY MEDICINE

## 2021-08-29 PROCEDURE — 81001 URINALYSIS AUTO W/SCOPE: CPT

## 2021-08-29 PROCEDURE — 82077 ASSAY SPEC XCP UR&BREATH IA: CPT

## 2021-08-29 PROCEDURE — 2580000003 HC RX 258: Performed by: EMERGENCY MEDICINE

## 2021-08-29 PROCEDURE — 96374 THER/PROPH/DIAG INJ IV PUSH: CPT

## 2021-08-29 PROCEDURE — 36415 COLL VENOUS BLD VENIPUNCTURE: CPT

## 2021-08-29 PROCEDURE — 83605 ASSAY OF LACTIC ACID: CPT

## 2021-08-29 PROCEDURE — 80053 COMPREHEN METABOLIC PANEL: CPT

## 2021-08-29 PROCEDURE — 93010 ELECTROCARDIOGRAM REPORT: CPT | Performed by: INTERNAL MEDICINE

## 2021-08-29 PROCEDURE — 85025 COMPLETE CBC W/AUTO DIFF WBC: CPT

## 2021-08-29 PROCEDURE — 80307 DRUG TEST PRSMV CHEM ANLYZR: CPT

## 2021-08-29 PROCEDURE — 74176 CT ABD & PELVIS W/O CONTRAST: CPT

## 2021-08-29 PROCEDURE — 96361 HYDRATE IV INFUSION ADD-ON: CPT

## 2021-08-29 PROCEDURE — 96360 HYDRATION IV INFUSION INIT: CPT

## 2021-08-29 PROCEDURE — 93005 ELECTROCARDIOGRAM TRACING: CPT | Performed by: EMERGENCY MEDICINE

## 2021-08-29 PROCEDURE — 83735 ASSAY OF MAGNESIUM: CPT

## 2021-08-29 PROCEDURE — 6370000000 HC RX 637 (ALT 250 FOR IP): Performed by: EMERGENCY MEDICINE

## 2021-08-29 PROCEDURE — 84484 ASSAY OF TROPONIN QUANT: CPT

## 2021-08-29 RX ORDER — CLONAZEPAM 0.5 MG/1
1 TABLET ORAL ONCE
Status: COMPLETED | OUTPATIENT
Start: 2021-08-29 | End: 2021-08-29

## 2021-08-29 RX ORDER — LOSARTAN POTASSIUM 25 MG/1
100 TABLET ORAL ONCE
Status: COMPLETED | OUTPATIENT
Start: 2021-08-29 | End: 2021-08-29

## 2021-08-29 RX ORDER — CLONIDINE HYDROCHLORIDE 0.1 MG/1
0.1 TABLET ORAL ONCE
Status: COMPLETED | OUTPATIENT
Start: 2021-08-29 | End: 2021-08-29

## 2021-08-29 RX ORDER — ONDANSETRON 4 MG/1
4 TABLET, ORALLY DISINTEGRATING ORAL EVERY 8 HOURS PRN
Qty: 12 TABLET | Refills: 0 | Status: SHIPPED | OUTPATIENT
Start: 2021-08-29 | End: 2021-09-02

## 2021-08-29 RX ORDER — ONDANSETRON 2 MG/ML
4 INJECTION INTRAMUSCULAR; INTRAVENOUS EVERY 30 MIN PRN
Status: DISCONTINUED | OUTPATIENT
Start: 2021-08-29 | End: 2021-08-29 | Stop reason: HOSPADM

## 2021-08-29 RX ORDER — 0.9 % SODIUM CHLORIDE 0.9 %
1000 INTRAVENOUS SOLUTION INTRAVENOUS ONCE
Status: COMPLETED | OUTPATIENT
Start: 2021-08-29 | End: 2021-08-29

## 2021-08-29 RX ADMIN — CLONAZEPAM 1 MG: 0.5 TABLET ORAL at 11:34

## 2021-08-29 RX ADMIN — SODIUM CHLORIDE 1000 ML: 9 INJECTION, SOLUTION INTRAVENOUS at 07:57

## 2021-08-29 RX ADMIN — CLONIDINE HYDROCHLORIDE 0.1 MG: 0.1 TABLET ORAL at 11:34

## 2021-08-29 RX ADMIN — ONDANSETRON 4 MG: 2 INJECTION INTRAMUSCULAR; INTRAVENOUS at 11:33

## 2021-08-29 RX ADMIN — LOSARTAN POTASSIUM 100 MG: 25 TABLET, FILM COATED ORAL at 11:31

## 2021-08-29 ASSESSMENT — ENCOUNTER SYMPTOMS
BACK PAIN: 0
NAUSEA: 1
COUGH: 0
DIARRHEA: 0
CONSTIPATION: 1
RHINORRHEA: 0
VOMITING: 1
ABDOMINAL PAIN: 0
SHORTNESS OF BREATH: 0

## 2021-08-29 NOTE — ED NOTES
Bed: 17  Expected date:   Expected time:   Means of arrival:   Comments:  Miles Guzman RN  08/29/21 [id number]

## 2021-08-29 NOTE — ED PROVIDER NOTES
Emergency Department Provider Note  Location: [facility] Kira King  8/29/2021     Patient Identification  Nataly Tejeda is a 67 y.o. female    Chief Complaint  Emesis (x 5 days no other complaints. Denies fever or pain anywhere. EMS stated her BP 180s/100s and that Pt said it was going to be high.)      Mode of Arrival  EMS    HPI  (History provided by patient)  This is a 67 y.o. female with a PMH significant for DM presented today for vomiting x 5 days. She has not had a bowel movement for 5 days. Denies bloody stool or other abnormal stool prior to onset of her symptoms. No urinary symptoms. Cannot think of what triggered her N/V. Patient denies any other symptoms. She denies chest pain or abdominal pain. No fever. Patient states she has not taken her home medicine for 5 days due to nausea vomiting    ROS  Review of Systems   Constitutional: Negative for chills and fever. HENT: Negative for congestion and rhinorrhea. Eyes: Negative for visual disturbance. Respiratory: Negative for cough and shortness of breath. Cardiovascular: Negative for chest pain and leg swelling. Gastrointestinal: Positive for constipation (no BM x 5 days), nausea and vomiting. Negative for abdominal pain and diarrhea. Genitourinary: Negative for dysuria and frequency. Musculoskeletal: Negative for back pain. Skin: Negative for rash. Neurological: Negative for syncope and speech difficulty. Psychiatric/Behavioral: Negative for confusion. I have reviewed the following nursing documentation:  Allergies:    Allergies   Allergen Reactions    Lipitor [Atorvastatin] Other (See Comments)     Weakness, severe    Morphine Shortness Of Breath    Keflex [Cephalexin] Other (See Comments)     SOB & bilateral arms shaking     Hctz [Hydrochlorothiazide] Other (See Comments)     Hyponatremia, severe      Norvasc [Amlodipine Besylate] Swelling     Of neck    Penicillins Hives    Tramadol Itching    Hydralazine Palpitations and Other (See Comments)     Dizzy,fatigue,headaches,palpitations,loss of appetite    Shellfish-Derived Products Swelling and Rash     Swelling of neck       Past medical history:  has a past medical history of Acute DVT (deep venous thrombosis) ([facility] (7/3/2015), Acute encephalopathy (4/19/2018), Acute on chronic diastolic heart failure ([facility] (3/30/2019), Alcohol abuse, Anxiety, Arthritis, CAD in native artery, Cellulitis (4/28/2018), Cerebral artery occlusion with cerebral infarction [facility]), Cerebrovascular accident (CVA) ([facility] [facility] Chronic fatigue, Complex care coordination, Complicated UTI (urinary tract infection), COPD (chronic obstructive pulmonary disease) ([facility] COPD exacerbation ([facility] (2/20/2018), Depression, Diabetes mellitus ([facility] DIMAS (dyspnea on exertion) (7/3/2015), DVT (deep venous thrombosis) ([facility] DVT, lower extremity ([facility] Fatigue (11/3/2015), General weakness (11/4/2015), Generalized weakness (8/22/2019), Hypercholesteremia, Hypertension, Hyperthyroidism, Incontinence (10/16/2012), Mammogram abnormal (2/7/2012), Neuromuscular disorder ([facility] Osteopenia (2/14/2017), Pneumonia, Recurrent UTI, Right forearm cellulitis, Superficial thrombophlebitis of right upper extremity, Thyroid disease, Tobacco abuse, Tobacco abuse counseling, 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 Past surgical history:  has a past surgical history that includes Tonsillectomy; Colonoscopy (1/19/2012); Toe Surgery (Right); Shoulder arthroscopy (Right, 6/18/14); and eye surgery. Home medications:   Prior to Admission medications    Medication Sig Start Date End Date Taking?  Authorizing Provider   atorvastatin (LIPITOR) 80 MG tablet TAKE ONE TABLET BY MOUTH DAILY 8/9/21  Yes QUITA Crump NP   cloNIDine (CATAPRES) 0.1 MG tablet TAKE ONE TABLET BY MOUTH ONCE NIGHTLY 8/9/21  Yes QUITA Crump NP   mirtazapine (REMERON) 15 MG tablet TAKE ONE TABLET BY MOUTH DAILY 8/9/21  Yes QUITA Gr - NP   clonazePAM (KLONOPIN) 1 MG tablet Take 1 tablet by mouth 2 times daily as needed for Anxiety for up to 30 days. 8/5/21 9/4/21 Yes Kenzie Chacon MD   aspirin 81 MG chewable tablet Take 1 tablet by mouth daily 4/16/21  Yes Sandra Rodgers MD   budesonide-formoterol [facility]) 160-4.5 MCG/ACT AERO Inhale 2 puffs into the lungs 2 times daily 4/15/21  Yes Sandra Rodgers MD   labetalol (NORMODYNE) 200 MG tablet Take 1 tablet by mouth every 12 hours 10/3/19  Yes QUITA Rolle CNP   levothyroxine (SYNTHROID) 100 MCG tablet TAKE ONE TABLET BY MOUTH DAILY 8/9/21   QUITA Laboy NP   losartan (COZAAR) 100 MG tablet TAKE ONE TABLET BY MOUTH DAILY 8/9/21   QUITA Laboy - NP   oxybutynin (DITROPAN) 5 MG tablet TAKE ONE TABLET BY MOUTH TWICE A DAY 8/9/21   QUITA Loera NP       Social history:  reports that she has been smoking cigarettes. She has a 52.00 pack-year smoking history. She has never used smokeless tobacco. She reports that she does not drink alcohol and does not use drugs. Family history:    Family History   Problem Relation Age of Onset    Heart Disease Father         MI @ 64    Alcohol Abuse Father        Exam  ED Triage Vitals   BP Temp Temp Source Pulse Resp SpO2 Height Weight   08/29/21 0643 08/29/21 0647 08/29/21 0647 08/29/21 0643 08/29/21 0643 -- 08/29/21 0647 08/29/21 0647   (!) 194/117 98.7 °F (37.1 °C) Oral 90 (!) 33  5' 7\" (1.702 m) 125 lb (56.7 kg)   Physical Exam  Vitals and nursing note reviewed. Constitutional:       General: She is not in acute distress. Appearance: Normal appearance. She is well-developed. She is not diaphoretic. Comments: Patient appears uncomfortable due to dry heaving. No active vomiting in the ED. HENKELLY:      Head: Normocephalic and atraumatic. Eyes:      General: Lids are normal. No scleral icterus. Right eye: No discharge. Left eye: No discharge. Conjunctiva/sclera: Conjunctivae normal.      Pupils: Pupils are equal, round, and reactive to light. Neck:      Trachea: No tracheal deviation. Cardiovascular:      Rate and Rhythm: Normal rate and regular rhythm. Heart sounds: Normal heart sounds. No murmur heard. Pulmonary:      Effort: Pulmonary effort is normal. No respiratory distress. Breath sounds: Normal breath sounds. No stridor. No wheezing. Abdominal:      General: Bowel sounds are normal. There is no distension. Palpations: Abdomen is soft. Tenderness: There is no abdominal tenderness. There is no guarding or rebound. Musculoskeletal:         General: No deformity. Cervical back: Neck supple. Right lower leg: No edema. Left lower leg: No edema. Skin:     General: Skin is warm and dry. Coloration: Skin is not pale. Findings: No rash. Neurological:      General: No focal deficit present. Mental Status: She is alert and oriented to person, place, and time. Cranial Nerves: No dysarthria or facial asymmetry. Motor: Motor function is intact. No tremor. Psychiatric:         Mood and Affect: Mood normal.         Speech: Speech normal.         Behavior: Behavior normal. Behavior is cooperative. MDM/ED Course  EKG  The Ekg interpreted by me in the absence of a cardiologist shows. normal sinus rhythm with a rate of 92  PAC noted  Axis is   Normal  QTc is  prolonged  Intervals and Durations are unremarkable. No specific ST-T wave changes appreciated. No evidence of acute ischemia. Compare to prior EKG dated 4/10/21, PVC no longer present but PAC present today.       Radiology  CT ABDOMEN PELVIS WO CONTRAST Additional Contrast? None    Result Date: 8/29/2021  EXAMINATION: CT OF THE ABDOMEN AND PELVIS WITHOUT CONTRAST 8/29/2021 8:12 am TECHNIQUE: CT of the abdomen and pelvis was performed without the 17 10 - 40 U/L    AST 32 15 - 37 U/L    Globulin 3.8 g/dL   Troponin   Result Value Ref Range    Troponin <0.01 <0.01 ng/mL   Lactic Acid, Plasma   Result Value Ref Range    Lactic Acid 1.3 0.4 - 2.0 mmol/L   Ethanol   Result Value Ref Range    Ethanol Lvl None Detected mg/dL   Drug screen multi urine   Result Value Ref Range    Amphetamine Screen, Urine Neg Negative <1000ng/mL    Barbiturate Screen, Ur Neg Negative <200 ng/mL    Benzodiazepine Screen, Urine Neg Negative <200 ng/mL    Cannabinoid Scrn, Ur Neg Negative <50 ng/mL    Cocaine Metabolite Screen, Urine Neg Negative <300 ng/mL    Opiate Scrn, Ur Neg Negative <300 ng/mL    PCP Screen, Urine Neg Negative <25 ng/mL    Methadone Screen, Urine Neg Negative <300 ng/mL    Propoxyphene Scrn, Ur Neg Negative <300 ng/mL    Oxycodone Urine Neg Negative <100 ng/ml    pH, UA 6.0     Drug Screen Comment: see below    Magnesium   Result Value Ref Range    Magnesium 2.00 1.80 - 2.40 mg/dL   Microscopic Urinalysis   Result Value Ref Range    Hyaline Casts, UA 2 0 - 8 /LPF    WBC, UA 2 0 - 5 /HPF    RBC, UA 11 (H) 0 - 4 /HPF    Epithelial Cells, UA 2 0 - 5 /HPF   Troponin   Result Value Ref Range    Troponin <0.01 <0.01 ng/mL         - Patient seen and evaluated. 67 y.o. female presented for N/V x 5 days and no BM x 5 days. S/s concerning for SBO although abdominal exam benign and no distention noted. - Patient was placed on telemetry during his/her ED stay and no malignant dysrhythmia observed. - Patient was given zofran and IVF in the ED. Upon reassessment, patient reported feeling much better and took her home dose of BP meds without difficulty. - Diagnostic studies reviewed. Urine ketones noted but that was expected given the amount of vomiting that patient reported. No elevated creatinine. CT of the abdomen pelvis did not find acute pathology that could account for patient's symptoms. I discussed with with the patient.   Since she felt much better and tolerated p.o. challenge, she decided she would rather go home and outpatient follow-up with PCP. She knows to return if she has recurrence of nausea vomiting. I will also prescribe Zofran for her. - Return precautions also discussed. patient verbalized understanding of care plan and agreed to follow-up with PCP as advised. I estimate there is LOW risk for ACUTE APPENDICITIS, BOWEL OBSTRUCTION, CHOLECYSTITIS, COMPLICATED DIVERTICULITIS, INCARCERATED OR STRANGULATED HERNIA, SEVERE PANCREATITIS, or PERFORATED BOWEL. Thus, I consider the discharge disposition reasonable. Also, there is no evidence or peritonitis, sepsis, or toxicity. Nataly Tejeda and I have discussed the diagnosis and risks, and we agree with discharging home to follow-up with PCP. We also discussed returning to the Emergency Department immediately if new or worsening symptoms occur. We have discussed the symptoms which are most concerning (e.g., bloody stool, fever, changing or worsening pain, vomiting) that necessitate immediate return. Clinical Impression:  1. Nausea and vomiting, intractability of vomiting not specified, unspecified vomiting type    2. Dehydration        Disposition:  Discharge to home in good condition. Blood pressure (!) 165/103, pulse 82, temperature 98.7 °F (37.1 °C), temperature source Oral, resp. rate 17, height 5' 7\" (1.702 m), weight 125 lb (56.7 kg), not currently breastfeeding. Disposition referral (if applicable):  QUITA Nicole NP  [address]  [phone]    Schedule an appointment as soon as possible for a visit in 3 days        This chart was generated in part by using Dragon Dictation system and may contain errors related to that system including errors in grammar, punctuation, and spelling, as well as words and phrases that may be inappropriate.  If there are any questions or concerns please feel free to contact the dictating provider for clarification.      Vianey Mccarty MD  [facility] Manny Houston MD  08/29/21 0841

## 2021-09-01 ENCOUNTER — APPOINTMENT (OUTPATIENT)
Dept: CT IMAGING | Age: 76
End: 2021-09-01
Payer: COMMERCIAL

## 2021-09-01 ENCOUNTER — HOSPITAL ENCOUNTER (EMERGENCY)
Age: 76
Discharge: NURSING, REHAB OR LONG-TERM CARE | End: 2021-09-01
Attending: EMERGENCY MEDICINE
Payer: COMMERCIAL

## 2021-09-01 ENCOUNTER — APPOINTMENT (OUTPATIENT)
Dept: GENERAL RADIOLOGY | Age: 76
End: 2021-09-01
Payer: COMMERCIAL

## 2021-09-01 VITALS
HEIGHT: 67 IN | SYSTOLIC BLOOD PRESSURE: 133 MMHG | DIASTOLIC BLOOD PRESSURE: 97 MMHG | RESPIRATION RATE: 21 BRPM | HEART RATE: 71 BPM | OXYGEN SATURATION: 96 % | BODY MASS INDEX: 19.62 KG/M2 | WEIGHT: 125 LBS | TEMPERATURE: 97.5 F
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
## 2021-09-01 DIAGNOSIS — S01.01XA LACERATION OF SCALP, INITIAL ENCOUNTER: ICD-10-CM

## 2021-09-01 DIAGNOSIS — R53.1 GENERAL WEAKNESS: Primary | ICD-10-CM

## 2021-09-01 LAB
A/G RATIO: 1.4 (ref 1.1–2.2)
ALBUMIN SERPL-MCNC: 4.2 G/DL (ref 3.4–5)
ALP BLD-CCNC: 53 U/L (ref 40–129)
ALT SERPL-CCNC: 17 U/L (ref 10–40)
ANION GAP SERPL CALCULATED.3IONS-SCNC: 10 MMOL/L (ref 3–16)
AST SERPL-CCNC: 31 U/L (ref 15–37)
BACTERIA: ABNORMAL /HPF
BASOPHILS ABSOLUTE: 0 K/UL (ref 0–0.2)
BASOPHILS RELATIVE PERCENT: 0.5 %
BILIRUB SERPL-MCNC: 0.4 MG/DL (ref 0–1)
BILIRUBIN URINE: NEGATIVE
BLOOD, URINE: ABNORMAL
BUN BLDV-MCNC: 10 MG/DL (ref 7–20)
CALCIUM SERPL-MCNC: 8.8 MG/DL (ref 8.3–10.6)
CHLORIDE BLD-SCNC: 87 MMOL/L (ref 99–110)
CLARITY: CLEAR
CO2: 31 MMOL/L (ref 21–32)
COLOR: YELLOW
CREAT SERPL-MCNC: 0.6 MG/DL (ref 0.6–1.2)
EKG ATRIAL RATE: 76 BPM
EKG ATRIAL RATE: 82 BPM
EKG DIAGNOSIS: NORMAL
EKG DIAGNOSIS: NORMAL
EKG P AXIS: 61 DEGREES
EKG P AXIS: 65 DEGREES
EKG P-R INTERVAL: 134 MS
EKG P-R INTERVAL: 138 MS
EKG Q-T INTERVAL: 396 MS
EKG Q-T INTERVAL: 408 MS
EKG QRS DURATION: 78 MS
EKG QRS DURATION: 80 MS
EKG QTC CALCULATION (BAZETT): 459 MS
EKG QTC CALCULATION (BAZETT): 462 MS
EKG R AXIS: 18 DEGREES
EKG R AXIS: 24 DEGREES
EKG T AXIS: 36 DEGREES
EKG T AXIS: 44 DEGREES
EKG VENTRICULAR RATE: 76 BPM
EKG VENTRICULAR RATE: 82 BPM
EOSINOPHILS ABSOLUTE: 0 K/UL (ref 0–0.6)
EOSINOPHILS RELATIVE PERCENT: 0.5 %
EPITHELIAL CELLS, UA: 3 /HPF (ref 0–5)
GFR AFRICAN AMERICAN: >60
GFR NON-AFRICAN AMERICAN: >60
GLOBULIN: 3.1 G/DL
GLUCOSE BLD-MCNC: 99 MG/DL (ref 70–99)
GLUCOSE URINE: NEGATIVE MG/DL
HCT VFR BLD CALC: 43.1 % (ref 36–48)
HEMOGLOBIN: 15.1 G/DL (ref 12–16)
HYALINE CASTS: 3 /LPF (ref 0–8)
KETONES, URINE: NEGATIVE MG/DL
LEUKOCYTE ESTERASE, URINE: ABNORMAL
LYMPHOCYTES ABSOLUTE: 0.6 K/UL (ref 1–5.1)
LYMPHOCYTES RELATIVE PERCENT: 7.6 %
MAGNESIUM: 2.1 MG/DL (ref 1.8–2.4)
MCH RBC QN AUTO: 34.7 PG (ref 26–34)
MCHC RBC AUTO-ENTMCNC: 35.1 G/DL (ref 31–36)
MCV RBC AUTO: 98.9 FL (ref 80–100)
MICROSCOPIC EXAMINATION: YES
MONOCYTES ABSOLUTE: 0.8 K/UL (ref 0–1.3)
MONOCYTES RELATIVE PERCENT: 10.2 %
NEUTROPHILS ABSOLUTE: 6.8 K/UL (ref 1.7–7.7)
NEUTROPHILS RELATIVE PERCENT: 81.2 %
NITRITE, URINE: POSITIVE
PDW BLD-RTO: 13.8 % (ref 12.4–15.4)
PH UA: 6 (ref 5–8)
PLATELET # BLD: 297 K/UL (ref 135–450)
PMV BLD AUTO: 8.4 FL (ref 5–10.5)
POTASSIUM REFLEX MAGNESIUM: 3.4 MMOL/L (ref 3.5–5.1)
PROTEIN UA: NEGATIVE MG/DL
RBC # BLD: 4.36 M/UL (ref 4–5.2)
RBC UA: 1 /HPF (ref 0–4)
SARS-COV-2, NAAT: NOT DETECTED
SODIUM BLD-SCNC: 128 MMOL/L (ref 136–145)
SPECIFIC GRAVITY UA: 1.01 (ref 1–1.03)
TOTAL PROTEIN: 7.3 G/DL (ref 6.4–8.2)
URINE REFLEX TO CULTURE: YES
URINE TYPE: ABNORMAL
UROBILINOGEN, URINE: 0.2 E.U./DL
WBC # BLD: 8.3 K/UL (ref 4–11)
WBC UA: 178 /HPF (ref 0–5)

## 2021-09-01 PROCEDURE — 71045 X-RAY EXAM CHEST 1 VIEW: CPT

## 2021-09-01 PROCEDURE — 90715 TDAP VACCINE 7 YRS/> IM: CPT | Performed by: EMERGENCY MEDICINE

## 2021-09-01 PROCEDURE — 93005 ELECTROCARDIOGRAM TRACING: CPT | Performed by: EMERGENCY MEDICINE

## 2021-09-01 PROCEDURE — 12002 RPR S/N/AX/GEN/TRNK2.6-7.5CM: CPT

## 2021-09-01 PROCEDURE — 83735 ASSAY OF MAGNESIUM: CPT

## 2021-09-01 PROCEDURE — 81001 URINALYSIS AUTO W/SCOPE: CPT

## 2021-09-01 PROCEDURE — 87086 URINE CULTURE/COLONY COUNT: CPT

## 2021-09-01 PROCEDURE — 70450 CT HEAD/BRAIN W/O DYE: CPT

## 2021-09-01 PROCEDURE — 87077 CULTURE AEROBIC IDENTIFY: CPT

## 2021-09-01 PROCEDURE — 93010 ELECTROCARDIOGRAM REPORT: CPT | Performed by: INTERNAL MEDICINE

## 2021-09-01 PROCEDURE — 87635 SARS-COV-2 COVID-19 AMP PRB: CPT

## 2021-09-01 PROCEDURE — 6360000002 HC RX W HCPCS: Performed by: EMERGENCY MEDICINE

## 2021-09-01 PROCEDURE — 85025 COMPLETE CBC W/AUTO DIFF WBC: CPT

## 2021-09-01 PROCEDURE — 99285 EMERGENCY DEPT VISIT HI MDM: CPT

## 2021-09-01 PROCEDURE — 6370000000 HC RX 637 (ALT 250 FOR IP): Performed by: EMERGENCY MEDICINE

## 2021-09-01 PROCEDURE — 72125 CT NECK SPINE W/O DYE: CPT

## 2021-09-01 PROCEDURE — 90471 IMMUNIZATION ADMIN: CPT | Performed by: EMERGENCY MEDICINE

## 2021-09-01 PROCEDURE — 80053 COMPREHEN METABOLIC PANEL: CPT

## 2021-09-01 PROCEDURE — 87186 SC STD MICRODIL/AGAR DIL: CPT

## 2021-09-01 RX ORDER — ACETAMINOPHEN 500 MG
1000 TABLET ORAL ONCE
Status: COMPLETED | OUTPATIENT
Start: 2021-09-01 | End: 2021-09-01

## 2021-09-01 RX ADMIN — ACETAMINOPHEN 1000 MG: 500 TABLET ORAL at 11:31

## 2021-09-01 RX ADMIN — TETANUS TOXOID, REDUCED DIPHTHERIA TOXOID AND ACELLULAR PERTUSSIS VACCINE, ADSORBED 0.5 ML: 5; 2.5; 8; 8; 2.5 SUSPENSION INTRAMUSCULAR at 08:46

## 2021-09-01 ASSESSMENT — PAIN DESCRIPTION - PAIN TYPE: TYPE: ACUTE PAIN

## 2021-09-01 ASSESSMENT — PAIN DESCRIPTION - ORIENTATION: ORIENTATION: LEFT

## 2021-09-01 ASSESSMENT — PAIN SCALES - GENERAL
PAINLEVEL_OUTOF10: 6
PAINLEVEL_OUTOF10: 5

## 2021-09-01 ASSESSMENT — PAIN DESCRIPTION - LOCATION: LOCATION: HEAD

## 2021-09-01 NOTE — CARE COORDINATION
Patient was accepted to [facility]. Jazlyn Lopez reported the facility can accept her without PT/OT evaluations. Called our therapy department and cancelled the order. Set up transportation with [facility] at 3pm today. Informed pt and spouse of this information. Discharge packet completed and left on pt's chart. PASRR form completed online and faxed to the facility. All discharge needs met at this time. Discharge Plan:  Rajesh  [address] [address]  Report = [phone]  Fax = [phone]    [facility] at 3pm today.     Electronically signed by GISELLA Conway, YIFAN on 9/1/2021 at 2:25 PM

## 2021-09-01 NOTE — ED PROVIDER NOTES
[address] PROVIDER NOTE    Patient Identification  Pt Name: Gregor Osuna  MRN: [id number]  Kaya 1945  Date of evaluation: 9/1/2021  Provider: Villa Canavan, MD  PCP: QUITA Diego - ADILIA    Chief Complaint  Fall (Patient in by squad from home - states she was getting up to go to the bathroom and fell hitting her left side of head on night stand. Unsure if she was dizzy before falling. Nausea in route. Denies nausea during triage. )      HPI  (History provided by patient)  This is a 67 y.o. female who was brought in by EMS transportation for for evaluation after a fall at home. Patient states she had stood up and was trying to walk to the bathroom when her legs gave out and she suddenly fell to the ground. She hit the posterior portion of her head against the nightstand, but did not lose consciousness and remembers everything that happened. She denies having any preceding symptoms that have led to her fall. Specifically she denies dizziness, lightheadedness, palpitations, chest pain, shortness of breath, or other symptoms. She states that she has become generally more weak over time and has had multiple falls in the last couple of weeks. She feels somewhat lightheaded now after hitting her head, but does not describe vertiginous dizziness, numbness, tingling, or focal weakness. She denies pain to her neck or back. She has not been ill recently, denying fever, chills, cough, chest pain, shortness of breath, or other symptoms. ROS  10 systems reviewed, pertinent positives/negatives per HPI otherwise noted to be negative.     I have reviewed the following nursing documentation:  Allergies: Lipitor [atorvastatin], Morphine, Keflex [cephalexin], Hctz [hydrochlorothiazide], Norvasc [amlodipine besylate], Penicillins, Tramadol, Hydralazine, and Shellfish-derived products    Past medical history:   Past Medical History:   Diagnosis Date    Acute DVT [facility]deep venous thrombosis) ([facility] 7/3/2015    Acute encephalopathy 4/19/2018    Acute on chronic diastolic heart failure ([facility] 3/30/2019    Alcohol abuse     Anxiety     Arthritis     CAD in native artery     Cellulitis 4/28/2018    Cerebral artery occlusion with cerebral infarction ([facility])     states hx of multiple mini strokes    Cerebrovascular accident (CVA) ([facility]     Chronic fatigue     Complex care coordination     Complicated UTI (urinary tract infection)     COPD (chronic obstructive pulmonary disease) ([facility])     COPD exacerbation ([facility] 2/20/2018    Depression     Diabetes mellitus ([facility]     DIMAS (dyspnea on exertion) 7/3/2015    DVT (deep venous thrombosis) ([facility])     DVT, lower extremity ([facility])     Fatigue 11/3/2015    General weakness 11/4/2015    Generalized weakness 8/22/2019    Hypercholesteremia     Hypertension     Hyperthyroidism     Incontinence 10/16/2012    Mammogram abnormal 2/7/2012    Neuromuscular disorder ([facility]     Osteopenia 2/14/2017    Pneumonia     Recurrent UTI     Right forearm cellulitis     Superficial thrombophlebitis of right upper extremity     Thyroid disease     Tobacco abuse     Tobacco abuse counseling     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 Oskar Melchor; repeat 5 years    EYE SURGERY      cateracts removed    SHOULDER ARTHROSCOPY Right 6/18/14    SUBACROMIAL DECOMPRESSION, ROTATOR CUFF REPAIR    TOE SURGERY Right     TONSILLECTOMY         Home medications:   Discharge Medication List as of 9/1/2021  2:44 PM      CONTINUE these medications which have NOT CHANGED    Details   ondansetron (ZOFRAN ODT) 4 MG disintegrating tablet Take 1 tablet by mouth every 8 hours as needed for Nausea or Vomiting Pharmacist: if insurance will not cover ODT formulation, switch to regular tablets.   Thank you., Disp-12 tablet, R-0Normal levothyroxine (SYNTHROID) 100 MCG tablet TAKE ONE TABLET BY MOUTH DAILY, Disp-30 tablet, R-0Normal      atorvastatin (LIPITOR) 80 MG tablet TAKE ONE TABLET BY MOUTH DAILY, Disp-30 tablet, R-0Normal      cloNIDine (CATAPRES) 0.1 MG tablet TAKE ONE TABLET BY MOUTH ONCE NIGHTLY, Disp-30 tablet, R-0Normal      losartan (COZAAR) 100 MG tablet TAKE ONE TABLET BY MOUTH DAILY, Disp-30 tablet, R-0Normal      oxybutynin (DITROPAN) 5 MG tablet TAKE ONE TABLET BY MOUTH TWICE A DAY, Disp-60 tablet, R-0Normal      mirtazapine (REMERON) 15 MG tablet TAKE ONE TABLET BY MOUTH DAILY, Disp-30 tablet, R-0Normal      clonazePAM (KLONOPIN) 1 MG tablet Take 1 tablet by mouth 2 times daily as needed for Anxiety for up to 30 days. , Disp-60 tablet, R-0Normal      aspirin 81 MG chewable tablet Take 1 tablet by mouth daily, Disp-30 tablet, R-0Normal      budesonide-formoterol (SYMBICORT) 160-4.5 MCG/ACT AERO Inhale 2 puffs into the lungs 2 times daily, Disp-1 Inhaler, R-0Normal      labetalol (NORMODYNE) 200 MG tablet Take 1 tablet by mouth every 12 hours, Disp-60 tablet, R-0Needs appointmentNormal             Social history:  reports that she has been smoking cigarettes. She has a 52.00 pack-year smoking history. She has never used smokeless tobacco. She reports that she does not drink alcohol and does not use drugs. Family history:    Family History   Problem Relation Age of Onset    Heart Disease Father         MI @ 64    Alcohol Abuse Father        Exam  ED Triage Vitals [09/01/21 0823]   BP Temp Temp Source Pulse Resp SpO2 Height Weight   (!) 199/119 97.5 °F (36.4 °C) Oral 87 18 96 % 5' 7\" (1.702 m) 125 lb (56.7 kg)     Nursing note and vitals reviewed. Constitutional: Well developed, well nourished. Non-toxic in appearance. HENT:      Head: Normocephalic and atraumatic. Ears: External ears normal.      Nose: Nose normal.     Mouth: Membrane mucosa moist and pink. Eyes: Anicteric sclera. No discharge.  No visual field deficits as tested by confrontation in each eye individually. Neck: Supple. Trachea midline. Cardiovascular: RRR; no murmurs, rubs, or gallops. Pulmonary/Chest: Effort normal. No respiratory distress. CTAB. No stridor. No wheezes. No rales. Abdominal: Soft. No distension. Non-tender to palpation  Musculoskeletal: Moves all extremities. No gross deformity. Neurological: Alert and oriented to person, place, time, and situation. Face symmetric without droop or paralysis. She has drift in the left upper extremity, but this is chronic from a previous stroke per patient. No drift in the right upper extremity or the bilateral lower extremities. Strength normal and equal in the bilateral upper and lower extremities. Normal sensation to light touch throughout the bilateral face, upper extremities, and lower extremities. Normal finger-nose bilaterally. Normal heel-shin bilaterally. Gait steady and without difficulty. Speech normal without aphasia or dysarthria. No neglect or gaze deviation. Negative test of skew. No truncal ataxia. Unsteady on feet with gait. Skin: Warm and dry. No rash. Linear, 3 cm laceration to the posterior occipital scalp. No active bleeding. Psychiatric: Normal mood and affect. Behavior is normal.    Procedures  Please see documentation of laceration repair by LESLIE Lara    EKG  The Ekg interpreted by me in the absence of a cardiologist shows. normal sinus rhythm with a rate of 76; PACs present  Axis is   Normal  QTc is  normal  Intervals and Durations are unremarkable. No specific ST-T wave changes appreciated. No evidence of acute ischemia. No significant change from prior EKG dated 8/29/2021      Radiology  CT CERVICAL SPINE WO CONTRAST   Final Result   Progressive multilevel degenerative changes. No acute osseous abnormality.          CT HEAD WO CONTRAST   Final Result   No acute intracranial abnormality         XR CHEST PORTABLE   Final Result   Chronic scarring and pleural disease left lung base inferior and lateral.      No new or acute abnormality.              Labs  Results for orders placed or performed during the hospital encounter of 09/01/21   CBC Auto Differential   Result Value Ref Range    WBC 8.3 4.0 - 11.0 K/uL    RBC 4.36 4.00 - 5.20 M/uL    Hemoglobin 15.1 12.0 - 16.0 g/dL    Hematocrit 43.1 36.0 - 48.0 %    MCV 98.9 80.0 - 100.0 fL    MCH 34.7 (H) 26.0 - 34.0 pg    MCHC 35.1 31.0 - 36.0 g/dL    RDW 13.8 12.4 - 15.4 %    Platelets 456 335 - 149 K/uL    MPV 8.4 5.0 - 10.5 fL    Neutrophils % 81.2 %    Lymphocytes % 7.6 %    Monocytes % 10.2 %    Eosinophils % 0.5 %    Basophils % 0.5 %    Neutrophils Absolute 6.8 1.7 - 7.7 K/uL    Lymphocytes Absolute 0.6 (L) 1.0 - 5.1 K/uL    Monocytes Absolute 0.8 0.0 - 1.3 K/uL    Eosinophils Absolute 0.0 0.0 - 0.6 K/uL    Basophils Absolute 0.0 0.0 - 0.2 K/uL   Comprehensive Metabolic Panel w/ Reflex to MG   Result Value Ref Range    Sodium 128 (L) 136 - 145 mmol/L    Potassium reflex Magnesium 3.4 (L) 3.5 - 5.1 mmol/L    Chloride 87 (L) 99 - 110 mmol/L    CO2 31 21 - 32 mmol/L    Anion Gap 10 3 - 16    Glucose 99 70 - 99 mg/dL    BUN 10 7 - 20 mg/dL    CREATININE 0.6 0.6 - 1.2 mg/dL    GFR Non-African American >60 >60    GFR African American >60 >60    Calcium 8.8 8.3 - 10.6 mg/dL    Total Protein 7.3 6.4 - 8.2 g/dL    Albumin 4.2 3.4 - 5.0 g/dL    Albumin/Globulin Ratio 1.4 1.1 - 2.2    Total Bilirubin 0.4 0.0 - 1.0 mg/dL    Alkaline Phosphatase 53 40 - 129 U/L    ALT 17 10 - 40 U/L    AST 31 15 - 37 U/L    Globulin 3.1 g/dL   Urinalysis Reflex to Culture    Specimen: Urine, clean catch   Result Value Ref Range    Color, UA Yellow Straw/Yellow    Clarity, UA Clear Clear    Glucose, Ur Negative Negative mg/dL    Bilirubin Urine Negative Negative    Ketones, Urine Negative Negative mg/dL    Specific Gravity, UA 1.010 1.005 - 1.030    Blood, Urine TRACE-INTACT (A) Negative    pH, UA 6.0 5.0 - 8.0    Protein, UA Negative Negative mg/dL Urobilinogen, Urine 0.2 <2.0 E.U./dL    Nitrite, Urine POSITIVE (A) Negative    Leukocyte Esterase, Urine MODERATE (A) Negative    Microscopic Examination YES     Urine Type NotGiven     Urine Reflex to Culture Yes    Magnesium   Result Value Ref Range    Magnesium 2.10 1.80 - 2.40 mg/dL   Microscopic Urinalysis   Result Value Ref Range    Bacteria, UA 4+ (A) None Seen /HPF    Hyaline Casts, UA 3 0 - 8 /LPF    WBC,  (H) 0 - 5 /HPF    RBC, UA 1 0 - 4 /HPF    Epithelial Cells, UA 3 0 - 5 /HPF       Screenings  NIH Stroke Scale  NIH Stroke Scale Assessed: Yes  Interval: Baseline  Level of Consciousness (1a. ): Alert  LOC Questions (1b. ): Answers both correctly  LOC Commands (1c. ): Performs both tasks correctly  Best Gaze (2. ): Normal  Visual (3. ): No visual loss  Facial Palsy (4. ): Normal symmetrical movement  Motor Arm, Left (5a. ): Drift, but does not hit bed (chronic - previous stroke)  Motor Arm, Right (5b. ): No drift  Motor Leg, Left (6a. ): No drift  Motor Leg, Right (6b. ): No drift  Limb Ataxia (7. ): Absent  Sensory (8. ): Normal  Best Language (9. ): No aphasia  Dysarthria (10. ): Normal  Extinction and Inattention (11): No abnormality  Total: 1    MDM and ED Course  Based on patient's initial presentation, exam, and history, it appeared that her fall today was due to functional decline. Her  arrived later and confirmed the patient's had increasing generalized weakness, particularly in her legs, causing her to have difficulty walking or getting out of chairs. She has had more falls recently and is concerned she may develop a more serious injury than she did today. None of this is acute nor new. I considered both neurogenic and cardiogenic etiologies. Her NIH stroke score was a 1 due to chronic left arm drift from her previous stroke. However, this is her baseline and unchanged.   With her unchanged NIHSS, negative test of skew, no truncal ataxia, the patient's risk of stroke return. Final Impression  1. General weakness    2. Fall, initial encounter    3. Laceration of scalp, initial encounter        Blood pressure (!) 133/97, pulse 71, temperature 97.5 °F (36.4 °C), temperature source Oral, resp. rate 21, height 5' 7\" (1.702 m), weight 125 lb (56.7 kg), SpO2 96 %, not currently breastfeeding. Disposition:  Discharge    This chart was generated using the [address] [place] [address] dictation system. I created this record but it may contain dictation errors given the limitations of this technology.        Marian Wilcox MD  09/03/21 6048

## 2021-09-01 NOTE — ED NOTES
Assisted pt to bedside commode. Pt very unsteady on ambulation to bedside.       Fabi Garcia RN  09/01/21 1692

## 2021-09-01 NOTE — ED PROVIDER NOTES
Procedure Only     Lac Repair    Date/Time: 9/1/2021 12:17 PM  Performed by: LESLIE Duvall  Authorized by: Comfort Waller MD     Consent:     Consent obtained:  Verbal    Consent given by:  Patient    Risks discussed:  Infection, need for additional repair, nerve damage, pain, poor cosmetic result, poor wound healing, retained foreign body, tendon damage and vascular damage    Alternatives discussed:  No treatment  Anesthesia (see MAR for exact dosages): Anesthesia method:  Local infiltration    Local anesthetic:  Lidocaine 1% w/o epi  Laceration details:     Location:  Scalp    Scalp location:  Occipital    Length (cm):  3    Depth (mm):  2  Repair type:     Repair type:  Simple  Pre-procedure details:     Preparation:  Patient was prepped and draped in usual sterile fashion and imaging obtained to evaluate for foreign bodies  Exploration:     Hemostasis achieved with:  Direct pressure    Wound exploration: wound explored through full range of motion and entire depth of wound probed and visualized      Wound extent: no foreign bodies/material noted, no muscle damage noted, no nerve damage noted, no tendon damage noted, no underlying fracture noted and no vascular damage noted    Treatment:     Area cleansed with:  Hibiclens and saline    Amount of cleaning:  Extensive    Irrigation solution:  Sterile saline    Visualized foreign bodies/material removed: no    Skin repair:     Repair method:  Staples    Number of staples:  5  Approximation:     Approximation:  Close  Post-procedure details:     Dressing:  Open (no dressing)    Patient tolerance of procedure:   Tolerated well, no immediate complications         Rowan Duvall  09/01/21 1218

## 2021-09-01 NOTE — ED NOTES
Report given to transport crew. All paperwork sent with patient and crew. All questions answered.       Gabriela Burleson RN  09/01/21 5203

## 2021-09-01 NOTE — ED NOTES
Bed: 06  Expected date:   Expected time:   Means of arrival: [facility]  Comments:  francisco Ruggiero Height  09/01/21 0820

## 2021-09-03 LAB
ORGANISM: ABNORMAL
URINE CULTURE, ROUTINE: ABNORMAL

## 2021-09-07 NOTE — ED NOTES
[facility]   Emergency Department Culture Follow-Up       Neha Davis (CSN: [id number]) was seen and evaluated at [facility] Emergency Department on 9/1/21 by provider  Dr. Joby Horan. A blood culture was positive and is growing E. Coli. Sensitivity results: N/A      Treatment Course: The patient was NOT treated in the emergency department with antimicrobial therapy. Follow-Up:    The patient's care facility was contacted and notified of the results. Results were faxed by: Juliet Galan, [place] to Facility: [facility]. Facility Fax number: [phone].     Thank you,    Graham Leong, [facility]  9/7/2021

## 2021-09-22 DIAGNOSIS — F41.9 ANXIETY: ICD-10-CM

## 2021-09-22 RX ORDER — CLONAZEPAM 1 MG/1
1 TABLET ORAL 2 TIMES DAILY PRN
Qty: 60 TABLET | Refills: 0 | Status: SHIPPED | OUTPATIENT
Start: 2021-09-22 | End: 2021-10-26 | Stop reason: SDUPTHER

## 2021-09-22 NOTE — TELEPHONE ENCOUNTER
Medication:   Requested Prescriptions     Pending Prescriptions Disp Refills    clonazePAM (KLONOPIN) 1 MG tablet 60 tablet 0     Sig: Take 1 tablet by mouth 2 times daily as needed for Anxiety for up to 30 days. Last Filled: 8/5/2021     Patient Phone Number: [phone] (home) [phone] (work)    Last appt: 7/15/2021   Next appt: 9/23/2021    Last OARRS:   RX Monitoring 5/11/2020   Attestation -   Periodic Controlled Substance Monitoring No signs of potential drug abuse or diversion identified.

## 2021-09-22 NOTE — TELEPHONE ENCOUNTER
Pt daughter calling for refill of clonazePAM (KLONOPIN) 1 MG tablet     Last OV: 7/15/21  Next OV: 9/23/21    Please send to     [facility] [address], [address] [phone] Gloria Lopez [phone] [address] [facility] [address] [address]   Phone:  [phone]  Fax:  [phone]

## 2021-09-23 ENCOUNTER — OFFICE VISIT (OUTPATIENT)
Dept: FAMILY MEDICINE CLINIC | Age: 76
End: 2021-09-23
Payer: COMMERCIAL

## 2021-09-23 VITALS
OXYGEN SATURATION: 97 % | TEMPERATURE: 98.6 F | SYSTOLIC BLOOD PRESSURE: 158 MMHG | BODY MASS INDEX: 20.05 KG/M2 | HEART RATE: 93 BPM | WEIGHT: 128 LBS | DIASTOLIC BLOOD PRESSURE: 86 MMHG

## 2021-09-23 DIAGNOSIS — R53.1 GENERAL WEAKNESS: Primary | ICD-10-CM

## 2021-09-23 DIAGNOSIS — Z09 HOSPITAL DISCHARGE FOLLOW-UP: ICD-10-CM
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
## 2021-09-23 PROCEDURE — 99214 OFFICE O/P EST MOD 30 MIN: CPT | Performed by: NURSE PRACTITIONER

## 2021-09-23 RX ORDER — ACETAMINOPHEN 325 MG/1
325 TABLET ORAL 2 TIMES DAILY
COMMUNITY
End: 2021-09-23 | Stop reason: CLARIF

## 2021-09-23 NOTE — PROGRESS NOTES
[facility]  : 1945  Encounter date: 2021    This efrain 67 y.o. female who presents with  Chief Complaint   Patient presents with    Follow-up     EDFU from -  Fall(laceration of scalp)        History of present illness:    HPI Pt is 67year old female for follow up on ED visit 21. Pt fell out of bed and hit posterior head on dresser. Pt went to [facility], received 5 staples. Imaging showed:  CT cervical- Progressive multilevel degenerative changes.  No acute osseous abnormality. CT head- No acute intracranial abnormality  Labs completed showed e-coli bacterial infections- treated ABX  EKG- sinus rhythm with atrial enlargment  Troponin negative    Pt referred to Rehab, stayed 21 days for strength training and balance. Pt discharge home , will be set up with OT/PT and continued exercises at home. Reports feeling improved in strength and balance. Current Outpatient Medications on File Prior to Visit   Medication Sig Dispense Refill    clonazePAM (KLONOPIN) 1 MG tablet Take 1 tablet by mouth 2 times daily as needed for Anxiety for up to 30 days.  60 tablet 0    levothyroxine (SYNTHROID) 100 MCG tablet TAKE ONE TABLET BY MOUTH DAILY 30 tablet 0    atorvastatin (LIPITOR) 80 MG tablet TAKE ONE TABLET BY MOUTH DAILY 30 tablet 0    cloNIDine (CATAPRES) 0.1 MG tablet TAKE ONE TABLET BY MOUTH ONCE NIGHTLY 30 tablet 0    losartan (COZAAR) 100 MG tablet TAKE ONE TABLET BY MOUTH DAILY 30 tablet 0    oxybutynin (DITROPAN) 5 MG tablet TAKE ONE TABLET BY MOUTH TWICE A DAY 60 tablet 0    mirtazapine (REMERON) 15 MG tablet TAKE ONE TABLET BY MOUTH DAILY 30 tablet 0    aspirin 81 MG chewable tablet Take 1 tablet by mouth daily 30 tablet 0    budesonide-formoterol (SYMBICORT) 160-4.5 MCG/ACT AERO Inhale 2 puffs into the lungs 2 times daily 1 Inhaler 0    labetalol (NORMODYNE) 200 MG tablet Take 1 tablet by mouth every 12 hours 60 tablet 0     No current facility-administered medications on file prior to visit.       Allergies   Allergen Reactions    Lipitor [Atorvastatin] Other (See Comments)     Weakness, severe    Morphine Shortness Of Breath    Keflex [Cephalexin] Other (See Comments)     SOB & bilateral arms shaking     Hctz [Hydrochlorothiazide] Other (See Comments)     Hyponatremia, severe      Norvasc [Amlodipine Besylate] Swelling     Of neck    Penicillins Hives    Tramadol Itching    Hydralazine Palpitations and Other (See Comments)     Dizzy,fatigue,headaches,palpitations,loss of appetite    Shellfish-Derived Products Swelling and Rash     Swelling of neck     Past Medical History:   Diagnosis Date    [facility] 7/3/2015    Acute encephalopathy 4/19/2018    Acute on chronic diastolic heart failure ([facility]) 3/30/2019    Alcohol abuse     Anxiety     Arthritis     CAD in native artery     Cellulitis 4/28/2018    Cerebral artery occlusion with cerebral infarction ([facility])     states hx of multiple mini strokes    Cerebrovascular accident (CVA) ([address]     Chronic fatigue     Complex care coordination     Complicated UTI (urinary tract infection)     COPD (chronic obstructive pulmonary disease) ([facility])     COPD exacerbation ([address] 2/20/2018    Depression     Diabetes mellitus ([address]     DIMAS (dyspnea on exertion) 7/3/2015    DVT (deep venous thrombosis) ([facility])     DVT, lower extremity ([facility])     Fatigue 11/3/2015    General weakness 11/4/2015    Generalized weakness 8/22/2019    Hypercholesteremia     Hypertension     Hyperthyroidism     Incontinence 10/16/2012    Mammogram abnormal 2/7/2012    Neuromuscular disorder ([address]     Osteopenia 2/14/2017    Pneumonia     Recurrent UTI     Right forearm cellulitis     Superficial thrombophlebitis of right upper extremity     Thyroid disease     Tobacco abuse     Tobacco abuse counseling     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 Arleen García; repeat 5 years    EYE SURGERY      cateracts removed    SHOULDER ARTHROSCOPY Right 6/18/14    SUBACROMIAL DECOMPRESSION, ROTATOR CUFF REPAIR    TOE SURGERY Right     TONSILLECTOMY        Family History   Problem Relation Age of Onset    Heart Disease Father         MI @ 64    Alcohol Abuse Father       Social History     Tobacco Use    Smoking status: Current Every Day Smoker     Packs/day: 1.00     Years: 52.00     Pack years: 52.00     Types: Cigarettes     Last attempt to quit: 7/1/2018     Years since quitting: 3.2    Smokeless tobacco: Never Used    Tobacco comment: started smoking at age 26 / smoked up to 2 p,p,d / now smoking 4 to 8 cigarettes a day,   Substance Use Topics    Alcohol use: No     Alcohol/week: 0.0 standard drinks     Comment: patient reports she is an alcoholic hasn't had anything to drink 3-4 months        Review of Systems    Objective:    BP (!) 158/86   Pulse 93   Temp 98.6 °F (37 °C)   Wt 128 lb (58.1 kg)   SpO2 97%   BMI 20.05 kg/m²   Weight: 128 lb (58.1 kg)     BP Readings from Last 3 Encounters:   09/23/21 (!) 158/86   09/01/21 (!) 133/97   08/29/21 (!) 165/103     Wt Readings from Last 3 Encounters:   09/23/21 128 lb (58.1 kg)   09/01/21 125 lb (56.7 kg)   08/29/21 125 lb (56.7 kg)     BMI Readings from Last 3 Encounters:   09/23/21 20.05 kg/m²   09/01/21 19.58 kg/m²   08/29/21 19.58 kg/m²       Physical Exam  Vitals reviewed. Constitutional:       Appearance: Normal appearance. She is well-developed. HENT:      Head:      Comments: Post laceration, well healed  Eyes:      Extraocular Movements: Extraocular movements intact. Pupils: Pupils are equal, round, and reactive to light. Cardiovascular:      Rate and Rhythm: Normal rate and regular rhythm. Heart sounds: Normal heart sounds. No murmur heard. Pulmonary:      Effort: Pulmonary effort is normal.      Breath sounds: Normal breath sounds. Musculoskeletal:      Right lower leg: No edema. Left lower leg: No edema. Skin:     General: Skin is warm and dry. Neurological:      Mental Status: She is alert and oriented to person, place, and time. Motor: Weakness present. Coordination: Coordination abnormal.      Gait: Gait normal.      Deep Tendon Reflexes: Reflexes normal.   Psychiatric:         Mood and Affect: Mood normal.         Assessment/Plan    1. General weakness  Advised continued home exercises  Continue with PT/OT will refer if needed  Advised safety concerns    2. Fall, subsequent encounter  Discussed home safety measures    3. Hospital discharge follow-up  Reviewed physician notations, imaging, labs, EKG  Questions answered    Time Spent on discharge is more than 30 minutes in the examination, evaluation, counseling and review of medications and discharge plan. Return in about 3 months (around 12/23/2021) for medication re-check, hypertension re-check. This dictation was generated by voice recognition computer software. Although all attempts are made to edit the dictation for accuracy, there may be errors in the transcription that are not intended.

## 2021-09-27 ENCOUNTER — NURSE ONLY (OUTPATIENT)
Dept: CARDIOLOGY CLINIC | Age: 76
End: 2021-09-27
Payer: COMMERCIAL

## 2021-09-27 DIAGNOSIS — Z45.09 ENCOUNTER FOR ELECTRONIC ANALYSIS OF REVEAL EVENT RECORDER: ICD-10-CM

## 2021-09-27 DIAGNOSIS — I48.0 PAF (PAROXYSMAL ATRIAL FIBRILLATION) (HCC): ICD-10-CM

## 2021-09-28 ENCOUNTER — TELEPHONE (OUTPATIENT)
Dept: FAMILY MEDICINE CLINIC | Age: 76
End: 2021-09-28

## 2021-09-28 DIAGNOSIS — R32 URINARY INCONTINENCE, UNSPECIFIED TYPE: ICD-10-CM

## 2021-09-28 DIAGNOSIS — F41.9 ANXIETY: ICD-10-CM

## 2021-09-28 DIAGNOSIS — E78.5 HYPERLIPIDEMIA, UNSPECIFIED HYPERLIPIDEMIA TYPE: ICD-10-CM

## 2021-09-28 DIAGNOSIS — I10 UNCONTROLLED HYPERTENSION: Primary | ICD-10-CM

## 2021-09-28 DIAGNOSIS — I10 ESSENTIAL HYPERTENSION: ICD-10-CM

## 2021-09-28 DIAGNOSIS — E03.9 HYPOTHYROIDISM, UNSPECIFIED TYPE: ICD-10-CM

## 2021-09-28 RX ORDER — ATORVASTATIN CALCIUM 80 MG/1
TABLET, FILM COATED ORAL
Qty: 90 TABLET | Refills: 0 | Status: ON HOLD | OUTPATIENT
Start: 2021-09-28 | End: 2022-01-23

## 2021-09-28 RX ORDER — LEVOTHYROXINE SODIUM 0.1 MG/1
TABLET ORAL
Qty: 90 TABLET | Refills: 0 | Status: SHIPPED | OUTPATIENT
Start: 2021-09-28 | End: 2022-01-31

## 2021-09-28 RX ORDER — CARVEDILOL 12.5 MG/1
12.5 TABLET ORAL 2 TIMES DAILY
Qty: 60 TABLET | Refills: 3 | Status: ON HOLD | OUTPATIENT
Start: 2021-09-28 | End: 2022-01-23

## 2021-09-28 RX ORDER — LOSARTAN POTASSIUM 100 MG/1
TABLET ORAL
Qty: 90 TABLET | Refills: 0 | Status: ON HOLD | OUTPATIENT
Start: 2021-09-28 | End: 2022-01-23

## 2021-09-28 RX ORDER — OXYBUTYNIN CHLORIDE 5 MG/1
TABLET ORAL
Qty: 180 TABLET | Refills: 0 | Status: SHIPPED | OUTPATIENT
Start: 2021-09-28 | End: 2021-10-18 | Stop reason: SDUPTHER

## 2021-09-28 RX ORDER — CLONIDINE HYDROCHLORIDE 0.1 MG/1
0.1 TABLET ORAL 2 TIMES DAILY
Qty: 60 TABLET | Refills: 2 | Status: SHIPPED | OUTPATIENT
Start: 2021-09-28 | End: 2021-10-22 | Stop reason: SDUPTHER

## 2021-09-28 RX ORDER — MIRTAZAPINE 15 MG/1
TABLET, FILM COATED ORAL
Qty: 90 TABLET | Refills: 0 | Status: SHIPPED | OUTPATIENT
Start: 2021-09-28 | End: 2021-09-29

## 2021-09-28 NOTE — TELEPHONE ENCOUNTER
Corby Grissom form [facility] care has been advised. Corby Grissom states she goes to pt house once a week daily and will keep us posted.

## 2021-09-28 NOTE — TELEPHONE ENCOUNTER
Spoke to Benny johns- from [facility] states pt has not been taking her medication for past 4 days since pt been discharged from nursing home. Pt daughter/ were yelling behind when Benny johns was talking in regards to pt about her medication. States they were not giving her a medication. Pt has zero refill refills on her medication. Please advise.

## 2021-09-28 NOTE — TELEPHONE ENCOUNTER
Her blood pressure over 190/114. She is taking Clonidine 0.1mg at bedtime and Losartan 100mg once daily (taking in AM). Her blood pressures have been running high and the home nurse Ira Cordova is wondering what to do about her blood pressure.      Please given Ira Cordova a call back with what to do     [phone]

## 2021-09-28 NOTE — TELEPHONE ENCOUNTER
Will continue with discussed medication changes, previously elevated, uncontrolled, will send over refills, advise follow up in 1 month to recheck BP

## 2021-09-28 NOTE — TELEPHONE ENCOUNTER
Advised to stop labetalol/normdyne, sent over prescription for coreg twice daily. Start clonidine twice daily.

## 2021-09-29 PROCEDURE — G2066 INTER DEVC REMOTE 30D: HCPCS | Performed by: INTERNAL MEDICINE

## 2021-09-29 PROCEDURE — 93298 REM INTERROG DEV EVAL SCRMS: CPT | Performed by: INTERNAL MEDICINE

## 2021-09-29 RX ORDER — MIRTAZAPINE 15 MG/1
TABLET, FILM COATED ORAL
Qty: 30 TABLET | Refills: 1 | Status: SHIPPED | OUTPATIENT
Start: 2021-09-29 | End: 2021-10-18 | Stop reason: SDUPTHER

## 2021-09-29 NOTE — TELEPHONE ENCOUNTER
Medication:   Requested Prescriptions     Pending Prescriptions Disp Refills    mirtazapine (REMERON) 15 MG tablet [Pharmacy Med Name: MIRTAZAPINE 15 MG TABLET] 30 tablet      Sig: TAKE ONE TABLET BY MOUTH DAILY      Last Filled:      Patient Phone Number: [phone] (home) [phone] (work)    Last appt: 9/23/2021   Next appt: 12/23/2021    Last OARRS:   RX Monitoring 5/11/2020   Attestation -   Periodic Controlled Substance Monitoring No signs of potential drug abuse or diversion identified.      PDMP Monitoring:    Last PDMP Lisa Chaidez as Reviewed [facility]):  Review User Review Instant Review Result   Lin Mora 4/20/2021 12:35 PM Reviewed PDMP [1]     Preferred Pharmacy:   [address] [place] [address] 143 [address] [facility] [phone] [facility] [phone] [address]  [place] [address]  Phone: [phone] Fax: [phone]

## 2021-10-04 ENCOUNTER — TELEPHONE (OUTPATIENT)
Dept: FAMILY MEDICINE CLINIC | Age: 76
End: 2021-10-04

## 2021-10-04 NOTE — TELEPHONE ENCOUNTER
Jasmeet Cardenas with [facility] [phone] is calling to state that the patients Bp is 170/113.   Patient states that it has been running high for a few days      Please advise

## 2021-10-12 ENCOUNTER — TELEPHONE (OUTPATIENT)
Dept: FAMILY MEDICINE CLINIC | Age: 76
End: 2021-10-12

## 2021-10-12 DIAGNOSIS — I10 UNCONTROLLED HYPERTENSION: Primary | ICD-10-CM

## 2021-10-12 NOTE — TELEPHONE ENCOUNTER
I have placed a referral to kidney and hypertension for uncontrolled BP, please call [phone] to schedule appt.

## 2021-10-12 NOTE — TELEPHONE ENCOUNTER
Ze Rock with [facility] [phone]  is calling to just make note that the patient's BP is 163/114 and the patient is having a headache. Patient is not sure if she took her BP medication this morning or not.        Please advise

## 2021-10-12 NOTE — TELEPHONE ENCOUNTER
----- Message from Serge Eller sent at 10/12/2021  5:00 PM EDT -----  Subject: Message to Provider    QUESTIONS  Information for Provider? Pt called in to find out the name/contact info   for the specialist she is being sent to for her high blood pressure.   ---------------------------------------------------------------------------  --------------  CALL BACK INFO  What is the best way for the office to contact you? OK to leave message on   voicemail  Preferred Call Back Phone Number?  [phone]  ---------------------------------------------------------------------------  --------------  SCRIPT ANSWERS  undefined

## 2021-10-14 ENCOUNTER — TELEPHONE (OUTPATIENT)
Dept: FAMILY MEDICINE CLINIC | Age: 76
End: 2021-10-14

## 2021-10-18 DIAGNOSIS — F41.9 ANXIETY: ICD-10-CM

## 2021-10-18 DIAGNOSIS — R32 URINARY INCONTINENCE, UNSPECIFIED TYPE: ICD-10-CM

## 2021-10-18 RX ORDER — OXYBUTYNIN CHLORIDE 5 MG/1
TABLET ORAL
Qty: 270 TABLET | Refills: 0 | Status: SHIPPED | OUTPATIENT
Start: 2021-10-18 | End: 2022-04-26

## 2021-10-18 NOTE — TELEPHONE ENCOUNTER
----- Message from Jose Bae Dr sent at 10/18/2021  1:02 PM EDT -----  Subject: Refill Request    QUESTIONS  Name of Medication? mirtazapine (REMERON) 15 MG tablet  Patient-reported dosage and instructions? 1 time a day  How many days do you have left? 9  Preferred Pharmacy? [address] [place] [address] [address]  Pharmacy phone number (if available)? [phone]  Additional Information for Provider? needs refilled, also please see 2   medication questions sent seperate  ---------------------------------------------------------------------------  --------------  [address] [employer]  What is the best way for the office to contact you? OK to leave message on   voicemail  Preferred Call Back Phone Number?  [phone]

## 2021-10-18 NOTE — TELEPHONE ENCOUNTER
Medication:   Requested Prescriptions     Pending Prescriptions Disp Refills    mirtazapine (REMERON) 15 MG tablet 30 tablet 1     Sig: TAKE ONE TABLET BY MOUTH DAILY        Last Filled: 9/29/2021      Patient Phone Number: [phone] (home) [phone] (work)    Last appt: 9/23/2021   Next appt: 12/23/2021    Last OARRS:   RX Monitoring 5/11/2020   Attestation -   Periodic Controlled Substance Monitoring No signs of potential drug abuse or diversion identified.

## 2021-10-18 NOTE — TELEPHONE ENCOUNTER
----- Message from 364Anne-Marie Catalino Jasmine sent at 10/18/2021 12:58 PM EDT -----  Subject: Medication Problem    QUESTIONS  Name of Medication? cloNIDine (CATAPRES) 0.1 MG tablet  Patient-reported dosage and instructions? 3 times a day  What question or problem do you have with the medication? Per daughter    upped to 3 times a day, But the pharmacy still only has the 2 a day   prescription. Please send updated script to [address] [place] [address] Pharmacy? [email or web address] [address], [address] [facility] phone number (if available)? [phone]  Additional Information for Provider? ALSO please see refill requests sent   separately   ---------------------------------------------------------------------------  --------------  CALL BACK INFO  What is the best way for the office to contact you? OK to leave message on   voicemail  Preferred Call Back Phone Number? [phone]  ---------------------------------------------------------------------------  --------------  SCRIPT ANSWERS  Relationship to Patient? Other  Representative Name? Elenita Farhan - daughter  Is the Representative on the appropriate HIPAA document in Epic?  Yes

## 2021-10-18 NOTE — TELEPHONE ENCOUNTER
----- Message from Jose Bae Dr sent at 10/18/2021  1:04 PM EDT -----  Subject: Medication Problem    QUESTIONS  Name of Medication? oxybutynin (DITROPAN) 5 MG tablet  Patient-reported dosage and instructions? 1 time a day  What question or problem do you have with the medication? Once a day   prescription ok, the Twice a day one needs removed and NOT filled. Preferred Pharmacy? [facility] [address], [address] [facility] phone number (if available)? [phone]  Additional Information for Provider? Please also see 1 other med question   and refil request  ---------------------------------------------------------------------------  --------------  CALL BACK INFO  What is the best way for the office to contact you? OK to leave message on   voicemail  Preferred Call Back Phone Number? [phone]  ---------------------------------------------------------------------------  --------------  SCRIPT ANSWERS  Relationship to Patient? Other  Representative Name? [facility] - daughter  Is the Representative on the appropriate HIPAA document in Epic?  Yes

## 2021-10-19 RX ORDER — MIRTAZAPINE 15 MG/1
TABLET, FILM COATED ORAL
Qty: 30 TABLET | Refills: 1 | Status: SHIPPED | OUTPATIENT
Start: 2021-10-19 | End: 2022-05-24

## 2021-10-22 DIAGNOSIS — I10 ESSENTIAL HYPERTENSION: ICD-10-CM

## 2021-10-22 RX ORDER — CLONIDINE HYDROCHLORIDE 0.1 MG/1
0.1 TABLET ORAL 2 TIMES DAILY
Qty: 60 TABLET | Refills: 2 | Status: SHIPPED | OUTPATIENT
Start: 2021-10-22 | End: 2021-10-26

## 2021-10-22 NOTE — TELEPHONE ENCOUNTER
cloNIDine (CATAPRES) 0.1 MG tablet [[id number]]    [facility] [address], [place] - [address], [address] [place] [address]   Phone:  [phone]  Fax:  [phone]

## 2021-10-26 ENCOUNTER — TELEPHONE (OUTPATIENT)
Dept: FAMILY MEDICINE CLINIC | Age: 76
End: 2021-10-26

## 2021-10-26 DIAGNOSIS — F41.9 ANXIETY: ICD-10-CM

## 2021-10-26 DIAGNOSIS — I10 ESSENTIAL HYPERTENSION: ICD-10-CM

## 2021-10-26 RX ORDER — CLONAZEPAM 1 MG/1
1 TABLET ORAL 2 TIMES DAILY PRN
Qty: 60 TABLET | Refills: 0 | Status: SHIPPED | OUTPATIENT
Start: 2021-10-26 | End: 2021-11-29 | Stop reason: SDUPTHER

## 2021-10-26 RX ORDER — CLONAZEPAM 1 MG/1
1 TABLET ORAL 2 TIMES DAILY PRN
Qty: 90 TABLET | Refills: 0 | Status: CANCELLED | OUTPATIENT
Start: 2021-10-26 | End: 2021-11-25

## 2021-10-26 RX ORDER — CLONIDINE HYDROCHLORIDE 0.1 MG/1
0.1 TABLET ORAL 3 TIMES DAILY
Qty: 90 TABLET | Refills: 2 | Status: SHIPPED | OUTPATIENT
Start: 2021-10-26 | End: 2022-06-13 | Stop reason: SDUPTHER

## 2021-10-26 NOTE — TELEPHONE ENCOUNTER
----- Message from Lydia Daniel sent at 10/25/2021 12:28 PM EDT -----  Subject: Refill Request    QUESTIONS  Name of Medication? cloNIDine (CATAPRES) 0.1 MG tablet  Patient-reported dosage and instructions? 2 x daily  How many days do you have left? 0  Preferred Pharmacy? [address] [place] [address] 736  Pharmacy phone number (if available)? [phone]  Additional Information for Provider? Pt takes 3x a daily instead of 2 a   day  ---------------------------------------------------------------------------  --------------  CALL BACK INFO  What is the best way for the office to contact you? OK to leave message on   voicemail  Preferred Call Back Phone Number?  [phone]

## 2021-10-26 NOTE — TELEPHONE ENCOUNTER
Medication:   Requested Prescriptions     Pending Prescriptions Disp Refills    clonazePAM (KLONOPIN) 1 MG tablet 60 tablet 0     Sig: Take 1 tablet by mouth 2 times daily as needed for Anxiety for up to 30 days. Last Filled: 9/22/2021     Patient Phone Number: [phone] (home) [phone] (work)    Last appt: 9/23/2021   Next appt: 12/23/2021    Last OARRS:   RX Monitoring 5/11/2020   Attestation -   Periodic Controlled Substance Monitoring No signs of potential drug abuse or diversion identified.

## 2021-11-01 ENCOUNTER — NURSE ONLY (OUTPATIENT)
Dept: CARDIOLOGY CLINIC | Age: 76
End: 2021-11-01
Payer: COMMERCIAL

## 2021-11-01 DIAGNOSIS — I48.0 PAF (PAROXYSMAL ATRIAL FIBRILLATION) (HCC): ICD-10-CM

## 2021-11-01 DIAGNOSIS — Z45.09 ENCOUNTER FOR ELECTRONIC ANALYSIS OF REVEAL EVENT RECORDER: ICD-10-CM

## 2021-11-02 PROCEDURE — 93298 REM INTERROG DEV EVAL SCRMS: CPT | Performed by: INTERNAL MEDICINE

## 2021-11-02 PROCEDURE — G2066 INTER DEVC REMOTE 30D: HCPCS | Performed by: INTERNAL MEDICINE

## 2021-11-29 ENCOUNTER — TELEPHONE (OUTPATIENT)
Dept: FAMILY MEDICINE CLINIC | Age: 76
End: 2021-11-29

## 2021-11-29 DIAGNOSIS — F41.9 ANXIETY: ICD-10-CM

## 2021-11-29 RX ORDER — CLONAZEPAM 1 MG/1
1 TABLET ORAL 2 TIMES DAILY PRN
Qty: 60 TABLET | Refills: 0 | Status: SHIPPED | OUTPATIENT
Start: 2021-11-29 | End: 2021-12-23 | Stop reason: SDUPTHER

## 2021-11-29 NOTE — TELEPHONE ENCOUNTER
----- Message from Jaimee Ahmet sent at 11/26/2021  1:25 PM EST -----  Subject: Refill Request    QUESTIONS  Name of Medication? clonazePAM (KLONOPIN) 1 MG tablet  Patient-reported dosage and instructions? Take 1 tablet by mouth 2 times   daily as needed for Anxiety for up to 30 days.     How many days do you have left? 0  Preferred Pharmacy? [facility] 637  Pharmacy phone number (if available)? [phone]  ---------------------------------------------------------------------------  --------------  Jesus SADLER  What is the best way for the office to contact you? OK to leave message on   voicemail  Preferred Call Back Phone Number?  [phone]

## 2021-11-29 NOTE — TELEPHONE ENCOUNTER
----- Message from Patricio Ba sent at 11/26/2021  1:25 PM EST -----  Subject: Refill Request    QUESTIONS  Name of Medication? clonazePAM (KLONOPIN) 1 MG tablet  Patient-reported dosage and instructions? Take 1 tablet by mouth 2 times   daily as needed for Anxiety for up to 30 days.     How many days do you have left? 0  Preferred Pharmacy? [address] [place] [address] [address]  Pharmacy phone number (if available)? [phone]  ---------------------------------------------------------------------------  --------------  Bobo SADLER  What is the best way for the office to contact you? OK to leave message on   voicemail  Preferred Call Back Phone Number?  [phone]

## 2021-12-06 ENCOUNTER — NURSE ONLY (OUTPATIENT)
Dept: CARDIOLOGY CLINIC | Age: 76
End: 2021-12-06
Payer: COMMERCIAL

## 2021-12-06 DIAGNOSIS — Z45.09 ENCOUNTER FOR ELECTRONIC ANALYSIS OF REVEAL EVENT RECORDER: ICD-10-CM

## 2021-12-06 DIAGNOSIS — I48.91 ATRIAL FIBRILLATION, UNSPECIFIED TYPE (HCC): ICD-10-CM

## 2021-12-07 PROCEDURE — G2066 INTER DEVC REMOTE 30D: HCPCS | Performed by: INTERNAL MEDICINE

## 2021-12-07 PROCEDURE — 93298 REM INTERROG DEV EVAL SCRMS: CPT | Performed by: INTERNAL MEDICINE

## 2021-12-23 ENCOUNTER — OFFICE VISIT (OUTPATIENT)
Dept: FAMILY MEDICINE CLINIC | Age: 76
End: 2021-12-23
Payer: COMMERCIAL

## 2021-12-23 VITALS
WEIGHT: 134 LBS | OXYGEN SATURATION: 96 % | HEART RATE: 83 BPM | DIASTOLIC BLOOD PRESSURE: 92 MMHG | TEMPERATURE: 96.9 F | SYSTOLIC BLOOD PRESSURE: 152 MMHG | BODY MASS INDEX: 20.99 KG/M2

## 2021-12-23 DIAGNOSIS — I10 HTN (HYPERTENSION), BENIGN: Primary | ICD-10-CM

## 2021-12-23 DIAGNOSIS — F41.9 ANXIETY: ICD-10-CM

## 2021-12-23 PROCEDURE — 99214 OFFICE O/P EST MOD 30 MIN: CPT | Performed by: NURSE PRACTITIONER

## 2021-12-23 RX ORDER — CLONAZEPAM 1 MG/1
1 TABLET ORAL 2 TIMES DAILY PRN
Qty: 60 TABLET | Refills: 0 | Status: SHIPPED | OUTPATIENT
Start: 2021-12-23 | End: 2022-01-31

## 2021-12-23 NOTE — PROGRESS NOTES
Fang Solares  : 1945  Encounter date: 2021    This efrain 67 y.o. female who presents with  Chief Complaint   Patient presents with    3 Month Follow-Up     HTN/med check       History of present illness:    HPI Pt is 67year old female for medication recheck on clonazepam for anxiety and hypertension. Pt reports rushing to get her, therefore pressure is elevated 176/110.  reports upcoming urology procedure, says a preop is not needed. Pt reports she has not been checking BP at home, daughter usually monitors and no longer has home health RN visiting. Pt believes she has been taking medication as prescribed. Reiterated that she is to be taking clonidine 0.1 mg TID, she thinks it is only BID. Pt reports she is always a very anxious person, discussed that will stay with the clonazepam twice daily vs more due to higher risk for fall and sedation. Current Outpatient Medications on File Prior to Visit   Medication Sig Dispense Refill    cloNIDine (CATAPRES) 0.1 MG tablet Take 1 tablet by mouth 3 times daily 90 tablet 2    mirtazapine (REMERON) 15 MG tablet TAKE ONE TABLET BY MOUTH DAILY 30 tablet 1    oxybutynin (DITROPAN) 5 MG tablet TAKE ONE TABLET BY MOUTH THREE TIMES A  tablet 0    carvedilol (COREG) 12.5 MG tablet Take 1 tablet by mouth 2 times daily 60 tablet 3    levothyroxine (SYNTHROID) 100 MCG tablet TAKE ONE TABLET BY MOUTH DAILY 90 tablet 0    atorvastatin (LIPITOR) 80 MG tablet TAKE ONE TABLET BY MOUTH DAILY 90 tablet 0    losartan (COZAAR) 100 MG tablet TAKE ONE TABLET BY MOUTH DAILY 90 tablet 0    aspirin 81 MG chewable tablet Take 1 tablet by mouth daily 30 tablet 0    budesonide-formoterol (SYMBICORT) 160-4.5 MCG/ACT AERO Inhale 2 puffs into the lungs 2 times daily 1 Inhaler 0     No current facility-administered medications on file prior to visit.       Allergies   Allergen Reactions    Lipitor [Atorvastatin] Other (See Comments)     Weakness, severe    Morphine Shortness Of Breath    Keflex [Cephalexin] Other (See Comments)     SOB & bilateral arms shaking     Hctz [Hydrochlorothiazide] Other (See Comments)     Hyponatremia, severe      Norvasc [Amlodipine Besylate] Swelling     Of neck    Penicillins Hives    Tramadol Itching    Hydralazine Palpitations and Other (See Comments)     Dizzy,fatigue,headaches,palpitations,loss of appetite    Shellfish-Derived Products Swelling and Rash     Swelling of neck     Past Medical History:   Diagnosis Date    Acute DVT (deep venous thrombosis) ([facility]) 7/3/2015    Acute encephalopathy 4/19/2018    Acute on chronic diastolic heart failure ([facility] [address] 3/30/2019    Alcohol abuse     Anxiety     Arthritis     CAD in native artery     Cellulitis 4/28/2018    Cerebral artery occlusion with cerebral infarction ([facility])     states hx of multiple mini strokes    Cerebrovascular accident (CVA) ([facility]     Chronic fatigue     Complex care coordination     Complicated UTI (urinary tract infection)     COPD (chronic obstructive pulmonary disease) ([facility])     COPD exacerbation ([facility] 2/20/2018    Depression     Diabetes mellitus ([facility]     DIMAS (dyspnea on exertion) 7/3/2015    DVT (deep venous thrombosis) ([facility])     DVT, lower extremity (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 Yessy Brito; repeat 5 years    EYE SURGERY      cateracts removed    SHOULDER ARTHROSCOPY Right 6/18/14    SUBACROMIAL DECOMPRESSION, ROTATOR CUFF REPAIR    TOE SURGERY Right     TONSILLECTOMY        Family History   Problem Relation Age of Onset    Heart Disease Father         MI @ 64    Alcohol Abuse Father       Social History     Tobacco Use    Smoking status: Current Every Day Smoker     Packs/day: 1.00     Years: 52.00     Pack years: 52.00     Types: Cigarettes     Last attempt to quit: 7/1/2018     Years since quitting: 3.4    Smokeless tobacco: Never Used    Tobacco comment: started smoking at age 26 / smoked up to 2 p,p,d / now smoking 4 to 8 cigarettes a day,   Substance Use Topics    Alcohol use: No     Alcohol/week: 0.0 standard drinks     Comment: patient reports she is an alcoholic hasn't had anything to drink 3-4 months        Review of Systems    Objective:    BP (!) 152/92   Pulse 83   Temp 96.9 °F (36.1 °C)   Wt 134 lb (60.8 kg)   SpO2 96%   BMI 20.99 kg/m²   Weight: 134 lb (60.8 kg)     BP Readings from Last 3 Encounters:   12/23/21 (!) 152/92   09/23/21 (!) 158/86   09/01/21 (!) 133/97     Wt Readings from Last 3 Encounters:   12/23/21 134 lb (60.8 kg)   09/23/21 128 lb (58.1 kg)   09/01/21 125 lb (56.7 kg)     BMI Readings from Last 3 Encounters:   12/23/21 20.99 kg/m²   09/23/21 20.05 kg/m²   09/01/21 19.58 kg/m²       Physical Exam  Vitals reviewed. Constitutional:       Appearance: Normal appearance. She is well-developed. Cardiovascular:      Rate and Rhythm: Normal rate and regular rhythm. Pulses: Normal pulses. Heart sounds: Normal heart sounds. No murmur heard. Pulmonary:      Effort: Pulmonary effort is normal.      Breath sounds: Normal breath sounds. Musculoskeletal:      Right lower leg: No edema. Left lower leg: No edema. Skin:     General: Skin is warm and dry. Neurological:      Mental Status: She is alert and oriented to person, place, and time. Mental status is at baseline.    Psychiatric:         Mood and Affect: Mood normal. Assessment/Plan    1. HTN (hypertension), benign  Uncontrolled  OARRS reviewed  Reviewed medications and dosages  Copy of updated meds provided  Advised daily monitoring    2. Anxiety  Uncontrolled  Advised stress relieving tactics, breathing exercises  Limit caffeine  - clonazePAM (KLONOPIN) 1 MG tablet; Take 1 tablet by mouth 2 times daily as needed for Anxiety for up to 30 days. Dispense: 60 tablet; Refill: 0      No follow-ups on file. This dictation was generated by voice recognition computer software. Although all attempts are made to edit the dictation for accuracy, there may be errors in the transcription that are not intended.

## 2021-12-28 ENCOUNTER — HOSPITAL ENCOUNTER (OUTPATIENT)
Age: 76
Discharge: HOME | End: 2021-12-28
Payer: COMMERCIAL

## 2021-12-28 PROCEDURE — U0005 INFEC AGEN DETEC AMPLI PROBE: HCPCS

## 2021-12-28 PROCEDURE — U0003 INFECTIOUS AGENT DETECTION BY NUCLEIC ACID (DNA OR RNA); SEVERE ACUTE RESPIRATORY SYNDROME CORONAVIRUS 2 (SARS-COV-2) (CORONAVIRUS DISEASE [COVID-19]), AMPLIFIED PROBE TECHNIQUE, MAKING USE OF HIGH THROUGHPUT TECHNOLOGIES AS DESCRIBED BY CMS-2020-01-R: HCPCS

## 2021-12-29 ENCOUNTER — HOSPITAL ENCOUNTER (OUTPATIENT)
Age: 76
Setting detail: OUTPATIENT SURGERY
Discharge: HOME | End: 2021-12-29
Attending: UROLOGY | Admitting: UROLOGY
Payer: COMMERCIAL

## 2021-12-29 ENCOUNTER — ANESTHESIA EVENT (OUTPATIENT)
Dept: OPERATING ROOM | Age: 76
End: 2021-12-29
Payer: COMMERCIAL

## 2021-12-29 ENCOUNTER — APPOINTMENT (OUTPATIENT)
Dept: GENERAL RADIOLOGY | Age: 76
End: 2021-12-29
Attending: UROLOGY
Payer: COMMERCIAL

## 2021-12-29 ENCOUNTER — ANESTHESIA (OUTPATIENT)
Dept: OPERATING ROOM | Age: 76
End: 2021-12-29
Payer: COMMERCIAL

## 2021-12-29 VITALS
HEIGHT: 67 IN | DIASTOLIC BLOOD PRESSURE: 92 MMHG | OXYGEN SATURATION: 93 % | HEART RATE: 79 BPM | TEMPERATURE: 97.2 F | BODY MASS INDEX: 20.8 KG/M2 | SYSTOLIC BLOOD PRESSURE: 158 MMHG | RESPIRATION RATE: 14 BRPM | WEIGHT: 132.5 LBS

## 2021-12-29 VITALS
RESPIRATION RATE: 7 BRPM | SYSTOLIC BLOOD PRESSURE: 117 MMHG | OXYGEN SATURATION: 99 % | DIASTOLIC BLOOD PRESSURE: 67 MMHG

## 2021-12-29 DIAGNOSIS — N32.81 OVERACTIVE BLADDER: Primary | ICD-10-CM

## 2021-12-29 LAB
GLUCOSE BLD-MCNC: 93 MG/DL (ref 70–99)
PERFORMED ON: NORMAL
SARS-COV-2, NAAT: NOT DETECTED
SARS-COV-2: NOT DETECTED

## 2021-12-29 PROCEDURE — 7100000000 HC PACU RECOVERY - FIRST 15 MIN: Performed by: UROLOGY

## 2021-12-29 PROCEDURE — 87635 SARS-COV-2 COVID-19 AMP PRB: CPT

## 2021-12-29 PROCEDURE — 2500000003 HC RX 250 WO HCPCS: Performed by: NURSE ANESTHETIST, CERTIFIED REGISTERED

## 2021-12-29 PROCEDURE — 2720000010 HC SURG SUPPLY STERILE: Performed by: UROLOGY

## 2021-12-29 PROCEDURE — 6370000000 HC RX 637 (ALT 250 FOR IP)

## 2021-12-29 PROCEDURE — 6360000002 HC RX W HCPCS: Performed by: ANESTHESIOLOGY

## 2021-12-29 PROCEDURE — 7100000011 HC PHASE II RECOVERY - ADDTL 15 MIN: Performed by: UROLOGY

## 2021-12-29 PROCEDURE — 6360000002 HC RX W HCPCS: Performed by: NURSE ANESTHETIST, CERTIFIED REGISTERED

## 2021-12-29 PROCEDURE — 2580000003 HC RX 258: Performed by: UROLOGY

## 2021-12-29 PROCEDURE — 7100000010 HC PHASE II RECOVERY - FIRST 15 MIN: Performed by: UROLOGY

## 2021-12-29 PROCEDURE — C1778 LEAD, NEUROSTIMULATOR: HCPCS | Performed by: UROLOGY

## 2021-12-29 PROCEDURE — 6360000002 HC RX W HCPCS: Performed by: UROLOGY

## 2021-12-29 PROCEDURE — 2709999900 HC NON-CHARGEABLE SUPPLY: Performed by: UROLOGY

## 2021-12-29 PROCEDURE — C1883 ADAPT/EXT, PACING/NEURO LEAD: HCPCS | Performed by: UROLOGY

## 2021-12-29 PROCEDURE — 3700000000 HC ANESTHESIA ATTENDED CARE: Performed by: UROLOGY

## 2021-12-29 PROCEDURE — 7100000001 HC PACU RECOVERY - ADDTL 15 MIN: Performed by: UROLOGY

## 2021-12-29 PROCEDURE — 3600000012 HC SURGERY LEVEL 2 ADDTL 15MIN: Performed by: UROLOGY

## 2021-12-29 PROCEDURE — 3209999900 FLUORO FOR SURGICAL PROCEDURES

## 2021-12-29 PROCEDURE — 3700000001 HC ADD 15 MINUTES (ANESTHESIA): Performed by: UROLOGY

## 2021-12-29 PROCEDURE — 2500000003 HC RX 250 WO HCPCS: Performed by: UROLOGY

## 2021-12-29 PROCEDURE — 3600000002 HC SURGERY LEVEL 2 BASE: Performed by: UROLOGY

## 2021-12-29 DEVICE — KIT LEAD SURE SCAN INTERSTIM MRI: Type: IMPLANTABLE DEVICE | Status: FUNCTIONAL

## 2021-12-29 RX ORDER — FENTANYL CITRATE 50 UG/ML
25 INJECTION, SOLUTION INTRAMUSCULAR; INTRAVENOUS EVERY 5 MIN PRN
Status: DISCONTINUED | OUTPATIENT
Start: 2021-12-29 | End: 2021-12-29 | Stop reason: HOSPADM

## 2021-12-29 RX ORDER — ONDANSETRON 2 MG/ML
4 INJECTION INTRAMUSCULAR; INTRAVENOUS
Status: DISCONTINUED | OUTPATIENT
Start: 2021-12-29 | End: 2021-12-29 | Stop reason: HOSPADM

## 2021-12-29 RX ORDER — LABETALOL HYDROCHLORIDE 5 MG/ML
5 INJECTION, SOLUTION INTRAVENOUS EVERY 10 MIN PRN
Status: DISCONTINUED | OUTPATIENT
Start: 2021-12-29 | End: 2021-12-29 | Stop reason: HOSPADM

## 2021-12-29 RX ORDER — CIPROFLOXACIN 2 MG/ML
400 INJECTION, SOLUTION INTRAVENOUS
Status: COMPLETED | OUTPATIENT
Start: 2021-12-29 | End: 2021-12-29

## 2021-12-29 RX ORDER — ONDANSETRON 2 MG/ML
INJECTION INTRAMUSCULAR; INTRAVENOUS PRN
Status: DISCONTINUED | OUTPATIENT
Start: 2021-12-29 | End: 2021-12-29 | Stop reason: SDUPTHER

## 2021-12-29 RX ORDER — AMOXICILLIN 250 MG
1 CAPSULE ORAL 2 TIMES DAILY
Qty: 30 TABLET | Refills: 1 | Status: ON HOLD | OUTPATIENT
Start: 2021-12-29 | End: 2022-01-23

## 2021-12-29 RX ORDER — FENTANYL CITRATE 50 UG/ML
50 INJECTION, SOLUTION INTRAMUSCULAR; INTRAVENOUS EVERY 5 MIN PRN
Status: DISCONTINUED | OUTPATIENT
Start: 2021-12-29 | End: 2021-12-29 | Stop reason: HOSPADM

## 2021-12-29 RX ORDER — HYDROCODONE BITARTRATE AND ACETAMINOPHEN 5; 325 MG/1; MG/1
1 TABLET ORAL ONCE
Status: COMPLETED | OUTPATIENT
Start: 2021-12-29 | End: 2021-12-29

## 2021-12-29 RX ORDER — PROPOFOL 10 MG/ML
INJECTION, EMULSION INTRAVENOUS PRN
Status: DISCONTINUED | OUTPATIENT
Start: 2021-12-29 | End: 2021-12-29 | Stop reason: SDUPTHER

## 2021-12-29 RX ORDER — HYDROCODONE BITARTRATE AND ACETAMINOPHEN 5; 325 MG/1; MG/1
TABLET ORAL
Status: COMPLETED
Start: 2021-12-29 | End: 2021-12-29

## 2021-12-29 RX ORDER — MEPERIDINE HYDROCHLORIDE 25 MG/ML
12.5 INJECTION INTRAMUSCULAR; INTRAVENOUS; SUBCUTANEOUS EVERY 5 MIN PRN
Status: DISCONTINUED | OUTPATIENT
Start: 2021-12-29 | End: 2021-12-29 | Stop reason: HOSPADM

## 2021-12-29 RX ORDER — SUCCINYLCHOLINE/SOD CL,ISO/PF 200MG/10ML
SYRINGE (ML) INTRAVENOUS PRN
Status: DISCONTINUED | OUTPATIENT
Start: 2021-12-29 | End: 2021-12-29 | Stop reason: SDUPTHER

## 2021-12-29 RX ORDER — BUPIVACAINE HYDROCHLORIDE 5 MG/ML
INJECTION, SOLUTION EPIDURAL; INTRACAUDAL
Status: COMPLETED | OUTPATIENT
Start: 2021-12-29 | End: 2021-12-29

## 2021-12-29 RX ORDER — LIDOCAINE HYDROCHLORIDE 10 MG/ML
0.5 INJECTION, SOLUTION EPIDURAL; INFILTRATION; INTRACAUDAL; PERINEURAL ONCE
Status: DISCONTINUED | OUTPATIENT
Start: 2021-12-29 | End: 2021-12-29 | Stop reason: HOSPADM

## 2021-12-29 RX ORDER — PHENYLEPHRINE HCL IN 0.9% NACL 1 MG/10 ML
SYRINGE (ML) INTRAVENOUS PRN
Status: DISCONTINUED | OUTPATIENT
Start: 2021-12-29 | End: 2021-12-29 | Stop reason: SDUPTHER

## 2021-12-29 RX ORDER — FENTANYL CITRATE 50 UG/ML
INJECTION, SOLUTION INTRAMUSCULAR; INTRAVENOUS PRN
Status: DISCONTINUED | OUTPATIENT
Start: 2021-12-29 | End: 2021-12-29 | Stop reason: SDUPTHER

## 2021-12-29 RX ORDER — DEXAMETHASONE SODIUM PHOSPHATE 4 MG/ML
INJECTION, SOLUTION INTRA-ARTICULAR; INTRALESIONAL; INTRAMUSCULAR; INTRAVENOUS; SOFT TISSUE PRN
Status: DISCONTINUED | OUTPATIENT
Start: 2021-12-29 | End: 2021-12-29 | Stop reason: SDUPTHER

## 2021-12-29 RX ORDER — LIDOCAINE HYDROCHLORIDE 10 MG/ML
INJECTION, SOLUTION INFILTRATION; PERINEURAL
Status: COMPLETED | OUTPATIENT
Start: 2021-12-29 | End: 2021-12-29

## 2021-12-29 RX ORDER — HYDROCODONE BITARTRATE AND ACETAMINOPHEN 5; 325 MG/1; MG/1
1 TABLET ORAL EVERY 6 HOURS PRN
Qty: 20 TABLET | Refills: 0 | Status: SHIPPED | OUTPATIENT
Start: 2021-12-29 | End: 2022-01-03

## 2021-12-29 RX ORDER — SODIUM CHLORIDE, SODIUM LACTATE, POTASSIUM CHLORIDE, CALCIUM CHLORIDE 600; 310; 30; 20 MG/100ML; MG/100ML; MG/100ML; MG/100ML
INJECTION, SOLUTION INTRAVENOUS CONTINUOUS
Status: DISCONTINUED | OUTPATIENT
Start: 2021-12-29 | End: 2021-12-29 | Stop reason: HOSPADM

## 2021-12-29 RX ORDER — LIDOCAINE HYDROCHLORIDE 20 MG/ML
INJECTION, SOLUTION EPIDURAL; INFILTRATION; INTRACAUDAL; PERINEURAL PRN
Status: DISCONTINUED | OUTPATIENT
Start: 2021-12-29 | End: 2021-12-29 | Stop reason: SDUPTHER

## 2021-12-29 RX ADMIN — DEXAMETHASONE SODIUM PHOSPHATE 8 MG: 4 INJECTION, SOLUTION INTRAMUSCULAR; INTRAVENOUS at 08:10

## 2021-12-29 RX ADMIN — HYDROCODONE BITARTRATE AND ACETAMINOPHEN 1 TABLET: 5; 325 TABLET ORAL at 10:30

## 2021-12-29 RX ADMIN — Medication 200 MCG: at 08:40

## 2021-12-29 RX ADMIN — ONDANSETRON 4 MG: 2 INJECTION INTRAMUSCULAR; INTRAVENOUS at 08:13

## 2021-12-29 RX ADMIN — LIDOCAINE HYDROCHLORIDE 40 MG: 20 INJECTION, SOLUTION EPIDURAL; INFILTRATION; INTRACAUDAL; PERINEURAL at 08:05

## 2021-12-29 RX ADMIN — Medication 100 MCG: at 09:01

## 2021-12-29 RX ADMIN — FENTANYL CITRATE 50 MCG: 50 INJECTION, SOLUTION INTRAMUSCULAR; INTRAVENOUS at 07:59

## 2021-12-29 RX ADMIN — SODIUM CHLORIDE, POTASSIUM CHLORIDE, SODIUM LACTATE AND CALCIUM CHLORIDE: 600; 310; 30; 20 INJECTION, SOLUTION INTRAVENOUS at 07:53

## 2021-12-29 RX ADMIN — Medication 100 MCG: at 08:51

## 2021-12-29 RX ADMIN — CIPROFLOXACIN 400 MG: 2 INJECTION, SOLUTION INTRAVENOUS at 07:48

## 2021-12-29 RX ADMIN — Medication 140 MG: at 08:05

## 2021-12-29 RX ADMIN — PROPOFOL 80 MG: 10 INJECTION, EMULSION INTRAVENOUS at 08:05

## 2021-12-29 RX ADMIN — FENTANYL CITRATE 50 MCG: 50 INJECTION, SOLUTION INTRAMUSCULAR; INTRAVENOUS at 09:28

## 2021-12-29 RX ADMIN — SODIUM CHLORIDE, POTASSIUM CHLORIDE, SODIUM LACTATE AND CALCIUM CHLORIDE: 600; 310; 30; 20 INJECTION, SOLUTION INTRAVENOUS at 08:57

## 2021-12-29 ASSESSMENT — PULMONARY FUNCTION TESTS
PIF_VALUE: 20
PIF_VALUE: 15
PIF_VALUE: 1
PIF_VALUE: 18
PIF_VALUE: 17
PIF_VALUE: 2
PIF_VALUE: 17
PIF_VALUE: 0
PIF_VALUE: 21
PIF_VALUE: 19
PIF_VALUE: 15
PIF_VALUE: 17
PIF_VALUE: 17
PIF_VALUE: 14
PIF_VALUE: 0
PIF_VALUE: 15
PIF_VALUE: 19
PIF_VALUE: 17
PIF_VALUE: 30
PIF_VALUE: 18
PIF_VALUE: 18
PIF_VALUE: 19
PIF_VALUE: 17
PIF_VALUE: 1
PIF_VALUE: 18
PIF_VALUE: 9
PIF_VALUE: 14
PIF_VALUE: 10
PIF_VALUE: 1
PIF_VALUE: 18
PIF_VALUE: 14
PIF_VALUE: 18
PIF_VALUE: 17
PIF_VALUE: 18
PIF_VALUE: 18
PIF_VALUE: 23
PIF_VALUE: 21
PIF_VALUE: 21
PIF_VALUE: 0
PIF_VALUE: 17
PIF_VALUE: 17
PIF_VALUE: 19
PIF_VALUE: 18
PIF_VALUE: 17
PIF_VALUE: 18
PIF_VALUE: 1
PIF_VALUE: 18
PIF_VALUE: 17
PIF_VALUE: 19
PIF_VALUE: 18
PIF_VALUE: 26
PIF_VALUE: 1
PIF_VALUE: 15
PIF_VALUE: 19
PIF_VALUE: 20
PIF_VALUE: 20
PIF_VALUE: 1
PIF_VALUE: 15
PIF_VALUE: 14
PIF_VALUE: 18
PIF_VALUE: 19
PIF_VALUE: 19
PIF_VALUE: 18
PIF_VALUE: 17
PIF_VALUE: 16
PIF_VALUE: 19
PIF_VALUE: 2
PIF_VALUE: 15
PIF_VALUE: 30
PIF_VALUE: 20
PIF_VALUE: 17
PIF_VALUE: 17
PIF_VALUE: 18
PIF_VALUE: 17
PIF_VALUE: 20
PIF_VALUE: 18
PIF_VALUE: 18
PIF_VALUE: 14
PIF_VALUE: 19
PIF_VALUE: 1

## 2021-12-29 ASSESSMENT — PAIN DESCRIPTION - DESCRIPTORS
DESCRIPTORS: ACHING
DESCRIPTORS: ACHING

## 2021-12-29 ASSESSMENT — PAIN SCALES - GENERAL
PAINLEVEL_OUTOF10: 2
PAINLEVEL_OUTOF10: 0
PAINLEVEL_OUTOF10: 8
PAINLEVEL_OUTOF10: 5

## 2021-12-29 ASSESSMENT — PAIN DESCRIPTION - FREQUENCY
FREQUENCY: CONTINUOUS
FREQUENCY: CONTINUOUS

## 2021-12-29 ASSESSMENT — PAIN DESCRIPTION - LOCATION
LOCATION: HEAD
LOCATION: HEAD

## 2021-12-29 ASSESSMENT — PAIN - FUNCTIONAL ASSESSMENT
PAIN_FUNCTIONAL_ASSESSMENT: ACTIVITIES ARE NOT PREVENTED
PAIN_FUNCTIONAL_ASSESSMENT: 0-10

## 2021-12-29 ASSESSMENT — PAIN DESCRIPTION - PAIN TYPE
TYPE: ACUTE PAIN
TYPE: SURGICAL PAIN
TYPE: ACUTE PAIN

## 2021-12-29 ASSESSMENT — PAIN DESCRIPTION - PROGRESSION: CLINICAL_PROGRESSION: GRADUALLY IMPROVING

## 2021-12-29 ASSESSMENT — ENCOUNTER SYMPTOMS: SHORTNESS OF BREATH: 1

## 2021-12-29 ASSESSMENT — PAIN DESCRIPTION - ONSET: ONSET: GRADUAL

## 2021-12-29 NOTE — PROGRESS NOTES
Pt assisted with dressing and ambulation into wheel chair per . Pt tolerating PO fluids and crackers well. Mid lower back dressing scant amount of drainage on dressing, stimulator in place with belt. Surgical incision well approximated with surgical glue, no drainage or swelling noted. Pt pain well controlled and no report of nausea at time of discharge. Pt discharged in stable condition via wheel chair to  to be transported home.

## 2021-12-29 NOTE — H&P
Urology Preoperative History & Physical - Update  [facility]    The history and physical exam was reviewed. The patient was seen and examined in pre-op. Exam-NAD, heart (normal rate) and lungs (nonlabored breathing) were both normal.  exam from clinic reviewed, today's  exam deferred to surgery. She had a chance to ask questions which were answered. There has been no interval changes. Plan to continue to the OR for cysto, Stage 1 interstim.     Electronically signed by: Davon Gaxiola MD, ANGELINA 12/29/2021   [facility]  Office Contact: [phone]

## 2021-12-29 NOTE — OP NOTE
mapping of the sacral region which included marking out midline of the sacrum, SI joints, sciatic notches, medial foramenal borders and sacral foramena. Local injection of 50:50 mixture of 0.5% marcaine and 1% lidocaine was injected into the needle location sites. A finder needle was introduced approximately 2 cm above sciatic notch and 2 cm lateral to sacral midline, feeling for foramenal margins until the S3 foramen was identified and penetrated. The depth of the Foramen Needle was confirmed and adjusted fluoroscopically. Proper needle position was confirmed by direct observation of the lifting of the perineum or bellowing, and observation of plantar flexion of the great toe utilizing the external Test Stimulator. The Foramen Needle stylet was removed and a directional guide was placed and confirmed fluoroscopically. The Foramen Needle was removed. An incision was made peripherally to the directional guide through the fascial layer. The Lead Introducer sheath with dilator was placed over the directional guide and directed into the foramen to ensure the radiopaque marker of the Lead Introducer did not extend beyond the anterior edge of the sacrum. The dilator was unlocked and removed along with the directional guide. The lead was then placed through the introducer sheath to the first white line. Position was checked fluoroscopically. The lead was then further introduced until 3 electrodes were visible below the sacrum. Each electrode was tested for visualization of janelle, and plantar flexion of the great toe. After satisfactory positioning was confirmed, the introducer sheath was retracted under continuous fluoroscopy, deploying lead tines into the perisacral tissue. Further incision was made into subcutaneous tissue posterior to the iliac crest and lateral to the sacrum. Blunt dissection was continued until the gluteal fascia was identified and hemostasis was achieved.  A tunneling tool with straw was placed from the lead exit site subcutaneously to the incised pocket site. The tunneling tool was removed and the lead was fed through the straw and pulled out at the pocket site. The lead was cleansed of bodily fluids, dried and a protective boot was placed over the lead. The lead was inserted into the temporary percutaneous extension and the metal bands were aligned. The setscrew was tightened with the hex wrench. A tunnel was made subcutaneously and exited to a puncture site above the contra lateral buttock. The percutaneous extension was placed through the straw and exposed and connected, and secured with a drain stitch. The wounds were irrigated with antibiotic solution in water and closed with 3-0 vicryl in two running subcuticular layers. The skin was closed with 4-0 vicryl and dermabond. The lead exit location was covered with 4x4 guaze and Tegaderm to cover the incision. Gauze was placed under twist lock cable connector and covered with Tegaderm. At the end of the procedure all needle, lap, instrument and sponge counts were correct. The patient tolerated the procedure well and was transported to the PACU in stable condition. Findings: Normal cystourethroscopy, uncomplicated stage I InterStim    Estimated Blood Loss: Minimal                 Drains: none          Specimens: none    Complications: none apparent           Disposition:  PACU - hemodynamically stable.      Plan: Continue trial with InterStim this week, follow-up stage II pending result           Nilam Minaya MD, [facility]  12/29/2021

## 2021-12-29 NOTE — ANESTHESIA POSTPROCEDURE EVALUATION
Department of Anesthesiology  Postprocedure Note    Patient: Dora Bearden  MRN: [id number]  YOB: 1945  Date of evaluation: 12/29/2021  Time:  10:11 AM     Procedure Summary     Date: 12/29/21 Room / Location: [address] [place] [address]    Anesthesia Start: 0905 Anesthesia Stop: 6065    Procedure: INTERSTIM STAGE1, FLEXIBLE CYSTOSCOPY (N/A Back) Diagnosis: (N32.81  OVERACTIVE BLADDER)    Surgeons: Preet Jones MD Responsible Provider: Vinod Ochoa MD    Anesthesia Type: general ASA Status: 3          Anesthesia Type: general    Mahnaz Phase I: Mahnaz Score: 10    Mahnaz Phase II: Mahnaz Score: 10    Last vitals: Reviewed and per EMR flowsheets.        Anesthesia Post Evaluation    Patient location during evaluation: PACU  Patient participation: complete - patient participated  Level of consciousness: awake  Airway patency: patent  Nausea & Vomiting: no vomiting and no nausea  Complications: no  Cardiovascular status: hemodynamically stable  Respiratory status: acceptable  Hydration status: stable  Multimodal analgesia pain management approach

## 2021-12-29 NOTE — PROGRESS NOTES
Patient to PACU from OR. Alert, c/o pain. VSS. Surgical incisions/dressings to left back c/d/i. Will monitor.

## 2021-12-29 NOTE — PROGRESS NOTES
Discharge instructions reviewed with Eliceo Srinivasan () face to face, all questions answered and verbalized understanding of instructions. Script Norco and Colace placed in pt discharge folder. [employer] information and equipment sent with pt.

## 2021-12-29 NOTE — ANESTHESIA PRE PROCEDURE
ringers infusion   IntraVENous Continuous Leslye To MD        lidocaine PF 1 % injection 0.5 mL  0.5 mL IntraDERmal Once Leslye To MD           Allergies: Allergies   Allergen Reactions    Lipitor [Atorvastatin] Other (See Comments)     Weakness, severe    Morphine Shortness Of Breath    Keflex [Cephalexin] Other (See Comments)     SOB & bilateral arms shaking     Hctz [Hydrochlorothiazide] Other (See Comments)     Hyponatremia, severe      Norvasc [Amlodipine Besylate] Swelling     Of neck    Penicillins Hives    Tramadol Itching    Hydralazine Palpitations and Other (See Comments)     Dizzy,fatigue,headaches,palpitations,loss of appetite    Shellfish-Derived Products Swelling and Rash     Swelling of neck       Problem List:    Patient Active Problem List   Diagnosis Code    Hypothyroidism E03.9    Smoker F17.200    Anxiety F41.9    History of alcoholism ([facility] [address] F10.21    Pleural effusion J90    Centrilobular emphysema (HCC) J43.2    COPD, moderate ([facility]) J44.9    Encephalopathy G93.40    CAD in native artery I25.10    Chronic ischemic multifocal multiple vascular territories stroke I69.30    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 A    Oropharyngeal dysphagia R13.12    HTN (hypertension), benign I10    Dyslipidemia E78.5    Encounter for electronic analysis of reveal event recorder Z45.09    Physical deconditioning R53.81    PAF (paroxysmal atrial fibrillation) (formerly [facility]) I48.0    Vascular dementia, uncomplicated (formerly [facility]) Y97.06    Other insomnia G47.09    Transient alteration of awareness R40.4    Hyponatremia E87.1    Coronary artery disease due to lipid rich plaque I25.10, I25.83    Ataxia R27.0    Cord compression ([facility]) G95.20    MRSA infection A49.02    Weakness R53.1       Past Medical History:        Diagnosis Date    Acute DVT (deep venous thrombosis) ([facility]) 7/3/2015    Acute encephalopathy 4/19/2018    Acute on chronic diastolic heart failure ([facility] [address] 3/30/2019    Alcohol abuse     Anxiety     Arthritis     CAD in native artery     Cellulitis 4/28/2018    Cerebral artery occlusion with cerebral infarction ([facility])     states hx of multiple mini strokes    Cerebrovascular accident (CVA) ([facility] [address]     Chronic fatigue     Complex care coordination     Complicated UTI (urinary tract infection)     COPD (chronic obstructive pulmonary disease) ([facility])     COPD exacerbation ([facility] [address] 2/20/2018    Depression     Diabetes mellitus ([facility] [address]     DIMAS (dyspnea on exertion) 7/3/2015    DVT (deep venous thrombosis) ([facility])     DVT, lower extremity ([facility])     Fatigue 11/3/2015    General weakness 11/4/2015    Generalized weakness 8/22/2019    Hypercholesteremia     Hypertension     Hyperthyroidism     Incontinence 10/16/2012    Mammogram abnormal 2/7/2012    Neuromuscular disorder ([facility] [address]     Osteopenia 2/14/2017    Pneumonia     Recurrent UTI     Right forearm cellulitis     Superficial thrombophlebitis of right upper extremity     Thyroid disease     Tobacco abuse     Tobacco abuse counseling     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 Robert Dumont; repeat 5 years    EYE SURGERY      cateracts removed    PACEMAKER INSERTION      PACEMAKER PLACEMENT      SHOULDER ARTHROSCOPY Right 6/18/14    SUBACROMIAL DECOMPRESSION, ROTATOR CUFF REPAIR    TOE SURGERY Right     TONSILLECTOMY         Social History:    Social History     Tobacco Use    Smoking status: Current Every Day Smoker     Packs/day: 1.00     Years: 52.00     Pack years: 52.00     Types: Cigarettes     Last attempt to quit: 7/1/2018     Years since quitting: 3.4    Smokeless tobacco: Never Used    Tobacco comment: started smoking at age 26 / smoked up to 2 p,p,d / now smoking 4 to 8 cigarettes a day,   Substance Use Topics    Alcohol use: No     Alcohol/week: 0.0 standard drinks     Comment: patient reports she is an alcoholic hasn't had anything to drink 3-4 months                                Ready to quit: Not Answered  Counseling given: Not Answered  Comment: started smoking at age 26 / smoked up to 2 p,p,d / now smoking 4 to 8 cigarettes a day,      Vital Signs (Current):   Vitals:    12/27/21 1459 12/29/21 0712   BP:  (!) 192/112   Pulse:  83   Resp:  18   Temp:  96.8 °F (36 °C)   TempSrc:  Temporal   SpO2:  97%   Weight: 134 lb (60.8 kg) 132 lb 8 oz (60.1 kg)   Height: 5' 7\" (1.702 m)                                               BP Readings from Last 3 Encounters:   12/29/21 (!) 192/112   12/23/21 (!) 152/92   09/23/21 (!) 158/86       NPO Status:                                                                                 BMI:   Wt Readings from Last 3 Encounters:   12/29/21 132 lb 8 oz (60.1 kg)   12/23/21 134 lb (60.8 kg)   09/23/21 128 lb (58.1 kg)     Body mass index is 20.75 kg/m².     CBC:   Lab Results   Component Value Date    WBC 8.3 09/01/2021    RBC 4.36 09/01/2021    HGB 15.1 09/01/2021    HCT 43.1 09/01/2021    MCV 98.9 09/01/2021    RDW 13.8 09/01/2021     09/01/2021       CMP:   Lab Results   Component Value Date     09/01/2021    K 3.4 09/01/2021    CL 87 09/01/2021    CO2 31 09/01/2021    BUN 10 09/01/2021    CREATININE 0.6 09/01/2021    GFRAA >60 09/01/2021    GFRAA >60 05/02/2013    AGRATIO 1.4 09/01/2021    LABGLOM >60 09/01/2021    LABGLOM 79 12/11/2017    GLUCOSE 99 09/01/2021    PROT 7.3 09/01/2021    PROT 7.1 10/18/2012    CALCIUM 8.8 09/01/2021    BILITOT 0.4 09/01/2021    ALKPHOS 53 09/01/2021    AST 31 09/01/2021    ALT 17 09/01/2021       POC Tests: No results for input(s): POCGLU, POCNA, POCK, POCCL, POCBUN, POCHEMO, POCHCT in the last 72 hours. Coags:   Lab Results   Component Value Date    PROTIME 13.0 09/11/2019    INR 1.14 09/11/2019    APTT 61.4 03/28/2019       HCG (If Applicable): No results found for: PREGTESTUR, PREGSERUM, HCG, HCGQUANT     ABGs: No results found for: PHART, PO2ART, HDG7WEH, LRC7HFY, BEART, L2ICKMFD     Type & Screen (If Applicable):  No results found for: LABABO, LABRH    Drug/Infectious Status (If Applicable):  No results found for: HIV, HEPCAB    COVID-19 Screening (If Applicable):   Lab Results   Component Value Date    COVID19 Not Detected 09/01/2021           Anesthesia Evaluation  Patient summary reviewed and Nursing notes reviewed  Airway: Mallampati: III        Dental:          Pulmonary:   (+) COPD:  shortness of breath:                             Cardiovascular:  Exercise tolerance: poor (<4 METS),   (+) hypertension:, CAD:, DIMAS:,                ROS comment: Summary   Normal left ventricle size, wall thickness, and systolic function with an   estimated ejection fraction of 55-60%. No regional wall motion abnormalities   are seen. Ascending and descending aorta with wall thickening and mild nonmobile   protruding atheroma   The right atrial size is normal.   No evidence for endocarditis,no evidence for patent foramen ovale     Neuro/Psych:   (+) CVA:, neuromuscular disease:, psychiatric history:            GI/Hepatic/Renal:             Endo/Other:    (+) Diabetes, hypothyroidism::., .                 Abdominal:             Vascular: Other Findings:             Anesthesia Plan      general     ASA 3       Induction: intravenous.                           Kayce Nixon MD   12/29/2021

## 2021-12-29 NOTE — PROGRESS NOTES
Bedside report from Chaz Chao RN. Pt awake and alert, VSS. Bedside assessment complete, mid lower  back dressing clean dry and intact, surgical incision well approximated with surgical glue, no drainage/ bruising/ swelling noted. Pt denies pain and nausea at this time. Pt  brought back to bedside to sit with pt. Pt tolerating PO fluids well. [employer] folder and box  given to pt to take home for use.

## 2022-01-08 PROCEDURE — G2066 INTER DEVC REMOTE 30D: HCPCS | Performed by: INTERNAL MEDICINE

## 2022-01-08 PROCEDURE — 93298 REM INTERROG DEV EVAL SCRMS: CPT | Performed by: INTERNAL MEDICINE

## 2022-01-10 ENCOUNTER — NURSE ONLY (OUTPATIENT)
Dept: CARDIOLOGY CLINIC | Age: 77
End: 2022-01-10
Payer: COMMERCIAL

## 2022-01-10 DIAGNOSIS — Z45.09 ENCOUNTER FOR ELECTRONIC ANALYSIS OF REVEAL EVENT RECORDER: ICD-10-CM

## 2022-01-10 DIAGNOSIS — I48.0 PAF (PAROXYSMAL ATRIAL FIBRILLATION) (HCC): ICD-10-CM

## 2022-01-10 NOTE — PROGRESS NOTES
We received a remote transmission from patient's monitor at home. Remote Linq report shows SVT recording with no symptoms from patient. . AF is not real. EP physician to review. We will continue to monitor remotely.

## 2022-01-13 DIAGNOSIS — R32 INCONTINENCE IN FEMALE: Primary | ICD-10-CM

## 2022-01-18 NOTE — PROGRESS NOTES
Name_______________________________________Printed:____________________  Date and time of surgery [id number]_____[id number]_____Gfed______________Bebofjy Time:__1500______________   1. The instructions given regarding when and if a patient needs to stop oral intake prior to surgery varies. Follow the specific instructions you were given                  ___Nothing to eat or to drink after Midnight the night before.                   ____Carbo loading or ERAS instructions will be given to select patients-if you have been given those instructions -please do the following                           The evening before your surgery after dinner before midnight drink 40 ounces of gatorade. If you are diabetic use sugar free. The morning of surgery drink 40 ounces of water. This needs to be finished 3 hours prior to your surgery start time. 2. Take the following pills with a small sip of water on the morning of surgery______coreg and synthroid_____________________________________________                  Do not take blood pressure medications ending in pril or sartan the akil[place] prior to surgery or the morning of surgery_   3. Aspirin, Ibuprofen, Advil, Naproxen, Vitamin E and other Anti-inflammatory products and supplements should be stopped for 5 -7days before surgery or as directed by your physician. 4. Check with your Doctor regarding stopping Plavix, Coumadin,Eliquis, Lovenox,Effient,Pradaxa,Xarelto, Fragmin or other blood thinners and follow their instructions. 5. Do not smoke, and do not drink any alcoholic beverages 24 hours prior to surgery. This includes NA Beer. Refrain from the usage of any recreational drugs. 6. You may brush your teeth and gargle the morning of surgery. DO NOT SWALLOW WATER   7. You MUST make arrangements for a responsible adult to stay on site while you are here and take you home after your surgery. You will not be allowed to leave alone or drive yourself home.   It is strongly suggested someone stay with you the first 24 hrs. Your surgery will be cancelled if you do not have a ride home. 8. A parent/legal guardian must accompany a child scheduled for surgery and plan to stay at the hospital until the child is discharged. Please do not bring other children with you. 9. Please wear simple, loose fitting clothing to the hospital.  Neri Miller not bring valuables (money, credit cards, checkbooks, etc.) Do not wear any makeup (including no eye makeup) or nail polish on your fingers or toes. 10. DO NOT wear any jewelry or piercings on day of surgery. All body piercing jewelry must be removed. 11. If you have __x_dentures, they will be removed before going to the OR; we will provide you a container. If you wear ___contact lenses or _x__glasses, they will be removed; please bring a case for them. 12. Please see your family doctor/pediatrician for a history & physical and/or concerning medications. Bring any test results/reports from your physician's office. PCP____Kappa______________Phone___________H&P Appt. Date________             13 If you  have a Living Will and Durable Power of  for Healthcare, please bring in a copy. 15. Notify your Surgeon if you develop any illness between now and surgery  time, cough, cold, fever, sore throat, nausea, vomiting, etc.  Please notify your surgeon if you experience dizziness, shortness of breath or blurred vision between now & the time of your surgery             15. DO NOT shave your operative site 96 hours prior to surgery. For face & neck surgery, men may use an electric razor 48 hours prior to surgery. 16. Shower the night before or morning of surgery using an antibacterial soap or as you have been instructed. 17. To provide excellent care visitors will be limited to one in the room at any given time. 18.  Please bring picture ID and insurance card.              19.  Visit our web site for additional information:  [email or web address]              20.During flu season no children under the age of 15 are permitted in the hospital for the safety of all patients. 21. If you take a long acting insulin in the evening only  take half of your usual  dose the night  before your procedure              22. If you use a c-pap please bring DOS if staying overnight,             23.For your convenience [facility] has a pharmacy on site to fill your prescriptions. 24. If you use oxygen and have a portable tank please bring it  with you the DOS             25. Bring a complete list of all your medications with name and dose include any supplements. 26. Other__________________________________________   *Please call pre admission testing if you any further questions   [address] [place]  [phone]   [facility]    ___ Covid test to be done 3-5 days prior to scheduled surgery -patient aware they are REQUIRED to bring a copy of the negative result DOS-if they receive a positive result to notify their surgeon         If known - indicate where patient is getting covid test done ___________________________________________________________    _x Rapid - DOS    ___ Other___________________________________      [place]  POLICY(subject to change)    There is a one visitor policy at [facility] for all surgeries and endoscopies. Whether the visitor can stay or will be asked to wait in the car will depend on the current policy and if social distancing can be maintained. The policy is subject to change at any time. Please make sure the visitor has a cell phone that is on,charged and able to accept calls, as this may be the way that the staff communicates with them. Pain management is NO VISITOR policyThe patients ride is expected to remain in the car with a cell phone for communication. If the ride is leaving the hospital grounds please make sure they are back in time for pickup. Have the patient inform the staff on arrival what their rides plans are while the patient is in the facility. At the MAIN there is one visitor allowed. Please note that the visitor policy is subject to change. All above information reviewed with patient in person or by phone. Patient verbalizes understanding. All questions and concerns addressed.                                                                                                  Patient/Rep__Patient/  Florentino__________________                                                                                                                                    PRE OP INSTRUCTIONS

## 2022-01-20 ENCOUNTER — HOSPITAL ENCOUNTER (OUTPATIENT)
Age: 77
Setting detail: OUTPATIENT SURGERY
Discharge: HOME | End: 2022-01-20
Attending: UROLOGY | Admitting: UROLOGY
Payer: COMMERCIAL

## 2022-01-20 ENCOUNTER — ANESTHESIA EVENT (OUTPATIENT)
Dept: OPERATING ROOM | Age: 77
End: 2022-01-20
Payer: COMMERCIAL

## 2022-01-20 ENCOUNTER — ANESTHESIA (OUTPATIENT)
Dept: OPERATING ROOM | Age: 77
End: 2022-01-20
Payer: COMMERCIAL

## 2022-01-20 VITALS
BODY MASS INDEX: 19.45 KG/M2 | OXYGEN SATURATION: 95 % | RESPIRATION RATE: 18 BRPM | HEIGHT: 67 IN | DIASTOLIC BLOOD PRESSURE: 104 MMHG | HEART RATE: 53 BPM | SYSTOLIC BLOOD PRESSURE: 146 MMHG | TEMPERATURE: 96.8 F | WEIGHT: 123.9 LBS

## 2022-01-20 LAB — SARS-COV-2, NAAT: DETECTED

## 2022-01-20 PROCEDURE — 87635 SARS-COV-2 COVID-19 AMP PRB: CPT

## 2022-01-20 RX ORDER — HYDROMORPHONE HCL 110MG/55ML
0.5 PATIENT CONTROLLED ANALGESIA SYRINGE INTRAVENOUS EVERY 5 MIN PRN
Status: DISCONTINUED | OUTPATIENT
Start: 2022-01-20 | End: 2022-01-20 | Stop reason: HOSPADM

## 2022-01-20 RX ORDER — LIDOCAINE HYDROCHLORIDE 10 MG/ML
0.5 INJECTION, SOLUTION EPIDURAL; INFILTRATION; INTRACAUDAL; PERINEURAL ONCE
Status: DISCONTINUED | OUTPATIENT
Start: 2022-01-20 | End: 2022-01-20 | Stop reason: HOSPADM

## 2022-01-20 RX ORDER — FENTANYL CITRATE 50 UG/ML
50 INJECTION, SOLUTION INTRAMUSCULAR; INTRAVENOUS EVERY 5 MIN PRN
Status: DISCONTINUED | OUTPATIENT
Start: 2022-01-20 | End: 2022-01-20 | Stop reason: HOSPADM

## 2022-01-20 RX ORDER — LABETALOL HYDROCHLORIDE 5 MG/ML
5 INJECTION, SOLUTION INTRAVENOUS EVERY 10 MIN PRN
Status: DISCONTINUED | OUTPATIENT
Start: 2022-01-20 | End: 2022-01-20 | Stop reason: HOSPADM

## 2022-01-20 RX ORDER — DIPHENHYDRAMINE HYDROCHLORIDE 50 MG/ML
12.5 INJECTION INTRAMUSCULAR; INTRAVENOUS
Status: DISCONTINUED | OUTPATIENT
Start: 2022-01-20 | End: 2022-01-20 | Stop reason: HOSPADM

## 2022-01-20 RX ORDER — HYDROCODONE BITARTRATE AND ACETAMINOPHEN 5; 325 MG/1; MG/1
1 TABLET ORAL PRN
Status: DISCONTINUED | OUTPATIENT
Start: 2022-01-20 | End: 2022-01-20 | Stop reason: HOSPADM

## 2022-01-20 RX ORDER — HYDROMORPHONE HCL 110MG/55ML
0.25 PATIENT CONTROLLED ANALGESIA SYRINGE INTRAVENOUS EVERY 5 MIN PRN
Status: DISCONTINUED | OUTPATIENT
Start: 2022-01-20 | End: 2022-01-20 | Stop reason: HOSPADM

## 2022-01-20 RX ORDER — MEPERIDINE HYDROCHLORIDE 25 MG/ML
12.5 INJECTION INTRAMUSCULAR; INTRAVENOUS; SUBCUTANEOUS EVERY 5 MIN PRN
Status: DISCONTINUED | OUTPATIENT
Start: 2022-01-20 | End: 2022-01-20 | Stop reason: HOSPADM

## 2022-01-20 RX ORDER — ONDANSETRON 2 MG/ML
4 INJECTION INTRAMUSCULAR; INTRAVENOUS
Status: DISCONTINUED | OUTPATIENT
Start: 2022-01-20 | End: 2022-01-20 | Stop reason: HOSPADM

## 2022-01-20 RX ORDER — HYDROCODONE BITARTRATE AND ACETAMINOPHEN 5; 325 MG/1; MG/1
2 TABLET ORAL PRN
Status: DISCONTINUED | OUTPATIENT
Start: 2022-01-20 | End: 2022-01-20 | Stop reason: HOSPADM

## 2022-01-20 RX ORDER — FENTANYL CITRATE 50 UG/ML
25 INJECTION, SOLUTION INTRAMUSCULAR; INTRAVENOUS EVERY 5 MIN PRN
Status: DISCONTINUED | OUTPATIENT
Start: 2022-01-20 | End: 2022-01-20 | Stop reason: HOSPADM

## 2022-01-20 RX ORDER — PROMETHAZINE HYDROCHLORIDE 25 MG/ML
6.25 INJECTION, SOLUTION INTRAMUSCULAR; INTRAVENOUS
Status: DISCONTINUED | OUTPATIENT
Start: 2022-01-20 | End: 2022-01-20 | Stop reason: HOSPADM

## 2022-01-20 RX ORDER — SODIUM CHLORIDE, SODIUM LACTATE, POTASSIUM CHLORIDE, CALCIUM CHLORIDE 600; 310; 30; 20 MG/100ML; MG/100ML; MG/100ML; MG/100ML
INJECTION, SOLUTION INTRAVENOUS CONTINUOUS
Status: DISCONTINUED | OUTPATIENT
Start: 2022-01-20 | End: 2022-01-20 | Stop reason: HOSPADM

## 2022-01-20 RX ORDER — CIPROFLOXACIN 2 MG/ML
400 INJECTION, SOLUTION INTRAVENOUS
Status: DISCONTINUED | OUTPATIENT
Start: 2022-01-20 | End: 2022-01-20 | Stop reason: HOSPADM

## 2022-01-20 ASSESSMENT — PAIN - FUNCTIONAL ASSESSMENT: PAIN_FUNCTIONAL_ASSESSMENT: 0-10

## 2022-01-20 ASSESSMENT — COPD QUESTIONNAIRES: CAT_SEVERITY: MODERATE

## 2022-01-20 ASSESSMENT — ENCOUNTER SYMPTOMS: SHORTNESS OF BREATH: 1

## 2022-01-20 NOTE — PROGRESS NOTES
Rapid Covid test came back positive. Surgery cancelled. Lucita Cunningham from [place] speaking with patient and .

## 2022-01-20 NOTE — ANESTHESIA PRE PROCEDURE
Department of Anesthesiology  Preprocedure Note       Name:  Porsche Stokes   Age:  67 y.o.  :  1945                                          MRN:  [id number]         Date:  2022      Surgeon: Pan William):  Nixon Álvarez MD    Procedure: Procedure(s):  INTERSTIMULATOR INSERTION STAGE 2 - MEDTRONIC    Medications prior to admission:   Prior to Admission medications    Medication Sig Start Date End Date Taking? Authorizing Provider   senna-docusate (PERICOLACE) 8.6-50 MG per tablet Take 1 tablet by mouth 2 times daily For constipation while on pain medication. 21  Nixon Álvarez MD   clonazePAM (KLONOPIN) 1 MG tablet Take 1 tablet by mouth 2 times daily as needed for Anxiety for up to 30 days.  21  QUITA Holder NP   cloNIDine (CATAPRES) 0.1 MG tablet Take 1 tablet by mouth 3 times daily 10/26/21 12/27/21  QUITA Holder NP   mirtazapine (REMERON) 15 MG tablet TAKE ONE TABLET BY MOUTH DAILY 10/19/21   QUITA Lindsay NP   oxybutynin (DITROPAN) 5 MG tablet TAKE ONE TABLET BY MOUTH THREE TIMES A DAY 10/18/21   QUITA Wilson NP   carvedilol (COREG) 12.5 MG tablet Take 1 tablet by mouth 2 times daily 21   QUITA Holder NP   levothyroxine (SYNTHROID) 100 MCG tablet TAKE ONE TABLET BY MOUTH DAILY 21   QUITA Lindsay NP   atorvastatin (LIPITOR) 80 MG tablet TAKE ONE TABLET BY MOUTH DAILY 21   QUITA Lindsay NP   losartan (COZAAR) 100 MG tablet TAKE ONE TABLET BY MOUTH DAILY 21   QUITA Zamora NP   aspirin 81 MG chewable tablet Take 1 tablet by mouth daily 21   King Debo MD   budesonide-formoterol (SYMBICORT) 160-4.5 MCG/ACT AERO Inhale 2 puffs into the lungs 2 times daily 4/15/21   King Debo MD       Current medications:    Current Outpatient Medications   Medication Sig Dispense Refill    senna-docusate (PERICOLACE) 8.6-50 MG per tablet Take 1 tablet by mouth 2 times daily For constipation while on pain medication. 30 tablet 1    clonazePAM (KLONOPIN) 1 MG tablet Take 1 tablet by mouth 2 times daily as needed for Anxiety for up to 30 days. 60 tablet 0    cloNIDine (CATAPRES) 0.1 MG tablet Take 1 tablet by mouth 3 times daily 90 tablet 2    mirtazapine (REMERON) 15 MG tablet TAKE ONE TABLET BY MOUTH DAILY 30 tablet 1    oxybutynin (DITROPAN) 5 MG tablet TAKE ONE TABLET BY MOUTH THREE TIMES A  tablet 0    carvedilol (COREG) 12.5 MG tablet Take 1 tablet by mouth 2 times daily 60 tablet 3    levothyroxine (SYNTHROID) 100 MCG tablet TAKE ONE TABLET BY MOUTH DAILY 90 tablet 0    atorvastatin (LIPITOR) 80 MG tablet TAKE ONE TABLET BY MOUTH DAILY 90 tablet 0    losartan (COZAAR) 100 MG tablet TAKE ONE TABLET BY MOUTH DAILY 90 tablet 0    aspirin 81 MG chewable tablet Take 1 tablet by mouth daily 30 tablet 0    budesonide-formoterol (SYMBICORT) 160-4.5 MCG/ACT AERO Inhale 2 puffs into the lungs 2 times daily 1 Inhaler 0     No current facility-administered medications for this visit. Allergies:     Allergies   Allergen Reactions    Lipitor [Atorvastatin] Other (See Comments)     Weakness, severe    Morphine Shortness Of Breath    Keflex [Cephalexin] Other (See Comments)     SOB & bilateral arms shaking     Hctz [Hydrochlorothiazide] Other (See Comments)     Hyponatremia, severe      Norvasc [Amlodipine Besylate] Swelling     Of neck    Penicillins Hives    Tramadol Itching    Hydralazine Palpitations and Other (See Comments)     Dizzy,fatigue,headaches,palpitations,loss of appetite    Shellfish-Derived Products Swelling and Rash     Swelling of neck       Problem List:    Patient Active Problem List   Diagnosis Code    Hypothyroidism E03.9    Smoker F17.200    Anxiety F41.9    History of alcoholism ([address] F10.21    Pleural effusion J90    Centrilobular emphysema (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 A    Oropharyngeal dysphagia R13.12    HTN (hypertension), benign I10    Dyslipidemia E78.5    Encounter for electronic analysis of reveal event recorder Z45.09    Physical deconditioning R53.81    PAF (paroxysmal atrial fibrillation) ([facility]) I48.0    Vascular dementia, uncomplicated ([facility]) R29.72    Other insomnia G47.09    Transient alteration of awareness R40.4    Hyponatremia E87.1    Coronary artery disease due to lipid rich plaque I25.10, I25.83    Ataxia R27.0    Cord compression ([facility]) G95.20    MRSA infection A49.02    Weakness R53.1    Overactive bladder N32.81       Past Medical History:        Diagnosis Date    Acute DVT (deep venous thrombosis) ([facility]) 7/3/2015    Acute encephalopathy 4/19/2018    Acute on chronic diastolic heart failure ([facility] [address] 3/30/2019    Alcohol abuse     Anxiety     Arthritis     CAD in native artery     Cellulitis 4/28/2018    Cerebral artery occlusion with cerebral infarction ([facility])     states hx of multiple mini strokes    Cerebrovascular accident (CVA) ([facility] [address]     Chronic fatigue     Complex care coordination     Complicated UTI (urinary tract infection)     COPD (chronic obstructive pulmonary disease) ([facility] [facility]     COPD exacerbation ([facility] [facility] 2/20/2018    Depression     Diabetes mellitus ([facility] [facility]     denies    DIMAS (dyspnea on exertion) 7/3/2015    DVT (deep venous thrombosis) ([facility])     DVT, lower extremity ([facility])     Fatigue 11/3/2015    General weakness 11/4/2015    Generalized weakness 8/22/2019    Hypercholesteremia     Hypertension     Hyperthyroidism     Incontinence 10/16/2012    Mammogram abnormal 2/7/2012    Neuromuscular disorder ([address]     Osteopenia 2/14/2017    Pneumonia     Recurrent UTI     Right forearm cellulitis     Superficial thrombophlebitis of right upper extremity     Thyroid disease     Tobacco abuse     Tobacco abuse counseling     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 Sharmaine Talbert; repeat 5 years    EYE SURGERY      cateracts removed    NERVE SURGERY N/A 12/29/2021    Rimma Horn, FLEXIBLE CYSTOSCOPY performed by Jake Byrne MD at [address] [place] [address] ARTHROSCOPY Right 6/18/14    SUBACROMIAL DECOMPRESSION, ROTATOR CUFF REPAIR    TOE SURGERY Right     TONSILLECTOMY         Social History:    Social History     Tobacco Use    Smoking status: Current Every Day Smoker     Packs/day: 1.00     Years: 52.00     Pack years: 52.00     Types: Cigarettes     Last attempt to quit: 7/1/2018     Years since quitting: 3.5    Smokeless tobacco: Never Used    Tobacco comment: started smoking at age 26 / smoked up to 2 p,p,d / now smoking 4 to 8 cigarettes a day,   Substance Use Topics    Alcohol use: No     Alcohol/week: 0.0 standard drinks     Comment: patient reports she is an alcoholic hasn't had anything to drink 3-4 months                                Ready to quit: Not Answered  Counseling given: Not Answered  Comment: started smoking at age 26 / smoked up to 2 p,p,d / now smoking 4 to 8 cigarettes a day,      Vital Signs (Current): There were no vitals filed for this visit.                                            BP Readings from Last 3 Encounters:   12/29/21 (!) 158/92   12/29/21 117/67   12/23/21 (!) 152/92       NPO Status: BMI:   Wt Readings from Last 3 Encounters:   12/29/21 132 lb 8 oz (60.1 kg)   12/23/21 134 lb (60.8 kg)   09/23/21 128 lb (58.1 kg)     There is no height or weight on file to calculate BMI.    CBC:   Lab Results   Component Value Date    WBC 8.3 09/01/2021    RBC 4.36 09/01/2021    HGB 15.1 09/01/2021    HCT 43.1 09/01/2021    MCV 98.9 09/01/2021    RDW 13.8 09/01/2021     09/01/2021       CMP:   Lab Results   Component Value Date     09/01/2021    K 3.4 09/01/2021    CL 87 09/01/2021    CO2 31 09/01/2021    BUN 10 09/01/2021    CREATININE 0.6 09/01/2021    GFRAA >60 09/01/2021    GFRAA >60 05/02/2013    AGRATIO 1.4 09/01/2021    LABGLOM >60 09/01/2021    LABGLOM 79 12/11/2017    GLUCOSE 99 09/01/2021    PROT 7.3 09/01/2021    PROT 7.1 10/18/2012    CALCIUM 8.8 09/01/2021    BILITOT 0.4 09/01/2021    ALKPHOS 53 09/01/2021    AST 31 09/01/2021    ALT 17 09/01/2021       POC Tests: No results for input(s): POCGLU, POCNA, POCK, POCCL, POCBUN, POCHEMO, POCHCT in the last 72 hours.     Coags:   Lab Results   Component Value Date    PROTIME 13.0 09/11/2019    INR 1.14 09/11/2019    APTT 61.4 03/28/2019       HCG (If Applicable): No results found for: PREGTESTUR, PREGSERUM, HCG, HCGQUANT     ABGs: No results found for: PHART, PO2ART, LYG8RGG, RWD2HUH, BEART, E2RICDJG     Type & Screen (If Applicable):  No results found for: LABABO, LABRH    Drug/Infectious Status (If Applicable):  No results found for: HIV, HEPCAB    COVID-19 Screening (If Applicable):   Lab Results   Component Value Date    COVID19 Not Detected 12/29/2021    COVID19 Not Detected 12/28/2021           Anesthesia Evaluation  Patient summary reviewed and Nursing notes reviewed  Airway: Mallampati: III  TM distance: >3 FB   Neck ROM: full  Mouth opening: > = 3 FB Dental:          Pulmonary:   (+) COPD: moderate,  shortness of breath: no interval change,                             Cardiovascular:  Exercise tolerance: poor (<4 METS), (+) hypertension:, CAD: non-obstructive, DIMAS:,                ROS comment: Summary   Normal left ventricle size, wall thickness, and systolic function with an   estimated ejection fraction of 55-60%. No regional wall motion abnormalities   are seen. Ascending and descending aorta with wall thickening and mild nonmobile   protruding atheroma   The right atrial size is normal.   No evidence for endocarditis,no evidence for patent foramen ovale     Neuro/Psych:   (+) CVA: no interval change, neuromuscular disease:, psychiatric history:depression/anxiety             GI/Hepatic/Renal:             Endo/Other:    (+) DiabetesType II DM, well controlled, , hypothyroidism::., .                  ROS comment: ETOH ABUSE Abdominal:             Vascular:   + DVT, . Other Findings:               Anesthesia Plan      general     ASA 3       Induction: intravenous and rapid sequence. MIPS: Postoperative opioids intended, Prophylactic antiemetics administered and Postoperative trial extubation. Anesthetic plan and risks discussed with patient. Plan discussed with CRNA.                   Poonam Nash MD   1/20/2022

## 2022-01-20 NOTE — PROGRESS NOTES
Teaching / education initiated regarding perioperative experience, expectations, and pain management during stay. Patient verbalized understanding. Pt unable to state when she last took home medications with exception of coreg. Consent signed by Dr. Linda Barnes.

## 2022-01-20 NOTE — H&P
Urology Preoperative History & Physical - Update  [facility]    The history and physical exam was reviewed. The patient was seen and examined in pre-op. Exam-NAD, heart (normal rate) and lungs (nonlabored breathing) were both normal.  exam from clinic reviewed, today's  exam deferred to surgery. She had a chance to ask questions which were answered. There has been no interval changes. Plan to continue to the OR for stage II InterStim.     Electronically signed by: Travis Romero MD MD, ANGELINA 1/20/2022   [facility]  Office Contact: [phone]

## 2022-01-23 ENCOUNTER — APPOINTMENT (OUTPATIENT)
Dept: CT IMAGING | Age: 77
DRG: 643 | End: 2022-01-23
Payer: COMMERCIAL

## 2022-01-23 ENCOUNTER — HOSPITAL ENCOUNTER (INPATIENT)
Age: 77
LOS: 3 days | Discharge: HOME HEALTH | DRG: 643 | End: 2022-01-26
Attending: EMERGENCY MEDICINE | Admitting: INTERNAL MEDICINE
Payer: COMMERCIAL

## 2022-01-23 ENCOUNTER — APPOINTMENT (OUTPATIENT)
Dept: GENERAL RADIOLOGY | Age: 77
DRG: 643 | End: 2022-01-23
Payer: COMMERCIAL

## 2022-01-23 DIAGNOSIS — R11.11 NON-INTRACTABLE VOMITING WITHOUT NAUSEA, UNSPECIFIED VOMITING TYPE: ICD-10-CM

## 2022-01-23 DIAGNOSIS — E87.6 HYPOKALEMIA: ICD-10-CM

## 2022-01-23 DIAGNOSIS — G93.40 ENCEPHALOPATHY: Primary | ICD-10-CM

## 2022-01-23 DIAGNOSIS — U07.1 COVID-19 VIRUS INFECTION: ICD-10-CM

## 2022-01-23 DIAGNOSIS — E87.1 HYPONATREMIA: ICD-10-CM
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
## 2022-01-23 LAB
A/G RATIO: 1.4 (ref 1.1–2.2)
ABO/RH: NORMAL
ALBUMIN SERPL-MCNC: 4.2 G/DL (ref 3.4–5)
ALP BLD-CCNC: 55 U/L (ref 40–129)
ALT SERPL-CCNC: 14 U/L (ref 10–40)
ANION GAP SERPL CALCULATED.3IONS-SCNC: 11 MMOL/L (ref 3–16)
ANTIBODY SCREEN: NORMAL
AST SERPL-CCNC: 24 U/L (ref 15–37)
BASOPHILS ABSOLUTE: 0 K/UL (ref 0–0.2)
BASOPHILS RELATIVE PERCENT: 0.2 %
BILIRUB SERPL-MCNC: 0.4 MG/DL (ref 0–1)
BILIRUBIN URINE: NEGATIVE
BLOOD, URINE: NEGATIVE
BUN BLDV-MCNC: 7 MG/DL (ref 7–20)
C-REACTIVE PROTEIN: <3 MG/L (ref 0–5.1)
CALCIUM SERPL-MCNC: 9 MG/DL (ref 8.3–10.6)
CHLORIDE BLD-SCNC: 89 MMOL/L (ref 99–110)
CLARITY: ABNORMAL
CO2: 26 MMOL/L (ref 21–32)
COLOR: YELLOW
CREAT SERPL-MCNC: <0.5 MG/DL (ref 0.6–1.2)
D DIMER: 460 NG/ML DDU (ref 0–229)
EKG ATRIAL RATE: 99 BPM
EKG DIAGNOSIS: NORMAL
EKG P AXIS: 48 DEGREES
EKG P-R INTERVAL: 110 MS
EKG Q-T INTERVAL: 372 MS
EKG QRS DURATION: 70 MS
EKG QTC CALCULATION (BAZETT): 477 MS
EKG R AXIS: 29 DEGREES
EKG T AXIS: 33 DEGREES
EKG VENTRICULAR RATE: 99 BPM
EOSINOPHILS ABSOLUTE: 0.1 K/UL (ref 0–0.6)
EOSINOPHILS RELATIVE PERCENT: 1 %
EPITHELIAL CELLS, UA: 8 /HPF (ref 0–5)
GFR AFRICAN AMERICAN: >60
GFR NON-AFRICAN AMERICAN: >60
GLUCOSE BLD-MCNC: 103 MG/DL (ref 70–99)
GLUCOSE URINE: NEGATIVE MG/DL
HCT VFR BLD CALC: 42.7 % (ref 36–48)
HEMOGLOBIN: 14.8 G/DL (ref 12–16)
HYALINE CASTS: 6 /LPF (ref 0–8)
KETONES, URINE: NEGATIVE MG/DL
LEUKOCYTE ESTERASE, URINE: NEGATIVE
LIPASE: 17 U/L (ref 13–60)
LYMPHOCYTES ABSOLUTE: 0.6 K/UL (ref 1–5.1)
LYMPHOCYTES RELATIVE PERCENT: 8.8 %
MAGNESIUM: 2 MG/DL (ref 1.8–2.4)
MCH RBC QN AUTO: 35 PG (ref 26–34)
MCHC RBC AUTO-ENTMCNC: 34.5 G/DL (ref 31–36)
MCV RBC AUTO: 101.4 FL (ref 80–100)
MICROSCOPIC EXAMINATION: YES
MONOCYTES ABSOLUTE: 0.9 K/UL (ref 0–1.3)
MONOCYTES RELATIVE PERCENT: 12.6 %
NEUTROPHILS ABSOLUTE: 5.7 K/UL (ref 1.7–7.7)
NEUTROPHILS RELATIVE PERCENT: 77.4 %
NITRITE, URINE: NEGATIVE
PDW BLD-RTO: 13.1 % (ref 12.4–15.4)
PH UA: 7 (ref 5–8)
PLATELET # BLD: 375 K/UL (ref 135–450)
PMV BLD AUTO: 8.1 FL (ref 5–10.5)
POTASSIUM REFLEX MAGNESIUM: 3.3 MMOL/L (ref 3.5–5.1)
PROCALCITONIN: 0.08 NG/ML (ref 0–0.15)
PROTEIN UA: NEGATIVE MG/DL
RBC # BLD: 4.22 M/UL (ref 4–5.2)
RBC UA: 1 /HPF (ref 0–4)
SODIUM BLD-SCNC: 126 MMOL/L (ref 136–145)
SODIUM BLD-SCNC: 129 MMOL/L (ref 136–145)
SPECIFIC GRAVITY UA: 1 (ref 1–1.03)
TOTAL PROTEIN: 7.2 G/DL (ref 6.4–8.2)
TROPONIN: <0.01 NG/ML
URINE REFLEX TO CULTURE: ABNORMAL
URINE TYPE: ABNORMAL
UROBILINOGEN, URINE: 0.2 E.U./DL
WBC # BLD: 7.4 K/UL (ref 4–11)
WBC UA: 2 /HPF (ref 0–5)

## 2022-01-23 PROCEDURE — 86900 BLOOD TYPING SEROLOGIC ABO: CPT

## 2022-01-23 PROCEDURE — 99285 EMERGENCY DEPT VISIT HI MDM: CPT

## 2022-01-23 PROCEDURE — 85379 FIBRIN DEGRADATION QUANT: CPT

## 2022-01-23 PROCEDURE — 84145 PROCALCITONIN (PCT): CPT

## 2022-01-23 PROCEDURE — 87449 NOS EACH ORGANISM AG IA: CPT

## 2022-01-23 PROCEDURE — 71045 X-RAY EXAM CHEST 1 VIEW: CPT

## 2022-01-23 PROCEDURE — 83690 ASSAY OF LIPASE: CPT

## 2022-01-23 PROCEDURE — 84295 ASSAY OF SERUM SODIUM: CPT

## 2022-01-23 PROCEDURE — 1200000000 HC SEMI PRIVATE

## 2022-01-23 PROCEDURE — 93005 ELECTROCARDIOGRAM TRACING: CPT | Performed by: EMERGENCY MEDICINE

## 2022-01-23 PROCEDURE — 93010 ELECTROCARDIOGRAM REPORT: CPT | Performed by: INTERNAL MEDICINE

## 2022-01-23 PROCEDURE — 86850 RBC ANTIBODY SCREEN: CPT

## 2022-01-23 PROCEDURE — 84443 ASSAY THYROID STIM HORMONE: CPT

## 2022-01-23 PROCEDURE — 74176 CT ABD & PELVIS W/O CONTRAST: CPT

## 2022-01-23 PROCEDURE — 82607 VITAMIN B-12: CPT

## 2022-01-23 PROCEDURE — 6370000000 HC RX 637 (ALT 250 FOR IP): Performed by: EMERGENCY MEDICINE

## 2022-01-23 PROCEDURE — 36415 COLL VENOUS BLD VENIPUNCTURE: CPT

## 2022-01-23 PROCEDURE — 6370000000 HC RX 637 (ALT 250 FOR IP): Performed by: NURSE PRACTITIONER

## 2022-01-23 PROCEDURE — 82746 ASSAY OF FOLIC ACID SERUM: CPT

## 2022-01-23 PROCEDURE — 6370000000 HC RX 637 (ALT 250 FOR IP): Performed by: INTERNAL MEDICINE

## 2022-01-23 PROCEDURE — 84439 ASSAY OF FREE THYROXINE: CPT

## 2022-01-23 PROCEDURE — 82728 ASSAY OF FERRITIN: CPT

## 2022-01-23 PROCEDURE — 84484 ASSAY OF TROPONIN QUANT: CPT

## 2022-01-23 PROCEDURE — 96374 THER/PROPH/DIAG INJ IV PUSH: CPT

## 2022-01-23 PROCEDURE — 72125 CT NECK SPINE W/O DYE: CPT

## 2022-01-23 PROCEDURE — 2580000003 HC RX 258: Performed by: NURSE PRACTITIONER

## 2022-01-23 PROCEDURE — 6360000002 HC RX W HCPCS: Performed by: NURSE PRACTITIONER

## 2022-01-23 PROCEDURE — 81001 URINALYSIS AUTO W/SCOPE: CPT

## 2022-01-23 PROCEDURE — 87040 BLOOD CULTURE FOR BACTERIA: CPT

## 2022-01-23 PROCEDURE — 70450 CT HEAD/BRAIN W/O DYE: CPT

## 2022-01-23 PROCEDURE — 6360000002 HC RX W HCPCS: Performed by: INTERNAL MEDICINE

## 2022-01-23 PROCEDURE — 86901 BLOOD TYPING SEROLOGIC RH(D): CPT

## 2022-01-23 PROCEDURE — 94640 AIRWAY INHALATION TREATMENT: CPT

## 2022-01-23 PROCEDURE — 80053 COMPREHEN METABOLIC PANEL: CPT

## 2022-01-23 PROCEDURE — 87086 URINE CULTURE/COLONY COUNT: CPT

## 2022-01-23 PROCEDURE — 83735 ASSAY OF MAGNESIUM: CPT

## 2022-01-23 PROCEDURE — 85025 COMPLETE CBC W/AUTO DIFF WBC: CPT

## 2022-01-23 PROCEDURE — 86140 C-REACTIVE PROTEIN: CPT

## 2022-01-23 PROCEDURE — 2580000003 HC RX 258: Performed by: INTERNAL MEDICINE

## 2022-01-23 RX ORDER — ASPIRIN 81 MG/1
324 TABLET, CHEWABLE ORAL ONCE
Status: COMPLETED | OUTPATIENT
Start: 2022-01-23 | End: 2022-01-23

## 2022-01-23 RX ORDER — SODIUM CHLORIDE 0.9 % (FLUSH) 0.9 %
5-40 SYRINGE (ML) INJECTION EVERY 12 HOURS SCHEDULED
Status: DISCONTINUED | OUTPATIENT
Start: 2022-01-23 | End: 2022-01-26 | Stop reason: HOSPADM

## 2022-01-23 RX ORDER — CLONAZEPAM 0.5 MG/1
1 TABLET ORAL 2 TIMES DAILY PRN
Status: DISCONTINUED | OUTPATIENT
Start: 2022-01-23 | End: 2022-01-26 | Stop reason: HOSPADM

## 2022-01-23 RX ORDER — LEVOTHYROXINE SODIUM 0.1 MG/1
100 TABLET ORAL DAILY
Status: DISCONTINUED | OUTPATIENT
Start: 2022-01-24 | End: 2022-01-26 | Stop reason: HOSPADM

## 2022-01-23 RX ORDER — BUDESONIDE AND FORMOTEROL FUMARATE DIHYDRATE 160; 4.5 UG/1; UG/1
2 AEROSOL RESPIRATORY (INHALATION) 2 TIMES DAILY
Status: DISCONTINUED | OUTPATIENT
Start: 2022-01-23 | End: 2022-01-26 | Stop reason: HOSPADM

## 2022-01-23 RX ORDER — ACETAMINOPHEN 500 MG
1000 TABLET ORAL ONCE
Status: COMPLETED | OUTPATIENT
Start: 2022-01-23 | End: 2022-01-23

## 2022-01-23 RX ORDER — SODIUM CHLORIDE 9 MG/ML
25 INJECTION, SOLUTION INTRAVENOUS PRN
Status: DISCONTINUED | OUTPATIENT
Start: 2022-01-23 | End: 2022-01-26 | Stop reason: HOSPADM

## 2022-01-23 RX ORDER — CLONIDINE HYDROCHLORIDE 0.1 MG/1
0.1 TABLET ORAL ONCE
Status: COMPLETED | OUTPATIENT
Start: 2022-01-23 | End: 2022-01-23

## 2022-01-23 RX ORDER — ASPIRIN 81 MG/1
81 TABLET, CHEWABLE ORAL DAILY
Status: DISCONTINUED | OUTPATIENT
Start: 2022-01-24 | End: 2022-01-26 | Stop reason: HOSPADM

## 2022-01-23 RX ORDER — ONDANSETRON 4 MG/1
4 TABLET, ORALLY DISINTEGRATING ORAL EVERY 8 HOURS PRN
Status: DISCONTINUED | OUTPATIENT
Start: 2022-01-23 | End: 2022-01-26 | Stop reason: HOSPADM

## 2022-01-23 RX ORDER — ATORVASTATIN CALCIUM 80 MG/1
80 TABLET, FILM COATED ORAL NIGHTLY
Status: DISCONTINUED | OUTPATIENT
Start: 2022-01-23 | End: 2022-01-26 | Stop reason: HOSPADM

## 2022-01-23 RX ORDER — VITAMIN B COMPLEX
2000 TABLET ORAL DAILY
Status: DISCONTINUED | OUTPATIENT
Start: 2022-01-23 | End: 2022-01-26 | Stop reason: HOSPADM

## 2022-01-23 RX ORDER — ONDANSETRON 2 MG/ML
4 INJECTION INTRAMUSCULAR; INTRAVENOUS EVERY 6 HOURS PRN
Status: DISCONTINUED | OUTPATIENT
Start: 2022-01-23 | End: 2022-01-26 | Stop reason: HOSPADM

## 2022-01-23 RX ORDER — CLONIDINE HYDROCHLORIDE 0.1 MG/1
0.1 TABLET ORAL 3 TIMES DAILY
Status: DISCONTINUED | OUTPATIENT
Start: 2022-01-23 | End: 2022-01-26 | Stop reason: HOSPADM

## 2022-01-23 RX ORDER — CARVEDILOL 3.12 MG/1
6.25 TABLET ORAL 2 TIMES DAILY WITH MEALS
Status: DISCONTINUED | OUTPATIENT
Start: 2022-01-23 | End: 2022-01-26

## 2022-01-23 RX ORDER — GUAIFENESIN/DEXTROMETHORPHAN 100-10MG/5
5 SYRUP ORAL EVERY 4 HOURS PRN
Status: DISCONTINUED | OUTPATIENT
Start: 2022-01-23 | End: 2022-01-26 | Stop reason: HOSPADM

## 2022-01-23 RX ORDER — IPRATROPIUM BROMIDE AND ALBUTEROL SULFATE 2.5; .5 MG/3ML; MG/3ML
1 SOLUTION RESPIRATORY (INHALATION) EVERY 4 HOURS PRN
Status: DISCONTINUED | OUTPATIENT
Start: 2022-01-23 | End: 2022-01-26 | Stop reason: HOSPADM

## 2022-01-23 RX ORDER — ACETAMINOPHEN 650 MG/1
650 SUPPOSITORY RECTAL EVERY 6 HOURS PRN
Status: DISCONTINUED | OUTPATIENT
Start: 2022-01-23 | End: 2022-01-26 | Stop reason: HOSPADM

## 2022-01-23 RX ORDER — SODIUM CHLORIDE 0.9 % (FLUSH) 0.9 %
5-40 SYRINGE (ML) INJECTION PRN
Status: DISCONTINUED | OUTPATIENT
Start: 2022-01-23 | End: 2022-01-26 | Stop reason: HOSPADM

## 2022-01-23 RX ORDER — AMLODIPINE BESYLATE 5 MG/1
5 TABLET ORAL DAILY
Status: DISCONTINUED | OUTPATIENT
Start: 2022-01-23 | End: 2022-01-23

## 2022-01-23 RX ORDER — POTASSIUM CHLORIDE 20 MEQ/1
40 TABLET, EXTENDED RELEASE ORAL EVERY 4 HOURS
Status: COMPLETED | OUTPATIENT
Start: 2022-01-23 | End: 2022-01-23

## 2022-01-23 RX ORDER — ACETAMINOPHEN 325 MG/1
650 TABLET ORAL EVERY 6 HOURS PRN
Status: DISCONTINUED | OUTPATIENT
Start: 2022-01-23 | End: 2022-01-26 | Stop reason: HOSPADM

## 2022-01-23 RX ORDER — ONDANSETRON 2 MG/ML
4 INJECTION INTRAMUSCULAR; INTRAVENOUS EVERY 30 MIN PRN
Status: DISCONTINUED | OUTPATIENT
Start: 2022-01-23 | End: 2022-01-23

## 2022-01-23 RX ORDER — 0.9 % SODIUM CHLORIDE 0.9 %
1000 INTRAVENOUS SOLUTION INTRAVENOUS ONCE
Status: COMPLETED | OUTPATIENT
Start: 2022-01-23 | End: 2022-01-23

## 2022-01-23 RX ORDER — POLYETHYLENE GLYCOL 3350 17 G/17G
17 POWDER, FOR SOLUTION ORAL DAILY PRN
Status: DISCONTINUED | OUTPATIENT
Start: 2022-01-23 | End: 2022-01-26 | Stop reason: HOSPADM

## 2022-01-23 RX ADMIN — POTASSIUM CHLORIDE 40 MEQ: 20 TABLET, EXTENDED RELEASE ORAL at 15:26

## 2022-01-23 RX ADMIN — CARVEDILOL 6.25 MG: 3.12 TABLET, FILM COATED ORAL at 15:27

## 2022-01-23 RX ADMIN — ACETAMINOPHEN 1000 MG: 500 TABLET ORAL at 08:59

## 2022-01-23 RX ADMIN — CLONAZEPAM 1 MG: 0.5 TABLET ORAL at 18:05

## 2022-01-23 RX ADMIN — ASPIRIN 81 MG 324 MG: 81 TABLET ORAL at 11:17

## 2022-01-23 RX ADMIN — POTASSIUM CHLORIDE 40 MEQ: 20 TABLET, EXTENDED RELEASE ORAL at 18:05

## 2022-01-23 RX ADMIN — ENOXAPARIN SODIUM 30 MG: 30 INJECTION SUBCUTANEOUS at 15:27

## 2022-01-23 RX ADMIN — ONDANSETRON 4 MG: 2 INJECTION INTRAMUSCULAR; INTRAVENOUS at 08:54

## 2022-01-23 RX ADMIN — CLONIDINE HYDROCHLORIDE 0.1 MG: 0.1 TABLET ORAL at 21:11

## 2022-01-23 RX ADMIN — Medication 10 ML: at 15:32

## 2022-01-23 RX ADMIN — Medication 2 PUFF: at 22:02

## 2022-01-23 RX ADMIN — SODIUM CHLORIDE 1000 ML: 9 INJECTION, SOLUTION INTRAVENOUS at 08:54

## 2022-01-23 RX ADMIN — Medication 2000 UNITS: at 15:27

## 2022-01-23 RX ADMIN — CLONIDINE HYDROCHLORIDE 0.1 MG: 0.1 TABLET ORAL at 11:00

## 2022-01-23 RX ADMIN — Medication 10 ML: at 21:11

## 2022-01-23 ASSESSMENT — PAIN SCALES - GENERAL
PAINLEVEL_OUTOF10: 0
PAINLEVEL_OUTOF10: 5
PAINLEVEL_OUTOF10: 4
PAINLEVEL_OUTOF10: 5

## 2022-01-23 ASSESSMENT — ENCOUNTER SYMPTOMS
VOMITING: 1
COUGH: 0
VOMITING: 0
CHEST TIGHTNESS: 0
NAUSEA: 1
PHOTOPHOBIA: 0
SHORTNESS OF BREATH: 0
EYE REDNESS: 0
SORE THROAT: 0
RHINORRHEA: 0
DIARRHEA: 0
CONSTIPATION: 0
EYE PAIN: 0
ABDOMINAL PAIN: 0
BLOOD IN STOOL: 0
BACK PAIN: 0
NAUSEA: 0

## 2022-01-23 ASSESSMENT — PAIN DESCRIPTION - LOCATION: LOCATION: HEAD

## 2022-01-23 ASSESSMENT — PAIN DESCRIPTION - DESCRIPTORS: DESCRIPTORS: ACHING

## 2022-01-23 NOTE — PROGRESS NOTES
Aspiration Screen    Name: Abdullahi George  : 1945  Medical Diagnosis: Hyponatremia [E87.1]    Swallow screen to rule out aspiration completed per pneumonia protocol. Patient demonstrates no significant high risk indicators for potential aspiration per swallow screen at this time. No further swallowing intervention is warranted at this time. Continue current diet as tolerated. Consulted with current nurse who reported pt is not experiencing any difficulties with swallowing at this time. Please consult speech therapy for bedside swallow evaluation if symptoms of swallowing dysfunction arise. Thanks   Emanuel GARVIN  [address] [place] [address] [id number]  Speech-Language Pathologist   2022 11:15 AM

## 2022-01-23 NOTE — ED PROVIDER NOTES
[address] [facility]        Pt Name: Elizabeth Estrada  MRN: [id number]  [place] 1945  Date of evaluation: 1/23/2022  Provider: QUITA Keith CNP  PCP: QUITA Nuñez NP    This patient was seen and evaluated by the attending physician Olga Coats, [facility]       Chief Complaint   Patient presents with    Fatigue     Pt reports emesis and feeling weak/fatigued for about 1 week now. Denies diarrhea or fever. Fall yesterday d/t weakness. Denies LOC    Emesis       HISTORY OF PRESENT ILLNESS   (Location/Symptom, Timing/Onset,Context/Setting, Quality, Duration, Modifying Factors, Severity)  Note limiting factors. Elizabeth Estrada is a 67 y.o. female who presents emergency department with concern for vomiting. Present for about 3 days. She reports no associated diarrhea or fever. She is able to keep down water. Additionally, the patient reports that she fell yesterday morning when getting up to get coffee. She is uncertain of the cause of her fall. She does report that she hit her head but denies loss of consciousness. She is on aspirin. She reports she lives with her daughter. She denies any possible exposure to COVID, however medical record shows that she tested positive for COVID 3 days ago. She denies rash, headaches, weakness, dizziness, chest pain, shortness of breath, cough, congestion, abdominal pain, nausea, diarrhea, constipation, blood in the stool, or painful urination. No family at bedside. Nursing Notes triage note reviewed and agreed with or any disagreements were addressed  in the HPI. REVIEW OF SYSTEMS    (2-9 systems for level 4, 10 or more for level 5)     Review of Systems   Constitutional: Negative for chills and fever. HENT: Negative for postnasal drip, rhinorrhea and sore throat. Eyes: Negative for visual disturbance.    Respiratory: Negative for shortness of breath. Cardiovascular: Negative for chest pain. Gastrointestinal: Positive for vomiting. Negative for abdominal pain, blood in stool, constipation, diarrhea and nausea. Genitourinary: Negative for dysuria, flank pain and hematuria. Skin: Negative for rash. Neurological: Negative for weakness and headaches. All other systems reviewed and are negative. Positives and Pertinent negatives as per HPI. Except as noted above in the ROS, all other systems were reviewed and negative.        PAST MEDICAL HISTORY     Past Medical History:   Diagnosis Date    Acute DVT (deep venous thrombosis) ([facility] 7/3/2015    Acute encephalopathy 4/19/2018    Acute on chronic diastolic heart failure ([address] 3/30/2019    Alcohol abuse     Anxiety     Arthritis     CAD in native artery     Cellulitis 4/28/2018    Cerebral artery occlusion with cerebral infarction (HCC)     states hx of multiple mini strokes    Cerebrovascular accident (CVA) ([facility] [address] [facility]     Chronic fatigue     Complex care coordination     Complicated UTI (urinary tract infection)     COPD [facility]chronic obstructive pulmonary disease) [facility]     COPD exacerbation ([facility] [address] [facility] 2/20/2018    Depression     Diabetes mellitus ([address]     denies    DIMAS (dyspnea on exertion) 7/3/2015    DVT (deep venous thrombosis) ([facility]     DVT, lower extremity ([facility])     Fatigue 11/3/2015    General weakness 11/4/2015    Generalized weakness 8/22/2019    Hypercholesteremia     Hypertension     Hyperthyroidism     Incontinence 10/16/2012    Mammogram abnormal 2/7/2012    Neuromuscular disorder ([facility] [address] [facility]     Osteopenia 2/14/2017    Pneumonia     Recurrent UTI     Right forearm cellulitis     Superficial thrombophlebitis of right upper extremity     Thyroid disease     Tobacco abuse     Tobacco abuse counseling     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 Nigel Valerio; repeat 5 years    EYE SURGERY      cateracts removed    NERVE SURGERY N/A 12/29/2021    Anoop Rodas, FLEXIBLE CYSTOSCOPY performed by Sandra Rodney MD at [address] [place] [address] ARTHROSCOPY Right 6/18/14    SUBACROMIAL DECOMPRESSION, ROTATOR CUFF REPAIR    TOE SURGERY Right     TONSILLECTOMY           CURRENT MEDICATIONS       Previous Medications    ASPIRIN 81 MG CHEWABLE TABLET    Take 1 tablet by mouth daily    ATORVASTATIN (LIPITOR) 80 MG TABLET    TAKE ONE TABLET BY MOUTH DAILY    BUDESONIDE-FORMOTEROL (SYMBICORT) 160-4.5 MCG/ACT AERO    Inhale 2 puffs into the lungs 2 times daily    CARVEDILOL (COREG) 12.5 MG TABLET    Take 1 tablet by mouth 2 times daily    CLONAZEPAM (KLONOPIN) 1 MG TABLET    Take 1 tablet by mouth 2 times daily as needed for Anxiety for up to 30 days. CLONIDINE (CATAPRES) 0.1 MG TABLET    Take 1 tablet by mouth 3 times daily    LEVOTHYROXINE (SYNTHROID) 100 MCG TABLET    TAKE ONE TABLET BY MOUTH DAILY    LOSARTAN (COZAAR) 100 MG TABLET    TAKE ONE TABLET BY MOUTH DAILY    MIRTAZAPINE (REMERON) 15 MG TABLET    TAKE ONE TABLET BY MOUTH DAILY    OXYBUTYNIN (DITROPAN) 5 MG TABLET    TAKE ONE TABLET BY MOUTH THREE TIMES A DAY    SENNA-DOCUSATE (PERICOLACE) 8.6-50 MG PER TABLET    Take 1 tablet by mouth 2 times daily For constipation while on pain medication.          ALLERGIES     Lipitor [atorvastatin], Morphine, Keflex [cephalexin], Hctz [hydrochlorothiazide], Norvasc [amlodipine besylate], Penicillins, Tramadol, Hydralazine, and Shellfish-derived products    FAMILY HISTORY       Family History   Problem Relation Age of Onset    Heart Disease Father         MI @ 64    Alcohol Abuse Father           SOCIAL HISTORY       Social History     Socioeconomic History    Marital status:      Spouse name: None    Number of children: None    Years of education: None    Highest education level: None Occupational History    None   Tobacco Use    Smoking status: Current Every Day Smoker     Packs/day: 1.00     Years: 52.00     Pack years: 52.00     Types: Cigarettes     Last attempt to quit: 7/1/2018     Years since quitting: 3.5    Smokeless tobacco: Never Used    Tobacco comment: started smoking at age 26 / smoked up to 2 p,p,d / now smoking 4 to 8 cigarettes a day,   Vaping Use    Vaping Use: Never used   Substance and Sexual Activity    Alcohol use: No     Alcohol/week: 0.0 standard drinks     Comment: patient reports she is an alcoholic hasn't had anything to drink 3-4 months    Drug use: No    Sexual activity: Yes     Partners: Male   Other Topics Concern    None   Social History Narrative    None     Social Determinants of Health     Financial Resource Strain:     Difficulty of Paying Living Expenses: Not on file   Food Insecurity:     Worried About Running Out of Food in the Last Year: Not on file    Ozzy of Food in the Last Year: Not on file   Transportation Needs:     Lack of Transportation (Medical): Not on file    Lack of Transportation (Non-Medical):  Not on file   Physical Activity:     Days of Exercise per Week: Not on file    Minutes of Exercise per Session: Not on file   Stress:     Feeling of Stress : Not on file   Social Connections:     Frequency of Communication with Friends and Family: Not on file    Frequency of Social Gatherings with Friends and Family: Not on file    Attends Mandaen Services: Not on file    Active Member of Clubs or Organizations: Not on file    Attends Club or Organization Meetings: Not on file    Marital Status: Not on file   Intimate Partner Violence:     Fear of Current or Ex-Partner: Not on file    Emotionally Abused: Not on file    Physically Abused: Not on file    Sexually Abused: Not on file   Housing Stability:     Unable to Pay for Housing in the Last Year: Not on file    [facility] of [facility] in the Last Year: Not on file  Unstable Housing in the Last Year: Not on file       SCREENINGS    Ed Coma Scale  Eye Opening: Spontaneous  Best Verbal Response: Oriented  Best Motor Response: Obeys commands  [place] Coma Scale Score: 15        PHYSICAL EXAM  (up to 7 for level 4, 8 or more for level 5)     ED Triage Vitals [01/23/22 0820]   BP Temp Temp Source Pulse Resp SpO2 Height Weight   (!) 197/146 98.9 °F (37.2 °C) Oral 92 16 98 % 5' 6\" (1.676 m) 129 lb (58.5 kg)       Physical Exam  Vitals and nursing note reviewed. Constitutional:       Appearance: Normal appearance. She is well-developed. She is not ill-appearing or diaphoretic. HENT:      Head: Normocephalic and atraumatic. Eyes:      General: No scleral icterus. Right eye: No discharge. Left eye: No discharge. Cardiovascular:      Rate and Rhythm: Normal rate and regular rhythm. Heart sounds: Normal heart sounds. No murmur heard. No friction rub. No gallop. Pulmonary:      Effort: Pulmonary effort is normal. No respiratory distress. Breath sounds: Normal breath sounds. No stridor. No wheezing or rales. Chest:      Chest wall: No tenderness. Abdominal:      General: Bowel sounds are normal. There is no distension. Palpations: Abdomen is soft. There is no mass. Tenderness: There is no abdominal tenderness. There is no guarding or rebound. Musculoskeletal:         General: No tenderness. Normal range of motion. Cervical back: Normal range of motion and neck supple. Skin:     General: Skin is warm and dry. Coloration: Skin is pale. Neurological:      Mental Status: She is alert and oriented to person, place, and time.       Coordination: Coordination normal.   Psychiatric:         Mood and Affect: Mood normal.         Behavior: Behavior normal.         DIAGNOSTIC RESULTS   LABS:    Labs Reviewed   CBC WITH AUTO DIFFERENTIAL - Abnormal; Notable for the following components:       Result Value    .4 (*)     [facility] 35.0 (*)     Lymphocytes Absolute 0.6 (*)     All other components within normal limits    Narrative:     Performed at:  [facility] [address], [place]   Phone [phone]   COMPREHENSIVE METABOLIC PANEL W/ REFLEX TO MG FOR LOW K - Abnormal; Notable for the following components:    Sodium 126 (*)     Potassium reflex Magnesium 3.3 (*)     Chloride 89 (*)     Glucose 103 (*)     CREATININE <0.5 (*)     All other components within normal limits    Narrative:     Performed at:  [facility] [address]  [employer], [place]   Phone [phone]   URINE RT REFLEX TO CULTURE - Abnormal; Notable for the following components:    Clarity, UA CLOUDY (*)     All other components within normal limits    Narrative:     Performed at:  [facility] [address],  [place], [place]   Phone [phone]   MICROSCOPIC URINALYSIS - Abnormal; Notable for the following components:    Epithelial Cells, UA 8 (*)     All other components within normal limits    Narrative:     Performed at:  [facility] [address],  [place], [place]   Phone [phone]   TROPONIN    Narrative:     Performed at:  [facility] [address]  [place], [place]   Phone [phone]   LIPASE    Narrative:     Performed at:  [facility] [address],  [place], [place]   Phone [phone]   MAGNESIUM    Narrative:     Performed at:  [facility] [address]  [place], [place]   Phone [phone]   ANTIBODY SCREEN    Narrative:     Performed at:  [facility] [address]  [place], [place]   Phone [phone]   ABO/RH    Narrative:     Performed at:  [facility]  [address] [place] [id number]   Phone [phone]       All other labs werewithin normal range or not returned as of this dictation. EKG: All EKG's are interpreted by the Emergency Department Physician who either signs or Co-signs this chart in the absence of acardiologist.  Please see their note for interpretation of EKG. RADIOLOGY:   Interpretation per the Radiologist below, if available at the time of this note:    CT ABDOMEN PELVIS WO CONTRAST Additional Contrast? None   Final Result   1. Unchanged small complex left pleural effusion. CT Cervical Spine WO Contrast   Final Result   1. No acute fracture. CT Head WO Contrast   Final Result   1. No acute intracranial abnormality. XR CHEST PORTABLE   Final Result   1. No acute abnormality. No results found. PROCEDURES   Unless otherwise noted below, none     Procedures    CRITICAL CARE TIME     There was a high probability of life-threatening clinical deterioration in the patient's condition requiring my urgent intervention. The total critical care time spent while evaluating and treating this patient was at least 32 minutes. This excludes time spent doing separately billable procedures. This includes time at the bedside, data interpretation, medication management, obtaining critical history from collateral sources if the patient is unable to provide it directly, and physician consultation. Specifics of interventions taken and potentially life-threatening diagnostic considerations are listed in the medical decision making.       CONSULTS:  None      EMERGENCY DEPARTMENT COURSE and DIFFERENTIAL DIAGNOSIS/MDM:   Vitals:    Vitals:    01/23/22 0820 01/23/22 0912 01/23/22 0944   BP: (!) 197/146 (!) 205/122 (!) 185/95   Pulse: 92 94 97   Resp: 16 20 25   Temp: 98.9 °F (37.2 °C)     TempSrc: Oral     SpO2: 98% 95% 93%   Weight: 129 lb (58.5 kg)     Height: 5' 6\" (1.676 m)         Renard Rod was given the following medications:  Medications ondansetron [facility]) injection 4 mg (4 mg IntraVENous Given 1/23/22 0854)   0.9 % sodium chloride bolus (0 mLs IntraVENous Stopped 1/23/22 0959)   acetaminophen (TYLENOL) tablet 1,000 mg (1,000 mg Oral Given 1/23/22 0859)       Argenis Monzon was evaluated in the emergency department with concern for vomiting and confusion. Known COVID-19 infection. She reports a fall yesterday. CT head and cervical spine are negative for acute abnormality. Urinalysis shows no evidence of infection, however identified to have hyponatremia and hypokalemia. She was given IV fluids, Tylenol, and Zofran in the ER. The hospitalist was consulted and is in agreement for admission. This plan was discussed with the patient who is in agreement with the plan. FINAL IMPRESSION      1. Encephalopathy    2. Hyponatremia    3. COVID-19 virus infection    4. Hypokalemia    5. Non-intractable vomiting without nausea, unspecified vomiting type    6.  Fall, initial encounter          DISPOSITION/PLAN   DISPOSITION Decision To Admit 01/23/2022 10:28:11 AM      (Please note that portions of this note were completed with a voice recognition program.  Efforts were made to edit the dictations but occasionally words are mis-transcribed.)    QUITA To CNP (electronically signed)       QUITA To CNP  01/23/22 6421

## 2022-01-23 NOTE — RT PROTOCOL NOTE
RT Inhaler-Nebulizer Bronchodilator Protocol Note    There is a bronchodilator order in the chart from a provider indicating to follow the RT Bronchodilator Protocol and there is an Initiate RT Inhaler-Nebulizer Bronchodilator Protocol order as well (see protocol at bottom of note). CXR Findings:  XR CHEST PORTABLE    Result Date: 1/23/2022  1. No acute abnormality. The findings from the last RT Protocol Assessment were as follows:   History Pulmonary Disease: Chronic pulmonary disease  Respiratory Pattern: Regular pattern and RR 12-20 bpm  Breath Sounds: Clear breath sounds  Cough: Strong, productive  Indication for Bronchodilator Therapy:    Bronchodilator Assessment Score: 3    Aerosolized bronchodilator medication orders have been revised according to the RT Inhaler-Nebulizer Bronchodilator Protocol below. Respiratory Therapist to perform RT Therapy Protocol Assessment initially then follow the protocol. Repeat RT Therapy Protocol Assessment PRN for score 0-3 or on second treatment, BID, and PRN for scores above 3. No Indications - adjust the frequency to every 6 hours PRN wheezing or bronchospasm, if no treatments needed after 48 hours then discontinue using Per Protocol order mode. If indication present, adjust the RT bronchodilator orders based on the Bronchodilator Assessment Score as indicated below. Use Inhaler orders unless patient has one or more of the following: on home nebulizer, not able to hold breath for 10 seconds, is not alert and oriented, cannot activate and use MDI correctly, or respiratory rate 25 breaths per minute or more, then use the equivalent nebulizer order(s) with same Frequency and PRN reasons based on the score. If a patient is on this medication at home then do not decrease Frequency below that used at home.     0-3 - enter or revise RT bronchodilator order(s) to equivalent RT Bronchodilator order with Frequency of every 4 hours PRN for wheezing or increased work of breathing using Per Protocol order mode. 4-6 - enter or revise RT Bronchodilator order(s) to two equivalent RT bronchodilator orders with one order with BID Frequency and one order with Frequency of every 4 hours PRN wheezing or increased work of breathing using Per Protocol order mode. 7-10 - enter or revise RT Bronchodilator order(s) to two equivalent RT bronchodilator orders with one order with TID Frequency and one order with Frequency of every 4 hours PRN wheezing or increased work of breathing using Per Protocol order mode. 11-13 - enter or revise RT Bronchodilator order(s) to one equivalent RT bronchodilator order with QID Frequency and an Albuterol order with Frequency of every 4 hours PRN wheezing or increased work of breathing using Per Protocol order mode. Greater than 13 - enter or revise RT Bronchodilator order(s) to one equivalent RT bronchodilator order with every 4 hours Frequency and an Albuterol order with Frequency of every 2 hours PRN wheezing or increased work of breathing using Per Protocol order mode. RT to enter RT Home Evaluation for COPD & MDI Assessment order using Per Protocol order mode.     Electronically signed by Guillermo Kerns RCP on 1/23/2022 at 4:56 PM

## 2022-01-23 NOTE — ACP (ADVANCE CARE PLANNING)
Advance Care Planning Note       Purpose of Encounter: Advanced care planning in light of hospitalization for nausea and vomiting   Parties in attendance: :Gab Williamson MD  Decisional Capacity:Yes  Objective: pt admitted with COVID, hyponatremia, N &V and fall  Goals of Care Determinations: Patient wants full support including CPR, intubation, trach, PEG tube, dialysis   Code Status:   Time Spent on Advanced Planning Documents: 18 mins. Advanced Care Planning Documents:  Completed advances directives, advanced directives in chart.       Electronically signed by Kaelyn Merino MD on 1/23/2022 at 5:10 PM

## 2022-01-23 NOTE — PLAN OF CARE
Problem: Pain:  Goal: Pain level will decrease  Description: Pain level will decrease  Outcome: Ongoing  Goal: Control of acute pain  Description: Control of acute pain  Outcome: Ongoing  Goal: Control of chronic pain  Description: Control of chronic pain  Outcome: Ongoing     Problem: Airway Clearance - Ineffective  Goal: Achieve or maintain patent airway  Outcome: Ongoing     Problem: Gas Exchange - Impaired  Goal: Absence of hypoxia  Outcome: Ongoing  Goal: Promote optimal lung function  Outcome: Ongoing     Problem: Breathing Pattern - Ineffective  Goal: Ability to achieve and maintain a regular respiratory rate  Outcome: Ongoing     Problem:  Body Temperature -  Risk of, Imbalanced  Goal: Ability to maintain a body temperature within defined limits  Outcome: Ongoing  Goal: Will regain or maintain usual level of consciousness  Outcome: Ongoing  Goal: Complications related to the disease process, condition or treatment will be avoided or minimized  Outcome: Ongoing     Problem: Isolation Precautions - Risk of Spread of Infection  Goal: Prevent transmission of infection  Outcome: Ongoing     Problem: Nutrition Deficits  Goal: Optimize nutritional status  Outcome: Ongoing     Problem: Risk for Fluid Volume Deficit  Goal: Maintain normal heart rhythm  Outcome: Ongoing  Goal: Maintain absence of muscle cramping  Outcome: Ongoing  Goal: Maintain normal serum potassium, sodium, calcium, phosphorus, and pH  Outcome: Ongoing     Problem: Loneliness or Risk for Loneliness  Goal: Demonstrate positive use of time alone when socialization is not possible  Outcome: Ongoing     Problem: Fatigue  Goal: Verbalize increase energy and improved vitality  Outcome: Ongoing     Problem: Patient Education: Go to Patient Education Activity  Goal: Patient/Family Education  Outcome: Ongoing     Problem: Skin Integrity:  Goal: Will show no infection signs and symptoms  Description: Will show no infection signs and symptoms  Outcome: Ongoing  Goal: Absence of new skin breakdown  Description: Absence of new skin breakdown  Outcome: Ongoing     Problem: Falls - Risk of:  Goal: Will remain free from falls  Description: Will remain free from falls  Outcome: Ongoing  Goal: Absence of physical injury  Description: Absence of physical injury  Outcome: Ongoing

## 2022-01-23 NOTE — PROGRESS NOTES
Pt transported from ED with tech via stretcher. Oriented to room, fall precautions explained to pt, vitals obtained & whiteboard updated. Pt resting comfortably in bed.

## 2022-01-23 NOTE — CONSULTS
Nephrology Consult Note  [phone]  [phone]   [facility]           Reason for Consult: Hyponatremia     HISTORY OF PRESENT ILLNESS:               The patient is a 75 y. o.female with significant past medical history of HTN, CAD/dCHF, s/p PPI, DM II, alcohol abuse, CVA  chronic hyponatremia, COPD, DVT, Depression, HTN, HPL, presented with  Generalized weakness that led to a fall yesterday, also reports emesis. Recently tested positive for COVID-19 on 1/20/22 as part of pre-op testing. Found to have hypertensive urgency with BPs upto 200s/120s  Labs on arrival with hyponatremia with a sodium of 126, hypokalemia with a K of 3.3 and so we are consulted for further evaluation. CXR on admission with no acute changes  CT head and Cpine with no acute changes  CT of the Chest showed unchanged small complex left pleural effusion and unremarkable kidneys.   Pt seen and examined in the ER  Hyponatremia appears to be chronic dating back to atleast 2013  Baseline sodium is in high 120s to low 130s  Of note, on mirtazapine, losartan at home  Unsure if she takes lexapro  Nausea for three days  Says has been taking enough fluids    Past Medical History:        Diagnosis Date    Acute DVT (deep venous thrombosis) ([address] 7/3/2015    Acute encephalopathy 4/19/2018    Acute on chronic diastolic heart failure ([address] 3/30/2019    Alcohol abuse     Anxiety     Arthritis     CAD in native artery     Cellulitis 4/28/2018    Cerebral artery occlusion with cerebral infarction ([address]     states hx of multiple mini strokes    Cerebrovascular accident (CVA) ([address]     Chronic fatigue     Complex care coordination     Complicated UTI (urinary tract infection)     COPD (chronic obstructive pulmonary disease) ([address]     COPD exacerbation ([address] 2/20/2018    Depression     Diabetes mellitus ([address]     denies    DIMAS (dyspnea on exertion) 7/3/2015    DVT (deep venous thrombosis) (HCC)     DVT, lower extremity ([address]     Fatigue 11/3/2015    General weakness 11/4/2015    Generalized weakness 8/22/2019    Hypercholesteremia     Hypertension     Hyperthyroidism     Incontinence 10/16/2012    Mammogram abnormal 2/7/2012    Neuromuscular disorder ([address]     Osteopenia 2/14/2017    Pneumonia     Recurrent UTI     Right forearm cellulitis     Superficial thrombophlebitis of right upper extremity     Thyroid disease     Tobacco abuse     Tobacco abuse counseling     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 Brianne Meza; repeat 5 years    EYE SURGERY      cateracts removed    NERVE SURGERY N/A 12/29/2021    Fely Black, FLEXIBLE CYSTOSCOPY performed by Ephraim Ruiz MD at [address] [place] [address] ARTHROSCOPY Right 6/18/14    SUBACROMIAL DECOMPRESSION, ROTATOR CUFF REPAIR    TOE SURGERY Right     TONSILLECTOMY           Current Medications:    No current facility-administered medications on file prior to encounter. Current Outpatient Medications on File Prior to Encounter   Medication Sig Dispense Refill    senna-docusate (PERICOLACE) 8.6-50 MG per tablet Take 1 tablet by mouth 2 times daily For constipation while on pain medication. 30 tablet 1    clonazePAM (KLONOPIN) 1 MG tablet Take 1 tablet by mouth 2 times daily as needed for Anxiety for up to 30 days.  60 tablet 0    cloNIDine (CATAPRES) 0.1 MG tablet Take 1 tablet by mouth 3 times daily 90 tablet 2    mirtazapine (REMERON) 15 MG tablet TAKE ONE TABLET BY MOUTH DAILY 30 tablet 1    oxybutynin (DITROPAN) 5 MG tablet TAKE ONE TABLET BY MOUTH THREE TIMES A  tablet 0    carvedilol (COREG) 12.5 MG tablet Take 1 tablet by mouth 2 times daily 60 tablet 3    levothyroxine (SYNTHROID) 100 MCG tablet TAKE ONE TABLET BY MOUTH DAILY 90 tablet 0    atorvastatin (LIPITOR) 80 MG tablet TAKE ONE TABLET BY MOUTH DAILY 90 tablet 0    losartan (COZAAR) 100 MG tablet TAKE ONE TABLET BY MOUTH DAILY 90 tablet 0    aspirin 81 MG chewable tablet Take 1 tablet by mouth daily 30 tablet 0    budesonide-formoterol (SYMBICORT) 160-4.5 MCG/ACT AERO Inhale 2 puffs into the lungs 2 times daily 1 Inhaler 0       Allergies:  Lipitor [atorvastatin], Morphine, Keflex [cephalexin], Hctz [hydrochlorothiazide], Norvasc [amlodipine besylate], Penicillins, Tramadol, Hydralazine, and Shellfish-derived products    Social History:    Social History     Socioeconomic History    Marital status:      Spouse name: Not on file    Number of children: Not on file    Years of education: Not on file    Highest education level: Not on file   Occupational History    Not on file   Tobacco Use    Smoking status: Current Every Day Smoker     Packs/day: 1.00     Years: 52.00     Pack years: 52.00     Types: Cigarettes     Last attempt to quit: 7/1/2018     Years since quitting: 3.5    Smokeless tobacco: Never Used    Tobacco comment: started smoking at age 26 / smoked up to 2 p,p,d / now smoking 4 to 8 cigarettes a day,   Vaping Use    Vaping Use: Never used   Substance and Sexual Activity    Alcohol use: No     Alcohol/week: 0.0 standard drinks     Comment: patient reports she is an alcoholic hasn't had anything to drink 3-4 months    Drug use: No    Sexual activity: Yes     Partners: Male   Other Topics Concern    Not on file   Social History Narrative    Not on file     Social Determinants of Health     Financial Resource Strain:     Difficulty of Paying Living Expenses: Not on file   Food Insecurity:     Worried About Running Out of Food in the Last Year: Not on file    Ozzy of Food in the Last Year: Not on file   Transportation Needs:     Lack of Transportation (Medical): Not on file    Lack of Transportation (Non-Medical):  Not on file   Physical Activity:     Days of Exercise per Week: Not on file    Minutes of Exercise per Session: Not on file   Stress:     Feeling of Stress : Not on file   Social Connections:     Frequency of Communication with Friends and Family: Not on file    Frequency of Social Gatherings with Friends and Family: Not on file    Attends [employer] Services: Not on file    Active Member of [address] [place] [address] or Organizations: Not on file    Attends Club or Organization Meetings: Not on file    Marital Status: Not on file   Intimate Partner Violence:     Fear of Current or Ex-Partner: Not on file    Emotionally Abused: Not on file    Physically Abused: Not on file    Sexually Abused: Not on file   Housing Stability:     Unable to Pay for Housing in the Last Year: Not on file    Number of [place] in the Last Year: Not on file    Unstable Housing in the Last Year: Not on file       Family History:       Problem Relation Age of Onset    Heart Disease Father         MI @ 64    Alcohol Abuse Father            Review of Systems   Constitutional: Positive for activity change, appetite change and fatigue. Negative for chills, diaphoresis and fever. HENT: Negative for ear discharge, ear pain, postnasal drip, rhinorrhea, sneezing, sore throat and tinnitus. Eyes: Negative for photophobia, pain and redness. Respiratory: Negative for cough, chest tightness and shortness of breath. Cardiovascular: Negative for chest pain, palpitations and leg swelling. Gastrointestinal: Positive for nausea. Negative for abdominal pain, constipation, diarrhea and vomiting. Endocrine: Negative for polydipsia, polyphagia and polyuria. Genitourinary: Negative for difficulty urinating, dysuria, hematuria and urgency. Musculoskeletal: Positive for arthralgias. Negative for back pain. Skin: Negative for pallor and rash. Neurological: Positive for headaches. Negative for dizziness, tremors, seizures and light-headedness. Psychiatric/Behavioral: Negative for confusion and hallucinations.        PHYSICAL EXAM:      Vitals: BP (!) 185/95   Pulse 97   Temp 98.9 °F (37.2 °C) (Oral)   Resp 25   Ht 5' 6\" (1.676 m)   Wt 129 lb (58.5 kg)   SpO2 93%   BMI 20.82 kg/m²   No intake/output data recorded. I/O this shift:  In: 1000 [IV Piggyback:1000]  Out: -     Physical Exam:  General : elderly white woman, AAOx3, not in pain or respiratory distress, resting in bed  HEENT : normocephalic, atraumatic, mucosa moist, no palor or icterus  CVS: S1 S2 normal, regular rhythm, no murmurs or rubs. Lungs: decreased breath sounds at the bases  Abd: Soft, bowel sounds normal, non-tender. Ext: No edema, no cyanosis  Skin: Warm. No rashes appreciated. : bladder non-distended, no tenderness over the bladder  Neuro: Alert and oriented x 3, nonfocal.  Joints: No erythema noted over joints.       DATA:    CBC with Differential:    Lab Results   Component Value Date    WBC 7.4 01/23/2022    RBC 4.22 01/23/2022    HGB 14.8 01/23/2022    HCT 42.7 01/23/2022     01/23/2022    .4 01/23/2022    MCH 35.0 01/23/2022    MCHC 34.5 01/23/2022    RDW 13.1 01/23/2022    SEGSPCT 79.1 04/19/2016    BANDSPCT 1 04/28/2018    LYMPHOPCT 8.8 01/23/2022    MONOPCT 12.6 01/23/2022    EOSPCT 1.3 05/08/2011    BASOPCT 0.2 01/23/2022    MONOSABS 0.9 01/23/2022    LYMPHSABS 0.6 01/23/2022    EOSABS 0.1 01/23/2022    BASOSABS 0.0 01/23/2022    DIFFTYPE Auto 05/02/2013     BMP:    Lab Results   Component Value Date     01/23/2022    K 3.3 01/23/2022    CL 89 01/23/2022    CO2 26 01/23/2022    BUN 7 01/23/2022    LABALBU 4.2 01/23/2022    CREATININE <0.5 01/23/2022    CALCIUM 9.0 01/23/2022    GFRAA >60 01/23/2022    GFRAA >60 05/02/2013    LABGLOM >60 01/23/2022    LABGLOM 79 12/11/2017    GLUCOSE 103 01/23/2022     Ionized Calcium:  No results found for: IONCA  Magnesium:    Lab Results   Component Value Date    MG 2.00 01/23/2022     Phosphorus:    Lab Results   Component Value Date    PHOS 4.4 04/15/2021     PTT:    Lab Results   Component Value Date APTT 61.4 03/28/2019   [APTT}  Troponin:    Lab Results   Component Value Date    TROPONINI <0.01 01/23/2022     Last 3 Troponin:    Lab Results   Component Value Date    TROPONINI <0.01 01/23/2022    TROPONINI <0.01 08/29/2021    TROPONINI <0.01 08/29/2021     U/A:    Lab Results   Component Value Date    NITRITE Negative 07/15/2021    COLORU YELLOW 01/23/2022    PROTEINU Negative 01/23/2022    PHUR 7.0 01/23/2022    WBCUA 2 01/23/2022    RBCUA 1 01/23/2022    YEAST 0 07/13/2020    YEAST Present 09/11/2019    BACTERIA 4+ 09/01/2021    CLARITYU CLOUDY 01/23/2022    SPECGRAV 1.005 01/23/2022    LEUKOCYTESUR Negative 01/23/2022    UROBILINOGEN 0.2 01/23/2022    BILIRUBINUR Negative 01/23/2022    BILIRUBINUR 0 07/15/2021    BILIRUBINUR NEGATIVE 05/08/2011    BLOODU Negative 01/23/2022    GLUCOSEU Negative 01/23/2022    GLUCOSEU NEGATIVE 05/08/2011       ASSESSMENT/PLAN:      1. Hyponatremia chronic  Suspect due to combination of volume depletion/SIADH from mirtazapine ? Lexapro/losartan in the setting of covid-19   Sodium Q 6 hr  Check urine studies  Avoid isotonic fluids  Keep on fluid restriction   Check stat sodium   2. Hypokalemia replete  Mag levels normal  3. Hypertensive urgency  Resume home meds  Keep losartan on hol  Start Amlodipine  4. COVID-19        Thank you for allowing me to participate in the care of this patient. I will continue to follow along. Please call with questions.     Elieser Valdez MD, MD.  Office Phone : [phone]  1/23/2022

## 2022-01-23 NOTE — PROGRESS NOTES
Attempted to complete med rec but pt unable to recall rx. Called daughter's number on chart (actually home #) and spoke with pt's  - he is unaware of pt's prescriptions. He gave me Doris's # (daughter), [phone]. I called with no answer, and voicemail box is full. Will attempt to call again later, daughter is at work until [phone].

## 2022-01-23 NOTE — ED NOTES
Bed: 21  Expected date:   Expected time:   Means of arrival:   Comments:  M31 - fatigue, fall, etc.      Abi Arceo RN  01/23/22 [id number]

## 2022-01-23 NOTE — H&P
HOSPITALISTS HISTORY AND PHYSICAL    1/23/2022 5:08 PM    Patient Information:  Rafat Lu is a 67 y.o. female [id number]  PCP:  QUITA Tabares NP (Tel: [phone] )    Chief complaint:    Chief Complaint   Patient presents with    Fatigue     Pt reports emesis and feeling weak/fatigued for about 1 week now. Denies diarrhea or fever. Fall yesterday d/t weakness. Denies LOC    Emesis        History of Present Illness:  Princess Stephens is a 67 y.o. female  with PMHx of hypertension, hyperlipidemia, CAD s/p PPI hypothyroidism, anxiety, depression and COPD presented with nausea and vomiting. Per chart review, patient was tested positive for COVID about 3 days ago and has been experiencing nausea and vomiting for the past 3 days. Patient reported that because of persistent nausea and vomiting she has been unable to keep anything down and yesterday she was feeling so weak that he fell down. Patient denies any fever, chills, shortness of breath, cough, chest pain, PND, orthopnea, BLE edema, dizziness, syncope, urinary frequency urgency or dysuria. On admission, patient was hemodynamically stable. Initial lab work showed sodium: 126, potassium: 3.3, chloride: 89. CXR negative for any acute pathology; showed no change in the left apical and left basilar scarring and left pleural thickening. CT head for any acute intracranial pathology. CT cervical spine negative for any fractures.           REVIEW OF SYSTEMS:   Detailed 12 point ROS obtained which were negative except what mentioned above in HPI    Past Medical History:   has a past medical history of Acute DVT (deep venous thrombosis) ([address] Acute encephalopathy, Acute on chronic diastolic heart failure ([address] Alcohol abuse, Anxiety, Arthritis, CAD in native artery, Cellulitis, Cerebral artery occlusion with cerebral infarction ([address] Cerebrovascular accident (CVA) ([facility] Chronic fatigue, Complex care coordination, Complicated UTI (urinary tract infection), COPD (chronic obstructive pulmonary disease) [facility] COPD exacerbation ([facility] Depression, Diabetes mellitus ([facility] DIMAS (dyspnea on exertion), DVT (deep venous thrombosis) ([facility] DVT, lower extremity ([facility] Fatigue, General weakness, Generalized weakness, Hypercholesteremia, Hypertension, Hyperthyroidism, Incontinence, Mammogram abnormal, Neuromuscular disorder ([facility] Osteopenia, Pneumonia, Recurrent UTI, Right forearm cellulitis, Superficial thrombophlebitis of right upper extremity, Thyroid disease, Tobacco abuse, Tobacco abuse counseling, Trapped lung, and Unable to walk. Past Surgical History:   has a past surgical history that includes Tonsillectomy; Colonoscopy (1/19/2012); Toe Surgery (Right); Shoulder arthroscopy (Right, 6/18/14); eye surgery; Pacemaker insertion; pacemaker placement; and Nerve Surgery (N/A, 12/29/2021). Medications:  No current facility-administered medications on file prior to encounter. Current Outpatient Medications on File Prior to Encounter   Medication Sig Dispense Refill    mirtazapine (REMERON) 15 MG tablet TAKE ONE TABLET BY MOUTH DAILY 30 tablet 1    oxybutynin (DITROPAN) 5 MG tablet TAKE ONE TABLET BY MOUTH THREE TIMES A  tablet 0    levothyroxine (SYNTHROID) 100 MCG tablet TAKE ONE TABLET BY MOUTH DAILY 90 tablet 0    aspirin 81 MG chewable tablet Take 1 tablet by mouth daily 30 tablet 0    clonazePAM (KLONOPIN) 1 MG tablet Take 1 tablet by mouth 2 times daily as needed for Anxiety for up to 30 days. 60 tablet 0    cloNIDine (CATAPRES) 0.1 MG tablet Take 1 tablet by mouth 3 times daily 90 tablet 2       Allergies:   Allergies   Allergen Reactions    Lipitor [Atorvastatin] Other (See Comments)     Weakness, severe    Morphine Shortness Of Breath    Keflex [Cephalexin] Other (See Comments)     SOB & bilateral arms shaking     Hctz [Hydrochlorothiazide] Other (See Comments)     Hyponatremia, severe      Norvasc [Amlodipine Besylate] Swelling     Of neck    Penicillins Hives    Tramadol Itching    Hydralazine Palpitations and Other (See Comments)     Dizzy,fatigue,headaches,palpitations,loss of appetite    Shellfish-Derived Products Swelling and Rash     Swelling of neck        Social History:   reports that she has been smoking cigarettes. She has a 52.00 pack-year smoking history. She has never used smokeless tobacco. She reports that she does not drink alcohol and does not use drugs. Family History:  family history includes Alcohol Abuse in her father; Heart Disease in her father. Physical Exam:  BP (!) 168/110   Pulse 82   Temp 97.6 °F (36.4 °C) (Oral)   Resp 18   Ht 5' 6\" (1.676 m)   Wt 129 lb (58.5 kg)   SpO2 94%   BMI 20.82 kg/m²     General appearance: No apparent distress appears stated age and cooperative. HEENT Normal cephalic, atraumatic without obvious deformity. Pupils equal, round, and reactive to light. Extra ocular muscles intact. Conjunctivae/corneas clear. Neck: Supple, No jugular venous distention/bruits. Trachea midline without thyromegaly or adenopathy with full range of motion. Lungs: Clear to auscultation, bilaterally without Rales/Wheezes/Rhonchi with good respiratory effort. Heart: Regular rate and rhythm with Normal S1/S2 without murmurs, rubs or gallops, point of maximum impulse non-displaced  Abdomen: Soft, non-tender or non-distended without rigidity or guarding and positive bowel sounds all four quadrants. Extremities: No clubbing, cyanosis, or edema bilaterally. Full range of motion without deformity and normal gait intact. Skin: Skin color, texture, turgor normal.  No rashes or lesions. Neurologic: Alert and oriented X 3, neurovascularly intact with sensory/motor intact upper extremities/lower extremities, bilaterally.   Cranial nerves: II-XII intact, grossly non-focal.  Psychiatry: Appropriate affect. Not agitated  Skin: Warm, dry, normal turgor, no rash  Brisk capillary refill, peripheral pulses palpable     Labs:  CBC:   Lab Results   Component Value Date    WBC 7.4 01/23/2022    RBC 4.22 01/23/2022    HGB 14.8 01/23/2022    HCT 42.7 01/23/2022    .4 01/23/2022    MCH 35.0 01/23/2022    MCHC 34.5 01/23/2022    RDW 13.1 01/23/2022     01/23/2022    MPV 8.1 01/23/2022     BMP:    Lab Results   Component Value Date     01/23/2022    K 3.3 01/23/2022    CL 89 01/23/2022    CO2 26 01/23/2022    BUN 7 01/23/2022    CREATININE <0.5 01/23/2022    CALCIUM 9.0 01/23/2022    GFRAA >60 01/23/2022    GFRAA >60 05/02/2013    LABGLOM >60 01/23/2022    LABGLOM 79 12/11/2017    GLUCOSE 103 01/23/2022     CT ABDOMEN PELVIS WO CONTRAST Additional Contrast? None   Final Result   1. Unchanged small complex left pleural effusion. CT Cervical Spine WO Contrast   Final Result   1. No acute fracture. CT Head WO Contrast   Final Result   1. No acute intracranial abnormality. XR CHEST PORTABLE   Final Result   1. No acute abnormality. Discussed case  with ED physician    Problem List  Active Problems:    Hyponatremia  Resolved Problems:    * No resolved hospital problems. *        Assessment/Plan:     COVID-19: Tested positive about 3 days ago  Patient currently on room air would not qualify for steroids or Actemra  Maintain droplet plus precautions  Supplemental oxygen if needed to keep sats above 92%  Continue anticoagulation for hypercoagulable state     Nausea and vomiting: Likely secondary to COVID  As needed antiemetics and IV fluid ordered     Hyponatremia: Acute on chronic chronic.   Serum sodium 126 on admission  likely secondary from decreased p.o. intake/vomiting//SIADH from nausea/medazepam  Nephrology consulted: Appreciate recs  Urine studies ordered  Monitor sodium every 6 hours  Avoid isotonic fluids     Hypokalemia: Likely secondary from decreased p.o. intake and nausea and vomiting  Repeat potassium as needed     Hypertension: Uncontrolled  On Coreg, clonidine and losartan at home  Losartan on hold in setting of hyponatremia  Monitor blood pressure closely     Hyperlipidemia: Continue statins     Hypothyroidism: Continue Synthroid  TSH ordered     Hx of anxiety and depression: On Klonopin and mirtazapine at home  Only mirtazapine for now     Fall likely secondary to dehydration/generalized weakness  Maintain fall precautions  Orthostatic vitals ordered     Hx of COPD: Not in exacerbation  Continue as needed breathing treatment at home and let us        DVT prophylaxis: Lovenox  Code status: Full    Admit as inpatient I anticipate hospitalization spanning less than two midnights for investigation and treatment of the above medically necessary diagnoses. Please note that some part of this chart was generated using Dragon dictation software. Although every effort was made to ensure the accuracy of this automated transcription, some errors in transcription may have occurred inadvertently. If you may need any clarification, please do not hesitate to contact me through [facility].        Red Chowdhury MD    1/23/2022 5:08 PM

## 2022-01-24 LAB
A/G RATIO: 1.5 (ref 1.1–2.2)
ALBUMIN SERPL-MCNC: 3.8 G/DL (ref 3.4–5)
ALP BLD-CCNC: 47 U/L (ref 40–129)
ALT SERPL-CCNC: 15 U/L (ref 10–40)
ANION GAP SERPL CALCULATED.3IONS-SCNC: 9 MMOL/L (ref 3–16)
AST SERPL-CCNC: 24 U/L (ref 15–37)
BASOPHILS ABSOLUTE: 0.1 K/UL (ref 0–0.2)
BASOPHILS RELATIVE PERCENT: 0.9 %
BILIRUB SERPL-MCNC: 0.3 MG/DL (ref 0–1)
BUN BLDV-MCNC: 10 MG/DL (ref 7–20)
C-REACTIVE PROTEIN: <3 MG/L (ref 0–5.1)
CALCIUM SERPL-MCNC: 8.8 MG/DL (ref 8.3–10.6)
CHLORIDE BLD-SCNC: 95 MMOL/L (ref 99–110)
CO2: 25 MMOL/L (ref 21–32)
CREAT SERPL-MCNC: 0.6 MG/DL (ref 0.6–1.2)
D DIMER: 471 NG/ML DDU (ref 0–229)
EOSINOPHILS ABSOLUTE: 0.2 K/UL (ref 0–0.6)
EOSINOPHILS RELATIVE PERCENT: 2.6 %
FERRITIN: 73.4 NG/ML (ref 15–150)
FOLATE: 16.26 NG/ML (ref 4.78–24.2)
FOLATE: >20 NG/ML (ref 4.78–24.2)
GFR AFRICAN AMERICAN: >60
GFR NON-AFRICAN AMERICAN: >60
GLUCOSE BLD-MCNC: 104 MG/DL (ref 70–99)
HCT VFR BLD CALC: 40.3 % (ref 36–48)
HEMOGLOBIN: 13.8 G/DL (ref 12–16)
L. PNEUMOPHILA SEROGP 1 UR AG: NORMAL
LYMPHOCYTES ABSOLUTE: 0.7 K/UL (ref 1–5.1)
LYMPHOCYTES RELATIVE PERCENT: 10.6 %
MCH RBC QN AUTO: 34.7 PG (ref 26–34)
MCHC RBC AUTO-ENTMCNC: 34.1 G/DL (ref 31–36)
MCV RBC AUTO: 101.6 FL (ref 80–100)
MONOCYTES ABSOLUTE: 1 K/UL (ref 0–1.3)
MONOCYTES RELATIVE PERCENT: 14.6 %
NEUTROPHILS ABSOLUTE: 4.7 K/UL (ref 1.7–7.7)
NEUTROPHILS RELATIVE PERCENT: 71.3 %
OSMOLALITY URINE: 322 MOSM/KG (ref 390–1070)
PDW BLD-RTO: 12.7 % (ref 12.4–15.4)
PLATELET # BLD: 358 K/UL (ref 135–450)
PMV BLD AUTO: 7.9 FL (ref 5–10.5)
POTASSIUM REFLEX MAGNESIUM: 4.4 MMOL/L (ref 3.5–5.1)
RBC # BLD: 3.97 M/UL (ref 4–5.2)
SODIUM BLD-SCNC: 126 MMOL/L (ref 136–145)
SODIUM BLD-SCNC: 127 MMOL/L (ref 136–145)
SODIUM BLD-SCNC: 129 MMOL/L (ref 136–145)
SODIUM BLD-SCNC: 129 MMOL/L (ref 136–145)
SODIUM URINE: 39 MMOL/L
STREP PNEUMONIAE ANTIGEN, URINE: NORMAL
T4 FREE: 1.5 NG/DL (ref 0.9–1.8)
TOTAL PROTEIN: 6.3 G/DL (ref 6.4–8.2)
TSH REFLEX: 4.67 UIU/ML (ref 0.27–4.2)
URINE CULTURE, ROUTINE: NORMAL
VITAMIN B-12: 838 PG/ML (ref 211–911)
VITAMIN B-12: 960 PG/ML (ref 211–911)
WBC # BLD: 6.6 K/UL (ref 4–11)

## 2022-01-24 PROCEDURE — 84295 ASSAY OF SERUM SODIUM: CPT

## 2022-01-24 PROCEDURE — 80053 COMPREHEN METABOLIC PANEL: CPT

## 2022-01-24 PROCEDURE — 85379 FIBRIN DEGRADATION QUANT: CPT

## 2022-01-24 PROCEDURE — 86140 C-REACTIVE PROTEIN: CPT

## 2022-01-24 PROCEDURE — 1200000000 HC SEMI PRIVATE

## 2022-01-24 PROCEDURE — 85025 COMPLETE CBC W/AUTO DIFF WBC: CPT

## 2022-01-24 PROCEDURE — 82746 ASSAY OF FOLIC ACID SERUM: CPT

## 2022-01-24 PROCEDURE — 97165 OT EVAL LOW COMPLEX 30 MIN: CPT

## 2022-01-24 PROCEDURE — 97161 PT EVAL LOW COMPLEX 20 MIN: CPT

## 2022-01-24 PROCEDURE — 2580000003 HC RX 258: Performed by: INTERNAL MEDICINE

## 2022-01-24 PROCEDURE — 97530 THERAPEUTIC ACTIVITIES: CPT

## 2022-01-24 PROCEDURE — 6370000000 HC RX 637 (ALT 250 FOR IP): Performed by: INTERNAL MEDICINE

## 2022-01-24 PROCEDURE — 82607 VITAMIN B-12: CPT

## 2022-01-24 PROCEDURE — 97535 SELF CARE MNGMENT TRAINING: CPT

## 2022-01-24 PROCEDURE — 94761 N-INVAS EAR/PLS OXIMETRY MLT: CPT

## 2022-01-24 PROCEDURE — 6360000002 HC RX W HCPCS: Performed by: INTERNAL MEDICINE

## 2022-01-24 PROCEDURE — 36415 COLL VENOUS BLD VENIPUNCTURE: CPT

## 2022-01-24 PROCEDURE — 83935 ASSAY OF URINE OSMOLALITY: CPT

## 2022-01-24 PROCEDURE — 84300 ASSAY OF URINE SODIUM: CPT

## 2022-01-24 PROCEDURE — 94640 AIRWAY INHALATION TREATMENT: CPT

## 2022-01-24 RX ADMIN — Medication 2 PUFF: at 20:51

## 2022-01-24 RX ADMIN — CARVEDILOL 6.25 MG: 3.12 TABLET, FILM COATED ORAL at 18:26

## 2022-01-24 RX ADMIN — CLONIDINE HYDROCHLORIDE 0.1 MG: 0.1 TABLET ORAL at 10:05

## 2022-01-24 RX ADMIN — CLONIDINE HYDROCHLORIDE 0.1 MG: 0.1 TABLET ORAL at 16:33

## 2022-01-24 RX ADMIN — CARVEDILOL 6.25 MG: 3.12 TABLET, FILM COATED ORAL at 10:05

## 2022-01-24 RX ADMIN — Medication 2000 UNITS: at 10:05

## 2022-01-24 RX ADMIN — Medication 10 ML: at 10:06

## 2022-01-24 RX ADMIN — Medication 2 PUFF: at 09:29

## 2022-01-24 RX ADMIN — CLONAZEPAM 1 MG: 0.5 TABLET ORAL at 10:05

## 2022-01-24 RX ADMIN — Medication 15 G: at 21:13

## 2022-01-24 RX ADMIN — ENOXAPARIN SODIUM 30 MG: 30 INJECTION SUBCUTANEOUS at 21:13

## 2022-01-24 RX ADMIN — Medication 10 ML: at 21:13

## 2022-01-24 RX ADMIN — ASPIRIN 81 MG 81 MG: 81 TABLET ORAL at 10:05

## 2022-01-24 RX ADMIN — CLONAZEPAM 1 MG: 0.5 TABLET ORAL at 21:25

## 2022-01-24 RX ADMIN — LEVOTHYROXINE SODIUM 100 MCG: 0.1 TABLET ORAL at 05:10

## 2022-01-24 RX ADMIN — ENOXAPARIN SODIUM 30 MG: 30 INJECTION SUBCUTANEOUS at 10:05

## 2022-01-24 RX ADMIN — CLONIDINE HYDROCHLORIDE 0.1 MG: 0.1 TABLET ORAL at 21:13

## 2022-01-24 ASSESSMENT — PAIN SCALES - GENERAL
PAINLEVEL_OUTOF10: 0
PAINLEVEL_OUTOF10: 5
PAINLEVEL_OUTOF10: 0
PAINLEVEL_OUTOF10: 0
PAINLEVEL_OUTOF10: 5
PAINLEVEL_OUTOF10: 0
PAINLEVEL_OUTOF10: 0
PAINLEVEL_OUTOF10: 5
PAINLEVEL_OUTOF10: 0
PAINLEVEL_OUTOF10: 0

## 2022-01-24 ASSESSMENT — PAIN DESCRIPTION - PAIN TYPE
TYPE: ACUTE PAIN
TYPE: ACUTE PAIN

## 2022-01-24 ASSESSMENT — PAIN DESCRIPTION - LOCATION
LOCATION: HEAD
LOCATION: HEAD

## 2022-01-24 NOTE — PROGRESS NOTES
Comprehensive Nutrition Assessment    Type and Reason for Visit:  Initial,Consult    Nutrition Recommendations/Plan:   Ensure Clear BID  Monitor PO/ONS     Nutrition Assessment:  Consult ordered for nutrition services d/t weight loss and poor po. Pt has COVID and is in isolation. Tried to call pt today but she didn't answer the phone. She has been having nausea/vomiting and has been on a clear liquid diet. Will start Ensure Clear BID. When diet advances will see if she would prefer regular Ensure. Continue to monitor po/ons intake throughout hospital stay. Malnutrition Assessment:  Malnutrition Status: At risk for malnutrition (Comment)    Context:  Acute Illness     Findings of the 6 clinical characteristics of malnutrition:  Energy Intake:  Mild decrease in energy intake (Comment)  Weight Loss:   (4 pound weight loss)     Body Fat Loss:  Unable to assess (Isolation)     Muscle Mass Loss:  Unable to assess    Fluid Accumulation:  Unable to assess     Strength:  Not Performed    Estimated Daily Nutrient Needs:  Energy (kcal):  0587-4752 (25-30kcal/57kg); Weight Used for Energy Requirements:        Protein (g):  68-86 g (.2-1.5g/57kg); Weight Used for Protein Requirements:  Current        Fluid (ml/day):   ; Method Used for Fluid Requirements:  1 ml/kcal      Nutrition Related Findings:  , Glucose 104, LBM 1/21      Wounds:  Surgical Incision (Blister on sacrum, knee abrasion)       Current Nutrition Therapies:    ADULT DIET; Clear Liquid; Low Sodium (2 gm)  ADULT ORAL NUTRITION SUPPLEMENT; Lunch, Dinner; Clear Liquid Oral Supplement    Anthropometric Measures:  · Height: 5' 6\" (167.6 cm)  · Current Body Weight: 125 lb (56.7 kg)   · Ideal Body Weight: 130 lbs; % Ideal Body Weight 96.2 %   · BMI: 20.2  · BMI Categories: Normal Weight (BMI 18.5-24. 9)       Nutrition Diagnosis:   · Inadequate oral intake related to altered GI function as evidenced by vomiting,nausea,intake 51-75%,poor intake prior to

## 2022-01-24 NOTE — PROGRESS NOTES
Nephrology Attending  Progress Note        SUMMARY :  We are following this patient for Hyponatremia   71-year-old female with significant past medical history of hypertension, coronary artery disease, diastolic CHF, diabetes mellitus type 2, CVA, COPD, DVT, hypertension, hyperlipidemia, depression, alcohol abuse, and chronic hyponatremia presented to the ER with weakness of fall and 1 emesis  She had tested positive for COVID-19 on 1/20/2022  In the ER she was found to have hypertensive urgency with blood pressure of 210/124 and labs showed a sodium of 126 with a K of 3.3    SUBJECTIVE:   Patient progress reviewed. The patient feels better  On room air   BP up   Cough +      Physical Exam:    VITALS:  BP (!) 157/94   Pulse 75   Temp 97.4 °F (36.3 °C) (Oral)   Resp 16   Ht 5' 6\" (1.676 m)   Wt 125 lb 14.1 oz (57.1 kg)   SpO2 94%   BMI 20.32 kg/m²   BLOOD PRESSURE RANGE:  Systolic (96EMK), MXF:382 , Min:135 , DEMETRIA:263   ; Diastolic (34RKY), FTK:45, Min:83, Max:111    24HR INTAKE/OUTPUT:    Intake/Output Summary (Last 24 hours) at 1/24/2022 1042  Last data filed at 1/24/2022 0506  Gross per 24 hour   Intake 720 ml   Output 350 ml   Net 370 ml       Gen:  alert, oriented x 3  PERRL , EOM +  Pallor +, No icterus  JVP not raised   CV: RRR no murmur or rub . Recorder device +  Lungs:B/ L air entry, Normal breath sounds   Abd: soft, bowel sounds + , No organomegaly   Ext: No edema, no cyanosis,    Skin: Warm.   No rash  Neuro: nonfocal.      DATA:    CBC with Differential:    Lab Results   Component Value Date    WBC 6.6 01/24/2022    RBC 3.97 01/24/2022    HGB 13.8 01/24/2022    HCT 40.3 01/24/2022     01/24/2022    .6 01/24/2022    MCH 34.7 01/24/2022    MCHC 34.1 01/24/2022    RDW 12.7 01/24/2022    SEGSPCT 79.1 04/19/2016    BANDSPCT 1 04/28/2018    LYMPHOPCT 10.6 01/24/2022    MONOPCT 14.6 01/24/2022    EOSPCT 1.3 05/08/2011    BASOPCT 0.9 01/24/2022    MONOSABS 1.0 01/24/2022    LYMPHSABS 0.7

## 2022-01-24 NOTE — PROGRESS NOTES
HOSPITALISTS PROGRESS NOTE    1/24/2022 8:52 AM        Name: Argenis Monzon . Admitted: 1/23/2022  Primary Care Provider: QUITA Lott NP (Tel: [phone])      CC: nausea, vomiting and hyponatremia    Brief Course: 67 y.o. female  with PMHx of hypertension, hyperlipidemia, CAD s/p PPI hypothyroidism, anxiety, depression and COPD presented with nausea and vomiting. Per chart review, patient was tested positive for COVID about 3 days ago and has been experiencing nausea and vomiting for the past 3 days. Patient reported that because of persistent nausea and vomiting she has been unable to keep anything down and yesterday she was feeling so weak that he fell down. Patient denies any fever, chills, shortness of breath, cough, chest pain, PND, orthopnea, BLE edema, dizziness, syncope, urinary frequency urgency or dysuria.  On admission, patient was hemodynamically stable.  Initial lab work showed sodium: 126, potassium: 3.3, chloride: 89.  CXR negative for any acute pathology; showed no change in the left apical and left basilar scarring and left pleural thickening.  CT head for any acute intracranial pathology.  CT cervical spine negative for any fractures.     Interval history:   Pt seen and examined today   Overnight events noted and interval ancillary notes  Afebrile overnight, WBC and procalcitonin within normal limits  Remains on room air   Serum sodium 129 this morning  Pt reported that nausea and vomiting has improved and she felt like a human again   Denied any fevers, chills, chest pain, palpitations, cough, SOB, dizziness,  Tolerated full liquid diet      Assessment & Plan:     COVID-19: Tested positive about 3 days ago  Patient currently on room air would not qualify for steroids or Actemra  Maintain droplet plus precautions  Supplemental oxygen if needed to keep sats above 92%  Continue anticoagulation for hypercoagulable state     Nausea and vomiting: Likely secondary to COVID  As needed antiemetics and IV fluid ordered     Hyponatremia: Acute on chronic chronic.  Serum sodium 126 on admission  likely secondary from decreased p.o. intake/vomiting//SIADH from nausea/medazepam  Nephrology consulted: Appreciate recs  Urine studies ordered  Monitor sodium every 6 hours  Avoid isotonic fluids     Hypokalemia: Likely secondary from decreased p.o. intake and nausea and vomiting  Repeat potassium as needed     Hypertension: Uncontrolled  On Coreg, clonidine and losartan at home  Losartan on hold in setting of hyponatremia  Monitor blood pressure closely     Hyperlipidemia: Continue statins     Hypothyroidism: Continue Synthroid  TSH ordered     Hx of anxiety and depression: On Klonopin and mirtazapine at home  Only mirtazapine for now     Fall likely secondary to dehydration/generalized weakness  Maintain fall precautions  Orthostatic vitals ordered     Hx of COPD: Not in exacerbation  Continue as needed breathing treatment at home and let us        DVT prophylaxis: Lovenox  Code:Full Code  Diet: ADULT DIET; Clear Liquid;  Low Sodium (2 gm)    Disposition:     Current Medications  sodium chloride flush 0.9 % injection 5-40 mL, 2 times per day  sodium chloride flush 0.9 % injection 5-40 mL, PRN  0.9 % sodium chloride infusion, PRN  enoxaparin (LOVENOX) injection 30 mg, BID  ondansetron (ZOFRAN-ODT) disintegrating tablet 4 mg, Q8H PRN   Or  ondansetron (ZOFRAN) injection 4 mg, Q6H PRN  polyethylene glycol (GLYCOLAX) packet 17 g, Daily PRN  acetaminophen (TYLENOL) tablet 650 mg, Q6H PRN   Or  acetaminophen (TYLENOL) suppository 650 mg, Q6H PRN  guaiFENesin-dextromethorphan (ROBITUSSIN DM) 100-10 MG/5ML syrup 5 mL, Q4H PRN  Vitamin D (CHOLECALCIFEROL) tablet 2,000 Units, Daily  ipratropium-albuterol (DUONEB) nebulizer solution 1 ampule, Q4H PRN  albuterol (PROVENTIL) nebulizer solution 2.5 mg, Q6H PRN  carvedilol (COREG) tablet 6.25 mg, BID WC  aspirin chewable tablet 81 mg, Daily  atorvastatin (LIPITOR) tablet 80 mg, Nightly  budesonide-formoterol (SYMBICORT) 160-4.5 MCG/ACT inhaler 2 puff, BID  levothyroxine (SYNTHROID) tablet 100 mcg, Daily  clonazePAM (KLONOPIN) tablet 1 mg, BID PRN  cloNIDine (CATAPRES) tablet 0.1 mg, TID  influenza quadrivalent split vaccine (FLUZONE;FLUARIX;FLULAVAL;AFLURIA) injection 0.5 mL, Prior to discharge        Objective:  BP (!) 157/94   Pulse 75   Temp 97.4 °F (36.3 °C) (Oral)   Resp 18   Ht 5' 6\" (1.676 m)   Wt 125 lb 14.1 oz (57.1 kg)   SpO2 93%   BMI 20.32 kg/m²     Intake/Output Summary (Last 24 hours) at 1/24/2022 8845  Last data filed at 1/24/2022 2103  Gross per 24 hour   Intake 1720 ml   Output 350 ml   Net 1370 ml      Wt Readings from Last 3 Encounters:   01/24/22 125 lb 14.1 oz (57.1 kg)   01/20/22 123 lb 14.4 oz (56.2 kg)   12/29/21 132 lb 8 oz (60.1 kg)       Physical Examination:   General appearance:  No apparent distress, appears stated age and cooperative. Eyes: Sclera clear. Pupils equal.  ENT: Moist oral mucosa. Trachea midline, no adenopathy. Cardiovascular: Regular rhythm, normal S1, S2. No murmur. No edema in lower extremities  Respiratory:Not using accessory muscles. Good inspiratory effort. Clear to auscultation bilaterally, no wheeze or crackles. GI: Abdomen soft, no tenderness, not distended, normal bowel sounds  Musculoskeletal: normal ROM, No cyanosis in digits  Neurology: CN 2-12 grossly intact. No speech or motor deficits  Psych: Normal affect.  Alert and oriented in time, place and person  Skin: Warm, dry, normal turgor    Labs and Tests:  CBC:   Recent Labs     01/23/22  0825 01/24/22  0736   WBC 7.4 6.6   HGB 14.8 13.8    358     BMP:    Recent Labs     01/23/22  0825 01/23/22  0825 01/23/22  1748 01/24/22  0209 01/24/22  0736   *   < > 129* 129* 129*   K 3.3*  --   --   --  4.4   CL 89*  --   --   --  95*   CO2 26  --   --   --  25   BUN 7  --   -- --  10   CREATININE <0.5*  --   --   --  0.6   GLUCOSE 103*  --   --   --  104*    < > = values in this interval not displayed. Hepatic:   Recent Labs     01/23/22  0825 01/24/22  0736   AST 24 24   ALT 14 15   BILITOT 0.4 0.3   ALKPHOS 55 47     CT ABDOMEN PELVIS WO CONTRAST Additional Contrast? None   Final Result   1. Unchanged small complex left pleural effusion. CT Cervical Spine WO Contrast   Final Result   1. No acute fracture. CT Head WO Contrast   Final Result   1. No acute intracranial abnormality. XR CHEST PORTABLE   Final Result   1. No acute abnormality. Problem List  Active Problems:    Hyponatremia  Resolved Problems:    * No resolved hospital problems.  Sumit Hoffmann MD   1/24/2022 8:52 AM

## 2022-01-24 NOTE — PROGRESS NOTES
Physical Therapy    Facility/Department: [address] NURSING  Initial Assessment    NAME: Dara Rasmussen  :   MRN: [id number]    Date of Service: 2022    Discharge Recommendations: Dara Rasmussen scored a 16/24 on the AM-PAC short mobility form. Current research shows that an AM-PAC score of 17 or less is typically not associated with a discharge to the patient's home setting. Based on the patient's AM-PAC score and their current functional mobility deficits, it is recommended that the patient have 5-7 sessions per week of Physical Therapy at d/c to increase the patient's independence. At this time, this patient demonstrates the endurance, and/or tolerance for 3 hours of therapy each day, with a treatment frequency of 5-7x/wk. Please see assessment section for further patient specific details. Should pt refuse therapy recommendations and d/c home, recommend the below services to address deficits in the home environment:   [address] [place] [address]: LEVEL 3 SAFETY  - Initial home health evaluation to occur within 24-48 hours, in patient home   - Therapy evaluations in home within 24-48 hours of discharge; including DME and home safety   - Frontload therapy 5 days, then 3x a week   - Therapy to evaluate if patient has [address] needs for personal care   -  evaluation within 24-48 hours, includes evaluation of resources and insurance to determine [place], [place], LTC, and Medicaid options     If patient discharges prior to next session this note will serve as a discharge summary. Please see below for the latest assessment towards goals. PT Equipment Recommendations  Equipment Needed: No    Assessment   Body structures, Functions, Activity limitations: Decreased functional mobility ; Decreased strength;Decreased endurance;Decreased balance  Assessment: Pt presents with the above deficits impairing her ability to perform functional mobility safely and independently.  Pt with PLOF of MOD I with PURA however currently requires CGA for mobility with RW. Pt would benefit from acute PT services to address deficits. Treatment Diagnosis: decreased activity tolerance  Prognosis: Good  Decision Making: Low Complexity  Clinical Presentation: stable  PT Education: Goals;PT Role;Plan of Care;Transfer Training;General Safety;Orientation;Gait Training;Functional Mobility Training  Patient Education: d/c recommendations--pt verbalizing understanding  Barriers to Learning: none  REQUIRES PT FOLLOW UP: Yes  Activity Tolerance  Activity Tolerance: Patient Tolerated treatment well;Patient limited by endurance  Activity Tolerance: required increased time to perform tasks       Patient Diagnosis(es): The primary encounter diagnosis was Encephalopathy. Diagnoses of Hyponatremia, COVID-19 virus infection, Hypokalemia, Non-intractable vomiting without nausea, unspecified vomiting type, and Fall, initial encounter were also pertinent to this visit.      has a past medical history of Acute DVT (deep venous thrombosis) ([facility]), Acute encephalopathy, Acute on chronic diastolic heart failure ([address] Alcohol abuse, Anxiety, Arthritis, CAD in native artery, Cellulitis, Cerebral artery occlusion with cerebral infarction [facility]), Cerebrovascular accident (CVA) ([address] Chronic fatigue, Complex care coordination, Complicated UTI (urinary tract infection), COPD (chronic obstructive pulmonary disease) ([address] COPD exacerbation ([address] Depression, Diabetes mellitus ([address] DIMAS (dyspnea on exertion), DVT (deep venous thrombosis) ([address] DVT, lower extremity ([address] Fatigue, General weakness, Generalized weakness, Hypercholesteremia, Hypertension, Hyperthyroidism, Incontinence, Mammogram abnormal, Neuromuscular disorder ([address] Osteopenia, Pneumonia, Recurrent UTI, Right forearm cellulitis, Superficial thrombophlebitis of right upper extremity, Thyroid disease, Tobacco abuse, Tobacco abuse counseling, Trapped lung, and Unable to walk.   has a past surgical history that includes Tonsillectomy; Colonoscopy (1/19/2012); Toe Surgery (Right); Shoulder arthroscopy (Right, 6/18/14); eye surgery; Pacemaker insertion; pacemaker placement; and Nerve Surgery (N/A, 12/29/2021). Restrictions  Restrictions/Precautions  Restrictions/Precautions: Fall Risk,Isolation,Modified Diet (HIGH FALL RISK; Droplet Plus Isolation; full liquid diet)  Position Activity Restriction  Other position/activity restrictions: 67 y.o. female  with PMHx of hypertension, hyperlipidemia, CAD s/p PPI hypothyroidism, anxiety, depression and COPD presented with nausea and vomiting. Per chart review, patient was tested positive for COVID about 3 days ago and has been experiencing nausea and vomiting for the past 3 days. Patient reported that because of persistent nausea and vomiting she has been unable to keep anything down and yesterday she was feeling so weak that he fell down. Patient denies any fever, chills, shortness of breath, cough, chest pain, PND, orthopnea, BLE edema, dizziness, syncope, urinary frequency urgency or dysuria. On admission, patient was hemodynamically stable. Initial lab work showed sodium: 126, potassium: 3.3, chloride: 89. CXR negative for any acute pathology; showed no change in the left apical and left basilar scarring and left pleural thickening. CT head for any acute intracranial pathology. CT cervical spine negative for any fractures. Vision/Hearing  Vision: Impaired  Vision Exceptions: Wears glasses for reading  Hearing: Exceptions to [facility]  Hearing Exceptions: Hard of hearing/hearing concerns       Subjective  General  Chart Reviewed: Yes  Response To Previous Treatment: Not applicable  Family / Caregiver Present: No  Diagnosis: hypokalemia  Follows Commands: Within Functional Limits  General Comment  Comments: Pt L sidelying in bed upon arrival.  Subjective  Subjective: Pt agreeable to PT/OT eval, reporting no pain at this time.   Pain Screening  Patient Currently in Pain: Denies  Vital Signs  Patient Currently in Pain: Denies       Orientation  Orientation  Overall Orientation Status: Within Functional Limits     Social/Functional History  Social/Functional History  Lives With: Spouse,Daughter,Other (comment) (also lived with 2 great grandchildren)  Type of Home: House  Home Layout: Multi-level,Bed/Bath upstairs (tri level--~6 steps to upper level with B handrail, pt states she spends most of her time on upper level)  Home Access: Stairs to enter without rails  Entrance Stairs - Number of Steps: 1 TEO  Bathroom Shower/Tub: Walk-in shower  Bathroom Toilet: Standard  Bathroom Equipment:  (shower chair)  Bathroom Accessibility: [facility]: [address] [place] [address] Help From: Family (daughter)  ADL Assistance: Needs assistance  Bath:  (daughter assists her to get in shower chair)  Homemaking Assistance: Needs assistance (A from daughter)  Homemaking Responsibilities: No  Ambulation Assistance: Needs assistance (RW)  Transfer Assistance: Independent  Active : No  Patient's  Info: family  Leisure & Hobbies: watch tv  Additional Comments: 1 fall in last 6 months (a couple days ago)     Cognition   Cognition  Overall Cognitive Status: WFL    Objective  AROM RLE (degrees)  RLE AROM: WFL  AROM LLE (degrees)  LLE AROM : WFL  Strength RLE  Strength RLE: WFL  Comment: grossly 3+/5  Strength LLE  Strength LLE: WFL  Comment: grossly 3+/5  Tone RLE  RLE Tone: Normotonic  Tone LLE  LLE Tone: Normotonic  Motor Control  Gross Motor?: WFL  Sensation  Overall Sensation Status: WFL  Bed mobility  Supine to Sit: Stand by assistance (HOB raised)  Scooting: Stand by assistance  Transfers  Sit to Stand: Contact guard assistance (x1 EOB)  Stand to sit: Contact guard assistance (x1 recliner)  Ambulation  Ambulation?: Yes  Ambulation 1  Surface: level tile  Device: Rolling Walker  Assistance: Contact guard assistance  Quality of Gait: wide Will, decreased risa, decreased B step lengths/heights  Distance: 2'  Comments: Pt required increased time to perform stand step transfer EOB>recliner, no LOB. SpO2 at 97% on RA. Stairs/Curb  Stairs?: No     Balance  Posture: Good  Sitting - Static: Good  Sitting - Dynamic: Good;- (SBA seated EOB for ADLs ~10-12 minutes)  Standing - Static: Fair;+ (CGA standing with RW for UE support)  Standing - Dynamic: Fair;+ (CGA with RW for UE support)        Plan   Plan  Times per week: 3-5x  Times per day: Daily  Current Treatment Recommendations: Strengthening,Balance Training,Functional Mobility Training,Transfer Training,Gait Training,Endurance Training,Neuromuscular Re-education,Home Exercise Program,Safety Education & Training,Patient/Caregiver Education & Training,Equipment Evaluation, Education, & procurement,Modalities  Safety Devices  Type of devices:  All fall risk precautions in place,Left in chair,Call light within reach,Chair alarm in place,Gait belt,Patient at risk for falls,Nurse notified  Restraints  Initially in place: No    G-Code       OutComes Score       AM-PAC Score  AM-PAC Inpatient Mobility Raw Score : 16 (01/24/22 1701)  AM-PAC Inpatient T-Scale Score : 40.78 (01/24/22 1701)  Mobility Inpatient CMS 0-100% Score: 54.16 (01/24/22 1701)  Mobility Inpatient CMS G-Code Modifier : CK (01/24/22 1701)          Goals  Short term goals  Time Frame for Short term goals: upon d/c  Short term goal 1: Pt will perform bed mobility MOD I  Short term goal 2: Pt will perform transfers with LRAD and SBA  Short term goal 3: Pt will ambulate 22' with LRAD and CGA  Patient Goals   Patient goals : pt did not state       Therapy Time   Individual Concurrent Group Co-treatment   Time In 1311         Time Out 1354         Minutes 43              Timed Code Treatment Minutes:   28    Total Treatment Minutes:  [address] [place], [place] [id number]

## 2022-01-24 NOTE — PROGRESS NOTES
Occupational Therapy   Occupational Therapy Initial Assessment  Date: 2022   Patient Name: Sourav Berman  MRN: [id number]     : 1945    Date of Service: 2022    Discharge Recommendations: Sourav Berman scored a  on the AM-PAC ADL Inpatient form. Current research shows that an AM-PAC score of 17 or less is typically not associated with a discharge to the patient's home setting. However, based on the patient's decreased tolerance for activity/ambulation with subsequent ADL deficits, it is recommended that the patient have 5-7 sessions per week of Occupational Therapy at d/c to increase the patient's independence. At this time, this patient demonstrates the endurance, and/or tolerance for 3 hours of therapy each day, with a treatment frequency of 5-7x/wk. Please see assessment section for further patient specific details. If patient discharges prior to next session this note will serve as a discharge summary. Please see below for the latest assessment towards goals. OT Equipment Recommendations  Equipment Needed: No (continue to assess)    Assessment   Performance deficits / Impairments: Decreased functional mobility ; Decreased ADL status; Decreased ROM; Decreased strength;Decreased endurance;Decreased safe awareness;Decreased balance;Decreased high-level IADLs  Assessment: Pt presents below baseline d/t above deficits. Pt limited d/t fatigue and limited endurance. Pt would benefit from continued OT services to get back to her baseline and address fxl mobility, balance, endurance, ADL/IADLs, and strength.   Treatment Diagnosis: Hypokalemia, fatigue, and generalized weakness  Prognosis: Good  Decision Making: Low Complexity  History: Arthritis, CAD, CVA, COPD, hypertension, thryoid disease  Exam: as seen above  Assistance / Modification: fxl transfers CGA  OT Education: OT Role;Plan of Care;Transfer Training  Patient Education: eval/discharge - pt v/u  REQUIRES OT FOLLOW UP: Yes  Activity Tolerance  Activity Tolerance: Patient Tolerated treatment well;Patient limited by fatigue  Activity Tolerance: pt only able to transfer from EOB to recliner and stand for ~30 seconds d/t fatigue. Safety Devices  Safety Devices in place: Yes  Type of devices: All fall risk precautions in place; Left in chair;Nurse notified;Call light within reach; Chair alarm in place           Patient Diagnosis(es): The primary encounter diagnosis was Encephalopathy. Diagnoses of Hyponatremia, COVID-19 virus infection, Hypokalemia, Non-intractable vomiting without nausea, unspecified vomiting type, and Fall, initial encounter were also pertinent to this visit. has a past medical history of Acute DVT (deep venous thrombosis) ([facility]), Acute encephalopathy, Acute on chronic diastolic heart failure ([facility] [address] Alcohol abuse, Anxiety, Arthritis, CAD in native artery, Cellulitis, Cerebral artery occlusion with cerebral infarction [facility]), Cerebrovascular accident (CVA) ([facility] [address] Chronic fatigue, Complex care coordination, Complicated UTI (urinary tract infection), COPD (chronic obstructive pulmonary disease) ([address] COPD exacerbation ([address] Depression, Diabetes mellitus ([address] DIMAS (dyspnea on exertion), DVT (deep venous thrombosis) ([address] DVT, lower extremity ([address] Fatigue, General weakness, Generalized weakness, Hypercholesteremia, Hypertension, Hyperthyroidism, Incontinence, Mammogram abnormal, Neuromuscular disorder ([address] Osteopenia, Pneumonia, Recurrent UTI, Right forearm cellulitis, Superficial thrombophlebitis of right upper extremity, Thyroid disease, Tobacco abuse, Tobacco abuse counseling, Trapped lung, and Unable to walk.   has a past surgical history that includes Tonsillectomy; Colonoscopy (1/19/2012); Toe Surgery (Right); Shoulder arthroscopy (Right, 6/18/14); eye surgery; Pacemaker insertion; pacemaker placement; and Nerve Surgery (N/A, 12/29/2021).     Treatment Diagnosis: Hypokalemia, fatigue, and generalized walker,Cane  Receives Help From: Family (daughter)  ADL Assistance: Needs assistance  Bath:  (daughter assists her to get in shower chair)  Homemaking Assistance: Needs assistance (A from daughter)  Homemaking Responsibilities: No  Ambulation Assistance: Needs assistance (RW)  Transfer Assistance: Independent  Active : No  Patient's  Info: family  Leisure & Hobbies: watch tv  Additional Comments: 1 fall in last 6 months (a couple days ago)       Objective        Orientation  Overall Orientation Status: Within Functional Limits     Balance  Sitting Balance: Supervision  Standing Balance: Stand by assistance (CGA with transfer)  Standing Balance  Time: ~30 seconds  Activity: transfer from bed to recliner  Functional Mobility  Functional Mobility Comments: Stand step transfer from EOB > recliner with RW and CGA  ADL  Grooming: Stand by assistance;Setup (washing face)  UE Bathing: Setup;Stand by assistance  LE Bathing: Contact guard assistance (Pt seated to bathe BLE, anticipate CGA for posterior/anterior kenton in stance)  UE Dressing: Setup (gown change)  Additional Comments: Seated EOB for sponge bathing as described above  Tone RUE  RUE Tone: Normotonic  Tone LUE  LUE Tone: Normotonic  Coordination  Movements Are Fluid And Coordinated: Yes     Bed mobility  Supine to Sit: Stand by assistance  Transfers  Stand Step Transfers: Contact guard assistance (EOB > recliner with RW)  Sit to stand: Contact guard assistance  Stand to sit: Contact guard assistance     Cognition  Overall Cognitive Status: WFL  Perception  Overall Perceptual Status: WFL     Sensation  Overall Sensation Status: WFL        LUE AROM (degrees)  LUE AROM : Exceptions  LUE General AROM: within fxl limits distally, limited near shoulder  L Shoulder Flexion 0-180: 0-85  L Shoulder ABduction 0-180: 0-85  RUE AROM (degrees)  RUE AROM : WFL  LUE Strength  Gross LUE Strength: Exceptions to [facility]  L Shoulder Flex: 3-/5  L Shoulder ABduction: 3-/5  L Hand General: 4+/5  RUE Strength  Gross RUE Strength: WFL  R Hand General: 4+/5                   Plan   Plan  Times per week: 3-5  Times per day: Daily  Current Treatment Recommendations: Self-Care / ADL,Strengthening,Endurance Training,Balance Training,Functional Mobility Training,Home Management Training    G-Code     OutComes Score                                                  AM-PAC Score        AM-[facility] Inpatient Daily Activity Raw Score: 19 (01/24/22 1502)  AM-PAC Inpatient ADL T-Scale Score : 40.22 (01/24/22 1502)  ADL Inpatient CMS 0-100% Score: 42.8 (01/24/22 1502)  ADL Inpatient CMS G-Code Modifier : CK (01/24/22 1502)    Goals  Short term goals  Time Frame for Short term goals: discharge  Short term goal 1: increase standing tolerance to 4 minutes in stance to complete fxl ADL  Short term goal 2: fxl mobility CGA  Short term goal 3: fxl transfer SBA  Short term goal 4: increase standing balance to supervision when completing LB ADL  Short term goal 5: toileting supervision  Long term goals  Time Frame for Long term goals : STG = LTG       Therapy Time   Individual Concurrent Group Co-treatment   Time In 1311         Time Out 1354         Minutes 43            Timed Code Treatment Minutes:   28 minutes    Total Treatment Minutes:  43 minutes    RASHIDA Badillo OTR/L [id number]    Stacey Stoddard OT

## 2022-01-24 NOTE — ED PROVIDER NOTES
I independently performed a history and physical on Janneth Sheikh. All diagnostic, treatment, and disposition decisions were made by myself in conjunction with the advanced practice provider. Briefly, this is a 67 y.o. female here for generalized weakness and fatigue. She is recently tested positive for COVID-19. Family is concerned that patient has been confused, last known well 3 days ago. .    On exam, patient afebrile and nontoxic. No distress. Heart RRR. Lungs CTAB. Abdomen soft, nondistended, nontender to palpation in all quadrants. A&Ox4, however perseverates with questions and requires repeated prompting to follow some commands. PERRL. Speech clear, face symmetric. CN 2-12 intact. 5/5 motor and sensation grossly intact all extremities. No pronator drift. Gait not tested. Appears generally weak without focal deficit. EKG  EKG was reviewed by emergency department physician in the absence of a cardiologist    Narrow complex sinus rhythm, rate 99, normal axis, borderline shortened CT and normal QRS intervals, normal Qtc, no ST elevations or depressions, normal t-wave morphology, impression sinus rhythm with PACs, no STEMI      Screenings   [place] Coma Scale  Eye Opening: Spontaneous  Best Verbal Response: Confused  Best Motor Response: Obeys commands  Ed Coma Scale Score: 14        MDM    Patient afebrile and nontoxic. No distress. EKG no STEMI, initial troponin normal, nothing to suggest ACS or malignant dysrhythmia at this time. Neuro exam is nonfocal, presentation not consistent with CVA/TIA. Laboratory work-up with chronic hyponatremia and mild hypokalemia. Patient does seem to be mildly confused on my examination, per family this is atypical.  There is very low suspicion for infectious etiology or sepsis. Nothing to suggest CNS infection. Presentation is concerning for encephalopathy secondary to COVID-19.   Case discussed with internal medicine team and will admit for further evaluation and care. Patient Referrals:  No follow-up provider specified. Discharge Medications:  Current Discharge Medication List          FINAL IMPRESSION  1. Encephalopathy    2. Hyponatremia    3. COVID-19 virus infection    4. Hypokalemia    5. Non-intractable vomiting without nausea, unspecified vomiting type    6. Fall, initial encounter        Blood pressure (!) 171/111, pulse 72, temperature 97.8 °F (36.6 °C), temperature source Oral, resp. rate 16, height 5' 6\" (1.676 m), weight 125 lb 14.1 oz (57.1 kg), SpO2 92 %, not currently breastfeeding. For further details of [facility] emergency department encounter, please see documentation by advanced practice provider, Anna Mcdermott NP.     Carol Villavicencio DO (electronically signed)  Attending Emergency Physician       Carol Villavicencio DO  01/24/22 1344

## 2022-01-24 NOTE — PROGRESS NOTES
Pt tolerated full liquids. Denies n/v. purewick changed and pericare provided. Fall p[recautions in place.

## 2022-01-25 LAB
A/G RATIO: 1.5 (ref 1.1–2.2)
ALBUMIN SERPL-MCNC: 3.6 G/DL (ref 3.4–5)
ALP BLD-CCNC: 44 U/L (ref 40–129)
ALT SERPL-CCNC: 16 U/L (ref 10–40)
ANION GAP SERPL CALCULATED.3IONS-SCNC: 10 MMOL/L (ref 3–16)
AST SERPL-CCNC: 23 U/L (ref 15–37)
BASOPHILS ABSOLUTE: 0.1 K/UL (ref 0–0.2)
BASOPHILS RELATIVE PERCENT: 1 %
BILIRUB SERPL-MCNC: 0.3 MG/DL (ref 0–1)
BUN BLDV-MCNC: 30 MG/DL (ref 7–20)
C-REACTIVE PROTEIN: <3 MG/L (ref 0–5.1)
CALCIUM SERPL-MCNC: 8.8 MG/DL (ref 8.3–10.6)
CHLORIDE BLD-SCNC: 95 MMOL/L (ref 99–110)
CO2: 24 MMOL/L (ref 21–32)
CREAT SERPL-MCNC: 0.7 MG/DL (ref 0.6–1.2)
D DIMER: 464 NG/ML DDU (ref 0–229)
EOSINOPHILS ABSOLUTE: 0.2 K/UL (ref 0–0.6)
EOSINOPHILS RELATIVE PERCENT: 3 %
GFR AFRICAN AMERICAN: >60
GFR NON-AFRICAN AMERICAN: >60
GLUCOSE BLD-MCNC: 103 MG/DL (ref 70–99)
HCT VFR BLD CALC: 36.8 % (ref 36–48)
HEMOGLOBIN: 12.8 G/DL (ref 12–16)
LYMPHOCYTES ABSOLUTE: 0.9 K/UL (ref 1–5.1)
LYMPHOCYTES RELATIVE PERCENT: 15.9 %
MCH RBC QN AUTO: 35.3 PG (ref 26–34)
MCHC RBC AUTO-ENTMCNC: 34.6 G/DL (ref 31–36)
MCV RBC AUTO: 101.9 FL (ref 80–100)
MONOCYTES ABSOLUTE: 0.7 K/UL (ref 0–1.3)
MONOCYTES RELATIVE PERCENT: 12.3 %
NEUTROPHILS ABSOLUTE: 3.9 K/UL (ref 1.7–7.7)
NEUTROPHILS RELATIVE PERCENT: 67.8 %
PDW BLD-RTO: 13.1 % (ref 12.4–15.4)
PLATELET # BLD: 300 K/UL (ref 135–450)
PMV BLD AUTO: 7.7 FL (ref 5–10.5)
POTASSIUM REFLEX MAGNESIUM: 5.1 MMOL/L (ref 3.5–5.1)
PROCALCITONIN: 0.05 NG/ML (ref 0–0.15)
RBC # BLD: 3.62 M/UL (ref 4–5.2)
SODIUM BLD-SCNC: 129 MMOL/L (ref 136–145)
TOTAL PROTEIN: 6 G/DL (ref 6.4–8.2)
WBC # BLD: 5.8 K/UL (ref 4–11)

## 2022-01-25 PROCEDURE — 6370000000 HC RX 637 (ALT 250 FOR IP): Performed by: INTERNAL MEDICINE

## 2022-01-25 PROCEDURE — 2580000003 HC RX 258: Performed by: INTERNAL MEDICINE

## 2022-01-25 PROCEDURE — 86140 C-REACTIVE PROTEIN: CPT

## 2022-01-25 PROCEDURE — 84145 PROCALCITONIN (PCT): CPT

## 2022-01-25 PROCEDURE — 97530 THERAPEUTIC ACTIVITIES: CPT

## 2022-01-25 PROCEDURE — 80053 COMPREHEN METABOLIC PANEL: CPT

## 2022-01-25 PROCEDURE — 6360000002 HC RX W HCPCS: Performed by: INTERNAL MEDICINE

## 2022-01-25 PROCEDURE — 94640 AIRWAY INHALATION TREATMENT: CPT

## 2022-01-25 PROCEDURE — 36415 COLL VENOUS BLD VENIPUNCTURE: CPT

## 2022-01-25 PROCEDURE — 85379 FIBRIN DEGRADATION QUANT: CPT

## 2022-01-25 PROCEDURE — 85025 COMPLETE CBC W/AUTO DIFF WBC: CPT

## 2022-01-25 PROCEDURE — 97116 GAIT TRAINING THERAPY: CPT

## 2022-01-25 PROCEDURE — 1200000000 HC SEMI PRIVATE

## 2022-01-25 RX ORDER — ZOLPIDEM TARTRATE 5 MG/1
2.5 TABLET ORAL NIGHTLY
Status: DISCONTINUED | OUTPATIENT
Start: 2022-01-25 | End: 2022-01-26 | Stop reason: HOSPADM

## 2022-01-25 RX ADMIN — CLONIDINE HYDROCHLORIDE 0.1 MG: 0.1 TABLET ORAL at 20:49

## 2022-01-25 RX ADMIN — GUAIFENESIN AND DEXTROMETHORPHAN 5 ML: 100; 10 SYRUP ORAL at 20:49

## 2022-01-25 RX ADMIN — Medication 2 PUFF: at 19:29

## 2022-01-25 RX ADMIN — ATORVASTATIN CALCIUM 80 MG: 80 TABLET, FILM COATED ORAL at 20:49

## 2022-01-25 RX ADMIN — Medication 10 ML: at 20:50

## 2022-01-25 RX ADMIN — ENOXAPARIN SODIUM 30 MG: 30 INJECTION SUBCUTANEOUS at 20:50

## 2022-01-25 RX ADMIN — CARVEDILOL 6.25 MG: 3.12 TABLET, FILM COATED ORAL at 17:29

## 2022-01-25 RX ADMIN — ZOLPIDEM TARTRATE 2.5 MG: 5 TABLET ORAL at 20:50

## 2022-01-25 RX ADMIN — CLONAZEPAM 1 MG: 0.5 TABLET ORAL at 23:01

## 2022-01-25 RX ADMIN — LEVOTHYROXINE SODIUM 100 MCG: 0.1 TABLET ORAL at 06:17

## 2022-01-25 RX ADMIN — Medication 15 G: at 08:48

## 2022-01-25 RX ADMIN — ONDANSETRON 4 MG: 2 INJECTION INTRAMUSCULAR; INTRAVENOUS at 04:43

## 2022-01-25 RX ADMIN — Medication 10 ML: at 08:49

## 2022-01-25 RX ADMIN — CLONIDINE HYDROCHLORIDE 0.1 MG: 0.1 TABLET ORAL at 14:22

## 2022-01-25 RX ADMIN — ACETAMINOPHEN 650 MG: 325 TABLET ORAL at 20:51

## 2022-01-25 RX ADMIN — ASPIRIN 81 MG 81 MG: 81 TABLET ORAL at 08:48

## 2022-01-25 RX ADMIN — Medication 2000 UNITS: at 08:48

## 2022-01-25 RX ADMIN — ENOXAPARIN SODIUM 30 MG: 30 INJECTION SUBCUTANEOUS at 08:48

## 2022-01-25 RX ADMIN — CLONAZEPAM 1 MG: 0.5 TABLET ORAL at 10:34

## 2022-01-25 RX ADMIN — CARVEDILOL 6.25 MG: 3.12 TABLET, FILM COATED ORAL at 08:48

## 2022-01-25 RX ADMIN — CLONIDINE HYDROCHLORIDE 0.1 MG: 0.1 TABLET ORAL at 08:48

## 2022-01-25 ASSESSMENT — PAIN SCALES - GENERAL
PAINLEVEL_OUTOF10: 0
PAINLEVEL_OUTOF10: 4

## 2022-01-25 NOTE — PROGRESS NOTES
HOSPITALISTS PROGRESS NOTE    1/25/2022 8:50 AM        Name: Vanessa Tobias . Admitted: 1/23/2022  Primary Care Provider: QUITA Chadwick NP (Tel: [phone])      CC: nausea, vomiting and hyponatremia    Brief Course: 67 y.o. female  with PMHx of hypertension, hyperlipidemia, CAD s/p PPI hypothyroidism, anxiety, depression and COPD presented with nausea and vomiting. Per chart review, patient was tested positive for COVID about 3 days ago and has been experiencing nausea and vomiting for the past 3 days. Patient reported that because of persistent nausea and vomiting she has been unable to keep anything down and yesterday she was feeling so weak that he fell down. Patient denies any fever, chills, shortness of breath, cough, chest pain, PND, orthopnea, BLE edema, dizziness, syncope, urinary frequency urgency or dysuria.  On admission, patient was hemodynamically stable.  Initial lab work showed sodium: 126, potassium: 3.3, chloride: 89.  CXR negative for any acute pathology; showed no change in the left apical and left basilar scarring and left pleural thickening.  CT head for any acute intracranial pathology.  CT cervical spine negative for any fractures. Interval history:   Pt seen and examined today. Overnight events noted, interval ancillary notes and labs reviewed.   On room air: Satting well  Afebrile overnight, WBCs within normal limits  Sodium 129 this morning; on urea tabs  Reported generalized weakness but denied cough, SOB, chest pain, nausea, vomiting or abdominal pain        Assessment & Plan:     COVID-19: Tested positive about 3 days ago  Patient currently on room air would not qualify for steroids or Actemra  Maintain droplet plus precautions  Supplemental oxygen if needed to keep sats above 92%  Continue anticoagulation for hypercoagulable state     Nausea and vomiting: Likely secondary to COVID  As needed antiemetics and IV fluid ordered     Hyponatremia: Acute on chronic chronic.  Serum sodium 126 on admission  likely secondary from decreased p.o. intake/vomiting//SIADH from nausea/medazepam  Nephrology consulted: Appreciate recs  Urine studies noted;Continue urea tabs  Monitor sodium every 6 hours  Avoid isotonic fluids     Hypokalemia: Likely secondary from decreased p.o. intake and nausea and vomiting: Resolved  Repeat potassium as needed     Hypertension: On Coreg, clonidine and losartan at home  Losartan on hold in setting of hyponatremia  Monitor blood pressure closely     Hyperlipidemia: Continue statins     Hypothyroidism: Continue Synthroid  TSH ordered     Hx of anxiety and depression: On Klonopin and mirtazapine at home  Only mirtazapine for now     Fall likely secondary to dehydration/generalized weakness  Maintain fall precautions  Orthostatic vitals ordered     Hx of COPD: Not in exacerbation  Continue as needed breathing treatment at home and let us        DVT prophylaxis: Lovenox  Code:Full Code  Diet: ADULT ORAL NUTRITION SUPPLEMENT; Lunch, Dinner; Clear Liquid Oral Supplement  ADULT DIET;  Regular    Disposition:     Current Medications  urea (URE-NA) packet 15 g, Daily  sodium chloride flush 0.9 % injection 5-40 mL, 2 times per day  sodium chloride flush 0.9 % injection 5-40 mL, PRN  0.9 % sodium chloride infusion, PRN  enoxaparin (LOVENOX) injection 30 mg, BID  ondansetron (ZOFRAN-ODT) disintegrating tablet 4 mg, Q8H PRN   Or  ondansetron (ZOFRAN) injection 4 mg, Q6H PRN  polyethylene glycol (GLYCOLAX) packet 17 g, Daily PRN  acetaminophen (TYLENOL) tablet 650 mg, Q6H PRN   Or  acetaminophen (TYLENOL) suppository 650 mg, Q6H PRN  guaiFENesin-dextromethorphan (ROBITUSSIN DM) 100-10 MG/5ML syrup 5 mL, Q4H PRN  Vitamin D (CHOLECALCIFEROL) tablet 2,000 Units, Daily  ipratropium-albuterol (DUONEB) nebulizer solution 1 ampule, Q4H PRN  albuterol (PROVENTIL) nebulizer solution 2.5 mg, Q6H PRN  carvedilol (COREG) tablet 6.25 mg, BID WC  aspirin chewable tablet 81 mg, Daily  atorvastatin (LIPITOR) tablet 80 mg, Nightly  budesonide-formoterol (SYMBICORT) 160-4.5 MCG/ACT inhaler 2 puff, BID  levothyroxine (SYNTHROID) tablet 100 mcg, Daily  clonazePAM (KLONOPIN) tablet 1 mg, BID PRN  cloNIDine (CATAPRES) tablet 0.1 mg, TID  influenza quadrivalent split vaccine (FLUZONE;FLUARIX;FLULAVAL;AFLURIA) injection 0.5 mL, Prior to discharge        Objective:  BP (!) 150/78   Pulse 78   Temp 97.8 °F (36.6 °C) (Axillary)   Resp 15   Ht 5' 6\" (1.676 m)   Wt 131 lb 4 oz (59.5 kg)   SpO2 95%   BMI 21.18 kg/m²     Intake/Output Summary (Last 24 hours) at 1/25/2022 0850  Last data filed at 1/25/2022 0437  Gross per 24 hour   Intake --   Output 1100 ml   Net -1100 ml      Wt Readings from Last 3 Encounters:   01/25/22 131 lb 4 oz (59.5 kg)   01/20/22 123 lb 14.4 oz (56.2 kg)   12/29/21 132 lb 8 oz (60.1 kg)       Physical Examination:   General appearance:  No apparent distress, appears stated age and cooperative. Eyes: Sclera clear. Pupils equal.  ENT: Moist oral mucosa. Trachea midline, no adenopathy. Cardiovascular: Regular rhythm, normal S1, S2. No murmur. No edema in lower extremities  Respiratory:Not using accessory muscles. Good inspiratory effort. Clear to auscultation bilaterally, no wheeze or crackles. GI: Abdomen soft, no tenderness, not distended, normal bowel sounds  Musculoskeletal: normal ROM, No cyanosis in digits  Neurology: CN 2-12 grossly intact. No speech or motor deficits  Psych: Normal affect.  Alert and oriented in time, place and person  Skin: Warm, dry, normal turgor    Labs and Tests:  CBC:   Recent Labs     01/23/22  0825 01/24/22  0736 01/25/22  0423   WBC 7.4 6.6 5.8   HGB 14.8 13.8 12.8    358 300     BMP:    Recent Labs     01/23/22  0825 01/23/22  1748 01/24/22  0736 01/24/22  0736 01/24/22  1144 01/24/22  1752 01/25/22  0423   *   < > 129*   < > 127* 126* 129*   K 3.3*  --  4.4  --   --   --  5.1   CL 89*  --  95*  --   --   --  95*   CO2 26  --  25  --   --   --  24   BUN 7  --  10  --   --   --  30*   CREATININE <0.5*  --  0.6  --   --   --  0.7   GLUCOSE 103*  --  104*  --   --   --  103*    < > = values in this interval not displayed. Hepatic:   Recent Labs     01/23/22  0825 01/24/22  0736 01/25/22  0423   AST 24 24 23   ALT 14 15 16   BILITOT 0.4 0.3 0.3   ALKPHOS 55 47 44     CT ABDOMEN PELVIS WO CONTRAST Additional Contrast? None   Final Result   1. Unchanged small complex left pleural effusion. CT Cervical Spine WO Contrast   Final Result   1. No acute fracture. CT Head WO Contrast   Final Result   1. No acute intracranial abnormality. XR CHEST PORTABLE   Final Result   1. No acute abnormality. Problem List  Active Problems:    Hyponatremia  Resolved Problems:    * No resolved hospital problems.  Nathan Watts MD   1/25/2022 8:49 AM

## 2022-01-25 NOTE — PROGRESS NOTES
Nephrology Attending  Progress Note        SUMMARY :  We are following this patient for Hyponatremia   76-year-old female with significant past medical history of hypertension, coronary artery disease, diastolic CHF, diabetes mellitus type 2, CVA, COPD, DVT, hypertension, hyperlipidemia, depression, alcohol abuse, and chronic hyponatremia presented to the ER with weakness of fall and 1 emesis  She had tested positive for COVID-19 on 1/20/2022  In the ER she was found to have hypertensive urgency with blood pressure of 210/124 and labs showed a sodium of 126 with a K of 3.3    SUBJECTIVE:   Patient progress reviewed. The patient feels better  BP stable   Cough +, breathing better  Has problems falling asleep   at bedside    Physical Exam:    VITALS:  BP (!) 150/78   Pulse 78   Temp 97.8 °F (36.6 °C) (Axillary)   Resp 15   Ht 5' 6\" (1.676 m)   Wt 131 lb 4 oz (59.5 kg)   SpO2 95%   BMI 21.18 kg/m²   BLOOD PRESSURE RANGE:  Systolic (08YBB), PRN:511 , Min:101 , MQM:557   ; Diastolic (11GUE), XUZ:10, Min:65, Max:79    24HR INTAKE/OUTPUT:      Intake/Output Summary (Last 24 hours) at 1/25/2022 1029  Last data filed at 1/25/2022 0437  Gross per 24 hour   Intake --   Output 1100 ml   Net -1100 ml       Gen:  alert, oriented x 3  PERRL , EOM +  Pallor +, No icterus  JVP not raised   CV: RRR no murmur or rub . Recorder device +  Lungs:B/ L air entry, Normal breath sounds   Abd: soft, bowel sounds + , No organomegaly   Ext: No edema, no cyanosis,    Skin: Warm.   No rash  Neuro: nonfocal.      DATA:    CBC with Differential:    Lab Results   Component Value Date    WBC 5.8 01/25/2022    RBC 3.62 01/25/2022    HGB 12.8 01/25/2022    HCT 36.8 01/25/2022     01/25/2022    .9 01/25/2022    MCH 35.3 01/25/2022    MCHC 34.6 01/25/2022    RDW 13.1 01/25/2022    SEGSPCT 79.1 04/19/2016    BANDSPCT 1 04/28/2018    LYMPHOPCT 15.9 01/25/2022    MONOPCT 12.3 01/25/2022    EOSPCT 1.3 05/08/2011    BASOPCT 1.0 01/25/2022    MONOSABS 0.7 01/25/2022    LYMPHSABS 0.9 01/25/2022    EOSABS 0.2 01/25/2022    BASOSABS 0.1 01/25/2022    DIFFTYPE Auto 05/02/2013     CMP:    Lab Results   Component Value Date     01/25/2022    K 5.1 01/25/2022    CL 95 01/25/2022    CO2 24 01/25/2022    BUN 30 01/25/2022    CREATININE 0.7 01/25/2022    GFRAA >60 01/25/2022    GFRAA >60 05/02/2013    AGRATIO 1.5 01/25/2022    LABGLOM >60 01/25/2022    LABGLOM 79 12/11/2017    GLUCOSE 103 01/25/2022    PROT 6.0 01/25/2022    PROT 7.1 10/18/2012    LABALBU 3.6 01/25/2022    CALCIUM 8.8 01/25/2022    BILITOT 0.3 01/25/2022    ALKPHOS 44 01/25/2022    AST 23 01/25/2022    ALT 16 01/25/2022     Phosphorus:    Lab Results   Component Value Date    PHOS 4.4 04/15/2021     Uric Acid:    Lab Results   Component Value Date    LABURIC 4.5 08/04/2019     U/A:    Lab Results   Component Value Date    NITRITE Negative 07/15/2021    COLORU YELLOW 01/23/2022    PROTEINU Negative 01/23/2022    PHUR 7.0 01/23/2022    WBCUA 2 01/23/2022    RBCUA 1 01/23/2022    YEAST 0 07/13/2020    YEAST Present 09/11/2019    BACTERIA 4+ 09/01/2021    CLARITYU CLOUDY 01/23/2022    SPECGRAV 1.005 01/23/2022    LEUKOCYTESUR Negative 01/23/2022    UROBILINOGEN 0.2 01/23/2022    BILIRUBINUR Negative 01/23/2022    BILIRUBINUR 0 07/15/2021    BILIRUBINUR NEGATIVE 05/08/2011    BLOODU Negative 01/23/2022    GLUCOSEU Negative 01/23/2022    GLUCOSEU NEGATIVE 05/08/2011         IMPRESSION/RECOMMENDATIONS:      Diagnosis and Plan     1. Hyponatremia  Na 129, Uosm 522 , U na - 39  Likely SIADH , Continue  Urea    2. Hypokalemia:  Corrected, K 5.1     3. Hypertensive Urgency  Better     4. COVID - 19   5.  Insomnia- Kelsey Sanchez MD

## 2022-01-25 NOTE — CARE COORDINATION
CM called patient's spouse regarding d/c plan of care , he stated he will be visiting her tomorrow and will discuss about that then.

## 2022-01-25 NOTE — PROGRESS NOTES
Physical Therapy  Facility/Department: [address] NURSING  Daily Treatment Note  NAME: Zuleyma King  :   MRN: [id number]    Date of Service: 2022    Discharge Recommendations:Sharon Kitchen scored a 16 on the AM-PAC short mobility form. Current research shows that an AM-PAC score of 17 or less is typically not associated with a discharge to the patient's home setting. Based on the patient's AM-PAC score and their current functional mobility deficits, it is recommended that the patient have 5-7 sessions per week of Physical Therapy at d/c to increase the patient's independence. At this time, this patient demonstrates the endurance, and/or tolerance for 3 hours of therapy each day, with a treatment frequency of 5-7x/wk. Please see assessment section for further patient specific details. If patient discharges prior to next session this note will serve as a discharge summary. Please see below for the latest assessment towards goals. PT Equipment Recommendations  Equipment Needed: No    Assessment   Body structures, Functions, Activity limitations: Decreased functional mobility ; Decreased strength;Decreased endurance;Decreased balance  Assessment: Pt presents with the above deficits impairing her ability to perform functional mobility safely and independently. Pt with PLOF of MOD I with RW however currently requires CGA for mobility with RW. Pt would benefit from acute PT services to address deficits.   Treatment Diagnosis: decreased activity tolerance  PT Education: Goals;PT Role;Plan of Care;Transfer Training;General Safety;Orientation;Gait Training;Functional Mobility Training  Patient Education: d/c recommendations--pt verbalizing understanding  Barriers to Learning: none  REQUIRES PT FOLLOW UP: Yes  Activity Tolerance  Activity Tolerance: Patient Tolerated treatment well;Patient limited by endurance  Activity Tolerance: required increased time to perform tasks     Patient Diagnosis(es): The primary encounter diagnosis was Encephalopathy. Diagnoses of Hyponatremia, COVID-19 virus infection, Hypokalemia, Non-intractable vomiting without nausea, unspecified vomiting type, and Fall, initial encounter were also pertinent to this visit. has a past medical history of Acute DVT (deep venous thrombosis) ([facility]), Acute encephalopathy, Acute on chronic diastolic heart failure ([facility] [address] Alcohol abuse, Anxiety, Arthritis, CAD in native artery, Cellulitis, Cerebral artery occlusion with cerebral infarction [facility]), Cerebrovascular accident (CVA) ([facility] [address] Chronic fatigue, Complex care coordination, Complicated UTI (urinary tract infection), COPD (chronic obstructive pulmonary disease) ([facility] [address] COPD exacerbation ([facility] [address] Depression, Diabetes mellitus ([facility] [address] DIMAS (dyspnea on exertion), DVT (deep venous thrombosis) ([facility] [address] DVT, lower extremity ([facility] [address] Fatigue, General weakness, Generalized weakness, Hypercholesteremia, Hypertension, Hyperthyroidism, Incontinence, Mammogram abnormal, Neuromuscular disorder ([facility] [address] Osteopenia, Pneumonia, Recurrent UTI, Right forearm cellulitis, Superficial thrombophlebitis of right upper extremity, Thyroid disease, Tobacco abuse, Tobacco abuse counseling, Trapped lung, and Unable to walk.   has a past surgical history that includes Tonsillectomy; Colonoscopy (1/19/2012); Toe Surgery (Right); Shoulder arthroscopy (Right, 6/18/14); eye surgery; Pacemaker insertion; pacemaker placement; and Nerve Surgery (N/A, 12/29/2021). Restrictions  Restrictions/Precautions  Restrictions/Precautions: Fall Risk,Isolation,Modified Diet (HIGH FALL RISK; Droplet Plus Isolation; full liquid diet)  Position Activity Restriction  Other position/activity restrictions: 67 y.o. female  with PMHx of hypertension, hyperlipidemia, CAD s/p PPI hypothyroidism, anxiety, depression and COPD presented with nausea and vomiting.  Per chart review, patient was tested positive for COVID about 3 days ago and has been experiencing nausea and vomiting for the past 3 days. Patient reported that because of persistent nausea and vomiting she has been unable to keep anything down and yesterday she was feeling so weak that he fell down. Patient denies any fever, chills, shortness of breath, cough, chest pain, PND, orthopnea, BLE edema, dizziness, syncope, urinary frequency urgency or dysuria. On admission, patient was hemodynamically stable. Initial lab work showed sodium: 126, potassium: 3.3, chloride: 89. CXR negative for any acute pathology; showed no change in the left apical and left basilar scarring and left pleural thickening. CT head for any acute intracranial pathology. CT cervical spine negative for any fractures. Subjective   General  Chart Reviewed: Yes  Response To Previous Treatment: Patient with no complaints from previous session. Family / Caregiver Present: No  Subjective  Subjective: Pt agreeable to PT, reporting no pain at this time.   General Comment  Comments: Pt reclined in bed upon arrival  Pain Screening  Patient Currently in Pain: Denies  Vital Signs  Patient Currently in Pain: Denies       Orientation  Orientation  Overall Orientation Status: Within Functional Limits     Objective   Bed mobility  Supine to Sit: Stand by assistance (HOB elevated)  Scooting: Stand by assistance     Transfers  Sit to Stand: Contact guard assistance;Minimal Assistance (CGA from bed; min A from chair and toilet)  Stand to sit: Contact guard assistance;Minimal Assistance (CGA to bed; min A to lower chair and toilet)     Ambulation  Ambulation?: Yes  Ambulation 1  Surface: level tile  Device: Rolling Walker  Assistance: Contact guard assistance  Quality of Gait: wide Will, decreased risa, decreased B step lengths/heights  Distance: 18 ft x 2; amb from bed to bathroom; back to chair     Balance  Posture: Fair  Sitting - Static: Good  Sitting - Dynamic: Good;- (dons brief seated on toilet with SBA; able to perform self pericare seated with SBA)  Standing - Static: Fair;+ (CGA with RW while PT assisted with pericare after loose stool)  Standing - Dynamic: Fair;+ (CGA at sink for hand washing, and CGA for amb with RW)  Comments: Pt was assisted with donning clean gown and brief while seated on toilet; also completed bathing with warm wipes to upper body, lower body and back; changed gown again once back in chair due to slightly soiled after use of toilet; bed linens changed due to being wet         AM-PAC Score  AM-PAC Inpatient Mobility Raw Score : 16 (01/25/22 1313)  AM-PAC Inpatient T-Scale Score : 40.78 (01/25/22 1313)  Mobility Inpatient CMS 0-100% Score: 54.16 (01/25/22 1313)  Mobility Inpatient CMS G-Code Modifier : CK (01/25/22 1313)          Goals  Short term goals  Time Frame for Short term goals: upon d/c  Short term goal 1: Pt will perform bed mobility MOD I  Short term goal 2: Pt will perform transfers with LRAD and SBA  Short term goal 3: Pt will ambulate 22' with LRAD and CGA  Patient Goals   Patient goals : pt did not state  No goals met    Plan    Plan  Times per week: 3-5x  Times per day: Daily  Current Treatment Recommendations: Strengthening,Balance Training,Functional Mobility Training,Transfer Training,Gait Training,Endurance Training,Neuromuscular Re-education,Home Exercise Program,Safety Education & Training,Patient/Caregiver Education & Training,Equipment Evaluation, Education, & procurement,Modalities  Safety Devices  Type of devices:  All fall risk precautions in place,Call light within reach,Chair alarm in place,Gait belt,Patient at risk for falls,Left in chair,Nurse notified  Restraints  Initially in place: No     Therapy Time   Individual Concurrent Group Co-treatment   Time In 0958         Time Out 1058         Minutes 60         Timed Code Treatment Minutes: [facility], [facility], 15 [facility]

## 2022-01-26 ENCOUNTER — TELEPHONE (OUTPATIENT)
Dept: FAMILY MEDICINE CLINIC | Age: 77
End: 2022-01-26

## 2022-01-26 VITALS
DIASTOLIC BLOOD PRESSURE: 84 MMHG | RESPIRATION RATE: 18 BRPM | SYSTOLIC BLOOD PRESSURE: 133 MMHG | TEMPERATURE: 98.2 F | BODY MASS INDEX: 21.09 KG/M2 | OXYGEN SATURATION: 98 % | HEART RATE: 72 BPM | WEIGHT: 131.25 LBS | HEIGHT: 66 IN

## 2022-01-26 LAB
A/G RATIO: 2.1 (ref 1.1–2.2)
ALBUMIN SERPL-MCNC: 3.9 G/DL (ref 3.4–5)
ALP BLD-CCNC: 47 U/L (ref 40–129)
ALT SERPL-CCNC: 16 U/L (ref 10–40)
ANION GAP SERPL CALCULATED.3IONS-SCNC: 12 MMOL/L (ref 3–16)
ANION GAP SERPL CALCULATED.3IONS-SCNC: 9 MMOL/L (ref 3–16)
AST SERPL-CCNC: 22 U/L (ref 15–37)
BASOPHILS ABSOLUTE: 0.1 K/UL (ref 0–0.2)
BASOPHILS RELATIVE PERCENT: 1.1 %
BILIRUB SERPL-MCNC: 0.3 MG/DL (ref 0–1)
BUN BLDV-MCNC: 18 MG/DL (ref 7–20)
BUN BLDV-MCNC: 26 MG/DL (ref 7–20)
CALCIUM SERPL-MCNC: 8.9 MG/DL (ref 8.3–10.6)
CALCIUM SERPL-MCNC: 9.5 MG/DL (ref 8.3–10.6)
CHLORIDE BLD-SCNC: 94 MMOL/L (ref 99–110)
CHLORIDE BLD-SCNC: 95 MMOL/L (ref 99–110)
CO2: 25 MMOL/L (ref 21–32)
CO2: 30 MMOL/L (ref 21–32)
CREAT SERPL-MCNC: 0.6 MG/DL (ref 0.6–1.2)
CREAT SERPL-MCNC: 0.7 MG/DL (ref 0.6–1.2)
EOSINOPHILS ABSOLUTE: 0.3 K/UL (ref 0–0.6)
EOSINOPHILS RELATIVE PERCENT: 4.4 %
GFR AFRICAN AMERICAN: >60
GFR AFRICAN AMERICAN: >60
GFR NON-AFRICAN AMERICAN: >60
GFR NON-AFRICAN AMERICAN: >60
GLUCOSE BLD-MCNC: 90 MG/DL (ref 70–99)
GLUCOSE BLD-MCNC: 90 MG/DL (ref 70–99)
HCT VFR BLD CALC: 36.9 % (ref 36–48)
HEMOGLOBIN: 12.8 G/DL (ref 12–16)
LYMPHOCYTES ABSOLUTE: 1.1 K/UL (ref 1–5.1)
LYMPHOCYTES RELATIVE PERCENT: 18 %
MCH RBC QN AUTO: 35.2 PG (ref 26–34)
MCHC RBC AUTO-ENTMCNC: 34.8 G/DL (ref 31–36)
MCV RBC AUTO: 101.3 FL (ref 80–100)
MONOCYTES ABSOLUTE: 1 K/UL (ref 0–1.3)
MONOCYTES RELATIVE PERCENT: 15.4 %
NEUTROPHILS ABSOLUTE: 3.9 K/UL (ref 1.7–7.7)
NEUTROPHILS RELATIVE PERCENT: 61.1 %
PDW BLD-RTO: 12.8 % (ref 12.4–15.4)
PLATELET # BLD: 313 K/UL (ref 135–450)
PMV BLD AUTO: 7.4 FL (ref 5–10.5)
POTASSIUM REFLEX MAGNESIUM: 5.5 MMOL/L (ref 3.5–5.1)
POTASSIUM SERPL-SCNC: 5 MMOL/L (ref 3.5–5.1)
RBC # BLD: 3.65 M/UL (ref 4–5.2)
SODIUM BLD-SCNC: 131 MMOL/L (ref 136–145)
SODIUM BLD-SCNC: 134 MMOL/L (ref 136–145)
TOTAL PROTEIN: 5.8 G/DL (ref 6.4–8.2)
WBC # BLD: 6.3 K/UL (ref 4–11)

## 2022-01-26 PROCEDURE — 97530 THERAPEUTIC ACTIVITIES: CPT

## 2022-01-26 PROCEDURE — 85025 COMPLETE CBC W/AUTO DIFF WBC: CPT

## 2022-01-26 PROCEDURE — 94680 O2 UPTK RST&XERS DIR SIMPLE: CPT

## 2022-01-26 PROCEDURE — 6370000000 HC RX 637 (ALT 250 FOR IP): Performed by: INTERNAL MEDICINE

## 2022-01-26 PROCEDURE — 80053 COMPREHEN METABOLIC PANEL: CPT

## 2022-01-26 PROCEDURE — 6360000002 HC RX W HCPCS: Performed by: INTERNAL MEDICINE

## 2022-01-26 PROCEDURE — 2580000003 HC RX 258: Performed by: INTERNAL MEDICINE

## 2022-01-26 PROCEDURE — 36415 COLL VENOUS BLD VENIPUNCTURE: CPT

## 2022-01-26 PROCEDURE — 97535 SELF CARE MNGMENT TRAINING: CPT

## 2022-01-26 RX ORDER — ATORVASTATIN CALCIUM 80 MG/1
40 TABLET, FILM COATED ORAL NIGHTLY
Qty: 30 TABLET | Refills: 0 | OUTPATIENT
Start: 2022-01-26

## 2022-01-26 RX ORDER — CARVEDILOL 6.25 MG/1
12.5 TABLET ORAL 2 TIMES DAILY WITH MEALS
Status: DISCONTINUED | OUTPATIENT
Start: 2022-01-26 | End: 2022-01-26 | Stop reason: HOSPADM

## 2022-01-26 RX ORDER — CARVEDILOL 12.5 MG/1
12.5 TABLET ORAL 2 TIMES DAILY WITH MEALS
Qty: 60 TABLET | Refills: 1 | Status: SHIPPED | OUTPATIENT
Start: 2022-01-26 | End: 2022-02-02 | Stop reason: ALTCHOICE

## 2022-01-26 RX ORDER — BUDESONIDE AND FORMOTEROL FUMARATE DIHYDRATE 160; 4.5 UG/1; UG/1
2 AEROSOL RESPIRATORY (INHALATION) 2 TIMES DAILY
Qty: 10.2 G | Refills: 0 | Status: SHIPPED | OUTPATIENT
Start: 2022-01-26

## 2022-01-26 RX ADMIN — ENOXAPARIN SODIUM 30 MG: 30 INJECTION SUBCUTANEOUS at 10:38

## 2022-01-26 RX ADMIN — CLONAZEPAM 1 MG: 0.5 TABLET ORAL at 10:39

## 2022-01-26 RX ADMIN — CLONIDINE HYDROCHLORIDE 0.1 MG: 0.1 TABLET ORAL at 10:39

## 2022-01-26 RX ADMIN — Medication 2000 UNITS: at 10:39

## 2022-01-26 RX ADMIN — LEVOTHYROXINE SODIUM 100 MCG: 0.1 TABLET ORAL at 06:14

## 2022-01-26 RX ADMIN — ASPIRIN 81 MG 81 MG: 81 TABLET ORAL at 10:39

## 2022-01-26 RX ADMIN — CARVEDILOL 6.25 MG: 3.12 TABLET, FILM COATED ORAL at 10:39

## 2022-01-26 RX ADMIN — Medication 15 G: at 10:36

## 2022-01-26 RX ADMIN — CLONIDINE HYDROCHLORIDE 0.1 MG: 0.1 TABLET ORAL at 14:03

## 2022-01-26 RX ADMIN — Medication 10 ML: at 10:41

## 2022-01-26 ASSESSMENT — PAIN DESCRIPTION - PROGRESSION
CLINICAL_PROGRESSION: GRADUALLY IMPROVING

## 2022-01-26 ASSESSMENT — PAIN SCALES - GENERAL
PAINLEVEL_OUTOF10: 0

## 2022-01-26 NOTE — PROGRESS NOTES
Occupational Therapy  Facility/Department: [address] ONCOLOGY  Daily Treatment Note  NAME: Abdullahi George  : 1945  MRN: [id number]    Date of Service: 2022    Discharge Recommendations: Abdullahi George scored a 19/24 on the AM-PAC ADL Inpatient form. Current research shows that an AM-PAC score of 17 or less is typically not associated with a discharge to the patient's home setting. Based on the patient's AM-PAC score and their current ADL deficits, it is recommended that the patient have 3-5 sessions per week of Occupational Therapy at d/c to increase the patient's independence. Please see assessment section for further patient specific details. If patient discharges prior to next session this note will serve as a discharge summary. Please see below for the latest assessment towards goals. OT Equipment Recommendations  Equipment Needed: No    Assessment   Performance deficits / Impairments: Decreased functional mobility ; Decreased ADL status; Decreased ROM; Decreased strength;Decreased endurance;Decreased safe awareness;Decreased balance;Decreased high-level IADLs  Assessment: Pt presents below baseline d/t above deficits. Pt limited d/t fatigue and limited endurance. Pt would benefit from continued OT services to get back to her baseline and address fxl mobility, balance, endurance, ADL/IADLs, and strength. Treatment Diagnosis: Hypokalemia, fatigue, and generalized weakness  History: Arthritis, CAD, CVA, COPD, hypertension, thryoid disease  Exam: as seen above  Assistance / Modification: unable to asses d/t pt refusal to get out of bed d/t being too fatigued  OT Education: OT Role;Plan of Care  Patient Education: d/c - pt v/u  REQUIRES OT FOLLOW UP: Yes  Activity Tolerance  Activity Tolerance: Patient Tolerated treatment well;Patient limited by fatigue  Activity Tolerance: Pt tolerated treatment well and sat on EOB ~ 10 minutes while RN changed dressing on lower back.  Pt was too fatigued to stand up after this and verbalized she needed to get back in bed. Safety Devices  Safety Devices in place: Yes  Type of devices: All fall risk precautions in place;Nurse notified;Call light within reach; Left in bed;Bed alarm in place         Patient Diagnosis(es): The primary encounter diagnosis was Encephalopathy. Diagnoses of Hyponatremia, COVID-19 virus infection, Hypokalemia, Non-intractable vomiting without nausea, unspecified vomiting type, and Fall, initial encounter were also pertinent to this visit. has a past medical history of Acute DVT (deep venous thrombosis) ([facility]), Acute encephalopathy, Acute on chronic diastolic heart failure ([address] Alcohol abuse, Anxiety, Arthritis, CAD in native artery, Cellulitis, Cerebral artery occlusion with cerebral infarction [facility]), Cerebrovascular accident (CVA) ([facility] [address] Chronic fatigue, Complex care coordination, Complicated UTI (urinary tract infection), COPD (chronic obstructive pulmonary disease) ([address] COPD exacerbation ([address] Depression, Diabetes mellitus ([address] DIMAS (dyspnea on exertion), DVT (deep venous thrombosis) ([address] DVT, lower extremity ([address] Fatigue, General weakness, Generalized weakness, Hypercholesteremia, Hypertension, Hyperthyroidism, Incontinence, Mammogram abnormal, Neuromuscular disorder ([address] Osteopenia, Pneumonia, Recurrent UTI, Right forearm cellulitis, Superficial thrombophlebitis of right upper extremity, Thyroid disease, Tobacco abuse, Tobacco abuse counseling, Trapped lung, and Unable to walk.   has a past surgical history that includes Tonsillectomy; Colonoscopy (1/19/2012); Toe Surgery (Right); Shoulder arthroscopy (Right, 6/18/14); eye surgery; Pacemaker insertion; pacemaker placement; and Nerve Surgery (N/A, 12/29/2021).     Restrictions  Restrictions/Precautions  Restrictions/Precautions: Fall Risk,Isolation  Required Braces or Orthoses?: No  Position Activity Restriction  Other position/activity restrictions: 67 y.o. female  with PMHx of hypertension, hyperlipidemia, CAD s/p PPI hypothyroidism, anxiety, depression and COPD presented with nausea and vomiting. Per chart review, patient was tested positive for COVID about 3 days ago and has been experiencing nausea and vomiting for the past 3 days. Patient reported that because of persistent nausea and vomiting she has been unable to keep anything down and yesterday she was feeling so weak that he fell down. Patient denies any fever, chills, shortness of breath, cough, chest pain, PND, orthopnea, BLE edema, dizziness, syncope, urinary frequency urgency or dysuria. On admission, patient was hemodynamically stable. Initial lab work showed sodium: 126, potassium: 3.3, chloride: 89. CXR negative for any acute pathology; showed no change in the left apical and left basilar scarring and left pleural thickening. CT head for any acute intracranial pathology. CT cervical spine negative for any fractures. Subjective   General  Chart Reviewed: Yes  Family / Caregiver Present: No  Diagnosis: Hypokalemia  Subjective  Subjective: Pt supine in bed upon arrival. Pleasant and agreeable to therapy.       Orientation  Orientation  Overall Orientation Status: Within Functional Limits  Objective    ADL  Grooming: Stand by assistance;Setup (washing face)  UE Bathing: Setup;Stand by assistance  LE Bathing: Contact guard assistance (Pt seated to bathe BLE, anticipate CGA for posterior/anterior kenton in stance)  UE Dressing: Setup (gown change)  LE Dressing: Dependent/Total (RN assisted with brief change)  Additional Comments: Seated EOB for sponge bathing as described above        Balance  Sitting Balance: Contact guard assistance (SBA>>>CGA d/t fatigue, seated EOB ~10 min)  Functional Mobility  Functional Mobility Comments: Unable to assess, pt never left bed d/t refusal to get out of bed despite encouragement  Bed mobility  Rolling to Left: Contact guard assistance  Rolling to Right: Contact guard assistance  Supine to Sit: Stand by assistance  Sit to Supine: Stand by assistance  Scooting: Stand by assistance  Comment: HOB flattened for bed mobility  Transfers  Transfer Comments: unable to assess d/t  pt refusal to get out of bed                       Cognition  Overall Cognitive Status: WFL     Perception  Overall Perceptual Status: WFL                 LUE AROM (degrees)  LUE AROM : Exceptions  LUE General AROM: within fxl limits distally, limited near shoulder  L Shoulder Flexion 0-180: 0-85  L Shoulder ABduction 0-180: 0-85  RUE AROM (degrees)  RUE AROM : WFL                 Plan   Plan  Times per week: 3-5  Times per day: Daily  Current Treatment Recommendations: Self-Care / ADL,Strengthening,Endurance Training,Balance Training,Functional Mobility Training,Home Management Training  G-Code     OutComes Score                                                  AM-PAC Score        [facility] Inpatient Daily Activity Raw Score: 19 (01/26/22 1512)  AM-PAC Inpatient ADL T-Scale Score : 40.22 (01/26/22 1512)  ADL Inpatient CMS 0-100% Score: 42.8 (01/26/22 1512)  ADL Inpatient CMS G-Code Modifier : CK (01/26/22 1512)    Goals  Short term goals  Time Frame for Short term goals: discharge  Short term goal 1: increase standing tolerance to 4 minutes in stance to complete fxl ADL -- ongoing 1/26  Short term goal 2: fxl mobility CGA -- ongoing 1/26  Short term goal 3: fxl transfer SBA -- ongoing 1/26  Short term goal 4: increase standing balance to supervision when completing LB ADL -- ongoing 1/26  Short term goal 5: toileting supervision -- ongoing 1/26  Long term goals  Time Frame for Long term goals : STG = LTG       Therapy Time   Individual Concurrent Group Co-treatment   Time In       1353   Time Out       1437   Minutes       44        Timed Code Treatment Minutes:  44 Minutes    Total Treatment Minutes:  44 minutes    Krzysztof España S/OT    OT provided direct supervision to student, facilitated in making skilled judgements throughout duration of session.     UnionRASHIDA Wiley OTR/L [id number]     [place], OT

## 2022-01-26 NOTE — PROGRESS NOTES
VSS. Call light within reach. Assessment complete at this time. No signs of distress or additional needs at this time. Fall precautions in place. Scheduled medications given.

## 2022-01-26 NOTE — PROGRESS NOTES
01/26/22 0959   Resting (Room Air)   SpO2 100   HR 68   During Walk (Room Air)   SpO2 94   HR 76   Walk/Assistance Device Ambulation  (stand and sit, repeat, repeat.  Pt uses walker/cane at home and  will bring.)   Rate of Dyspnea 0   After Walk   SpO2 98   HR 71   Does the Patient Qualify for Home O2 No   Does the Patient Need Portable Oxygen Tanks No Detail Level: Detailed

## 2022-01-26 NOTE — PROGRESS NOTES
Nephrology Attending  Progress Note        SUMMARY :  We are following this patient for Hyponatremia   79-year-old female with significant past medical history of hypertension, coronary artery disease, diastolic CHF, diabetes mellitus type 2, CVA, COPD, DVT, hypertension, hyperlipidemia, depression, alcohol abuse, and chronic hyponatremia presented to the ER with weakness of fall and 1 emesis  She had tested positive for COVID-19 on 1/20/2022  In the ER she was found to have hypertensive urgency with blood pressure of 210/124 and labs showed a sodium of 126 with a K of 3.3    SUBJECTIVE:   Patient progress reviewed. The patient feels better  BP high   Cough +, breathing better      Physical Exam:    VITALS:  BP (!) 168/108   Pulse 68   Temp 97.3 °F (36.3 °C) (Axillary)   Resp 18   Ht 5' 6\" (1.676 m)   Wt 131 lb 4 oz (59.5 kg)   SpO2 100%   BMI 21.18 kg/m²   BLOOD PRESSURE RANGE:  Systolic (32UPV), VPW:274 , Min:100 , VLY:071   ; Diastolic (58AVA), TUQ:55, Min:64, Max:108    24HR INTAKE/OUTPUT:      Intake/Output Summary (Last 24 hours) at 1/26/2022 1140  Last data filed at 1/26/2022 1573  Gross per 24 hour   Intake 240 ml   Output 1650 ml   Net -1410 ml       Gen:  alert, oriented x 3  PERRL , EOM +  Pallor +, No icterus  JVP not raised   CV: RRR no murmur or rub . Recorder device +  Lungs:B/ L air entry, Normal breath sounds   Abd: soft, bowel sounds + , No organomegaly   Ext: No edema, no cyanosis,    Skin: Warm.   No rash  Neuro: nonfocal.      DATA:    CBC with Differential:    Lab Results   Component Value Date    WBC 6.3 01/26/2022    RBC 3.65 01/26/2022    HGB 12.8 01/26/2022    HCT 36.9 01/26/2022     01/26/2022    .3 01/26/2022    MCH 35.2 01/26/2022    MCHC 34.8 01/26/2022    RDW 12.8 01/26/2022    SEGSPCT 79.1 04/19/2016    BANDSPCT 1 04/28/2018    LYMPHOPCT 18.0 01/26/2022    MONOPCT 15.4 01/26/2022    EOSPCT 1.3 05/08/2011    BASOPCT 1.1 01/26/2022    MONOSABS 1.0 01/26/2022 LYMPHSABS 1.1 01/26/2022    EOSABS 0.3 01/26/2022    BASOSABS 0.1 01/26/2022    DIFFTYPE Auto 05/02/2013     CMP:    Lab Results   Component Value Date     01/26/2022    K 5.0 01/26/2022    K 5.5 01/26/2022    CL 95 01/26/2022    CO2 30 01/26/2022    BUN 18 01/26/2022    CREATININE 0.6 01/26/2022    GFRAA >60 01/26/2022    GFRAA >60 05/02/2013    AGRATIO 2.1 01/26/2022    LABGLOM >60 01/26/2022    LABGLOM 79 12/11/2017    GLUCOSE 90 01/26/2022    PROT 5.8 01/26/2022    PROT 7.1 10/18/2012    LABALBU 3.9 01/26/2022    CALCIUM 9.5 01/26/2022    BILITOT 0.3 01/26/2022    ALKPHOS 47 01/26/2022    AST 22 01/26/2022    ALT 16 01/26/2022     Phosphorus:    Lab Results   Component Value Date    PHOS 4.4 04/15/2021     Uric Acid:    Lab Results   Component Value Date    LABURIC 4.5 08/04/2019     U/A:    Lab Results   Component Value Date    NITRITE Negative 07/15/2021    COLORU YELLOW 01/23/2022    PROTEINU Negative 01/23/2022    PHUR 7.0 01/23/2022    WBCUA 2 01/23/2022    RBCUA 1 01/23/2022    YEAST 0 07/13/2020    YEAST Present 09/11/2019    BACTERIA 4+ 09/01/2021    CLARITYU CLOUDY 01/23/2022    SPECGRAV 1.005 01/23/2022    LEUKOCYTESUR Negative 01/23/2022    UROBILINOGEN 0.2 01/23/2022    BILIRUBINUR Negative 01/23/2022    BILIRUBINUR 0 07/15/2021    BILIRUBINUR NEGATIVE 05/08/2011    BLOODU Negative 01/23/2022    GLUCOSEU Negative 01/23/2022    GLUCOSEU NEGATIVE 05/08/2011         IMPRESSION/RECOMMENDATIONS:      Diagnosis and Plan     Hyponatremia  Na better at 134  ( Uosm 522 , U na - 39, Likely SIADH)   Continue  Urea    Hypokalemia:  Corrected, K 5    Hypertensive Urgency  Increase coreg, taper /reduce clonidine     COVID - 19   Insomnia- ambien    Will have renal follow up with Dr Monica White MD

## 2022-01-26 NOTE — TELEPHONE ENCOUNTER
Loan Estrada with [facility] [phone] is calling to see if Eduard Lopez will sign off on orders for Nursing , PT, and OT for the patient. She is being released from the hospital today and she is going to need home care. She does also have COVID.      Please advise

## 2022-01-26 NOTE — CARE COORDINATION
CM spoke to Braman with [facility] and ok for pt to have walker. CM retrieved walker from office and delivered to RN.     Patient discharged 1/26/2022 to home with [facility]   All discharge needs met per case management     Yue Wood RN, BSN  [phone]

## 2022-01-26 NOTE — CARE COORDINATION
KAREEM notified by MD pt refusing snf and wants to go home. Kareem attempted to call pt in room no answer. CM called spouse and informed him therapy will be seeing pt again and making any equipment recommendations for home. He wants pt referred to [facility] as she has had them in the past. Informed him will refer her to [facility] and if they have no availability in area we will find comparable home care and he is in agreement to that plan. KAREEM called [facility] with [facility] and referred pt. Pt needs no home oxygen.  will be here at 4 to pick pt up. RN updated.     Geena Jimenez RN, BSN  [phone]

## 2022-01-26 NOTE — PROGRESS NOTES
Physical Therapy  Facility/Department: [address] ONCOLOGY  Daily Treatment Note  NAME: Severa Boroughs  : 1945  MRN: [id number]    Date of Service: 2022    Discharge Recommendations: Severa Boroughs scored a 16/24 on the AM-PAC short mobility form. Current research shows that an AM-PAC score of 17 or less is typically not associated with a discharge to the patient's home setting. Based on the patient's AM-PAC score and their current functional mobility deficits, it is recommended that the patient have 3-5 sessions per week of Physical Therapy at d/c to increase the patient's independence. Please see assessment section for further patient specific details. If patient discharges prior to next session this note will serve as a discharge summary. Please see below for the latest assessment towards goals. PT Equipment Recommendations  Equipment Needed: No    Assessment   Body structures, Functions, Activity limitations: Decreased functional mobility ; Decreased strength;Decreased endurance;Decreased balance  Assessment: Patient continues to present with the above deficits impairing her ability to perform functional mobility safely and independently. Patient with PLOF of MOD I with RW however currently requires CGA for mobility with RW. Patient would continue to benefit from acute PT services to address deficits and return to PLOF. Treatment Diagnosis: Decreased activity tolerance  Prognosis: Good  Decision Making: Low Complexity  Clinical Presentation: Stable  PT Education: Goals;PT Role;Plan of Care  Patient Education: Patient verbalized understanding.   Barriers to Learning: None  REQUIRES PT FOLLOW UP: Yes  Activity Tolerance  Activity Tolerance: Patient Tolerated treatment well;Patient limited by fatigue  Activity Tolerance: Patient tolerated treatment well despite discomfort associated with static seated position for longer duration of time while RN cleansed patient's open wound locatized to the low back region. Patient fatigued rather quickly following brief change and wound cleanse resulting in her unable to participate in further therapy. Patient Diagnosis(es): The primary encounter diagnosis was Encephalopathy. Diagnoses of Hyponatremia, COVID-19 virus infection, Hypokalemia, Non-intractable vomiting without nausea, unspecified vomiting type, and Fall, initial encounter were also pertinent to this visit. has a past medical history of Acute DVT (deep venous thrombosis) ([facility]), Acute encephalopathy, Acute on chronic diastolic heart failure ([address] Alcohol abuse, Anxiety, Arthritis, CAD in native artery, Cellulitis, Cerebral artery occlusion with cerebral infarction [facility]), Cerebrovascular accident (CVA) ([address] Chronic fatigue, Complex care coordination, Complicated UTI (urinary tract infection), COPD (chronic obstructive pulmonary disease) ([address] COPD exacerbation ([address] Depression, Diabetes mellitus ([address] DIMAS (dyspnea on exertion), DVT (deep venous thrombosis) ([address] DVT, lower extremity ([address] Fatigue, General weakness, Generalized weakness, Hypercholesteremia, Hypertension, Hyperthyroidism, Incontinence, Mammogram abnormal, Neuromuscular disorder ([address] Osteopenia, Pneumonia, Recurrent UTI, Right forearm cellulitis, Superficial thrombophlebitis of right upper extremity, Thyroid disease, Tobacco abuse, Tobacco abuse counseling, Trapped lung, and Unable to walk.   has a past surgical history that includes Tonsillectomy; Colonoscopy (1/19/2012); Toe Surgery (Right); Shoulder arthroscopy (Right, 6/18/14); eye surgery; Pacemaker insertion; pacemaker placement; and Nerve Surgery (N/A, 12/29/2021).     Restrictions  Restrictions/Precautions  Restrictions/Precautions: Fall Risk,Isolation  Required Braces or Orthoses?: No  Position Activity Restriction  Other position/activity restrictions: 67 y.o. female  with PMHx of hypertension, hyperlipidemia, CAD s/p PPI hypothyroidism, anxiety, depression and COPD presented with nausea and vomiting. Per chart review, patient was tested positive for COVID about 3 days ago and has been experiencing nausea and vomiting for the past 3 days. Patient reported that because of persistent nausea and vomiting she has been unable to keep anything down and yesterday she was feeling so weak that he fell down. Patient denies any fever, chills, shortness of breath, cough, chest pain, PND, orthopnea, BLE edema, dizziness, syncope, urinary frequency urgency or dysuria. On admission, patient was hemodynamically stable. Initial lab work showed sodium: 126, potassium: 3.3, chloride: 89. CXR negative for any acute pathology; showed no change in the left apical and left basilar scarring and left pleural thickening. CT head for any acute intracranial pathology. CT cervical spine negative for any fractures. Subjective   General  Chart Reviewed: Yes  Response To Previous Treatment: Patient with no complaints from previous session. Family / Caregiver Present: No  Subjective  Subjective: Patient denies experiencing any pain at this time, agreeable to co-treatment session. General Comment  Comments: Patient semi-recumbent in bed eating lunch upon arrival for PT/OT treatment. Pain Screening  Patient Currently in Pain: Denies (Simultaneous filing. User may not have seen previous data.)  Pain Assessment  Pain Assessment: 0-10  Pain Level: 0  Vital Signs  Patient Currently in Pain: Denies (Simultaneous filing. User may not have seen previous data.)       Orientation  Orientation  Overall Orientation Status: Within Functional Limits     Cognition   Cognition  Overall Cognitive Status: WFL     Objective   Bed mobility  Bridging: Stand by assistance (Patient performed bridge while lying supine in bed in order to don a clean brief.  Patient did not require additional assistance in order to perform.)  Supine to Sit: Stand by assistance (HOB flattened, utilized bedrails)  Sit to Supine: Stand by assistance  Scooting: Stand by assistance  Transfers  Sit to Stand: Unable to assess  Stand to sit: Unable to assess  Comment: Sit<>stand transfers were unable to be assessed secondary to patient's refusal to get OOB. Ambulation  Ambulation?: No  Stairs/Curb  Stairs?: No     Balance  Posture: Fair (Significant rounded shoulder with protracted scapulae, forward head posture)  Sitting - Static: Good (SBA provided while seated EOB for ~ 5-10 minutes in order to clean posterior open wound. Patient's posture consisted of significant L trunk lean, rounded shoulders, protracted scapulae, and forward head.)      AROM RLE (degrees)  RLE AROM: WFL  AROM LLE (degrees)  LLE AROM : WFL  Strength RLE  Strength RLE: WFL  Comment: Patient's strength is indicated as grossly 3+/5. Strength LLE  Strength LLE: WFL  Comment: Patient's strength is indicated as grossly 3+/5. AM-PAC Score  AM-PAC Inpatient Mobility Raw Score : 16 (01/26/22 1512)  AM-PAC Inpatient T-Scale Score : 40.78 (01/26/22 1512)  Mobility Inpatient CMS 0-100% Score: 54.16 (01/26/22 1512)  Mobility Inpatient CMS G-Code Modifier : CK (01/26/22 1512)    Goals  Short term goals  Time Frame for Short term goals: upon d/c  Short term goal 1: Pt will perform bed mobility MOD I  Short term goal 2: Pt will perform transfers with LRAD and SBA  Short term goal 3: Pt will ambulate 22' with LRAD and CGA  Patient Goals   Patient goals : pt did not state   *No goals met! *    Plan    Plan  Times per week: 3-5x  Times per day: Daily  Current Treatment Recommendations: Strengthening,Balance Training,Functional Mobility Training,Transfer Training,Gait Training,Endurance Training,Neuromuscular Re-education,Home Exercise Program,Safety Education & Training,Patient/Caregiver Education & Training,Equipment Evaluation, Education, & procurement,Modalities  Safety Devices  Type of devices:  All fall risk precautions in place,Call light within reach,Gait belt,Patient at risk for falls,Nurse notified,Bed alarm in place,Left in bed  Restraints  Initially in place: No     Therapy Time   Individual Concurrent Group Co-treatment   Time In 1353         Time Out 1437         Minutes 44         Timed Code Treatment Minutes: Damion 40, SPT    PT providing direct supervision during session and assisting in making skilled judgements throughout session.   [id number] [facility] PT, DPT [id number]    [address] [facility], PT Scalp laceration, initial encounter

## 2022-01-26 NOTE — DISCHARGE INSTR - COC
Continuity of Care Form    Patient Name: Shreya Ambrose   :    MRN:  [id number]    Admit date:  2022  Discharge date:  2022    Code Status Order: Full Code   Advance Directives:      Admitting Physician:  Brooke Curtis MD  PCP: Maximiliano Ramirez, APRN - NP    Discharging Nurse: Ryanne Elaine Unit/Room#: 4MK-0205/1081-14  Discharging Unit Phone Number: [phone]    Emergency Contact:   Extended Emergency Contact Information  Primary Emergency Contact: Froy Aiken  Address: [address] [facility], [address] Phone: [phone]  Work Phone: [phone]  Mobile Phone: [phone]  Relation: Spouse  Secondary Emergency Contact: Clay Turner  Home Phone: [phone]  Mobile Phone: [phone]  Relation: Child    Past Surgical History:  Past Surgical History:   Procedure Laterality Date    COLONOSCOPY  2012    Dr. Nicholas Carrera; repeat 5 years    EYE SURGERY      cateracts removed    NERVE SURGERY N/A 2021    Marylou Higgins, FLEXIBLE CYSTOSCOPY performed by Adrianne Yee MD at [address] [place] [address] ARTHROSCOPY Right 14    SUBACROMIAL DECOMPRESSION, ROTATOR CUFF REPAIR    TOE SURGERY Right     TONSILLECTOMY         Immunization History:   Immunization History   Administered Date(s) Administered    COVID-19, Pfizer Purple top, DILUTE for use, 12+ yrs, 30mcg/0.3mL dose 2021    Influenza Virus Vaccine 2015    Influenza, High Dose (Fluzone 65 yrs and older) 10/16/2012, 10/27/2014, 2016, 10/16/2017, 2018, 10/08/2019    Pneumococcal Conjugate 13-valent (Joiuxte33) 2015    Pneumococcal Conjugate 7-valent (Prevnar7) 2011    Pneumococcal Polysaccharide (Qubcipgdn97) 2011    Tdap (Boostrix, Adacel) 2013, 2014, 2021       Active Problems:  Patient Active Problem List   Diagnosis Code    Hypothyroidism E03.9 Smoker F17.200    Anxiety F41.9    History of alcoholism ([facility] F10.21    Pleural effusion J90    Centrilobular emphysema (HCC) J43.2    COPD, moderate (HCC) J44.9    Encephalopathy G93.40    CAD in native artery I25.10    Chronic ischemic multifocal multiple vascular territories stroke I69.30    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 ([facility]) I63.40    Cardiac arrhythmia I49.9    Alcohol abuse counseling and surveillance Z71.41    Severe infection B99.9    Therapeutic drug monitoring Z51.81    H/O ischemic multifocal multiple vascular territories stroke Z86.73    Depression F32. A    Oropharyngeal dysphagia R13.12    HTN (hypertension), benign I10    Dyslipidemia E78.5    Encounter for electronic analysis of reveal event recorder Z45.09    Physical deconditioning R53.81    PAF (paroxysmal atrial fibrillation) ([facility]) I48.0    Vascular dementia, uncomplicated ([facility]) V99.93    Other insomnia G47.09    Transient alteration of awareness R40.4    Hyponatremia E87.1    Coronary artery disease due to lipid rich plaque I25.10, I25.83    Ataxia R27.0    Cord compression ([facility]) G95.20    MRSA infection A49.02    Weakness R53.1    Overactive bladder N32.81       Isolation/Infection:   Isolation            Droplet Plus          Patient Infection Status       Infection Onset Added Last Indicated Last Indicated By Review Planned Expiration Resolved Resolved By    COVID-19 01/20/22 01/20/22 01/20/22 COVID-19, Rapid 01/27/22 02/03/22      Resolved    COVID-19 (Rule Out) 12/28/21 12/28/21 12/28/21 COVID-19 (Ordered)   12/29/21 Rule-Out Test Resulted    C-diff Rule Out 04/10/21 04/10/21 04/10/21 Clostridium Difficile Toxin/Antigen (Ordered)   04/13/21 Debi Gonzales RN    Order discontinued    MRSA 08/22/19 08/24/19 08/22/19 Urine Culture   04/12/21 Debi Gonzales RN            Nurse Assessment:  Last Vital Signs: BP (!) 168/108   Pulse 68   Temp 97.3 °F (36.3 °C) (Axillary)   Resp 18   Ht 5' 6\" (1.676 m)   Wt 131 lb 4 oz (59.5 kg)   SpO2 100%   BMI 21.18 kg/m²     Last documented pain score (0-10 scale): Pain Level: 0  Last Weight:   Wt Readings from Last 1 Encounters:   01/25/22 131 lb 4 oz (59.5 kg)     Mental Status:  oriented    IV Access:  - None    Nursing Mobility/ADLs:  Walking   Assisted  Transfer  Assisted  Bathing  Assisted  Dressing  Assisted  Toileting  Assisted  Feeding  Independent  Med Admin  Assisted  Med Delivery   whole    Wound Care Documentation and Therapy:  Wound 01/23/22 Knee Anterior;Right Abrasion (Active)   Wound Etiology Traumatic 01/26/22 1044   Dressing Status Other (Comment) 01/26/22 1044   Wound Cleansed Not Cleansed 01/26/22 1044   Dressing/Treatment Open to air 01/26/22 1044   Wound Assessment [place]/red;Superficial 01/26/22 1044   Drainage Amount None 01/26/22 1044   Odor None 01/25/22 0437   Number of days: 2        Elimination:  Continence: Bowel: Yes  Bladder: No  Urinary Catheter: None   Colostomy/Ileostomy/Ileal Conduit: No       Date of Last BM: 1/25/22    Intake/Output Summary (Last 24 hours) at 1/26/2022 1244  Last data filed at 1/26/2022 0619  Gross per 24 hour   Intake 240 ml   Output 1650 ml   Net -1410 ml     I/O last 3 completed shifts: In: 240 [P.O.:240]  Out: 2900 [Urine:2900]    Safety Concerns: At Risk for Falls    Impairments/Disabilities:      None    Nutrition Therapy:  Current Nutrition Therapy:   - Oral Diet:  General    Routes of Feeding: Oral  Liquids: No Restrictions  Daily Fluid Restriction: no  Last Modified Barium Swallow with Video (Video Swallowing Test): not done    Treatments at the Time of Hospital Discharge:   Respiratory Treatments: home nebulizers (symbicort, albuterol)   Oxygen Therapy:  is not on home oxygen therapy.   Ventilator:    - No ventilator support    Rehab Therapies: SN, PT, OT  Weight Bearing Status/Restrictions: No weight bearing restirctions  Other Medical Equipment (for information only, NOT a DME order):  walker  Other Treatments: HOME HEALTH CARE: LEVEL 3 SAFETY       -Initial home health evaluation to occur within 24-48 hours, in patient home   -Home health agency to establish plan of care for patient over 60 day period   -Medication Reconciliation   -PT/OT/Speech evaluations in home within 24-48 hours of discharge; including  -DME and home safety   -Frontload therapy 5 days, then 3x a week   -OT to evaluate if patient has [address] needs for personal care   - evaluation within 24-48 hours, includes evaluation of resources   and insurance to determine [place], [place], LTC, and Medicaid options   -PCP Visit scheduled within three to seven days of discharge   -Telehealth-Homecare Vitals(If patient is agreeable and meets guidelines)  -May Have Physical Therapy Start Of Care       Patient's personal belongings (please select all that are sent with patient):  None    RN SIGNATURE:  Electronically signed by Kathryn Magdaleno RN on 1/26/22 at 4:17 PM EST    CASE MANAGEMENT/SOCIAL WORK SECTION    Inpatient Status Date: 1/23/2020    Readmission Risk Assessment Score:  Readmission Risk              Risk of Unplanned Readmission:  15           Discharging to Facility/ Agency   Name: Lu Pierson will call for Appointment  Phone: [phone]  Fax: [phone]     Dialysis Facility (if applicable)   Name:  Address:  Dialysis Schedule:  Phone:  Fax:    / signature: Mitch Miner RN, BSN  [phone]     PHYSICIAN SECTION    Prognosis: Fair    Condition at Discharge: Stable    Rehab Potential (if transferring to Rehab): Fair    Recommended Labs or Other Treatments After Discharge:     Physician Certification: I certify the above information and transfer of Syd Fountain  is necessary for the continuing treatment of the diagnosis listed and that she requires Home Care for less 30 days.      Update Admission H&P: No change in H&P    PHYSICIAN SIGNATURE:  Electronically signed by Alfred Garcia MD on 1/26/22 at 1:25 PM EST

## 2022-01-26 NOTE — PROGRESS NOTES
0045 Patient transferred to [address] [facility]. Aox3. Resp even and unlabored. Report to [facility]. All belongings including teeth taken to new room. Care of patient relinquished.

## 2022-01-26 NOTE — CONSULTS
[facility]  1/26/2022  [id number]    Rehab Brief Consult:    Patient unfortunately would not be anticipated to be approved by [facility] for ARU level of care given her current hospital diagnoses. Would suggest a SNF as her next location. Bertrand discussed with CM. We will sign off. Thank you for the consultation.     Mima Franz MD 1/26/2022 9:48 AM

## 2022-01-27 ENCOUNTER — CARE COORDINATION (OUTPATIENT)
Dept: CASE MANAGEMENT | Age: 77
End: 2022-01-27

## 2022-01-27 LAB
BLOOD CULTURE, ROUTINE: NORMAL
CULTURE, BLOOD 2: NORMAL

## 2022-01-27 NOTE — CARE COORDINATION
[facility] Transitions Initial Follow Up Call    Call within 2 business days of discharge: Yes    Patient: Nancy Cr Patient : 1945   MRN: [id number]  Reason for Admission: [address]  Discharge Date: 22 RARS: Readmission Risk Score: 13.5 ( )      Last Discharge [address] [address]       Complaint Diagnosis Description Type Department Provider    22 Fatigue; Emesis Encephalopathy . .. ED to Hosp-Admission (Discharged) (ADMITTED) FZ 5T Guero Hannah MD; Re Puentes. .. Spoke with: Eugenia Muller    Non-face-to-face services provided:  Obtained and reviewed discharge summary and/or continuity of care documents  Education of patient/family/caregiver/guardian to support self-management-      Care Transitions 24 Hour Call    Do you have any ongoing symptoms?: Yes  Patient-reported symptoms: Weakness  Do you have a copy of your discharge instructions?: Yes  Do you have all of your prescriptions and are they filled?: Yes (Comment: will  today when pharmacy opens)  Have you scheduled your follow up appointment?: No  Were you discharged with any Home Care or Post Acute Services: Yes  Post Acute Services: Home Health (Comment: [facility])  Patient DME: Chetna Davis chair, Ankita cane  Do you have support at home?: Partner/Spouse/SO, Child, Grandchild  Do you feel like you have everything you need to keep you well at home?: Yes  Are you an active caregiver in your home?: No  Care Transitions Interventions     States she's doing fine. Reports her only ongoing symptom is weakness. Denies SOB, CP, fatigue, n/v, fever, cough, congestion or other flu-like symptoms. Reports taking medications as prescribed. States her daughter manages them and she's not home right now. Her daughter also takes care of appts. She doesn't think they've heard from [facility] yet. Placed call to [facility] and confirmed pt is scheduled for today with PT and nurse.    Follow Up  Future Appointments   Date Time Provider Department [place]   2/14/2022  7:30 AM SCHEDULE, [facility] REMOTE TRANSMISSION FF [facility]   3/21/2022  7:30 AM SCHEDULE, [facility] REMOTE TRANSMISSION FF [facility]   4/25/2022  7:30 AM SCHEDULE, [facility] REMOTE TRANSMISSION FF [facility]       Jose Bridges

## 2022-01-28 ENCOUNTER — TELEPHONE (OUTPATIENT)
Dept: FAMILY MEDICINE CLINIC | Age: 77
End: 2022-01-28

## 2022-01-28 DIAGNOSIS — F41.9 ANXIETY: ICD-10-CM

## 2022-01-28 DIAGNOSIS — E03.9 HYPOTHYROIDISM, UNSPECIFIED TYPE: ICD-10-CM

## 2022-01-28 NOTE — TELEPHONE ENCOUNTER
Medication:   Requested Prescriptions     Pending Prescriptions Disp Refills    clonazePAM (KLONOPIN) 1 MG tablet 60 tablet 0     Sig: Take 1 tablet by mouth 2 times daily as needed for Anxiety for up to 30 days. Last Filled:      Patient Phone Number: [phone] (home) [phone] (work)    Last appt: 12/23/2021   Next appt: 2/2/2022    Last OARRS:   RX Monitoring 5/11/2020   Attestation -   Periodic Controlled Substance Monitoring No signs of potential drug abuse or diversion identified.      PDMP Monitoring:    Last PDMP Callie Perry as Reviewed [facility]):  Review User Review Instant Review Result   Primo Cardozo 12/23/2021  8:37 AM Reviewed PDMP [1]     Preferred Pharmacy:   [facility] [address] [facility] [phone] Pema Barrera [phone] [address] [facility]  [facility] [address]  Phone: [phone] Fax: [phone]

## 2022-01-28 NOTE — TELEPHONE ENCOUNTER
----- Message from Riky Leon sent at 1/28/2022  3:22 PM EST -----  Subject: Refill Request    QUESTIONS  Name of Medication? clonazePAM (KLONOPIN) 1 MG tablet  Patient-reported dosage and instructions? 1 MG as needed  How many days do you have left? 0  Preferred Pharmacy? [address] [place] [address] 954  Pharmacy phone number (if available)? [phone]  ---------------------------------------------------------------------------  --------------  Cathy SADLER  What is the best way for the office to contact you? OK to leave message on   voicemail  Preferred Call Back Phone Number?  [phone]

## 2022-01-28 NOTE — TELEPHONE ENCOUNTER
Myrna Seaman with [facility] [phone] is calling to get verbal orders for OT. 1 time a week for 1 week and then 2 times a week for 3 weeks.      Please advise     Provider out of the office

## 2022-01-29 NOTE — DISCHARGE SUMMARY
Hospital Medicine Discharge Summary    Patient ID: Abdullahi George      Patient's PCP: QUITA Martel NP    Admit Date: 1/23/2022     Discharge Date: 1/26/2022     Admitting Physician: Marge Rodriguez MD     Discharge Physician: Radha Zamorano MD        Active Hospital Problems    Diagnosis     Hyponatremia [E87.1]          Hospital Course: This 67 y. o. female  with PMHx of hypertension, hyperlipidemia, CAD s/p PPI hypothyroidism, anxiety, depression and COPD presented with nausea and vomiting. On admission, patient was hemodynamically stable.  Initial lab work showed sodium: 126, potassium: 3.3, chloride: 89.  CXR negative for any acute pathology; showed no change in the left apical and left basilar scarring and left pleural thickening.  CT head for any acute intracranial pathology.  CT cervical spine negative for any fractures.       COVID-19: Tested positive about 3 days ago. Patient remained on room air during hospital stay and did not qualify for steroids or Actemra.     Nausea and vomiting: Likely secondary to COVID: Resolved     Hyponatremia: Acute on chronic chronic.  Serum sodium 126 on admission: Trended up  likely secondary from decreased p.o. intake/vomiting//SIADH from nausea/medazepam  Patient was treated with urea tabs per nephrology recs.     Hypokalemia: Likely secondary from decreased p.o. intake and nausea and vomiting: Resolved    Hypertension: On Coreg, clonidine and losartan at home  Losartan held in setting of hyponatremia     Hyperlipidemia: Continue statins     Hypothyroidism: Continue Synthroid     Hx of anxiety and depression: On Klonopin and mirtazapine at home     Fall likely secondary to dehydration/generalized weakness: PT OT ordered    Hx of COPD: Not in exacerbation.  Continue as needed breathing treatment at home and let us         Physical Exam Performed:     /84   Pulse 72   Temp 98.2 °F (36.8 °C) (Axillary)   Resp 18   Ht 5' 6\" (1.676 m)   Wt 131 lb 4 oz (59.5 kg)   SpO2 98%   BMI 21.18 kg/m²     General appearance:  No apparent distress, appears stated age and cooperative. Eyes: Sclera clear. Pupils equal.  ENT: Moist oral mucosa. Trachea midline, no adenopathy. Cardiovascular: Regular rhythm, normal S1, S2. No murmur. No edema in lower extremities  Respiratory:Not usingaccessory muscles. Good inspiratory effort. Clear to auscultation bilaterally, no wheeze or crackles. GI: Abdomen soft, no tenderness, not distended, normal bowel sounds  Musculoskeletal: Normal ROM, No cyanosis in digits. Neurology: CN 2-12 grossly intact. No speech or motor deficits  Psych: Normal affect. Alert and oriented in time, place and person  Skin: Warm, dry, normal turgor      Labs: For convenience and continuity at follow-up the following most recent labs are provided:      CBC:    Lab Results   Component Value Date    WBC 6.3 01/26/2022    HGB 12.8 01/26/2022    HCT 36.9 01/26/2022     01/26/2022       Renal:    Lab Results   Component Value Date     01/26/2022    K 5.0 01/26/2022    K 5.5 01/26/2022    CL 95 01/26/2022    CO2 30 01/26/2022    BUN 18 01/26/2022    CREATININE 0.6 01/26/2022    CALCIUM 9.5 01/26/2022    PHOS 4.4 04/15/2021         Significant Diagnostic Studies    Radiology:   CT ABDOMEN PELVIS WO CONTRAST Additional Contrast? None   Final Result   1. Unchanged small complex left pleural effusion. CT Cervical Spine WO Contrast   Final Result   1. No acute fracture. CT Head WO Contrast   Final Result   1. No acute intracranial abnormality. XR CHEST PORTABLE   Final Result   1. No acute abnormality.                 Consults:     IP CONSULT TO NEPHROLOGY  IP CONSULT TO DIETITIAN  IP CONSULT TO PHYSICAL MEDICINE REHAB  IP CONSULT TO HOME CARE NEEDS    Disposition: Home  PT OT recommended SNF but patient refused      Condition at Discharge: Stable     Discharge Instructions/Follow-up:   Follow up with your PCP within 7-10 days of discharge and PCP repeat thyroid function levels and in 2 to 3 months. Quarantine yourself at home for a total of 10 days since the onset of symptoms  Follow up with nephrology as instructed   Follow up with  as instructed   Take all your medications as prescribed. Discharge Medications:     Discharge Medication List as of 1/26/2022  5:44 PM           Details   albuterol (PROVENTIL) (5 MG/ML) 0.5% nebulizer solution Take 0.5 mLs by nebulization every 6 hours as needed for Wheezing, Disp-120 each, R-0Normal      urea (URE-NA) 15 g PACK packet Take 15 g by mouth daily, Disp-30 each, R-0Normal              Details   budesonide-formoterol (SYMBICORT) 160-4.5 MCG/ACT AERO Inhale 2 puffs into the lungs 2 times daily, Disp-10.2 g, R-0Normal      carvedilol (COREG) 12.5 MG tablet Take 1 tablet by mouth 2 times daily (with meals), Disp-60 tablet, R-1Normal              Details   clonazePAM (KLONOPIN) 1 MG tablet Take 1 tablet by mouth 2 times daily as needed for Anxiety for up to 30 days. , Disp-60 tablet, R-0Normal      cloNIDine (CATAPRES) 0.1 MG tablet Take 1 tablet by mouth 3 times daily, Disp-90 tablet, R-2Normal      mirtazapine (REMERON) 15 MG tablet TAKE ONE TABLET BY MOUTH DAILY, Disp-30 tablet, R-1Normal      oxybutynin (DITROPAN) 5 MG tablet TAKE ONE TABLET BY MOUTH THREE TIMES A DAY, Disp-270 tablet, R-0Normal      levothyroxine (SYNTHROID) 100 MCG tablet TAKE ONE TABLET BY MOUTH DAILY, Disp-90 tablet, R-0Normal      aspirin 81 MG chewable tablet Take 1 tablet by mouth daily, Disp-30 tablet, R-0Normal               The patient was seen and examined on day of discharge and this discharge summary is in conjunction with any daily progress note from day of discharge. Time Spent on discharge is 45 minutes  in the examination, evaluation, counseling and review of medications and discharge plan.       Note that greater  than 30 minutes was spent in preparing discharge papers, discussing discharge with patient, medication review, etc.     Signed:    Alfred Garcia MD   1/29/2022      Thank you QUITA Ramirez - ADILIA for the opportunity to be involved in this patient's care. If you have any questions or concerns please feel free to contact me at [phone].

## 2022-01-31 RX ORDER — CLONAZEPAM 1 MG/1
TABLET ORAL
Qty: 60 TABLET | Refills: 0 | Status: SHIPPED | OUTPATIENT
Start: 2022-01-31 | End: 2022-02-28 | Stop reason: SDUPTHER

## 2022-01-31 RX ORDER — CLONAZEPAM 1 MG/1
1 TABLET ORAL 2 TIMES DAILY PRN
Qty: 60 TABLET | Refills: 0 | OUTPATIENT
Start: 2022-01-31 | End: 2022-03-02

## 2022-01-31 RX ORDER — LEVOTHYROXINE SODIUM 0.1 MG/1
TABLET ORAL
Qty: 90 TABLET | Refills: 0 | Status: SHIPPED | OUTPATIENT
Start: 2022-01-31 | End: 2022-04-26

## 2022-01-31 NOTE — TELEPHONE ENCOUNTER
Medication:   Requested Prescriptions     Pending Prescriptions Disp Refills    levothyroxine (SYNTHROID) 100 MCG tablet [Pharmacy Med Name: LEVOTHYROXINE 100 MCG TABLET] 90 tablet 0     Sig: TAKE ONE TABLET BY MOUTH DAILY    clonazePAM ([place]) 1 MG tablet [Pharmacy Med Name: clonazePAM 1 MG TABLET] 60 tablet      Sig: TAKE ONE TABLET BY MOUTH TWICE A DAY AS NEEDED FOR ANXIETY        Last Filled:      Patient Phone Number: [phone] (home) [phone] (work)    Last appt: 12/23/2021   Next appt: 2/2/2022    Last OARRS:   RX Monitoring 5/11/2020   Attestation -   Periodic Controlled Substance Monitoring No signs of potential drug abuse or diversion identified.

## 2022-02-02 ENCOUNTER — OFFICE VISIT (OUTPATIENT)
Dept: FAMILY MEDICINE CLINIC | Age: 77
End: 2022-02-02
Payer: COMMERCIAL

## 2022-02-02 VITALS
BODY MASS INDEX: 21.14 KG/M2 | TEMPERATURE: 97.5 F | SYSTOLIC BLOOD PRESSURE: 162 MMHG | WEIGHT: 131 LBS | OXYGEN SATURATION: 98 % | HEART RATE: 88 BPM | DIASTOLIC BLOOD PRESSURE: 98 MMHG

## 2022-02-02 DIAGNOSIS — E03.9 HYPOTHYROIDISM, UNSPECIFIED TYPE: Primary | ICD-10-CM

## 2022-02-02 DIAGNOSIS — R11.2 NAUSEA AND VOMITING, INTRACTABILITY OF VOMITING NOT SPECIFIED, UNSPECIFIED VOMITING TYPE: Primary | ICD-10-CM

## 2022-02-02 DIAGNOSIS — I10 HTN (HYPERTENSION), BENIGN: ICD-10-CM

## 2022-02-02 DIAGNOSIS — U07.1 COVID: ICD-10-CM

## 2022-02-02 DIAGNOSIS — Z09 HOSPITAL DISCHARGE FOLLOW-UP: ICD-10-CM

## 2022-02-02 PROCEDURE — 99214 OFFICE O/P EST MOD 30 MIN: CPT | Performed by: NURSE PRACTITIONER

## 2022-02-02 RX ORDER — CARVEDILOL 25 MG/1
25 TABLET ORAL 2 TIMES DAILY
Qty: 90 TABLET | Refills: 1 | Status: ON HOLD
Start: 2022-02-02 | End: 2022-06-10 | Stop reason: HOSPADM

## 2022-02-02 RX ORDER — ONDANSETRON 4 MG/1
4 TABLET, FILM COATED ORAL 3 TIMES DAILY PRN
Qty: 30 TABLET | Refills: 0 | Status: SHIPPED | OUTPATIENT
Start: 2022-02-02

## 2022-02-02 ASSESSMENT — PATIENT HEALTH QUESTIONNAIRE - PHQ9
SUM OF ALL RESPONSES TO PHQ QUESTIONS 1-9: 10
1. LITTLE INTEREST OR PLEASURE IN DOING THINGS: 3
10. IF YOU CHECKED OFF ANY PROBLEMS, HOW DIFFICULT HAVE THESE PROBLEMS MADE IT FOR YOU TO DO YOUR WORK, TAKE CARE OF THINGS AT HOME, OR GET ALONG WITH OTHER PEOPLE: 0
5. POOR APPETITE OR OVEREATING: 0
4. FEELING TIRED OR HAVING LITTLE ENERGY: 3
9. THOUGHTS THAT YOU WOULD BE BETTER OFF DEAD, OR OF HURTING YOURSELF: 0
8. MOVING OR SPEAKING SO SLOWLY THAT OTHER PEOPLE COULD HAVE NOTICED. OR THE OPPOSITE, BEING SO FIGETY OR RESTLESS THAT YOU HAVE BEEN MOVING AROUND A LOT MORE THAN USUAL: 0
2. FEELING DOWN, DEPRESSED OR HOPELESS: 2
6. FEELING BAD ABOUT YOURSELF - OR THAT YOU ARE A FAILURE OR HAVE LET YOURSELF OR YOUR FAMILY DOWN: 0
SUM OF ALL RESPONSES TO PHQ QUESTIONS 1-9: 10
3. TROUBLE FALLING OR STAYING ASLEEP: 2
SUM OF ALL RESPONSES TO PHQ9 QUESTIONS 1 & 2: 5
SUM OF ALL RESPONSES TO PHQ QUESTIONS 1-9: 10
SUM OF ALL RESPONSES TO PHQ QUESTIONS 1-9: 10
7. TROUBLE CONCENTRATING ON THINGS, SUCH AS READING THE NEWSPAPER OR WATCHING TELEVISION: 0

## 2022-02-02 NOTE — PROGRESS NOTES
Nitin Malone  : 1945  Encounter date: 2022    This efrain 67 y.o. female who presents with  Chief Complaint   Patient presents with    Follow-up     EDFU from [facility] -        History of present illness:    HPI PT is 67year old female for ED follow up from - at [facility]. Pt originally sent to ED for N/V following positive covid test on 22. Electrolyte imbalance from vomiting and low PO intake  - Initial lab work showed sodium: 126, potassium: 3.3, chloride: 89  Pt had also fallen at home, likely secondary to dehydration. Labs on Dismissal-   Lab Results   Component Value Date      2022     K 5.0 2022     K 5.5 2022     CL 95 2022     CO2 30 2022     BUN 18 2022     CREATININE 0.6 2022     CALCIUM 9.5 2022     Troponin- NL  Imaging completed-  Radiology:   CT ABDOMEN PELVIS WO CONTRAST Additional Contrast? None   Final Result   1. Unchanged small complex left pleural effusion.           CT Cervical Spine WO Contrast   Final Result   1. No acute fracture.           CT Head WO Contrast   Final Result   1. No acute intracranial abnormality.           XR CHEST PORTABLE   Final Result   1. No acute abnormality. Recommendations for continued OT and PT at home- refused  PT established with Nephrology- Dr. Chikis Dailey, advised follow up  Losartan stopped per Nephrology, due to hyponatremia    BP remains uncontrolled. Will increase coreg.   .  Current Outpatient Medications on File Prior to Visit   Medication Sig Dispense Refill    levothyroxine (SYNTHROID) 100 MCG tablet TAKE ONE TABLET BY MOUTH DAILY 90 tablet 0    clonazePAM (KLONOPIN) 1 MG tablet TAKE ONE TABLET BY MOUTH TWICE A DAY AS NEEDED FOR ANXIETY 60 tablet 0    budesonide-formoterol (SYMBICORT) 160-4.5 MCG/ACT AERO Inhale 2 puffs into the lungs 2 times daily 10.2 g 0    albuterol (PROVENTIL) (5 MG/ML) 0.5% nebulizer solution Take 0.5 mLs by nebulization every 6 hours as needed for Wheezing 120 each 0    urea (URE-NA) 15 g PACK packet Take 15 g by mouth daily 30 each 0    cloNIDine (CATAPRES) 0.1 MG tablet Take 1 tablet by mouth 3 times daily 90 tablet 2    mirtazapine (REMERON) 15 MG tablet TAKE ONE TABLET BY MOUTH DAILY 30 tablet 1    oxybutynin (DITROPAN) 5 MG tablet TAKE ONE TABLET BY MOUTH THREE TIMES A  tablet 0    aspirin 81 MG chewable tablet Take 1 tablet by mouth daily 30 tablet 0     No current facility-administered medications on file prior to visit.       Allergies   Allergen Reactions    Lipitor [Atorvastatin] Other (See Comments)     Weakness, severe    Morphine Shortness Of Breath    Keflex [Cephalexin] Other (See Comments)     SOB & bilateral arms shaking     Hctz [Hydrochlorothiazide] Other (See Comments)     Hyponatremia, severe      Norvasc [Amlodipine Besylate] Swelling     Of neck    Penicillins Hives    Tramadol Itching    Hydralazine Palpitations and Other (See Comments)     Dizzy,fatigue,headaches,palpitations,loss of appetite    Shellfish-Derived Products Swelling and Rash     Swelling of neck     Past Medical History:   Diagnosis Date    Acute DVT [facility]deep venous thrombosis) ([facility]) 7/3/2015    Acute encephalopathy 4/19/2018    Acute on chronic diastolic heart failure ([facility]) 3/30/2019    Alcohol abuse     Anxiety     Arthritis     CAD in native artery     Cellulitis 4/28/2018    Cerebral artery occlusion with cerebral infarction ([facility])     states hx of multiple mini strokes    [facility]     Chronic fatigue     Complex care coordination     Complicated UTI (urinary tract infection)     COPD (chronic obstructive pulmonary disease) [facility]     COPD exacerbation ([address] 2/20/2018    Depression     Diabetes mellitus ([address]     denies    DIMAS (dyspnea on exertion) 7/3/2015    DVT (deep venous thrombosis) ([facility])     DVT, lower extremity ([address]     Fatigue 11/3/2015    General weakness 11/4/2015    Generalized weakness 8/22/2019    Hypercholesteremia     Hypertension     Hyperthyroidism     Incontinence 10/16/2012    Mammogram abnormal 2/7/2012    Neuromuscular disorder ([address]     Osteopenia 2/14/2017    Pneumonia     Recurrent UTI     Right forearm cellulitis     Superficial thrombophlebitis of right upper extremity     Thyroid disease     Tobacco abuse     Tobacco abuse counseling     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 French [place]; repeat 5 years    EYE SURGERY      cateracts removed    NERVE SURGERY N/A 12/29/2021    Nayan Cisse, FLEXIBLE CYSTOSCOPY performed by David Ac MD at [address] [place] [address] ARTHROSCOPY Right 6/18/14    SUBACROMIAL DECOMPRESSION, ROTATOR CUFF REPAIR    TOE SURGERY Right     TONSILLECTOMY        Family History   Problem Relation Age of Onset    Heart Disease Father         MI @ 64    Alcohol Abuse Father       Social History     Tobacco Use    Smoking status: Current Every Day Smoker     Packs/day: 1.00     Years: 52.00     Pack years: 52.00     Types: Cigarettes     Last attempt to quit: 7/1/2018     Years since quitting: 3.5    Smokeless tobacco: Never Used    Tobacco comment: started smoking at age 26 / smoked up to 2 p,p,d / now smoking 4 to 8 cigarettes a day,   Substance Use Topics    Alcohol use: No     Alcohol/week: 0.0 standard drinks     Comment: patient reports she is an alcoholic hasn't had anything to drink 3-4 months        Review of Systems    Objective:    BP (!) 162/98   Pulse 88   Temp 97.5 °F (36.4 °C)   Wt 131 lb (59.4 kg)   SpO2 98%   BMI 21.14 kg/m²   Weight: 131 lb (59.4 kg)     BP Readings from Last 3 Encounters:   02/02/22 (!) 162/98   01/26/22 133/84   01/20/22 (!) 146/104     Wt Readings from Last 3 Encounters:   02/02/22 131 lb (59.4 kg)   01/25/22 131 lb 4 oz (59.5 kg)   01/20/22 123 lb 14.4 oz (56.2 kg)     BMI Readings from Last 3 Encounters:   02/02/22 21.14 kg/m²   01/25/22 21.18 kg/m²   01/20/22 19.41 kg/m²       Physical Exam  Vitals reviewed. Constitutional:       Appearance: Normal appearance. She is well-developed. Cardiovascular:      Rate and Rhythm: Normal rate and regular rhythm. Pulses: Normal pulses. Heart sounds: Normal heart sounds. No murmur heard. Pulmonary:      Effort: Pulmonary effort is normal.      Breath sounds: Normal breath sounds. Abdominal:      General: Bowel sounds are normal. There is no distension. Palpations: Abdomen is soft. Tenderness: There is no abdominal tenderness. Musculoskeletal:      Right lower leg: No edema. Left lower leg: No edema. Skin:     General: Skin is warm and dry. Neurological:      Mental Status: She is alert. Mental status is at baseline. Psychiatric:         Mood and Affect: Mood normal.         Assessment/Plan    1. Nausea and vomiting, intractability of vomiting not specified, unspecified vomiting type  Advised small meals  hydration  - ondansetron (ZOFRAN) 4 MG tablet; Take 1 tablet by mouth 3 times daily as needed for Nausea or Vomiting  Dispense: 30 tablet; Refill: 0    2. HTN (hypertension), benign  Stop losartan  Daily monitoring  - carvedilol (COREG) 25 MG tablet; Take 1 tablet by mouth 2 times daily  Dispense: 90 tablet; Refill: 1    3. COVID  Advised copy of vaccination record    4. Hospital discharge follow-up  Reviewed physician notations, imaging, labs  Questions answered    Time Spent on discharge is more than 30 minutes in the examination, evaluation, counseling and review of medications and discharge plan. Return in about 3 months (around 5/2/2022) for medication re-check, hypertension re-check, lab review. This dictation was generated by voice recognition computer software.   Although all attempts are made to edit the dictation for accuracy, there may be errors in the transcription that are not intended.

## 2022-02-03 ENCOUNTER — CARE COORDINATION (OUTPATIENT)
Dept: CASE MANAGEMENT | Age: 77
End: 2022-02-03

## 2022-02-03 NOTE — CARE COORDINATION
Follow Up Call    Challenges to be reviewed by the provider   Additional needs identified to be addressed with provider: No  none                 Encounter was not routed to provider for escalation. Method of communication with provider:  none. Contacted the spouse by telephone to follow up after hospital visit. Status: improved  Interventions to address identified needs: Education of patient/family/caregiver/guardian to support self-management-ensuring that they call PCP with any decline in CoVID s/s     [facility] follow up appointment(s):   Future Appointments   Date Time Provider Dami Sims   2/14/2022  7:30 AM SCHEDULE, [place] REMOTE TRANSMISSION FF Card[facility]   3/21/2022  7:30 AM SCHEDULE, [place] REMOTE TRANSMISSION FF Cardio [facility]   4/25/2022  7:30 AM SCHEDULE, [place] REMOTE TRANSMISSION FF Cardio MMA     Non-[facility] follow up appointment(s):    Follow up appointment completed? Yes. Provided contact information for future needs. Plan for follow-up call in 7-10 days based on severity of symptoms and risk factors. Will Monitor CoVID s/s    Spoke to Alan Westbrook after 2 IDs. Pt is doing well. She is sleeping upstairs. She saw her PCP yesterday and they have been unable to  the prescription yet. Reviewed AVS with him along with the med changes. She is getting [facility] with PT and a nurse but he does not remember the name. Pt is not SOB, have CP, coughing/hacking. Er appetite is good. She still smokes.  Doing well and has kept PCP HFU      ROB Lara, [facility] Transition Nurse  [phone]

## 2022-02-08 NOTE — PROGRESS NOTES
suggested someone stay with you the first 24 hrs. Your surgery will be cancelled if you do not have a ride home. 8. A parent/legal guardian must accompany a child scheduled for surgery and plan to stay at the hospital until the child is discharged. Please do not bring other children with you. 9. Please wear simple, loose fitting clothing to the hospital.  Alta Spicer not bring valuables (money, credit cards, checkbooks, etc.) Do not wear any makeup (including no eye makeup) or nail polish on your fingers or toes. 10. DO NOT wear any jewelry or piercings on day of surgery. All body piercing jewelry must be removed. 11. If you have ___dentures, they will be removed before going to the OR; we will provide you a container. If you wear ___contact lenses or ___glasses, they will be removed; please bring a case for them. 12. Please see your family doctor/pediatrician for a history & physical and/or concerning medications. Bring any test results/reports from your physician's office. PCP________x__________Phone___________H&P Appt. Date________             13 If you  have a Living Will and Durable Power of  for Healthcare, please bring in a copy. 15. Notify your Surgeon if you develop any illness between now and surgery  time, cough, cold, fever, sore throat, nausea, vomiting, etc.  Please notify your surgeon if you experience dizziness, shortness of breath or blurred vision between now & the time of your surgery             15. DO NOT shave your operative site 96 hours prior to surgery. For face & neck surgery, men may use an electric razor 48 hours prior to surgery. 16. Shower the night before or morning of surgery using an antibacterial soap or as you have been instructed. 17. To provide excellent care visitors will be limited to one in the room at any given time. 18.  Please bring picture ID and insurance card.              19. Visit our web site for additional information:  [email or web address]              20.During flu season no children under the age of 15 are permitted in the hospital for the safety of all patients. 21. If you take a long acting insulin in the evening only  take half of your usual  dose the night  before your procedure              22. If you use a c-pap please bring DOS if staying overnight,             23.For your convenience [facility] has a pharmacy on site to fill your prescriptions. 24. If you use oxygen and have a portable tank please bring it  with you the DOS             25. Bring a complete list of all your medications with name and dose include any supplements. 26. Other__________________________________________   *Please call pre admission testing if you any further questions   [address]    [facility] AND [facility] [place]  [phone]   [facility]    ___ Covid test to be done 3-5 days prior to scheduled surgery -patient aware they are REQUIRED to bring a copy of the negative result DOS-if they receive a positive result to notify their surgeon         If known - indicate where patient is getting covid test done ___________________________________________________________    ___ [facility] - DOS    _x__ Other_______+covid 1/20/22 mff no symptoms____________________________      Shahriar Treviño POLICY(subject to change)    There is a one visitor policy at [facility] for all surgeries and endoscopies. Whether the visitor can stay or will be asked to wait in the car will depend on the current policy and if social distancing can be maintained. The policy is subject to change at any time. Please make sure the visitor has a cell phone that is on,charged and able to accept calls, as this may be the way that the staff communicates with them. Pain management is NO VISITOR policyThe patients ride is expected to remain in the car with a cell phone for communication. If the ride is leaving the hospital grounds please make sure they are back in time for pickup. Have the patient inform the staff on arrival what their rides plans are while the patient is in the facility. At the MAIN there is one visitor allowed. Please note that the visitor policy is subject to change. All above information reviewed with patient in person or by phone. Patient verbalizes understanding. All questions and concerns addressed.                                                                                                  Patient/Rep_________valerio husband___________                                                                                                                                    PRE OP INSTRUCTIONS

## 2022-02-09 ENCOUNTER — HOSPITAL ENCOUNTER (OUTPATIENT)
Age: 77
Setting detail: OUTPATIENT SURGERY
Discharge: HOME | End: 2022-02-09
Attending: UROLOGY | Admitting: UROLOGY
Payer: COMMERCIAL

## 2022-02-09 ENCOUNTER — ANESTHESIA EVENT (OUTPATIENT)
Dept: OPERATING ROOM | Age: 77
End: 2022-02-09
Payer: COMMERCIAL

## 2022-02-09 ENCOUNTER — ANESTHESIA (OUTPATIENT)
Dept: OPERATING ROOM | Age: 77
End: 2022-02-09
Payer: COMMERCIAL

## 2022-02-09 VITALS
SYSTOLIC BLOOD PRESSURE: 114 MMHG | DIASTOLIC BLOOD PRESSURE: 60 MMHG | OXYGEN SATURATION: 100 % | RESPIRATION RATE: 16 BRPM

## 2022-02-09 VITALS
TEMPERATURE: 97.6 F | HEART RATE: 74 BPM | DIASTOLIC BLOOD PRESSURE: 76 MMHG | HEIGHT: 66 IN | SYSTOLIC BLOOD PRESSURE: 189 MMHG | BODY MASS INDEX: 20.89 KG/M2 | RESPIRATION RATE: 25 BRPM | WEIGHT: 130 LBS | OXYGEN SATURATION: 94 %

## 2022-02-09 LAB
GLUCOSE BLD-MCNC: 112 MG/DL (ref 70–99)
PERFORMED ON: ABNORMAL

## 2022-02-09 PROCEDURE — 6360000002 HC RX W HCPCS: Performed by: NURSE ANESTHETIST, CERTIFIED REGISTERED

## 2022-02-09 PROCEDURE — 3700000000 HC ANESTHESIA ATTENDED CARE: Performed by: UROLOGY

## 2022-02-09 PROCEDURE — 3600000012 HC SURGERY LEVEL 2 ADDTL 15MIN: Performed by: UROLOGY

## 2022-02-09 PROCEDURE — 7100000000 HC PACU RECOVERY - FIRST 15 MIN: Performed by: UROLOGY

## 2022-02-09 PROCEDURE — 2500000003 HC RX 250 WO HCPCS: Performed by: ANESTHESIOLOGY

## 2022-02-09 PROCEDURE — 7100000001 HC PACU RECOVERY - ADDTL 15 MIN: Performed by: UROLOGY

## 2022-02-09 PROCEDURE — 2500000003 HC RX 250 WO HCPCS: Performed by: NURSE ANESTHETIST, CERTIFIED REGISTERED

## 2022-02-09 PROCEDURE — 3700000001 HC ADD 15 MINUTES (ANESTHESIA): Performed by: UROLOGY

## 2022-02-09 PROCEDURE — 2720000010 HC SURG SUPPLY STERILE: Performed by: UROLOGY

## 2022-02-09 PROCEDURE — C1767 GENERATOR, NEURO NON-RECHARG: HCPCS | Performed by: UROLOGY

## 2022-02-09 PROCEDURE — 7100000011 HC PHASE II RECOVERY - ADDTL 15 MIN: Performed by: UROLOGY

## 2022-02-09 PROCEDURE — 2709999900 HC NON-CHARGEABLE SUPPLY: Performed by: UROLOGY

## 2022-02-09 PROCEDURE — 2500000003 HC RX 250 WO HCPCS: Performed by: UROLOGY

## 2022-02-09 PROCEDURE — A4217 STERILE WATER/SALINE, 500 ML: HCPCS | Performed by: UROLOGY

## 2022-02-09 PROCEDURE — 6370000000 HC RX 637 (ALT 250 FOR IP): Performed by: ANESTHESIOLOGY

## 2022-02-09 PROCEDURE — 3600000002 HC SURGERY LEVEL 2 BASE: Performed by: UROLOGY

## 2022-02-09 PROCEDURE — 2580000003 HC RX 258: Performed by: UROLOGY

## 2022-02-09 PROCEDURE — 7100000010 HC PHASE II RECOVERY - FIRST 15 MIN: Performed by: UROLOGY

## 2022-02-09 PROCEDURE — 6360000002 HC RX W HCPCS: Performed by: UROLOGY

## 2022-02-09 DEVICE — GENERATOR NEUROSTIMULATOR H17XL2IN THK3IN TORQ WRNCH PROD: Type: IMPLANTABLE DEVICE | Site: BACK | Status: FUNCTIONAL

## 2022-02-09 RX ORDER — FENTANYL CITRATE 50 UG/ML
50 INJECTION, SOLUTION INTRAMUSCULAR; INTRAVENOUS EVERY 5 MIN PRN
Status: DISCONTINUED | OUTPATIENT
Start: 2022-02-09 | End: 2022-02-10 | Stop reason: HOSPADM

## 2022-02-09 RX ORDER — LIDOCAINE HYDROCHLORIDE 20 MG/ML
INJECTION, SOLUTION INFILTRATION; PERINEURAL PRN
Status: DISCONTINUED | OUTPATIENT
Start: 2022-02-09 | End: 2022-02-09 | Stop reason: SDUPTHER

## 2022-02-09 RX ORDER — MEPERIDINE HYDROCHLORIDE 25 MG/ML
12.5 INJECTION INTRAMUSCULAR; INTRAVENOUS; SUBCUTANEOUS EVERY 5 MIN PRN
Status: DISCONTINUED | OUTPATIENT
Start: 2022-02-09 | End: 2022-02-10 | Stop reason: HOSPADM

## 2022-02-09 RX ORDER — LIDOCAINE HYDROCHLORIDE 10 MG/ML
INJECTION, SOLUTION INFILTRATION; PERINEURAL
Status: COMPLETED | OUTPATIENT
Start: 2022-02-09 | End: 2022-02-09

## 2022-02-09 RX ORDER — ONDANSETRON 2 MG/ML
INJECTION INTRAMUSCULAR; INTRAVENOUS PRN
Status: DISCONTINUED | OUTPATIENT
Start: 2022-02-09 | End: 2022-02-09 | Stop reason: SDUPTHER

## 2022-02-09 RX ORDER — ONDANSETRON 2 MG/ML
4 INJECTION INTRAMUSCULAR; INTRAVENOUS
Status: DISCONTINUED | OUTPATIENT
Start: 2022-02-09 | End: 2022-02-09 | Stop reason: HOSPADM

## 2022-02-09 RX ORDER — FENTANYL CITRATE 50 UG/ML
25 INJECTION, SOLUTION INTRAMUSCULAR; INTRAVENOUS EVERY 5 MIN PRN
Status: DISCONTINUED | OUTPATIENT
Start: 2022-02-09 | End: 2022-02-10 | Stop reason: HOSPADM

## 2022-02-09 RX ORDER — LABETALOL HYDROCHLORIDE 5 MG/ML
5 INJECTION, SOLUTION INTRAVENOUS EVERY 10 MIN PRN
Status: DISCONTINUED | OUTPATIENT
Start: 2022-02-09 | End: 2022-02-10 | Stop reason: HOSPADM

## 2022-02-09 RX ORDER — PROPOFOL 10 MG/ML
INJECTION, EMULSION INTRAVENOUS CONTINUOUS PRN
Status: DISCONTINUED | OUTPATIENT
Start: 2022-02-09 | End: 2022-02-09 | Stop reason: SDUPTHER

## 2022-02-09 RX ORDER — LIDOCAINE HYDROCHLORIDE 10 MG/ML
0.5 INJECTION, SOLUTION EPIDURAL; INFILTRATION; INTRACAUDAL; PERINEURAL ONCE
Status: DISCONTINUED | OUTPATIENT
Start: 2022-02-09 | End: 2022-02-10 | Stop reason: HOSPADM

## 2022-02-09 RX ORDER — FENTANYL CITRATE 50 UG/ML
INJECTION, SOLUTION INTRAMUSCULAR; INTRAVENOUS PRN
Status: DISCONTINUED | OUTPATIENT
Start: 2022-02-09 | End: 2022-02-09 | Stop reason: SDUPTHER

## 2022-02-09 RX ORDER — ACETAMINOPHEN 325 MG/1
650 TABLET ORAL EVERY 4 HOURS PRN
Status: DISCONTINUED | OUTPATIENT
Start: 2022-02-09 | End: 2022-02-10 | Stop reason: HOSPADM

## 2022-02-09 RX ORDER — SODIUM CHLORIDE, SODIUM LACTATE, POTASSIUM CHLORIDE, CALCIUM CHLORIDE 600; 310; 30; 20 MG/100ML; MG/100ML; MG/100ML; MG/100ML
INJECTION, SOLUTION INTRAVENOUS CONTINUOUS
Status: DISCONTINUED | OUTPATIENT
Start: 2022-02-09 | End: 2022-02-10 | Stop reason: HOSPADM

## 2022-02-09 RX ORDER — BUPIVACAINE HYDROCHLORIDE 5 MG/ML
INJECTION, SOLUTION EPIDURAL; INTRACAUDAL
Status: COMPLETED | OUTPATIENT
Start: 2022-02-09 | End: 2022-02-09

## 2022-02-09 RX ADMIN — FENTANYL CITRATE 50 MCG: 50 INJECTION, SOLUTION INTRAMUSCULAR; INTRAVENOUS at 15:44

## 2022-02-09 RX ADMIN — PROPOFOL 140 MCG/KG/MIN: 10 INJECTION, EMULSION INTRAVENOUS at 15:46

## 2022-02-09 RX ADMIN — VANCOMYCIN HYDROCHLORIDE 1000 MG: 1 INJECTION, POWDER, LYOPHILIZED, FOR SOLUTION INTRAVENOUS at 15:27

## 2022-02-09 RX ADMIN — ONDANSETRON 4 MG: 2 INJECTION INTRAMUSCULAR; INTRAVENOUS at 16:00

## 2022-02-09 RX ADMIN — SODIUM CHLORIDE, POTASSIUM CHLORIDE, SODIUM LACTATE AND CALCIUM CHLORIDE: 600; 310; 30; 20 INJECTION, SOLUTION INTRAVENOUS at 13:23

## 2022-02-09 RX ADMIN — LABETALOL HYDROCHLORIDE 5 MG: 5 INJECTION INTRAVENOUS at 16:35

## 2022-02-09 RX ADMIN — LIDOCAINE HYDROCHLORIDE 60 MG: 20 INJECTION, SOLUTION INFILTRATION; PERINEURAL at 15:46

## 2022-02-09 RX ADMIN — ACETAMINOPHEN 650 MG: 325 TABLET ORAL at 16:48

## 2022-02-09 ASSESSMENT — PULMONARY FUNCTION TESTS
PIF_VALUE: 1

## 2022-02-09 ASSESSMENT — PAIN SCALES - GENERAL: PAINLEVEL_OUTOF10: 6

## 2022-02-09 ASSESSMENT — COPD QUESTIONNAIRES: CAT_SEVERITY: MODERATE

## 2022-02-09 ASSESSMENT — ENCOUNTER SYMPTOMS: SHORTNESS OF BREATH: 1

## 2022-02-09 ASSESSMENT — PAIN - FUNCTIONAL ASSESSMENT: PAIN_FUNCTIONAL_ASSESSMENT: 0-10

## 2022-02-09 NOTE — ANESTHESIA PRE PROCEDURE
Department of Anesthesiology  Preprocedure Note       Name:  Moisés Prabhakar   Age:  67 y.o.  :  1945                                          MRN:  [id number]         Date:  2022      Surgeon: Luke Cordova):  Pat Pollard MD    Procedure: Procedure(s):  INTERSTIMULATOR INSERTION STAGE 2 - MEDTRONICS    Medications prior to admission:   Prior to Admission medications    Medication Sig Start Date End Date Taking? Authorizing Provider   ondansetron (ZOFRAN) 4 MG tablet Take 1 tablet by mouth 3 times daily as needed for Nausea or Vomiting 22   QUITA Magallon NP   carvedilol (COREG) 25 MG tablet Take 1 tablet by mouth 2 times daily 22   QUITA Magallon NP   levothyroxine (SYNTHROID) 100 MCG tablet TAKE ONE TABLET BY MOUTH DAILY 22   QUITA Aguiar NP   clonazePAM (KLONOPIN) 1 MG tablet TAKE ONE TABLET BY MOUTH TWICE A DAY AS NEEDED FOR ANXIETY 1/31/22 3/2/22  QUITA Magallon NP   budesonide-formoterol (SYMBICORT) 160-4.5 MCG/ACT AERO Inhale 2 puffs into the lungs 2 times daily 22   Makayla Wyatt MD   albuterol (PROVENTIL) (5 MG/ML) 0.5% nebulizer solution Take 0.5 mLs by nebulization every 6 hours as needed for Wheezing 22   Makayla Wyatt MD   urea (URE-NA) 15 g PACK packet Take 15 g by mouth daily 22   Makayla Wyatt MD   cloNIDine (CATAPRES) 0.1 MG tablet Take 1 tablet by mouth 3 times daily 10/26/21 2/2/22  QUITA Mark NP   mirtazapine (REMERON) 15 MG tablet TAKE ONE TABLET BY MOUTH DAILY 10/19/21   QUITA Aguiar NP   oxybutynin (DITROPAN) 5 MG tablet TAKE ONE TABLET BY MOUTH THREE TIMES A DAY 10/18/21   QUITA Magallon NP   aspirin 81 MG chewable tablet Take 1 tablet by mouth daily 21   Yael Fisher MD       Current medications:    No current facility-administered medications for this visit.      No current outpatient medications on file. Facility-Administered Medications Ordered in Other Visits   Medication Dose Route Frequency Provider Last Rate Last Admin    lidocaine PF 1 % injection 0.5 mL  0.5 mL SubCUTAneous Once Jewel Roberson MD        lactated ringers infusion   IntraVENous Continuous Jewel Roberson MD        vancomycin 1000 mg IVPB in 250 mL D5W addavial  1,000 mg IntraVENous Once Jewel Roberson MD           Allergies: Allergies   Allergen Reactions    Lipitor [Atorvastatin] Other (See Comments)     Weakness, severe    Morphine Shortness Of Breath    Keflex [Cephalexin] Other (See Comments)     SOB & bilateral arms shaking     Hctz [Hydrochlorothiazide] Other (See Comments)     Hyponatremia, severe      Norvasc [Amlodipine Besylate] Swelling     Of neck    Penicillins Hives    Tramadol Itching    Hydralazine Palpitations and Other (See Comments)     Dizzy,fatigue,headaches,palpitations,loss of appetite    Shellfish-Derived Products Swelling and Rash     Swelling of neck       Problem List:    Patient Active Problem List   Diagnosis Code    Hypothyroidism E03.9    Smoker F17.200    Anxiety F41.9    History of alcoholism ([facility] F10.21    Pleural effusion J90    Centrilobular emphysema (HCC) J43.2    COPD, moderate (HCC) J44.9    Encephalopathy G93.40    CAD in native artery I25.10    Chronic ischemic multifocal multiple vascular territories stroke I69.30    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 (HCC) I63.40    Cardiac arrhythmia I49.9    Alcohol abuse counseling and surveillance Z71.41    Severe infection B99.9    Therapeutic drug monitoring Z51.81    H/O ischemic multifocal multiple vascular territories stroke Z80.78    Depression F32. A    Oropharyngeal dysphagia R13.12    HTN (hypertension), benign I10    Dyslipidemia E78.5    Encounter for electronic analysis of reveal event recorder Z45.09    Physical deconditioning R53.81    PAF (paroxysmal atrial fibrillation) ([facility] I48.0    Vascular dementia, uncomplicated ([facility] [facility] I22.27    Other insomnia G47.09    Transient alteration of awareness R40.4    Hyponatremia E87.1    Coronary artery disease due to lipid rich plaque I25.10, I25.83    Ataxia R27.0    Cord compression ([facility]) G95.20    MRSA infection A49.02    Weakness R53.1    Overactive bladder N32.81       Past Medical History:        Diagnosis Date    Acute DVT (deep venous thrombosis) ([facility]) 7/3/2015    Acute encephalopathy 4/19/2018    Acute on chronic diastolic heart failure ([facility] [address] 3/30/2019    Alcohol abuse     Anxiety     Arthritis     CAD in native artery     Cellulitis 4/28/2018    Cerebral artery occlusion with cerebral infarction ([facility])     states hx of multiple mini strokes    Cerebrovascular accident (CVA) ([facility] [facility]     Chronic fatigue     Complex care coordination     Complicated UTI (urinary tract infection)     COPD (chronic obstructive pulmonary disease) ([facility] [address] [facility]     [facility] 2/20/2018    Depression     Diabetes mellitus ([facility] [address]     denies    DIMAS (dyspnea on exertion) 7/3/2015    DVT (deep venous thrombosis) ([facility])     DVT, lower extremity ([facility])     Fatigue 11/3/2015    General weakness 11/4/2015    Generalized weakness 8/22/2019    Hypercholesteremia     Hypertension     Hyperthyroidism     Incontinence 10/16/2012    Mammogram abnormal 2/7/2012    Neuromuscular disorder ([facility] [address] [facility]     Osteopenia 2/14/2017    Pneumonia     Recurrent UTI     Right forearm cellulitis     Superficial thrombophlebitis of right upper extremity     Thyroid disease     Tobacco abuse     Tobacco abuse counseling     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 Ana Alvarado; repeat 5 years    EYE SURGERY      cateracts removed    NERVE SURGERY N/A 12/29/2021    Sylvia Donis, FLEXIBLE CYSTOSCOPY performed by Jennifer Estrada MD at [address] [address] PACEMAKER INSERTION      PACEMAKER PLACEMENT      SHOULDER ARTHROSCOPY Right 6/18/14    SUBACROMIAL DECOMPRESSION, ROTATOR CUFF REPAIR    TOE SURGERY Right     TONSILLECTOMY         Social History:    Social History     Tobacco Use    Smoking status: Current Every Day Smoker     Packs/day: 1.00     Years: 52.00     Pack years: 52.00     Types: Cigarettes     Last attempt to quit: 7/1/2018     Years since quitting: 3.6    Smokeless tobacco: Never Used    Tobacco comment: started smoking at age [address]89 [address] [place] / smoked up to 2 p,p,d / now smoking 4 to 8 cigarettes a day,   Substance Use Topics    Alcohol use: No     Alcohol/week: 0.0 standard drinks     Comment: patient reports she is an alcoholic hasn't had anything to drink 3-4 months                                Ready to quit: Not Answered  Counseling given: Not Answered  Comment: started smoking at age [address]80 [address] [place] / smoked up to 2 p,p,d / now smoking 4 to 8 cigarettes a day,      Vital Signs (Current): There were no vitals filed for this visit.                                            BP Readings from Last 3 Encounters:   02/02/22 (!) 162/98   01/26/22 133/84   01/20/22 (!) 146/104       NPO Status:                                                                                 BMI:   Wt Readings from Last 3 Encounters:   02/08/22 131 lb (59.4 kg)   02/02/22 131 lb (59.4 kg)   01/25/22 131 lb 4 oz (59.5 kg)     There is no height or weight on file to calculate BMI.    CBC:   Lab Results   Component Value Date    WBC 6.3 01/26/2022    RBC 3.65 01/26/2022    HGB 12.8 01/26/2022    HCT 36.9 01/26/2022    .3 01/26/2022    RDW 12.8 01/26/2022     01/26/2022       CMP:   Lab Results   Component Value Date     01/26/2022    K 5.0 01/26/2022    K 5.5 01/26/2022    CL 95 01/26/2022    CO2 30 01/26/2022    BUN 18 01/26/2022    CREATININE 0.6 01/26/2022    GFRAA >60 01/26/2022    GFRAA >60 05/02/2013    AGRATIO 2.1 01/26/2022    LABGLOM >60 01/26/2022    LABGLOM 79 12/11/2017    GLUCOSE 90 01/26/2022    PROT 5.8 01/26/2022    PROT 7.1 10/18/2012    CALCIUM 9.5 01/26/2022    BILITOT 0.3 01/26/2022    ALKPHOS 47 01/26/2022    AST 22 01/26/2022    ALT 16 01/26/2022       POC Tests: No results for input(s): POCGLU, POCNA, POCK, POCCL, POCBUN, POCHEMO, POCHCT in the last 72 hours. Coags:   Lab Results   Component Value Date    PROTIME 13.0 09/11/2019    INR 1.14 09/11/2019    APTT 61.4 03/28/2019       HCG (If Applicable): No results found for: PREGTESTUR, PREGSERUM, HCG, HCGQUANT     ABGs: No results found for: PHART, PO2ART, HJR1UFT, GRN1ALQ, BEART, K5NBNDQV     Type & Screen (If Applicable):  No results found for: LABABO, LABRH    Drug/Infectious Status (If Applicable):  No results found for: HIV, HEPCAB    COVID-19 Screening (If Applicable):   Lab Results   Component Value Date    COVID19 DETECTED 01/20/2022    COVID19 Not Detected 12/28/2021           Anesthesia Evaluation  Patient summary reviewed and Nursing notes reviewed  Airway: Mallampati: III  TM distance: >3 FB   Neck ROM: full  Mouth opening: > = 3 FB Dental:          Pulmonary:   (+) COPD: moderate,  shortness of breath: no interval change,                             Cardiovascular:  Exercise tolerance: poor (<4 METS),   (+) hypertension:, CAD: non-obstructive, DIMAS:,                ROS comment: Summary   Normal left ventricle size, wall thickness, and systolic function with an   estimated ejection fraction of 55-60%. No regional wall motion abnormalities   are seen.    Ascending and descending aorta with wall thickening and mild nonmobile   protruding atheroma   The right atrial size is normal.   No evidence for endocarditis,no evidence for patent foramen ovale     Neuro/Psych:   (+) CVA: no interval change, neuromuscular disease:, psychiatric history:depression/anxiety             GI/Hepatic/Renal:             Endo/Other:    (+) DiabetesType II DM, well controlled, , hypothyroidism::., .                  ROS

## 2022-02-09 NOTE — ANESTHESIA POSTPROCEDURE EVALUATION
Department of Anesthesiology  Postprocedure Note    Patient: Jerica Santiago  MRN: [id number]  YOB: 1945  Date of evaluation: 2/9/2022  Time:  4:13 PM     Procedure Summary     Date: 02/09/22 Room / Location: [address] [place] [address]    Anesthesia Start: 6376 Anesthesia Stop:     Procedure: INTERSTIMULATOR INSERTION STAGE 2 - MEDTRONICS (N/A ) Diagnosis: (N32.81  OVERACTIVE BLADDER)    Surgeons: Jake Byrne MD Responsible Provider: Felice Elizalde MD    Anesthesia Type: general ASA Status: 3          Anesthesia Type: No value filed. Mahnaz Phase I: Mahnaz Score: 10    Mahnaz Phase II:      Last vitals: Reviewed and per EMR flowsheets.        Anesthesia Post Evaluation    Patient location during evaluation: PACU  Patient participation: complete - patient participated  Level of consciousness: awake  Airway patency: patent  Nausea & Vomiting: no vomiting and no nausea  Complications: no  Cardiovascular status: hemodynamically stable  Respiratory status: acceptable  Hydration status: stable  Multimodal analgesia pain management approach

## 2022-02-09 NOTE — PROGRESS NOTES
Pt arrived to PACU from OR, VSS, pt is alert. Neuro check WNL. No pain verbalizes aside from headache. Will continue to monitor.

## 2022-02-09 NOTE — H&P
Urology Preoperative History & Physical - Update with Site [facility]    The history and physical was reviewed. The patient was seen and examined in pre-op and her left side where wire in place was marked with her participation. Exam-NAD, heart (normal rate) and lungs (nonlabored breathing) were both normal.  exam from clinic reviewed, today's  exam deferred to surgery. She had a chance to ask questions which were answered. There has been no interval change. Plan to continue to the OR for Stage 2 Interstim.     Electronically signed by: Paola Peacock MD MD, ANGELINA 2/9/2022   [facility]  Office Contact: [phone]

## 2022-02-09 NOTE — OP NOTE
Urology Operative Report  [facility]    Provider: Sylvia Khanna MD MD Patient ID:  Admission Date: 2022 Name: Beck Cortes Date: 2022  MRN: [id number]   Patient Location: OR/NONE : 1945  Attending: Sylvia Khanna MD Date of Service: 2022  PCP: QUITA Galvez NP     Preoperative Diagnosis: Overactive bladder with frequency, urgency, and urgency incontinence, with urinary retention    Postoperative Diagnosis: same    Procedure:    1. Insertion of peripheral neurostimulator pulse generator, direct or inductive coupling (95124)  2. Electronic analysis of implanted neurostimulator pulse generator system (e.g., rate, pulse amplitude, pulse duration, configuration of wave form, battery status, electrode selectability, output modulation, cycling, impedance, and patient compliance measurements); peripheral (e.g., peripheral nerve, sacral nerve, and neuromuscular) neurostimulator pulse generator/transmitter, with intraoperative or subsequent programming (27730)    Surgeon:   Sylvia Khanna MD, ANGELINA    Anesthesia: General LMA anesthesia    Indications: Severa Boroughs is a 67 y.o. female who presents for the above named surgery. Informed consent was obtained and the risks, benefits, and details of the procedure were explained to the patient who elected to proceed. The patient had previously failed at least 2 medications used to treat their diagnosis    Details of Procedure: The patient was brought to the operating room and placed in the supine position on the stretcher. SCDs were placed on the lower extremities. Following induction of anesthesia the patient was positioned prone on the OR table. The buttocks was slightly  with tape. The back was prepped and draped in the usual sterile fashion.  A routine timeout was performed, confirming the patient, procedure, site, risk of fire, patient allergies and confirming that preoperative antibiotics had been administered prior to incision. The previous pocket was re-opened using a scalpel and then blunt dissection. The previously placed lead was identified and pulled into the surgical field. The lead was detatched by releasing the set screw. The external wire was correctly identified and cut, allowing removal of the external wire element. The pocket was developed and hemostasis was ensured. The pocket was thoroughly irrigated with antibiotic solution. The lead was cleansed of bodily fluids and dried. The lead was inserted into the InterSti Neurostimulator and the metal bands were aligned with the blue lead tip clearly visible in the distal portion of the neurostimulator header. The single setscrew was tightened with the hex wrench. The neurostimulator was placed into the subcutaneous pocket with the etched identification side placed upwards and the excessive lead wrapped counterclockwise around the neurostimulator. The programming head was placed over the implanted neurostimulator in a sterile cover to ensure adequate lead connection and that parameters were within normal limits. Impedances were confirmed to be within normal limits, greater than 50 and less than 4,000. The wounds were irrigated with antibiotic solution in water and closed with 3-0 vicryl in two running subcuticular layers. The skin was closed with 4-0 vicryl and dermabond. At the end of the procedure all needle, lap, instrument and sponge counts were correct. The patient tolerated the procedure well and was transported to the PACU in stable condition. Findings: Evidence of infection, impedances within normal limits    Estimated Blood Loss: Minimal                 Drains: none          Specimens: none    Complications: none apparent           Disposition:  PACU - hemodynamically stable.      Plan: Postoperative [facility] Benji Hein MD, Tae Glover [address]  2/9/2022

## 2022-02-09 NOTE — PROGRESS NOTES
Teaching / education initiated regarding perioperative experience, expectations, and pain management during stay. Patient verbalized understanding. All questions answered. Pt is alert and oriented. VSS.

## 2022-02-09 NOTE — FLOWSHEET NOTE
Discharge note: Discharge order obtained, and discharge instructions reviewed. Patient educated, using the teach back method, about follow up instructions and discharge instructions. A completed copy of the AVS instructions given to patient and all questions answered. IV catheter removed without complaints, catheter intact, site WNL. Discharged to lobby via wheel chair per RN.

## 2022-02-10 ENCOUNTER — CARE COORDINATION (OUTPATIENT)
Dept: CASE MANAGEMENT | Age: 77
End: 2022-02-10

## 2022-02-10 NOTE — CARE COORDINATION
[facility] Transitions Follow Up Call    2/10/2022    Patient: Jerica Santiago  Patient : 1945   MRN: [id number]  Reason for Admission: Lito  Discharge Date: 22 RARS: Readmission Risk Score: 13.5 ( )         Spoke with: [address] Transitions Subsequent and Final Call    Subsequent and Final Calls  Do you have any ongoing symptoms?: No  Have your medications changed?: No  Do you have any questions related to your medications?: No  Do you currently have any active services?: Yes  Are you currently active with any services?: Home Health  Do you have any needs or concerns that I can assist you with?: No  Identified Barriers: None  Care Transitions Interventions  Other Interventions:           Care Transitions Follow Up Call    Needs to be reviewed by the provider   Additional needs identified to be addressed with provider: No  none             Method of communication with provider : none      Care Transition Nurse (CTN) contacted the patient by telephone to follow up after admission. Verified name and  with patient as identifiers. Addressed changes since last contact: none  Discussed follow-up appointments. If no appointment was previously scheduled, appointment scheduling offered: Yes. Is follow up appointment scheduled within 7 days of discharge? Yes. Advance Care Planning:   Does patient have an Advance Directive: reviewed and current. CTN reviewed discharge instructions, medical action plan and red flags with patient and discussed any barriers to care and/or understanding of plan of care after discharge. Discussed appropriate site of care based on symptoms and resources available to patient including: PCP, Specialist and Home health. The patient agrees to contact the PCP office for questions related to their healthcare.      Patients top risk factors for readmission: medical condition-COVID  Interventions to address risk factors: Obtained and reviewed discharge summary and/or continuity of care documents, Education of patient/family/caregiver/guardian to support self-management-symptoms, f/u and Assessment and support for treatment adherence and medication management-denied new or changed      Non-[facility] follow up appointment(s): NA    States she is ok. Denies COVID s/s. Appetite good. Reports some constipation. Discussed ambulation, hydration, and stool softener use. Urology procedure yesterday went well. HC active. Denies medication changes. Denies needs. CTN provided contact information for future needs. Plan for follow-up call in 7-10 days based on severity of symptoms and risk factors.   Plan for next call: symptom management-COVID s/s  medication management-new or changed         Follow Up  Future Appointments   Date Time Provider Dami Sims   2/14/2022  7:30 AM SCHEDULE, [place] REMOTE TRANSMISSION FF Cardio [facility]   3/21/2022  7:30 AM SCHEDULE, [place] REMOTE TRANSMISSION FF Cardio MMA   4/25/2022  7:30 AM SCHEDULE, [place] REMOTE TRANSMISSION FF Cardio MMA       Juli Farnsworth LPN

## 2022-02-12 PROCEDURE — 93298 REM INTERROG DEV EVAL SCRMS: CPT | Performed by: INTERNAL MEDICINE

## 2022-02-12 PROCEDURE — G2066 INTER DEVC REMOTE 30D: HCPCS | Performed by: INTERNAL MEDICINE

## 2022-02-14 ENCOUNTER — NURSE ONLY (OUTPATIENT)
Dept: CARDIOLOGY CLINIC | Age: 77
End: 2022-02-14
Payer: COMMERCIAL

## 2022-02-14 DIAGNOSIS — I48.0 PAF (PAROXYSMAL ATRIAL FIBRILLATION) (HCC): ICD-10-CM

## 2022-02-14 DIAGNOSIS — Z45.09 ENCOUNTER FOR ELECTRONIC ANALYSIS OF REVEAL EVENT RECORDER: ICD-10-CM

## 2022-02-14 NOTE — PROGRESS NOTES
We received a remote transmission from patient's monitor at home. Remote Linq report shows SVT recording with no symptoms from patient. . AF is not real. Pt has reached the [facility]. Office staff notified. EP physician to review. We will continue to monitor remotely.

## 2022-02-15 ENCOUNTER — TELEPHONE (OUTPATIENT)
Dept: CARDIOLOGY CLINIC | Age: 77
End: 2022-02-15

## 2022-02-15 NOTE — PROGRESS NOTES
Please call patient to schedule follow up appt regarding loop recorder at [facility].   Has not been seen since 2019

## 2022-02-15 NOTE — TELEPHONE ENCOUNTER
I spoke to pt and let her know she needs an apt. She saw RMM in the past. Please call pt to schedule.

## 2022-02-15 NOTE — TELEPHONE ENCOUNTER
Please call patient to schedule follow up regarding ILR device is at [facility].   Has not been seen since 2019

## 2022-02-16 NOTE — TELEPHONE ENCOUNTER
Pt scheduled with RMM regarding ILR device is at [facility] 2/22 at [facility]

## 2022-02-17 ENCOUNTER — CARE COORDINATION (OUTPATIENT)
Dept: CASE MANAGEMENT | Age: 77
End: 2022-02-17

## 2022-02-17 NOTE — PROGRESS NOTES
[facility]   Electrophysiology Consultation   Date: 2/22/2022  Reason for Consultation: Device NICOLE  Consult Requesting Physician: None  Chief Complaint   Patient presents with    Follow-up       CC: ILR NICOLE  HPI: Pilar Dimas is a 67 y.o. female .Janell James has history of TIA in the past. She has history of HTN, HLD, COPD and CAD. MRI with multiple infarctions in both hemispheres ? For embolic stroke. Patient has no history of prior cardiac arrhythmia. Denies any palpitation. 1/23/2022 patient presented to the ED with complaints of nausea and vomiting. She was also covid positive. Cherelle Hernandez presents to the office today in follow up. Her device has reached NICOLE. She had an episode on her ILR 1/28/2022 that was atrial fibrillation, lasting 3 minutes and 40 seconds. She is not symptomatic with any episodes of tachycardia. Discussion of need for anticoagulation d/t her high [id number] Vasc score. Assessment and plan:   -PAF      1/28/2022 patient had an episode lasting 3 minutes and 40 seconds of atrial fibrillation   Patient has a [id number] Score of at least 7 (age1, female, HTN, CAD, CVA)    [id number] Vasc score and anticoagulation discussed. High risk for stroke and thromboembolism. Anticoagulation is recommended. I discussed anticoagulation to decrease the risk of thromboembolic events including stroke. Benefits and alternatives were discussed with patient. Risk of bleeding was discussed. Patient verbalized understanding. CrCl 73 mL/min per cr of 0.6 1/26/2022, start Xarelto 20 mg daily    Episodes noted on ILR in the past have been documented as frequent atrial ectopy and brief PAT.      -ILR   Has reached NICOLE   Discussion of removal, no need for replacement at this time    -CVA   MRI with multiple infarcts 4/20/2018   MRI 4/13/2021, no acute infarcts    On asa 81 mg daily    -CAD   Stress test 3/29/2019 normal perfusion     -HTN  Not well controlled: 166/97  Lisinopril 5 mg daily, advised her to follow up with PCP  BP goal <130/80  Home BP monitoring encouraged, printed information provided on how to accurately measure BP at home. Counseled to follow a low salt diet to assure blood pressure remains controlled for cardiovascular risk factor modification. The patient is counseled to get regular exercise 3-5 times per week and maintain a healthy weight reduce cardiovascular risk factors. Patient Active Problem List    Diagnosis Date Noted    Overactive bladder 12/29/2021    Weakness 08/25/2019    MRSA infection 08/24/2019    Cord compression ([address]     Coronary artery disease due to lipid rich plaque     Ataxia     Hyponatremia 07/21/2019    Other insomnia 05/13/2019    Transient alteration of awareness 05/13/2019    Physical deconditioning 05/08/2019    PAF (paroxysmal atrial fibrillation) ([address] 05/08/2019    Vascular dementia, uncomplicated [facility]     Encounter for electronic analysis of reveal event recorder 08/28/2018    Oropharyngeal dysphagia     HTN (hypertension), benign     Dyslipidemia     H/O ischemic multifocal multiple vascular territories stroke     Depression     Therapeutic drug monitoring     Severe infection     Alcohol abuse counseling and surveillance 04/23/2018    Cardiac arrhythmia     Chronic ischemic multifocal multiple vascular territories stroke 04/20/2018    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 (HCC)     Encephalopathy     CAD in native artery     COPD, moderate ([address] 06/09/2016    Centrilobular emphysema (HCC)     Pleural effusion 11/03/2015    History of alcoholism ([facility] 11/27/2012    Hypothyroidism 10/16/2012    Smoker 10/16/2012    Anxiety 10/16/2012     Diagnostic studies:   ECG 2/22/22  Sinus rhythm PAC's   397 QTcH, 86 QRS    RANDALL 8/26/2019   Summary   Normal left ventricle size, wall thickness, and systolic function with an   estimated ejection fraction of 55-60%. No regional wall motion abnormalities   are seen. Ascending and descending aorta with wall thickening and mild nonmobile   protruding atheroma   The right atrial size is normal.   No evidence for endocarditis,no evidence for patent foramen ovale     Stress Test 3/29/2019  Summary    Normal myocardial perfusion study.    Normal LV size and systolic function.         I independently reviewed the cardiac diagnostic studies, ECG and relevant imaging studies. Lab Results   Component Value Date    LVEF 58 08/26/2019    LVEFMODE Echo 03/27/2019     Lab Results   Component Value Date    TSH 1.32 03/27/2019       Physical Examination:  Vitals:    02/22/22 1528   BP: (!) 166/97   Pulse: 77   SpO2: 97%      Wt Readings from Last 3 Encounters:   02/22/22 130 lb 9.6 oz (59.2 kg)   02/09/22 130 lb (59 kg)   02/02/22 131 lb (59.4 kg)       · Constitutional: Oriented. No distress. · Head: Normocephalic and atraumatic. · Mouth/Throat: Oropharynx is clear and moist.   · Eyes: Conjunctivae normal. EOM are normal.   · Neck: Neck supple. No JVD present. · Cardiovascular: Normal rate, regular rhythm, S1&S2. · Pulmonary/Chest: Bilateral respiratory sounds. No rhonchi. · Abdominal: Soft. No tenderness. · Musculoskeletal: No tenderness. No edema    · Lymphadenopathy: Has no cervical adenopathy. · Neurological: Alert and oriented. Follows command, No Gross deficit   · Skin: Skin is warm, No rash noted. · Psychiatric: Has a normal behavior     Review of System:  [x] Full ROS obtained and negative except as mentioned in HPI    Prior to Admission medications    Medication Sig Start Date End Date Taking?  Authorizing Provider   rivaroxaban (XARELTO) 20 MG TABS tablet Take 1 tablet by mouth daily (with breakfast) 2/22/22  Yes Saima Puentes MD   lisinopril (PRINIVIL;ZESTRIL) 5 MG tablet Take 1 tablet by mouth daily 2/22/22  Yes Saima Puentes MD   ondansetron (ZOFRAN) 4 MG tablet Take 1 tablet by mouth 3 times daily as needed for Nausea or Vomiting 2/2/22  Yes Shawnee Cabrera QUITA Jerome NP   carvedilol (COREG) 25 MG tablet Take 1 tablet by mouth 2 times daily 2/2/22  Yes QUITA Chadwick NP   levothyroxine (SYNTHROID) 100 MCG tablet TAKE ONE TABLET BY MOUTH DAILY 1/31/22  Yes QUITA Chadwick NP   clonazePAM (KLONOPIN) 1 MG tablet TAKE ONE TABLET BY MOUTH TWICE A DAY AS NEEDED FOR ANXIETY 1/31/22 3/2/22 Yes QUITA Chadwick NP   budesonide-formoterol (SYMBICORT) 160-4.5 MCG/ACT AERO Inhale 2 puffs into the lungs 2 times daily 1/26/22  Yes Jorge Matthew MD   albuterol (PROVENTIL) (5 MG/ML) 0.5% nebulizer solution Take 0.5 mLs by nebulization every 6 hours as needed for Wheezing 1/26/22  Yes Jorge Matthew MD   urea (URE-NA) 15 g PACK packet Take 15 g by mouth daily 1/27/22  Yes Jorge Matthew MD   mirtazapine (REMERON) 15 MG tablet TAKE ONE TABLET BY MOUTH DAILY 10/19/21  Yes QUITA Chadwick NP   oxybutynin (DITROPAN) 5 MG tablet TAKE ONE TABLET BY MOUTH THREE TIMES A DAY 10/18/21  Yes QUITA Chadwick NP   aspirin 81 MG chewable tablet Take 1 tablet by mouth daily 4/16/21  Yes Charleen Stark MD   cloNIDine (CATAPRES) 0.1 MG tablet Take 1 tablet by mouth 3 times daily 10/26/21 2/9/22  QUITA Chadwick NP       Past Medical History:   Diagnosis Date    Acute DVT (deep venous thrombosis) ([facility] 07/03/2015    Acute encephalopathy 04/19/2018    Acute on chronic diastolic heart failure ([facility] 03/30/2019    Alcohol abuse     Anxiety     Arthritis     CAD in native artery     Cellulitis 04/28/2018    Cerebral artery occlusion with cerebral infarction ([facility]     states hx of multiple mini strokes    Cerebrovascular accident (CVA) ([facility]     Chronic fatigue     Complex care coordination     Complicated UTI (urinary tract infection)     COPD (chronic obstructive pulmonary disease) ([facility]     COPD exacerbation ([facility] 02/20/2018    Depression     Diabetes mellitus ([facility] denies    DIMAS (dyspnea on exertion) 07/03/2015    D[facility] thrombosis) [facility]     DVT, lower extremity (HCC)     Fatigue 11/03/2015    General weakness 11/04/2015    Generalized weakness 08/22/2019    Hypercholesteremia     Hypertension     Hyperthyroidism     Incontinence 10/16/2012    Mammogram abnormal 02/07/2012    Neuromuscular disorder [facility]     Osteopenia 02/14/2017    PAF (paroxysmal atrial fibrillation) ([facility]     Pneumonia     Recurrent UTI     Right forearm cellulitis     Superficial thrombophlebitis of right upper extremity     Thyroid disease     Tobacco abuse     Tobacco abuse counseling     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 Jimmy Card; repeat 5 years    EYE SURGERY      cateracts removed    NERVE SURGERY N/A 12/29/2021    Elissa Pulse, FLEXIBLE CYSTOSCOPY performed by Becca Fournier MD at [address] [place] [address] ARTHROSCOPY Right 6/18/14    SUBACROMIAL DECOMPRESSION, ROTATOR CUFF REPAIR    STIMULATOR SURGERY N/A 2/9/2022    INTERSTIMULATOR INSERTION STAGE 2 - MEDTRONICS performed by Becca Fournier MD at [facility] TOE SURGERY Right     TONSILLECTOMY         Allergies   Allergen Reactions    Lipitor [Atorvastatin] Other (See Comments)     Weakness, severe    Morphine Shortness Of Breath    Keflex [Cephalexin] Other (See Comments)     SOB & bilateral arms shaking     Hctz [Hydrochlorothiazide] Other (See Comments)     Hyponatremia, severe      Norvasc [Amlodipine Besylate] Swelling     Of neck    Penicillins Hives    Tramadol Itching    Hydralazine Palpitations and Other (See Comments)     Dizzy,fatigue,headaches,palpitations,loss of appetite    Shellfish-Derived Products Swelling and Rash     Swelling of neck       Social History:  Reviewed. reports that she has been smoking cigarettes.  She has a 52.00 pack-year smoking history. She has never used smokeless tobacco. She reports that she does not drink alcohol and does not use drugs. Family History:  Reviewed. Reviewed. No family history of SCD. Relevant and available labs, and cardiovascular diagnostics reviewed. Reviewed. I independently reviewed all cardiac tracing. I independently reviewed relevant and available cardiac diagnostic tests ECG, CXR, Echo, Stress test, Device interrogation, Holter, CT scan. All questions and concerns were addressed to the patient/family. Alternatives to my treatment were discussed. I have discussed the above stated plan and the patient verbalized understanding and agreed with the plan. Scribe attestation: This note was scribed in the presence of Spring Bennett MD by Chele Posada RN    Physician Attestation: I, Dr. Spring Bennett, confirm that the scribe's documentation has been prepared under my direction and personally reviewed by me in its entirety. I also confirm that the note above accurately reflects all work, treatment, procedures, and medical decision making performed by me. NOTE: This report was transcribed using voice recognition software. Every effort was made to ensure accuracy, however, inadvertent computerized transcription errors may be present.      Spring Bennett MD, MPH  [facility]   Office: [phone]  Fax: [phone]

## 2022-02-17 NOTE — CARE COORDINATION
[facility] Transitions Follow Up Call    2022    Patient: Dali Marcos  Patient : 1945   MRN: [id number]  Reason for Admission: COVID (also interstimulator insertion stage I and 2)  Discharge Date: 22 RARS: Readmission Risk Score: 13.5 ( )         Spoke with:  Katy Rinne verified. Pt was not available to talk \"upstairs eating\". States pt is \"doing extremely well\". [facility] coming today and he states they will be completing care. CTN asked if comfortable with this, he stated \"I can't read her mind but she says she doesn't need it any more\". [address] [place] [address] was somewhat in a hurry to end the call. Will continue to follow. Care Transitions Follow Up Call    Needs to be reviewed by the provider   Additional needs identified to be addressed with provider: No  none             Method of communication with provider : none      Care Transition Nurse (CTN) contacted the family by telephone to follow up after admission on 22. Verified name and  with family as identifiers. Addressed changes since last contact: Covid s/s  Discussed follow-up appointments. If no appointment was previously scheduled, appointment scheduling offered: No.   Is follow up appointment scheduled within 7 days of discharge? Yes. CTN reviewed discharge instructions, medical action plan and red flags with family and discussed any barriers to care and/or understanding of plan of care after discharge. Discussed appropriate site of care based on symptoms and resources available to patient including: PCP, Specialist and When to call 911. The family agrees to contact the PCP office for questions related to their healthcare. Patients top risk factors for readmission: medical condition-Covid s/s  Interventions to address risk factors: Obtained and reviewed discharge summary and/or continuity of care documents      Non-[facility] follow up appointment(s): na    CTN provided contact information for future needs.  Plan for follow-up call in 5-7 days based on severity of symptoms and risk factors. Plan for next call: symptom management-r/t covid  self management-ADLs        Care Transitions Subsequent and Final Call    Schedule Follow Up Appointment with PCP: Completed  Subsequent and Final Calls  Do you have any ongoing symptoms?: No  Have your medications changed?: No  Do you have any questions related to your medications?: No  Do you currently have any active services?: No  Are you currently active with any services?: Home Health  Do you have any needs or concerns that I can assist you with?: No  Identified Barriers: None  Care Transitions Interventions  No Identified Needs    Physical Therapy: Completed    Other Interventions:            Follow Up  Future Appointments   Date Time Provider Dami Sims   2/22/2022  3:15 PM Hermelindo Hodges MD [facility] Cardio [facility]       Kunal Michael RN

## 2022-02-22 ENCOUNTER — NURSE ONLY (OUTPATIENT)
Dept: CARDIOLOGY CLINIC | Age: 77
End: 2022-02-22

## 2022-02-22 ENCOUNTER — OFFICE VISIT (OUTPATIENT)
Dept: CARDIOLOGY CLINIC | Age: 77
End: 2022-02-22
Payer: COMMERCIAL

## 2022-02-22 VITALS
SYSTOLIC BLOOD PRESSURE: 166 MMHG | OXYGEN SATURATION: 97 % | DIASTOLIC BLOOD PRESSURE: 97 MMHG | WEIGHT: 130.6 LBS | HEART RATE: 77 BPM | BODY MASS INDEX: 19.79 KG/M2 | HEIGHT: 68 IN

## 2022-02-22 DIAGNOSIS — I48.0 PAF (PAROXYSMAL ATRIAL FIBRILLATION) (HCC): Primary | ICD-10-CM

## 2022-02-22 PROCEDURE — 99214 OFFICE O/P EST MOD 30 MIN: CPT | Performed by: INTERNAL MEDICINE

## 2022-02-22 RX ORDER — LISINOPRIL 5 MG/1
5 TABLET ORAL DAILY
Qty: 30 TABLET | Refills: 5 | Status: SHIPPED | OUTPATIENT
Start: 2022-02-22 | End: 2022-06-13 | Stop reason: SDUPTHER

## 2022-02-22 NOTE — LETTER
[facility]  EP Procedure Sheet    2/22/22  Ira Slater  1945  EP Procedures  [] Pacemaker implant (single/dual) [] EP Study   [] ICD implant (single/dual) [] Atrial flutter ablation (RANDALL Y/N)   [] Biv implant ICD [] Tilt Table   [] Biv implant PPM [] Atrial fibrillation ablation (RANDALL Yes)   [] Generator Change (PPM/ICD/BiV) [] SVT ablation   [] Lead revision (RV/LA/RA) (<1 month) [] VT ablation     [] Lead extraction +/- upgrade (BiV/PPM/ICD) [] VT Ischemic/ non-ischemic   [x] Loop removal [] VT RVOT   [] Cardioversion [] VT Left sided   [] RANDALL [] AVN ablation   Equipment  [] Medtronic  [] ELO Mapping System   [] [address] [] [address]   [] JAXON Gutierrez [employer] [] CryoAblation   [] Biotronik [] Laser Lead Extraction   EP Procedures Scheduling Request  # hours Requested   Scheduled  Date:   Specific Day  Completed    Anesthesia  F/u Date:   CT surgery backup  COVID     Overnight stay      Location/Doctor MFF [x]RMM []MXA   []MKW [] Other     Pre-Procedure Labs / Imaging  [] PT/INR [] Type & cross   [] CBC [] Units PRBC   [] BMP/Mg [] Units FFP   [] Venogram [] Cardiac CTA for Pulmonary vein mapping     RN INITIALS: RA    Patient Instructions  Do not eat or drink after midnight the night prior to procedure  Dx:PAF ICD-10 code: I48.0

## 2022-02-24 DIAGNOSIS — E78.5 HYPERLIPIDEMIA, UNSPECIFIED HYPERLIPIDEMIA TYPE: ICD-10-CM

## 2022-02-24 RX ORDER — ATORVASTATIN CALCIUM 80 MG/1
TABLET, FILM COATED ORAL
Qty: 90 TABLET | Refills: 0 | OUTPATIENT
Start: 2022-02-24

## 2022-02-24 NOTE — TELEPHONE ENCOUNTER
Medication:   Requested Prescriptions     Pending Prescriptions Disp Refills    atorvastatin (LIPITOR) 80 MG tablet [Pharmacy Med Name: ATORVASTATIN 80 MG TABLET] 90 tablet 0     Sig: TAKE ONE TABLET BY MOUTH DAILY      The original prescription was discontinued on 1/23/2022 by Saúl Li RN for the following reason: LIST CLEANUP. Renewing this prescription may not be appropriate. Last Filled: 9/28/2021     Patient Phone Number: [phone] (home) [phone] (work)    Last appt: 2/2/2022   Next appt: Visit date not found    Last OARRS:   RX Monitoring 5/11/2020   Attestation -   Periodic Controlled Substance Monitoring No signs of potential drug abuse or diversion identified.

## 2022-02-24 NOTE — PROGRESS NOTES
Patient comes in for interrogation of their implanted loop recorder. Interrogation shows Battery has reached RRT on 1/29/2022. Most AF episode noted appear to be frequent atrial ectopy and brief PAT. 1 Tachy episode noted on 1/28/2022 lasting 3 minutes and 40 seconds does appear to be AF. For EP to review. Implanted for CVA. Patient remains on Coreg. Will start Xarelto today. Please see interrogation for more detail. Patient will see Dr. Evelin Pena to discuss plan on care.

## 2022-02-25 ENCOUNTER — CARE COORDINATION (OUTPATIENT)
Dept: CASE MANAGEMENT | Age: 77
End: 2022-02-25

## 2022-02-25 NOTE — CARE COORDINATION
[facility] Transitions Follow Up Call    2022    Patient: Jerica Santiago  Patient : 1945   MRN: [id number]  Reason for Admission: Fatigue  Discharge Date: 22 RARS: Readmission Risk Score: 13.5 ( )         Spoke with: [address] Transitions Follow Up Call    Needs to be reviewed by the provider   Additional needs identified to be addressed with provider: Yes  home health Hadley Jones- PT, DME- [place]  Pt stated that she is experiencing nausea after taking the Xarelto. Pt denied emesis. Pt is taking the Xarelto with food/breakfast. Would like to know if there is another med that can try. Method of communication with provider : chart routing      Care Transition Nurse (CTN) contacted the family by telephone to follow up after admission on 22. Verified name and  with family as identifiers. Addressed changes since last contact: medications-F/u with Cardio  Xarelto added  Discussed follow-up appointments. If no appointment was previously scheduled, appointment scheduling offered: Yes. Is follow up appointment scheduled within 7 days of discharge? Yes. Advance Care Planning:   Does patient have an Advance Directive: Reviewed and current  CTN reviewed discharge instructions, medical action plan and red flags with family and discussed any barriers to care and/or understanding of plan of care after discharge. Discussed appropriate site of care based on symptoms and resources available to patient including: PCP, Specialist, Urgent care clinics, Home health, When to call 911 and [address]. The family agrees to contact the PCP office for questions related to their healthcare. Patients top risk factors for readmission: ineffective coping  Interventions to address risk factors: Obtained and reviewed discharge summary and/or continuity of care documents  Message routed to [employer].     Non-[facility] follow up appointment(s):     CTN provided contact information for future needs. No further follow-up call indicated based on severity of symptoms and risk factors. Plan for next call: N/A         This [facility] spoke with Daisy Cisneros, pt's  and he stated that pt is doing well but was placed on Xarelto at her Cardio f/u on 02/22/22 and it is causing nausea, despite taking it with breakfast as prescribed. Will notify Cardiio. Daisy Cisneros denied any worsening symptoms. Denied fever, chills, N/V and any difficulty breathing at this time. Denied chest pain, SOB and palpitations. Denied difficulty with urination, BMs or appetite. Denied any other needs at this time. Advised pt to immediately report any worsening symptoms to the PCP. Patient verbalized understanding and agreed. Jarod Seaman LPN, [facility]  PH: [phone]            Care Transitions Subsequent and Final Call    Schedule Follow Up Appointment with PCP: Completed  Subsequent and Final Calls  Do you have any ongoing symptoms?: Yes  Onset of Patient-reported symptoms: Other  Patient-reported symptoms: Nausea  Interventions for patient-reported symptoms: Notified PCP/Physician  Have your medications changed?: Yes  Patient Reports: Xarelto added  Do you have any questions related to your medications?: Yes  Patient Reports: Xarelto causing nausea? Do you currently have any active services?: Yes  Are you currently active with any services?: Home Health, Other  Do you have any needs or concerns that I can assist you with?: Yes  Patient-reported Needs or Concerns: Notify Cardio of nausea after taking Xarelto  Identified Barriers: None  Care Transitions Interventions   Home Care Waiver: Completed Physical Therapy: Completed       DME Assistance: Completed   Other Interventions:            Follow Up  Future Appointments   Date Time Provider Dami Sims   6/9/2022  2:45 PM QUITA Peter CNP Cardio [facility]   6/9/2022  3:00 PM SCHEDULE, [address]  Cardio [facility]       Jarod Seaman LPN

## 2022-02-28 ENCOUNTER — CARE COORDINATION (OUTPATIENT)
Dept: CASE MANAGEMENT | Age: 77
End: 2022-02-28

## 2022-02-28 DIAGNOSIS — E78.5 HYPERLIPIDEMIA, UNSPECIFIED HYPERLIPIDEMIA TYPE: ICD-10-CM

## 2022-02-28 DIAGNOSIS — F41.9 ANXIETY: ICD-10-CM

## 2022-02-28 RX ORDER — CLONAZEPAM 1 MG/1
TABLET ORAL
Qty: 60 TABLET | Refills: 0 | Status: SHIPPED | OUTPATIENT
Start: 2022-02-28 | End: 2022-03-29 | Stop reason: SDUPTHER

## 2022-02-28 NOTE — TELEPHONE ENCOUNTER
Medication:   Requested Prescriptions     Pending Prescriptions Disp Refills    clonazePAM (KLONOPIN) 1 MG tablet 60 tablet 0     Sig: TAKE ONE TABLET BY MOUTH TWICE A DAY AS NEEDED FOR ANXIETY        Last Filled: 1/31/2022     Patient Phone Number: [phone] (home) [phone] (work)    Last appt: 2/2/2022   Next appt: Visit date not found    Last OARRS:   RX Monitoring 5/11/2020   Attestation -   Periodic Controlled Substance Monitoring No signs of potential drug abuse or diversion identified.

## 2022-02-28 NOTE — TELEPHONE ENCOUNTER
clonazePAM (KLONOPIN) 1 MG tablet 60 tablet 0 1/31/2022 3/2/2022    Sig: TAKE ONE TABLET BY MOUTH TWICE A DAY AS NEEDED FOR Cira Dickinson

## 2022-03-01 RX ORDER — ATORVASTATIN CALCIUM 80 MG/1
TABLET, FILM COATED ORAL
Qty: 90 TABLET | Refills: 0 | OUTPATIENT
Start: 2022-03-01

## 2022-03-02 ENCOUNTER — TELEPHONE (OUTPATIENT)
Dept: CARDIOLOGY CLINIC | Age: 77
End: 2022-03-02

## 2022-03-02 ENCOUNTER — CARE COORDINATION (OUTPATIENT)
Dept: CASE MANAGEMENT | Age: 77
End: 2022-03-02

## 2022-03-02 NOTE — CARE COORDINATION
Writer f/u with pt as pt reported nausea after taking Xarelto 20 mg, that was prescribed on, 02/22/22. Writer routed a message to prescribing MD, Dr. George Delcid, on, 02/25/22 and advised of pt's reports of nausea despite taking med with food/breakfast as prescribed. Pt wanting to know if there is a comparable med available. Pt's  stated that he had not heard back from Dr. Orville Flores in regards to this. Writer called and spoke with, Jesus Chacko, and left a detailed message to advise Dr. Orville Flores and to request that he contact the pt to discuss.    Maurisio BLUNT, [facility] 30: [phone]

## 2022-03-03 ENCOUNTER — TELEPHONE (OUTPATIENT)
Dept: FAMILY MEDICINE CLINIC | Age: 77
End: 2022-03-03

## 2022-03-03 NOTE — TELEPHONE ENCOUNTER
Received plan of care/orders from [place] [address] Episode Summary. Provider signed orders and has been faxed to home care agency.

## 2022-03-29 DIAGNOSIS — F41.9 ANXIETY: ICD-10-CM

## 2022-03-29 RX ORDER — CLONAZEPAM 1 MG/1
TABLET ORAL
Qty: 60 TABLET | Refills: 0 | Status: SHIPPED | OUTPATIENT
Start: 2022-03-29 | End: 2022-05-02

## 2022-03-29 NOTE — TELEPHONE ENCOUNTER
Medication:   Requested Prescriptions     Pending Prescriptions Disp Refills    clonazePAM (KLONOPIN) 1 MG tablet 60 tablet 0     Sig: TAKE ONE TABLET BY MOUTH TWICE A DAY AS NEEDED FOR ANXIETY        Last Filled: 2/28/22     Patient Phone Number: [phone] (home) [phone] (work)    Last appt: 2/2/2022   Next appt: Visit date not found    Last OARRS:   RX Monitoring 5/11/2020   Attestation -   Periodic Controlled Substance Monitoring No signs of potential drug abuse or diversion identified.

## 2022-03-29 NOTE — TELEPHONE ENCOUNTER
----- Message from Raz Meza sent at 3/29/2022 12:18 PM EDT -----  Subject: Refill Request    QUESTIONS  Name of Medication? clonazePAM (KLONOPIN) 1 MG tablet  Patient-reported dosage and instructions? TAKE ONE TABLET BY MOUTH TWICE A   DAY AS NEEDED FOR ANXIETY  How many days do you have left? Unknown  Preferred Pharmacy? [facility]  Pharmacy phone number (if available)? [phone]  ---------------------------------------------------------------------------  --------------  Deepa SADLER  What is the best way for the office to contact you? OK to leave message on   voicemail  Preferred Call Back Phone Number?  [phone]

## 2022-04-25 DIAGNOSIS — E03.9 HYPOTHYROIDISM, UNSPECIFIED TYPE: ICD-10-CM

## 2022-04-25 DIAGNOSIS — R32 URINARY INCONTINENCE, UNSPECIFIED TYPE: ICD-10-CM

## 2022-04-26 RX ORDER — OXYBUTYNIN CHLORIDE 5 MG/1
TABLET ORAL
Qty: 270 TABLET | Refills: 0 | Status: SHIPPED | OUTPATIENT
Start: 2022-04-26 | End: 2022-05-24

## 2022-04-26 RX ORDER — LEVOTHYROXINE SODIUM 0.1 MG/1
TABLET ORAL
Qty: 90 TABLET | Refills: 0 | Status: SHIPPED | OUTPATIENT
Start: 2022-04-26 | End: 2022-06-13 | Stop reason: SDUPTHER

## 2022-04-30 DIAGNOSIS — F41.9 ANXIETY: ICD-10-CM

## 2022-05-02 RX ORDER — CLONAZEPAM 1 MG/1
TABLET ORAL
Qty: 60 TABLET | Refills: 0 | Status: SHIPPED | OUTPATIENT
Start: 2022-05-02 | End: 2022-06-29 | Stop reason: SDUPTHER

## 2022-05-02 NOTE — TELEPHONE ENCOUNTER
Medication:   Requested Prescriptions     Pending Prescriptions Disp Refills    clonazePAM (KLONOPIN) 1 MG tablet [Pharmacy Med Name: clonazePAM 1 MG TABLET] 60 tablet      Sig: TAKE ONE TABLET BY MOUTH TWICE A DAY AS NEEDED FOR ANXIETY      Last Filled:     Patient Phone Number: [phone] (home) [phone] (work)    Last appt: 2/2/2022   Next appt:     Last OARRS:   RX Monitoring 5/11/2020   Attestation -   Periodic Controlled Substance Monitoring No signs of potential drug abuse or diversion identified.      PDMP Monitoring:    Last PDMP Carola Conde as Reviewed [facility]):  Review User Review Instant Review Result   Adrian Ayala 12/23/2021  8:37 AM Reviewed PDMP [1]     Preferred Pharmacy:   [facility] [id number] Lizz Valdivia, [address] [phone] Olvin Aden [phone]  [facility] [address]  [address] [place] [address]  Phone: [phone] Fax: [phone]

## 2022-05-24 DIAGNOSIS — F41.9 ANXIETY: ICD-10-CM

## 2022-05-24 DIAGNOSIS — I10 UNCONTROLLED HYPERTENSION: ICD-10-CM

## 2022-05-24 DIAGNOSIS — R32 URINARY INCONTINENCE, UNSPECIFIED TYPE: ICD-10-CM

## 2022-05-24 RX ORDER — OXYBUTYNIN CHLORIDE 5 MG/1
TABLET ORAL
Qty: 90 TABLET | Refills: 0 | Status: SHIPPED | OUTPATIENT
Start: 2022-05-24 | End: 2022-06-13 | Stop reason: SDUPTHER

## 2022-05-24 RX ORDER — MIRTAZAPINE 15 MG/1
TABLET, FILM COATED ORAL
Qty: 30 TABLET | Refills: 0 | Status: SHIPPED | OUTPATIENT
Start: 2022-05-24 | End: 2022-06-13 | Stop reason: SDUPTHER

## 2022-05-24 RX ORDER — CARVEDILOL 12.5 MG/1
TABLET ORAL
Qty: 60 TABLET | Refills: 0 | Status: SHIPPED | OUTPATIENT
Start: 2022-05-24 | End: 2022-06-13 | Stop reason: SDUPTHER

## 2022-05-29 PROBLEM — R53.81 PHYSICAL DECONDITIONING: Status: RESOLVED | Noted: 2019-05-08 | Resolved: 2022-05-29

## 2022-05-29 PROBLEM — A49.02 MRSA INFECTION: Status: RESOLVED | Noted: 2019-08-24 | Resolved: 2022-05-29

## 2022-05-29 PROBLEM — R53.1 WEAKNESS: Status: RESOLVED | Noted: 2019-08-25 | Resolved: 2022-05-29

## 2022-05-31 ENCOUNTER — TELEPHONE (OUTPATIENT)
Dept: FAMILY MEDICINE CLINIC | Age: 77
End: 2022-05-31

## 2022-05-31 DIAGNOSIS — F41.9 ANXIETY: ICD-10-CM

## 2022-05-31 RX ORDER — CLONAZEPAM 1 MG/1
TABLET ORAL
Qty: 60 TABLET | Refills: 0 | OUTPATIENT
Start: 2022-05-31 | End: 2022-07-01

## 2022-05-31 NOTE — TELEPHONE ENCOUNTER
clonazePAM (KLONOPIN) 1 MG tablet [[id number]]     Order Details  Dose, Route, Frequency: As Directed   Dispense Quantity: 60 tablet Refills: 0          Sig: TAKE ONE TABLET BY MOUTH TWICE A DAY AS NEEDED FOR ANXIETY     Pharmacy:  [facility] [id number] Lisa Mccarthy, [address] [place] [address] [phone] Herb Douse [phone]   [address] [place] [address]   Phone:  [phone]  Fax:  [phone]

## 2022-05-31 NOTE — TELEPHONE ENCOUNTER
Per PCP, cannot fill because she is overdue for her appt. Should have been seen by 5/2/22 for refill of this medication. Called Pt home phone, no answer, just rings and rings. Called Pt cell phone that is also listed as spouse's number, no answer LVM. Can provide short term refill if Pt schedules appt, should be a 30 min AWV.

## 2022-06-02 DIAGNOSIS — F41.9 ANXIETY: ICD-10-CM

## 2022-06-02 RX ORDER — CLONAZEPAM 1 MG/1
TABLET ORAL
Qty: 60 TABLET | OUTPATIENT
Start: 2022-06-02

## 2022-06-02 RX ORDER — CLONAZEPAM 1 MG/1
TABLET ORAL
Qty: 60 TABLET | Refills: 0 | OUTPATIENT
Start: 2022-06-02 | End: 2022-07-03

## 2022-06-02 NOTE — TELEPHONE ENCOUNTER
Pt  called in upset that clonazepam was not filled yet. Explained to Pt's  that Latasha Blackmon is supposed to see her PCP every 3 months due to the medication being a control substance.  claims that no one has told him this, he controls the medication and that he will get the pills from somewhere.  then hung up on me.

## 2022-06-02 NOTE — TELEPHONE ENCOUNTER
clonazePAM (KLONOPIN) 1 MG tablet 60 tablet 0 5/2/2022 6/2/2022    Sig: TAKE ONE TABLET BY MOUTH TWICE A DAY AS NEEDED FOR ANXIETY    Sent to pharmacy as: clonazePAM 1 MG Oral Tablet Aileen Baker      Pharmacy    [facility] [id number] [place] [id number] [address]unt [phone] Purnima Kerr [phone]   [address] [place] [address]   Phone:  [phone]  Fax:  [phone]

## 2022-06-02 NOTE — TELEPHONE ENCOUNTER
Medication:   Requested Prescriptions     Pending Prescriptions Disp Refills    clonazePAM (KLONOPIN) 1 MG tablet 60 tablet 0      Last Filled:  5/2/22    Patient Phone Number: [phone] (home) [phone] (work)    Last appt: 2/2/2022   Next appt: Visit date not found, due back 5/2/22    Last OARRS:   RX Monitoring 5/11/2020   Attestation -   Periodic Controlled Substance Monitoring No signs of potential drug abuse or diversion identified.      PDMP Monitoring:    Last PDMP Sherald Spurling as Reviewed [facility]):  Review User Review Instant Review Result   Yue Alex 12/23/2021  8:37 AM Reviewed PDMP [1]     Preferred Pharmacy:   [facility] [id number] Jose Tejeda, [facility] [phone] Maurisio Brown [phone]  [facility] [address]  [address] [place] [address]  Phone: [phone] Fax: [phone]

## 2022-06-02 NOTE — TELEPHONE ENCOUNTER
----- Message from Cheryl Sierra sent at 6/2/2022  2:33 PM EDT -----  Subject: Message to Provider    QUESTIONS  Information for Provider? wants to see if she can get a refill till her   apt on June 8th.   ---------------------------------------------------------------------------  --------------  Ismael SADLER  What is the best way for the office to contact you? OK to leave message on   voicemail  Preferred Call Back Phone Number?  [phone]  ---------------------------------------------------------------------------  --------------  SCRIPT ANSWERS  undefined

## 2022-06-07 ENCOUNTER — HOSPITAL ENCOUNTER (INPATIENT)
Age: 77
LOS: 3 days | Discharge: HOME | DRG: 643 | End: 2022-06-10
Attending: EMERGENCY MEDICINE | Admitting: INTERNAL MEDICINE
Payer: COMMERCIAL

## 2022-06-07 ENCOUNTER — APPOINTMENT (OUTPATIENT)
Dept: CT IMAGING | Age: 77
DRG: 643 | End: 2022-06-07
Payer: COMMERCIAL

## 2022-06-07 DIAGNOSIS — I71.40 ABDOMINAL AORTIC ANEURYSM (AAA) WITHOUT RUPTURE: ICD-10-CM

## 2022-06-07 DIAGNOSIS — J90 PLEURAL EFFUSION ON LEFT: ICD-10-CM

## 2022-06-07 DIAGNOSIS — R11.2 NAUSEA VOMITING AND DIARRHEA: Primary | ICD-10-CM

## 2022-06-07 DIAGNOSIS — R19.7 NAUSEA VOMITING AND DIARRHEA: Primary | ICD-10-CM

## 2022-06-07 DIAGNOSIS — E87.1 HYPONATREMIA: ICD-10-CM

## 2022-06-07 DIAGNOSIS — R33.8 ACUTE URINARY RETENTION: ICD-10-CM

## 2022-06-07 LAB
ALBUMIN SERPL-MCNC: 4.4 G/DL (ref 3.4–5)
ALP BLD-CCNC: 46 U/L (ref 40–129)
ALT SERPL-CCNC: 6 U/L (ref 10–40)
ANION GAP SERPL CALCULATED.3IONS-SCNC: 13 MMOL/L (ref 3–16)
AST SERPL-CCNC: 16 U/L (ref 15–37)
BACTERIA: ABNORMAL /HPF
BASE EXCESS VENOUS: 2.9 MMOL/L (ref -3–3)
BASOPHILS ABSOLUTE: 0.1 K/UL (ref 0–0.2)
BASOPHILS RELATIVE PERCENT: 0.8 %
BILIRUB SERPL-MCNC: 0.4 MG/DL (ref 0–1)
BILIRUBIN DIRECT: <0.2 MG/DL (ref 0–0.3)
BILIRUBIN URINE: NEGATIVE
BILIRUBIN, INDIRECT: ABNORMAL MG/DL (ref 0–1)
BLOOD, URINE: ABNORMAL
BUN BLDV-MCNC: 10 MG/DL (ref 7–20)
CALCIUM SERPL-MCNC: 9.1 MG/DL (ref 8.3–10.6)
CARBOXYHEMOGLOBIN: 8.2 % (ref 0–1.5)
CHLORIDE BLD-SCNC: 85 MMOL/L (ref 99–110)
CLARITY: CLEAR
CO2: 25 MMOL/L (ref 21–32)
COLOR: YELLOW
COMMENT UA: ABNORMAL
CREAT SERPL-MCNC: 0.6 MG/DL (ref 0.6–1.2)
EOSINOPHILS ABSOLUTE: 0 K/UL (ref 0–0.6)
EOSINOPHILS RELATIVE PERCENT: 0.3 %
EPITHELIAL CELLS, UA: ABNORMAL /HPF (ref 0–5)
GFR AFRICAN AMERICAN: >60
GFR NON-AFRICAN AMERICAN: >60
GLUCOSE BLD-MCNC: 110 MG/DL (ref 70–99)
GLUCOSE URINE: NEGATIVE MG/DL
HCO3 VENOUS: 27.4 MMOL/L (ref 23–29)
HCT VFR BLD CALC: 41.1 % (ref 36–48)
HEMOGLOBIN: 14.3 G/DL (ref 12–16)
HYALINE CASTS: ABNORMAL /LPF (ref 0–2)
INFLUENZA A: NOT DETECTED
INFLUENZA B: NOT DETECTED
KETONES, URINE: NEGATIVE MG/DL
LACTIC ACID, SEPSIS: 1.9 MMOL/L (ref 0.4–1.9)
LEUKOCYTE ESTERASE, URINE: ABNORMAL
LIPASE: 15 U/L (ref 13–60)
LYMPHOCYTES ABSOLUTE: 0.9 K/UL (ref 1–5.1)
LYMPHOCYTES RELATIVE PERCENT: 11.1 %
MAGNESIUM: 1.8 MG/DL (ref 1.8–2.4)
MCH RBC QN AUTO: 35.2 PG (ref 26–34)
MCHC RBC AUTO-ENTMCNC: 34.8 G/DL (ref 31–36)
MCV RBC AUTO: 101.3 FL (ref 80–100)
METHEMOGLOBIN VENOUS: <0 %
MICROSCOPIC EXAMINATION: YES
MONOCYTES ABSOLUTE: 0.9 K/UL (ref 0–1.3)
MONOCYTES RELATIVE PERCENT: 10.8 %
NEUTROPHILS ABSOLUTE: 6.6 K/UL (ref 1.7–7.7)
NEUTROPHILS RELATIVE PERCENT: 77 %
NITRITE, URINE: NEGATIVE
O2 CONTENT, VEN: 19 VOL %
O2 SAT, VEN: 100 %
O2 THERAPY: ABNORMAL
PCO2, VEN: 40.5 MMHG (ref 40–50)
PDW BLD-RTO: 13.4 % (ref 12.4–15.4)
PH UA: 6 (ref 5–8)
PH VENOUS: 7.44 (ref 7.35–7.45)
PLATELET # BLD: 357 K/UL (ref 135–450)
PMV BLD AUTO: 8.4 FL (ref 5–10.5)
PO2, VEN: 124 MMHG (ref 25–40)
POTASSIUM REFLEX MAGNESIUM: 3.4 MMOL/L (ref 3.5–5.1)
PROTEIN UA: 100 MG/DL
RBC # BLD: 4.06 M/UL (ref 4–5.2)
RBC UA: ABNORMAL /HPF (ref 0–4)
SARS-COV-2 RNA, RT PCR: NOT DETECTED
SODIUM BLD-SCNC: 123 MMOL/L (ref 136–145)
SPECIFIC GRAVITY UA: 1.02 (ref 1–1.03)
TCO2 CALC VENOUS: 64 MMOL/L
TOTAL PROTEIN: 7.4 G/DL (ref 6.4–8.2)
TROPONIN: <0.01 NG/ML
URINE TYPE: ABNORMAL
UROBILINOGEN, URINE: 0.2 E.U./DL
WBC # BLD: 8.5 K/UL (ref 4–11)
WBC UA: ABNORMAL /HPF (ref 0–5)

## 2022-06-07 PROCEDURE — 80076 HEPATIC FUNCTION PANEL: CPT

## 2022-06-07 PROCEDURE — 87086 URINE CULTURE/COLONY COUNT: CPT

## 2022-06-07 PROCEDURE — 85025 COMPLETE CBC W/AUTO DIFF WBC: CPT

## 2022-06-07 PROCEDURE — 84484 ASSAY OF TROPONIN QUANT: CPT

## 2022-06-07 PROCEDURE — 82803 BLOOD GASES ANY COMBINATION: CPT

## 2022-06-07 PROCEDURE — 83605 ASSAY OF LACTIC ACID: CPT

## 2022-06-07 PROCEDURE — 81001 URINALYSIS AUTO W/SCOPE: CPT

## 2022-06-07 PROCEDURE — 80048 BASIC METABOLIC PNL TOTAL CA: CPT

## 2022-06-07 PROCEDURE — 83690 ASSAY OF LIPASE: CPT

## 2022-06-07 PROCEDURE — 2580000003 HC RX 258: Performed by: EMERGENCY MEDICINE

## 2022-06-07 PROCEDURE — 96360 HYDRATION IV INFUSION INIT: CPT

## 2022-06-07 PROCEDURE — 99285 EMERGENCY DEPT VISIT HI MDM: CPT

## 2022-06-07 PROCEDURE — 87186 SC STD MICRODIL/AGAR DIL: CPT

## 2022-06-07 PROCEDURE — 93005 ELECTROCARDIOGRAM TRACING: CPT | Performed by: EMERGENCY MEDICINE

## 2022-06-07 PROCEDURE — 87636 SARSCOV2 & INF A&B AMP PRB: CPT

## 2022-06-07 PROCEDURE — 74176 CT ABD & PELVIS W/O CONTRAST: CPT

## 2022-06-07 PROCEDURE — 1200000000 HC SEMI PRIVATE

## 2022-06-07 PROCEDURE — 70450 CT HEAD/BRAIN W/O DYE: CPT

## 2022-06-07 PROCEDURE — 83735 ASSAY OF MAGNESIUM: CPT

## 2022-06-07 PROCEDURE — 87077 CULTURE AEROBIC IDENTIFY: CPT

## 2022-06-07 RX ORDER — POTASSIUM CHLORIDE 7.45 MG/ML
10 INJECTION INTRAVENOUS PRN
Status: DISCONTINUED | OUTPATIENT
Start: 2022-06-07 | End: 2022-06-10 | Stop reason: HOSPADM

## 2022-06-07 RX ORDER — ONDANSETRON 2 MG/ML
4 INJECTION INTRAMUSCULAR; INTRAVENOUS EVERY 6 HOURS PRN
Status: DISCONTINUED | OUTPATIENT
Start: 2022-06-07 | End: 2022-06-10 | Stop reason: HOSPADM

## 2022-06-07 RX ORDER — ACETAMINOPHEN 650 MG/1
650 SUPPOSITORY RECTAL EVERY 6 HOURS PRN
Status: DISCONTINUED | OUTPATIENT
Start: 2022-06-07 | End: 2022-06-10 | Stop reason: HOSPADM

## 2022-06-07 RX ORDER — ASPIRIN 81 MG/1
81 TABLET, CHEWABLE ORAL DAILY
Status: DISCONTINUED | OUTPATIENT
Start: 2022-06-08 | End: 2022-06-10 | Stop reason: HOSPADM

## 2022-06-07 RX ORDER — POTASSIUM CHLORIDE 20 MEQ/1
40 TABLET, EXTENDED RELEASE ORAL ONCE
Status: COMPLETED | OUTPATIENT
Start: 2022-06-07 | End: 2022-06-08

## 2022-06-07 RX ORDER — LANOLIN ALCOHOL/MO/W.PET/CERES
400 CREAM (GRAM) TOPICAL ONCE
Status: COMPLETED | OUTPATIENT
Start: 2022-06-07 | End: 2022-06-08

## 2022-06-07 RX ORDER — ONDANSETRON 4 MG/1
4 TABLET, FILM COATED ORAL 3 TIMES DAILY PRN
Status: DISCONTINUED | OUTPATIENT
Start: 2022-06-07 | End: 2022-06-08 | Stop reason: ALTCHOICE

## 2022-06-07 RX ORDER — LEVOTHYROXINE SODIUM 0.1 MG/1
100 TABLET ORAL DAILY
Status: DISCONTINUED | OUTPATIENT
Start: 2022-06-08 | End: 2022-06-10 | Stop reason: HOSPADM

## 2022-06-07 RX ORDER — SODIUM CHLORIDE 0.9 % (FLUSH) 0.9 %
10 SYRINGE (ML) INJECTION PRN
Status: DISCONTINUED | OUTPATIENT
Start: 2022-06-07 | End: 2022-06-10 | Stop reason: HOSPADM

## 2022-06-07 RX ORDER — ACETAMINOPHEN 325 MG/1
650 TABLET ORAL ONCE
Status: DISCONTINUED | OUTPATIENT
Start: 2022-06-07 | End: 2022-06-10 | Stop reason: HOSPADM

## 2022-06-07 RX ORDER — SODIUM CHLORIDE 0.9 % (FLUSH) 0.9 %
10 SYRINGE (ML) INJECTION EVERY 12 HOURS SCHEDULED
Status: DISCONTINUED | OUTPATIENT
Start: 2022-06-07 | End: 2022-06-10 | Stop reason: HOSPADM

## 2022-06-07 RX ORDER — 0.9 % SODIUM CHLORIDE 0.9 %
1000 INTRAVENOUS SOLUTION INTRAVENOUS ONCE
Status: COMPLETED | OUTPATIENT
Start: 2022-06-07 | End: 2022-06-07

## 2022-06-07 RX ORDER — OXYBUTYNIN CHLORIDE 5 MG/1
5 TABLET ORAL 3 TIMES DAILY
Status: DISCONTINUED | OUTPATIENT
Start: 2022-06-08 | End: 2022-06-10 | Stop reason: HOSPADM

## 2022-06-07 RX ORDER — ENOXAPARIN SODIUM 100 MG/ML
40 INJECTION SUBCUTANEOUS DAILY
Status: DISCONTINUED | OUTPATIENT
Start: 2022-06-08 | End: 2022-06-07

## 2022-06-07 RX ORDER — SODIUM CHLORIDE 9 MG/ML
INJECTION, SOLUTION INTRAVENOUS PRN
Status: DISCONTINUED | OUTPATIENT
Start: 2022-06-07 | End: 2022-06-10 | Stop reason: HOSPADM

## 2022-06-07 RX ORDER — MAGNESIUM SULFATE IN WATER 40 MG/ML
2000 INJECTION, SOLUTION INTRAVENOUS PRN
Status: DISCONTINUED | OUTPATIENT
Start: 2022-06-07 | End: 2022-06-10 | Stop reason: HOSPADM

## 2022-06-07 RX ORDER — CARVEDILOL 6.25 MG/1
12.5 TABLET ORAL 2 TIMES DAILY WITH MEALS
Status: DISCONTINUED | OUTPATIENT
Start: 2022-06-08 | End: 2022-06-10 | Stop reason: HOSPADM

## 2022-06-07 RX ORDER — MIRTAZAPINE 15 MG/1
15 TABLET, FILM COATED ORAL DAILY
Status: DISCONTINUED | OUTPATIENT
Start: 2022-06-08 | End: 2022-06-10 | Stop reason: HOSPADM

## 2022-06-07 RX ORDER — LISINOPRIL 10 MG/1
5 TABLET ORAL DAILY
Status: DISCONTINUED | OUTPATIENT
Start: 2022-06-08 | End: 2022-06-10 | Stop reason: HOSPADM

## 2022-06-07 RX ORDER — PROMETHAZINE HYDROCHLORIDE 25 MG/1
12.5 TABLET ORAL EVERY 6 HOURS PRN
Status: DISCONTINUED | OUTPATIENT
Start: 2022-06-07 | End: 2022-06-10 | Stop reason: HOSPADM

## 2022-06-07 RX ORDER — ACETAMINOPHEN 325 MG/1
650 TABLET ORAL EVERY 6 HOURS PRN
Status: DISCONTINUED | OUTPATIENT
Start: 2022-06-07 | End: 2022-06-10 | Stop reason: HOSPADM

## 2022-06-07 RX ADMIN — SODIUM CHLORIDE 1000 ML: 9 INJECTION, SOLUTION INTRAVENOUS at 21:09

## 2022-06-07 ASSESSMENT — PAIN - FUNCTIONAL ASSESSMENT: PAIN_FUNCTIONAL_ASSESSMENT: NONE - DENIES PAIN

## 2022-06-08 LAB
ALBUMIN SERPL-MCNC: 3.8 G/DL (ref 3.4–5)
ALP BLD-CCNC: 41 U/L (ref 40–129)
ALT SERPL-CCNC: 6 U/L (ref 10–40)
ANION GAP SERPL CALCULATED.3IONS-SCNC: 10 MMOL/L (ref 3–16)
ANION GAP SERPL CALCULATED.3IONS-SCNC: 10 MMOL/L (ref 3–16)
ANION GAP SERPL CALCULATED.3IONS-SCNC: 11 MMOL/L (ref 3–16)
ANION GAP SERPL CALCULATED.3IONS-SCNC: 12 MMOL/L (ref 3–16)
AST SERPL-CCNC: 13 U/L (ref 15–37)
BASOPHILS ABSOLUTE: 0.1 K/UL (ref 0–0.2)
BASOPHILS RELATIVE PERCENT: 0.8 %
BILIRUB SERPL-MCNC: 0.4 MG/DL (ref 0–1)
BILIRUBIN DIRECT: <0.2 MG/DL (ref 0–0.3)
BILIRUBIN, INDIRECT: ABNORMAL MG/DL (ref 0–1)
BUN BLDV-MCNC: 10 MG/DL (ref 7–20)
BUN BLDV-MCNC: 10 MG/DL (ref 7–20)
BUN BLDV-MCNC: 28 MG/DL (ref 7–20)
BUN BLDV-MCNC: 9 MG/DL (ref 7–20)
CALCIUM SERPL-MCNC: 8.8 MG/DL (ref 8.3–10.6)
CALCIUM SERPL-MCNC: 8.9 MG/DL (ref 8.3–10.6)
CALCIUM SERPL-MCNC: 9 MG/DL (ref 8.3–10.6)
CALCIUM SERPL-MCNC: 9.1 MG/DL (ref 8.3–10.6)
CHLORIDE BLD-SCNC: 89 MMOL/L (ref 99–110)
CHLORIDE BLD-SCNC: 90 MMOL/L (ref 99–110)
CHLORIDE BLD-SCNC: 91 MMOL/L (ref 99–110)
CHLORIDE BLD-SCNC: 92 MMOL/L (ref 99–110)
CO2: 25 MMOL/L (ref 21–32)
CO2: 25 MMOL/L (ref 21–32)
CO2: 26 MMOL/L (ref 21–32)
CO2: 26 MMOL/L (ref 21–32)
CREAT SERPL-MCNC: 0.6 MG/DL (ref 0.6–1.2)
CREAT SERPL-MCNC: 0.7 MG/DL (ref 0.6–1.2)
EKG ATRIAL RATE: 74 BPM
EKG DIAGNOSIS: NORMAL
EKG P AXIS: 75 DEGREES
EKG P-R INTERVAL: 150 MS
EKG Q-T INTERVAL: 428 MS
EKG QRS DURATION: 80 MS
EKG QTC CALCULATION (BAZETT): 475 MS
EKG R AXIS: 59 DEGREES
EKG T AXIS: 77 DEGREES
EKG VENTRICULAR RATE: 74 BPM
EOSINOPHILS ABSOLUTE: 0.1 K/UL (ref 0–0.6)
EOSINOPHILS RELATIVE PERCENT: 1.6 %
GFR AFRICAN AMERICAN: >60
GFR NON-AFRICAN AMERICAN: >60
GLUCOSE BLD-MCNC: 110 MG/DL (ref 70–99)
GLUCOSE BLD-MCNC: 90 MG/DL (ref 70–99)
GLUCOSE BLD-MCNC: 91 MG/DL (ref 70–99)
GLUCOSE BLD-MCNC: 94 MG/DL (ref 70–99)
HCT VFR BLD CALC: 38.9 % (ref 36–48)
HEMOGLOBIN: 13.5 G/DL (ref 12–16)
LACTIC ACID, SEPSIS: 1.1 MMOL/L (ref 0.4–1.9)
LYMPHOCYTES ABSOLUTE: 1.3 K/UL (ref 1–5.1)
LYMPHOCYTES RELATIVE PERCENT: 15 %
MCH RBC QN AUTO: 35.3 PG (ref 26–34)
MCHC RBC AUTO-ENTMCNC: 34.6 G/DL (ref 31–36)
MCV RBC AUTO: 102.2 FL (ref 80–100)
MONOCYTES ABSOLUTE: 1.2 K/UL (ref 0–1.3)
MONOCYTES RELATIVE PERCENT: 14.5 %
NEUTROPHILS ABSOLUTE: 5.7 K/UL (ref 1.7–7.7)
NEUTROPHILS RELATIVE PERCENT: 68.1 %
OSMOLALITY: 265 MOSM/KG (ref 280–301)
PDW BLD-RTO: 13.5 % (ref 12.4–15.4)
PLATELET # BLD: 318 K/UL (ref 135–450)
PMV BLD AUTO: 8.3 FL (ref 5–10.5)
POTASSIUM SERPL-SCNC: 3.6 MMOL/L (ref 3.5–5.1)
POTASSIUM SERPL-SCNC: 4.3 MMOL/L (ref 3.5–5.1)
POTASSIUM SERPL-SCNC: 4.3 MMOL/L (ref 3.5–5.1)
POTASSIUM SERPL-SCNC: 4.4 MMOL/L (ref 3.5–5.1)
RBC # BLD: 3.81 M/UL (ref 4–5.2)
SODIUM BLD-SCNC: 125 MMOL/L (ref 136–145)
SODIUM BLD-SCNC: 126 MMOL/L (ref 136–145)
SODIUM BLD-SCNC: 128 MMOL/L (ref 136–145)
SODIUM BLD-SCNC: 128 MMOL/L (ref 136–145)
TOTAL PROTEIN: 6.7 G/DL (ref 6.4–8.2)
TROPONIN: <0.01 NG/ML
WBC # BLD: 8.4 K/UL (ref 4–11)

## 2022-06-08 PROCEDURE — 80048 BASIC METABOLIC PNL TOTAL CA: CPT

## 2022-06-08 PROCEDURE — 2580000003 HC RX 258: Performed by: INTERNAL MEDICINE

## 2022-06-08 PROCEDURE — 6370000000 HC RX 637 (ALT 250 FOR IP): Performed by: EMERGENCY MEDICINE

## 2022-06-08 PROCEDURE — 80076 HEPATIC FUNCTION PANEL: CPT

## 2022-06-08 PROCEDURE — 6370000000 HC RX 637 (ALT 250 FOR IP): Performed by: INTERNAL MEDICINE

## 2022-06-08 PROCEDURE — 83930 ASSAY OF BLOOD OSMOLALITY: CPT

## 2022-06-08 PROCEDURE — 1200000000 HC SEMI PRIVATE

## 2022-06-08 PROCEDURE — 85025 COMPLETE CBC W/AUTO DIFF WBC: CPT

## 2022-06-08 PROCEDURE — 94760 N-INVAS EAR/PLS OXIMETRY 1: CPT

## 2022-06-08 PROCEDURE — 84484 ASSAY OF TROPONIN QUANT: CPT

## 2022-06-08 PROCEDURE — 36415 COLL VENOUS BLD VENIPUNCTURE: CPT

## 2022-06-08 PROCEDURE — 93010 ELECTROCARDIOGRAM REPORT: CPT | Performed by: INTERNAL MEDICINE

## 2022-06-08 PROCEDURE — 6370000000 HC RX 637 (ALT 250 FOR IP): Performed by: NURSE PRACTITIONER

## 2022-06-08 PROCEDURE — 83605 ASSAY OF LACTIC ACID: CPT

## 2022-06-08 RX ORDER — CLONAZEPAM 1 MG/1
1 TABLET ORAL EVERY 12 HOURS PRN
Status: DISCONTINUED | OUTPATIENT
Start: 2022-06-08 | End: 2022-06-10 | Stop reason: HOSPADM

## 2022-06-08 RX ADMIN — SODIUM CHLORIDE, PRESERVATIVE FREE 10 ML: 5 INJECTION INTRAVENOUS at 20:58

## 2022-06-08 RX ADMIN — MIRTAZAPINE 15 MG: 15 TABLET, FILM COATED ORAL at 09:53

## 2022-06-08 RX ADMIN — OXYBUTYNIN CHLORIDE 5 MG: 5 TABLET ORAL at 09:53

## 2022-06-08 RX ADMIN — ASPIRIN 81 MG 81 MG: 81 TABLET ORAL at 09:53

## 2022-06-08 RX ADMIN — POTASSIUM CHLORIDE 40 MEQ: 20 TABLET, EXTENDED RELEASE ORAL at 00:03

## 2022-06-08 RX ADMIN — ACETAMINOPHEN 650 MG: 325 TABLET ORAL at 10:03

## 2022-06-08 RX ADMIN — APIXABAN 5 MG: 5 TABLET, FILM COATED ORAL at 20:55

## 2022-06-08 RX ADMIN — LEVOTHYROXINE SODIUM 100 MCG: 0.1 TABLET ORAL at 09:53

## 2022-06-08 RX ADMIN — Medication 400 MG: at 01:57

## 2022-06-08 RX ADMIN — OXYBUTYNIN CHLORIDE 5 MG: 5 TABLET ORAL at 20:55

## 2022-06-08 RX ADMIN — CARVEDILOL 12.5 MG: 6.25 TABLET, FILM COATED ORAL at 16:18

## 2022-06-08 RX ADMIN — CARVEDILOL 12.5 MG: 6.25 TABLET, FILM COATED ORAL at 10:05

## 2022-06-08 RX ADMIN — Medication 15 G: at 15:26

## 2022-06-08 RX ADMIN — OXYBUTYNIN CHLORIDE 5 MG: 5 TABLET ORAL at 14:58

## 2022-06-08 RX ADMIN — SODIUM CHLORIDE, PRESERVATIVE FREE 10 ML: 5 INJECTION INTRAVENOUS at 02:00

## 2022-06-08 RX ADMIN — ACETAMINOPHEN 650 MG: 325 TABLET ORAL at 01:57

## 2022-06-08 RX ADMIN — APIXABAN 5 MG: 5 TABLET, FILM COATED ORAL at 09:53

## 2022-06-08 RX ADMIN — ACETAMINOPHEN 650 MG: 325 TABLET ORAL at 20:55

## 2022-06-08 RX ADMIN — CLONAZEPAM 1 MG: 1 TABLET ORAL at 21:49

## 2022-06-08 RX ADMIN — LISINOPRIL 5 MG: 10 TABLET ORAL at 09:53

## 2022-06-08 ASSESSMENT — ENCOUNTER SYMPTOMS
DIARRHEA: 0
PHOTOPHOBIA: 0
BACK PAIN: 0
NAUSEA: 0
COUGH: 0
ABDOMINAL PAIN: 0
EYE PAIN: 0
CONSTIPATION: 0
SORE THROAT: 0
EYE REDNESS: 0
RHINORRHEA: 0
SHORTNESS OF BREATH: 0
VOMITING: 0
CHEST TIGHTNESS: 0

## 2022-06-08 ASSESSMENT — PAIN DESCRIPTION - LOCATION: LOCATION: HEAD

## 2022-06-08 ASSESSMENT — PAIN SCALES - GENERAL
PAINLEVEL_OUTOF10: 0
PAINLEVEL_OUTOF10: 6
PAINLEVEL_OUTOF10: 5
PAINLEVEL_OUTOF10: 2

## 2022-06-08 ASSESSMENT — PAIN DESCRIPTION - DESCRIPTORS: DESCRIPTORS: ACHING

## 2022-06-08 NOTE — PROGRESS NOTES
Hospitalist Progress Note      PCP: QUITA Aguayo - NP    Date of Admission: 6/7/2022    Chief Complaint: Emesis, nausea, diarrhea    Hospital Course: diabetes, depression, hypothyroidism, hypertension, hyperlipidemia, presented to the ED due to weakness fatigue.      Patient reports that she normally lives independently at home with her  and daughter. Patient is able to ambulate with a walker. Patient reported recently having some intermittent confusion and worsening fatigue no known alleviating or exacerbating factor. Patient also has been having some nausea vomiting and diarrhea reports more than 3 times intermittently watery with no recent antibiotic or hospitalization. Patient reports that she has been not eating any food and just remains drinking water for hydration. Patient otherwise denies any chest pain abdominal pain or dysuria. Subjective: Patient laying in bed, states the diarrhea has stopped. Reports the nausea vomiting is much improved as well. Attempting to eat food. Denies any abdominal pain. Denies shortness of breath chest pain palpitation headache lightheadedness dizziness. Reviewed plan of care, denies further needs or questions. 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  -Neurology recommended outpatient follow-up for normal pressure hydrocephalus. We will send referral.  -Encephalopathy has resolved, she is alert oriented person place and time    Acute hypervolemic hyponatremia  -Nephrology consulted-presume SIADH. Resume urea tabs. -Urine lites pending  -Continue IV fluids  -Neurochecks  -BMP every 4 hours  -Sodium 126 on admission, improved to 128    Hypokalemia  -Replacement given    Abnormal urinalysis  -UA showed 4+ bacteria. Patient has no urinary symptoms though.   Urine culture was added, will monitor. Avoid antibiotic for now. Monitor for fevers. Diarrhea  -Obtain sample of to rule out C. 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 Fib  -Continue medications as ordered  -Takes no diabetes medications at home, blood sugars less than 110  -Continue Eliquis        Medications:  Reviewed    Infusion Medications    sodium chloride       Scheduled Medications    acetaminophen  650 mg Oral Once    sodium chloride flush  10 mL IntraVENous 2 times per day    apixaban  5 mg Oral BID    aspirin  81 mg Oral Daily    carvedilol  12.5 mg Oral BID WC    levothyroxine  100 mcg Oral Daily    lisinopril  5 mg Oral Daily    mirtazapine  15 mg Oral Daily    oxybutynin  5 mg Oral TID     PRN Meds: sodium chloride flush, sodium chloride, potassium chloride, magnesium sulfate, promethazine **OR** ondansetron, acetaminophen **OR** acetaminophen      Intake/Output Summary (Last 24 hours) at 6/8/2022 1306  Last data filed at 6/8/2022 1012  Gross per 24 hour   Intake 1240 ml   Output 750 ml   Net 490 ml       Physical Exam Performed:    BP (!) 157/81   Pulse 67   Temp 98.2 °F (36.8 °C) (Oral)   Resp 16   Ht 5' 7\" (1.702 m)   Wt 120 lb (54.4 kg)   SpO2 94%   BMI 18.79 kg/m²     General appearance: No apparent distress, appears stated age and cooperative. HEENT: Pupils equal, round, and reactive to light. Conjunctivae/corneas clear. Neck: Supple, with full range of motion. No jugular venous distention. Trachea midline. Respiratory:  Normal respiratory effort. Clear to auscultation, bilaterally without Rales/Wheezes/Rhonchi. Cardiovascular: Regular rate and rhythm with normal S1/S2 without murmurs, rubs or gallops.   Abdomen: Soft, non-tender, non-distended with normal bowel sounds. Musculoskeletal: No clubbing, cyanosis or edema bilaterally. Full range of motion without deformity. Generalized weakness. Skin: Skin color, texture, turgor normal.  No rashes or lesions. Neurologic:  Neurovascularly intact without any focal sensory/motor deficits. Cranial nerves: II-XII intact, grossly non-focal.  Psychiatric: Alert and oriented, thought content appropriate, normal insight  Capillary Refill: Brisk,< 3 seconds   Peripheral Pulses: +2 palpable, equal bilaterally       Labs:   Recent Labs     06/07/22 2033 06/08/22  0510   WBC 8.5 8.4   HGB 14.3 13.5   HCT 41.1 38.9    318     Recent Labs     06/08/22  0132 06/08/22  0510 06/08/22  1036   * 126* 128*   K 3.6 4.4 4.3   CL 90* 91* 92*   CO2 26 25 25   BUN 9 10 10   CREATININE 0.6 0.7 0.6   CALCIUM 8.9 8.8 9.0     Recent Labs     06/07/22 2033 06/08/22  0510   AST 16 13*   ALT 6* 6*   BILIDIR <0.2 <0.2   BILITOT 0.4 0.4   ALKPHOS 46 41     No results for input(s): INR in the last 72 hours. Recent Labs     06/07/22 2033 06/08/22  0510   TROPONINI <0.01 <0.01       Urinalysis:      Lab Results   Component Value Date    NITRU Negative 06/07/2022    WBCUA 6-9 06/07/2022    BACTERIA 4+ 06/07/2022    RBCUA 3-4 06/07/2022    BLOODU SMALL 06/07/2022    SPECGRAV 1.020 06/07/2022    GLUCOSEU Negative 06/07/2022    GLUCOSEU NEGATIVE 05/08/2011       Radiology:  CT HEAD WO CONTRAST   Final Result   1. No acute intracranial hemorrhage or mass effect. 2.  Extensive chronic small vessel ischemic disease. Suggestion of an old   infarct in the left occipital lobe, similar to the prior study. Small left   cerebellar old lacunar infarct.       3.  Marked prominence of the left greater than right lateral ventricles is   similar to the prior study and may relate to central volume loss and/or   component of normal pressure hydrocephalus in the appropriate clinical setting      RECOMMENDATIONS:   Follow-up brain MRI may be helpful for further evaluation as warranted. CT ABDOMEN PELVIS WO CONTRAST Additional Contrast? None   Preliminary Result   1. No acute process within the abdomen or pelvis, within the limitations of a   noncontrast study. 2. No acute bowel inflammation or obstruction. 3. Mild colonic diverticulosis, without evidence of diverticulitis. 4. Stable mild approximate 3.0 cm fusiform aneurysm of the infrarenal   abdominal aorta, without evidence of leak or rupture. Further follow-up of   this abnormality is as advised below. 5. Stable mildly complicated and partially loculated small left pleural   effusion. RECOMMENDATIONS:   For management of fusiform AAA:      3.0-3.4 cm AAA, recommend follow-up every 3 years. Note: Recommend Vascular consultation if a fusiform AAA enlarges by >0.5 cm   in 6 months or >1 cm in 1 year or for a saccular AAA of any size. References: Sherri Blood Radiol 2013; 04(02):685-962; J Vasc Surg. 2018; 67:2-77                 DVT Prophylaxis: Eliquis  Diet: ADULT DIET; Regular; 3 carb choices (45 gm/meal); Low Fat/Low Chol/High Fiber/2 gm Na; Low Sodium (2 gm)  Code Status: Full Code    PT/OT Eval Status: Eval ordered    Dispo -home once medically stable    Khushbu Nguyễn APRN - CNP      NOTE:  This report was transcribed using voice recognition software. Every effort was made to ensure accuracy; however, inadvertent computerized transcription errors may be present.

## 2022-06-08 NOTE — ED PROVIDER NOTES
EMERGENCY DEPARTMENT PROVIDER NOTE    Patient Identification  Pt Name: Mamta Ladd  MRN: [id number]  [place] 1945  Date of evaluation: 6/7/2022  Provider: Schuyler Ardon DO  PCP: QUITA Dumont NP    Chief Complaint  Emesis (pt brought in by [facility] squad from home. pt states that she has been having N/V/D for the past 3 days. pt states that she has a [employer] bladder control device and hasnt been able to urinate in 3 days. squad gave 4 mg IV zofran and pt states feels a little better. pt states hasnt ate in 3 days and only has drank water. pt states last time vomiting was 3 hours ago )      HPI  (History provided by patient)  This is a 67 y.o. female with pertinent past medical history of vascular dementia, COPD, hypothyroidism, CVA, PAF who was brought in by EMS transportation for nausea and nonbloody vomiting ongoing for the past 3 days. Nothing seems to make the symptoms any better, worsened with attempts at eating and drinking. Patient also reports diarrhea at onset which has since resolved. Patient also states she feels generally weak, sluggish and confused. Patient is also concerned as she has a bladder stimulator, does not feel this is working as she has been unable to urinate today. She denies any fevers, chills, chest pain, shortness of breath. ROS    Const:  No fevers, no chills, +generalized weakness  Skin:  No rash, no lesions  Eyes:  No visual changes, no blurry or double vision, no pain  ENT:  No sore throat, no difficulty swallowing, no ear pain, no sinus pain or congestion  Card:  No chest pain, no palpitations, no edema  Resp:  No shortness of breath, no cough, no wheezing  Abd:  No abdominal pain, +nausea, +vomiting, +diarrhea (now resolved)  Genitourinary:  No dysuria, no hematuria, no vaginal discharge, no vaginal bleeding.  +urinary retention  MSK:  No joint pain, no myalgia  Neuro:  No focal weakness, +headache, no paresthesia    All other systems reviewed and negative unless otherwise noted in HPI      I have reviewed the following nursing documentation:  Allergies: Lipitor [atorvastatin], Morphine, Keflex [cephalexin], Hctz [hydrochlorothiazide], Norvasc [amlodipine besylate], Penicillins, Tramadol, Hydralazine, and Shellfish-derived products    Past medical history:   Past Medical History:   Diagnosis Date    Acute DVT (deep venous thrombosis) ([facility] 07/03/2015    Acute encephalopathy 04/19/2018    Acute on chronic diastolic heart failure ([facility] 03/30/2019    Alcohol abuse     Anxiety     Arthritis     CAD in native artery     Cellulitis 04/28/2018    Cerebral artery occlusion with cerebral infarction (HCC)     states hx of multiple mini strokes    Cerebrovascular accident (CVA) ([facility]     Chronic fatigue     Complex care coordination     Complicated UTI (urinary tract infection)     COPD (chronic obstructive pulmonary disease) ([facility]     COPD exacerbation ([facility] 02/20/2018    Depression     Diabetes mellitus ([facility]     denies    DIMAS (dyspnea on exertion) 07/03/2015    [facility] thrombosis[facility]     DVT, lower extremity ([facility])     Fatigue 11/03/2015    General weakness 11/04/2015    Generalized weakness 08/22/2019    Hypercholesteremia     Hypertension     Hyperthyroidism     Incontinence 10/16/2012    Mammogram abnormal 02/07/2012    Neuromuscular disorder ([facility]     Osteopenia 02/14/2017    PAF (paroxysmal atrial fibrillation) (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 Juwan Ross; repeat 5 years    EYE SURGERY      cateracts removed    NERVE SURGERY N/A 12/29/2021    INTERSTIM STAGE1, FLEXIBLE CYSTOSCOPY performed by Consuelo Landon MD at [address] [place] [address] ARTHROSCOPY Right 6/18/14    SUBACROMIAL DECOMPRESSION, ROTATOR CUFF REPAIR    STIMULATOR SURGERY N/A 2/9/2022    INTERSTIMULATOR INSERTION STAGE 2 - MEDTRONICS performed by Consuelo Landon MD at [facility] Right     TONSILLECTOMY         Home medications:   Previous Medications    ALBUTEROL (PROVENTIL) (5 MG/ML) 0.5% NEBULIZER SOLUTION    Take 0.5 mLs by nebulization every 6 hours as needed for Wheezing    APIXABAN (ELIQUIS) 5 MG TABS TABLET    Take 1 tablet by mouth 2 times daily    ASPIRIN 81 MG CHEWABLE TABLET    Take 1 tablet by mouth daily    BUDESONIDE-FORMOTEROL (SYMBICORT) 160-4.5 MCG/ACT AERO    Inhale 2 puffs into the lungs 2 times daily    CARVEDILOL (COREG) 12.5 MG TABLET    TAKE ONE TABLET BY MOUTH TWICE A DAY    CARVEDILOL (COREG) 25 MG TABLET    Take 1 tablet by mouth 2 times daily    CLONAZEPAM (KLONOPIN) 1 MG TABLET    TAKE ONE TABLET BY MOUTH TWICE A DAY AS NEEDED FOR ANXIETY    CLONIDINE (CATAPRES) 0.1 MG TABLET    Take 1 tablet by mouth 3 times daily    LEVOTHYROXINE (SYNTHROID) 100 MCG TABLET    TAKE ONE TABLET BY MOUTH DAILY    LISINOPRIL (PRINIVIL;ZESTRIL) 5 MG TABLET    Take 1 tablet by mouth daily    MIRTAZAPINE (REMERON) 15 MG TABLET    TAKE ONE TABLET BY MOUTH DAILY    ONDANSETRON (ZOFRAN) 4 MG TABLET    Take 1 tablet by mouth 3 times daily as needed for Nausea or Vomiting    OXYBUTYNIN (DITROPAN) 5 MG TABLET    TAKE ONE TABLET BY MOUTH THREE TIMES A DAY    UREA (URE-NA) 15 G PACK PACKET    Take 15 g by mouth daily       Social history:  reports that she has been smoking cigarettes. She has a 52.00 pack-year smoking history. She has never used smokeless tobacco. She reports that she does not drink alcohol and does not use drugs.     Family history:    Family History   Problem Relation Age of Onset    Heart Disease Father         MI @ 64    Alcohol Abuse Father Exam  ED Triage Vitals   BP Temp Temp Source Heart Rate Resp SpO2 Height Weight   06/07/22 2025 06/07/22 2027 06/07/22 2025 06/07/22 2025 06/07/22 2025 06/07/22 2025 06/07/22 2025 06/07/22 2025   (!) 177/98 97.9 °F (36.6 °C) Oral 71 22 98 % 5' 7\" (1.702 m) 120 lb (54.4 kg)     Nursing note and vitals reviewed. Constitutional: Well developed, well nourished. Non-toxic in appearance. HENT:      Head: Normocephalic and atraumatic. Ears: External ears normal.      Nose: Nose normal.     Mouth: Membrane mucosa dry and pink. Eyes: Anicteric sclera. No discharge. PERRL, no nystagmus  Neck: Supple. Trachea midline. Cardiovascular: RRR; no murmurs, rubs, or gallops. Pulmonary/Chest: Effort normal. No respiratory distress. CTAB. No stridor. No wheezes. No rales. Abdominal: Soft. No distension. Tenderness palpation suprapubic region, no focal McBurney's point tenderness. Negative Rovsing. No rebound, no rigidity, no guarding. No CVA tenderness. Musculoskeletal: Moves all extremities. No gross deformity. Neurological: Alert and orientedx4. Face symmetric. Speech is clear. CN 2-12 intact. 5/5 motor and sensation grossly intact all extremities. No pronator drift. Normal finger to nose, normal heel to shin. Patient appears generally weak without focal deficit  Skin: Warm and dry. No rash. Psychiatric: Normal mood and affect. Behavior is normal.    Procedures      EKG    EKG was reviewed by emergency department physician in the absence of a cardiologist    Narrow complex sinus rhythm, rate 74, normal axis, normal ID and QRS intervals, normal Qtc, no ST elevations or depressions, TWI V2, impression NSR with nonspecific T wave morphology and infrequent PVCs, no STEMI      Radiology  CT HEAD WO CONTRAST   Final Result   1. No acute intracranial hemorrhage or mass effect. 2.  Extensive chronic small vessel ischemic disease.   Suggestion of an old   infarct in the left occipital lobe, similar to the prior study. Small left   cerebellar old lacunar infarct. 3.  Marked prominence of the left greater than right lateral ventricles is   similar to the prior study and may relate to central volume loss and/or   component of normal pressure hydrocephalus in the appropriate clinical setting      RECOMMENDATIONS:   Follow-up brain MRI may be helpful for further evaluation as warranted. CT ABDOMEN PELVIS WO CONTRAST Additional Contrast? None   Preliminary Result   1. No acute process within the abdomen or pelvis, within the limitations of a   noncontrast study. 2. No acute bowel inflammation or obstruction. 3. Mild colonic diverticulosis, without evidence of diverticulitis. 4. Stable mild approximate 3.0 cm fusiform aneurysm of the infrarenal   abdominal aorta, without evidence of leak or rupture. Further follow-up of   this abnormality is as advised below. 5. Stable mildly complicated and partially loculated small left pleural   effusion. RECOMMENDATIONS:   For management of fusiform AAA:      3.0-3.4 cm AAA, recommend follow-up every 3 years. Note: Recommend Vascular consultation if a fusiform AAA enlarges by >0.5 cm   in 6 months or >1 cm in 1 year or for a saccular AAA of any size. References: Victoriano Hernandez Radiol 2013; 45(36):847-326; J Vasc Surg.  2018; 67:2-77             Labs  Results for orders placed or performed during the hospital encounter of 06/07/22   COVID-19 & Influenza Combo    Specimen: Nasopharyngeal Swab   Result Value Ref Range    SARS-CoV-2 RNA, RT PCR NOT DETECTED NOT DETECTED    INFLUENZA A NOT DETECTED NOT DETECTED    INFLUENZA B NOT DETECTED NOT DETECTED   CBC with Auto Differential   Result Value Ref Range    WBC 8.5 4.0 - 11.0 K/uL    RBC 4.06 4.00 - 5.20 M/uL    Hemoglobin 14.3 12.0 - 16.0 g/dL    Hematocrit 41.1 36.0 - 48.0 %    .3 (H) 80.0 - 100.0 fL    MCH 35.2 (H) 26.0 - 34.0 pg    MCHC 34.8 31.0 - 36.0 g/dL    RDW 13.4 12.4 - 15.4 %    Platelets 357 135 - 450 K/uL    MPV 8.4 5.0 - 10.5 fL    Neutrophils % 77.0 %    Lymphocytes % 11.1 %    Monocytes % 10.8 %    Eosinophils % 0.3 %    Basophils % 0.8 %    Neutrophils Absolute 6.6 1.7 - 7.7 K/uL    Lymphocytes Absolute 0.9 (L) 1.0 - 5.1 K/uL    Monocytes Absolute 0.9 0.0 - 1.3 K/uL    Eosinophils Absolute 0.0 0.0 - 0.6 K/uL    Basophils Absolute 0.1 0.0 - 0.2 K/uL   Basic Metabolic Panel w/ Reflex to MG   Result Value Ref Range    Sodium 123 (L) 136 - 145 mmol/L    Potassium reflex Magnesium 3.4 (L) 3.5 - 5.1 mmol/L    Chloride 85 (L) 99 - 110 mmol/L    CO2 25 21 - 32 mmol/L    Anion Gap 13 3 - 16    Glucose 110 (H) 70 - 99 mg/dL    BUN 10 7 - 20 mg/dL    CREATININE 0.6 0.6 - 1.2 mg/dL    GFR Non-African American >60 >60    GFR African American >60 >60    Calcium 9.1 8.3 - 10.6 mg/dL   Hepatic Function Panel   Result Value Ref Range    Total Protein 7.4 6.4 - 8.2 g/dL    Albumin 4.4 3.4 - 5.0 g/dL    Alkaline Phosphatase 46 40 - 129 U/L    ALT 6 (L) 10 - 40 U/L    AST 16 15 - 37 U/L    Total Bilirubin 0.4 0.0 - 1.0 mg/dL    Bilirubin, Direct <0.2 0.0 - 0.3 mg/dL    Bilirubin, Indirect see below 0.0 - 1.0 mg/dL   Lipase   Result Value Ref Range    Lipase 15.0 13.0 - 60.0 U/L   Troponin   Result Value Ref Range    Troponin <0.01 <0.01 ng/mL   Urinalysis with Microscopic   Result Value Ref Range    Color, UA Yellow Straw/Yellow    Clarity, UA Clear Clear    Glucose, Ur Negative Negative mg/dL    Bilirubin Urine Negative Negative    Ketones, Urine Negative Negative mg/dL    Specific Gravity, UA 1.020 1.005 - 1.030    Blood, Urine SMALL (A) Negative    pH, UA 6.0 5.0 - 8.0    Protein,  (A) Negative mg/dL    Urobilinogen, Urine 0.2 <2.0 E.U./dL    Nitrite, Urine Negative Negative    Leukocyte Esterase, Urine SMALL (A) Negative    Microscopic Examination YES     Urine Type Voided     Hyaline Casts, UA 0-2 0 - 2 /LPF    WBC, UA 6-9 0 - 5 /HPF    RBC, UA 3-4 0 - 4 /HPF    Epithelial Cells, UA 0-1 0 - 5 /HPF Bacteria, UA 4+ (A) None Seen /HPF    Urinalysis Comments see below    Blood gas, venous   Result Value Ref Range    pH, Charles 7.438 7.350 - 7.450    pCO2, Charles 40.5 40.0 - 50.0 mmHg    pO2, Charles 124.0 (H) 25.0 - 40.0 mmHg    HCO3, Venous 27.4 23.0 - 29.0 mmol/L    Base Excess, Charles 2.9 -3.0 - 3.0 mmol/L    O2 Sat, Charles 100 Not Established %    Carboxyhemoglobin 8.2 (H) 0.0 - 1.5 %    MetHgb, Charles <0.0 <1.5 %    TC02 (Calc), Charles 64 Not Established mmol/L    O2 Content, Charles 19 Not Established VOL %    O2 Therapy Unknown    Lactate, Sepsis   Result Value Ref Range    Lactic Acid, Sepsis 1.9 0.4 - 1.9 mmol/L   Magnesium   Result Value Ref Range    Magnesium 1.80 1.80 - 2.40 mg/dL   EKG 12 Lead   Result Value Ref Range    Ventricular Rate 74 BPM    Atrial Rate 74 BPM    P-R Interval 150 ms    QRS Duration 80 ms    Q-T Interval 428 ms    QTc Calculation (Bazett) 475 ms    P Axis 75 degrees    R Axis 59 degrees    T Axis 77 degrees    Diagnosis       Sinus rhythm with Premature supraventricular complexes with junctional escape complexesPossible Left atrial enlargementBorderline ECG       Screenings           Is this patient to be included in the SEP-1 Core Measure due to severe sepsis or septic shock? No   Exclusion criteria - the patient is NOT to be included for SEP-1 Core Measure due to: Infection is not suspected      MDM and ED Course    Patient afebrile and nontoxic. No distress. She is alert and protecting her airway. EKG without evidence of acute ischemia, troponin normal, ACS is not evident. No malignant dysrhythmia. Neuro exam nonfocal, NIHSS of 0, nothing to suggest CVA/TIA. Large work-up shows no evidence of acute endorgan dysfunction, there is however significant hyponatremia noted. Patient clinically appears volume depleted, likely hypovolemic hyponatremia from fluid loss from nausea and vomiting.   No peritoneal signs on abdominal exam, CT imaging reveals no evidence of obstruction, perforation or intra-abdominal abscess. Patient reports inability to urinate, upon my CT scan read bladder does appear to be somewhat distended and a Gasca catheter was thus placed for relief. Patient received IV fluids. Given her symptomatic hyponatremia, will require admission for further evaluation and care. Case discussed with internal medicine team who will admit. Patient remained alert, hemodynamically stable and in no distress over her emergency department course. Final Impression  1. Nausea vomiting and diarrhea    2. Hyponatremia    3. Pleural effusion on left    4. Acute urinary retention    5. Abdominal aortic aneurysm (AAA) without rupture (HCC)        Blood pressure (!) 161/92, pulse 73, temperature 97.9 °F (36.6 °C), resp. rate 21, height 5' 7\" (1.702 m), weight 120 lb (54.4 kg), SpO2 96 %, not currently breastfeeding. Disposition:  DISPOSITION Admitted 06/07/2022 11:00:08 PM      Patient Referrals:  No follow-up provider specified. Discharge Medications:  New Prescriptions    No medications on file       Discontinued Medications:  Discontinued Medications    No medications on file       This chart was generated using the [address] [place] [address] [address]  dictation system. I created this record but it may contain dictation errors given the limitations of this technology.     Schuyler Ardon DO (electronically signed)  Attending Emergency Physician       Schuyler Arodn DO  06/08/22 [id number]

## 2022-06-08 NOTE — ED NOTES
Pt report given to [facility] RN.  Tele requested      Chinyere Dobbins RN  06/08/22 [id number]

## 2022-06-08 NOTE — CONSULTS
Nephrology Consult Note  [phone]   [facility]           Reason for Consult : Hyponatremia    HISTORY OF PRESENT ILLNESS:                The patient is a 67 y. o.female with significant past medical history of hyponatremia, CAD, Afib, s/p PPI, CVA, COPD, HTN, DM II, HPL, Hypothyroidism,  Depression, DVT, tobacco use, alcohol use came in with c/o nausea, emesis, diarrhea, generalized weakness and poor po intake.   Labs showed hyponatremia with a sodium of 123, hypokalemia with a K of 3.4, improved to 128 at this time  We are consulted for further evaluation and management of hyponatremia  Prior h/o hyponatremia, presumed to be SIADH  Has been on urea tabs  Denies nausea/emesis at this time    Past Medical History:        Diagnosis Date    Acute DVT (deep venous thrombosis) ([address] 07/03/2015    Acute encephalopathy 04/19/2018    Acute on chronic diastolic heart failure ([address] 03/30/2019    Alcohol abuse     Anxiety     Arthritis     CAD in native artery     Cellulitis 04/28/2018    Cerebral artery occlusion with cerebral infarction ([address]     states hx of multiple mini strokes    Cerebrovascular accident (CVA) ([address]     Chronic fatigue     Complex care coordination     Complicated UTI (urinary tract infection)     COPD (chronic obstructive pulmonary disease) ([address]     COPD exacerbation ([address] 02/20/2018    Depression     Diabetes mellitus ([address]     denies    DIMAS (dyspnea on exertion) 07/03/2015    [facility]     DVT, lower extremity ([address]     Fatigue 11/03/2015    General weakness 11/04/2015    Generalized weakness 08/22/2019    Hypercholesteremia     Hypertension     Hyperthyroidism     Incontinence 10/16/2012    Mammogram abnormal 02/07/2012    Neuromuscular disorder ([address]     Osteopenia 02/14/2017    PA[facility]ox[facility]mal [facility] fibrillation) [facility]     Pneumonia     Recurrent UTI     Right forearm cellulitis     Superficial thrombophlebitis of right upper extremity     Thyroid disease     Tobacco abuse     Tobacco abuse counseling     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 Oskar Melchor; repeat 5 years    EYE SURGERY      cateracts removed    NERVE SURGERY N/A 12/29/2021    Paras Post, FLEXIBLE CYSTOSCOPY performed by Felicia Buerger, MD at [address] [place] [address] ARTHROSCOPY Right 6/18/14    SUBACROMIAL DECOMPRESSION, ROTATOR CUFF REPAIR    STIMULATOR SURGERY N/A 2/9/2022    INTERSTIMULATOR INSERTION STAGE 2 - MEDTRONICS performed by Felicia Buerger, MD at [facility] TOE SURGERY Right     TONSILLECTOMY           Current Medications:    No current facility-administered medications on file prior to encounter.      Current Outpatient Medications on File Prior to Encounter   Medication Sig Dispense Refill    carvedilol (COREG) 12.5 MG tablet TAKE ONE TABLET BY MOUTH TWICE A DAY 60 tablet 0    oxybutynin (DITROPAN) 5 MG tablet TAKE ONE TABLET BY MOUTH THREE TIMES A DAY 90 tablet 0    mirtazapine (REMERON) 15 MG tablet TAKE ONE TABLET BY MOUTH DAILY 30 tablet 0    clonazePAM (KLONOPIN) 1 MG tablet TAKE ONE TABLET BY MOUTH TWICE A DAY AS NEEDED FOR ANXIETY 60 tablet 0    levothyroxine (SYNTHROID) 100 MCG tablet TAKE ONE TABLET BY MOUTH DAILY 90 tablet 0    apixaban (ELIQUIS) 5 MG TABS tablet Take 1 tablet by mouth 2 times daily 180 tablet 1    lisinopril (PRINIVIL;ZESTRIL) 5 MG tablet Take 1 tablet by mouth daily 30 tablet 5    ondansetron (ZOFRAN) 4 MG tablet Take 1 tablet by mouth 3 times daily as needed for Nausea or Vomiting 30 tablet 0    carvedilol (COREG) 25 MG tablet Take 1 tablet by mouth 2 times daily 90 tablet 1    budesonide-formoterol (SYMBICORT) 160-4.5 MCG/ACT AERO Inhale 2 puffs into the lungs 2 times daily 10.2 g 0    albuterol (PROVENTIL) (5 MG/ML) 0.5% nebulizer solution Take 0.5 mLs by nebulization every 6 hours as needed for Wheezing 120 each 0    urea (URE-NA) 15 g PACK packet Take 15 g by mouth daily 30 each 0    cloNIDine (CATAPRES) 0.1 MG tablet Take 1 tablet by mouth 3 times daily 90 tablet 2    aspirin 81 MG chewable tablet Take 1 tablet by mouth daily 30 tablet 0       Allergies:  Lipitor [atorvastatin], Morphine, Keflex [cephalexin], Hctz [hydrochlorothiazide], Norvasc [amlodipine besylate], Penicillins, Tramadol, Hydralazine, and Shellfish-derived products    Social History:    Social History     Socioeconomic History    Marital status:      Spouse name: Not on file    Number of children: Not on file    Years of education: Not on file    Highest education level: Not on file   Occupational History    Not on file   Tobacco Use    Smoking status: Current Every Day Smoker     Packs/day: 1.00     Years: 52.00     Pack years: 52.00     Types: Cigarettes     Last attempt to quit: 7/1/2018     Years since quitting: 3.9    Smokeless tobacco: Never Used    Tobacco comment: started smoking at age [address] / smoked up to 2 p,p,d / now smoking 4 to 8 cigarettes a day,   Vaping Use    Vaping Use: Never used   Substance and Sexual Activity    Alcohol use: No    Drug use: No    Sexual activity: Yes     Partners: Male   Other Topics Concern    Not on file   Social History Narrative    Not on file     Social Determinants of Health     Financial Resource Strain:     Difficulty of Paying Living Expenses: Not on file   Food Insecurity:     Worried About Running Out of Food in the Last Year: Not on file    Ozzy of Food in the Last Year: Not on file   Transportation Needs:     Lack of Transportation (Medical): Not on file    Lack of Transportation (Non-Medical):  Not on file   Physical Activity:     Days of Exercise per Week: Not on file    Minutes of Exercise per Session: Not on file   Stress:     Feeling of Stress : Not on file   Social Connections:     shifts: In: 1000 [IV Piggyback:1000]  Out: 600 [Urine:600]  I/O this shift:  In: 240 [P.O.:240]  Out: 150 [Urine:150]    Physical Exam:  General : AAOx2, elderly white woman,  not in pain or respiratory distress, resting in bed  HEENT : normocephalic, atraumatic, mucosa moist, no palor or icterus  CVS: S1 S2 normal, regular rhythm, no murmurs or rubs. Lungs: Clear, no wheezing or crackles. Abd: Soft, bowel sounds normal, non-tender. Ext: No edema, no cyanosis  Skin: Warm. No rashes appreciated. Poor skin turgor  : bladder non-distended, no tenderness over the bladder  Neuro:  nonfocal.  Joints: No erythema noted over joints.       DATA:    CBC with Differential:    Lab Results   Component Value Date    WBC 8.4 06/08/2022    RBC 3.81 06/08/2022    HGB 13.5 06/08/2022    HCT 38.9 06/08/2022     06/08/2022    .2 06/08/2022    MCH 35.3 06/08/2022    MCHC 34.6 06/08/2022    RDW 13.5 06/08/2022    SEGSPCT 79.1 04/19/2016    BANDSPCT 1 04/28/2018    LYMPHOPCT 15.0 06/08/2022    MONOPCT 14.5 06/08/2022    EOSPCT 1.3 05/08/2011    BASOPCT 0.8 06/08/2022    MONOSABS 1.2 06/08/2022    LYMPHSABS 1.3 06/08/2022    EOSABS 0.1 06/08/2022    BASOSABS 0.1 06/08/2022    DIFFTYPE Auto 05/02/2013     BMP:    Lab Results   Component Value Date     06/08/2022    K 4.3 06/08/2022    K 3.4 06/07/2022    CL 92 06/08/2022    CO2 25 06/08/2022    BUN 10 06/08/2022    LABALBU 3.8 06/08/2022    CREATININE 0.6 06/08/2022    CALCIUM 9.0 06/08/2022    GFRAA >60 06/08/2022    GFRAA >60 05/02/2013    LABGLOM >60 06/08/2022    LABGLOM 79 12/11/2017    GLUCOSE 91 06/08/2022     Ionized Calcium:  No results found for: IONCA  Magnesium:    Lab Results   Component Value Date    MG 1.80 06/07/2022     Phosphorus:    Lab Results   Component Value Date    PHOS 4.4 04/15/2021     Troponin:    Lab Results   Component Value Date    TROPONINI <0.01 06/08/2022     Last 3 Troponin:    Lab Results   Component Value Date    TROPONINI <0.01 06/08/2022    TROPONINI <0.01 06/07/2022    TROPONINI <0.01 01/23/2022     U/A:    Lab Results   Component Value Date    NITRITE Negative 07/15/2021    COLORU Yellow 06/07/2022    PROTEINU 100 06/07/2022    PHUR 6.0 06/07/2022    WBCUA 6-9 06/07/2022    RBCUA 3-4 06/07/2022    YEAST 0 07/13/2020    YEAST Present 09/11/2019    BACTERIA 4+ 06/07/2022    CLARITYU Clear 06/07/2022    SPECGRAV 1.020 06/07/2022    LEUKOCYTESUR SMALL 06/07/2022    UROBILINOGEN 0.2 06/07/2022    BILIRUBINUR Negative 06/07/2022    BILIRUBINUR 0 07/15/2021    BILIRUBINUR NEGATIVE 05/08/2011    BLOODU SMALL 06/07/2022    GLUCOSEU Negative 06/07/2022    GLUCOSEU NEGATIVE 05/08/2011     ABG:  No results found for: PH, PCO2, PO2, HCO3, BE, THGB, TCO2, O2SAT    ASSESSMENT/PLAN:      1. Hyponatremia chronic  Presumed SIADH  Could be multifactorial secondary to volume depletion/poor solute intake and relative excess fluid intake  Check urine studies, pending  Resume urea tabs  May need salt tabs  2. Hypokalemia improved  Mag levels normal  3. Hypertension fairly controlled  Continue same meds    Thank you for allowing me to participate in the care of this patient. I will continue to follow along. Please call with questions.     Deep Burrell MD, MD.  Office Phone : [phone]  6/8/2022

## 2022-06-08 NOTE — CARE COORDINATION
Discharge Planning Assessment  Risk of Readmission Score: 12%    RN discharge planner met with patient to discuss reason for admission, current living situation, and potential needs at the time of discharge. Demographics/Insurance verified Yes    Current type of dwelling: Bi-Level home with one step to enter and 6-7 steps to change levels. Patient from ECF/ confirmed with: N/A    Living arrangements: spouse    Level of function/Support: The patient uses a walker to ambulate, independent with ADL's. PCP: Sheree Frey    Last Visit to PCP: 02/02/2022    DME: walker, tub/shower with a seat    Active with any community resources/agencies/skilled home care:    [address] - 01/26/2022    [facility] - 09/11/2019, 09/01/2021    Medication compliance issues: The patient is compliant with her medications. Financial issues that could impact healthcare: None    Tentative discharge plan: the current discharge plan is for the patient to return home.     Transportation at the time of discharge: [address] RN    [facility]  Phone: [phone]

## 2022-06-08 NOTE — PLAN OF CARE
Problem: Discharge Planning  Goal: Discharge to home or other facility with appropriate resources  Outcome: Progressing  Flowsheets (Taken 6/8/2022 0206 by Nneka Mac RN)  Discharge to home or other facility with appropriate resources:   Refer to discharge planning if patient needs post-hospital services based on physician order or complex needs related to functional status, cognitive ability or social support system   Identify discharge learning needs (meds, wound care, etc)     Problem: Safety - Adult  Goal: Free from fall injury  Outcome: Progressing

## 2022-06-08 NOTE — H&P
She      Hospital Medicine History & Physical      PCP: QUITA Meade - [place]    Date of Admission: 6/7/2022    Date of Service: Pt seen/examined on 6/7/2022    Pt seen/examined face to face on and admitted as inpatient with expected LOS greater than two midnights due to medical therapy    Chief Complaint:    Chief Complaint   Patient presents with    Emesis     pt [facility] by [facility] angeloanthony from home. pt states that she has been having N/V/D for the past 3 days. pt states that she has a [employer] bladder control device and hasnt been able to urinate in 3 days. squad gave 4 mg IV zofran and pt states feels a little better. pt states hasnt ate in 3 days and only has drank water. pt states last time vomiting was 3 hours ago         History Of Present Illness:      67 y.o. female who presented to [facility] with past medical history of CAD, anxiety, COPD, type 2 diabetes, depression, hypothyroidism, hypertension, hyperlipidemia, presented to the ED due to weakness fatigue. Patient reports that she normally lives independently at home with her  and daughter. Patient is able to ambulate with a walker. Patient reported recently having some intermittent confusion and worsening fatigue no known alleviating or exacerbating factor. Patient also has been having some nausea vomiting and diarrhea reports more than 3 times intermittently watery with no recent antibiotic or hospitalization. Patient reports that she has been not eating any food and just remains drinking water for hydration. Patient otherwise denies any chest pain abdominal pain or dysuria.       Past Medical History:          Diagnosis Date    [facility] 07/03/2015    Acute encephalopathy 04/19/2018    Acute on chronic diastolic heart failure ([facility]) 03/30/2019    Alcohol abuse     Anxiety     Arthritis     CAD in native artery     Cellulitis 04/28/2018    Cerebral artery occlusion with cerebral infarction [facility])     states hx of multiple mini strokes    [facility]     Chronic fatigue     Complex care coordination     Complicated UTI (urinary tract infection)     COPD [facility] disease) [facility]     COPD exacerbation ([facility] 02/20/2018    Depression     Diabetes mellitus ([facility]     denies    DIMAS (dyspnea on exertion) 07/03/2015    [facility]     DVT, lower extremity ([facility])     Fatigue 11/03/2015    General weakness 11/04/2015    Generalized weakness 08/22/2019    Hypercholesteremia     Hypertension     Hyperthyroidism     Incontinence 10/16/2012    Mammogram abnormal 02/07/2012    Neuromuscular disorder ([facility]     Osteopenia 02/14/2017    PAF (paroxysmal atrial fibrillation) ([facility])     Pneumonia     Recurrent UTI     Right forearm cellulitis     Superficial thrombophlebitis of right upper extremity     Thyroid disease     Tobacco abuse     Tobacco abuse counseling     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 Rizo No; repeat 5 years    EYE SURGERY      cateracts removed    NERVE SURGERY N/A 12/29/2021    Pilar Perez, FLEXIBLE CYSTOSCOPY performed by Perri Bianchi MD at [address] [place] [address] ARTHROSCOPY Right 6/18/14    SUBACROMIAL DECOMPRESSION, ROTATOR CUFF REPAIR    STIMULATOR SURGERY N/A 2/9/2022    INTERSTIMULATOR INSERTION STAGE 2 - MEDTRONICS performed by Perri Bianchi MD at [facility] TOE SURGERY Right     TONSILLECTOMY         Medications Prior to Admission:      Prior to Admission medications    Medication Sig Start Date End Date Taking?  Authorizing Provider   carvedilol (COREG) 12.5 MG tablet TAKE ONE TABLET BY MOUTH TWICE A DAY 5/24/22   Sherrill Cross APRN - NP   oxybutynin (DITROPAN) 5 MG tablet TAKE ONE TABLET BY MOUTH THREE TIMES A DAY 5/24/22   QUITA Champion NP   mirtazapine (REMERON) 15 MG tablet TAKE ONE TABLET BY MOUTH DAILY 5/24/22   QUITA Encinas - NP   clonazePAM (KLONOPIN) 1 MG tablet TAKE ONE TABLET BY MOUTH TWICE A DAY AS NEEDED FOR ANXIETY 5/2/22 6/2/22  QUITA Koenig - NP   levothyroxine (SYNTHROID) 100 MCG tablet TAKE ONE TABLET BY MOUTH DAILY 4/26/22   QUITA Encinas NP   apixaban (ELIQUIS) 5 MG TABS tablet Take 1 tablet by mouth 2 times daily 3/2/22   Mamie Yadav MD   lisinopril (PRINIVIL;ZESTRIL) 5 MG tablet Take 1 tablet by mouth daily 2/22/22   Mamie Yadav MD   ondansetron (ZOFRAN) 4 MG tablet Take 1 tablet by mouth 3 times daily as needed for Nausea or Vomiting 2/2/22   QUITA Champion NP   carvedilol (COREG) 25 MG tablet Take 1 tablet by mouth 2 times daily 2/2/22   QUITA Champion NP   budesonide-formoterol (SYMBICORT) 160-4.5 MCG/ACT AERO Inhale 2 puffs into the lungs 2 times daily 1/26/22   Emelia Kanner, MD   albuterol (PROVENTIL) (5 MG/ML) 0.5% nebulizer solution Take 0.5 mLs by nebulization every 6 hours as needed for Wheezing 1/26/22   Emelia Kanner, MD   urea (URE-NA) 15 g PACK packet Take 15 g by mouth daily 1/27/22   Emelia Kanner, MD   cloNIDine (CATAPRES) 0.1 MG tablet Take 1 tablet by mouth 3 times daily 10/26/21 2/9/22  QUITA Champion NP   aspirin 81 MG chewable tablet Take 1 tablet by mouth daily 4/16/21   Jt Church MD       Allergies:  Lipitor [atorvastatin], Morphine, Keflex [cephalexin], Hctz [hydrochlorothiazide], Norvasc [amlodipine besylate], Penicillins, Tramadol, Hydralazine, and Shellfish-derived products    Social History:          TOBACCO:   reports that she has been smoking cigarettes. She has a 52.00 pack-year smoking history. She has never used smokeless tobacco.  ETOH:   reports no history of alcohol use.   E-Cigarettes/Vaping Use Questions Responses    E-Cigarette/Vaping Use Never User    Start Date     Passive Exposure     Quit Date     Counseling Given     Comments             Family History:      Reviewed in detail, and noncontributory        Problem Relation Age of Onset    Heart Disease Father         MI @ 64    Alcohol Abuse Father        REVIEW OF SYSTEMS:   Limited accuracy as patient is encephalopathic    Constitutional:  No Fever, No Chills, No Night Sweats  ENT/Mouth:  No Nasal Congestion,  No Hoarseness, No new mouth lesion  Eyes:  No Eye Pain, No Redness, No Discharge  Cardiovascular:  No Chest Pain, No Orthopnea, No Palpitations  Respiratory:  No Cough, No Sputum, No Dyspnea  Gastrointestinal: No Vomiting, No Diarrhea, No abdominal pain  Genitourinary: No Urinary Frequency, No Hematuria, No Urinary pain  Musculoskeletal:  No worsening Arthralgias, No worsening Myalgias  Skin:  No new Skin Lesions, No new skin rash  Neuro: + Weakness, No new numbness. Psych:  No suicial ideation, No Violence ideation    PHYSICAL EXAM PERFORMED:    BP (!) 161/92   Pulse 73   Temp 97.9 °F (36.6 °C)   Resp 21   Ht 5' 7\" (1.702 m)   Wt 120 lb (54.4 kg)   SpO2 96%   BMI 18.79 kg/m²     General appearance:  mild acute distress, appears older than stated age  HEENT:   atraumatic, sclera anicteric, Conjunctivae clear. Neck: Supple,Trachea midline, no goiter  Respiratory:minimal accessory muscle usage, Normal respiratory effort. Clear to auscultation, bilaterally without wheezing  Cardiovascular:  Regular rate and rhythm, capillary refill 2 seconds  Abdomen: Soft, non-tender, non-distended with normal bowel sounds. Musculoskeletal:  No clubbing, cyanosis. trace edema LE bilaterally. Skin: turgor normal.  No new rashes or lesions. Neurologic: Alert and oriented x2, no new focal sensory/motor deficits.      Labs:     Recent Labs     06/07/22 2033   WBC 8.5   HGB 14.3   HCT 41.1        Recent Labs     06/07/22 2033   *   K 3.4* CL 85*   CO2 25   BUN 10   CREATININE 0.6   CALCIUM 9.1     Recent Labs     06/07/22 2033   AST 16   ALT 6*   BILIDIR <0.2   BILITOT 0.4   ALKPHOS 46     No results for input(s): INR in the last 72 hours. Recent Labs     06/07/22 2033   TROPONINI <0.01       Urinalysis:      Lab Results   Component Value Date    NITRU Negative 01/23/2022    WBCUA 2 01/23/2022    BACTERIA 4+ 09/01/2021    RBCUA 1 01/23/2022    BLOODU Negative 01/23/2022    SPECGRAV 1.005 01/23/2022    GLUCOSEU Negative 01/23/2022    GLUCOSEU NEGATIVE 05/08/2011       Radiology:     CXR: I have reviewed the CXR with the following interpretation:   Pending  EKG:  I have reviewed the EKG with the following interpretation:   Normal sinus rhythm QTc 475  CT ABDOMEN PELVIS WO CONTRAST Additional Contrast? None   Preliminary Result   1. No acute process within the abdomen or pelvis, within the limitations of a   noncontrast study. 2. No acute bowel inflammation or obstruction. 3. Mild colonic diverticulosis, without evidence of diverticulitis. 4. Stable mild approximate 3.0 cm fusiform aneurysm of the infrarenal   abdominal aorta, without evidence of leak or rupture. Further follow-up of   this abnormality is as advised below. 5. Stable mildly complicated and partially loculated small left pleural   effusion. RECOMMENDATIONS:   For management of fusiform AAA:      3.0-3.4 cm AAA, recommend follow-up every 3 years. Note: Recommend Vascular consultation if a fusiform AAA enlarges by >0.5 cm   in 6 months or >1 cm in 1 year or for a saccular AAA of any size. References: Doren Barrier Radiol 2013; 52(82):990-979; J Vasc Surg.  2018; 67:2-77         CT HEAD WO CONTRAST    (Results Pending)       ASSESSMENT AND PLAN:    Active Hospital Problems    Diagnosis Date Noted    Acute hyponatremia [E87.1] 06/07/2022     Priority: Medium     Acute encephalopathy:  Secondary to hyponatremia  CT head showing old infarct and concerning findings of NPH  Neurology consulted, much appreciated    Acute hypovolemic hyponatremia:  Urine lites pending  Fluids received in the ED  Recheck BMP every 4 hours  Neurochecks every 4 hours    Hypokalemia, replaced    Chronic medical conditions:    Stable mildly complicated left pleural effusion:  Evident on the findings of the CT, on impression  This was unchanged from 01/23/2022  No respiratory symptoms at this time  Outpatient follow-up    3.0 cm fusiform aneurysm in the infrarenal:  Stable, with no current abdominal pain  Outpatient follow-up  If enlarges >0.5 cm in 6 months or>1 cm in 1 year- patient will need vascular consultation    Anxiety: Continue home medication  Type 2 Diabetes: Insulin sliding scale  COPD, not in acute exacerbation, continue inhalers  Depression: continue home medications  Essential Hypertension: Continue home medication  Hyperlipidemia: Controlled on home Statin. Outpatient PCP follow up post-discharge  Hypothyroidism: Continue home medication  Paroxysmal Atrial fibrilliation: unspecified and clinically unable to determine etiology. Controlled on home medication.   currently Anticoagulated and Monitored on tele      Diet: Advance as tolerated to cardiac diabetic diet    DVT Prophylaxis: Eliquis    Dispo:   Expected LOS greater than two Linda 1153, DO

## 2022-06-09 LAB
ANION GAP SERPL CALCULATED.3IONS-SCNC: 12 MMOL/L (ref 3–16)
ANION GAP SERPL CALCULATED.3IONS-SCNC: 14 MMOL/L (ref 3–16)
ANION GAP SERPL CALCULATED.3IONS-SCNC: 8 MMOL/L (ref 3–16)
ANION GAP SERPL CALCULATED.3IONS-SCNC: 8 MMOL/L (ref 3–16)
BASOPHILS ABSOLUTE: 0.1 K/UL (ref 0–0.2)
BASOPHILS RELATIVE PERCENT: 1.2 %
BUN BLDV-MCNC: 23 MG/DL (ref 7–20)
BUN BLDV-MCNC: 23 MG/DL (ref 7–20)
BUN BLDV-MCNC: 33 MG/DL (ref 7–20)
BUN BLDV-MCNC: 36 MG/DL (ref 7–20)
CALCIUM SERPL-MCNC: 8.8 MG/DL (ref 8.3–10.6)
CALCIUM SERPL-MCNC: 9.2 MG/DL (ref 8.3–10.6)
CHLORIDE BLD-SCNC: 89 MMOL/L (ref 99–110)
CHLORIDE BLD-SCNC: 91 MMOL/L (ref 99–110)
CHLORIDE BLD-SCNC: 91 MMOL/L (ref 99–110)
CHLORIDE BLD-SCNC: 93 MMOL/L (ref 99–110)
CO2: 19 MMOL/L (ref 21–32)
CO2: 26 MMOL/L (ref 21–32)
CO2: 28 MMOL/L (ref 21–32)
CO2: 30 MMOL/L (ref 21–32)
CREAT SERPL-MCNC: 0.5 MG/DL (ref 0.6–1.2)
CREAT SERPL-MCNC: 0.9 MG/DL (ref 0.6–1.2)
CREAT SERPL-MCNC: <0.5 MG/DL (ref 0.6–1.2)
CREAT SERPL-MCNC: <0.5 MG/DL (ref 0.6–1.2)
EOSINOPHILS ABSOLUTE: 0.1 K/UL (ref 0–0.6)
EOSINOPHILS RELATIVE PERCENT: 2 %
GFR AFRICAN AMERICAN: >60
GFR NON-AFRICAN AMERICAN: >60
GLUCOSE BLD-MCNC: 105 MG/DL (ref 70–99)
GLUCOSE BLD-MCNC: 73 MG/DL (ref 70–99)
GLUCOSE BLD-MCNC: 89 MG/DL (ref 70–99)
GLUCOSE BLD-MCNC: 96 MG/DL (ref 70–99)
HCT VFR BLD CALC: 40.1 % (ref 36–48)
HEMOGLOBIN: 13.7 G/DL (ref 12–16)
LYMPHOCYTES ABSOLUTE: 1 K/UL (ref 1–5.1)
LYMPHOCYTES RELATIVE PERCENT: 16.8 %
MCH RBC QN AUTO: 35.3 PG (ref 26–34)
MCHC RBC AUTO-ENTMCNC: 34.2 G/DL (ref 31–36)
MCV RBC AUTO: 103.1 FL (ref 80–100)
MONOCYTES ABSOLUTE: 0.9 K/UL (ref 0–1.3)
MONOCYTES RELATIVE PERCENT: 15.3 %
NEUTROPHILS ABSOLUTE: 3.9 K/UL (ref 1.7–7.7)
NEUTROPHILS RELATIVE PERCENT: 64.7 %
PDW BLD-RTO: 13.7 % (ref 12.4–15.4)
PLATELET # BLD: 310 K/UL (ref 135–450)
PMV BLD AUTO: 8.7 FL (ref 5–10.5)
POTASSIUM SERPL-SCNC: 4 MMOL/L (ref 3.5–5.1)
POTASSIUM SERPL-SCNC: 4 MMOL/L (ref 3.5–5.1)
POTASSIUM SERPL-SCNC: 4.3 MMOL/L (ref 3.5–5.1)
POTASSIUM SERPL-SCNC: 4.7 MMOL/L (ref 3.5–5.1)
RBC # BLD: 3.89 M/UL (ref 4–5.2)
SODIUM BLD-SCNC: 126 MMOL/L (ref 136–145)
SODIUM BLD-SCNC: 127 MMOL/L (ref 136–145)
SODIUM BLD-SCNC: 127 MMOL/L (ref 136–145)
SODIUM BLD-SCNC: 129 MMOL/L (ref 136–145)
WBC # BLD: 6.1 K/UL (ref 4–11)

## 2022-06-09 PROCEDURE — 6370000000 HC RX 637 (ALT 250 FOR IP): Performed by: NURSE PRACTITIONER

## 2022-06-09 PROCEDURE — 6370000000 HC RX 637 (ALT 250 FOR IP): Performed by: INTERNAL MEDICINE

## 2022-06-09 PROCEDURE — 80048 BASIC METABOLIC PNL TOTAL CA: CPT

## 2022-06-09 PROCEDURE — 1200000000 HC SEMI PRIVATE

## 2022-06-09 PROCEDURE — 36415 COLL VENOUS BLD VENIPUNCTURE: CPT

## 2022-06-09 PROCEDURE — 94760 N-INVAS EAR/PLS OXIMETRY 1: CPT

## 2022-06-09 PROCEDURE — 85025 COMPLETE CBC W/AUTO DIFF WBC: CPT

## 2022-06-09 PROCEDURE — 2580000003 HC RX 258: Performed by: INTERNAL MEDICINE

## 2022-06-09 RX ORDER — SODIUM CHLORIDE 1000 MG
1 TABLET, SOLUBLE MISCELLANEOUS DAILY
Status: DISCONTINUED | OUTPATIENT
Start: 2022-06-09 | End: 2022-06-10 | Stop reason: HOSPADM

## 2022-06-09 RX ADMIN — SODIUM CHLORIDE TAB 1 GM 1 G: 1 TAB at 11:45

## 2022-06-09 RX ADMIN — CLONAZEPAM 1 MG: 1 TABLET ORAL at 20:16

## 2022-06-09 RX ADMIN — ASPIRIN 81 MG 81 MG: 81 TABLET ORAL at 09:35

## 2022-06-09 RX ADMIN — CARVEDILOL 12.5 MG: 6.25 TABLET, FILM COATED ORAL at 17:05

## 2022-06-09 RX ADMIN — CARVEDILOL 12.5 MG: 6.25 TABLET, FILM COATED ORAL at 09:19

## 2022-06-09 RX ADMIN — CLONAZEPAM 1 MG: 1 TABLET ORAL at 11:46

## 2022-06-09 RX ADMIN — ACETAMINOPHEN 650 MG: 325 TABLET ORAL at 11:42

## 2022-06-09 RX ADMIN — SODIUM CHLORIDE, PRESERVATIVE FREE 10 ML: 5 INJECTION INTRAVENOUS at 20:17

## 2022-06-09 RX ADMIN — OXYBUTYNIN CHLORIDE 5 MG: 5 TABLET ORAL at 14:26

## 2022-06-09 RX ADMIN — OXYBUTYNIN CHLORIDE 5 MG: 5 TABLET ORAL at 09:22

## 2022-06-09 RX ADMIN — LEVOTHYROXINE SODIUM 100 MCG: 0.1 TABLET ORAL at 05:23

## 2022-06-09 RX ADMIN — MIRTAZAPINE 15 MG: 15 TABLET, FILM COATED ORAL at 09:22

## 2022-06-09 RX ADMIN — APIXABAN 5 MG: 5 TABLET, FILM COATED ORAL at 09:35

## 2022-06-09 RX ADMIN — APIXABAN 5 MG: 5 TABLET, FILM COATED ORAL at 20:16

## 2022-06-09 RX ADMIN — LISINOPRIL 5 MG: 10 TABLET ORAL at 09:19

## 2022-06-09 RX ADMIN — OXYBUTYNIN CHLORIDE 5 MG: 5 TABLET ORAL at 20:16

## 2022-06-09 RX ADMIN — Medication 15 G: at 09:23

## 2022-06-09 ASSESSMENT — PAIN SCALES - GENERAL
PAINLEVEL_OUTOF10: 0
PAINLEVEL_OUTOF10: 0
PAINLEVEL_OUTOF10: 6

## 2022-06-09 ASSESSMENT — PAIN DESCRIPTION - ORIENTATION: ORIENTATION: MID

## 2022-06-09 ASSESSMENT — PAIN DESCRIPTION - LOCATION: LOCATION: NECK

## 2022-06-09 ASSESSMENT — PAIN DESCRIPTION - DESCRIPTORS: DESCRIPTORS: ACHING

## 2022-06-09 NOTE — PROGRESS NOTES
Hospitalist Progress Note      PCP: QUITA Naylor - NP    Date of Admission: 6/7/2022    Chief Complaint: Emesis, nausea, diarrhea    Hospital Course: diabetes, depression, hypothyroidism, hypertension, hyperlipidemia, presented to the ED due to weakness fatigue.      Patient reports that she normally lives independently at home with her  and daughter. Patient is able to ambulate with a walker. Patient reported recently having some intermittent confusion and worsening fatigue no known alleviating or exacerbating factor. Patient also has been having some nausea vomiting and diarrhea reports more than 3 times intermittently watery with no recent antibiotic or hospitalization. Patient reports that she has been not eating any food and just remains drinking water for hydration. Patient otherwise denies any chest pain abdominal pain or dysuria. Subjective: Patient laying in bed, states the diarrhea has stopped. Reports the nausea vomiting is much improved as well. Tolerating food. Feels weak, awaiting PT/OT. Denies any abdominal pain. Denies shortness of breath chest pain palpitation headache lightheadedness dizziness. Reviewed plan of care, denies further needs or questions. 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  -Neurology recommended outpatient follow-up for normal pressure hydrocephalus. We will send referral.  -Encephalopathy has resolved, she is alert oriented person place and time    Acute hypervolemic hyponatremia  -Nephrology consulted-presume SIADH. Stop urea and fluid restriction, continue salt tabs. -Continue IV fluids  -Neurochecks  -BMP every 4 hours  -Sodium 126 on admission, improved to 129    Hypokalemia  -Replacement given    Abnormal urinalysis  -UA showed 4+ bacteria.   Patient has no or gallops. Abdomen: Soft, non-tender, non-distended with normal bowel sounds. Musculoskeletal: No clubbing, cyanosis or edema bilaterally. Full range of motion without deformity. Generalized weakness. Skin: Skin color, texture, turgor normal.  No rashes or lesions. Neurologic:  Neurovascularly intact without any focal sensory/motor deficits. Cranial nerves: II-XII intact, grossly non-focal.  Psychiatric: Alert and oriented, thought content appropriate, normal insight  Capillary Refill: Brisk,< 3 seconds   Peripheral Pulses: +2 palpable, equal bilaterally       Labs:   Recent Labs     06/07/22 2033 06/08/22  0510 06/09/22  0501   WBC 8.5 8.4 6.1   HGB 14.3 13.5 13.7   HCT 41.1 38.9 40.1    318 310     Recent Labs     06/08/22  2311 06/09/22  0501 06/09/22  1105   * 129* 126*   K 4.0 4.0 4.7   CL 91* 91* 93*   CO2 28 26 19*   BUN 23* 23* 36*   CREATININE <0.5* 0.5* <0.5*   CALCIUM 8.8 8.8 9.2     Recent Labs     06/07/22 2033 06/08/22  0510   AST 16 13*   ALT 6* 6*   BILIDIR <0.2 <0.2   BILITOT 0.4 0.4   ALKPHOS 46 41     No results for input(s): INR in the last 72 hours. Recent Labs     06/07/22 2033 06/08/22  0510   TROPONINI <0.01 <0.01       Urinalysis:      Lab Results   Component Value Date    NITRU Negative 06/07/2022    WBCUA 6-9 06/07/2022    BACTERIA 4+ 06/07/2022    RBCUA 3-4 06/07/2022    BLOODU SMALL 06/07/2022    SPECGRAV 1.020 06/07/2022    GLUCOSEU Negative 06/07/2022    GLUCOSEU NEGATIVE 05/08/2011       Radiology:  CT HEAD WO CONTRAST   Final Result   1. No acute intracranial hemorrhage or mass effect. 2.  Extensive chronic small vessel ischemic disease. Suggestion of an old   infarct in the left occipital lobe, similar to the prior study. Small left   cerebellar old lacunar infarct.       3.  Marked prominence of the left greater than right lateral ventricles is   similar to the prior study and may relate to central volume loss and/or   component of normal pressure hydrocephalus in the appropriate clinical setting      RECOMMENDATIONS:   Follow-up brain MRI may be helpful for further evaluation as warranted. CT ABDOMEN PELVIS WO CONTRAST Additional Contrast? None   Final Result   1. No acute process within the abdomen or pelvis, within the limitations of a   noncontrast study. 2. No acute bowel inflammation or obstruction. 3. Mild colonic diverticulosis, without evidence of diverticulitis. 4. Stable mild approximate 3.0 cm fusiform aneurysm of the infrarenal   abdominal aorta, without evidence of leak or rupture. Further follow-up of   this abnormality is as advised below. 5. Stable mildly complicated and partially loculated small left pleural   effusion. RECOMMENDATIONS:   For management of fusiform AAA:      3.0-3.4 cm AAA, recommend follow-up every 3 years. Note: Recommend Vascular consultation if a fusiform AAA enlarges by >0.5 cm   in 6 months or >1 cm in 1 year or for a saccular AAA of any size. References: Meenakshi Deal Radiol 2013; 03(77):093-303; J Vasc Surg. 2018; 67:2-77                 DVT Prophylaxis: Eliquis  Diet: ADULT DIET; Regular; 3 carb choices (45 gm/meal); Low Fat/Low Chol/High Fiber/2 gm Na; Low Sodium (2 gm); 1200 ml  Code Status: Full Code    PT/OT Eval Status: Eval ordered    Dispo -home once medically stable    QUITA Aquino - CNP      NOTE:  This report was transcribed using voice recognition software. Every effort was made to ensure accuracy; however, inadvertent computerized transcription errors may be present.

## 2022-06-09 NOTE — PLAN OF CARE
Problem: Discharge Planning  Goal: Discharge to home or other facility with appropriate resources  6/9/2022 0832 by Erasmo Mendez RN  Outcome: Progressing  6/8/2022 2223 by Eryn Hinton RN  Outcome: Progressing     Problem: Safety - Adult  Goal: Free from fall injury  6/9/2022 0832 by Erasmo Mendez RN  Outcome: Progressing  6/8/2022 2223 by Eryn Hinton RN  Outcome: Progressing     Problem: ABCDS Injury Assessment  Goal: Absence of physical injury  6/9/2022 0832 by Erasmo Mendez RN  Outcome: Progressing  6/8/2022 2223 by Eryn Hinton RN  Outcome: Progressing     Problem: Chronic Conditions and Co-morbidities  Goal: Patient's chronic conditions and co-morbidity symptoms are monitored and maintained or improved  6/9/2022 0832 by Erasmo Mendez RN  Outcome: Progressing  6/8/2022 2223 by Eryn Hinton RN  Outcome: Progressing     Problem: Pain  Goal: Verbalizes/displays adequate comfort level or baseline comfort level  6/9/2022 0832 by Erasmo Mendez RN  Outcome: Progressing  6/8/2022 2223 by Eryn Hinton RN  Outcome: Progressing

## 2022-06-09 NOTE — PROGRESS NOTES
Shift assessment completed, pt is alert and oriented, VSS, fall precautions in place, call light within reach, denies any needs at this time, see flowsheets and MAR, will monitor pt. The care plan and education has been reviewed and mutually agreed upon with the patient.

## 2022-06-09 NOTE — PLAN OF CARE
Problem: Discharge Planning  Goal: Discharge to home or other facility with appropriate resources  6/8/2022 2223 by Eryn Hinton RN  Outcome: Progressing     Problem: Safety - Adult  Goal: Free from fall injury  6/8/2022 2223 by Eryn Hinton RN  Outcome: Progressing     Problem: ABCDS Injury Assessment  Goal: Absence of physical injury  Outcome: Progressing     Problem: Chronic Conditions and Co-morbidities  Goal: Patient's chronic conditions and co-morbidity symptoms are monitored and maintained or improved  Outcome: Progressing     Problem: Pain  Goal: Verbalizes/displays adequate comfort level or baseline comfort level  Outcome: Progressing

## 2022-06-09 NOTE — PROGRESS NOTES
Nephrology progress Note  [phone]  [phone]   [facility]. [place]       Patient:  Neha Davis   : 1945    Brief HPI    The patient is a 67 y. o.female with significant past medical history of hyponatremia, CAD, Afib, s/p PPI, CVA, COPD, HTN, DM II, HPL, Hypothyroidism,  Depression, DVT, tobacco use, alcohol use came in with c/o nausea, emesis, diarrhea, generalized weakness and poor po intake. Labs showed hyponatremia with a sodium of 123, hypokalemia with a K of 3.4, improved to 128 at this time  We are consulted for further evaluation and management of hyponatremia  Prior h/o hyponatremia, presumed to be SIADH  Has been on urea tabs  Denies nausea/emesis at this time    Subjective/Interval history    Pt seen and examined  Hyponatremia better  BPs uncontrolled  Has been afebrile  On room air  No fevers/chills    Review of Systems   No abdominal pain or nausea/emesis    SHx:  No visitors at the bed-side    Meds:  Scheduled Meds:   urea  15 g Oral Daily    acetaminophen  650 mg Oral Once    sodium chloride flush  10 mL IntraVENous 2 times per day    apixaban  5 mg Oral BID    aspirin  81 mg Oral Daily    carvedilol  12.5 mg Oral BID WC    levothyroxine  100 mcg Oral Daily    lisinopril  5 mg Oral Daily    mirtazapine  15 mg Oral Daily    oxybutynin  5 mg Oral TID     Continuous Infusions:   sodium chloride       PRN Meds:.clonazePAM, sodium chloride flush, sodium chloride, potassium chloride, magnesium sulfate, promethazine **OR** ondansetron, acetaminophen **OR** acetaminophen      Vitals:  BP (!) 174/88   Pulse 59   Temp 97.4 °F (36.3 °C) (Oral)   Resp 16   Ht 5' 7\" (1.702 m)   Wt 120 lb (54.4 kg)   SpO2 96%   BMI 18.79 kg/m²       Physical Exam  General : AAOx3, not in pain or respiratory distress, resting in bed  HEENT : mucosa moist.  CVS: S1 S2 normal, regular rhythm, no murmurs or rubs. Lungs: Clear, no wheezing or crackles. Abd: Soft, bowel sounds normal, non-tender.   Ext: No edema, no cyanosis  Skin: Warm. No rashes appreciated.   : bladder non-distended, no tenderness over the bladder      Labs:  CBC with Differential:    Lab Results   Component Value Date    WBC 6.1 06/09/2022    RBC 3.89 06/09/2022    HGB 13.7 06/09/2022    HCT 40.1 06/09/2022     06/09/2022    .1 06/09/2022    MCH 35.3 06/09/2022    MCHC 34.2 06/09/2022    RDW 13.7 06/09/2022    SEGSPCT 79.1 04/19/2016    BANDSPCT 1 04/28/2018    LYMPHOPCT 16.8 06/09/2022    MONOPCT 15.3 06/09/2022    EOSPCT 1.3 05/08/2011    BASOPCT 1.2 06/09/2022    MONOSABS 0.9 06/09/2022    LYMPHSABS 1.0 06/09/2022    EOSABS 0.1 06/09/2022    BASOSABS 0.1 06/09/2022    DIFFTYPE Auto 05/02/2013     BMP:    Lab Results   Component Value Date     06/09/2022    K 4.0 06/09/2022    K 3.4 06/07/2022    CL 91 06/09/2022    CO2 26 06/09/2022    BUN 23 06/09/2022    LABALBU 3.8 06/08/2022    CREATININE 0.5 06/09/2022    CALCIUM 8.8 06/09/2022    GFRAA >60 06/09/2022    GFRAA >60 05/02/2013    LABGLOM >60 06/09/2022    LABGLOM 79 12/11/2017    GLUCOSE 73 06/09/2022     Ionized Calcium:  No results found for: IONCA  Magnesium:    Lab Results   Component Value Date    MG 1.80 06/07/2022     Phosphorus:    Lab Results   Component Value Date    PHOS 4.4 04/15/2021     PTT:    Lab Results   Component Value Date    APTT 61.4 03/28/2019   [APTT}  Troponin:    Lab Results   Component Value Date    TROPONINI <0.01 06/08/2022     Last 3 Troponin:    Lab Results   Component Value Date    TROPONINI <0.01 06/08/2022    TROPONINI <0.01 06/07/2022    TROPONINI <0.01 01/23/2022     U/A:    Lab Results   Component Value Date    NITRITE Negative 07/15/2021    COLORU Yellow 06/07/2022    PROTEINU 100 06/07/2022    PHUR 6.0 06/07/2022    WBCUA 6-9 06/07/2022    RBCUA 3-4 06/07/2022    YEAST 0 07/13/2020    YEAST Present 09/11/2019    BACTERIA 4+ 06/07/2022    CLARITYU Clear 06/07/2022    SPECGRAV 1.020 06/07/2022    LEUKOCYTESUR SMALL 06/07/2022 UROBILINOGEN 0.2 06/07/2022    BILIRUBINUR Negative 06/07/2022    BILIRUBINUR 0 07/15/2021    BILIRUBINUR NEGATIVE 05/08/2011    BLOODU SMALL 06/07/2022    GLUCOSEU Negative 06/07/2022    GLUCOSEU NEGATIVE 05/08/2011       Assessment/Plan:    1. Hyponatremia chronic  Presumed SIADH  Could be multifactorial secondary to volume depletion/poor solute intake and relative excess fluid intake  Urine studies pending  Stop urea tabs and fluid restriction  Keep on salt tabs  2. Hypokalemia improved  Mag levels normal  3. Hypertension fairly controlled  Continue same meds    Discussed with Ms Jay Patel MD  6/9/2022  Office Phone : [phone]    Thank you for allowing us to participate in the care of this pt. I willcontinue to follow along. Please call with questions or concerns.

## 2022-06-09 NOTE — CARE COORDINATION
Discharge Planning Note:    Met with the patient:    - The discharge plan remains, discharge home with no needs. Will continue to follow.     ROB De La Cruz RN    [facility]  Phone: [phone]

## 2022-06-10 VITALS
WEIGHT: 120 LBS | TEMPERATURE: 97.9 F | OXYGEN SATURATION: 96 % | SYSTOLIC BLOOD PRESSURE: 158 MMHG | DIASTOLIC BLOOD PRESSURE: 84 MMHG | RESPIRATION RATE: 16 BRPM | HEIGHT: 67 IN | HEART RATE: 68 BPM | BODY MASS INDEX: 18.83 KG/M2

## 2022-06-10 LAB
ANION GAP SERPL CALCULATED.3IONS-SCNC: 10 MMOL/L (ref 3–16)
ANION GAP SERPL CALCULATED.3IONS-SCNC: 9 MMOL/L (ref 3–16)
BASOPHILS ABSOLUTE: 0 K/UL (ref 0–0.2)
BASOPHILS RELATIVE PERCENT: 0.3 %
BUN BLDV-MCNC: 19 MG/DL (ref 7–20)
BUN BLDV-MCNC: 27 MG/DL (ref 7–20)
CALCIUM SERPL-MCNC: 8.7 MG/DL (ref 8.3–10.6)
CALCIUM SERPL-MCNC: 9 MG/DL (ref 8.3–10.6)
CHLORIDE BLD-SCNC: 92 MMOL/L (ref 99–110)
CHLORIDE BLD-SCNC: 94 MMOL/L (ref 99–110)
CO2: 26 MMOL/L (ref 21–32)
CO2: 27 MMOL/L (ref 21–32)
CREAT SERPL-MCNC: 0.5 MG/DL (ref 0.6–1.2)
CREAT SERPL-MCNC: 0.6 MG/DL (ref 0.6–1.2)
EOSINOPHILS ABSOLUTE: 0.2 K/UL (ref 0–0.6)
EOSINOPHILS RELATIVE PERCENT: 2.5 %
GFR AFRICAN AMERICAN: >60
GFR AFRICAN AMERICAN: >60
GFR NON-AFRICAN AMERICAN: >60
GFR NON-AFRICAN AMERICAN: >60
GLUCOSE BLD-MCNC: 83 MG/DL (ref 70–99)
GLUCOSE BLD-MCNC: 96 MG/DL (ref 70–99)
HCT VFR BLD CALC: 40.9 % (ref 36–48)
HEMOGLOBIN: 14 G/DL (ref 12–16)
LYMPHOCYTES ABSOLUTE: 1.2 K/UL (ref 1–5.1)
LYMPHOCYTES RELATIVE PERCENT: 19 %
MCH RBC QN AUTO: 35.2 PG (ref 26–34)
MCHC RBC AUTO-ENTMCNC: 34.3 G/DL (ref 31–36)
MCV RBC AUTO: 102.6 FL (ref 80–100)
MONOCYTES ABSOLUTE: 1 K/UL (ref 0–1.3)
MONOCYTES RELATIVE PERCENT: 16.4 %
NEUTROPHILS ABSOLUTE: 3.9 K/UL (ref 1.7–7.7)
NEUTROPHILS RELATIVE PERCENT: 61.8 %
ORGANISM: ABNORMAL
PDW BLD-RTO: 13.9 % (ref 12.4–15.4)
PLATELET # BLD: 304 K/UL (ref 135–450)
PMV BLD AUTO: 8.1 FL (ref 5–10.5)
POTASSIUM SERPL-SCNC: 4.4 MMOL/L (ref 3.5–5.1)
POTASSIUM SERPL-SCNC: 4.5 MMOL/L (ref 3.5–5.1)
RBC # BLD: 3.99 M/UL (ref 4–5.2)
SODIUM BLD-SCNC: 127 MMOL/L (ref 136–145)
SODIUM BLD-SCNC: 131 MMOL/L (ref 136–145)
URINE CULTURE, ROUTINE: ABNORMAL
WBC # BLD: 6.3 K/UL (ref 4–11)

## 2022-06-10 PROCEDURE — 97530 THERAPEUTIC ACTIVITIES: CPT

## 2022-06-10 PROCEDURE — 6370000000 HC RX 637 (ALT 250 FOR IP): Performed by: NURSE PRACTITIONER

## 2022-06-10 PROCEDURE — 36415 COLL VENOUS BLD VENIPUNCTURE: CPT

## 2022-06-10 PROCEDURE — 97165 OT EVAL LOW COMPLEX 30 MIN: CPT

## 2022-06-10 PROCEDURE — 97161 PT EVAL LOW COMPLEX 20 MIN: CPT

## 2022-06-10 PROCEDURE — 6370000000 HC RX 637 (ALT 250 FOR IP): Performed by: INTERNAL MEDICINE

## 2022-06-10 PROCEDURE — 80048 BASIC METABOLIC PNL TOTAL CA: CPT

## 2022-06-10 PROCEDURE — 85025 COMPLETE CBC W/AUTO DIFF WBC: CPT

## 2022-06-10 PROCEDURE — 97535 SELF CARE MNGMENT TRAINING: CPT

## 2022-06-10 PROCEDURE — 97116 GAIT TRAINING THERAPY: CPT

## 2022-06-10 RX ORDER — CIPROFLOXACIN 500 MG/1
500 TABLET, FILM COATED ORAL EVERY 12 HOURS SCHEDULED
Qty: 5 TABLET | Refills: 0 | Status: SHIPPED | OUTPATIENT
Start: 2022-06-10 | End: 2022-06-13

## 2022-06-10 RX ORDER — SODIUM CHLORIDE 1000 MG
1 TABLET, SOLUBLE MISCELLANEOUS DAILY
Qty: 90 TABLET | Refills: 3 | Status: SHIPPED | OUTPATIENT
Start: 2022-06-11

## 2022-06-10 RX ORDER — CIPROFLOXACIN 500 MG/1
500 TABLET, FILM COATED ORAL EVERY 12 HOURS SCHEDULED
Status: DISCONTINUED | OUTPATIENT
Start: 2022-06-10 | End: 2022-06-10 | Stop reason: HOSPADM

## 2022-06-10 RX ADMIN — SODIUM CHLORIDE TAB 1 GM 1 G: 1 TAB at 09:14

## 2022-06-10 RX ADMIN — CARVEDILOL 12.5 MG: 6.25 TABLET, FILM COATED ORAL at 09:15

## 2022-06-10 RX ADMIN — OXYBUTYNIN CHLORIDE 5 MG: 5 TABLET ORAL at 13:14

## 2022-06-10 RX ADMIN — OXYBUTYNIN CHLORIDE 5 MG: 5 TABLET ORAL at 09:15

## 2022-06-10 RX ADMIN — LEVOTHYROXINE SODIUM 100 MCG: 0.1 TABLET ORAL at 05:16

## 2022-06-10 RX ADMIN — CIPROFLOXACIN 500 MG: 500 TABLET, FILM COATED ORAL at 09:26

## 2022-06-10 RX ADMIN — APIXABAN 5 MG: 5 TABLET, FILM COATED ORAL at 09:26

## 2022-06-10 RX ADMIN — LISINOPRIL 5 MG: 10 TABLET ORAL at 09:14

## 2022-06-10 RX ADMIN — MIRTAZAPINE 15 MG: 15 TABLET, FILM COATED ORAL at 09:26

## 2022-06-10 RX ADMIN — ASPIRIN 81 MG 81 MG: 81 TABLET ORAL at 09:14

## 2022-06-10 RX ADMIN — CLONAZEPAM 1 MG: 1 TABLET ORAL at 09:26

## 2022-06-10 NOTE — PROGRESS NOTES
Shift assessment completed. Patient alert and oriented x4. Denies any pain at this time. AM meds administered per MAR. The care plan and education has been reviewed and mutually agreed upon with the patient. [place] in place. Standard safety measures in place will continue to monitor patient.     5:22 PM  IV removed prior to dc.the care plan and education has been resolved. Discharge instructions and medications reviewed with patient. Patient voices understanding and denies further concerns at this time. Patient discharged home per orders with all personal belongings. This nurse transported the patient in a wheelchair to private transportation. The ABCs of the Annual Wellness Visit  Subsequent Medicare Wellness Visit    Chief Complaint   Patient presents with   • Medicare Wellness-subsequent   • Head pain on right side     he thinks he may have bumped his head. he cannot recall any fall.       Subjective    History of Present Illness:  Ronald Bond is a 74 y.o. male who presents for a Subsequent Medicare Wellness Visit.    The following portions of the patient's history were reviewed and   updated as appropriate: allergies, current medications, past family history, past medical history, past social history, past surgical history and problem list.    Compared to one year ago, the patient feels his physical   health is worse.    Compared to one year ago, the patient feels his mental   health is worse.    Recent Hospitalizations:  He was admitted within the past 365 days at hospital.       Current Medical Providers:  Patient Care Team:  Eri Baez APRN as PCP - General (Family Medicine)  Andrade Waite MD as Consulting Physician (Orthopedic Surgery)  Barron Santana MD as Consulting Physician (Otolaryngology)  Melanie Neal MD as Consulting Physician (Endocrinology)  Dallas Jaquez MD as Consulting Physician (Gastroenterology)  Morena Vences APRN as Nurse Practitioner (Nurse Practitioner)    Outpatient Medications Prior to Visit   Medication Sig Dispense Refill   • albuterol sulfate  (90 Base) MCG/ACT inhaler Inhale 2 puffs Every 4 (Four) Hours As Needed for Wheezing or Shortness of Air. 18 g 1   • aspirin (aspirin) 81 MG EC tablet Take 1 tablet by mouth Daily. 90 tablet 3   • carvedilol (COREG) 6.25 MG tablet Take 1 tablet by mouth 2 (Two) Times a Day. 180 tablet 1   • clotrimazole (Clotrimazole Athletes Foot) 1 % cream Apply  topically to the appropriate area as directed 2 (Two) Times a Day. 60 g 1   • furosemide (LASIX) 20 MG tablet Take 1 tablet by mouth 2 (Two) Times a Day. 180 tablet 1   • meloxicam (Mobic) 7.5 MG  tablet Take 1 tablet by mouth Daily. 90 tablet 1   • methadone (METHADOSE) 40 MG disintegrating tablet Take 80 mg by mouth Daily.     • pantoprazole (Protonix) 20 MG EC tablet Take 1 tablet by mouth Daily As Needed for Heartburn or Indigestion. 90 tablet 1   • polyethylene glycol (MIRALAX) 17 g packet Take 17 g by mouth Daily. 90 packet 2   • pregabalin (LYRICA) 50 MG capsule Take 1 capsule by mouth 3 (Three) Times a Day. 90 capsule 1   • simvastatin (ZOCOR) 10 MG tablet Take 1 tablet by mouth every night at bedtime. 90 tablet 1   • Testosterone 20.25 MG/ACT (1.62%) gel APPLY 2 PUMPS TO ARMS AND SHOULDERS DAILY 75 g 5     No facility-administered medications prior to visit.       Opioid medication/s are on active medication list.  and I have evaluated his active treatment plan and pain score trends (see table).  Vitals:    10/18/21 1522   PainSc:   6   PainLoc: Head     I have reviewed the chart for potential of high risk medication and harmful drug interactions in the elderly.          Patient attends a methadone clinic.     Aspirin is on active medication list. Aspirin use is indicated based on review of current medical condition/s. Pros and cons of this therapy have been discussed today. Benefits of this medication outweigh potential harm.  Patient has been encouraged to continue taking this medication.  .      Patient Active Problem List   Diagnosis   • Depression with anxiety   • Hypertension   • Asthma   • H/O chronic hepatitis   • Polysubstance abuse (HCC)   • GERD (gastroesophageal reflux disease)   • CAD (coronary artery disease)   • Secondary male hypogonadism   • Localized edema   • Phimosis of penis   • Hyperlipidemia LDL goal <100   • Prolonged Q-T interval on ECG     Advance Care Planning  Advance Directive is not on file.  ACP discussion was held with the patient during this visit. Patient does not have an advance directive, information provided.    Review of Systems   Constitutional: Positive for  "activity change and fatigue.   Respiratory: Negative.    Cardiovascular: Negative.    Gastrointestinal: Negative.    Genitourinary: Negative.    Musculoskeletal: Positive for arthralgias and myalgias.   Skin: Negative.    Neurological:        Headache on the right side of his scalp.      Hematological: Negative.    Psychiatric/Behavioral: Negative for self-injury and suicidal ideas. The patient is nervous/anxious. The patient is not hyperactive.         Objective    Vitals:    10/18/21 1522   BP: 148/92   Pulse: 83   Resp: 18   Temp: 98.4 °F (36.9 °C)   TempSrc: Infrared   SpO2: 94%   Weight: 102 kg (224 lb 3.2 oz)   Height: 175.3 cm (69\")   PainSc:   6   PainLoc: Head     BMI Readings from Last 1 Encounters:   10/18/21 33.11 kg/m²   BMI is above normal parameters. Recommendations include: nutrition counseling    Does the patient have evidence of cognitive impairment? Yes    Physical Exam  Vitals reviewed.   HENT:      Head: Normocephalic.      Nose: Nose normal.      Comments: Edentulous   Eyes:      Pupils: Pupils are equal, round, and reactive to light.   Cardiovascular:      Rate and Rhythm: Normal rate and regular rhythm.      Pulses: Normal pulses.   Pulmonary:      Effort: Pulmonary effort is normal.   Abdominal:      Palpations: Abdomen is soft.   Musculoskeletal:         General: No tenderness. Normal range of motion.      Cervical back: Normal range of motion.   Skin:     General: Skin is warm.      Capillary Refill: Capillary refill takes less than 2 seconds.   Neurological:      Mental Status: He is alert and oriented to person, place, and time.       Lab Results   Component Value Date     (H) 09/16/2021    CHLPL 162 09/16/2021    TRIG 157 (H) 09/16/2021    HDL 37 (L) 09/16/2021    LDL 97 09/16/2021    VLDL 28 09/16/2021            HEALTH RISK ASSESSMENT    Smoking Status:  Social History     Tobacco Use   Smoking Status Former Smoker   • Packs/day: 1.00   • Years: 10.00   • Pack years: 10.00   • " Types: Cigarettes   • Start date:    • Quit date:    • Years since quittin.8   Smokeless Tobacco Never Used   Tobacco Comment    pt quit about 40-50 years ago      Alcohol Consumption:  Social History     Substance and Sexual Activity   Alcohol Use Not Currently    Comment: 2 per month     Fall Risk Screen:    DARLENE Fall Risk Assessment was completed, and patient is at HIGH risk for falls. Assessment completed on:10/18/2021    Depression Screening:  PHQ-2/PHQ-9 Depression Screening 2021   Little interest or pleasure in doing things 0   Feeling down, depressed, or hopeless 0   Total Score 0       Health Habits and Functional and Cognitive Screening:  Functional & Cognitive Status 10/18/2021   Do you have difficulty preparing food and eating? No   Do you have difficulty bathing yourself, getting dressed or grooming yourself? No   Do you have difficulty using the toilet? No   Do you have difficulty moving around from place to place? No   Do you have trouble with steps or getting out of a bed or a chair? No   Current Diet Well Balanced Diet   Dental Exam Not up to date   Eye Exam Not up to date   Exercise (times per week) 3 times per week   Current Exercises Include No Regular Exercise;Walking   Current Exercise Activities Include -   Do you need help using the phone?  No   Are you deaf or do you have serious difficulty hearing?  No   Do you need help with transportation? No   Do you need help shopping? No   Do you need help preparing meals?  No   Do you need help with housework?  No   Do you need help with laundry? No   Do you need help taking your medications? No   Do you need help managing money? No   Do you ever drive or ride in a car without wearing a seat belt? No   Have you felt unusual stress, anger or loneliness in the last month? No   Who do you live with? Other   If you need help, do you have trouble finding someone available to you? No   Have you been bothered in the last four weeks by  sexual problems? No   Do you have difficulty concentrating, remembering or making decisions? No       Age-appropriate Screening Schedule:  Refer to the list below for future screening recommendations based on patient's age, sex and/or medical conditions. Orders for these recommended tests are listed in the plan section. The patient has been provided with a written plan.    Health Maintenance   Topic Date Due   • INFLUENZA VACCINE  08/01/2021   • LIPID PANEL  09/16/2022   • TDAP/TD VACCINES (3 - Td or Tdap) 11/25/2025   • DXA SCAN  Discontinued   • ZOSTER VACCINE  Discontinued              Assessment/Plan   CMS Preventative Services Quick Reference  Risk Factors Identified During Encounter  Chronic Pain   Dementia/Memory   Fall Risk-High or Moderate  Obesity/Overweight   The above risks/problems have been discussed with the patient.  Follow up actions/plans if indicated are seen below in the Assessment/Plan Section.  Pertinent information has been shared with the patient in the After Visit Summary.    Diagnoses and all orders for this visit:    1. Need for influenza vaccination (Primary)  -     Fluzone High-Dose 65+yrs (6450-6470)    2. Hypertension, unspecified type    3. Medicare annual wellness visit, subsequent  -     Cancel: Comprehensive Metabolic Panel  -     Cancel: Lipid Panel  -     Cancel: Urinalysis With Culture If Indicated - Urine, Clean Catch    4. Poor short-term memory  -     TSH    5. Screening, lipid  -     Cancel: Lipid Panel    He is to follow up in 2 weeks for recheck of his scalp tenderness.   volteren gel 1%gel every 6 hours prn - sample.   Go to ER for any severe headache.     Follow Up:   Return in about 2 weeks (around 11/1/2021), or skin on head recheck, for Recheck.     An After Visit Summary and PPPS were made available to the patient.        I spent 35 minutes caring for Ronald on this date of service. This time includes time spent by me in the following activities:preparing for the  visit, reviewing tests, obtaining and/or reviewing a separately obtained history, performing a medically appropriate examination and/or evaluation , counseling and educating the patient/family/caregiver, ordering medications, tests, or procedures, referring and communicating with other health care professionals  and documenting information in the medical record

## 2022-06-10 NOTE — DISCHARGE SUMMARY
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 Fib      Hospital Course: diabetes, depression, hypothyroidism, hypertension, hyperlipidemia, presented to the ED due to weakness fatigue.      Patient reports that she normally lives independently at home with her  and daughter. Sailaja Ford is able to ambulate with a walker.  Patient reported recently having some intermittent confusion and worsening fatigue no known alleviating or exacerbating factor.  Patient also has been having some nausea vomiting and diarrhea reports more than 3 times intermittently watery with no recent antibiotic or hospitalization.  Patient reports that she has been not eating any food and just remains drinking water for hydration.  Patient otherwise denies any chest pain abdominal pain or dysuria. Acute metabolic encephalopathy  -Secondary to hyponatremia  -CT head showed old infarct and concerning's findings for normal pressure hydrocephalus which is unchanged from prior exam.  -Neurology recommended outpatient follow-up for normal pressure hydrocephalus. PCP to send referral to neurology.  -Encephalopathy has resolved, she is alert oriented person place and time     Acute hypervolemic hyponatremia  -Nephrology consulted-presume SIADH. Stop urea and fluid restriction, continue salt tabs.    Follow-up with urology outpatient  -Treated with IV fluids  -Sodium 126 on admission, improved to 129     Hypokalemia  -Replacement given     UTI  -Culture grew greater than 100,000 colonies of E. coli. Treated with Rocephin. Discharged on Cipro.     Diarrhea  -Resolved    Past medical history of fusiform aneurysm in the infrarenal, anxiety, type 2 diabetes, COPD, depression, hypertension, hypothyroidism, paroxysmal A. Fib  -Continue medications as ordered  -Takes no diabetes medications at home, blood sugars less than 110  -Continue Eliquis      Patient stated they felt well and wanted to go home. Patient denied chest pain, shortness of breath, palpitations, abdominal pain, nausea vomiting, diarrhea, dysuria, headache lightheadedness or dizziness. Appetite good. Voiding without difficulty. Reviewed plan of care with patient, verbalized understanding and agreement. Denied further questions or needs. Physical Exam Performed:     BP (!) 158/84   Pulse 68   Temp 97.9 °F (36.6 °C) (Oral)   Resp 16   Ht 5' 7\" (1.702 m)   Wt 120 lb (54.4 kg)   SpO2 96%   BMI 18.79 kg/m²       General appearance:  No apparent distress, appears stated age and cooperative. HEENT:  Normal cephalic, atraumatic without obvious deformity. Pupils equal, round, and reactive to light. Extra ocular muscles intact. Conjunctivae/corneas clear. Neck: Supple, with full range of motion. No jugular venous distention. Trachea midline. Respiratory:  Normal respiratory effort. Clear to auscultation, bilaterally without Rales/Wheezes/Rhonchi. Cardiovascular:  Regular rate and rhythm with normal S1/S2 without murmurs, rubs or gallops. Abdomen: Soft, non-tender, non-distended with normal bowel sounds. Musculoskeletal:  No clubbing, cyanosis or edema bilaterally. Full range of motion without deformity. Skin: Skin color, texture, turgor normal.  No rashes or lesions. Neurologic:  Neurovascularly intact without any focal sensory/motor deficits.  Cranial nerves: II-XII intact, grossly non-focal.  Psychiatric:  Alert and oriented, thought content appropriate, normal insight  Capillary Refill: Brisk,< 3 seconds   Peripheral Pulses: +2 palpable, equal bilaterally       Labs: For convenience and continuity at follow-up the following most recent labs are provided:      CBC:    Lab Results   Component Value Date    WBC 8.3 06/20/2022    HGB 15.6 06/20/2022    HCT 46.7 06/20/2022     06/20/2022       Renal:    Lab Results   Component Value Date     06/20/2022    K 3.7 06/20/2022    CL 96 06/20/2022    CO2 27 06/20/2022    BUN 8 06/20/2022    CREATININE 0.5 06/20/2022    CALCIUM 9.6 06/20/2022    PHOS 4.4 04/15/2021         Significant Diagnostic Studies    Radiology:   CT HEAD WO CONTRAST   Final Result   1. No acute intracranial hemorrhage or mass effect. 2.  Extensive chronic small vessel ischemic disease. Suggestion of an old   infarct in the left occipital lobe, similar to the prior study. Small left   cerebellar old lacunar infarct. 3.  Marked prominence of the left greater than right lateral ventricles is   similar to the prior study and may relate to central volume loss and/or   component of normal pressure hydrocephalus in the appropriate clinical setting      RECOMMENDATIONS:   Follow-up brain MRI may be helpful for further evaluation as warranted. CT ABDOMEN PELVIS WO CONTRAST Additional Contrast? None   Final Result   1. No acute process within the abdomen or pelvis, within the limitations of a   noncontrast study. 2. No acute bowel inflammation or obstruction. 3. Mild colonic diverticulosis, without evidence of diverticulitis. 4. Stable mild approximate 3.0 cm fusiform aneurysm of the infrarenal   abdominal aorta, without evidence of leak or rupture. Further follow-up of   this abnormality is as advised below. 5. Stable mildly complicated and partially loculated small left pleural   effusion. RECOMMENDATIONS:   For management of fusiform AAA:      3.0-3.4 cm AAA, recommend follow-up every 3 years.       Note: Recommend Vascular consultation if a fusiform AAA enlarges by >0.5 cm   in 6 months or >1 cm in 1 year or for a saccular AAA of any size. References: Shanthi Goes Radiol 2013; 76(36):141-055; J Vasc Surg.  2018; 67:2-77                Consults:     IP CONSULT TO HOSPITALIST  IP CONSULT TO NEPHROLOGY  IP CONSULT TO HOME CARE NEEDS    Disposition: Home    Condition at Discharge: Stable    Discharge Instructions/Follow-up:    Follow up with pcp in 1 week  Follow up with nephrology as directed    Code Status:  Prior     Activity: activity as tolerated    Diet: regular diet      Discharge Medications:     Discharge Medication List as of 6/10/2022  3:43 PM           Details   ciprofloxacin (CIPRO) 500 mg tablet Take 1 tablet by mouth every 12 hours for 5 doses, Disp-5 tablet, R-0Normal      sodium chloride 1 gram tablet Take 1 tablet by mouth daily, Disp-90 tablet, R-3Normal              Details   carvedilol (COREG) 12.5 MG tablet TAKE ONE TABLET BY MOUTH TWICE A DAY, Disp-60 tablet, R-0Normal      oxybutynin (DITROPAN) 5 MG tablet TAKE ONE TABLET BY MOUTH THREE TIMES A DAY, Disp-90 tablet, R-0Normal      mirtazapine (REMERON) 15 MG tablet TAKE ONE TABLET BY MOUTH DAILY, Disp-30 tablet, R-0Normal      clonazePAM (KLONOPIN) 1 MG tablet TAKE ONE TABLET BY MOUTH TWICE A DAY AS NEEDED FOR ANXIETY, Disp-60 tablet, R-0Normal      levothyroxine (SYNTHROID) 100 MCG tablet TAKE ONE TABLET BY MOUTH DAILY, Disp-90 tablet, R-0Normal      apixaban (ELIQUIS) 5 MG TABS tablet Take 1 tablet by mouth 2 times daily, Disp-180 tablet, R-1Normal      lisinopril (PRINIVIL;ZESTRIL) 5 MG tablet Take 1 tablet by mouth daily, Disp-30 tablet, R-5Normal      ondansetron (ZOFRAN) 4 MG tablet Take 1 tablet by mouth 3 times daily as needed for Nausea or Vomiting, Disp-30 tablet, R-0Normal      budesonide-formoterol (SYMBICORT) 160-4.5 MCG/ACT AERO Inhale 2 puffs into the lungs 2 times daily, Disp-10.2 g, R-0Normal      albuterol (PROVENTIL) (5 MG/ML) 0.5% nebulizer solution Take 0.5 mLs by nebulization every 6 hours as needed for Wheezing, Disp-120 each, R-0Normal      cloNIDine (CATAPRES) 0.1 MG tablet Take 1 tablet by mouth 3 times daily, Disp-90 tablet, R-2Normal      aspirin 81 MG chewable tablet Take 1 tablet by mouth daily, Disp-30 tablet, R-0Normal             Time Spent on discharge is more than 30 minutes in the examination, evaluation, counseling and review of medications and discharge plan. Signed: QUITA Ware CNP   6/21/2022    The patient was seen and examined on day of discharge and this discharge summary is in conjunction with any daily progress note from day of discharge. Thank you QUITA Blackwell Caro, NP for the opportunity to be involved in this patient's care. If you have any questions or concerns please feel free to contact me at [phone]. NOTE:  This report was transcribed using voice recognition software. Every effort was made to ensure accuracy; however, inadvertent computerized transcription errors may be present.

## 2022-06-10 NOTE — TELEPHONE ENCOUNTER
Called patient to schedule AWV and was informed that the patient has been in the hospital for 3 days. Currently still in there. Will call when patient get released!

## 2022-06-10 NOTE — PROGRESS NOTES
[facility] [facility]   Phone: [phone]    Occupational Therapy    [x] Initial Evaluation            [] Daily Treatment Note         [] Discharge Summary      Patient: Antoinette Naik   : 1945   MRN: [id number]   Date of Service:  6/10/2022    Admitting Diagnosis:  Acute hyponatremia  Current Admission Summary: 67 y.o. female who presented to [facility] with past medical history of CAD, anxiety, COPD, type 2 diabetes, depression, hypothyroidism, hypertension, hyperlipidemia, presented to the ED due to weakness fatigue. Past Medical History:  has a past medical history of Acute DVT (deep venous thrombosis) ([facility]), Acute encephalopathy, Acute on chronic diastolic heart failure ([address] Alcohol abuse, Anxiety, Arthritis, CAD in native artery, Cellulitis, Cerebral artery occlusion with cerebral infarction [facility]), Cerebrovascular accident (CVA) ([address] Chronic fatigue, Complex care coordination, Complicated UTI (urinary tract infection), COPD (chronic obstructive pulmonary disease) ([address] COPD exacerbation ([address] Depression, Diabetes mellitus ([address] DIMAS (dyspnea on exertion), DVT (deep venous thrombosis) ([address] DVT, lower extremity ([address] Fatigue, General weakness, Generalized weakness, Hypercholesteremia, Hypertension, Hyperthyroidism, Incontinence, Mammogram abnormal, Neuromuscular disorder ([address] Osteopenia, PAF (paroxysmal atrial fibrillation) ([address] Pneumonia, Recurrent UTI, Right forearm cellulitis, Superficial thrombophlebitis of right upper extremity, Thyroid disease, Tobacco abuse, Tobacco abuse counseling, Trapped lung, and Unable to walk. Past Surgical History:  has a past surgical history that includes Tonsillectomy; Colonoscopy (2012); Toe Surgery (Right); Shoulder arthroscopy (Right, 14); eye surgery; Pacemaker insertion; pacemaker placement; Nerve Surgery (N/A, 2021); and Stimulator Surgery (N/A, 2022).     Discharge Recommendations: Abram Urena scored a 21/24 on the AM-PAC ADL Inpatient form. Current research shows that an AM-PAC score of 18 or greater is typically associated with a discharge to the patient's home setting. Based on the patient's AM-PAC score, and their current ADL deficits, it is recommended that the patient have 2-3 sessions per week of Occupational Therapy at d/c to increase the patient's independence. At this time, this patient demonstrates the endurance and safety to discharge home with home services (home vs OP services) and a follow up treatment frequency of 2-3x/wk. Please see assessment section for further patient specific details. If patient discharges prior to next session this note will serve as a discharge summary. Please see below for the latest assessment towards goals. HOME HEALTH CARE: LEVEL 1 STANDARD    - Initial home health evaluation to occur within 24-48 hours, in patient home   - Therapy to evaluate with goal of regaining prior level of functioning   - Therapy to evaluate if patient has [address] needs for personal care      DME Required For Discharge: no DME required at discharge    Precautions/Restrictions: medium fall risk    Pre-Admission Information   Lives With: spouse, daughter, 2 high school age great grandchildren                      Type of Home: house  Home Layout: tri-level , stays upstairs most of the time; roughly 10 steps upstairs with ophelia handrails  Home Access: 1 step to enter without rails    Bathroom Layout: walker accessible, wheelchair accessible, walk in shower  Toilet Height: standard height  Bathroom Equipment: shower chair  Home Equipment: rolling walker, single point cane  Transfer Assistance: Independent without use of device, . Comment: uses vanity to help get up from toilet  Ambulation Assistance:Independent without use of device  ADL Assistance: requires assistance with bathing, .   Comment: Has assist from daughter into and out of shower  IADL Assistance: requires assistance with driving/transportation, Family does cleaning, cooking, laundry   Active :        []? Yes            [x]? No  Hand Dominance: []? Left            [x]? Right  Current Employment: retired. Occupation: Stay at home mom  Hobbies:   Watch TV, spends time on the phone  Recent Falls: None    Examination   Vision:   Vision Gross Assessment: Impaired and Vision Corrective Device: wears glasses for reading  Hearing:   hard of hearing  Perception:   WFL  Sensation:   WFL  Proprioception:    WFL  ROM:   (B) UE AROM WFL -- decreased end range shoulder mobility LUE  Strength:   (L) Shoulder: -3     (R) Shoulder: 4  WFL (B) distally    Decision Making: low complexity  Clinical Presentation: stable      Subjective  General: Pt lying upright in bed upon arrival, reports she is ready to go home, has not been OOB since admission--agreeable to evaluation  Pain: 0/10  Pain Interventions: not applicable        Activities of Daily Living  Basic Activities of Daily Living  Grooming: supervision  Grooming Comments: oral hygiene stance at sink, combing hair in stance  Lower Extremity Dressing: stand by assistance. Equipment: none  Dressing Comments: mario cazares  Instrumental Activities of Daily Living  No IADL completed on this date. Functional Mobility  Bed Mobility  Supine to Sit: supervision  Scooting: supervision  Comments:  Transfers  Sit to stand transfer:contact guard assistance  Stand to sit transfer: contact guard assistance  Comments:  Functional Mobility:  Functional Mobility: rolling walker. contact guard assistance.   Activity: 150' in hallway, to/from bathroom    Other Therapeutic Interventions    Functional Outcomes  AM-PAC Inpatient Daily Activity Raw Score: 21    Cognition  WFL  Orientation:    alert and oriented x 4  Command Following:   [facility]     Education  Barriers To Learning: none  Patient Education: patient educated on goals, OT role and benefits, plan of care, ADL adaptive strategies, transfer training, discharge recommendations  Learning Assessment:  patient verbalizes and demonstrates understanding, patient verbalizes understanding, would benefit from continued reinforcement    Assessment  Activity Tolerance: Tolerated well  Impairments Requiring Therapeutic Intervention: decreased functional mobility, decreased ADL status, decreased endurance, decreased balance, decreased IADL  Prognosis: good  Clinical Assessment: Pt presents slightly below baseline, with balance/endurance as primary limiting factors d/t immobilization from hospitalization.  Continued OT indicated to facilitate return to PLOF  Safety Interventions: patient left in chair, chair alarm in place, call light within reach, nurse notified and family/caregiver present    Plan  Frequency: 3-5 x/per week  Current Treatment Recommendations: strengthening, balance training, functional mobility training, transfer training, endurance training, patient/caregiver education, ADL/self-care training, home management training and equipment evaluation/education    Goals     Short Term Goals:  Time Frame: by discharge  Patient will complete lower body ADL at modified independent   Patient will complete toileting at modified independent   Patient will complete functional transfers at [facility] independent   Patient will complete functional mobility at modified independent     Therapy Session Time     Individual Group Co-treatment   Time In    0923   Time Out    1023   Minutes    60        Timed Code Treatment Minutes:   45 minutes  Total Treatment Minutes:  60 minutes       Electronically Signed By: BREANNA Mitchell, [facility] OTR/L [id number]

## 2022-06-10 NOTE — PROGRESS NOTES
Nephrology progress Note  [phone]   [facility]       Patient:  Marty Harris   : 1945    Brief HPI    The patient is a 67 y. o.female with significant past medical history of hyponatremia, CAD, Afib, s/p PPI, CVA, COPD, HTN, DM II, HPL, Hypothyroidism,  Depression, DVT, tobacco use, alcohol use came in with c/o nausea, emesis, diarrhea, generalized weakness and poor po intake. Labs showed hyponatremia with a sodium of 123, hypokalemia with a K of 3.4, improved to 128 at this time  We are consulted for further evaluation and management of hyponatremia  Prior h/o hyponatremia, presumed to be SIADH  Has been on urea tabs  Denies nausea/emesis at this time    Subjective/Interval history    Pt seen and examined  Hyponatremia better  Denies nausea/emesis  No fevers/chills    Review of Systems   No abdominal pain or nausea/emesis    SHx:  No visitors at the bed-side    Meds:  Scheduled Meds:   ciprofloxacin  500 mg Oral 2 times per day    sodium chloride  1 g Oral Daily    acetaminophen  650 mg Oral Once    sodium chloride flush  10 mL IntraVENous 2 times per day    apixaban  5 mg Oral BID    aspirin  81 mg Oral Daily    carvedilol  12.5 mg Oral BID WC    levothyroxine  100 mcg Oral Daily    lisinopril  5 mg Oral Daily    mirtazapine  15 mg Oral Daily    oxybutynin  5 mg Oral TID     Continuous Infusions:   sodium chloride       PRN Meds:.clonazePAM, sodium chloride flush, sodium chloride, potassium chloride, magnesium sulfate, promethazine **OR** ondansetron, acetaminophen **OR** acetaminophen      Vitals:  BP (!) 158/84   Pulse 68   Temp 97.9 °F (36.6 °C) (Oral)   Resp 16   Ht 5' 7\" (1.702 m)   Wt 120 lb (54.4 kg)   SpO2 96%   BMI 18.79 kg/m²       Physical Exam  General : AAOx3, not in pain or respiratory distress, sitting in the chair  HEENT : mucosa moist.  CVS: S1 S2 normal, regular rhythm, no murmurs or rubs. Lungs: Clear, no wheezing or crackles.   Abd: Soft, bowel sounds normal, non-tender. Ext: No edema, no cyanosis  Skin: Warm. No rashes appreciated.   : bladder non-distended, no tenderness over the bladder      Labs:  CBC with Differential:    Lab Results   Component Value Date    WBC 6.3 06/10/2022    RBC 3.99 06/10/2022    HGB 14.0 06/10/2022    HCT 40.9 06/10/2022     06/10/2022    .6 06/10/2022    MCH 35.2 06/10/2022    MCHC 34.3 06/10/2022    RDW 13.9 06/10/2022    SEGSPCT 79.1 04/19/2016    BANDSPCT 1 04/28/2018    LYMPHOPCT 19.0 06/10/2022    MONOPCT 16.4 06/10/2022    EOSPCT 1.3 05/08/2011    BASOPCT 0.3 06/10/2022    MONOSABS 1.0 06/10/2022    LYMPHSABS 1.2 06/10/2022    EOSABS 0.2 06/10/2022    BASOSABS 0.0 06/10/2022    DIFFTYPE Auto 05/02/2013     BMP:    Lab Results   Component Value Date     06/10/2022    K 4.4 06/10/2022    K 3.4 06/07/2022    CL 94 06/10/2022    CO2 27 06/10/2022    BUN 19 06/10/2022    LABALBU 3.8 06/08/2022    CREATININE 0.5 06/10/2022    CALCIUM 9.0 06/10/2022    GFRAA >60 06/10/2022    GFRAA >60 05/02/2013    LABGLOM >60 06/10/2022    LABGLOM 79 12/11/2017    GLUCOSE 83 06/10/2022     Ionized Calcium:  No results found for: IONCA  Magnesium:    Lab Results   Component Value Date    MG 1.80 06/07/2022     Phosphorus:    Lab Results   Component Value Date    PHOS 4.4 04/15/2021     PTT:    Lab Results   Component Value Date    APTT 61.4 03/28/2019   [APTT}  Troponin:    Lab Results   Component Value Date    TROPONINI <0.01 06/08/2022     Last 3 Troponin:    Lab Results   Component Value Date    TROPONINI <0.01 06/08/2022    TROPONINI <0.01 06/07/2022    TROPONINI <0.01 01/23/2022     U/A:    Lab Results   Component Value Date    NITRITE Negative 07/15/2021    COLORU Yellow 06/07/2022    PROTEINU 100 06/07/2022    PHUR 6.0 06/07/2022    WBCUA 6-9 06/07/2022    RBCUA 3-4 06/07/2022    YEAST 0 07/13/2020    YEAST Present 09/11/2019    BACTERIA 4+ 06/07/2022    CLARITYU Clear 06/07/2022    SPECGRAV 1.020 06/07/2022 LEUKOCYTESUR SMALL 06/07/2022    UROBILINOGEN 0.2 06/07/2022    BILIRUBINUR Negative 06/07/2022    BILIRUBINUR 0 07/15/2021    BILIRUBINUR NEGATIVE 05/08/2011    BLOODU SMALL 06/07/2022    GLUCOSEU Negative 06/07/2022    GLUCOSEU NEGATIVE 05/08/2011       Assessment/Plan:    1. Hyponatremia chronic  Presumed SIADH  Could be multifactorial secondary to volume depletion/poor solute intake and relative excess fluid intake  Continue salt tabs and fluid restriction  Can follow-up with me in the office  2. Hypokalemia improved  Mag levels normal  3. UTI E. Coli   On ABx  4. Hypertension fairly controlled  Continue same meds    Discussed with Ms Flo Mckee  Can be discharged from renal perspective    Marian Piña MD  6/10/2022  Office Phone : [phone]    Thank you for allowing us to participate in the care of this pt. I willcontinue to follow along. Please call with questions or concerns.

## 2022-06-10 NOTE — CARE COORDINATION
Discharge Planning Note:    PT/OT recommends HHC. Met with the patient. [facility] list was reviewed. The following referral was placed at the request of the patient. - [facility] - [address] has been informed of the patient's discharge order. Will continue to follow.     EVERARDO DongN RN    [facility]  Phone: [phone]

## 2022-06-10 NOTE — PROGRESS NOTES
[address] [facility] Rehabilitation Department   Phone: [phone]    Physical Therapy    [x] Initial Evaluation            [] Daily Treatment Note         [] Discharge Summary      Patient: Ezio Ng   :    MRN: [id number]   Date of Service:  6/10/2022  Admitting Diagnosis: Acute hyponatremia  Current Admission Summary: 67 y.o. female who presented to [facility] with past medical history of CAD, anxiety, COPD, type 2 diabetes, depression, hypothyroidism, hypertension, hyperlipidemia, presented to the ED due to weakness fatigue. Patient reports that she normally lives independently at home with her  and daughter. Patient is able to ambulate with a walker. Patient reported recently having some intermittent confusion and worsening fatigue no known alleviating or exacerbating factor. Patient also has been having some nausea vomiting and diarrhea reports more than 3 times intermittently watery with no recent antibiotic or hospitalization. Patient reports that she has been not eating any food and just remains drinking water for hydration. Patient otherwise denies any chest pain abdominal pain or dysuria.   Past Medical History:  has a past medical history of Acute DVT (deep venous thrombosis) ([facility]), Acute encephalopathy, Acute on chronic diastolic heart failure ([address] Alcohol abuse, Anxiety, Arthritis, CAD in native artery, Cellulitis, Cerebral artery occlusion with cerebral infarction [facility]), Cerebrovascular accident (CVA) ([address] Chronic fatigue, Complex care coordination, Complicated UTI (urinary tract infection), COPD (chronic obstructive pulmonary disease) ([address] COPD exacerbation ([address] Depression, Diabetes mellitus ([address] DIMAS (dyspnea on exertion), DVT (deep venous thrombosis) ([address] DVT, lower extremity ([address] Fatigue, General weakness, Generalized weakness, Hypercholesteremia, Hypertension, Hyperthyroidism, Incontinence, Mammogram abnormal, Neuromuscular disorder ([facility] Osteopenia, PAF (paroxysmal atrial fibrillation) ([facility] Pneumonia, Recurrent UTI, Right forearm cellulitis, Superficial thrombophlebitis of right upper extremity, Thyroid disease, Tobacco abuse, Tobacco abuse counseling, Trapped lung, and Unable to walk. Past Surgical History:  has a past surgical history that includes Tonsillectomy; Colonoscopy (1/19/2012); Toe Surgery (Right); Shoulder arthroscopy (Right, 6/18/14); eye surgery; Pacemaker insertion; pacemaker placement; Nerve Surgery (N/A, 12/29/2021); and Stimulator Surgery (N/A, 2/9/2022). Discharge Recommendations: Teresita Westbrook scored a 20/24 on the AM-PAC short mobility form. Current research shows that an AM-PAC score of 18 or greater is typically associated with a discharge to the patient's home setting. Based on the patient's AM-PAC score and their current functional mobility deficits, it is recommended that the patient have 2-3 sessions per week of Physical Therapy at d/c to increase the patient's independence. At this time, this patient demonstrates the endurance and safety to discharge home with home services and a follow up treatment frequency of 2-3x/wk. Please see assessment section for further patient specific details. If patient discharges prior to next session this note will serve as a discharge summary. Please see below for the latest assessment towards goals.    HOME HEALTH CARE: LEVEL 1 STANDARD    - Initial home health evaluation to occur within 24-48 hours, in patient home   - Therapy to evaluate with goal of regaining prior level of functioning   - Therapy to evaluate if patient has [address] needs for personal care    DME Required For Discharge: no DME required at discharge  Precautions/Restrictions: medium fall risk  Weight Bearing Restrictions: no restrictions  [] Right Upper Extremity  [] Left Upper Extremity [] Right Lower Extremity  [] Left Lower Extremity     Required Braces/Orthotics: no braces required   [] Right  [] Left  Positional Restrictions:no positional restrictions    Pre-Admission Information   Lives With: spouse, daughter, 2 high school age great grandchildren     Type of Home: house  Home Layout: tri-level , stays upstairs most of the time; roughly 10 steps upstairs with ophelia handrails  Home Access: 1 step to enter without rails    Bathroom Layout: walker accessible, wheelchair accessible, walk in shower  Toilet Height: standard height  Bathroom Equipment: shower chair  Home Equipment: rolling walker, single point cane  Transfer Assistance: Independent without use of device, . Comment: uses vanity to help get up from toilet  Ambulation Assistance:Independent without use of device  ADL Assistance: requires assistance with bathing, . Comment: Has assist from daughter into and out of shower  IADL Assistance: requires assistance with driving/transportation, Family does cleaning, cooking, laundry   Active :        [] Yes  [x] No  Hand Dominance: [] Left  [x] Right  Current Employment: retired. Occupation: Stay at home mom  Hobbies:   Watch TV, spends time on the phone  Recent Falls: None    Examination   Vision:   Vision Corrective Device: wears glasses for reading  Hearing:   hard of hearing  Posture:   Kyphotic, forward head rounded shoulders  Sensation:   WFL  Proprioception:    WFL  Tone:   Normotonic  ROM:   (B) LE AROM WFL  (B) UE AROM WFL  Strength:   (L) Hip: +3        (R) Hip: 4  (L) Knee extension: 4     (R) Knee extension: 5   (L) Knee flexion: 4     (R) Knee flexion: 4  (L) Ankle: 5     (R) Ankle: 4  Decision Making: low complexity  Clinical Presentation: stable      Subjective  General: Pt semi-reclined upon arrival. Agreeable to PT/OT evaluation  Pain: 0/10  Pain Interventions: not applicable       Functional Mobility  Bed Mobility  Supine to Sit: supervision, .   Comment Increased time required  Scooting: supervision  Comments:  Transfers  Sit to stand transfer: contact guard assistance  Stand to sit transfer: contact guard assistance  Comments: with 2WW; sit <>stand x1 from hospital bed, stand<> sit x1 to chair  Ambulation  Surface:level surface  Assistive Device: rolling walker  Assistance: contact guard assistance, . Comment progressed to SBA by the end of session  Distance: ~150ft  Gait Mechanics: Narrow base of support, decreased step length, decreased heel strike, forward flexed posture  Comments:  Verbal cueing provided for upright posture  Stair Mobility  Stair mobility not completed on this date. Comments:  Wheelchair Mobility:  No w/c mobility completed on this date. Comments:  Balance  Static Sitting Balance: fair: maintains balance at CGA without use of UE support  Dynamic Sitting Balance: fair: maintains balance at CGA without use of UE support  Static Standing Balance: fair (-): maintains balance at SBA with use of UE support  Dynamic Standing Balance: fair (-): maintains balance at CGA-SBA with use of UE support on 2WW  Comments:    Other Therapeutic Interventions  ADL tasks - see OT note  Functional Outcomes  AM-PAC Inpatient Mobility Raw Score : 20              Cognition  WFL  Orientation:    oriented to person, oriented to place, oriented to situation and disoriented to time  (pt reports not keeping track of date since being retired)  Command Following:   [facility]    Education  Barriers To Learning: none  Patient Education: patient educated on PT role and benefits, plan of care, precautions, general safety, proper use of assistive device/equipment, orientation, discharge recommendations  Learning Assessment:  patient verbalizes and demonstrates understanding    Assessment  Activity Tolerance: Pt tolerated interventions well requiring no rest breaks during ambulation or standing ADL tasks.   Impairments Requiring Therapeutic Intervention: decreased functional mobility, decreased strength, decreased endurance, decreased balance, decreased posture  Prognosis: good  Clinical Assessment: Pt is a 67 y.o female who presents with acute hyponatremia. Impairments identified during evaluation today are listed above. Positive prognostic factors include pt motivation to go home as well as substantial family support. Negative prognostic factors include medium fall risk and extensive PMH. Due to baseline mobility status and good tolerance of treatment session today, discharge to home with use of pt's 2WW is recommended once the pt is medically stable. Safety Interventions: patient left in chair, chair alarm in place, call light within reach, gait belt and patient at risk for falls    Plan  Frequency: 3-5 x/per week  Current Treatment Recommendations: strengthening, balance training, functional mobility training, transfer training, gait training, stair training, endurance training, patient/caregiver education, home exercise program, safety education and equipment evaluation/education  Goals  Patient Goals: To go home, avoid falling  Short Term Goals:  Time Frame: By d/c  Patient will complete bed mobility at modified independent   Patient will complete transfers at [facility]   Patient will ambulate 400 ft with use of rolling walker at modified independent  Patient will ascend/descend 4 stairs with (R) ascending handrail at modified independent    Therapy Session Time      Individual Group Co-treatment   Time In     0923   Time Out     1023   Minutes     60     Timed Code Treatment Minutes:  45 Minutes  Total Treatment Minutes:  60   Mary Chen SPT    PT providing direct supervision during session and assisting in making skilled judgements throughout session.     Electronically Signed By: Kristin Melo PT   Kristin Melo PT, DPT, [id number]

## 2022-06-13 ENCOUNTER — CARE COORDINATION (OUTPATIENT)
Dept: CASE MANAGEMENT | Age: 77
End: 2022-06-13

## 2022-06-13 ENCOUNTER — TELEPHONE (OUTPATIENT)
Dept: FAMILY MEDICINE CLINIC | Age: 77
End: 2022-06-13

## 2022-06-13 DIAGNOSIS — R32 URINARY INCONTINENCE, UNSPECIFIED TYPE: ICD-10-CM

## 2022-06-13 DIAGNOSIS — F01.50 VASCULAR DEMENTIA, UNCOMPLICATED (HCC): Primary | ICD-10-CM

## 2022-06-13 DIAGNOSIS — I10 UNCONTROLLED HYPERTENSION: Primary | ICD-10-CM

## 2022-06-13 DIAGNOSIS — E03.9 HYPOTHYROIDISM, UNSPECIFIED TYPE: ICD-10-CM

## 2022-06-13 DIAGNOSIS — I10 ESSENTIAL HYPERTENSION: ICD-10-CM

## 2022-06-13 DIAGNOSIS — F41.9 ANXIETY: ICD-10-CM

## 2022-06-13 PROCEDURE — 1111F DSCHRG MED/CURRENT MED MERGE: CPT | Performed by: NURSE PRACTITIONER

## 2022-06-13 RX ORDER — CLONAZEPAM 1 MG/1
TABLET ORAL
Qty: 60 TABLET | Refills: 0 | OUTPATIENT
Start: 2022-06-13 | End: 2022-07-14

## 2022-06-13 RX ORDER — MIRTAZAPINE 15 MG/1
TABLET, FILM COATED ORAL
Qty: 30 TABLET | Refills: 0 | Status: SHIPPED | OUTPATIENT
Start: 2022-06-13 | End: 2022-07-12 | Stop reason: SDUPTHER

## 2022-06-13 RX ORDER — LEVOTHYROXINE SODIUM 0.1 MG/1
TABLET ORAL
Qty: 90 TABLET | Refills: 0 | Status: SHIPPED | OUTPATIENT
Start: 2022-06-13 | End: 2022-07-26

## 2022-06-13 RX ORDER — CARVEDILOL 12.5 MG/1
TABLET ORAL
Qty: 60 TABLET | Refills: 0 | Status: SHIPPED | OUTPATIENT
Start: 2022-06-13 | End: 2022-06-27

## 2022-06-13 RX ORDER — OXYBUTYNIN CHLORIDE 5 MG/1
TABLET ORAL
Qty: 90 TABLET | Refills: 1 | Status: SHIPPED | OUTPATIENT
Start: 2022-06-13

## 2022-06-13 RX ORDER — LISINOPRIL 5 MG/1
5 TABLET ORAL DAILY
Qty: 30 TABLET | Refills: 5 | Status: SHIPPED | OUTPATIENT
Start: 2022-06-13

## 2022-06-13 RX ORDER — CLONIDINE HYDROCHLORIDE 0.1 MG/1
0.1 TABLET ORAL 3 TIMES DAILY
Qty: 90 TABLET | Refills: 0 | Status: SHIPPED | OUTPATIENT
Start: 2022-06-13 | End: 2022-07-14

## 2022-06-13 NOTE — CARE COORDINATION
[address] Transitions Initial Follow Up Call    Call within 2 business days of discharge: Yes    Patient: Madeline Pennington Patient : 1945   MRN: [id number]  Reason for Admission: Emesis  Discharge Date: 6/10/22 RARS: Readmission Risk Score: 13.2 ( )      Last Discharge [facility]       Complaint Diagnosis Description Type Department Provider    22 Emesis Nausea vomiting and diarrhea . .. ED to Hosp-Admission (Discharged) (ADMITTED) Ivania Zapata MD; Cheo Cooper . .. Spoke with: [address]: [facility]    Non-face-to-face services provided:  Obtained and reviewed discharge summary and/or continuity of care documents  Communication with home health agencies or other community services the patient is currently using-[facility]   Transitions of Care Initial Call    Was this an external facility discharge? No Discharge Facility: [facility]    Challenges to be reviewed by the provider   Additional needs identified to be addressed with provider: No  home health care-[facility]- SN, PT   MARKWalker             Method of communication with provider : none    Advance Care Planning:   Does patient have an Advance Directive: Reviewed and current   LPN Care Coordinator contacted the patient by telephone to perform post hospital discharge assessment. Verified name and  with patient as identifiers. Provided introduction to self, and explanation of the LPN CC role. LPN CC reviewed discharge instructions, medical action plan and red flags with patient who verbalized understanding. Patient given an opportunity to ask questions and does not have any further questions or concerns at this time. Were discharge instructions available to patient? Yes. Reviewed appropriate site of care based on symptoms and resources available to patient including: PCP  Specialist  Urgent care clinics  [facility]  When to call [address]  The patient agrees to contact the PCP office for questions related to their healthcare. Medication reconciliation was performed with patient, who verbalizes understanding of administration of home medications. Advised obtaining a 90-day supply of all daily and as-needed medications. Was patient discharged with a pulse oximeter? no    LPN CC provided contact information. Plan for follow-up call in 5-7 days based on severity of symptoms and risk factors. Plan for next call: self management-Eating well, PT going well? This [facility] spoke with pt and pt stated that she is doing well. Patient denied any worsening symptoms. Denied fever, chills, N/V and any difficulty breathing at this time. Denied chest pain and SOB. Denied difficulty with urination, BMs or appetite. Medication review performed and patient verbalized understanding and is taking all medications as prescribed. Pt's  will  the newly prescribed meds from the pharmacy today. Pt stated that her daughter administers her meds. Pt has a HFU on 06/20/22. SOC with [facility] will start tomorrow perMargarita. Denied any needs and concerns at this time. Advised pt to immediately report any worsening symptoms to the PCP. Patient verbalized understanding and agreed.   Ninfa Valerio LPN, [facility]  PH: [phone]        Care Transitions 24 Hour Call    Schedule Follow Up Appointment with PCP: Completed  Do you have a copy of your discharge instructions?: Yes  Do you have all of your prescriptions and are they filled?: No (Comment: will  today when pharmacy opens)  Have you been contacted by a Talmage Hashimoto Pharmacist?: No  Have you scheduled your follow up appointment?: Yes  How are you going to get to your appointment?: Car - family or friend to transport  [address]: [address], Other (Comment: [facility])  Patient DME: Oscar Sine chair, Straight cane  Do you have support at home?: Partner/Spouse/SO, Child, Grandchild  Do you feel like you have everything you need to keep you well at home?: Yes  Are you an active caregiver in your home?: No  Care Transitions Interventions   Home Care Waiver: Completed Physical Therapy: Completed       DME Assistance: Completed          Follow Up  Future Appointments   Date Time Provider Dami Sims   6/20/2022  9:15 AM QUITA Crump - ADILIA Neal LPN

## 2022-06-14 NOTE — CARE COORDINATION
Patient's spouse called stating that he accidentally left her medications in a car that was towed and he is not able to get into the car at this time. He states she did not take any of the medications that were prescribed to her and is requesting new prescriptions be sent to [email or web address] on [address]. Called and spoke with the pharmacist to give the prescription information over the phone as the patient's spouse was at the pharmacy.   Electronically signed by Fatou Smith RN on 6/14/2022 at 12:34 PM

## 2022-06-15 ENCOUNTER — TELEPHONE (OUTPATIENT)
Dept: FAMILY MEDICINE CLINIC | Age: 77
End: 2022-06-15

## 2022-06-15 NOTE — TELEPHONE ENCOUNTER
Judy with [facility], trying to start ordered home care. Was supposed to begin this past Monday but Pt has refused stating not feeling well.  just picked up new medicine yesterday. Giovany Mcmahon feels that  is very overwhelmed. Giovany Mcmahon is supposed to go back tomorrow. [phone].

## 2022-06-20 ENCOUNTER — CARE COORDINATION (OUTPATIENT)
Dept: CASE MANAGEMENT | Age: 77
End: 2022-06-20

## 2022-06-20 ENCOUNTER — HOSPITAL ENCOUNTER (EMERGENCY)
Age: 77
Discharge: HOME | End: 2022-06-20
Attending: EMERGENCY MEDICINE
Payer: COMMERCIAL

## 2022-06-20 ENCOUNTER — APPOINTMENT (OUTPATIENT)
Dept: GENERAL RADIOLOGY | Age: 77
End: 2022-06-20
Payer: COMMERCIAL

## 2022-06-20 VITALS
BODY MASS INDEX: 20.09 KG/M2 | OXYGEN SATURATION: 100 % | DIASTOLIC BLOOD PRESSURE: 97 MMHG | RESPIRATION RATE: 23 BRPM | HEART RATE: 71 BPM | TEMPERATURE: 97.1 F | SYSTOLIC BLOOD PRESSURE: 186 MMHG | WEIGHT: 128 LBS | HEIGHT: 67 IN

## 2022-06-20 DIAGNOSIS — R53.1 GENERAL WEAKNESS: Primary | ICD-10-CM

## 2022-06-20 DIAGNOSIS — R35.0 URINARY FREQUENCY: ICD-10-CM

## 2022-06-20 LAB
A/G RATIO: 1.7 (ref 1.1–2.2)
ALBUMIN SERPL-MCNC: 4.8 G/DL (ref 3.4–5)
ALP BLD-CCNC: 49 U/L (ref 40–129)
ALT SERPL-CCNC: 11 U/L (ref 10–40)
ANION GAP SERPL CALCULATED.3IONS-SCNC: 11 MMOL/L (ref 3–16)
AST SERPL-CCNC: 16 U/L (ref 15–37)
BASOPHILS ABSOLUTE: 0.1 K/UL (ref 0–0.2)
BASOPHILS RELATIVE PERCENT: 0.8 %
BILIRUB SERPL-MCNC: 0.4 MG/DL (ref 0–1)
BILIRUBIN URINE: NEGATIVE
BLOOD, URINE: NEGATIVE
BUN BLDV-MCNC: 8 MG/DL (ref 7–20)
CALCIUM SERPL-MCNC: 9.6 MG/DL (ref 8.3–10.6)
CHLORIDE BLD-SCNC: 96 MMOL/L (ref 99–110)
CLARITY: CLEAR
CO2: 27 MMOL/L (ref 21–32)
COLOR: YELLOW
CREAT SERPL-MCNC: 0.5 MG/DL (ref 0.6–1.2)
EKG ATRIAL RATE: 83 BPM
EKG DIAGNOSIS: NORMAL
EKG P AXIS: 70 DEGREES
EKG P-R INTERVAL: 132 MS
EKG Q-T INTERVAL: 388 MS
EKG QRS DURATION: 72 MS
EKG QTC CALCULATION (BAZETT): 455 MS
EKG R AXIS: 15 DEGREES
EKG T AXIS: 35 DEGREES
EKG VENTRICULAR RATE: 83 BPM
EOSINOPHILS ABSOLUTE: 0.1 K/UL (ref 0–0.6)
EOSINOPHILS RELATIVE PERCENT: 1.5 %
GFR AFRICAN AMERICAN: >60
GFR NON-AFRICAN AMERICAN: >60
GLUCOSE BLD-MCNC: 107 MG/DL (ref 70–99)
GLUCOSE URINE: NEGATIVE MG/DL
HCT VFR BLD CALC: 46.7 % (ref 36–48)
HEMOGLOBIN: 15.6 G/DL (ref 12–16)
KETONES, URINE: NEGATIVE MG/DL
LEUKOCYTE ESTERASE, URINE: NEGATIVE
LYMPHOCYTES ABSOLUTE: 0.8 K/UL (ref 1–5.1)
LYMPHOCYTES RELATIVE PERCENT: 9.6 %
MAGNESIUM: 2.1 MG/DL (ref 1.8–2.4)
MCH RBC QN AUTO: 34.3 PG (ref 26–34)
MCHC RBC AUTO-ENTMCNC: 33.3 G/DL (ref 31–36)
MCV RBC AUTO: 103.1 FL (ref 80–100)
MICROSCOPIC EXAMINATION: NORMAL
MONOCYTES ABSOLUTE: 0.8 K/UL (ref 0–1.3)
MONOCYTES RELATIVE PERCENT: 9.8 %
NEUTROPHILS ABSOLUTE: 6.5 K/UL (ref 1.7–7.7)
NEUTROPHILS RELATIVE PERCENT: 78.3 %
NITRITE, URINE: NEGATIVE
PDW BLD-RTO: 14.2 % (ref 12.4–15.4)
PH UA: 7 (ref 5–8)
PLATELET # BLD: 346 K/UL (ref 135–450)
PMV BLD AUTO: 7.9 FL (ref 5–10.5)
POTASSIUM REFLEX MAGNESIUM: 3.7 MMOL/L (ref 3.5–5.1)
PROTEIN UA: NEGATIVE MG/DL
RBC # BLD: 4.53 M/UL (ref 4–5.2)
SODIUM BLD-SCNC: 134 MMOL/L (ref 136–145)
SPECIFIC GRAVITY UA: <=1.005 (ref 1–1.03)
TOTAL PROTEIN: 7.7 G/DL (ref 6.4–8.2)
TROPONIN: <0.01 NG/ML
URINE REFLEX TO CULTURE: NORMAL
URINE TYPE: NORMAL
UROBILINOGEN, URINE: 0.2 E.U./DL
WBC # BLD: 8.3 K/UL (ref 4–11)

## 2022-06-20 PROCEDURE — 85025 COMPLETE CBC W/AUTO DIFF WBC: CPT

## 2022-06-20 PROCEDURE — 84484 ASSAY OF TROPONIN QUANT: CPT

## 2022-06-20 PROCEDURE — 2580000003 HC RX 258: Performed by: PHYSICIAN ASSISTANT

## 2022-06-20 PROCEDURE — 71045 X-RAY EXAM CHEST 1 VIEW: CPT

## 2022-06-20 PROCEDURE — 81003 URINALYSIS AUTO W/O SCOPE: CPT

## 2022-06-20 PROCEDURE — 99285 EMERGENCY DEPT VISIT HI MDM: CPT

## 2022-06-20 PROCEDURE — 93010 ELECTROCARDIOGRAM REPORT: CPT | Performed by: INTERNAL MEDICINE

## 2022-06-20 PROCEDURE — 36415 COLL VENOUS BLD VENIPUNCTURE: CPT

## 2022-06-20 PROCEDURE — 80053 COMPREHEN METABOLIC PANEL: CPT

## 2022-06-20 PROCEDURE — 83735 ASSAY OF MAGNESIUM: CPT

## 2022-06-20 PROCEDURE — 93005 ELECTROCARDIOGRAM TRACING: CPT | Performed by: PHYSICIAN ASSISTANT

## 2022-06-20 RX ORDER — 0.9 % SODIUM CHLORIDE 0.9 %
500 INTRAVENOUS SOLUTION INTRAVENOUS ONCE
Status: COMPLETED | OUTPATIENT
Start: 2022-06-20 | End: 2022-06-20

## 2022-06-20 RX ADMIN — SODIUM CHLORIDE 500 ML: 9 INJECTION, SOLUTION INTRAVENOUS at 08:34

## 2022-06-20 ASSESSMENT — PAIN - FUNCTIONAL ASSESSMENT: PAIN_FUNCTIONAL_ASSESSMENT: NONE - DENIES PAIN

## 2022-06-20 NOTE — ED PROVIDER NOTES
I independently performed a history and physical on María Lovett. All diagnostic, treatment, and disposition decisions were made by myself in conjunction with the advanced practice provider. For further details of Marisa Forde emergency department encounter, please see LESLIE Lilly's documentation. Patient reports several days of progressive weakness and feeling like she is dehydrated. She states she is urinating quite frequently and thinks she may have a UTI. She denies dysuria or any fevers, chills or sweats. No vomiting or diarrhea. She also denies any chest pain or shortness of breath. On exam abdomen benign and heart regular rate and rhythm. EKG  The Ekg interpreted by me shows  normal sinus rhythm with a rate of 83  Axis is   Normal  QTc is  normal  Intervals and Durations are unremarkable.       ST Segments: normal  No significant change from prior EKG dated 6/7/22    Results for orders placed or performed during the hospital encounter of 06/20/22   CBC with Auto Differential   Result Value Ref Range    WBC 8.3 4.0 - 11.0 K/uL    RBC 4.53 4.00 - 5.20 M/uL    Hemoglobin 15.6 12.0 - 16.0 g/dL    Hematocrit 46.7 36.0 - 48.0 %    .1 (H) 80.0 - 100.0 fL    MCH 34.3 (H) 26.0 - 34.0 pg    MCHC 33.3 31.0 - 36.0 g/dL    RDW 14.2 12.4 - 15.4 %    Platelets 960 113 - 975 K/uL    MPV 7.9 5.0 - 10.5 fL    Neutrophils % 78.3 %    Lymphocytes % 9.6 %    Monocytes % 9.8 %    Eosinophils % 1.5 %    Basophils % 0.8 %    Neutrophils Absolute 6.5 1.7 - 7.7 K/uL    Lymphocytes Absolute 0.8 (L) 1.0 - 5.1 K/uL    Monocytes Absolute 0.8 0.0 - 1.3 K/uL    Eosinophils Absolute 0.1 0.0 - 0.6 K/uL    Basophils Absolute 0.1 0.0 - 0.2 K/uL   Comprehensive Metabolic Panel w/ Reflex to MG   Result Value Ref Range    Sodium 134 (L) 136 - 145 mmol/L    Potassium reflex Magnesium 3.7 3.5 - 5.1 mmol/L    Chloride 96 (L) 99 - 110 mmol/L    CO2 27 21 - 32 mmol/L    Anion Gap 11 3 - 16    Glucose 107 (H) 70 - 99 mg/dL    BUN 8 7 - 20 mg/dL    CREATININE 0.5 (L) 0.6 - 1.2 mg/dL    GFR Non-African American >60 >60    GFR African American >60 >60    Calcium 9.6 8.3 - 10.6 mg/dL    Total Protein 7.7 6.4 - 8.2 g/dL    Albumin 4.8 3.4 - 5.0 g/dL    Albumin/Globulin Ratio 1.7 1.1 - 2.2    Total Bilirubin 0.4 0.0 - 1.0 mg/dL    Alkaline Phosphatase 49 40 - 129 U/L    ALT 11 10 - 40 U/L    AST 16 15 - 37 U/L   Troponin   Result Value Ref Range    Troponin <0.01 <0.01 ng/mL   Magnesium   Result Value Ref Range    Magnesium 2.10 1.80 - 2.40 mg/dL   Urinalysis with Reflex to Culture    Specimen: Urine   Result Value Ref Range    Color, UA Yellow Straw/Yellow    Clarity, UA Clear Clear    Glucose, Ur Negative Negative mg/dL    Bilirubin Urine Negative Negative    Ketones, Urine Negative Negative mg/dL    Specific Gravity, UA <=1.005 1.005 - 1.030    Blood, Urine Negative Negative    pH, UA 7.0 5.0 - 8.0    Protein, UA Negative Negative mg/dL    Urobilinogen, Urine 0.2 <2.0 E.U./dL    Nitrite, Urine Negative Negative    Leukocyte Esterase, Urine Negative Negative    Microscopic Examination Not Indicated     Urine Type NotGiven     Urine Reflex to Culture Not Indicated    EKG 12 Lead   Result Value Ref Range    Ventricular Rate 83 BPM    Atrial Rate 83 BPM    P-R Interval 132 ms    QRS Duration 72 ms    Q-T Interval 388 ms    QTc Calculation (Bazett) 455 ms    P Axis 70 degrees    R Axis 15 degrees    T Axis 35 degrees    Diagnosis       Sinus rhythm with Premature supraventricular complexesOtherwise normal ECG       I personally saw this patient and performed a substantive portion of the visit including all aspects of the medical decision making. MDM  Patient states she feels dehydrated but would not find any evidence of this. It does appear that after 500 cc of fluids she is feeling better and is agreeable with discharge.     I personally saw this patient and independently provided 10 minutes of non-concurrent critical care out of the total shared critical care time provided.        Marine Watkins MD  06/20/22 2526

## 2022-06-20 NOTE — CARE COORDINATION
Pt currently in the ED at [facility], will schedule to f/u.   Yandel Ramirez LPN, [address] [facility] [address]: [phone]

## 2022-06-20 NOTE — ED NOTES
Patient attempted to call  three times using hospital phone. Two calls made to 's cell phone with no answer and voicemail box full, one call to home land-line, unanswered. Patient states there are no other numbers she would like to call for transportation home. Patient requesting hospital provide transportation to home to [facility], [address] [place] [address]. Call made to clinical  to provide Lyft/Uber/cab to requested address. Patient is alert and oriented at this time. Patient taken to emergency room lobby to wait for transportation.      Suellen Kang RN  06/20/22 5545

## 2022-06-20 NOTE — ED PROVIDER NOTES
[facility]        Pt Name: Mario Alberto Graves  MRN: [id number]  [place] 1945  Date of evaluation: 6/20/2022  Provider: Shaina De Anda PA-C  PCP: QUITA Lange NP  Note Started: 8:01 AM EDT        I have seen and evaluated this patient with my supervising physician Evelin Parkinson MD.    [address] [place] [address]       Chief Complaint   Patient presents with    Dehydration     Pt coming in from home via [facility]. pt states she is feeling dehydrated. she is on water pills and has been urinating a lot. HISTORY OF PRESENT ILLNESS   (Location, Timing/Onset, Context/Setting, Quality, Duration, Modifying Factors, Severity, Associated Signs and Symptoms)  Note limiting factors. Chief Complaint: General weakness, urinary frequency. Mario Alberto Graves is a 67 y.o. female who presents to the emergency department with a chief complaint of urinary frequency, general weakness and feeling dehydrated. States that she has a slight headache but states she gets these every other day. Denies chest pain, shortness of breath, nausea, vomiting, fevers, abdominal pain, dysuria, hematuria, diarrhea, bloody stool. Denies any lifestyle or medication changes. States she is still been eating and drinking well. Lives at home with her  and another family member. Denies any syncopal episode or fall. Nursing Notes were all reviewed and agreed with or any disagreements were addressed in the HPI. REVIEW OF SYSTEMS    (2-9 systems for level 4, 10 or more for level 5)     Review of Systems    Positives and Pertinent negatives as per HPI. Except as noted above in the ROS, all other systems were reviewed and negative.        PAST MEDICAL HISTORY     Past Medical History:   Diagnosis Date    Acute DVT (deep venous thrombosis) ([facility] [address] 07/03/2015    Acute encephalopathy 04/19/2018    Acute on chronic diastolic heart failure ([facility] [address] hours as needed for Wheezing    APIXABAN (ELIQUIS) 5 MG TABS TABLET    Take 1 tablet by mouth 2 times daily    ASPIRIN 81 MG CHEWABLE TABLET    Take 1 tablet by mouth daily    BUDESONIDE-FORMOTEROL (SYMBICORT) 160-4.5 MCG/ACT AERO    Inhale 2 puffs into the lungs 2 times daily    CARVEDILOL (COREG) 12.5 MG TABLET    TAKE ONE TABLET BY MOUTH TWICE A DAY    CLONAZEPAM (KLONOPIN) 1 MG TABLET    TAKE ONE TABLET BY MOUTH TWICE A DAY AS NEEDED FOR ANXIETY    CLONIDINE (CATAPRES) 0.1 MG TABLET    Take 1 tablet by mouth 3 times daily    LEVOTHYROXINE (SYNTHROID) 100 MCG TABLET    TAKE ONE TABLET BY MOUTH DAILY    LISINOPRIL (PRINIVIL;ZESTRIL) 5 MG TABLET    Take 1 tablet by mouth daily    MIRTAZAPINE (REMERON) 15 MG TABLET    TAKE ONE TABLET BY MOUTH DAILY    ONDANSETRON (ZOFRAN) 4 MG TABLET    Take 1 tablet by mouth 3 times daily as needed for Nausea or Vomiting    OXYBUTYNIN (DITROPAN) 5 MG TABLET    TAKE ONE TABLET BY MOUTH THREE TIMES A DAY    SODIUM CHLORIDE 1 GRAM TABLET    Take 1 tablet by mouth daily         ALLERGIES     Lipitor [atorvastatin], Morphine, Keflex [cephalexin], Hctz [hydrochlorothiazide], Norvasc [amlodipine besylate], Penicillins, Tramadol, Hydralazine, and Shellfish-derived products    FAMILYHISTORY       Family History   Problem Relation Age of Onset    Heart Disease Father         MI @ 64    Alcohol Abuse Father           SOCIAL HISTORY       Social History     Tobacco Use    Smoking status: Current Every Day Smoker     Packs/day: 1.00     Years: 52.00     Pack years: 52.00     Types: Cigarettes     Last attempt to quit: 7/1/2018     Years since quitting: 3.9    Smokeless tobacco: Never Used    Tobacco comment: started smoking at age 26 / smoked up to 2 p,p,d / now smoking 4 to 8 cigarettes a day,   Vaping Use    Vaping Use: Never used   Substance Use Topics    Alcohol use: No    Drug use: No       SCREENINGS    [place] Coma Scale  Eye Opening: Spontaneous  Best Verbal Response: Oriented  Best Motor Response: Obeys commands  Ed Coma Scale Score: 15        PHYSICAL EXAM    (up to 7 for level 4, 8 or more for level 5)     ED Triage Vitals [06/20/22 0747]   BP Temp Temp Source Heart Rate Resp SpO2 Height Weight   (!) 184/115 97.1 °F (36.2 °C) Temporal 88 20 95 % 5' 7\" (1.702 m) 128 lb (58.1 kg)       Physical Exam  Vitals and nursing note reviewed. Constitutional:       Appearance: She is well-developed. She is not diaphoretic. HENT:      Head: Atraumatic. Nose: Nose normal.      Mouth/Throat:      Mouth: Mucous membranes are dry. Eyes:      General:         Right eye: No discharge. Left eye: No discharge. Cardiovascular:      Rate and Rhythm: Normal rate and regular rhythm. Heart sounds: No murmur heard. No friction rub. No gallop. Pulmonary:      Effort: Pulmonary effort is normal. No respiratory distress. Breath sounds: No stridor. No wheezing, rhonchi or rales. Abdominal:      General: Bowel sounds are normal. There is no distension. Palpations: Abdomen is soft. There is no mass. Tenderness: There is no abdominal tenderness. There is no guarding or rebound. Hernia: No hernia is present. Musculoskeletal:         General: No swelling. Normal range of motion. Cervical back: Normal range of motion. Skin:     General: Skin is warm and dry. Findings: No erythema or rash. Neurological:      Mental Status: She is alert and oriented to person, place, and time. Cranial Nerves: No cranial nerve deficit.    Psychiatric:         Behavior: Behavior normal.         DIAGNOSTIC RESULTS   LABS:    Labs Reviewed   CBC WITH AUTO DIFFERENTIAL - Abnormal; Notable for the following components:       Result Value    .1 (*)     MCH 34.3 (*)     Lymphocytes Absolute 0.8 (*)     All other components within normal limits   COMPREHENSIVE METABOLIC PANEL W/ REFLEX TO MG FOR LOW K - Abnormal; Notable for the following components: Sodium 134 (*)     Chloride 96 (*)     Glucose 107 (*)     CREATININE 0.5 (*)     All other components within normal limits   TROPONIN   MAGNESIUM   URINALYSIS WITH REFLEX TO CULTURE       When ordered only abnormal lab results are displayed. All other labs were within normal range or not returned as of this dictation. EKG: When ordered, EKG's are interpreted by the Emergency Department Physician in the absence of a cardiologist.  Please see their note for interpretation of EKG. RADIOLOGY:   Non-plain film images such as CT, Ultrasound and MRI are read by the radiologist. Plain radiographic images are visualized and preliminarily interpreted by the ED Provider with the below findings:        Interpretation per the Radiologist below, if available at the time of this note:    XR CHEST PORTABLE   Final Result   No significant change in the left apical and basilar scarring. Underlying   airspace disease cannot be excluded. No results found. PROCEDURES   Unless otherwise noted below, none     Procedures    CRITICAL CARE TIME       CONSULTS:  None      EMERGENCY DEPARTMENT COURSE and DIFFERENTIAL DIAGNOSIS/MDM:   Vitals:    Vitals:    06/20/22 0850 06/20/22 0900 06/20/22 0915 06/20/22 0930   BP:  (!) 177/104 (!) 187/105 (!) 197/103   Pulse: 72 68 75 80   Resp: 22 21 19 21   Temp:       TempSrc:       SpO2: 99% 97% 98% 97%   Weight:       Height:           Patient was given the following medications:  Medications   0.9 % sodium chloride bolus (0 mLs IntraVENous Stopped 6/20/22 0948)         Is this patient to be included in the SEP-1 Core Measure due to severe sepsis or septic shock? No   Exclusion criteria - the patient is NOT to be included for SEP-1 Core Measure due to: Infection is not suspected    Recheck of the patient prior to discharge she is feeling better. Laboratory testing unremarkable. Mild hyponatremia 134 but better than it has been in the past.  She continues to deny any pain. Low suspicion for atypical presentation of acute coronary syndrome, infectious etiology, acute abdomen or other emergent etiology. She will follow-up with her primary care physician return here for any worsening of symptoms or problems at home. FINAL IMPRESSION      1. General weakness    2.  Urinary frequency          DISPOSITION/PLAN   DISPOSITION Decision To Discharge 06/20/2022 10:17:55 AM      PATIENT REFERRED TO:  QUITA Meade NP  [address]  [phone]    Schedule an appointment as soon as possible for a visit in 3 days  For re-check    [facility] Emergency Department  [address] [place] [address]  [phone]    As needed      DISCHARGE MEDICATIONS:  New Prescriptions    No medications on file       DISCONTINUED MEDICATIONS:  Discontinued Medications    No medications on file              (Please note that portions of this note were completed with a voice recognition program.  Efforts were made to edit the dictations but occasionally words are mis-transcribed.)    Andres Patel PA-C (electronically signed)            Andres Patel PA-C  06/20/22 1023

## 2022-06-21 ENCOUNTER — CARE COORDINATION (OUTPATIENT)
Dept: CASE MANAGEMENT | Age: 77
End: 2022-06-21

## 2022-06-21 NOTE — CARE COORDINATION
[facility] Transitions Follow Up Call    2022    Patient: Codi Pardo  Patient : 1945   MRN: [id number]  Reason for Admission:   Discharge Date: 22 RARS: Readmission Risk Score: 13.2 ( )         Follow up call attempted, could not leave contact info, no vm.       Follow Up  Future Appointments   Date Time Provider Dami Sims   2022  8:00 AM QUITA Wallace NP, RN

## 2022-06-23 ENCOUNTER — CARE COORDINATION (OUTPATIENT)
Dept: CASE MANAGEMENT | Age: 77
End: 2022-06-23

## 2022-06-23 NOTE — CARE COORDINATION
[facility] Transitions Follow Up Call    2022    Patient: Freddy Anna  Patient : 1945   MRN: [id number]  Reason for Admission:   Discharge Date: 22 RARS: Readmission Risk Score: 13.2 ( )         Spoke with: pt's     Pt napping, this nurse will julius back at a different time/date.     Follow Up  Future Appointments   Date Time Provider Dami Sims   2022  8:00 AM QUITA Rodriguez - ADILIA Owens RN\

## 2022-06-24 ENCOUNTER — CARE COORDINATION (OUTPATIENT)
Dept: CASE MANAGEMENT | Age: 77
End: 2022-06-24

## 2022-06-24 NOTE — CARE COORDINATION
[facility] Transitions Follow Up Call    2022    Patient: Acosta Carrasco  Patient : 1945   MRN: [id number]  Reason for Admission:   Discharge Date: 22 RARS: Readmission Risk Score: 13.2 ( )         Spoke with: [address] Transitions Subsequent and Final Call    Subsequent and Final Calls  Do you have any ongoing symptoms?: No  Have your medications changed?: No  Do you have any questions related to your medications?: No  Do you currently have any active services?: Yes  Are you currently active with any services?: Other, Home Health  Do you have any needs or concerns that I can assist you with?: No  Identified Barriers: None  Care Transitions Interventions   Home Care Waiver: Completed Physical Therapy: Completed       DME Assistance: Completed   Other Interventions:         Pt states doing well, no issues or concerns. Agreed to more CTC f/u calls.       Follow Up  Future Appointments   Date Time Provider Dami Sims   2022  8:00 AM QUITA Gr - ADILIA Galarza RN

## 2022-06-25 DIAGNOSIS — I10 ESSENTIAL HYPERTENSION: ICD-10-CM

## 2022-06-27 DIAGNOSIS — I10 UNCONTROLLED HYPERTENSION: ICD-10-CM

## 2022-06-27 RX ORDER — CLONIDINE HYDROCHLORIDE 0.1 MG/1
TABLET ORAL
Qty: 14 TABLET | OUTPATIENT
Start: 2022-06-27

## 2022-06-27 RX ORDER — CARVEDILOL 12.5 MG/1
TABLET ORAL
Qty: 60 TABLET | Refills: 0 | Status: SHIPPED | OUTPATIENT
Start: 2022-06-27 | End: 2022-09-02

## 2022-06-29 ENCOUNTER — TELEPHONE (OUTPATIENT)
Dept: FAMILY MEDICINE CLINIC | Age: 77
End: 2022-06-29

## 2022-06-29 ENCOUNTER — OFFICE VISIT (OUTPATIENT)
Dept: FAMILY MEDICINE CLINIC | Age: 77
End: 2022-06-29
Payer: COMMERCIAL

## 2022-06-29 VITALS
DIASTOLIC BLOOD PRESSURE: 110 MMHG | HEART RATE: 82 BPM | WEIGHT: 115.5 LBS | TEMPERATURE: 97.2 F | SYSTOLIC BLOOD PRESSURE: 180 MMHG | BODY MASS INDEX: 18.09 KG/M2 | OXYGEN SATURATION: 96 %

## 2022-06-29 DIAGNOSIS — I10 UNCONTROLLED HYPERTENSION: ICD-10-CM

## 2022-06-29 DIAGNOSIS — R53.1 GENERAL WEAKNESS: Primary | ICD-10-CM

## 2022-06-29 DIAGNOSIS — F41.9 ANXIETY: ICD-10-CM

## 2022-06-29 DIAGNOSIS — Z09 HOSPITAL DISCHARGE FOLLOW-UP: ICD-10-CM

## 2022-06-29 PROCEDURE — 1111F DSCHRG MED/CURRENT MED MERGE: CPT | Performed by: NURSE PRACTITIONER

## 2022-06-29 PROCEDURE — 99214 OFFICE O/P EST MOD 30 MIN: CPT | Performed by: NURSE PRACTITIONER

## 2022-06-29 PROCEDURE — 1123F ACP DISCUSS/DSCN MKR DOCD: CPT | Performed by: NURSE PRACTITIONER

## 2022-06-29 RX ORDER — CLONAZEPAM 1 MG/1
TABLET ORAL
Qty: 60 TABLET | Refills: 2 | Status: SHIPPED | OUTPATIENT
Start: 2022-06-29 | End: 2022-08-25

## 2022-06-29 NOTE — TELEPHONE ENCOUNTER
Mary Kay Rojas with [facility] calling to advise Bernard that patient has cancelled each PT session she has tried to set up with her. States  has been cancelling them stating they need to see Bernard first. If she is to continue seeing PT, new verbal orders need to be set since the previous orders have . [phone].     Please advise

## 2022-06-29 NOTE — PROGRESS NOTES
daily as needed for Nausea or Vomiting 30 tablet 0    budesonide-formoterol (SYMBICORT) 160-4.5 MCG/ACT AERO Inhale 2 puffs into the lungs 2 times daily 10.2 g 0    albuterol (PROVENTIL) (5 MG/ML) 0.5% nebulizer solution Take 0.5 mLs by nebulization every 6 hours as needed for Wheezing 120 each 0    aspirin 81 MG chewable tablet Take 1 tablet by mouth daily 30 tablet 0     No current facility-administered medications on file prior to visit.       Allergies   Allergen Reactions    Lipitor [Atorvastatin] Other (See Comments)     Weakness, severe    Morphine Shortness Of Breath    Keflex [Cephalexin] Other (See Comments)     SOB & bilateral arms shaking     Hctz [Hydrochlorothiazide] Other (See Comments)     Hyponatremia, severe      Norvasc [Amlodipine Besylate] Swelling     Of neck    Penicillins Hives    Tramadol Itching    Hydralazine Palpitations and Other (See Comments)     Dizzy,fatigue,headaches,palpitations,loss of appetite    Shellfish-Derived Products Swelling and Rash     Swelling of neck     Past Medical History:   Diagnosis Date    Acute D[facility]deep venous thrombosis) ([facility] 07/03/2015    Acute encephalopathy 04/19/2018    Acute on chronic diastolic heart failure ([facility]) 03/30/2019    Alcohol abuse     Anxiety     Arthritis     CAD in native artery     Cellulitis 04/28/2018    Cerebral artery occlusion with cerebral infarction ([facility])     states hx of multiple mini strokes    [facility]brovascular accident (CVA) ([facility] [address]     Chronic fatigue     Complex care coordination     Complicated UTI (urinary tract infection)     COPD (chronic obstructive pulmonary disease) ([facility])     COPD exacerbation ([facility] [address] 02/20/2018    Depression     Diabetes mellitus ([facility] [address]     denies    DIMAS (dyspnea on exertion) 07/03/2015    DVT (deep venous thrombosis) ([facility])     DVT, lower extremity ([facility])     Fatigue 11/03/2015    General weakness 11/04/2015    Generalized weakness 08/22/2019    Hypercholesteremia     Hypertension     Hyperthyroidism     Incontinence 10/16/2012    Mammogram abnormal 2012    Neuromuscular disorder ([facility] [address]     Osteopenia 2017    PAF (paroxysmal atrial fibrillation) (HCC)     Pneumonia     Recurrent UTI     Right forearm cellulitis     Superficial thrombophlebitis of right upper extremity     Thyroid disease     Tobacco abuse     Tobacco abuse counseling     Trapped lung 2017    Unable to walk 2018      Past Surgical History:   Procedure Laterality Date    COLONOSCOPY  2012    Dr. Jocelyn Maradiaga; repeat 5 years    EYE SURGERY      cateracts removed    NERVE SURGERY N/A 2021    Marielena Nyhan, FLEXIBLE CYSTOSCOPY performed by Mariana Lucas MD at [address] [place] [address] ARTHROSCOPY Right 14    SUBACROMIAL DECOMPRESSION, ROTATOR CUFF REPAIR    STIMULATOR SURGERY N/A 2022    INTERSTIMULATOR INSERTION STAGE 2 - MEDTRONICS performed by Mariana Lucas MD at [facility] SURGERY Right     TONSILLECTOMY        Family History   Problem Relation Age of Onset    Heart Disease Father         MI @ 64    Alcohol Abuse Father       Social History     Tobacco Use    Smoking status: Current Every Day Smoker     Packs/day: 1.00     Years: 52.00     Pack years: 52.00     Types: Cigarettes     Last attempt to quit: 2018     Years since quittin.0    Smokeless tobacco: Never Used    Tobacco comment: started smoking at age 26 / smoked up to 2 p,p,d / now smoking 4 to 8 cigarettes a day,   Substance Use Topics    Alcohol use: No        Review of Systems    Objective:    BP (!) 180/110 (Site: Right Upper Arm, Position: Sitting, Cuff Size: Medium Adult)   Pulse 82   Temp 97.2 °F (36.2 °C) (Infrared)   Wt 115 lb 8 oz (52.4 kg)   SpO2 96%   BMI 18.09 kg/m²   Weight: 115 lb 8 oz (52.4 kg)     BP Readings from Last 3 Encounters:   22 (!) 180/110   22 (!) 186/97   06/10/22 (!) 158/84 Wt Readings from Last 3 Encounters:   06/29/22 115 lb 8 oz (52.4 kg)   06/20/22 128 lb (58.1 kg)   06/07/22 120 lb (54.4 kg)     BMI Readings from Last 3 Encounters:   06/29/22 18.09 kg/m²   06/20/22 20.05 kg/m²   06/07/22 18.79 kg/m²       Physical Exam    Assessment/Plan    1. General weakness  Discussed home health  Advised take medication as prescribed  Follow up with urology  Remain hydrated  Message sent to care coordinator to set up for pill packs for better compliance  Referral placed to home health    2. Anxiety  Controlled  OARRS reviewed  - clonazePAM (KLONOPIN) 1 MG tablet; TAKE ONE TABLET BY MOUTH TWICE A DAY AS NEEDED FOR ANXIETY  Dispense: 60 tablet; Refill: 2    3. Hospital discharge follow-up  Reviewed physician notations, labs, imaging  Questions answered    Time Spent on discharge is more than 30 minutes in the examination, evaluation, counseling and review of medications and discharge plan. Return in about 3 months (around 9/29/2022) for medication re-check. This dictation was generated by voice recognition computer software. Although all attempts are made to edit the dictation for accuracy, there may be errors in the transcription that are not intended.

## 2022-06-30 ENCOUNTER — TELEPHONE (OUTPATIENT)
Dept: FAMILY MEDICINE CLINIC | Age: 77
End: 2022-06-30

## 2022-06-30 NOTE — TELEPHONE ENCOUNTER
Pt would benefit greatly from pill packs,  is main caregiver and has difficulty administering appropriate meds at prescribed times, misses multiple doses including BP medications. I will be ordering for home RN visits to check in and check VSS, status of care. She has had multiple ED/hospitalizations.

## 2022-06-30 NOTE — TELEPHONE ENCOUNTER
Faxed home health referral, demo report and last visit summary to [facility]. Phone number [phone]. Fax number [phone].

## 2022-07-01 ENCOUNTER — CARE COORDINATION (OUTPATIENT)
Dept: CASE MANAGEMENT | Age: 77
End: 2022-07-01

## 2022-07-01 NOTE — CARE COORDINATION
[facility] Transitions Follow Up Call    2022    Patient: Dora Bearden  Patient : 1945   MRN: [id number]  Reason for Admission:   Discharge Date: 22 RARS: Readmission Risk Score: 13.2 ( )         Spoke with: pt's , HIPAA verified    Care Transitions Subsequent and Final Call    Subsequent and Final Calls  Do you have any ongoing symptoms?: No  Have your medications changed?: No  Do you have any questions related to your medications?: No  Do you currently have any active services?: Yes  Are you currently active with any services?: Other, Home Health  Do you have any needs or concerns that I can assist you with?: No  Identified Barriers: None  Care Transitions Interventions  No Identified Needs   Home Care Waiver: Completed Physical Therapy: Completed       DME Assistance: Completed   Other Interventions:         Pt's spouse states pt is doing better, no issues or concerns. Denies any further NV/D. Urinating without difficulty. Saw PCP, starting with [facility] on Monday. Agreed to more CTC f/u calls.       Follow Up  Future Appointments   Date Time Provider Dami Sims   2022  8:30 AM QUITA Madrigal NP, RN

## 2022-07-05 ENCOUNTER — TELEPHONE (OUTPATIENT)
Dept: FAMILY MEDICINE CLINIC | Age: 77
End: 2022-07-05

## 2022-07-05 ENCOUNTER — CARE COORDINATION (OUTPATIENT)
Dept: CARE COORDINATION | Age: 77
End: 2022-07-05

## 2022-07-05 NOTE — CARE COORDINATION
LINA made outreach to patient for Care Management enrollment. She has agreed to work with [facility] and has asked that [facility] call her back 7/6/2022 for enrollment.

## 2022-07-05 NOTE — TELEPHONE ENCOUNTER
Fernando Valdivia calling in stating she can not get in contact with patient so she can schedule home care. She states that every time she get in contact with someone they always cancel or reschedule. Please advise!

## 2022-07-06 ENCOUNTER — CARE COORDINATION (OUTPATIENT)
Dept: CASE MANAGEMENT | Age: 77
End: 2022-07-06

## 2022-07-06 ENCOUNTER — CARE COORDINATION (OUTPATIENT)
Dept: CARE COORDINATION | Age: 77
End: 2022-07-06

## 2022-07-06 SDOH — ECONOMIC STABILITY: FOOD INSECURITY: WITHIN THE PAST 12 MONTHS, THE FOOD YOU BOUGHT JUST DIDN'T LAST AND YOU DIDN'T HAVE MONEY TO GET MORE.: NEVER TRUE

## 2022-07-06 SDOH — ECONOMIC STABILITY: HOUSING INSECURITY: IN THE LAST 12 MONTHS, HOW MANY PLACES HAVE YOU LIVED?: 1

## 2022-07-06 SDOH — HEALTH STABILITY: PHYSICAL HEALTH: ON AVERAGE, HOW MANY DAYS PER WEEK DO YOU ENGAGE IN MODERATE TO STRENUOUS EXERCISE (LIKE A BRISK WALK)?: 0 DAYS

## 2022-07-06 SDOH — ECONOMIC STABILITY: INCOME INSECURITY: IN THE LAST 12 MONTHS, WAS THERE A TIME WHEN YOU WERE NOT ABLE TO PAY THE MORTGAGE OR RENT ON TIME?: NO

## 2022-07-06 SDOH — ECONOMIC STABILITY: TRANSPORTATION INSECURITY
IN THE PAST 12 MONTHS, HAS LACK OF TRANSPORTATION KEPT YOU FROM MEETINGS, WORK, OR FROM GETTING THINGS NEEDED FOR DAILY LIVING?: NO

## 2022-07-06 SDOH — ECONOMIC STABILITY: HOUSING INSECURITY
IN THE LAST 12 MONTHS, WAS THERE A TIME WHEN YOU DID NOT HAVE A STEADY PLACE TO SLEEP OR SLEPT IN A SHELTER (INCLUDING NOW)?: NO

## 2022-07-06 SDOH — ECONOMIC STABILITY: FOOD INSECURITY: WITHIN THE PAST 12 MONTHS, YOU WORRIED THAT YOUR FOOD WOULD RUN OUT BEFORE YOU GOT MONEY TO BUY MORE.: NEVER TRUE

## 2022-07-06 SDOH — HEALTH STABILITY: PHYSICAL HEALTH: ON AVERAGE, HOW MANY MINUTES DO YOU ENGAGE IN EXERCISE AT THIS LEVEL?: 0 MIN

## 2022-07-06 SDOH — ECONOMIC STABILITY: TRANSPORTATION INSECURITY
IN THE PAST 12 MONTHS, HAS THE LACK OF TRANSPORTATION KEPT YOU FROM MEDICAL APPOINTMENTS OR FROM GETTING MEDICATIONS?: NO

## 2022-07-06 ASSESSMENT — LIFESTYLE VARIABLES: HOW OFTEN DO YOU HAVE A DRINK CONTAINING ALCOHOL: NEVER

## 2022-07-06 ASSESSMENT — SOCIAL DETERMINANTS OF HEALTH (SDOH)
IN A TYPICAL WEEK, HOW MANY TIMES DO YOU TALK ON THE PHONE WITH FAMILY, FRIENDS, OR NEIGHBORS?: THREE TIMES A WEEK
HOW OFTEN DO YOU GET TOGETHER WITH FRIENDS OR RELATIVES?: NEVER
HOW OFTEN DO YOU ATTENT MEETINGS OF THE CLUB OR ORGANIZATION YOU BELONG TO?: NEVER
HOW OFTEN DO YOU ATTEND CHURCH OR RELIGIOUS SERVICES?: NEVER
DO YOU BELONG TO ANY CLUBS OR ORGANIZATIONS SUCH AS CHURCH GROUPS UNIONS, FRATERNAL OR ATHLETIC GROUPS, OR SCHOOL GROUPS?: NO
HOW HARD IS IT FOR YOU TO PAY FOR THE VERY BASICS LIKE FOOD, HOUSING, MEDICAL CARE, AND HEATING?: SOMEWHAT HARD

## 2022-07-06 NOTE — CARE COORDINATION
Ambulatory Care Coordination Note  7/6/2022  CM Risk Score: 13  Charlson 10 Year Mortality Risk Score: 100%     ACC: Alexandra Barth, RN    Summary Note: ACM enrolled patient into Care Management from PCP referral. Patient has agreed to work with [facility]. AC discussed Fall Risk with patient. She feels unsteady while ambulating and uses her walker at all times. Patient had a recent fall and per patient she had a scrape on her knee but did go to ED for evaluation. Patient lives with her  and daughter. Her daughter helps with household chores, and bathing. Patient does not get up at night to use the restroom. She wears depends. During outreach ACM discussed securing rugs and keeping cords and wires out of walkways. With the help of her daughter her house is clear of clutter and she does not have to do any reaching or lifting. [facility] will send resources for falls to her my chart. ACM discussed COPD with patient and her . They understand environmental exposures and is able to verbalize the difference between Rescue vs. Long Acting medications and have no concerns with usage of inhalers. Patient is comfortable with usage and is educated on proper usage of inhalers. Patient has no current concerns with her COPD and is at baseline with all symptoms. Patient is a smoker and according to her  she smokes like a \"luis carlos\". ACM discussed health risk with smoking and asked patient if she was ready to consider quitting. Patient states very adamantly that she is not ready at this time. ACM discussed tools and aides for stopping smoking and patient has declined at this time. [facility] will send resources to patient for COPD and smoking cessation in my chart. ACM discussed referral that was sent to ACM from PCP for assistance with medications. Patient and her  states that their daughter puts all patients pills in a pill container and her  has been giving them to her daily.  ACM discussed pill packs and benefits of having packets mailed to the home labeled and already prepared for patient. Patient and her  agreed that patient would benefit from service. With permission [facility] has sent referral to [address]. They are aware that pharmacy will call them to start the enrollment process. [facility] discussed urinary incontinence with patient and her . Patient has follow up with urologist scheduled. Per patients  she has a implant currently that is \"useless\". The implant is supposed to alert patient when she needs to urinate, but has not worked. Patient is currently wearing depends all day and through the night. Patient's  states that it is costly and with the rising cost of gas and food he worries that affording them will become worrisome. [facility] advised patient that there are programs that assist with purchasing depends and may help with some of the financial burden. [facility] will research programs and update patient with information during next outreach. Patient and her  are interested in [employer] for overnight. Patient's  states that they have looked into them and found them to be very costly. At patient and her 's request AC will investigate coverage or programs for [employer] and update if any are available. [facility] has made arrangements to follow up with patient and her  at a later date/time. Plan   F/U Fall Risk  F/U Depends   F/U [facility] Pill Packs    Ambulatory Care Coordination Assessment    Care Coordination Protocol  Referral from Primary Care Provider: No  Week 1 - Initial Assessment     Do you have all of your prescriptions and are they filled?: Yes (Comment: daughter dispenses medication)  Barriers to medication adherence: None  Are you able to afford your medications?: Yes  How often do you have trouble taking your medications the way you have been told to take them?: I always take them as prescribed.      Do you have Home O2 Therapy?: No      Ability to seek help/take action for Emergent Urgent situations i.e. fire, crime, inclement weather or health crisis. : Independent  Ability to ambulate to restroom: Independent  Ability handle personal hygeine needs (bathing/dressing/grooming): Independent  Ability to manage Medications: Needs Assistance  Ability to prepare Food Preparation: Needs Assistance  Ability to maintain home (clean home, laundry): Needs Assistance  Ability to drive and/or has transportation: Dependent  Ability to do shopping: Needs Assistance  Ability to manage finances: Independent  Is patient able to live independently?: Yes     Current Housing: Private Residence        Per the Fall Risk Screening, did the patient have 2 or more falls or 1 fall with injury in the past year?: No     Frequent urination at night?: No  Do you use rails/bars?: No  Do you have a non-slip tub mat?: No     Are you experiencing loss of meaning?: No  Are you experiencing loss of hope and peace?: No     Thinking about your patient's physical health needs, are there any symptoms or problems (risk indicators) you are unsure about that require further investigation?: No identified areas of uncertainly or problems already being investigated   Are the patients physical health problems impacting on their mental well-being?: No identified areas of concern   Are there any problems with your patients lifestyle behaviors (alcohol, drugs, diet, exercise) that are impacting on physical or mental well-being?: Some mild concern of potential negative impact on well-being   Do you have any other concerns about your patients mental well-being?  How would you rate their severity and impact on the patient?: Mild problems - don't interfere with function   How would you rate their home environment in terms of safety and stability (including domestic violence, insecure housing, neighbor harassment)?: Consistently safe, supportive, stable, no identified problems   How do daily activities impact on the patient's well-being? (include current or anticipated unemployment, work, caregiving, access to transportation or other): No identified problems or perceived positive benefits   How would you rate their social network (family, work, friends)?: Adequate participation with social networks   How would you rate their financial resources (including ability to afford all required medical care)?: Financially secure, some resource challenges   How wells does the patient now understand their health and well-being (symptoms, signs or risk factors) and what they need to do to manage their health?: Reasonable to good understanding and already engages in managing health or is willing to undertake better management   How well do you think your patient can engage in healthcare discussions? (Barriers include language, deafness, aphasia, alcohol or drug problems, learning difficulties, concentration): Clear and open communication, no identified barriers   Do other services need to be involved to help this patient?: Other care/services in place and adequate   Are current services involved with this patient well-coordinated? (Include coordination with other services you are now recommendation): All required care/services in place and well-coordinated   Suggested Interventions and Community Resources  Fall Risk Prevention: In Process Medi Set or Pill Pack: In Process   Occupational Therapy: In Process   Physical Therapy: In Process   Smoking Cessation: Declined   Zone Management Tools: In Process                  Prior to Admission medications    Medication Sig Start Date End Date Taking?  Authorizing Provider   clonazePAM (KLONOPIN) 1 MG tablet TAKE ONE TABLET BY MOUTH TWICE A DAY AS NEEDED FOR ANXIETY 6/29/22 7/30/22  QUITA Cerda NP   carvedilol (COREG) 12.5 MG tablet TAKE ONE TABLET BY MOUTH TWICE A DAY 6/27/22   QUITA Perry NP   lisinopril (PRINIVIL;ZESTRIL) 5 MG tablet Take 1 tablet by understand envrionmental exposure?: Yes  Is the patient able to verbalize Rescue vs. Long Acting medications?: Yes  Does the patient have a nebulizer?: Yes  Does the patient use a space with inhaled medications?: No            Symptoms:  None: Yes      Change in chronic cough?: No/At Baseline  Change in sputum?: No/At Baseline  Self Monitoring - SaO2: No  Have you had a recent diagnosis of pneumonia either by PCP or at a hospital?: No     ,   General Assessment    Do you have any symptoms that are causing concern?: No     , Care Coordination Episodes    Type: Amb Care Management  Episode: Rising Risk  Noted: 7/5/2022 and 13

## 2022-07-06 NOTE — TELEPHONE ENCOUNTER
Jose Rafael Ventura, gave verbal orders to cancel since Pt does not comply with appts. Stated she understood without further questions.

## 2022-07-07 DIAGNOSIS — J43.2 CENTRILOBULAR EMPHYSEMA (HCC): Primary | ICD-10-CM

## 2022-07-12 DIAGNOSIS — F41.9 ANXIETY: ICD-10-CM

## 2022-07-12 RX ORDER — MIRTAZAPINE 15 MG/1
TABLET, FILM COATED ORAL
Qty: 30 TABLET | Refills: 0 | Status: SHIPPED | OUTPATIENT
Start: 2022-07-12 | End: 2022-07-21 | Stop reason: SDUPTHER

## 2022-07-12 NOTE — TELEPHONE ENCOUNTER
[facility] pharmacy calling to get an e-script for patient's Mirtazapine. States they received all of the patient's other medication refills from her local pharmacy and they just need her Mirtazapine. Phone number [phone]. Fax number [phone].      Please advise

## 2022-07-14 DIAGNOSIS — I10 ESSENTIAL HYPERTENSION: ICD-10-CM

## 2022-07-14 RX ORDER — CLONIDINE HYDROCHLORIDE 0.1 MG/1
TABLET ORAL
Qty: 60 TABLET | Refills: 10 | Status: SHIPPED | OUTPATIENT
Start: 2022-07-14 | End: 2022-07-28

## 2022-07-20 ENCOUNTER — CARE COORDINATION (OUTPATIENT)
Dept: CARE COORDINATION | Age: 77
End: 2022-07-20

## 2022-07-20 NOTE — CARE COORDINATION
Ambulatory Care Coordination Note  7/20/2022    ACC: Shirley Barrientos, RN    Summary Note: ACM made outreach to patient to follow up with Care Management. ACM discussed COPD with patient. Per patient she is not currently having any issues or concerns. She stays indoors when the weather gets as hot as it has been. She takes her medications as prescribed and has not had an increased need to use her rescue inhaler. Patient is aware of COPD zones and when to report concerns. ACM discussed patient's appointment with Urologist. Per patient she was scheduled to have implant removed but was tested for COVID prior to procedure. Patient was positive. Per patient she had zero symptoms and would not have known that she had COVID if not for the test. She currently has no questions of concerns regarding COVID. She is scheduled for follow up with urologist but unsure of date and time. Per patient her  \"keeps track of all that\". ACM discussed Depends resources that were found. Patient was unable to write down information at the time of the call. ACM will send information via my chart for patient's  to review. ACM education on Fall safety. Patient practices safety inside and outside the home. She uses caution with ambulation and keeps her environment well lit, clear of clutter and her daughter has removed potential hazards. Patient states that if something is out of reach or if it requires bending she will leave and let someone else get it. Patient has been contacted by [facility] and her prescriptions are being processed. ACM advised patient to call if she has any questions or concerns once medications arrive. Plan  F/U COPD  F/U [facility]   F/U Depends, Community Resources       Lab Results       None            Care Coordination Interventions    Referral from Primary Care Provider: No  Suggested Interventions and Community Resources  Fall Risk Prevention: In Process  Medi Set or Pill Pack:  In Process (Comment: [facility] Referral Sent)  Occupational Therapy: In Process  Physical Therapy: In Process  Smoking Cessation: Declined  Zone Management Tools: In Process          Goals Addressed    None         Prior to Admission medications    Medication Sig Start Date End Date Taking?  Authorizing Provider   cloNIDine (CATAPRES) 0.1 MG tablet TAKE 1 TABLET BY MOUTH TWICE DAILY 7/14/22   Nenita Harley MD   mirtazapine (REMERON) 15 MG tablet TAKE ONE TABLET BY MOUTH DAILY 7/12/22   [facility], APRN - NP   albuterol (PROVENTIL) (5 MG/ML) 0.5% nebulizer solution Take 0.5 mLs by nebulization every 6 hours as needed for Wheezing 7/7/22   [place] Person, APRN - NP   clonazePAM (KLONOPIN) 1 MG tablet TAKE ONE TABLET BY MOUTH TWICE A DAY AS NEEDED FOR ANXIETY 6/29/22 7/30/22  [place] Person, APRN - NP   carvedilol (COREG) 12.5 MG tablet TAKE ONE TABLET BY MOUTH TWICE A DAY 6/27/22   [place] Person, APRN - NP   lisinopril (PRINIVIL;ZESTRIL) 5 MG tablet Take 1 tablet by mouth daily 6/13/22   [place] Person, APRN - NP   apixaban (ELIQUIS) 5 MG TABS tablet Take 1 tablet by mouth 2 times daily 6/13/22   [place] Person, APRN - NP   oxybutynin (DITROPAN) 5 MG tablet TAKE ONE TABLET BY MOUTH THREE TIMES A DAY 6/13/22   [place] Person, APRN - NP   levothyroxine (SYNTHROID) 100 MCG tablet TAKE ONE TABLET BY MOUTH DAILY 6/13/22   [place] Person, APRN - NP   sodium chloride 1 gram tablet Take 1 tablet by mouth daily 6/11/22   Nyla Dominique, APRN - CNP   ondansetron (ZOFRAN) 4 MG tablet Take 1 tablet by mouth 3 times daily as needed for Nausea or Vomiting 2/2/22   [facility], QUITA - NP   budesonide-formoterol (SYMBICORT) 160-4.5 MCG/ACT AERO Inhale 2 puffs into the lungs 2 times daily 1/26/22   Alley Curtis MD   aspirin 81 MG chewable tablet Take 1 tablet by mouth daily 4/16/21   Colten Rojo MD       Future Appointments   Date Time Provider Dami Sims   9/29/2022  8:30 AM Esteban Johnson, APRN - NP [address]   ,   COPD Assessment    Does the patient understand envrionmental exposure?: Yes  Is the patient able to verbalize Rescue vs. Long Acting medications?: Yes  Does the patient have a nebulizer?: Yes  Does the patient use a space with inhaled medications?: No            Symptoms:  None: Yes      Symptom course: stable  Breathlessness: none  Increase use of rapid acting/rescue inhaled medications?: No  Change in chronic cough?: No/At Baseline  Change in sputum?: No/At Baseline  Have you had a recent diagnosis of pneumonia either by PCP or at a hospital?: No     ,   General Assessment    Do you have any symptoms that are causing concern?: No     , and Care Coordination Episodes    Type: Amb Care Management  Episode: Rising Risk  Noted: 7/5/2022

## 2022-07-21 DIAGNOSIS — F41.9 ANXIETY: ICD-10-CM

## 2022-07-21 RX ORDER — MIRTAZAPINE 15 MG/1
TABLET, FILM COATED ORAL
Qty: 30 TABLET | Refills: 2 | Status: SHIPPED | OUTPATIENT
Start: 2022-07-21 | End: 2022-07-28

## 2022-07-21 NOTE — TELEPHONE ENCOUNTER
Medication:   Requested Prescriptions     Pending Prescriptions Disp Refills    mirtazapine (REMERON) 15 MG tablet 30 tablet 2     Sig: TAKE ONE TABLET BY MOUTH DAILY      Last Filled:      Patient Phone Number: [phone] (home) [phone] (work)    Last appt: 6/29/2022   Next appt: 9/29/2022    Last OARRS:   RX Monitoring 5/11/2020   Attestation -   Periodic Controlled Substance Monitoring No signs of potential drug abuse or diversion identified.      PDMP Monitoring:    Last PDMP Goldie Hinson as Reviewed [facility]):  Review User Review Instant Review Result   Wei Pandey 12/23/2021  8:37 AM Reviewed PDMP [1]     Preferred Pharmacy:       [address] [place] [address]  Phone: [phone] Fax: [phone]

## 2022-07-25 DIAGNOSIS — E03.9 HYPOTHYROIDISM, UNSPECIFIED TYPE: ICD-10-CM

## 2022-07-26 RX ORDER — LEVOTHYROXINE SODIUM 0.1 MG/1
TABLET ORAL
Qty: 90 TABLET | Refills: 0 | Status: SHIPPED | OUTPATIENT
Start: 2022-07-26

## 2022-07-27 DIAGNOSIS — F41.9 ANXIETY: ICD-10-CM

## 2022-07-27 DIAGNOSIS — I10 ESSENTIAL HYPERTENSION: ICD-10-CM

## 2022-07-27 RX ORDER — MIRTAZAPINE 15 MG/1
TABLET, FILM COATED ORAL
Qty: 30 TABLET | Refills: 2 | OUTPATIENT
Start: 2022-07-27

## 2022-07-28 RX ORDER — CLONIDINE HYDROCHLORIDE 0.1 MG/1
TABLET ORAL
Qty: 90 TABLET | Refills: 2 | Status: SHIPPED | OUTPATIENT
Start: 2022-07-28

## 2022-07-28 RX ORDER — MIRTAZAPINE 15 MG/1
TABLET, FILM COATED ORAL
Qty: 30 TABLET | Refills: 2 | Status: SHIPPED | OUTPATIENT
Start: 2022-07-28 | End: 2022-10-18 | Stop reason: SDUPTHER

## 2022-08-04 ENCOUNTER — CARE COORDINATION (OUTPATIENT)
Dept: CARE COORDINATION | Age: 77
End: 2022-08-04

## 2022-08-04 ASSESSMENT — ENCOUNTER SYMPTOMS: DYSPNEA ASSOCIATED WITH: EXERTION

## 2022-08-04 NOTE — CARE COORDINATION
Ambulatory Care Coordination Note  8/4/2022    ACC: Ernie Foreman, RN    Summary Note: ACM made outreach to patient for follow up with Care Management. ACM discussed resources for depends that had been sent via my chart. Message was not read. ACM encouraged patient and her  to review resource and advised ACM of any questions regarding resource. ACM discussed COPD with patient. Per patient she is at baseline with her symptoms with no current concerns. When discussing s/sx to monitor patient advised ACM that she pays close attention and will report any concerns right away. Patient is enrolled to receive medi packs from [facility]. She has not received medications to date but advised ACM that she is in the process and was confident that the process is moving in the right direction. Patient had no current questions or concerns for ACM during the time of the call. ACM made arrangements for future outreach and encouraged patient to call with any non emergent questions or concerns. Plan  F/U COPD, Depends, Medi Pks  Address Care Gaps  Discuss Graduation    Lab Results       None            Care Coordination Interventions    Referral from Primary Care Provider: No  Suggested Interventions and Community Resources  Fall Risk Prevention: In Process  Medi Set or Pill Pack: In Process (Comment: [facility] Referral Sent)  Occupational Therapy: In Process  Physical Therapy:  In Process  Smoking Cessation: Declined  Zone Management Tools: Completed          Goals Addressed                      This Visit's Progress      Activity Plan   Improving      I will increase my activity by Referred to physical therapy (Selena Doe)    Barriers: overwhelmed by complexity of regimen, stress, time constraints, and lack of education  Plan for overcoming my barriers: work with PCP, ACM and C PT  Confidence: 8/10  Anticipated Goal Completion Date: 9/6/2022        Patient Stated (pt-stated)   No change      Take medications as prescribed  Barriers: overwhelmed by complexity of regimen, stress, and lack of education  Plan for overcoming my barriers: work with [facility], PCP, and ACM  Confidence: 8/10  Anticipated Goal Completion Date: 9/6/2022              Prior to Admission medications    Medication Sig Start Date End Date Taking?  Authorizing Provider   cloNIDine (CATAPRES) 0.1 MG tablet TAKE ONE TABLET BY MOUTH THREE TIMES A DAY 7/28/22   QUITA Morales - NP   mirtazapine (REMERON) 15 MG tablet TAKE ONE TABLET BY MOUTH DAILY 7/28/22   Angelica Bryante CureQUITA tran - NP   levothyroxine (SYNTHROID) 100 MCG tablet TAKE ONE TABLET BY MOUTH DAILY 7/26/22   QUITA Villegas NP   albuterol (PROVENTIL) (5 MG/ML) 0.5% nebulizer solution Take 0.5 mLs by nebulization every 6 hours as needed for Wheezing 7/7/22   QUITA Corona NP   clonazePAM (KLONOPIN) 1 MG tablet TAKE ONE TABLET BY MOUTH TWICE A DAY AS NEEDED FOR ANXIETY 6/29/22 7/30/22  QUITA Corona NP   carvedilol (COREG) 12.5 MG tablet TAKE ONE TABLET BY MOUTH TWICE A DAY 6/27/22   QUITA Corona - NP   lisinopril (PRINIVIL;ZESTRIL) 5 MG tablet Take 1 tablet by mouth daily 6/13/22   QUITA Corona - NP   apixaban (ELIQUIS) 5 MG TABS tablet Take 1 tablet by mouth 2 times daily 6/13/22   QUITA Corona - NP   oxybutynin (DITROPAN) 5 MG tablet TAKE ONE TABLET BY MOUTH THREE TIMES A DAY 6/13/22   QUITA Corona NP   sodium chloride 1 gram tablet Take 1 tablet by mouth daily 6/11/22   QUITA Graves CNP   ondansetron (ZOFRAN) 4 MG tablet Take 1 tablet by mouth 3 times daily as needed for Nausea or Vomiting 2/2/22   QUITA Villegas NP   budesonide-formoterol (SYMBICORT) 160-4.5 MCG/ACT AERO Inhale 2 puffs into the lungs 2 times daily 1/26/22   Meeta Ivey MD   aspirin 81 MG chewable tablet Take 1 tablet by mouth daily 4/16/21   Piedad Spicer MD       Future Appointments   Date Time Provider Dami Pascuali   9/29/2022  8:30 AM QUITA Menezes - NP [address]   ,   COPD Assessment    Does the patient understand envrionmental exposure?: Yes  Is the patient able to verbalize Rescue vs. Long Acting medications?: Yes  Does the patient have a nebulizer?: Yes  Does the patient use a space with inhaled medications?: No            Symptoms:  None: Yes      Symptom course: stable  Breathlessness: exertion  Increase use of rapid acting/rescue inhaled medications?: No  Change in chronic cough?: No/At Baseline  Change in sputum?: No/At Baseline  Self Monitoring - SaO2: No  Have you had a recent diagnosis of pneumonia either by PCP or at a hospital?: No     ,   General Assessment    Do you have any symptoms that are causing concern?: No     , and Care Coordination Episodes    Type: Amb Care Management  Episode: Rising Risk  Noted: 7/5/2022

## 2022-08-24 ENCOUNTER — CARE COORDINATION (OUTPATIENT)
Dept: CARE COORDINATION | Age: 77
End: 2022-08-24

## 2022-08-24 DIAGNOSIS — F41.9 ANXIETY: ICD-10-CM

## 2022-08-25 RX ORDER — CLONAZEPAM 1 MG/1
TABLET ORAL
Qty: 60 TABLET | Refills: 0 | Status: SHIPPED | OUTPATIENT
Start: 2022-08-25 | End: 2022-10-05

## 2022-08-26 DIAGNOSIS — F41.9 ANXIETY: ICD-10-CM

## 2022-08-29 RX ORDER — CLONAZEPAM 1 MG/1
TABLET ORAL
Qty: 60 TABLET | Refills: 1 | OUTPATIENT
Start: 2022-08-29

## 2022-08-31 ENCOUNTER — HOSPITAL ENCOUNTER (EMERGENCY)
Age: 77
Discharge: HOME | End: 2022-08-31
Attending: EMERGENCY MEDICINE
Payer: COMMERCIAL

## 2022-08-31 ENCOUNTER — APPOINTMENT (OUTPATIENT)
Dept: GENERAL RADIOLOGY | Age: 77
End: 2022-08-31
Payer: COMMERCIAL

## 2022-08-31 VITALS
HEIGHT: 67 IN | OXYGEN SATURATION: 97 % | HEART RATE: 64 BPM | RESPIRATION RATE: 15 BRPM | WEIGHT: 115 LBS | BODY MASS INDEX: 18.05 KG/M2 | SYSTOLIC BLOOD PRESSURE: 126 MMHG | TEMPERATURE: 97.4 F | DIASTOLIC BLOOD PRESSURE: 85 MMHG

## 2022-08-31 DIAGNOSIS — E83.42 HYPOMAGNESEMIA: ICD-10-CM

## 2022-08-31 DIAGNOSIS — N39.0 URINARY TRACT INFECTION WITHOUT HEMATURIA, SITE UNSPECIFIED: Primary | ICD-10-CM

## 2022-08-31 LAB
A/G RATIO: 1.5 (ref 1.1–2.2)
ALBUMIN SERPL-MCNC: 4.4 G/DL (ref 3.4–5)
ALP BLD-CCNC: 53 U/L (ref 40–129)
ALT SERPL-CCNC: 8 U/L (ref 10–40)
ANION GAP SERPL CALCULATED.3IONS-SCNC: 15 MMOL/L (ref 3–16)
AST SERPL-CCNC: 17 U/L (ref 15–37)
BACTERIA: ABNORMAL /HPF
BILIRUB SERPL-MCNC: 0.7 MG/DL (ref 0–1)
BILIRUBIN URINE: NEGATIVE
BLOOD, URINE: ABNORMAL
BUDDING YEAST: PRESENT
BUN BLDV-MCNC: 7 MG/DL (ref 7–20)
CALCIUM SERPL-MCNC: 9 MG/DL (ref 8.3–10.6)
CHLORIDE BLD-SCNC: 89 MMOL/L (ref 99–110)
CLARITY: CLEAR
CO2: 27 MMOL/L (ref 21–32)
COLOR: YELLOW
CREAT SERPL-MCNC: <0.5 MG/DL (ref 0.6–1.2)
EPITHELIAL CELLS, UA: 3 /HPF (ref 0–5)
GFR AFRICAN AMERICAN: >60
GFR NON-AFRICAN AMERICAN: >60
GLUCOSE BLD-MCNC: 102 MG/DL (ref 70–99)
GLUCOSE URINE: NEGATIVE MG/DL
HYALINE CASTS: 3 /LPF (ref 0–8)
KETONES, URINE: ABNORMAL MG/DL
LEUKOCYTE ESTERASE, URINE: NEGATIVE
LIPASE: 13 U/L (ref 13–60)
MAGNESIUM: 1.7 MG/DL (ref 1.8–2.4)
MICROSCOPIC EXAMINATION: YES
NITRITE, URINE: POSITIVE
PH UA: 6.5 (ref 5–8)
POTASSIUM REFLEX MAGNESIUM: 3.4 MMOL/L (ref 3.5–5.1)
PROTEIN UA: 100 MG/DL
RBC UA: 1 /HPF (ref 0–4)
SODIUM BLD-SCNC: 131 MMOL/L (ref 136–145)
SPECIFIC GRAVITY UA: <=1.005 (ref 1–1.03)
TOTAL PROTEIN: 7.3 G/DL (ref 6.4–8.2)
URINE REFLEX TO CULTURE: ABNORMAL
URINE TYPE: ABNORMAL
UROBILINOGEN, URINE: 0.2 E.U./DL
WBC UA: 2 /HPF (ref 0–5)

## 2022-08-31 PROCEDURE — 83735 ASSAY OF MAGNESIUM: CPT

## 2022-08-31 PROCEDURE — 71045 X-RAY EXAM CHEST 1 VIEW: CPT

## 2022-08-31 PROCEDURE — 6370000000 HC RX 637 (ALT 250 FOR IP): Performed by: EMERGENCY MEDICINE

## 2022-08-31 PROCEDURE — 81001 URINALYSIS AUTO W/SCOPE: CPT

## 2022-08-31 PROCEDURE — 99285 EMERGENCY DEPT VISIT HI MDM: CPT

## 2022-08-31 PROCEDURE — 93005 ELECTROCARDIOGRAM TRACING: CPT | Performed by: EMERGENCY MEDICINE

## 2022-08-31 PROCEDURE — 80053 COMPREHEN METABOLIC PANEL: CPT

## 2022-08-31 PROCEDURE — 83690 ASSAY OF LIPASE: CPT

## 2022-08-31 PROCEDURE — 85025 COMPLETE CBC W/AUTO DIFF WBC: CPT

## 2022-08-31 RX ORDER — MULTIVITAMIN/IRON/FOLIC ACID 18MG-0.4MG
250 TABLET ORAL DAILY
Qty: 2 TABLET | Refills: 0 | Status: SHIPPED | OUTPATIENT
Start: 2022-08-31 | End: 2022-09-02

## 2022-08-31 RX ORDER — LISINOPRIL 10 MG/1
5 TABLET ORAL ONCE
Status: COMPLETED | OUTPATIENT
Start: 2022-08-31 | End: 2022-08-31

## 2022-08-31 RX ORDER — CARVEDILOL 3.12 MG/1
12.5 TABLET ORAL ONCE
Status: COMPLETED | OUTPATIENT
Start: 2022-08-31 | End: 2022-08-31

## 2022-08-31 RX ORDER — CLONAZEPAM 0.5 MG/1
1 TABLET ORAL ONCE
Status: COMPLETED | OUTPATIENT
Start: 2022-08-31 | End: 2022-08-31

## 2022-08-31 RX ORDER — CLONIDINE HYDROCHLORIDE 0.1 MG/1
0.1 TABLET ORAL ONCE
Status: COMPLETED | OUTPATIENT
Start: 2022-08-31 | End: 2022-08-31

## 2022-08-31 RX ORDER — SULFAMETHOXAZOLE AND TRIMETHOPRIM 800; 160 MG/1; MG/1
1 TABLET ORAL 2 TIMES DAILY
Qty: 14 TABLET | Refills: 0 | Status: SHIPPED | OUTPATIENT
Start: 2022-08-31 | End: 2022-09-07

## 2022-08-31 RX ORDER — LANOLIN ALCOHOL/MO/W.PET/CERES
400 CREAM (GRAM) TOPICAL DAILY
Status: DISCONTINUED | OUTPATIENT
Start: 2022-08-31 | End: 2022-08-31 | Stop reason: HOSPADM

## 2022-08-31 RX ORDER — SULFAMETHOXAZOLE AND TRIMETHOPRIM 800; 160 MG/1; MG/1
1 TABLET ORAL ONCE
Status: COMPLETED | OUTPATIENT
Start: 2022-08-31 | End: 2022-08-31

## 2022-08-31 RX ADMIN — Medication 400 MG: at 15:44

## 2022-08-31 RX ADMIN — CLONAZEPAM 1 MG: 0.5 TABLET ORAL at 12:26

## 2022-08-31 RX ADMIN — SULFAMETHOXAZOLE AND TRIMETHOPRIM 1 TABLET: 800; 160 TABLET ORAL at 15:44

## 2022-08-31 RX ADMIN — CARVEDILOL 12.5 MG: 3.12 TABLET, FILM COATED ORAL at 12:23

## 2022-08-31 RX ADMIN — CLONIDINE HYDROCHLORIDE 0.1 MG: 0.1 TABLET ORAL at 12:26

## 2022-08-31 RX ADMIN — LISINOPRIL 5 MG: 10 TABLET ORAL at 12:26

## 2022-08-31 ASSESSMENT — PAIN - FUNCTIONAL ASSESSMENT: PAIN_FUNCTIONAL_ASSESSMENT: NONE - DENIES PAIN

## 2022-08-31 NOTE — ED NOTES
Straight cath'ed for urine per orders. Tolerated well. Attends noted to be wet. Changed prior to cath. Carmella-care completed.      Madeleine Hodges RN  08/31/22 [id number]

## 2022-08-31 NOTE — ED NOTES
Orthostatic VS completed. C/o feeling dizzy when sitting up. Not sure if she feels strong enough to stand at this time.             Delfino Rodríguez RN  08/31/22 [id number]

## 2022-09-01 ENCOUNTER — CARE COORDINATION (OUTPATIENT)
Dept: CARE COORDINATION | Age: 77
End: 2022-09-01

## 2022-09-01 LAB
BASOPHILS ABSOLUTE: 0 K/UL (ref 0–0.2)
BASOPHILS RELATIVE PERCENT: 0 %
EKG ATRIAL RATE: 86 BPM
EKG DIAGNOSIS: NORMAL
EKG P AXIS: 53 DEGREES
EKG P-R INTERVAL: 138 MS
EKG Q-T INTERVAL: 396 MS
EKG QRS DURATION: 70 MS
EKG QTC CALCULATION (BAZETT): 473 MS
EKG R AXIS: 24 DEGREES
EKG T AXIS: 14 DEGREES
EKG VENTRICULAR RATE: 86 BPM
EOSINOPHILS ABSOLUTE: 0 K/UL (ref 0–0.6)
EOSINOPHILS RELATIVE PERCENT: 0 %
HCT VFR BLD CALC: 40.1 % (ref 36–48)
HEMATOLOGY PATH CONSULT: NORMAL
HEMATOLOGY PATH CONSULT: YES
HEMOGLOBIN: 13.9 G/DL (ref 12–16)
LYMPHOCYTES ABSOLUTE: 1.2 K/UL (ref 1–5.1)
LYMPHOCYTES RELATIVE PERCENT: 9 %
MACROCYTES: ABNORMAL
MCH RBC QN AUTO: 34.6 PG (ref 26–34)
MCHC RBC AUTO-ENTMCNC: 34.5 G/DL (ref 31–36)
MCV RBC AUTO: 100.3 FL (ref 80–100)
MONOCYTES ABSOLUTE: 1.8 K/UL (ref 0–1.3)
MONOCYTES RELATIVE PERCENT: 14 %
NEUTROPHILS ABSOLUTE: 10 K/UL (ref 1.7–7.7)
NEUTROPHILS RELATIVE PERCENT: 77 %
PDW BLD-RTO: 12.8 % (ref 12.4–15.4)
PLATELET # BLD: 364 K/UL (ref 135–450)
PLATELET SLIDE REVIEW: ADEQUATE
PMV BLD AUTO: 7.9 FL (ref 5–10.5)
RBC # BLD: 4 M/UL (ref 4–5.2)
SLIDE REVIEW: ABNORMAL
WBC # BLD: 13 K/UL (ref 4–11)

## 2022-09-01 PROCEDURE — 93010 ELECTROCARDIOGRAM REPORT: CPT | Performed by: INTERNAL MEDICINE

## 2022-09-01 NOTE — CARE COORDINATION
ACM made outreach to patient to follow up from recent ED visit. Patient's  answered the phone and asked that ACM call back in 2 minutes. ACM attempted further outreach but was UTR. AC will make another outreach tomorrow.

## 2022-09-02 ENCOUNTER — CARE COORDINATION (OUTPATIENT)
Dept: CARE COORDINATION | Age: 77
End: 2022-09-02

## 2022-09-02 DIAGNOSIS — I10 UNCONTROLLED HYPERTENSION: ICD-10-CM

## 2022-09-02 RX ORDER — CARVEDILOL 12.5 MG/1
TABLET ORAL
Qty: 60 TABLET | Refills: 0 | Status: SHIPPED | OUTPATIENT
Start: 2022-09-02 | End: 2022-09-26

## 2022-09-02 NOTE — CARE COORDINATION
ACM made outreach to patient to follow up from recent ED visit. ACM was advised that patient was sleeping and not available at this time. ACM left message for patient to call back.

## 2022-09-02 NOTE — TELEPHONE ENCOUNTER
Medication:   Requested Prescriptions     Pending Prescriptions Disp Refills    carvedilol (COREG) 12.5 MG tablet [Pharmacy Med Name: CARVEDILOL 12.5 MG TABS 12.5 Tablet] 60 tablet 10     Sig: TAKE ONE (1) TABLET BY MOUTH TWICE DAILY      Last Filled:      Patient Phone Number: [phone] (home) [phone] (work)    Last appt: 6/29/2022   Next appt: 9/29/2022    Last OARRS:   RX Monitoring 5/11/2020   Attestation -   Periodic Controlled Substance Monitoring No signs of potential drug abuse or diversion identified.      PDMP Monitoring:    Last PDMP [facility] as Reviewed [facility]):  Review User Review Instant Review Result   Aric Dumont 12/23/2021  8:37 AM Reviewed PDMP [1]     Preferred Pharmacy:       [address] [place] [address]  Phone: [phone] Fax: [phone]

## 2022-09-03 NOTE — ED PROVIDER NOTES
[address] [facility] PROVIDER NOTE    Patient Identification  Pt Name: Elkin Freeman  MRN: [id number]  Daniellegfsarah 1945  Date of evaluation: 8/31/2022  Provider: Deejay Reese MD  PCP: QUITA Kenny NP    Chief Complaint  Diarrhea (Pt brought in by squad from home with c/o diarrhea and weakness x 3 days. Pt unable to keep anything down.)      HPI  (History provided by patient)  This is a 67 y.o. female who was brought in by EMS transportation for diarrhea and generalized weakness. She is not having vomiting or nausea. She is able to tolerate p.o. intake, but she states it \"goes right through her. \"  She has been feeling lightheaded, particularly when standing. She denies vertiginous dizziness. Symptoms of been going on for last 3 days. She denies associated abdominal pain. She has not had hematochezia or hematemesis. She denies fever or chills. In addition to this, the patient has been off of her blood pressure medications for the last few days, resulting in significantly elevated blood pressure. She has also missed a dose of her anxiety medication    ROS  10 systems reviewed, pertinent positives/negatives per HPI otherwise noted to be negative.     I have reviewed the following nursing documentation:  Allergies: Lipitor [atorvastatin], Morphine, Keflex [cephalexin], Hctz [hydrochlorothiazide], Norvasc [amlodipine besylate], Penicillins, Tramadol, Hydralazine, and Shellfish-derived products    Past medical history:   Past Medical History:   Diagnosis Date    Acute DVT (deep venous thrombosis) ([facility] 07/03/2015    Acute encephalopathy 04/19/2018    Acute on chronic diastolic heart failure ([facility] 03/30/2019    Alcohol abuse     Anxiety     Arthritis     CAD in native artery     Cellulitis 04/28/2018    Cerebral artery occlusion with cerebral infarction ([facility]     states hx of multiple mini strokes    Cerebrovascular accident (CVA) (H[facility])     Chronic fatigue Complex care coordination     Complicated UTI (urinary tract infection)     COPD (chronic obstructive pulmonary disease) ([facility])     COPD exacerbation ([facility] 02/20/2018    Depression     Diabetes mellitus ([facility]     denies    DIMAS (dyspnea on exertion) 07/03/2015    DVT (deep venous thrombosis) ([facility])     DVT, lower extremity ([facility])     Fatigue 11/03/2015    General weakness 11/04/2015    Generalized weakness 08/22/2019    Hypercholesteremia     Hypertension     Hyperthyroidism     Incontinence 10/16/2012    Mammogram abnormal 02/07/2012    Neuromuscular disorder ([facility]     Osteopenia 02/14/2017    PAF (paroxysmal atrial fibrillation) ([facility])     Pneumonia     Recurrent UTI     Right forearm cellulitis     Superficial thrombophlebitis of right upper extremity     Thyroid disease     Tobacco abuse     Tobacco abuse counseling     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 Dedra Albarado; repeat 5 years    EYE SURGERY      cateracts removed    NERVE SURGERY N/A 12/29/2021    Zafar Backer, FLEXIBLE CYSTOSCOPY performed by Maxine Rios MD at [address] [place] [address] ARTHROSCOPY Right 6/18/14    SUBACROMIAL DECOMPRESSION, ROTATOR CUFF REPAIR    STIMULATOR SURGERY N/A 2/9/2022    INTERSTIMULATOR INSERTION STAGE 2 - [employer] performed by Maxine Rios MD at [place] [address] Right     TONSILLECTOMY         Home medications:   Discharge Medication List as of 8/31/2022  3:46 PM        CONTINUE these medications which have NOT CHANGED    Details   clonazePAM (KLONOPIN) 1 MG tablet TAKE ONE (1) TABLET BY MOUTH TWICE DAILY AS NEEDED FOR ANXIETY, Disp-60 tablet, R-0Normal      cloNIDine (CATAPRES) 0.1 MG tablet TAKE ONE TABLET BY MOUTH THREE TIMES A DAY, Disp-90 tablet, R-2Normal      mirtazapine (REMERON) 15 MG tablet TAKE ONE TABLET BY MOUTH DAILY, Disp-30 tablet, R-2Normal      levothyroxine (SYNTHROID) 100 MCG tablet TAKE ONE TABLET BY MOUTH DAILY, Disp-90 tablet, R-0Normal      albuterol (PROVENTIL) (5 MG/ML) 0.5% nebulizer solution Take 0.5 mLs by nebulization every 6 hours as needed for Wheezing, Disp-120 each, R-0Normal      carvedilol (COREG) 12.5 MG tablet TAKE ONE TABLET BY MOUTH TWICE A DAY, Disp-60 tablet, R-0Normal      lisinopril (PRINIVIL;ZESTRIL) 5 MG tablet Take 1 tablet by mouth daily, Disp-30 tablet, R-5Normal      apixaban (ELIQUIS) 5 MG TABS tablet Take 1 tablet by mouth 2 times daily, Disp-180 tablet, R-0Normal      oxybutynin (DITROPAN) 5 MG tablet TAKE ONE TABLET BY MOUTH THREE TIMES A DAY, Disp-90 tablet, R-1Normal      sodium chloride 1 gram tablet Take 1 tablet by mouth daily, Disp-90 tablet, R-3Normal      ondansetron (ZOFRAN) 4 MG tablet Take 1 tablet by mouth 3 times daily as needed for Nausea or Vomiting, Disp-30 tablet, R-0Normal      budesonide-formoterol (SYMBICORT) 160-4.5 MCG/ACT AERO Inhale 2 puffs into the lungs 2 times daily, Disp-10.2 g, R-0Normal      aspirin 81 MG chewable tablet Take 1 tablet by mouth daily, Disp-30 tablet, R-0Normal             Social history:  reports that she has been smoking cigarettes. She has a 52.00 pack-year smoking history. She has never used smokeless tobacco. She reports that she does not drink alcohol and does not use drugs. Family history:    Family History   Problem Relation Age of Onset    Heart Disease Father         MI @ 64    Alcohol Abuse Father        Exam  ED Triage Vitals [08/31/22 1100]   BP Temp Temp Source Heart Rate Resp SpO2 Height Weight   (!) 201/127 97.4 °F (36.3 °C) Infrared 87 25 96 % 5' 7\" (1.702 m) 115 lb (52.2 kg)     Nursing note and vitals reviewed. Constitutional: Well developed, well nourished. Non-toxic in appearance. HENT:      Head: Normocephalic and atraumatic.       Ears: External ears normal.      Nose: Nose normal.     Mouth: Membrane mucosa moist and pink.  Eyes: Anicteric sclera. No discharge. Neck: Supple. Trachea midline. Cardiovascular: RRR; no murmurs, rubs, or gallops. Pulmonary/Chest: Effort normal. No respiratory distress. CTAB. No stridor. No wheezes. No rales. Abdominal: Soft. No distension. Nontender to palpation. Bowel sounds normal.  Musculoskeletal: Moves all extremities. No gross deformity. Neurological: Alert and oriented. Face symmetric. Speech is clear. Skin: Warm and dry. No rash. Psychiatric: Normal mood and affect. Behavior is normal.    EKG  The Ekg interpreted by me in the absence of a cardiologist shows. normal sinus rhythm with a rate of 86  Axis is   Normal  QTc is   prolonged  Intervals and Durations are unremarkable. No specific ST-T wave changes appreciated. No evidence of acute ischemia. No significant change from prior EKG dated 6/20/2022      Radiology  XR CHEST PORTABLE   Final Result   No significant change in the appearance of left basilar airspace opacities   likely representing scarring and small pleural effusion. However, underlying   pneumonia cannot be fully excluded in the appropriate clinical setting.              Labs  Results for orders placed or performed during the hospital encounter of 08/31/22   CBC with Auto Differential   Result Value Ref Range    WBC 13.0 (H) 4.0 - 11.0 K/uL    RBC 4.00 4.00 - 5.20 M/uL    Hemoglobin 13.9 12.0 - 16.0 g/dL    Hematocrit 40.1 36.0 - 48.0 %    .3 (H) 80.0 - 100.0 fL    MCH 34.6 (H) 26.0 - 34.0 pg    MCHC 34.5 31.0 - 36.0 g/dL    RDW 12.8 12.4 - 15.4 %    Platelets 861 149 - 458 K/uL    MPV 7.9 5.0 - 10.5 fL    PLATELET SLIDE REVIEW Adequate     SLIDE REVIEW see below     Path Consult Yes     Neutrophils % 77.0 %    Lymphocytes % 9.0 %    Monocytes % 14.0 %    Eosinophils % 0.0 %    Basophils % 0.0 %    Neutrophils Absolute 10.0 (H) 1.7 - 7.7 K/uL    Lymphocytes Absolute 1.2 1.0 - 5.1 K/uL    Monocytes Absolute 1.8 (H) 0.0 - 1.3 K/uL    Eosinophils Absolute 0.0 0.0 - 0.6 K/uL    Basophils Absolute 0.0 0.0 - 0.2 K/uL    Macrocytes Occasional (A)    Comprehensive Metabolic Panel w/ Reflex to MG   Result Value Ref Range    Sodium 131 (L) 136 - 145 mmol/L    Potassium reflex Magnesium 3.4 (L) 3.5 - 5.1 mmol/L    Chloride 89 (L) 99 - 110 mmol/L    CO2 27 21 - 32 mmol/L    Anion Gap 15 3 - 16    Glucose 102 (H) 70 - 99 mg/dL    BUN 7 7 - 20 mg/dL    Creatinine <0.5 (L) 0.6 - 1.2 mg/dL    GFR Non-African American >60 >60    GFR African American >60 >60    Calcium 9.0 8.3 - 10.6 mg/dL    Total Protein 7.3 6.4 - 8.2 g/dL    Albumin 4.4 3.4 - 5.0 g/dL    Albumin/Globulin Ratio 1.5 1.1 - 2.2    Total Bilirubin 0.7 0.0 - 1.0 mg/dL    Alkaline Phosphatase 53 40 - 129 U/L    ALT 8 (L) 10 - 40 U/L    AST 17 15 - 37 U/L   Urinalysis with Reflex to Culture    Specimen: Urine, clean catch   Result Value Ref Range    Color, UA Yellow Straw/Yellow    Clarity, UA Clear Clear    Glucose, Ur Negative Negative mg/dL    Bilirubin Urine Negative Negative    Ketones, Urine TRACE (A) Negative mg/dL    Specific Gravity, UA <=1.005 1.005 - 1.030    Blood, Urine TRACE (A) Negative    pH, UA 6.5 5.0 - 8.0    Protein,  (A) Negative mg/dL    Urobilinogen, Urine 0.2 <2.0 E.U./dL    Nitrite, Urine POSITIVE (A) Negative    Leukocyte Esterase, Urine Negative Negative    Microscopic Examination YES     Urine Type NotGiven     Urine Reflex to Culture Not Indicated    Lipase   Result Value Ref Range    Lipase 13.0 13.0 - 60.0 U/L   Magnesium   Result Value Ref Range    Magnesium 1.70 (L) 1.80 - 2.40 mg/dL   Microscopic Urinalysis   Result Value Ref Range    Bacteria, UA 3+ (A) None Seen /HPF    Hyaline Casts, UA 3 0 - 8 /LPF    WBC, UA 2 0 - 5 /HPF    RBC, UA 1 0 - 4 /HPF    Epithelial Cells, UA 3 0 - 5 /HPF    Budding Yeast Present (A) None Seen   Blood Smear Review   Result Value Ref Range    Path Consult Reviewed        MDM and ED Course  Patient's blood pressure improved after she received or new symptoms develop      Discharge Medications:  Discharge Medication List as of 8/31/2022  3:46 PM        START taking these medications    Details   sulfamethoxazole-trimethoprim (BACTRIM DS) 800-160 MG per tablet Take 1 tablet by mouth 2 times daily for 7 days, Disp-14 tablet, R-0Normal      Magnesium Oxide (MAGNESIUM-OXIDE) 250 MG TABS tablet Take 1 tablet by mouth daily for 2 days, Disp-2 tablet, R-0Normal             This chart was generated using the [address] [place] [address] [address] [employer] system. I created this record but it may contain dictation errors given the limitations of this technology.         Casper Hamman, MD  09/12/22 8599

## 2022-09-09 ENCOUNTER — CARE COORDINATION (OUTPATIENT)
Dept: CARE COORDINATION | Age: 77
End: 2022-09-09

## 2022-09-09 NOTE — CARE COORDINATION
Ambulatory Care Coordination  ED Follow up Call    Reason for ED visit:  diarrhea   Status:     improved    Did you call your PCP prior to going to the ED? No      Did you receive a discharge instructions from the Emergency Room? Yes  Review of Instructions:     Understands what to report/when to return?:  Yes   Understands discharge instructions?:  Yes   Following discharge instructions?:  Yes      Are there any new complaints of pain? No  New Pain Meds? No    Constipation prophylaxis needed? N/A    If you have a wound is the dressing clean, dry, and intact? N/A  Understands wound care regimen? N/A    Are there any other complaints/concerns that you wish to tell your provider? None at this time    FU appts/Provider:    Future Appointments   Date Time Provider Dami Sims   9/29/2022  8:30 AM QUITA Lindquist - [facility] [address]           New Medications?:   Yes      Medication Reconciliation by phone - patient was seen at [facility]  Understands Medications? Yes  Taking Medications? Yes  Can you swallow your pills? Yes    Any further needs in the home i.e. Equipment? No    Link to services in community?:  No     ACM spoke with patient's daughter and . Patient was present in  the home. Patient was seen in ED for diarrhea and diagnosed with UTI. Patient has no further complaints at this time. ACM discussed discharge instructions with family and per [facility] discharge instructions were received, reviewed and there were no questions at this time. Family is aware to monitor for new or worsening symptoms and to report concerns to PCP. Per [facility] patient has no current symptoms of concerns, she has all her medication and home needs. ACM encouraged family to call with any questions or concerns. Patient does not have ED follow up scheduled. She is scheduled for routine Follow Up 9/29/2022.

## 2022-09-23 ENCOUNTER — CARE COORDINATION (OUTPATIENT)
Dept: CARE COORDINATION | Age: 77
End: 2022-09-23

## 2022-09-23 ASSESSMENT — ENCOUNTER SYMPTOMS: DYSPNEA ASSOCIATED WITH: EXERTION

## 2022-09-23 NOTE — CARE COORDINATION
ACC: Merna Curtis RN    Care Coordination Interventions    Referral from Primary Care Provider: No  Suggested Interventions and Community Resources  Fall Risk Prevention: In Process  Medi Set or Pill Pack: In Process (Comment: Exact Care Referral Sent)  Occupational Therapy: In Process  Physical Therapy: In Process  Smoking Cessation: Declined  Zone Management Tools: Completed         , Ambulatory Care Coordination Note  9/23/2022    ACC: Merna Curtis RN    Summary Note: AC made follow up call to patient for Care Management. Patient wrote down appointment date and time for her scheduled follow up with PCP. She will address care gaps during appointment. Patient advised ACM that she is doing well at this time. She had no questions or concerns. ACM followed up with patient's COPD. Patient is at baseline at this time with her symptoms. She has not had any recent triggers. Per patient she stays inside the house most of the time and that helps. Patient is aware of s/sx to monitor and when to report concerns to PCP and RED flags for ED. AC discussed PT with patient. She has completed her [address] PT. Patient states that she is doing well with ambulating. She is able to go up and down the stairs now. She uses the handrail for safety and has no identified hazards at this time. ACM discussed smoking cessation with patient. She continues to try and decrease smoking. She is not using any aid to help her quit at this time. She states that she would like to quit and will continue to work on it. She is aware of the health benefits of stopping. AC has arranged for follow up with patient. Plan  Graduation      Lab Results       None            Care Coordination Interventions    Referral from Primary Care Provider: No  Suggested Interventions and Community Resources  Fall Risk Prevention: In Process  Medi Set or Pill Pack: In Process (Comment: Exact Care Referral Sent)  Occupational Therapy:  In Process  Physical Therapy: In Process  Smoking Cessation: Declined  Zone Management Tools: Completed          Goals Addressed                      This Visit's Progress      Activity Plan   Improving      I will increase my activity by Referred to physical therapy (Jurgenellisannamarie Doe)    Barriers: overwhelmed by complexity of regimen, stress, time constraints, and lack of education  Plan for overcoming my barriers: work with PCP, ACM and [facility] PT  Confidence: 8/10  Anticipated Goal Completion Date: 9/6/2022        Patient Stated (pt-stated)   Improving      Take medications as prescribed  Barriers: overwhelmed by complexity of regimen, stress, and lack of education  Plan for overcoming my barriers: work with [address], PCP, and ACM  Confidence: 8/10  Anticipated Goal Completion Date: 9/6/2022              Prior to Admission medications    Medication Sig Start Date End Date Taking?  Authorizing Provider   carvedilol (COREG) 12.5 MG tablet TAKE ONE (1) TABLET BY MOUTH TWICE DAILY 9/2/22   QUITA Cowan CNP   Magnesium Oxide (MAGNESIUM-OXIDE) 250 MG TABS tablet Take 1 tablet by mouth daily for 2 days 8/31/22 9/2/22  Deja Villasenor MD   clonazePAM (KLONOPIN) 1 MG tablet TAKE ONE (1) TABLET BY MOUTH TWICE DAILY AS NEEDED FOR ANXIETY 8/25/22 9/25/22  QUITA Emery NP   cloNIDine (CATAPRES) 0.1 MG tablet TAKE ONE TABLET BY MOUTH THREE TIMES A DAY 7/28/22   QUITA Dey - NP   mirtazapine (REMERON) 15 MG tablet TAKE ONE TABLET BY MOUTH DAILY 7/28/22   QUITA Dey NP   levothyroxine (SYNTHROID) 100 MCG tablet TAKE ONE TABLET BY MOUTH DAILY 7/26/22   Vance QUITA Singh NP   albuterol (PROVENTIL) (5 MG/ML) 0.5% nebulizer solution Take 0.5 mLs by nebulization every 6 hours as needed for Wheezing 7/7/22   Gabirelle Fam APRN - NP   lisinopril (PRINIVIL;ZESTRIL) 5 MG tablet Take 1 tablet by mouth daily 6/13/22   Gabrielle FamQUITA - NP apixaban (ELIQUIS) 5 MG TABS tablet Take 1 tablet by mouth 2 times daily 6/13/22   Megha Kaur APRN - NP   oxybutynin (DITROPAN) 5 MG tablet TAKE ONE TABLET BY MOUTH THREE TIMES A DAY 6/13/22   Megha Cross APRN - NP   sodium chloride 1 gram tablet Take 1 tablet by mouth daily 6/11/22   Princess Dominique APRN - CNP   ondansetron (ZOFRAN) 4 MG tablet Take 1 tablet by mouth 3 times daily as needed for Nausea or Vomiting 2/2/22   José Perez APRN - NP   budesonide-formoterol (SYMBICORT) 160-4.5 MCG/ACT AERO Inhale 2 puffs into the lungs 2 times daily 1/26/22   Araceli Lopez MD   aspirin 81 MG chewable tablet Take 1 tablet by mouth daily 4/16/21   Fernanda Navarrete MD       Future Appointments   Date Time Provider Dami Guerraisti   9/29/2022  8:30 AM José Perez APRN - NP [address]   ,   COPD Assessment    Does the patient understand envrionmental exposure?: Yes  Is the patient able to verbalize Rescue vs. Long Acting medications?: Yes  Does the patient have a nebulizer?: Yes  Does the patient use a space with inhaled medications?: No            Symptoms:  None: Yes      Symptom course: stable  Breathlessness: exertion  Increase use of rapid acting/rescue inhaled medications?: No  Change in chronic cough?: No/At Baseline  Change in sputum?: No/At Baseline     ,   General Assessment    Do you have any symptoms that are causing concern?: No     , and Care Coordination Episodes    Type: Amb Care Management  Episode: Rising Risk  Noted: 7/5/2022

## 2022-09-25 DIAGNOSIS — I10 UNCONTROLLED HYPERTENSION: ICD-10-CM

## 2022-09-26 RX ORDER — CARVEDILOL 12.5 MG/1
TABLET ORAL
Qty: 60 TABLET | Refills: 10 | Status: SHIPPED | OUTPATIENT
Start: 2022-09-26

## 2022-09-26 RX ORDER — APIXABAN 5 MG/1
TABLET, FILM COATED ORAL
Qty: 180 TABLET | Refills: 0 | Status: SHIPPED | OUTPATIENT
Start: 2022-09-26

## 2022-10-05 DIAGNOSIS — F41.9 ANXIETY: ICD-10-CM

## 2022-10-05 RX ORDER — CLONAZEPAM 1 MG/1
TABLET ORAL
Qty: 60 TABLET | Refills: 0 | Status: SHIPPED | OUTPATIENT
Start: 2022-10-05 | End: 2022-11-05

## 2022-10-05 NOTE — TELEPHONE ENCOUNTER
Medication:   Requested Prescriptions     Pending Prescriptions Disp Refills    clonazePAM (KLONOPIN) 1 MG tablet [Pharmacy Med Name: CLONAZEPAM 1 MG TABLET 1 Tablet] 60 tablet 0     Sig: TAKE ONE (1) TABLET BY MOUTH TWICE DAILY AS NEEDED FOR ANXIETY      Last Filled:  8/25/2022    Patient Phone Number: [phone] (home) [phone] (work)    Last appt: 6/29/2022   Next appt: Visit date not found    Last OARRS:   RX Monitoring 5/11/2020   Attestation -   Periodic Controlled Substance Monitoring No signs of potential drug abuse or diversion identified.      PDMP Monitoring:    Last PDMP Juan Blanco as Reviewed [facility]):  Review User Review Instant Review Result   Tre Jeffrey 12/23/2021  8:37 AM Reviewed PDMP [1]     Preferred Pharmacy:   [facility] [id number] - [id number], [address] [facility] [phone] [place]  [phone]  [address] [place] [address] Road  [address] [place] [address]  Phone: [phone] Fax: [address], [address] [facility] [phone] - F [phone]  [address] [place] [address]  Phone: [phone] Fax: [phone]

## 2022-10-18 DIAGNOSIS — F41.9 ANXIETY: ICD-10-CM

## 2022-10-18 RX ORDER — MIRTAZAPINE 15 MG/1
TABLET, FILM COATED ORAL
Qty: 90 TABLET | Refills: 1 | Status: SHIPPED | OUTPATIENT
Start: 2022-10-18

## 2022-10-24 ENCOUNTER — CARE COORDINATION (OUTPATIENT)
Dept: CARE COORDINATION | Age: 77
End: 2022-10-24

## 2022-10-26 ENCOUNTER — HOSPITAL ENCOUNTER (EMERGENCY)
Age: 77
Discharge: HOME | End: 2022-10-26
Attending: EMERGENCY MEDICINE
Payer: COMMERCIAL

## 2022-10-26 ENCOUNTER — APPOINTMENT (OUTPATIENT)
Dept: GENERAL RADIOLOGY | Age: 77
End: 2022-10-26
Payer: COMMERCIAL

## 2022-10-26 ENCOUNTER — APPOINTMENT (OUTPATIENT)
Dept: CT IMAGING | Age: 77
End: 2022-10-26
Payer: COMMERCIAL

## 2022-10-26 ENCOUNTER — TELEPHONE (OUTPATIENT)
Dept: FAMILY MEDICINE CLINIC | Age: 77
End: 2022-10-26

## 2022-10-26 VITALS
HEIGHT: 67 IN | HEART RATE: 82 BPM | WEIGHT: 128 LBS | SYSTOLIC BLOOD PRESSURE: 206 MMHG | RESPIRATION RATE: 20 BRPM | DIASTOLIC BLOOD PRESSURE: 118 MMHG | OXYGEN SATURATION: 98 % | TEMPERATURE: 98.5 F | BODY MASS INDEX: 20.09 KG/M2

## 2022-10-26 DIAGNOSIS — I10 HYPERTENSION, UNSPECIFIED TYPE: Primary | ICD-10-CM

## 2022-10-26 DIAGNOSIS — N39.0 URINARY TRACT INFECTION WITHOUT HEMATURIA, SITE UNSPECIFIED: ICD-10-CM

## 2022-10-26 LAB
A/G RATIO: 1.3 (ref 1.1–2.2)
ALBUMIN SERPL-MCNC: 4.4 G/DL (ref 3.4–5)
ALP BLD-CCNC: 59 U/L (ref 40–129)
ALT SERPL-CCNC: 7 U/L (ref 10–40)
ANION GAP SERPL CALCULATED.3IONS-SCNC: 12 MMOL/L (ref 3–16)
AST SERPL-CCNC: 16 U/L (ref 15–37)
BACTERIA: ABNORMAL /HPF
BASOPHILS ABSOLUTE: 0.1 K/UL (ref 0–0.2)
BASOPHILS RELATIVE PERCENT: 1 %
BILIRUB SERPL-MCNC: 0.4 MG/DL (ref 0–1)
BILIRUBIN URINE: NEGATIVE
BLOOD, URINE: ABNORMAL
BUN BLDV-MCNC: 9 MG/DL (ref 7–20)
CALCIUM SERPL-MCNC: 9.5 MG/DL (ref 8.3–10.6)
CHLORIDE BLD-SCNC: 94 MMOL/L (ref 99–110)
CLARITY: ABNORMAL
CO2: 24 MMOL/L (ref 21–32)
COLOR: YELLOW
CREAT SERPL-MCNC: 0.6 MG/DL (ref 0.6–1.2)
EOSINOPHILS ABSOLUTE: 0.1 K/UL (ref 0–0.6)
EOSINOPHILS RELATIVE PERCENT: 1.3 %
EPITHELIAL CELLS, UA: ABNORMAL /HPF (ref 0–5)
GFR SERPL CREATININE-BSD FRML MDRD: >60 ML/MIN/{1.73_M2}
GLUCOSE BLD-MCNC: 93 MG/DL (ref 70–99)
GLUCOSE URINE: NEGATIVE MG/DL
HCT VFR BLD CALC: 44.1 % (ref 36–48)
HEMOGLOBIN: 14.4 G/DL (ref 12–16)
KETONES, URINE: NEGATIVE MG/DL
LEUKOCYTE ESTERASE, URINE: ABNORMAL
LIPASE: 14 U/L (ref 13–60)
LYMPHOCYTES ABSOLUTE: 0.9 K/UL (ref 1–5.1)
LYMPHOCYTES RELATIVE PERCENT: 12.3 %
MCH RBC QN AUTO: 33.5 PG (ref 26–34)
MCHC RBC AUTO-ENTMCNC: 32.7 G/DL (ref 31–36)
MCV RBC AUTO: 102.6 FL (ref 80–100)
MICROSCOPIC EXAMINATION: YES
MONOCYTES ABSOLUTE: 0.8 K/UL (ref 0–1.3)
MONOCYTES RELATIVE PERCENT: 10.4 %
NEUTROPHILS ABSOLUTE: 5.7 K/UL (ref 1.7–7.7)
NEUTROPHILS RELATIVE PERCENT: 75 %
NITRITE, URINE: POSITIVE
PDW BLD-RTO: 12.9 % (ref 12.4–15.4)
PH UA: 6 (ref 5–8)
PLATELET # BLD: 355 K/UL (ref 135–450)
PMV BLD AUTO: 7.7 FL (ref 5–10.5)
POTASSIUM REFLEX MAGNESIUM: 4.2 MMOL/L (ref 3.5–5.1)
PRO-BNP: 1102 PG/ML (ref 0–449)
PROTEIN UA: 30 MG/DL
RBC # BLD: 4.3 M/UL (ref 4–5.2)
RBC UA: ABNORMAL /HPF (ref 0–4)
SODIUM BLD-SCNC: 130 MMOL/L (ref 136–145)
SPECIFIC GRAVITY UA: 1.01 (ref 1–1.03)
TOTAL PROTEIN: 7.9 G/DL (ref 6.4–8.2)
TROPONIN: <0.01 NG/ML
URINE REFLEX TO CULTURE: YES
URINE TYPE: ABNORMAL
UROBILINOGEN, URINE: 0.2 E.U./DL
WBC # BLD: 7.6 K/UL (ref 4–11)
WBC UA: >100 /HPF (ref 0–5)

## 2022-10-26 PROCEDURE — 80053 COMPREHEN METABOLIC PANEL: CPT

## 2022-10-26 PROCEDURE — 83880 ASSAY OF NATRIURETIC PEPTIDE: CPT

## 2022-10-26 PROCEDURE — 83690 ASSAY OF LIPASE: CPT

## 2022-10-26 PROCEDURE — 84484 ASSAY OF TROPONIN QUANT: CPT

## 2022-10-26 PROCEDURE — 96360 HYDRATION IV INFUSION INIT: CPT

## 2022-10-26 PROCEDURE — 87186 SC STD MICRODIL/AGAR DIL: CPT

## 2022-10-26 PROCEDURE — 2580000003 HC RX 258: Performed by: PHYSICIAN ASSISTANT

## 2022-10-26 PROCEDURE — 81001 URINALYSIS AUTO W/SCOPE: CPT

## 2022-10-26 PROCEDURE — 99285 EMERGENCY DEPT VISIT HI MDM: CPT

## 2022-10-26 PROCEDURE — 87088 URINE BACTERIA CULTURE: CPT

## 2022-10-26 PROCEDURE — 71045 X-RAY EXAM CHEST 1 VIEW: CPT

## 2022-10-26 PROCEDURE — 87086 URINE CULTURE/COLONY COUNT: CPT

## 2022-10-26 PROCEDURE — 6370000000 HC RX 637 (ALT 250 FOR IP): Performed by: PHYSICIAN ASSISTANT

## 2022-10-26 PROCEDURE — 85025 COMPLETE CBC W/AUTO DIFF WBC: CPT

## 2022-10-26 PROCEDURE — 6370000000 HC RX 637 (ALT 250 FOR IP): Performed by: EMERGENCY MEDICINE

## 2022-10-26 PROCEDURE — 93005 ELECTROCARDIOGRAM TRACING: CPT | Performed by: PHYSICIAN ASSISTANT

## 2022-10-26 PROCEDURE — 70450 CT HEAD/BRAIN W/O DYE: CPT

## 2022-10-26 RX ORDER — CLONIDINE HYDROCHLORIDE 0.1 MG/1
0.1 TABLET ORAL ONCE
Status: COMPLETED | OUTPATIENT
Start: 2022-10-26 | End: 2022-10-26

## 2022-10-26 RX ORDER — 0.9 % SODIUM CHLORIDE 0.9 %
1000 INTRAVENOUS SOLUTION INTRAVENOUS ONCE
Status: COMPLETED | OUTPATIENT
Start: 2022-10-26 | End: 2022-10-26

## 2022-10-26 RX ORDER — CIPROFLOXACIN 500 MG/1
500 TABLET, FILM COATED ORAL
Status: COMPLETED | OUTPATIENT
Start: 2022-10-26 | End: 2022-10-26

## 2022-10-26 RX ORDER — CIPROFLOXACIN 250 MG/1
250 TABLET, FILM COATED ORAL 2 TIMES DAILY
Qty: 6 TABLET | Refills: 0 | Status: SHIPPED | OUTPATIENT
Start: 2022-10-26 | End: 2022-10-29

## 2022-10-26 RX ADMIN — CLONIDINE HYDROCHLORIDE 0.1 MG: 0.1 TABLET ORAL at 17:39

## 2022-10-26 RX ADMIN — CIPROFLOXACIN HYDROCHLORIDE 500 MG: 500 TABLET, FILM COATED ORAL at 18:00

## 2022-10-26 RX ADMIN — SODIUM CHLORIDE 1000 ML: 9 INJECTION, SOLUTION INTRAVENOUS at 17:19

## 2022-10-26 ASSESSMENT — PAIN - FUNCTIONAL ASSESSMENT: PAIN_FUNCTIONAL_ASSESSMENT: NONE - DENIES PAIN

## 2022-10-26 ASSESSMENT — ENCOUNTER SYMPTOMS
EYE PAIN: 0
ABDOMINAL PAIN: 0
SORE THROAT: 0
DIARRHEA: 0
SHORTNESS OF BREATH: 0
RHINORRHEA: 0
CONSTIPATION: 0
VOMITING: 0
BACK PAIN: 0
COUGH: 0
NAUSEA: 0

## 2022-10-26 NOTE — DISCHARGE INSTRUCTIONS
Continue Blood pressure medications as prescribed    Follow-up to primary care doctor next week for recheck    Start antibiotics today as prescribed for urinary tract infection    Return to emergency room if you have any chest pain, shortness of breath, difficulty breathing, headaches, vision changes, nausea, vomiting or abdominal pain.

## 2022-10-26 NOTE — TELEPHONE ENCOUNTER
Kira with [facility] called and she is seeing this patient right now. She checked her blood pressure  and it is 180/120. Advised her to have her family take her to the ED. She does have a history of a-fib and she is noticing a-fib.

## 2022-10-26 NOTE — ED PROVIDER NOTES
[facility]        Pt Name: Zhou Pritchard  MRN: [id number]  [place] 1945  Date of evaluation: 10/26/2022  Provider: LESLIE Simmons  PCP: QUITA Herrera NP  Note Started: 3:49 PM EDT        I have seen and evaluated this patient with my supervising physician Yojana Peñaloza MD.    [address] [place] [address]       Chief Complaint   Patient presents with    Hypertension     C/o high blood pressure. States she took her medication this morning. Denies any other concerns or symptoms        HISTORY OF PRESENT ILLNESS   (Location, Timing/Onset, Context/Setting, Quality, Duration, Modifying Factors, Severity, Associated Signs and Symptoms)  Note limiting factors. Chief Complaint: Hypertension    Zhou Pritchard is a 65 y.o. female who presents to the emergency room due to hypertension. Patient states that her home nurse came to her house today and noted that her blood pressure was 872 systolic. Patient states that she has been taking her medications as prescribed. Patient states that she has a history of stroke many years ago with left-sided weakness is at her baseline has not changed. Patient also has a history of coronary artery disease, diabetes, diastolic heart failure, hypertension, DVT anxiety and alcohol abuse. Patient takes aspirin and Eliquis. Patient denies missing any of her dosage. Patient states that she does not have any symptoms at this time other than high blood pressure. Nursing Notes were all reviewed and agreed with or any disagreements were addressed in the HPI. REVIEW OF SYSTEMS    (2-9 systems for level 4, 10 or more for level 5)     Review of Systems   Constitutional:  Negative for chills, diaphoresis and fever. HENT:  Negative for congestion, rhinorrhea and sore throat. Eyes:  Negative for pain and visual disturbance. Respiratory:  Negative for cough and shortness of breath. Cardiovascular:  Negative for chest pain and leg swelling. Gastrointestinal:  Negative for abdominal pain, constipation, diarrhea, nausea and vomiting. Genitourinary:  Negative for difficulty urinating, dysuria and frequency. Musculoskeletal:  Negative for back pain and neck pain. Skin:  Negative for rash and wound. Neurological:  Negative for dizziness and light-headedness. Positives and Pertinent negatives as per HPI. Except as noted above in the ROS, all other systems were reviewed and negative.        PAST MEDICAL HISTORY     Past Medical History:   Diagnosis Date    Acute DVT (deep venous thrombosis) ([facility] 07/03/2015    Acute encephalopathy 04/19/2018    Acute on chronic diastolic heart failure ([address] 03/30/2019    Alcohol abuse     Anxiety     Arthritis     CAD in native artery     Cellulitis 04/28/2018    Cerebral artery occlusion with cerebral infarction ([address]     states hx of multiple mini strokes    Cerebrovascular accident (CVA) ([facility] [address]     Chronic fatigue     Complex care coordination     Complicated UTI (urinary tract infection)     COPD (chronic obstructive pulmonary disease) ([facility] [address]     COPD exacerbation ([facility] [address] 02/20/2018    Depression     Diabetes mellitus ([address]     denies    DIMAS (dyspnea on exertion) 07/03/2015    DVT (deep venous thrombosis) ([facility])     DVT, lower extremity ([address]     Fatigue 11/03/2015    General weakness 11/04/2015    Generalized weakness 08/22/2019    Hypercholesteremia     Hypertension     Hyperthyroidism     Incontinence 10/16/2012    Mammogram abnormal 02/07/2012    Neuromuscular disorder ([address]     Osteopenia 02/14/2017    PAF (paroxysmal atrial fibrillation) ([facility])     Pneumonia     Recurrent UTI     Right forearm cellulitis     Superficial thrombophlebitis of right upper extremity     Thyroid disease     Tobacco abuse     Tobacco abuse counseling     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 Guadalupe Ba; repeat 5 years    EYE SURGERY      cateracts removed    NERVE SURGERY N/A 12/29/2021    Felice Burden, FLEXIBLE CYSTOSCOPY performed by Cosme Davies MD at [address] ARTHROSCOPY Right 6/18/14    SUBACROMIAL DECOMPRESSION, ROTATOR CUFF REPAIR    STIMULATOR SURGERY N/A 2/9/2022    INTERSTIMULATOR INSERTION STAGE 2 - [employer] performed by Cosme Davies MD at [facility] Right     TONSILLECTOMY           CURRENTMEDICATIONS       Previous Medications    ALBUTEROL (PROVENTIL) (5 MG/ML) 0.5% NEBULIZER SOLUTION    Take 0.5 mLs by nebulization every 6 hours as needed for Wheezing    ASPIRIN 81 MG CHEWABLE TABLET    Take 1 tablet by mouth daily    BUDESONIDE-FORMOTEROL (SYMBICORT) 160-4.5 MCG/ACT AERO    Inhale 2 puffs into the lungs 2 times daily    CARVEDILOL (COREG) 12.5 MG TABLET    TAKE 1 TABLET BY MOUTH TWICE DAILY    CLONAZEPAM (KLONOPIN) 1 MG TABLET    TAKE ONE (1) TABLET BY MOUTH TWICE DAILY AS NEEDED FOR ANXIETY    CLONIDINE (CATAPRES) 0.1 MG TABLET    TAKE ONE TABLET BY MOUTH THREE TIMES A DAY    ELIQUIS 5 MG TABS TABLET    TAKE ONE TABLET BY MOUTH TWICE A DAY    LEVOTHYROXINE (SYNTHROID) 100 MCG TABLET    TAKE ONE TABLET BY MOUTH DAILY    LISINOPRIL (PRINIVIL;ZESTRIL) 5 MG TABLET    Take 1 tablet by mouth daily    MAGNESIUM OXIDE (MAGNESIUM-OXIDE) 250 MG TABS TABLET    Take 1 tablet by mouth daily for 2 days    MIRTAZAPINE (REMERON) 15 MG TABLET    TAKE ONE TABLET BY MOUTH DAILY    ONDANSETRON (ZOFRAN) 4 MG TABLET    Take 1 tablet by mouth 3 times daily as needed for Nausea or Vomiting    OXYBUTYNIN (DITROPAN) 5 MG TABLET    TAKE ONE TABLET BY MOUTH THREE TIMES A DAY    SODIUM CHLORIDE 1 GRAM TABLET    Take 1 tablet by mouth daily         ALLERGIES     Lipitor [atorvastatin], Morphine, Keflex [cephalexin], Hctz [hydrochlorothiazide], Norvasc [amlodipine besylate], Penicillins, Tramadol, Hydralazine, and Shellfish-derived products    FAMILYHISTORY       Family History   Problem Relation Age of Onset    Heart Disease Father         MI @ 64    Alcohol Abuse Father           SOCIAL HISTORY       Social History     Tobacco Use    Smoking status: Every Day     Packs/day: 1.00     Years: 52.00     Pack years: 52.00     Types: Cigarettes     Last attempt to quit: 2018     Years since quittin.3    Smokeless tobacco: Never    Tobacco comments:     started smoking at age 26 / smoked up to 2 p,p,d / now smoking 4 to 8 cigarettes a day,   Vaping Use    Vaping Use: Never used   Substance Use Topics    Alcohol use: No    Drug use: No       SCREENINGS    [place] Coma Scale  Eye Opening: Spontaneous  Best Verbal Response: Oriented  Best Motor Response: Obeys commands  [place] Coma Scale Score: 15        PHYSICAL EXAM    (up to 7 for level 4, 8 or more for level 5)     ED Triage Vitals [10/26/22 1536]   BP Temp Temp Source Heart Rate Resp SpO2 Height Weight   (!) 204/103 98.5 °F (36.9 °C) Oral 80 19 97 % -- 128 lb (58.1 kg)       Physical Exam  Vitals and nursing note reviewed. Constitutional:       General: She is not in acute distress. Appearance: Normal appearance. She is normal weight. She is not ill-appearing. HENT:      Head: Normocephalic. Mouth/Throat:      Mouth: Mucous membranes are dry. Pharynx: Oropharynx is clear. No oropharyngeal exudate or posterior oropharyngeal erythema. Eyes:      Extraocular Movements: Extraocular movements intact. Pupils: Pupils are equal, round, and reactive to light. Cardiovascular:      Pulses: Normal pulses. Heart sounds: Normal heart sounds. Pulmonary:      Effort: Pulmonary effort is normal.      Breath sounds: Normal breath sounds. Abdominal:      General: Bowel sounds are normal.   Musculoskeletal:      Cervical back: Normal range of motion and neck supple. Right lower leg: No edema. Left lower leg: No edema. Neurological:      Mental Status: She is alert and oriented to person, place, and time. GCS: GCS eye subscore is 4. GCS verbal subscore is 5. GCS motor subscore is 6. Cranial Nerves: Cranial nerves 2-12 are intact. No cranial nerve deficit, dysarthria or facial asymmetry. Sensory: Sensation is intact. Motor: Weakness present. No tremor, atrophy, abnormal muscle tone, seizure activity or pronator drift. Comments: Left arm weakness residual from previous CVA   Psychiatric:         Mood and Affect: Mood normal.         Behavior: Behavior normal.       DIAGNOSTIC RESULTS   LABS:    Labs Reviewed   COMPREHENSIVE METABOLIC PANEL W/ REFLEX TO MG FOR LOW K - Abnormal; Notable for the following components:       Result Value    Sodium 130 (*)     Chloride 94 (*)     ALT 7 (*)     All other components within normal limits   CBC WITH AUTO DIFFERENTIAL - Abnormal; Notable for the following components:    .6 (*)     Lymphocytes Absolute 0.9 (*)     All other components within normal limits   URINALYSIS WITH REFLEX TO CULTURE - Abnormal; Notable for the following components:    Clarity, UA TURBID (*)     Blood, Urine MODERATE (*)     Protein, UA 30 (*)     Nitrite, Urine POSITIVE (*)     Leukocyte Esterase, Urine LARGE (*)     All other components within normal limits   BRAIN NATRIURETIC PEPTIDE - Abnormal; Notable for the following components:    Pro-BNP 1,102 (*)     All other components within normal limits   MICROSCOPIC URINALYSIS - Abnormal; Notable for the following components:    WBC, UA >100 (*)     Bacteria, UA 4+ (*)     All other components within normal limits   CULTURE, URINE   TROPONIN   LIPASE       When ordered only abnormal lab results are displayed. All other labs were within normal range or not returned as of this dictation. EKG:  When ordered, EKG's are interpreted by the Emergency Department Physician in the absence of a cardiologist.  Please see their note for interpretation of EKG. RADIOLOGY:   Non-plain film images such as CT, Ultrasound and MRI are read by the radiologist. Plain radiographic images are visualized and preliminarily interpreted by the ED Provider with the below findings:        Interpretation per the Radiologist below, if available at the time of this note:    CT Head W/O Contrast   Final Result   1. No acute intracranial abnormality. 2.  Extensive chronic small vessel ischemic change. XR CHEST PORTABLE   Final Result   Small left pleural effusion with left basilar airspace opacities which could   represent atelectasis versus pneumonia in the appropriate clinical setting. No results found. PROCEDURES   Unless otherwise noted below, none     Procedures    CRITICAL CARE TIME       CONSULTS:  None      EMERGENCY DEPARTMENT COURSE and DIFFERENTIAL DIAGNOSIS/MDM:   Vitals:    Vitals:    10/26/22 1701 10/26/22 1714 10/26/22 1716 10/26/22 1731   BP:  (!) 201/115 (!) 193/110    Pulse: 80 77 75 80   Resp: 23 22 20 18   Temp:       TempSrc:       SpO2: 98% 98% 99% 100%   Weight:  128 lb (58.1 kg)     Height:  5' 7.01\" (1.702 m)         Patient was given the following medications:  Medications   0.9 % sodium chloride bolus (1,000 mLs IntraVENous New Bag 10/26/22 1719)   ciprofloxacin (CIPRO) tablet 500 mg (has no administration in time range)   cloNIDine (CATAPRES) tablet 0.1 mg (0.1 mg Oral Given 10/26/22 1739)         Is this patient to be included in the SEP-1 Core Measure due to severe sepsis or septic shock? No   Exclusion criteria - the patient is NOT to be included for SEP-1 Core Measure due to:  2+ SIRS criteria are not met    11-year-old female presents emergency department due to elevated blood pressure. It was checked this morning by her home nursing staff and was noted to be systolic 728. Patient states that he been taking her blood pressure medications every day as prescribed.   On exam patient appears well and has no acute complaints at this time. Patient sent here for asymptomatic hypertension work-up. Work-up revealed a urinary tract infection with positive leukocytes and nitrates with greater than 100 white blood cells and 4+ bacteria. Patient was given ciprofloxacin oral tablet here. CBC was unremarkable. CMP showed a slight hyponatremia at 130 patient was given IV fluids for this with sodium chloride. Patient's BNP was slightly elevated but at her baseline of 1102. EKG unremarkable, CT scan of the head without contrast was ordered did not show any acute ischemic changes. Chest x-ray showed \"Small left pleural effusion with left basilar airspace opacities which could represent atelectasis versus pneumonia in the appropriate clinical setting. \"  Patient's not having any fever, cough or night sweats. Low suspicion for pneumonia or pleural effusion. Most likely representative of CHF. Patient is not looking fluid overloaded bilateral lower legs her nonedematous. Patient will be discharged with antibiotics and to follow-up with primary care doctor next week for recheck. Patient was given blood pressure medication here in the emergency department which did improve her blood pressure. At this time low suspicion for subarachnoid hemorrhage, epidural hematoma, AAA, aortic dissection, stroke, pneumothorax, hemothorax, A. fib, arrhythmias, pneumonia, pneumothorax, hemothorax, myocarditis or pericarditis or other acute emergent etiologies at this time    FINAL IMPRESSION      1. Hypertension, unspecified type    2.  Urinary tract infection without hematuria, site unspecified          DISPOSITION/PLAN   DISPOSITION Decision To Discharge 10/26/2022 05:45:54 PM      PATIENT REFERRED TO:  QUITA Zapata NP  [address]  [phone]    Schedule an appointment as soon as possible for a visit on 10/31/2022  [facility] Emergency Department  [facility] [address]  [phone]    As needed, If symptoms worsen    DISCHARGE MEDICATIONS:  New Prescriptions    CIPROFLOXACIN (CIPRO) 250 MG TABLET    Take 1 tablet by mouth 2 times daily for 3 days       DISCONTINUED MEDICATIONS:  Discontinued Medications    No medications on file              (Please note that portions of this note were completed with a voice recognition program.  Efforts were made to edit the dictations but occasionally words are mis-transcribed.)    Guinevere Libman, PA (electronically signed)            Guinevere Libman, PA  10/26/22 [phone]

## 2022-10-27 ENCOUNTER — CARE COORDINATION (OUTPATIENT)
Dept: CARE COORDINATION | Age: 77
End: 2022-10-27

## 2022-10-27 NOTE — CARE COORDINATION
Ambulatory Care Coordination  ED Follow up Call    Reason for ED visit:  HTN/UTI   Status:     improved    Did you call your PCP prior to going to the ED? No      Did you receive a discharge instructions from the Emergency Room? Yes  Review of Instructions:     Understands what to report/when to return?:  Yes   Understands discharge instructions?:  Yes   Following discharge instructions?:  Yes   If not why? N/a    Are there any new complaints of pain? No  New Pain Meds? N/A    Constipation prophylaxis needed? N/A    If you have a wound is the dressing clean, dry, and intact? N/A  Understands wound care regimen? N/A    Are there any other complaints/concerns that you wish to tell your provider? Not at this time. Pt was sleeping when AC called for f/u, however her , Mr. Ke Nguyen who is listed on her HIPAA form, spoke with ACM. He reports she is doing better today. He said her BP was down, although he couldn't provide ACM with the last BP reading. He did inform ACM he picked up pt's antibiotic and she started taking it today. He has no questions or concerns regarding her medications at this  time. We discussed making sure pt stays well hydrated and that she will need to call her PCP to f/u. He sated he understands and will call for f/u appointment. AC encouraged him to call with any questions/concerns and he agreed with this plan. [facility] to update primary Ghada MILLS. ROBERTH appts/Provider:    No future appointments. New Medications?:   Yes      Medication Reconciliation by phone - No  Understands Medications? Yes  Taking Medications? Yes  Can you swallow your pills? Yes    Any further needs in the home i.e. Equipment?   No    Link to services in community?:  No   Which services:  n/a

## 2022-10-28 LAB
EKG ATRIAL RATE: 79 BPM
EKG DIAGNOSIS: NORMAL
EKG P AXIS: 49 DEGREES
EKG P-R INTERVAL: 110 MS
EKG Q-T INTERVAL: 376 MS
EKG QRS DURATION: 68 MS
EKG QTC CALCULATION (BAZETT): 431 MS
EKG R AXIS: 41 DEGREES
EKG T AXIS: 54 DEGREES
EKG VENTRICULAR RATE: 79 BPM
ORGANISM: ABNORMAL
URINE CULTURE, ROUTINE: ABNORMAL

## 2022-10-28 PROCEDURE — 93010 ELECTROCARDIOGRAM REPORT: CPT | Performed by: INTERNAL MEDICINE

## 2022-10-31 DIAGNOSIS — F41.9 ANXIETY: ICD-10-CM

## 2022-11-01 RX ORDER — CLONAZEPAM 1 MG/1
TABLET ORAL
Qty: 60 TABLET | Refills: 0 | OUTPATIENT
Start: 2022-11-01

## 2022-11-01 RX ORDER — MIRTAZAPINE 15 MG/1
TABLET, FILM COATED ORAL
Qty: 30 TABLET | Refills: 10 | OUTPATIENT
Start: 2022-11-01

## 2022-11-01 NOTE — TELEPHONE ENCOUNTER
Medication:   Requested Prescriptions     Pending Prescriptions Disp Refills    mirtazapine (REMERON) 15 MG tablet [Pharmacy Med Name: MIRTAZAPINE 15 MG TABLET 15 Tablet] 30 tablet 10     Sig: TAKE 1 TABLET BY MOUTH DAILY    clonazePAM (KLONOPIN) 1 MG tablet [Pharmacy Med Name: CLONAZEPAM 1 MG TABLET 1 Tablet] 60 tablet 0     Sig: TAKE 1 TABLET BY MOUTH TWICE DAILY AS NEEDED FOR ANXIETY      Last Filled:  10/5/2022    Patient Phone Number: [phone] (home) [phone] (work)    Last appt: 6/29/2022   Next appt: 11/17/2022    Last OARRS:   RX Monitoring 5/11/2020   Attestation -   Periodic Controlled Substance Monitoring No signs of potential drug abuse or diversion identified.      PDMP Monitoring:    Last PDMP Nelida Wiggins as Reviewed [facility]):  Review User Review Instant Review Result   Abdias José 12/23/2021  8:37 AM Reviewed PDMP [1]     Preferred Pharmacy:   [facility] [id number] - [place], [address] [facility] [phone] [facility] [phone]  [address] [place] [address] Road  [address] [place] [address]  Phone: [phone] Fax: [address], [address] [facility] [phone] - f [phone]  [address] [place] [address]  Phone: [phone] Fax: [phone]

## 2022-11-08 ENCOUNTER — CARE COORDINATION (OUTPATIENT)
Dept: CARE COORDINATION | Age: 77
End: 2022-11-08

## 2022-11-14 DIAGNOSIS — F41.9 ANXIETY: ICD-10-CM

## 2022-11-15 RX ORDER — CLONAZEPAM 1 MG/1
TABLET ORAL
Qty: 60 TABLET | Refills: 0 | Status: SHIPPED | OUTPATIENT
Start: 2022-11-15 | End: 2022-12-15

## 2022-11-17 ENCOUNTER — OFFICE VISIT (OUTPATIENT)
Dept: FAMILY MEDICINE CLINIC | Age: 77
End: 2022-11-17
Payer: COMMERCIAL

## 2022-11-17 VITALS
OXYGEN SATURATION: 98 % | HEART RATE: 84 BPM | WEIGHT: 112 LBS | HEIGHT: 66 IN | SYSTOLIC BLOOD PRESSURE: 142 MMHG | BODY MASS INDEX: 18 KG/M2 | TEMPERATURE: 97.3 F | DIASTOLIC BLOOD PRESSURE: 88 MMHG

## 2022-11-17 DIAGNOSIS — E03.9 HYPOTHYROIDISM, UNSPECIFIED TYPE: ICD-10-CM

## 2022-11-17 DIAGNOSIS — Z00.00 ENCOUNTER FOR INITIAL ANNUAL WELLNESS VISIT (AWV) IN MEDICARE PATIENT: Primary | ICD-10-CM

## 2022-11-17 DIAGNOSIS — I10 ESSENTIAL HYPERTENSION: ICD-10-CM

## 2022-11-17 DIAGNOSIS — F01.50 VASCULAR DEMENTIA, UNCOMPLICATED (HCC): ICD-10-CM

## 2022-11-17 DIAGNOSIS — G95.20 CORD COMPRESSION (HCC): ICD-10-CM

## 2022-11-17 DIAGNOSIS — I50.22 CHRONIC SYSTOLIC (CONGESTIVE) HEART FAILURE (HCC): ICD-10-CM

## 2022-11-17 DIAGNOSIS — Z23 FLU VACCINE NEED: ICD-10-CM

## 2022-11-17 DIAGNOSIS — E87.0 HYPERNATREMIA: Primary | ICD-10-CM

## 2022-11-17 DIAGNOSIS — E55.9 VITAMIN D INSUFFICIENCY: ICD-10-CM

## 2022-11-17 DIAGNOSIS — J44.9 COPD, MODERATE (HCC): ICD-10-CM

## 2022-11-17 PROBLEM — I50.9 CONGESTIVE HEART FAILURE, UNSPECIFIED HF CHRONICITY, UNSPECIFIED HEART FAILURE TYPE (HCC): Status: RESOLVED | Noted: 2022-11-17 | Resolved: 2022-11-17

## 2022-11-17 PROBLEM — I50.9 CONGESTIVE HEART FAILURE, UNSPECIFIED HF CHRONICITY, UNSPECIFIED HEART FAILURE TYPE (HCC): Status: ACTIVE | Noted: 2022-11-17

## 2022-11-17 PROCEDURE — G0008 ADMIN INFLUENZA VIRUS VAC: HCPCS | Performed by: NURSE PRACTITIONER

## 2022-11-17 PROCEDURE — G0438 PPPS, INITIAL VISIT: HCPCS | Performed by: NURSE PRACTITIONER

## 2022-11-17 PROCEDURE — 1123F ACP DISCUSS/DSCN MKR DOCD: CPT | Performed by: NURSE PRACTITIONER

## 2022-11-17 PROCEDURE — 3074F SYST BP LT 130 MM HG: CPT | Performed by: NURSE PRACTITIONER

## 2022-11-17 PROCEDURE — 3078F DIAST BP <80 MM HG: CPT | Performed by: NURSE PRACTITIONER

## 2022-11-17 PROCEDURE — 90694 VACC AIIV4 NO PRSRV 0.5ML IM: CPT | Performed by: NURSE PRACTITIONER

## 2022-11-17 ASSESSMENT — PATIENT HEALTH QUESTIONNAIRE - PHQ9
9. THOUGHTS THAT YOU WOULD BE BETTER OFF DEAD, OR OF HURTING YOURSELF: 0
SUM OF ALL RESPONSES TO PHQ QUESTIONS 1-9: 6
8. MOVING OR SPEAKING SO SLOWLY THAT OTHER PEOPLE COULD HAVE NOTICED. OR THE OPPOSITE, BEING SO FIGETY OR RESTLESS THAT YOU HAVE BEEN MOVING AROUND A LOT MORE THAN USUAL: 1
6. FEELING BAD ABOUT YOURSELF - OR THAT YOU ARE A FAILURE OR HAVE LET YOURSELF OR YOUR FAMILY DOWN: 0
5. POOR APPETITE OR OVEREATING: 1
4. FEELING TIRED OR HAVING LITTLE ENERGY: 1
10. IF YOU CHECKED OFF ANY PROBLEMS, HOW DIFFICULT HAVE THESE PROBLEMS MADE IT FOR YOU TO DO YOUR WORK, TAKE CARE OF THINGS AT HOME, OR GET ALONG WITH OTHER PEOPLE: 1
1. LITTLE INTEREST OR PLEASURE IN DOING THINGS: 0
2. FEELING DOWN, DEPRESSED OR HOPELESS: 1
SUM OF ALL RESPONSES TO PHQ9 QUESTIONS 1 & 2: 1
3. TROUBLE FALLING OR STAYING ASLEEP: 1
7. TROUBLE CONCENTRATING ON THINGS, SUCH AS READING THE NEWSPAPER OR WATCHING TELEVISION: 1
SUM OF ALL RESPONSES TO PHQ QUESTIONS 1-9: 6

## 2022-11-17 NOTE — PROGRESS NOTES
Medicare Annual Wellness Visit    Jae Gross is here for Medicare AWV    Assessment & Plan   Medicare annual wellness visit, subsequent    Recommendations for Preventive Services Due: see orders and patient instructions/AVS.  Recommended screening schedule for the next 5-10 years is provided to the patient in written form: see Patient Instructions/AVS.     Return for Medicare Annual Wellness Visit in 1 year. Subjective   {OPTIONAL - WILL AUTO-DELETE IF NOT XMOZ:6685712518}    Patient's complete Health Risk Assessment and screening values have been reviewed and are found in Flowsheets. The following problems were reviewed today and where indicated follow up appointments were made and/or referrals ordered.     Positive Risk Factor Screenings with Interventions:    Fall Risk:  Do you feel unsteady or are you worried about falling? : (!) yes  2 or more falls in past year?: (!) yes  Fall with injury in past year?: (!) yes   Fall Risk Interventions:    {Medicare AWV Falls Risk Interventions:629672126}     Depression:  PHQ-2 Score: 1  PHQ-9 Total Score: 6    Severity:1-4 = minimal depression, 5-9 = mild depression, 10-14 = moderate depression, 15-19 = moderately severe depression, 20-27 = severe depression  Depression Interventions:  {Medicare AWV Depression Interventions:580939366}    Tobacco Use:  Tobacco Use: High Risk    Smoking Tobacco Use: Every Day    Smokeless Tobacco Use: Never    Passive Exposure: Not on file     E-cigarette/Vaping     Questions Responses    E-cigarette/Vaping Use Never User    Start Date     Passive Exposure     Quit Date     Counseling Given     Comments         Substance Use - Tobacco Interventions:  {Medicare AWV Tobacco Use Interventions:044550077}          Health Habits/Nutrition:  Physical Activity: Inactive    Days of Exercise per Week: 0 days    Minutes of Exercise per Session: 0 min        Body mass index: (!) 18.08     Health Habits/Nutrition Interventions:  {Medicare AWV Health Habits/Nutrition Interventions:507252355}             Objective   Vitals:    11/17/22 1033   BP: (!) 160/114   Site: Left Upper Arm   Position: Sitting   Cuff Size: Medium Adult   Pulse: 84   Temp: 97.3 °F (36.3 °C)   TempSrc: Infrared   SpO2: 98%   Weight: 112 lb (50.8 kg)   Height: 5' 6\" (1.676 m)      Body mass index is 18.08 kg/m². {OPTIONAL - GENERAL PHYSICAL EXAM (WILL AUTO-DELETE IF NOT BNPD):030416821}       Allergies   Allergen Reactions    Lipitor [Atorvastatin] Other (See Comments)     Weakness, severe    Morphine Shortness Of Breath    Keflex [Cephalexin] Other (See Comments)     SOB & bilateral arms shaking     Hctz [Hydrochlorothiazide] Other (See Comments)     Hyponatremia, severe      Norvasc [Amlodipine Besylate] Swelling     Of neck    Penicillins Hives    Tramadol Itching    Hydralazine Palpitations and Other (See Comments)     Dizzy,fatigue,headaches,palpitations,loss of appetite    Shellfish-Derived Products Swelling and Rash     Swelling of neck     Prior to Visit Medications    Medication Sig Taking?  Authorizing Provider   clonazePAM (KLONOPIN) 1 MG tablet TAKE 1 TABLET BY MOUTH TWICE DAILY AS NEEDED FOR ANXIETY Yes Edwyna Cancer, APRN - NP   mirtazapine (REMERON) 15 MG tablet TAKE ONE TABLET BY MOUTH DAILY Yes Edwyna Cancer, APRN - NP   ELIQUIS 5 MG TABS tablet TAKE ONE TABLET BY MOUTH TWICE A DAY Yes Edwyna Cancer, APRN - NP   carvedilol (COREG) 12.5 MG tablet TAKE 1 TABLET BY MOUTH TWICE DAILY Yes Edwyna Cancer, APRN - NP   Magnesium Oxide (MAGNESIUM-OXIDE) 250 MG TABS tablet Take 1 tablet by mouth daily for 2 days Yes Beata Asif MD   cloNIDine (CATAPRES) 0.1 MG tablet TAKE ONE TABLET BY MOUTH THREE TIMES A DAY Yes Edwyna Cancer, APRN - NP   levothyroxine (SYNTHROID) 100 MCG tablet TAKE ONE TABLET BY MOUTH DAILY Yes Edwyna Cancer, APRN - NP   albuterol (PROVENTIL) (5 MG/ML) 0.5% nebulizer solution Take 0.5 mLs by nebulization every 6 hours as needed for Wheezing Yes QUITA Schmidt NP   lisinopril (PRINIVIL;ZESTRIL) 5 MG tablet Take 1 tablet by mouth daily Yes QUITA Schmidt NP   oxybutynin (DITROPAN) 5 MG tablet TAKE ONE TABLET BY MOUTH THREE TIMES A DAY Yes QUITA Schmidt NP   sodium chloride 1 gram tablet Take 1 tablet by mouth daily Yes QUITA Avina CNP   ondansetron (ZOFRAN) 4 MG tablet Take 1 tablet by mouth 3 times daily as needed for Nausea or Vomiting Yes QUITA Schmidt NP   budesonide-formoterol (SYMBICORT) 160-4.5 MCG/ACT AERO Inhale 2 puffs into the lungs 2 times daily Yes Ilan Morris MD   aspirin 81 MG chewable tablet Take 1 tablet by mouth daily Yes Mike Huggins MD       [facility] (Including outside providers/suppliers regularly involved in providing care):   Patient Care Team:  QUITA Schmidt NP as PCP - General (Nurse Practitioner)  QUITA Schmidt NP as PCP - [facility] Empaneled Provider  Raciel Epps (Psychiatry)  Sharlene Andrews RN as Ambulatory Care Manager     Reviewed and updated this visit:  Tobacco  Allergies  Meds  Problems  Med Hx  Surg Hx  Soc Hx  Fam Hx

## 2022-11-17 NOTE — PROGRESS NOTES
[id number] VISIT    Patient is here for their Medicare Annual Wellness Visit     Last eye exam: several years, not sure  Last dental exam: 3/2022, got new dentures  Exercise: has difficulty getting around, no exercises  Diet: in general, an \"unhealthy\" diet, does not eat much, says daughter is a bad cook    How would you rate your overall health? : Fair        Fall Risk 11/17/2022 7/6/2022 7/6/2022 7/6/2022 9/23/2021 8/7/2020 5/13/2019   2 or more falls in past year? yes - - no yes no no   Fall with injury in past year? yes yes (No Data) no yes no no       PHQ Scores 11/17/2022 2/2/2022 3/1/2018 2/14/2017 2/14/2017 10/27/2014 2/20/2014   PHQ2 Score 1 5 2 2 2 6 4   PHQ9 Score 6 10 2 2 2 13 11       Do you always wear a seat belt in the car?: Yes      Have you noted any problems with hearing?: No  Have you noted any vision problems?: Yes, hard to read fine print but can see TV ok  Do you have concerns about your sexual health?: no  In the past month how much has pain been an issue for you?:  Somewhat  In the past month have you had issues with anxiety, loneliness, irritability or fatigue:  Somewhat    Do you take opioid medications even sometimes? No (if using assess risk and whether other treatments would be beneficial)    Living Will: Yes,   Copy on file      Healthcare Decision Maker:    Primary Decision Maker: Qamar Duncan - [phone]    Secondary Decision Maker: Lisa Dumont  [phone]  Click here to complete Healthcare Decision Makers including selection of the Healthcare Decision Maker Relationship (ie \"Primary\"). Today we documented Decision Maker(s) consistent with ACP documents on file. Who lives at home with you:  Cha Macdonald, and great grand daughter, grand daughter, great grand son  Do you have any pets? dog and cat  Do you have any services coming to your home (meals on wheels, home health, etc) ? : no      Do you need help with:  Using the phone: No  Bathing: No  Dressing:  No  Toileting: No  Transportation:  Yes: unable to drive  Shopping: No  Preparing meals: Yes: grand daughter helps  Housework/Laundry: Yes: uses a cane  Medications: Yes: prepackaged medications from home delivery service  Money management: Yes:  Marcel Albright handles the bills    Does your home have:  Unsecured throw rugs: No  Grab bars in bathroom: No  Walk in shower: Yes  Seat in shower: Yes  Lit pathways for night (nightlights): Yes    Memory:  Have you or anyone close to you expressed concerns about your memory: No    Knows:  Month: Yes  Day: No  Year: No  Day of Week: Yes  Able to Recall (tree, fork, ball) : No    Patient history:   Patient's medications, allergies, past medical, surgical, social and family histories were reviewed and updated in the EHR under History. Social History     Substance and Sexual Activity   Alcohol Use No       Social History     Substance and Sexual Activity   Drug Use No       Social History     Tobacco Use   Smoking Status Every Day    Packs/day: 1.00    Years: 52.00    Pack years: 52.00    Types: Cigarettes    Last attempt to quit: 2018    Years since quittin.3   Smokeless Tobacco Never   Tobacco Comments    started smoking at age 26 / smoked up to 2 p,p,d / now smoking 4 to 8 cigarettes a day,           Care Team:  Patient's list of care team members was updated in EHR under the Snap Shot. Cardiology- Dr. Kal Freeman  Cardiology- Theone Score, APRN  Urology- Dr. Tasha Messina  Urology- Dr. Kyra Carson    Immunizations: Reviewed with patient.    Influenza  Covid  shingles    Health Maintenance Due   Topic Date Due    Hepatitis C screen  Never done    Shingles vaccine (1 of 2) Never done    Low dose CT lung screening  Never done    COVID-19 Vaccine (2 - Pfizer series) 01/10/2022       CHRONIC CONDITIONS / ACUTE COMPLAINTS   Congestive heart failure, unspecified HF chronicity, unspecified heart failure type (HCC)  Chronic systolic (congestive) heart failure  Chronic ischemic multifocal multiple vascular territories stroke  Cardiac arrhythmia  HTN (hypertension), benign  PAF (paroxysmal atrial fibrillation) (HCC)  Vascular dementia, uncomplicated (HCC)  Coronary artery disease due to lipid rich plaque  Oropharyngeal dysphagia  Hypothyroidism  Overactive bladder  Encephalopathy  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 Vitals:    11/17/22 1033 11/17/22 1105   BP: (!) 160/114 (!) 142/88   Site: Left Upper Arm Left Upper Arm   Position: Sitting Sitting   Cuff Size: Medium Adult Medium Adult   Pulse: 84    Temp: 97.3 °F (36.3 °C)    TempSrc: Infrared    SpO2: 98%    Weight: 112 lb (50.8 kg)    Height: 5' 6\" (1.676 m)      Wt Readings from Last 3 Encounters:   11/17/22 112 lb (50.8 kg)   10/26/22 128 lb (58.1 kg)   08/31/22 115 lb (52.2 kg)       GENERAL:Alert and at baseline, no acute distress, well hydrated, well developed. LUNG:clear to auscultation bilaterally with normal respiratory effort  CV: Normal heart sounds, regular rate and rhythm without murmurs  EXTREMETY: weakness, no edema    Was the timed get up and go unsteady or longer than 20 seconds: Yes    Vision Screen for Initial Exam: no    EKG for Initial Exam at 71 (): no    AAA U/S screen for men 65-75 who smoked (): not applicable    Assessment/Plan:    Lucinda Mancia was seen today for medicare awv.     Diagnoses and all orders for this visit:    Encounter for initial annual wellness visit (AWV) in [place] patient  Advised routine dental and vision  Increased exercise, healthy diet   Discussed DEXA bone scan  Advised shingrix vaccination  Advised CT lung screen, smoking history    Chronic systolic (congestive) heart failure  Controlled  Advised better BP control    Cord compression (HCC)  Discussed DEXA bone scan  Risk for fracture  Vitamin D     Vascular dementia, uncomplicated (HCC)  Controlled  Advised mental exercises    COPD, moderate ([facility] [address]  Controlled  Not follow by pulmonology    Flu vaccine need  -     Influenza, FLUAD, (age 72 y+), IM, Preservative Free, 0.5 mL  Administered        Return in 3 months (on 2/17/2023) for Medicare Annual Wellness Visit in 1 year, medication re-check, lab review.

## 2022-11-17 NOTE — PATIENT INSTRUCTIONS
Personalized Preventive Plan for Jae Gross - 11/17/2022  Medicare offers a range of preventive health benefits. Some of the tests and screenings are paid in full while other may be subject to a deductible, co-insurance, and/or copay. Some of these benefits include a comprehensive review of your medical history including lifestyle, illnesses that may run in your family, and various assessments and screenings as appropriate. After reviewing your medical record and screening and assessments performed today your provider may have ordered immunizations, labs, imaging, and/or referrals for you. A list of these orders (if applicable) as well as your Preventive Care list are included within your After Visit Summary for your review. Other Preventive Recommendations:    A preventive eye exam performed by an eye specialist is recommended every 1-2 years to screen for glaucoma; cataracts, macular degeneration, and other eye disorders. A preventive dental visit is recommended every 6 months. Try to get at least 150 minutes of exercise per week or 10,000 steps per day on a pedometer . Order or download the FREE \"Exercise & Physical Activity: Your Everyday Guide\" from [employer]. Call 1-319.814.9980 or search [employer] online. You need 7236-8559 mg of calcium and 2149-4422 IU of vitamin D per day. It is possible to meet your calcium requirement with diet alone, but a vitamin D supplement is usually necessary to meet this goal.  When exposed to the sun, use a sunscreen that protects against both UVA and UVB radiation with an SPF of 30 or greater. Reapply every 2 to 3 hours or after sweating, drying off with a towel, or swimming. Always wear a seat belt when traveling in a car. Always wear a helmet when riding a bicycle or motorcycle.

## 2022-11-21 DIAGNOSIS — F41.9 ANXIETY: ICD-10-CM

## 2022-11-21 DIAGNOSIS — R32 URINARY INCONTINENCE, UNSPECIFIED TYPE: ICD-10-CM

## 2022-11-22 ENCOUNTER — CARE COORDINATION (OUTPATIENT)
Dept: CARE COORDINATION | Age: 77
End: 2022-11-22

## 2022-11-22 DIAGNOSIS — F41.9 ANXIETY: ICD-10-CM

## 2022-11-22 RX ORDER — OXYBUTYNIN CHLORIDE 5 MG/1
TABLET ORAL
Qty: 90 TABLET | Refills: 10 | Status: SHIPPED | OUTPATIENT
Start: 2022-11-22

## 2022-11-22 RX ORDER — CLONAZEPAM 1 MG/1
TABLET ORAL
Qty: 60 TABLET | Refills: 0 | OUTPATIENT
Start: 2022-11-22

## 2022-11-22 NOTE — CARE COORDINATION
ACM made outreach to patient via my chart. Will wait for return call. ACM will scheduled further outreach at a later date/time.

## 2022-11-23 ENCOUNTER — HOSPITAL ENCOUNTER (EMERGENCY)
Age: 77
Discharge: HOME | End: 2022-11-23
Attending: EMERGENCY MEDICINE
Payer: COMMERCIAL

## 2022-11-23 ENCOUNTER — APPOINTMENT (OUTPATIENT)
Dept: CT IMAGING | Age: 77
End: 2022-11-23
Payer: COMMERCIAL

## 2022-11-23 ENCOUNTER — APPOINTMENT (OUTPATIENT)
Dept: GENERAL RADIOLOGY | Age: 77
End: 2022-11-23
Payer: COMMERCIAL

## 2022-11-23 VITALS
RESPIRATION RATE: 18 BRPM | BODY MASS INDEX: 17.27 KG/M2 | WEIGHT: 110 LBS | OXYGEN SATURATION: 94 % | HEIGHT: 67 IN | HEART RATE: 88 BPM | SYSTOLIC BLOOD PRESSURE: 168 MMHG | TEMPERATURE: 97.4 F | DIASTOLIC BLOOD PRESSURE: 110 MMHG
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
## 2022-11-23 DIAGNOSIS — E87.1 CHRONIC HYPONATREMIA: ICD-10-CM

## 2022-11-23 DIAGNOSIS — R35.0 URINARY FREQUENCY: ICD-10-CM

## 2022-11-23 DIAGNOSIS — R53.1 GENERAL WEAKNESS: Primary | ICD-10-CM

## 2022-11-23 LAB
A/G RATIO: 1 (ref 1.1–2.2)
ALBUMIN SERPL-MCNC: 4.2 G/DL (ref 3.4–5)
ALP BLD-CCNC: 64 U/L (ref 40–129)
ALT SERPL-CCNC: 12 U/L (ref 10–40)
ANION GAP SERPL CALCULATED.3IONS-SCNC: 19 MMOL/L (ref 3–16)
AST SERPL-CCNC: 25 U/L (ref 15–37)
BACTERIA: NORMAL /HPF
BASOPHILS ABSOLUTE: 0 K/UL (ref 0–0.2)
BASOPHILS RELATIVE PERCENT: 0.3 %
BILIRUB SERPL-MCNC: 0.9 MG/DL (ref 0–1)
BILIRUBIN URINE: NEGATIVE
BLOOD, URINE: NEGATIVE
BUN BLDV-MCNC: 7 MG/DL (ref 7–20)
CALCIUM SERPL-MCNC: 9.8 MG/DL (ref 8.3–10.6)
CHLORIDE BLD-SCNC: 90 MMOL/L (ref 99–110)
CLARITY: CLEAR
CO2: 20 MMOL/L (ref 21–32)
COLOR: YELLOW
CREAT SERPL-MCNC: <0.5 MG/DL (ref 0.6–1.2)
EKG ATRIAL RATE: 83 BPM
EKG DIAGNOSIS: NORMAL
EKG P AXIS: 67 DEGREES
EKG P-R INTERVAL: 134 MS
EKG Q-T INTERVAL: 400 MS
EKG QRS DURATION: 84 MS
EKG QTC CALCULATION (BAZETT): 470 MS
EKG R AXIS: 48 DEGREES
EKG T AXIS: 51 DEGREES
EKG VENTRICULAR RATE: 83 BPM
EOSINOPHILS ABSOLUTE: 0 K/UL (ref 0–0.6)
EOSINOPHILS RELATIVE PERCENT: 0.4 %
EPITHELIAL CELLS, UA: 1 /HPF (ref 0–5)
GFR SERPL CREATININE-BSD FRML MDRD: >60 ML/MIN/{1.73_M2}
GLUCOSE BLD-MCNC: 100 MG/DL (ref 70–99)
GLUCOSE URINE: NEGATIVE MG/DL
HCT VFR BLD CALC: 43.9 % (ref 36–48)
HEMOGLOBIN: 15.1 G/DL (ref 12–16)
HYALINE CASTS: 0 /LPF (ref 0–8)
KETONES, URINE: ABNORMAL MG/DL
LEUKOCYTE ESTERASE, URINE: NEGATIVE
LYMPHOCYTES ABSOLUTE: 0.6 K/UL (ref 1–5.1)
LYMPHOCYTES RELATIVE PERCENT: 4.7 %
MCH RBC QN AUTO: 34.2 PG (ref 26–34)
MCHC RBC AUTO-ENTMCNC: 34.4 G/DL (ref 31–36)
MCV RBC AUTO: 99.4 FL (ref 80–100)
MICROSCOPIC EXAMINATION: YES
MONOCYTES ABSOLUTE: 0.9 K/UL (ref 0–1.3)
MONOCYTES RELATIVE PERCENT: 7.2 %
NEUTROPHILS ABSOLUTE: 11.4 K/UL (ref 1.7–7.7)
NEUTROPHILS RELATIVE PERCENT: 87.4 %
NITRITE, URINE: NEGATIVE
PDW BLD-RTO: 13.7 % (ref 12.4–15.4)
PH UA: 7.5 (ref 5–8)
PLATELET # BLD: 291 K/UL (ref 135–450)
PMV BLD AUTO: 8.6 FL (ref 5–10.5)
POTASSIUM REFLEX MAGNESIUM: 4.2 MMOL/L (ref 3.5–5.1)
PROTEIN UA: 300 MG/DL
RBC # BLD: 4.41 M/UL (ref 4–5.2)
RBC UA: 2 /HPF (ref 0–4)
SODIUM BLD-SCNC: 129 MMOL/L (ref 136–145)
SPECIFIC GRAVITY UA: 1.01 (ref 1–1.03)
TOTAL PROTEIN: 8.3 G/DL (ref 6.4–8.2)
TROPONIN: <0.01 NG/ML
URINE REFLEX TO CULTURE: ABNORMAL
URINE TYPE: ABNORMAL
UROBILINOGEN, URINE: 0.2 E.U./DL
WBC # BLD: 13 K/UL (ref 4–11)
WBC UA: 1 /HPF (ref 0–5)

## 2022-11-23 PROCEDURE — 84484 ASSAY OF TROPONIN QUANT: CPT

## 2022-11-23 PROCEDURE — 80053 COMPREHEN METABOLIC PANEL: CPT

## 2022-11-23 PROCEDURE — 81001 URINALYSIS AUTO W/SCOPE: CPT

## 2022-11-23 PROCEDURE — 93010 ELECTROCARDIOGRAM REPORT: CPT | Performed by: INTERNAL MEDICINE

## 2022-11-23 PROCEDURE — 70450 CT HEAD/BRAIN W/O DYE: CPT

## 2022-11-23 PROCEDURE — 99285 EMERGENCY DEPT VISIT HI MDM: CPT

## 2022-11-23 PROCEDURE — 85025 COMPLETE CBC W/AUTO DIFF WBC: CPT

## 2022-11-23 PROCEDURE — 71045 X-RAY EXAM CHEST 1 VIEW: CPT

## 2022-11-23 PROCEDURE — 93005 ELECTROCARDIOGRAM TRACING: CPT | Performed by: EMERGENCY MEDICINE

## 2022-11-23 PROCEDURE — 2580000003 HC RX 258: Performed by: EMERGENCY MEDICINE

## 2022-11-23 RX ORDER — 0.9 % SODIUM CHLORIDE 0.9 %
1000 INTRAVENOUS SOLUTION INTRAVENOUS ONCE
Status: COMPLETED | OUTPATIENT
Start: 2022-11-23 | End: 2022-11-23

## 2022-11-23 RX ORDER — CLONAZEPAM 1 MG/1
TABLET ORAL
Qty: 60 TABLET | Refills: 0 | OUTPATIENT
Start: 2022-11-23

## 2022-11-23 RX ADMIN — SODIUM CHLORIDE 1000 ML: 9 INJECTION, SOLUTION INTRAVENOUS at 13:43

## 2022-11-23 ASSESSMENT — LIFESTYLE VARIABLES: HOW OFTEN DO YOU HAVE A DRINK CONTAINING ALCOHOL: NEVER

## 2022-11-23 NOTE — ED PROVIDER NOTES
I received this patient in signout from Dr. Ad Waldron. We discussed the patient's initial history, physical, workup thus far, and medical decision making to this point. Briefly, Jairo Gregg is a 65 y.o. female  who presents to the ED complaining of generalized weakness and polyuria, with a fall yesterday that seems mechanical.  Initial history/exam also pertinent for negative trauma imaging from a head CT and chest x-ray standpoint. Chest x-ray does equivocally comment about airspace disease versus atelectasis, but the patient has no respiratory symptoms to suggest infectious process. She does complain of polyuria and we are awaiting urinalysis and ambulation trial prior to discharge. Pending studies at time of signout include: Ambulation trial, urinalysis    The patient will be reassessed after workup above is completed. Anticipated disposition at time of signout is discharge if reassessment is unremarkable      ED COURSE/MDM  I introduced myself to the patient and performed my initial assessment on Jairo Gregg. There is no significant change in the patient's history from what is documented initially by Dr. Ad Waldron after my evaluation. Old records reviewed. Labs and imaging reviewed. On my physical examination of the patient, she has no tenderness anywhere cervical thoracic lumbar spine or in the extremities x4 chest abdomen pelvis or anywhere else. Normocephalic/atraumatic. Well-appearing. Since time of sign out, the patient's ED workup was notable for negative urinalysis for infection. No indication for pulmonary antibiotics given lack of respiratory symptoms to suggest pneumonia. Her sodium is only 129, mildly low but stable for the patient. She is tolerating p.o. She does ambulate with a walker prior to discharge independently and says that she has lots of help at home and wants to be discharged home which is reasonable considering her evaluation today.     During the patient's ED course, the patient was given:  Medications   0.9 % sodium chloride bolus (1,000 mLs IntraVENous New Bag 11/23/22 1344)        CLINICAL IMPRESSION  1. General weakness    2. Urinary frequency    3. Fall on same level from slipping, tripping or stumbling, initial encounter    4. Chronic hyponatremia        Blood pressure (!) 168/110, pulse 88, temperature 97.4 °F (36.3 °C), temperature source Oral, resp. rate 18, height 5' 7\" (1.702 m), weight 110 lb (49.9 kg), SpO2 94 %, not currently breastfeeding. Radha Carl was discharged to home in stable condition. I have discussed the findings of today's workup with the patient and addressed the patient's questions and concerns. Important warning signs as well as new or worsening symptoms which would necessitate immediate return to the ED were discussed. The plan is to discharge from the ED at this time, and the patient is in stable condition. The patient acknowledged understanding is agreeable with this plan. Follow-up with:  QUITA Cantu NP  [address]  [phone]    Schedule an appointment as soon as possible for a visit in 2 days  For symptom re-evaluation    [facility] Emergency Department  [address]  Go to   If symptoms worsen      This chart was created using Dragon dictation software. Efforts were made by me to ensure accuracy, however some errors may be present due to limitations of this technology.        Daniel Link MD  11/23/22 3524

## 2022-11-23 NOTE — ED PROVIDER NOTES
[id number]      CHIEF COMPLAINT  Fall and Fatigue (For a couple of days. From home by [place]. .. Les Pimple Les Vandana thinks has uti)       HISTORY OF PRESENT ILLNESS  Juliane Fair is a 65 y.o. female  who presents to the ED complaining of generalized weakness. Believes she may have a UTI. Has urinary frequency at baseline and wears depends. No dysuria or hematuria. Feels weak in general, no focal numbness, tingling or weakness. Slight headache. Brought in by EMS. Dueliliana Duvall yesterday when reaching for the [employer] and misjudged and went down. Did hit head, no LOC. No other complaints, modifying factors or associated symptoms. I have reviewed the following from the nursing documentation.     Past Medical History:   Diagnosis Date    Acute DVT (deep venous thrombosis) ([facility] 07/03/2015    Acute encephalopathy 04/19/2018    Acute on chronic diastolic heart failure ([address] 03/30/2019    Alcohol abuse     Anxiety     Arthritis     CAD in native artery     Cellulitis 04/28/2018    Cerebral artery occlusion with cerebral infarction [facility])     states hx of multiple mini strokes    Cerebrovascular accident (CVA) ([address]     Chronic fatigue     Complex care coordination     Complicated UTI (urinary tract infection)     Congestive heart failure, unspecified HF chronicity, unspecified heart failure type ([address] 11/17/2022    COPD (chronic obstructive pulmonary disease) ([address]     COPD exacerbation ([address] 02/20/2018    Depression     Diabetes mellitus ([address]     denies    DIMAS (dyspnea on exertion) 07/03/2015    DVT (deep venous thrombosis) ([facility])     DVT, lower extremity ([facility])     Fatigue 11/03/2015    General weakness 11/04/2015    Generalized weakness 08/22/2019    Hypercholesteremia     Hypertension     Hyperthyroidism     Incontinence 10/16/2012    Mammogram abnormal 02/07/2012    Neuromuscular disorder ([address]     Osteopenia 02/14/2017    PAF (paroxysmal atrial fibrillation) ([facility])     Pneumonia Recurrent UTI     Right forearm cellulitis     Superficial thrombophlebitis of right upper extremity     Thyroid disease     Tobacco abuse     Tobacco abuse counseling     Trapped lung 2017    Unable to walk 2018     Past Surgical History:   Procedure Laterality Date    COLONOSCOPY  2012    Dr. Tom Urban; repeat 5 years    EYE SURGERY      cateracts removed    NERVE SURGERY N/A 2021    Aziza Leach, FLEXIBLE CYSTOSCOPY performed by Ray Abdi MD at [address] ARTHROSCOPY Right 14    SUBACROMIAL DECOMPRESSION, ROTATOR CUFF REPAIR    STIMULATOR SURGERY N/A 2022    INTERSTIMULATOR INSERTION STAGE 2 - MEDTRONICS performed by Ray Abdi MD at [address] [facility] Right     TONSILLECTOMY       Family History   Problem Relation Age of Onset    Heart Disease Father         MI @ 64    Alcohol Abuse Father      Social History     Socioeconomic History    Marital status:      Spouse name: Not on file    Number of children: Not on file    Years of education: Not on file    Highest education level: Not on file   Occupational History    Not on file   Tobacco Use    Smoking status: Every Day     Packs/day: 0.50     Years: 52.00     Pack years: 26.00     Types: Cigarettes     Last attempt to quit: 2018     Years since quittin.4    Smokeless tobacco: Never    Tobacco comments:     started smoking at age 26 / smoked up to 2 p,p,d / now smoking 4 to 8 cigarettes a day,   Vaping Use    Vaping Use: Never used   Substance and Sexual Activity    Alcohol use: No    Drug use: No    Sexual activity: Yes     Partners: Male   Other Topics Concern    Not on file   Social History Narrative    Not on file     Social Determinants of Health     Financial Resource Strain: Medium Risk    Difficulty of Paying Living Expenses: Somewhat hard   Food Insecurity: No Food Insecurity    Worried About Running Out of Food in the Last Year: Never true    Ran Out of Food in the Last Year: Never true   Transportation Needs: No Transportation Needs    Lack of Transportation (Medical): No    Lack of Transportation (Non-Medical): No   Physical Activity: Inactive    Days of Exercise per Week: 0 days    Minutes of Exercise per Session: 0 min   Stress: Stress Concern Present    Feeling of Stress : Very much   Social Connections: Moderately Isolated    Frequency of Communication with Friends and Family:  Three times a week    Frequency of Social Gatherings with Friends and Family: Never    Attends Oriental orthodox Services: Never    Active Member of Clubs or Organizations: No    Attends Club or Organization Meetings: Never    Marital Status:    Intimate Partner Violence: Not on file   Housing Stability: [address] to Pay for Housing in the Last Year: No    Number of [place] in the Last Year: 1    Unstable Housing in the Last Year: No     Current Facility-Administered Medications   Medication Dose Route Frequency Provider Last Rate Last Admin    0.9 % sodium chloride bolus  1,000 mL IntraVENous Once Geno Harding .9 mL/hr at 11/23/22 1343 1,000 mL at 11/23/22 1343     Current Outpatient Medications   Medication Sig Dispense Refill    oxybutynin (DITROPAN) 5 MG tablet TAKE 1 TABLET BY MOUTH THREE TIMES DAILY 90 tablet 10    clonazePAM (KLONOPIN) 1 MG tablet TAKE 1 TABLET BY MOUTH TWICE DAILY AS NEEDED FOR ANXIETY 60 tablet 0    mirtazapine (REMERON) 15 MG tablet TAKE ONE TABLET BY MOUTH DAILY 90 tablet 1    ELIQUIS 5 MG TABS tablet TAKE ONE TABLET BY MOUTH TWICE A  tablet 0    carvedilol (COREG) 12.5 MG tablet TAKE 1 TABLET BY MOUTH TWICE DAILY 60 tablet 10    Magnesium Oxide (MAGNESIUM-OXIDE) 250 MG TABS tablet Take 1 tablet by mouth daily for 2 days 2 tablet 0    cloNIDine (CATAPRES) 0.1 MG tablet TAKE ONE TABLET BY MOUTH THREE TIMES A DAY 90 tablet 2    levothyroxine (SYNTHROID) 100 MCG tablet TAKE ONE TABLET BY MOUTH DAILY 90 tablet 0    albuterol (PROVENTIL) (5 MG/ML) 0.5% nebulizer solution Take 0.5 mLs by nebulization every 6 hours as needed for Wheezing 120 each 0    lisinopril (PRINIVIL;ZESTRIL) 5 MG tablet Take 1 tablet by mouth daily 30 tablet 5    sodium chloride 1 gram tablet Take 1 tablet by mouth daily 90 tablet 3    ondansetron (ZOFRAN) 4 MG tablet Take 1 tablet by mouth 3 times daily as needed for Nausea or Vomiting 30 tablet 0    budesonide-formoterol (SYMBICORT) 160-4.5 MCG/ACT AERO Inhale 2 puffs into the lungs 2 times daily 10.2 g 0    aspirin 81 MG chewable tablet Take 1 tablet by mouth daily 30 tablet 0     Allergies   Allergen Reactions    Lipitor [Atorvastatin] Other (See Comments)     Weakness, severe    Morphine Shortness Of Breath    Keflex [Cephalexin] Other (See Comments)     SOB & bilateral arms shaking     Hctz [Hydrochlorothiazide] Other (See Comments)     Hyponatremia, severe      Norvasc [Amlodipine Besylate] Swelling     Of neck    Penicillins Hives    Tramadol Itching    Hydralazine Palpitations and Other (See Comments)     Dizzy,fatigue,headaches,palpitations,loss of appetite    Shellfish-Derived Products Swelling and Rash     Swelling of neck       REVIEW OF SYSTEMS  10 systems reviewed, pertinent positives per HPI otherwise noted to be negative. PHYSICAL EXAM  BP (!) 168/110   Pulse 97   Temp 97.4 °F (36.3 °C) (Oral)   Resp 18   Ht 5' 7\" (1.702 m)   Wt 110 lb (49.9 kg)   SpO2 94%   BMI 17.23 kg/m²    GENERAL APPEARANCE: Awake and alert. Cooperative. No acute distress. Thin appearing  HENT: Normocephalic. Atraumatic. Mucous membranes are tacky. No drooling or stridor. NECK: Supple. EYES: PERRL. EOM's grossly intact. HEART/CHEST: RRR. No murmurs. 2+ radial pulses b/l. LUNGS: Respirations unlabored. CTAB. Good air exchange. Speaking comfortably in full sentences. ABDOMEN: No tenderness. Soft. Non-distended. No masses. No organomegaly. No guarding or rebound.    MUSCULOSKELETAL: No extremity edema. Compartments soft. No deformity. No tenderness in the extremities. All extremities neurovascularly intact. SKIN: Warm and dry. No acute rashes. NEUROLOGICAL: Alert and oriented. CN's 2-12 intact. No gross facial drooping. Strength 5/5, sensation intact. No gross focal deficits. PSYCHIATRIC: Normal mood and affect. LABS  I have reviewed all labs for this visit.    Results for orders placed or performed during the hospital encounter of 11/23/22   CBC with Auto Differential   Result Value Ref Range    WBC 13.0 (H) 4.0 - 11.0 K/uL    RBC 4.41 4.00 - 5.20 M/uL    Hemoglobin 15.1 12.0 - 16.0 g/dL    Hematocrit 43.9 36.0 - 48.0 %    MCV 99.4 80.0 - 100.0 fL    MCH 34.2 (H) 26.0 - 34.0 pg    MCHC 34.4 31.0 - 36.0 g/dL    RDW 13.7 12.4 - 15.4 %    Platelets 931 674 - 788 K/uL    MPV 8.6 5.0 - 10.5 fL    Neutrophils % 87.4 %    Lymphocytes % 4.7 %    Monocytes % 7.2 %    Eosinophils % 0.4 %    Basophils % 0.3 %    Neutrophils Absolute 11.4 (H) 1.7 - 7.7 K/uL    Lymphocytes Absolute 0.6 (L) 1.0 - 5.1 K/uL    Monocytes Absolute 0.9 0.0 - 1.3 K/uL    Eosinophils Absolute 0.0 0.0 - 0.6 K/uL    Basophils Absolute 0.0 0.0 - 0.2 K/uL   CMP w/ Reflex to MG   Result Value Ref Range    Sodium 129 (L) 136 - 145 mmol/L    Potassium reflex Magnesium 4.2 3.5 - 5.1 mmol/L    Chloride 90 (L) 99 - 110 mmol/L    CO2 20 (L) 21 - 32 mmol/L    Anion Gap 19 (H) 3 - 16    Glucose 100 (H) 70 - 99 mg/dL    BUN 7 7 - 20 mg/dL    Creatinine <0.5 (L) 0.6 - 1.2 mg/dL    Est, Glom Filt Rate >60 >60    Calcium 9.8 8.3 - 10.6 mg/dL    Total Protein 8.3 (H) 6.4 - 8.2 g/dL    Albumin 4.2 3.4 - 5.0 g/dL    Albumin/Globulin Ratio 1.0 (L) 1.1 - 2.2    Total Bilirubin 0.9 0.0 - 1.0 mg/dL    Alkaline Phosphatase 64 40 - 129 U/L    ALT 12 10 - 40 U/L    AST 25 15 - 37 U/L   Troponin   Result Value Ref Range    Troponin <0.01 <0.01 ng/mL   Urinalysis with Reflex to Culture    Specimen: Urine   Result Value Ref Range    Urine Type NotGiven slight generalized atrophy at the cerebellar hemispheres. ORBITS: There are bilateral eye lens replacements. The visualized portion of the orbits demonstrate no acute abnormality. SINUSES: The visualized paranasal sinuses and mastoid air cells demonstrate no acute abnormality. SOFT TISSUES/SKULL:  No acute abnormality of the visualized skull or soft tissues. There is slight calcification of the carotid siphons. No acute intracranial abnormality. Extensive small vessel ischemic changes at cerebral regions     CT Head W/O Contrast    Result Date: 10/26/2022  EXAMINATION: CT OF THE HEAD WITHOUT CONTRAST  10/26/2022 4:17 pm TECHNIQUE: CT of the head was performed without the administration of intravenous contrast. Automated exposure control, iterative reconstruction, and/or weight based adjustment of the mA/kV was utilized to reduce the radiation dose to as low as reasonably achievable. COMPARISON: CT head dated 7 June 2022 HISTORY: ORDERING SYSTEM PROVIDED HISTORY: Hypertension TECHNOLOGIST PROVIDED HISTORY: Reason for exam:->Hypertension Has a \"code stroke\" or \"stroke alert\" been called? ->No Decision Support Exception - unselect if not a suspected or confirmed emergency medical condition->Emergency Medical Condition (MA) Reason for Exam: Hypertension (C/o high blood pressure. States she took her medication this morning. Denies any other concerns or symptoms ). FINDINGS: BRAIN/VENTRICLES: No intracranial hemorrhage, mass, or mass effect. No evidence of acute infarct. Extensive periventricular and deep white matter hypoattenuation consistent with microvascular ischemic change. Small old left cerebellar lacunar infarct. ORBITS: The visualized portion of the orbits demonstrate no acute abnormality. SINUSES: The visualized paranasal sinuses and mastoid air cells demonstrate no acute abnormality. SOFT TISSUES/SKULL:  No acute abnormality of the visualized skull or soft tissues.      1.  No acute intracranial abnormality. 2.  Extensive chronic small vessel ischemic change. XR CHEST PORTABLE    Result Date: 11/23/2022  EXAMINATION: ONE XRAY VIEW OF THE CHEST 11/23/2022 12:25 pm COMPARISON: 10/26/2022 HISTORY: Generalized weakness. FINDINGS: Stable mild cardiomegaly. Cardiac loop recorder. Right lung is clear. Stable opacity in the mid to lower left lung. No pneumothorax. Right rotator cuff repair. Stable left pleural effusion with adjacent airspace disease. XR CHEST PORTABLE    Result Date: 10/26/2022  EXAMINATION: ONE XRAY VIEW OF THE CHEST 10/26/2022 3:53 pm COMPARISON: Chest x-ray dated 31 August 2022 HISTORY: ORDERING SYSTEM PROVIDED HISTORY: Hypertension TECHNOLOGIST PROVIDED HISTORY: Reason for exam:->Hypertension Reason for Exam: hypertension FINDINGS: Stable cardiomediastinal silhouette with mild enlargement. Small left pleural effusion with left basilar airspace opacities. The right lung is clear. No pneumothorax     Small left pleural effusion with left basilar airspace opacities which could represent atelectasis versus pneumonia in the appropriate clinical setting. ED COURSE/MDM  Patient seen and evaluated. Old records reviewed. Labs and imaging reviewed and results discussed with patient. Patient is status post mechanical fall although has been feeling more generally weak. She is concerned about possible UTI. On blood work she has noted chronic hyponatremia. She has mild leukocytosis. She does have small pleural effusion and possible atelectasis versus pneumonia on chest x-ray but this seems to be more chronic and without overt pulmonary symptoms I feel that this is more likely atelectasis. Still awaiting urinalysis for evaluation of possible UTI. She does remain hypertensive as well. We will need to reassess her strength but at this time she does not have any focal neurologic deficits to suggest stroke. She seems to be more globally weak.   Will need an ambulation trial prior to discharge home    3:20 PM: I discussed the history, physical, and treatment plan with Dr. Radha Metz. Krysta Martinez was signed out in stable condition. Please see Dr. Rosales Drafts note for further details, including diagnosis and disposition. During the patient's ED course, the patient was given:  Medications   0.9 % sodium chloride bolus (1,000 mLs IntraVENous New Bag 11/23/22 1903)            DISCLAIMER: This chart was created using Dragon dictation software. Efforts were made by me to ensure accuracy, however some errors may be present due to limitations of this technology and occasionally words are not transcribed correctly.         Racheal Steel MD  11/23/22 [id number]

## 2022-11-25 ENCOUNTER — CARE COORDINATION (OUTPATIENT)
Dept: CARE COORDINATION | Age: 77
End: 2022-11-25

## 2022-11-25 NOTE — CARE COORDINATION
Ambulatory Care Coordination  ED Follow up Call    Reason for ED visit:  Fall, Hip Pain   Status:     not changed    Did you call your PCP prior to going to the ED? No      Did you receive a discharge instructions from the Emergency Room? Yes  Review of Instructions:     Understands what to report/when to return?:  Yes   Understands discharge instructions?:  Yes   Following discharge instructions?:  Yes     Are there any new complaints of pain? No  New Pain Meds? No    Constipation prophylaxis needed? N/A    If you have a wound is the dressing clean, dry, and intact? N/A  Understands wound care regimen? N/A    Are there any other complaints/concerns that you wish to tell your provider? None at this time. FU appts/Provider:    No future appointments. New Medications?:   Yes      Medication Reconciliation by phone - Patient was seen at a [facility]. Med list is up to date. Understands Medications? Yes  Taking Medications? Yes  Can you swallow your pills? Yes    Any further needs in the home i.e. Equipment? No    Link to services in community?:  No       ACM made outreach to patient for follow up with recent ED visit. Patient states that she went to ED d/t hip pain post fall. Per patient the pain is about the same. She states that as long as she is laying down the pain is not too bad but if she is up and moving that is when it hurts. She is taking tylenol for the pain, she states that she does not want to take strong pain medications. She will rotate tylenol and ibuprofen and use a heating pad on painful area to try and control the pain. Discussed how to safely use heat. Patient was encouraged to avoid activities that provoke pain and rest while healing. ACM discussed fall safety in the home. She uses her walker when ambulating in her room and restroom but to go downstairs she uses her cane. Her daughter assist her in the bath.  She has clear walk ways with no clutter, raised doorways or loose rugs. \"For the most part\" items that she will need are kept at waist level to avoid patient from needed to bend or reach. Patient had no current concerns for ACM or PCP. Patient has requested a call from office to schedule ED follow up. Per patient request ACM will send message to PCP office and request staff call patient to schedule. ACM discussed when to call PCP vs when to go to ED with RED flags. Patient verbalized understanding. Patient was reminded of on call service providers available for non emergent after hours questions or concerns.

## 2022-11-26 ENCOUNTER — APPOINTMENT (OUTPATIENT)
Dept: GENERAL RADIOLOGY | Age: 77
End: 2022-11-26
Payer: COMMERCIAL

## 2022-11-26 ENCOUNTER — HOSPITAL ENCOUNTER (EMERGENCY)
Age: 77
Discharge: HOME | End: 2022-11-26
Attending: EMERGENCY MEDICINE
Payer: COMMERCIAL

## 2022-11-26 ENCOUNTER — APPOINTMENT (OUTPATIENT)
Dept: CT IMAGING | Age: 77
End: 2022-11-26
Payer: COMMERCIAL

## 2022-11-26 VITALS
TEMPERATURE: 97.6 F | SYSTOLIC BLOOD PRESSURE: 156 MMHG | BODY MASS INDEX: 16.67 KG/M2 | OXYGEN SATURATION: 93 % | DIASTOLIC BLOOD PRESSURE: 89 MMHG | RESPIRATION RATE: 20 BRPM | HEIGHT: 68 IN | WEIGHT: 110 LBS | HEART RATE: 76 BPM
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
## 2022-11-26 DIAGNOSIS — S40.021A ARM CONTUSION, RIGHT, INITIAL ENCOUNTER: ICD-10-CM

## 2022-11-26 DIAGNOSIS — S19.9XXA NECK INJURY, INITIAL ENCOUNTER: ICD-10-CM

## 2022-11-26 PROCEDURE — 72125 CT NECK SPINE W/O DYE: CPT

## 2022-11-26 PROCEDURE — 70450 CT HEAD/BRAIN W/O DYE: CPT

## 2022-11-26 PROCEDURE — 6370000000 HC RX 637 (ALT 250 FOR IP): Performed by: EMERGENCY MEDICINE

## 2022-11-26 PROCEDURE — 73060 X-RAY EXAM OF HUMERUS: CPT

## 2022-11-26 PROCEDURE — 99284 EMERGENCY DEPT VISIT MOD MDM: CPT

## 2022-11-26 RX ORDER — HYDROCODONE BITARTRATE AND ACETAMINOPHEN 5; 325 MG/1; MG/1
1 TABLET ORAL ONCE
Status: COMPLETED | OUTPATIENT
Start: 2022-11-26 | End: 2022-11-26

## 2022-11-26 RX ADMIN — HYDROCODONE BITARTRATE AND ACETAMINOPHEN 1 TABLET: 5; 325 TABLET ORAL at 13:52

## 2022-11-26 ASSESSMENT — PAIN DESCRIPTION - ORIENTATION: ORIENTATION: RIGHT

## 2022-11-26 ASSESSMENT — PAIN DESCRIPTION - LOCATION: LOCATION: ARM

## 2022-11-26 ASSESSMENT — PAIN SCALES - GENERAL: PAINLEVEL_OUTOF10: 8

## 2022-11-26 NOTE — ED PROVIDER NOTES
[address] PROVIDER NOTE    Patient Identification  Pt Name: Jeff Estes  MRN: [id number]  Kaya 1945  Date of evaluation: 11/26/2022  Provider: Litzy Bishop MD  PCP: QUITA Cordon - [place]    Chief Complaint  Fall (Pt. In per [facility] with report of her trying to get into bed and sliding to the floor, states her bed is high and she couldn't get into it, states her right arm hurts from trying to pull herself back up.)      HPI  (History provided by patient and EMS)  This is a 65 y.o. female who was brought in by EMS transportation for evaluation after fall. Patient was trying to get into bed and slid to the floor. Patient reports her bed is very high and she had difficulty getting in. She states she developed right arm pain when she was trying to pull herself up, but does not think she injured her arm. She rates the pain 8/10 and describes it as constant. She does not believe she hit her head or injured her neck or back. She denies chest pain abdominal pain. She did not become dizzy or lightheaded when this occurred. ROS  10 systems reviewed, pertinent positives/negatives per HPI otherwise noted to be negative.     I have reviewed the following nursing documentation:  Allergies: Lipitor [atorvastatin], Morphine, Keflex [cephalexin], Hctz [hydrochlorothiazide], Norvasc [amlodipine besylate], Penicillins, Tramadol, Hydralazine, and Shellfish-derived products    Past medical history:   Past Medical History:   Diagnosis Date    Acute DVT (deep venous thrombosis) ([address] 07/03/2015    Acute encephalopathy 04/19/2018    Acute on chronic diastolic heart failure ([address] 03/30/2019    Alcohol abuse     Anxiety     Arthritis     CAD in native artery     Cellulitis 04/28/2018    Cerebral artery occlusion with cerebral infarction ([address]     states hx of multiple mini strokes    Cerebrovascular accident (CVA) ([facility]     Chronic fatigue     Complex care coordination Complicated UTI (urinary tract infection)     Congestive heart failure, unspecified HF chronicity, unspecified heart failure type ([facility] 11/17/2022    COPD (chronic obstructive pulmonary disease) ([facility])     COPD exacerbation ([facility] 02/20/2018    Depression     Diabetes mellitus ([facility]     denies    DIMAS (dyspnea on exertion) 07/03/2015    DVT (deep venous thrombosis) ([facility]     DVT, lower extremity ([facility])     Fatigue 11/03/2015    General weakness 11/04/2015    Generalized weakness 08/22/2019    Hypercholesteremia     Hypertension     Hyperthyroidism     Incontinence 10/16/2012    Mammogram abnormal 02/07/2012    Neuromuscular disorder ([facility]     Osteopenia 02/14/2017    PAF (paroxysmal atrial fibrillation) ([facility])     Pneumonia     Recurrent UTI     Right forearm cellulitis     Superficial thrombophlebitis of right upper extremity     Thyroid disease     Tobacco abuse     Tobacco abuse counseling     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 Ashley Phillips; repeat 5 years    EYE SURGERY      cateracts removed    NERVE SURGERY N/A 12/29/2021    Mayra Galarza, FLEXIBLE CYSTOSCOPY performed by Tiffanie Whitehead MD at [address] ARTHROSCOPY Right 6/18/14    SUBACROMIAL DECOMPRESSION, ROTATOR CUFF REPAIR    STIMULATOR SURGERY N/A 2/9/2022    INTERSTIMULATOR INSERTION STAGE 2 - MEDTRONICS performed by Tiffanie Whitehead MD at [address] [place] [address] Right     TONSILLECTOMY         Home medications:   Previous Medications    ALBUTEROL (PROVENTIL) (5 MG/ML) 0.5% NEBULIZER SOLUTION    Take 0.5 mLs by nebulization every 6 hours as needed for Wheezing    ASPIRIN 81 MG CHEWABLE TABLET    Take 1 tablet by mouth daily    BUDESONIDE-FORMOTEROL (SYMBICORT) 160-4.5 MCG/ACT AERO    Inhale 2 puffs into the lungs 2 times daily    CARVEDILOL (COREG) 12.5 MG TABLET TAKE 1 TABLET BY MOUTH TWICE DAILY    CLONAZEPAM (KLONOPIN) 1 MG TABLET    TAKE 1 TABLET BY MOUTH TWICE DAILY AS NEEDED FOR ANXIETY    CLONIDINE (CATAPRES) 0.1 MG TABLET    TAKE ONE TABLET BY MOUTH THREE TIMES A DAY    ELIQUIS 5 MG TABS TABLET    TAKE ONE TABLET BY MOUTH TWICE A DAY    LEVOTHYROXINE (SYNTHROID) 100 MCG TABLET    TAKE ONE TABLET BY MOUTH DAILY    LISINOPRIL (PRINIVIL;ZESTRIL) 5 MG TABLET    Take 1 tablet by mouth daily    MAGNESIUM OXIDE (MAGNESIUM-OXIDE) 250 MG TABS TABLET    Take 1 tablet by mouth daily for 2 days    MIRTAZAPINE (REMERON) 15 MG TABLET    TAKE ONE TABLET BY MOUTH DAILY    ONDANSETRON (ZOFRAN) 4 MG TABLET    Take 1 tablet by mouth 3 times daily as needed for Nausea or Vomiting    OXYBUTYNIN (DITROPAN) 5 MG TABLET    TAKE 1 TABLET BY MOUTH THREE TIMES DAILY    SODIUM CHLORIDE 1 GRAM TABLET    Take 1 tablet by mouth daily       Social history:  reports that she has been smoking cigarettes. She has a 26.00 pack-year smoking history. She has never used smokeless tobacco. She reports that she does not drink alcohol and does not use drugs. Family history:    Family History   Problem Relation Age of Onset    Heart Disease Father         MI @ 64    Alcohol Abuse Father        Exam  ED Triage Vitals [11/26/22 1219]   BP Temp Temp Source Heart Rate Resp SpO2 Height Weight   (!) 163/92 97.6 °F (36.4 °C) Oral 72 20 95 % 5' 8\" (1.727 m) 110 lb (49.9 kg)     Nursing note and vitals reviewed. Constitutional: Well developed, well nourished. Non-toxic in appearance. HENT:      Head: Normocephalic and atraumatic. Ears: External ears normal.      Nose: Nose normal.     Mouth: Membrane mucosa moist and pink. Eyes: Anicteric sclera. No discharge. Neck: Supple. Trachea midline. Cervical spine palpated throughout without palpable step-off or deformity  Cardiovascular: RRR; no murmurs, rubs, or gallops. Pulmonary/Chest: Effort normal. No respiratory distress. CTAB. No stridor. No wheezes.  No rales.   Abdominal: Soft. No distension. Non-tender to palpation. Musculoskeletal: Moves all extremities. No gross deformity. Tender to palpation over the mid right upper arm, overlying the humerus. All other bones and joints in the extremities were palpated and inspected without tenderness or deformity. Thoracic and lumbar spine palpated throughout without palpable step-off or deformity. Neurological: Alert and oriented. Face symmetric. Speech is clear. Skin: Warm and dry. No rash. Psychiatric: Normal mood and affect. Behavior is normal.    Radiology  CT HEAD WO CONTRAST   Final Result   No acute abnormality of the head and cervical spine. CT CERVICAL SPINE WO CONTRAST   Final Result   No acute abnormality of the head and cervical spine. XR HUMERUS RIGHT (MIN 2 VIEWS)   Final Result   No evidence of an acute process. MDM and ED Course  Considered possible syncopal etiologies, but patient denied having any associated physiologic symptoms or symptoms preceding the fall. Rather, this appears to be mechanical fall due to difficulty walking attempting to transfer into her bed. I performed a thorough and extensive exam as documented above to evaluate for traumatic injuries. The only significant tenderness she had was to her right humerus. Otherwise, she had no tenderness or palpable deformity throughout the rest of her extremities or back. She was nontender in her cervical spine, but given her age, I felt it important to rule out cervical spine injury. Similarly, although she has no obvious signs of head trauma, she is on Eliquis, so it was important to rule out intracranial hemorrhage. Fortunately, CTs of the head and cervical spine were unremarkable without evidence of serious traumatic injury. Humerus also showed no evidence of fracture. There is no evidence of shoulder dislocation. Patient was feeling much better after treatment in the emergency department.   She is safe for discharge back to her facility. Final Impression  1. Fall from standing, initial encounter    2. Arm contusion, right, initial encounter    3. Neck injury, initial encounter        Blood pressure (!) 156/89, pulse 76, temperature 97.6 °F (36.4 °C), temperature source Oral, resp. rate 20, height 5' 8\" (1.727 m), weight 110 lb (49.9 kg), SpO2 93 %, not currently breastfeeding. Disposition:  DISPOSITION Decision To Discharge 11/26/2022 03:02:55 PM      Patient Referrals:  QUITA Herrera - NP  [address]  [phone]    In 3 days  for re-evaluation    [facility]  [address] [place] [address]  [phone]    As needed, If symptoms worsen or new symptoms develop          This chart was generated using the [address] [place] [address] dictation system. I created this record but it may contain dictation errors given the limitations of this technology.         Juliana Edwards MD  12/13/22 8099

## 2022-11-28 ENCOUNTER — CARE COORDINATION (OUTPATIENT)
Dept: CARE COORDINATION | Age: 77
End: 2022-11-28

## 2022-11-28 DIAGNOSIS — F41.9 ANXIETY: ICD-10-CM

## 2022-11-28 DIAGNOSIS — R40.4 TRANSIENT ALTERATION OF AWARENESS: Primary | ICD-10-CM

## 2022-11-28 NOTE — CARE COORDINATION
Ambulatory Care Coordination  ED Follow up Call    Reason for ED visit:  Fall   Status:     not changed    Did you call your PCP prior to going to the ED? No      Did you receive a discharge instructions from the Emergency Room? Yes  Review of Instructions:     Understands what to report/when to return?:  Yes   Understands discharge instructions?:  Yes   Following discharge instructions?:  Yes       Are there any new complaints of pain? No  New Pain Meds? No    Constipation prophylaxis needed? N/A    If you have a wound is the dressing clean, dry, and intact? N/A  Understands wound care regimen? N/A    Are there any other complaints/concerns that you wish to tell your provider? Challenges to be reviewed by the provider   Additional needs identified to be addressed with provider Yes  Patient's  is requesting new referral for eval and treat with Selena Doe for nursing, PT and OT. FU appts/Provider:    No future appointments. New Medications?:   No      Medication Reconciliation by phone - patient was seen at a [facility]. Med list up to date. Understands Medications? Yes  Taking Medications? Yes  Can you swallow your pills? Yes    Any further needs in the home i.e. Equipment? Not Applicable    Link to services in community?:  Yes   Which services:  [address] made outreach to patient to follow up from recent ED visit for fall. LINA spoke with patient's . He states that she is still unsteady and weak. He states that she is not worse from when she left the ER but he is still concerned. LINA discussed discharge instructions with patient's  and he states a full understanding and is aware of when to call PCP vs ED for RED flags. LINA discussed scheduling a follow up with PCP for additional evaluation. He states that it is \"extremely difficult\" to come into office for appointment. Both  and patient do not drive.  They are dependent on patient's daughter and she works. He states that at this time all he wants is a [address] to come and evaluate the patient for home visits to help improve her strength and mobility. He states that she is otherwise doing well. He states that she is still eating and drinking and acting ok. ACM will send request to PCP for referral to [facility] for eval and treat.

## 2022-11-28 NOTE — TELEPHONE ENCOUNTER
Ezequiel Esparza is ok with the home care order. Previous order was placed on 6/2/2022 and sent over. Not sure what happened with that one given that Jigar has been in and out of the hospital several times.

## 2022-11-29 RX ORDER — CLONAZEPAM 1 MG/1
TABLET ORAL
Qty: 60 TABLET | Refills: 0 | OUTPATIENT
Start: 2022-11-29

## 2022-11-30 DIAGNOSIS — F41.9 ANXIETY: ICD-10-CM

## 2022-12-01 DIAGNOSIS — F41.9 ANXIETY: ICD-10-CM

## 2022-12-02 RX ORDER — CLONAZEPAM 1 MG/1
TABLET ORAL
Qty: 60 TABLET | Refills: 0 | OUTPATIENT
Start: 2022-12-02

## 2022-12-02 RX ORDER — MIRTAZAPINE 15 MG/1
TABLET, FILM COATED ORAL
Qty: 30 TABLET | Refills: 3 | Status: SHIPPED | OUTPATIENT
Start: 2022-12-02

## 2022-12-06 ENCOUNTER — CARE COORDINATION (OUTPATIENT)
Dept: CARE COORDINATION | Age: 77
End: 2022-12-06

## 2022-12-06 NOTE — CARE COORDINATION
ACM attempted outreach to patient for Care Management Follow Up and CHF outreach. UTR, will attempt further contact at a later date/time.

## 2022-12-13 ENCOUNTER — CARE COORDINATION (OUTPATIENT)
Dept: CARE COORDINATION | Age: 77
End: 2022-12-13

## 2022-12-13 NOTE — CARE COORDINATION
ACM made outreach to patient to follow up with Care Management and CHF Holiday outreach. Per patient's daughter Hollis Lo patient is currently IP at [facility] for Rehab. ACM will call patient back to follow up at a later date/time.

## 2022-12-13 NOTE — TELEPHONE ENCOUNTER
Called and spoke with Vincent Enamorado and gave her last INR results and warfarin dosing. Vincent Enamorado read dosing and results back to me and verbalized understanding. Yes ok

## 2022-12-15 DIAGNOSIS — F41.9 ANXIETY: ICD-10-CM

## 2022-12-15 RX ORDER — CLONAZEPAM 1 MG/1
TABLET ORAL
Qty: 60 TABLET | Refills: 0 | Status: SHIPPED | OUTPATIENT
Start: 2022-12-15 | End: 2023-01-15

## 2022-12-15 NOTE — TELEPHONE ENCOUNTER
Medication:   Requested Prescriptions     Pending Prescriptions Disp Refills    clonazePAM (KLONOPIN) 1 MG tablet [Pharmacy Med Name: CLONAZEPAM 1 MG TABLET 1 Tablet] 60 tablet 0     Sig: TAKE 1 TABLET BY MOUTH TWICE DAILY AS NEEDED FOR ANXIETY      Last Filled: 11/15/2022    Patient Phone Number: [phone] (home) [phone] (work)    Last appt: 11/17/2022   Next appt: Visit date not found    Last OARRS:   RX Monitoring 5/11/2020   Attestation -   Periodic Controlled Substance Monitoring No signs of potential drug abuse or diversion identified.      PDMP Monitoring:    Last PDMP Yesi Gloria as Reviewed [facility]):  Review User Review Instant Review Result   Shivam Aguirre 12/23/2021  8:37 AM Reviewed PDMP [1]     Preferred Pharmacy:   [address] [place] [address]  Phone: [phone] Fax: [phone]

## 2022-12-21 DIAGNOSIS — F41.9 ANXIETY: ICD-10-CM

## 2022-12-22 RX ORDER — CLONAZEPAM 1 MG/1
TABLET ORAL
Qty: 60 TABLET | Refills: 0 | OUTPATIENT
Start: 2022-12-22

## 2022-12-27 ENCOUNTER — CARE COORDINATION (OUTPATIENT)
Dept: CARE COORDINATION | Age: 77
End: 2022-12-27

## 2022-12-27 DIAGNOSIS — F41.9 ANXIETY: ICD-10-CM

## 2022-12-27 RX ORDER — CLONAZEPAM 1 MG/1
TABLET ORAL
Qty: 60 TABLET | Refills: 0 | Status: SHIPPED | OUTPATIENT
Start: 2022-12-27 | End: 2023-01-27

## 2022-12-27 NOTE — CARE COORDINATION
ACM made outreach to patient's daughter Coco Jimenez. Patient is still currently at rehab facility with no planned date for release. ACM will follow up at a later date/time.

## 2023-01-09 ENCOUNTER — CARE COORDINATION (OUTPATIENT)
Dept: CARE COORDINATION | Age: 78
End: 2023-01-09

## 2023-01-09 NOTE — CARE COORDINATION
ACM has ended Care Management episode at this time. ACM spoke with patient's daughter Lubna Taylor and was advised that patient is still IP for Rehab with no future discharge date. ACM discussed end care management episode at this time. Per Lubna Taylor she agreed with plan. She will discuss with PCP any future needs for CM after patient is home.

## 2023-01-17 ENCOUNTER — TELEPHONE (OUTPATIENT)
Dept: FAMILY MEDICINE CLINIC | Age: 78
End: 2023-01-17

## 2023-01-17 NOTE — TELEPHONE ENCOUNTER
Pt called for refill, Pt is staying with her brother, so they need it to be sent to the [facility]-see details below    ELIQUIS 5 MG TABS tablet [[id number]]     Order Details  Dose, Route, Frequency: As Directed   Dispense Quantity: 180 tablet Refills: 0          Sig: TAKE ONE TABLET BY MOUTH TWICE A DAY         Start Date: 09/26/22 End Date: --   Written Date: 09/26/22 Expiration Date: 09/26/23       [facility]  [address], [address]    Phone: [phone]

## 2023-01-18 ENCOUNTER — TELEPHONE (OUTPATIENT)
Dept: FAMILY MEDICINE CLINIC | Age: 78
End: 2023-01-18

## 2023-01-18 NOTE — TELEPHONE ENCOUNTER
[facility]  [phone]  called regarding Pt's meds. Kari Jones stated between Rehab/Hospital Visit she would like to go over Pt's medications because she has allot of them.

## 2023-01-19 ENCOUNTER — TELEPHONE (OUTPATIENT)
Dept: FAMILY MEDICINE CLINIC | Age: 78
End: 2023-01-19

## 2023-01-19 DIAGNOSIS — F41.9 ANXIETY: ICD-10-CM

## 2023-01-19 NOTE — TELEPHONE ENCOUNTER
Jigar, PT, with [facility] calling to advise that patient's BP this morning was 178/101 and has a mild headache. States patient has taken her morning meds. Please advise.

## 2023-01-19 NOTE — TELEPHONE ENCOUNTER
Damion, per Bernard, this is normal for Celina Garcia. Will confirm with home nurse what medications Celina Garcia is currently taking to ensure pt is taking bp meds as prescribed. Have upcoming appt with Celina Garcia on 1/24 for hospital follow up. Juan Broderick stated he understood without further questions.

## 2023-01-19 NOTE — TELEPHONE ENCOUNTER
Melvin Gonzalez, advised that we have not seen Yessica Tolentino since November and that we do not have a current med list. But that [address] [place] [address] does have an appt scheduled for 1/24 at 10:45am and we will be able to update it at that time and get it to Nika Lama. Per Gavino Leach is not longer living with her  due to lack of care. Sharon's brother and sister in law have taken over care of Yessica Tolentino and she is living with them in the [facility]. Nika Lama reports that Yessica Tolentino is doing well and gaining weight. Confirmed that Pt will be at upcoming appt.

## 2023-01-20 RX ORDER — CLONAZEPAM 1 MG/1
TABLET ORAL
Qty: 60 TABLET | Refills: 0 | OUTPATIENT
Start: 2023-01-20 | End: 2023-02-20

## 2023-01-24 ENCOUNTER — OFFICE VISIT (OUTPATIENT)
Dept: FAMILY MEDICINE CLINIC | Age: 78
End: 2023-01-24

## 2023-01-24 VITALS
WEIGHT: 119 LBS | HEART RATE: 72 BPM | TEMPERATURE: 97.3 F | SYSTOLIC BLOOD PRESSURE: 152 MMHG | DIASTOLIC BLOOD PRESSURE: 94 MMHG | BODY MASS INDEX: 18.09 KG/M2
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
## 2023-01-24 DIAGNOSIS — Z09 HOSPITAL DISCHARGE FOLLOW-UP: ICD-10-CM

## 2023-01-24 DIAGNOSIS — I10 UNCONTROLLED HYPERTENSION: ICD-10-CM

## 2023-01-24 RX ORDER — CLONIDINE HYDROCHLORIDE 0.1 MG/1
TABLET ORAL
Qty: 90 TABLET | Refills: 0 | Status: SHIPPED | OUTPATIENT
Start: 2023-01-24 | End: 2023-01-24 | Stop reason: SDUPTHER

## 2023-01-24 RX ORDER — LISINOPRIL 5 MG/1
5 TABLET ORAL DAILY
Qty: 90 TABLET | Refills: 1 | Status: SHIPPED | OUTPATIENT
Start: 2023-01-24

## 2023-01-24 RX ORDER — LISINOPRIL 5 MG/1
5 TABLET ORAL DAILY
Qty: 30 TABLET | Refills: 0 | Status: SHIPPED | OUTPATIENT
Start: 2023-01-24 | End: 2023-01-24 | Stop reason: SDUPTHER

## 2023-01-24 RX ORDER — CLONIDINE HYDROCHLORIDE 0.1 MG/1
TABLET ORAL
Qty: 90 TABLET | Refills: 1 | Status: SHIPPED | OUTPATIENT
Start: 2023-01-24

## 2023-01-24 NOTE — PROGRESS NOTES
Ronald Cowan  : 1945  Encounter date: 2023    This efrain 65 y.o. female who presents with  Chief Complaint   Patient presents with    Follow-Up from [facility]        History of present illness:    HPI Pt is 65year old female with family member for hospital follow up from -23 at [facility] and later admitted to [facility]. Pt initially seen for fall at home and generalized weakness. Concerns regarding support at home and medication management. *   K 3.7   CL 98   CO2 26   BUN 14   CREATININE 0.42*   GLU 86   CALCIUM 7.9*     WBC 7.3   HEMOGLOBIN 10.5*   HCT 30.7*      RBC 3.12*     UA- NEG UTI    VSS    Imaging  CT head- There is no sign of any acute intracranial hemorrhage, hydrocephalus or edema. The brain volume is symmetrically atrophic and there is moderate ventricular dilation with low-attenuation in the periventricular white matter, unchanged compared to previous exam. No midline shift or extra-axial fluid collection. No active sinus opacity. Previous lens transplant or cataract surgery noted. There is no depressed or displaced skull fracture. CT spine  1. No acute fracture with degenerative changes as described above 2. Normal alignment     Pt with uncontrolled hypertension, reviewed medications and advised what meds need to be taken and when, refills sent. Advised to bring all meds at next OV.    Current Outpatient Medications on File Prior to Visit   Medication Sig Dispense Refill    apixaban (ELIQUIS) 5 MG TABS tablet TAKE ONE TABLET BY MOUTH TWICE A  tablet 0    clonazePAM (KLONOPIN) 1 MG tablet TAKE 1 TABLET BY MOUTH TWICE DAILY AS NEEDED FOR ANXIETY 60 tablet 0    mirtazapine (REMERON) 15 MG tablet TAKE 1 TABLET BY MOUTH DAILY 30 tablet 3    oxybutynin (DITROPAN) 5 MG tablet TAKE 1 TABLET BY MOUTH THREE TIMES DAILY 90 tablet 10    carvedilol (COREG) 12.5 MG tablet TAKE 1 TABLET BY MOUTH TWICE DAILY 60 tablet 10    levothyroxine (SYNTHROID) 100 MCG tablet TAKE ONE TABLET BY MOUTH DAILY 90 tablet 0    aspirin 81 MG chewable tablet Take 1 tablet by mouth daily 30 tablet 0     No current facility-administered medications on file prior to visit.       Allergies   Allergen Reactions    Lipitor [Atorvastatin] Other (See Comments)     Weakness, severe    Morphine Shortness Of Breath    Keflex [Cephalexin] Other (See Comments)     SOB & bilateral arms shaking     Hctz [Hydrochlorothiazide] Other (See Comments)     Hyponatremia, severe      Norvasc [Amlodipine Besylate] Swelling     Of neck    Penicillins Hives    Tramadol Itching    Hydralazine Palpitations and Other (See Comments)     Dizzy,fatigue,headaches,palpitations,loss of appetite    Shellfish-Derived Products Swelling and Rash     Swelling of neck     Past Medical History:   Diagnosis Date    Acute DVT (deep venous thrombosis) ([facility]) 07/03/2015    Acute encephalopathy 04/19/2018    Acute on chronic diastolic heart failure ([address] 03/30/2019    Alcohol abuse     Anxiety     Arthritis     CAD in native artery     Cellulitis 04/28/2018    Cerebral artery occlusion with cerebral infarction ([address]     states hx of multiple mini strokes    Cerebrovascular accident (CVA) ([address]     Chronic fatigue     Complex care coordination     Complicated UTI (urinary tract infection)     Congestive heart failure, unspecified HF chronicity, unspecified heart failure type ([address] 11/17/2022    COPD (chronic obstructive pulmonary disease) ([address]     COPD exacerbation ([address] 02/20/2018    Depression     Diabetes mellitus ([address]     denies    DIMAS (dyspnea on exertion) 07/03/2015    DVT (deep venous thrombosis) ([facility])     DVT, lower extremity ([address]     Fatigue 11/03/2015    General weakness 11/04/2015    Generalized weakness 08/22/2019    Hypercholesteremia     Hypertension     Hyperthyroidism     Incontinence 10/16/2012    Mammogram abnormal 02/07/2012    Neuromuscular disorder ([address]     Osteopenia 2017    PAF (paroxysmal atrial fibrillation) ([facility])     Pneumonia     Recurrent UTI     Right forearm cellulitis     Superficial thrombophlebitis of right upper extremity     Thyroid disease     Tobacco abuse     Tobacco abuse counseling     Trapped lung 2017    Unable to walk 2018      Past Surgical History:   Procedure Laterality Date    COLONOSCOPY  2012    Dr. Lyman Bamberger; repeat 5 years    EYE SURGERY      cateracts removed    NERVE SURGERY N/A 2021    Margy Li, FLEXIBLE CYSTOSCOPY performed by Jessica Gabriel MD at [address] [place] [address] ARTHROSCOPY Right 14    SUBACROMIAL DECOMPRESSION, ROTATOR CUFF REPAIR    STIMULATOR SURGERY N/A 2022    INTERSTIMULATOR INSERTION STAGE 2 - MEDTRONICS performed by Jessica Gabriel MD at [place] [address] Right     TONSILLECTOMY        Family History   Problem Relation Age of Onset    Heart Disease Father         MI @ 64    Alcohol Abuse Father       Social History     Tobacco Use    Smoking status: Every Day     Packs/day: 0.50     Years: 52.00     Pack years: 26.00     Types: Cigarettes     Last attempt to quit: 2018     Years since quittin.5    Smokeless tobacco: Never    Tobacco comments:     started smoking at age 26 / smoked up to 2 p,p,d / now smoking 4 to 8 cigarettes a day,   Substance Use Topics    Alcohol use: No        Review of Systems    Objective:    BP (!) 152/94   Pulse 72   Temp 97.3 °F (36.3 °C)   Wt 119 lb (54 kg)   BMI 18.09 kg/m²   Weight: 119 lb (54 kg)     BP Readings from Last 3 Encounters:   23 (!) 152/94   22 (!) 156/89   22 (!) 168/110     Wt Readings from Last 3 Encounters:   23 119 lb (54 kg)   22 110 lb (49.9 kg)   22 110 lb (49.9 kg)     BMI Readings from Last 3 Encounters:   23 18.09 kg/m²   22 16.73 kg/m²   22 17.23 kg/m²       Physical Exam  Vitals reviewed.    Constitutional: Appearance: Normal appearance. She is well-developed. Cardiovascular:      Rate and Rhythm: Normal rate and regular rhythm. Pulses: Normal pulses. Heart sounds: Normal heart sounds. No murmur heard. Pulmonary:      Effort: Pulmonary effort is normal.      Breath sounds: Normal breath sounds. Skin:     General: Skin is warm and dry. Neurological:      Mental Status: She is alert. Mental status is at baseline. Motor: Weakness present. Gait: Gait abnormal.   Psychiatric:         Mood and Affect: Mood normal.       Assessment/Plan    1. Uncontrolled hypertension  Advised take medication as prescribed  Daily monitoring  Advised to bring all bottles medication at next OV  - lisinopril (PRINIVIL;ZESTRIL) 5 MG tablet; Take 1 tablet by mouth daily  Dispense: 90 tablet; Refill: 1  - cloNIDine (CATAPRES) 0.1 MG tablet; TAKE ONE TABLET BY MOUTH THREE TIMES A DAY  Dispense: 90 tablet; Refill: 1    2. Fall, subsequent encounter  Advised safety concerns  Continue occupational therapy  Discussed extensive rehab, ECF    3. Hospital follow up  Reviewed physician notations, labs, imaging    Time Spent on discharge is more than 30 minutes in the examination, evaluation, counseling and review of medications and discharge plan. Return in about 1 month (around 2/24/2023) for hypertension re-check. This dictation was generated by voice recognition computer software. Although all attempts are made to edit the dictation for accuracy, there may be errors in the transcription that are not intended.

## 2023-01-25 DIAGNOSIS — F41.9 ANXIETY: ICD-10-CM

## 2023-01-26 RX ORDER — CLONAZEPAM 1 MG/1
TABLET ORAL
Qty: 60 TABLET | Refills: 0 | Status: SHIPPED | OUTPATIENT
Start: 2023-01-26 | End: 2023-02-26

## 2023-01-26 NOTE — TELEPHONE ENCOUNTER
Medication:   Requested Prescriptions     Pending Prescriptions Disp Refills    clonazePAM (KLONOPIN) 1 MG tablet [Pharmacy Med Name: CLONAZEPAM 1 MG TABS 1 Tablet] 60 tablet 0     Sig: TAKE 1 TABLET BY MOUTH TWICE DAILY AS NEEDED FOR ANXIETY      Last Filled:  12/27/2022    Patient Phone Number: [phone] (home) [phone] (work)    Last appt: 1/24/2023   Next appt: 2/27/2023    Last OARRS:   RX Monitoring 5/11/2020   Attestation -   Periodic Controlled Substance Monitoring No signs of potential drug abuse or diversion identified.      PDMP Monitoring:    Last PDMP Gilda Blakely as Reviewed [facility]):  Review User Review Instant Review Result   Karina Jaycee 1/24/2023 10:52 AM Reviewed PDMP [1]     Preferred Pharmacy:   [facility] [id number] Mariia Lima 96 - P [phone] Britney Rodas [phone]  [facility]  Phone: [phone] Fax: 40 Meera Howard, [address] 700 [facility] [phone] - F [phone]  [address] [place] [address]  Phone: [phone] Fax: [address] [place] [address] Drive [id number] Jaison Garcia, [address]  [address] [place] [address]  Phone: [phone] Fax: [phone]

## 2023-02-03 ENCOUNTER — TELEPHONE (OUTPATIENT)
Dept: FAMILY MEDICINE CLINIC | Age: 78
End: 2023-02-03

## 2023-02-03 NOTE — TELEPHONE ENCOUNTER
Received call from [address] [place] [address] that Yumikorenetta Lia has been approved for 6 PT visits. Just had visit with Pt yesterday. Will be faxing orders.

## 2023-02-06 ENCOUNTER — TELEPHONE (OUTPATIENT)
Dept: FAMILY MEDICINE CLINIC | Age: 78
End: 2023-02-06

## 2023-02-06 NOTE — TELEPHONE ENCOUNTER
Called main number, no answer, sates that number is no longer in service. Called mobile number, no answer, states that number is no longer in service. Called emergency contact number, no answer, rings busy. Called [phone], [facility] home care admissions nurse number that we had in notes, states she no longer has the chart and unable to get information. Gave me main number to [facility] [facility]. Called [phone], main number to [employer], to try and get other family members numbers. Vanita Spann, home health aide, gave me the number [phone] which we do not have in our records but is not sure whose number this is. Called [phone], stats it is Mitchell You(sp?) at [employer] estate [employer]. Carri Cruz has stated before that her brothers name is Pascale Adams. LVM to have them call back regarding Sharon's appt.

## 2023-02-13 ENCOUNTER — TELEPHONE (OUTPATIENT)
Dept: FAMILY MEDICINE CLINIC | Age: 78
End: 2023-02-13

## 2023-02-13 ENCOUNTER — OFFICE VISIT (OUTPATIENT)
Dept: FAMILY MEDICINE CLINIC | Age: 78
End: 2023-02-13
Payer: COMMERCIAL

## 2023-02-13 VITALS
DIASTOLIC BLOOD PRESSURE: 76 MMHG | SYSTOLIC BLOOD PRESSURE: 124 MMHG | OXYGEN SATURATION: 97 % | HEART RATE: 74 BPM | BODY MASS INDEX: 17.79 KG/M2 | TEMPERATURE: 97.7 F | WEIGHT: 117 LBS

## 2023-02-13 DIAGNOSIS — I50.9 ACUTE ON CHRONIC CONGESTIVE HEART FAILURE, UNSPECIFIED HEART FAILURE TYPE (HCC): Primary | ICD-10-CM

## 2023-02-13 DIAGNOSIS — Z09 HOSPITAL DISCHARGE FOLLOW-UP: ICD-10-CM

## 2023-02-13 PROCEDURE — 99214 OFFICE O/P EST MOD 30 MIN: CPT | Performed by: NURSE PRACTITIONER

## 2023-02-13 PROCEDURE — 1123F ACP DISCUSS/DSCN MKR DOCD: CPT | Performed by: NURSE PRACTITIONER

## 2023-02-13 PROCEDURE — 3078F DIAST BP <80 MM HG: CPT | Performed by: NURSE PRACTITIONER

## 2023-02-13 PROCEDURE — 3074F SYST BP LT 130 MM HG: CPT | Performed by: NURSE PRACTITIONER

## 2023-02-13 RX ORDER — FUROSEMIDE 40 MG/1
20 TABLET ORAL DAILY
Qty: 60 TABLET | Refills: 0
Start: 2023-02-13

## 2023-02-13 RX ORDER — DAPAGLIFLOZIN 10 MG/1
TABLET, FILM COATED ORAL
COMMUNITY
Start: 2023-01-29

## 2023-02-13 RX ORDER — POTASSIUM CHLORIDE 750 MG/1
TABLET, FILM COATED, EXTENDED RELEASE ORAL
COMMUNITY
Start: 2023-01-29

## 2023-02-13 RX ORDER — FUROSEMIDE 40 MG/1
TABLET ORAL
COMMUNITY
Start: 2023-01-29 | End: 2023-02-13

## 2023-02-13 SDOH — ECONOMIC STABILITY: INCOME INSECURITY: HOW HARD IS IT FOR YOU TO PAY FOR THE VERY BASICS LIKE FOOD, HOUSING, MEDICAL CARE, AND HEATING?: NOT HARD AT ALL

## 2023-02-13 SDOH — ECONOMIC STABILITY: FOOD INSECURITY: WITHIN THE PAST 12 MONTHS, THE FOOD YOU BOUGHT JUST DIDN'T LAST AND YOU DIDN'T HAVE MONEY TO GET MORE.: NEVER TRUE

## 2023-02-13 SDOH — ECONOMIC STABILITY: FOOD INSECURITY: WITHIN THE PAST 12 MONTHS, YOU WORRIED THAT YOUR FOOD WOULD RUN OUT BEFORE YOU GOT MONEY TO BUY MORE.: NEVER TRUE

## 2023-02-13 ASSESSMENT — PATIENT HEALTH QUESTIONNAIRE - PHQ9
7. TROUBLE CONCENTRATING ON THINGS, SUCH AS READING THE NEWSPAPER OR WATCHING TELEVISION: 3
SUM OF ALL RESPONSES TO PHQ QUESTIONS 1-9: 23
SUM OF ALL RESPONSES TO PHQ9 QUESTIONS 1 & 2: 5
5. POOR APPETITE OR OVEREATING: 3
SUM OF ALL RESPONSES TO PHQ QUESTIONS 1-9: 24
1. LITTLE INTEREST OR PLEASURE IN DOING THINGS: 2
4. FEELING TIRED OR HAVING LITTLE ENERGY: 3
10. IF YOU CHECKED OFF ANY PROBLEMS, HOW DIFFICULT HAVE THESE PROBLEMS MADE IT FOR YOU TO DO YOUR WORK, TAKE CARE OF THINGS AT HOME, OR GET ALONG WITH OTHER PEOPLE: 3
6. FEELING BAD ABOUT YOURSELF - OR THAT YOU ARE A FAILURE OR HAVE LET YOURSELF OR YOUR FAMILY DOWN: 3
SUM OF ALL RESPONSES TO PHQ QUESTIONS 1-9: 24
3. TROUBLE FALLING OR STAYING ASLEEP: 3
2. FEELING DOWN, DEPRESSED OR HOPELESS: 3
9. THOUGHTS THAT YOU WOULD BE BETTER OFF DEAD, OR OF HURTING YOURSELF: 1
8. MOVING OR SPEAKING SO SLOWLY THAT OTHER PEOPLE COULD HAVE NOTICED. OR THE OPPOSITE, BEING SO FIGETY OR RESTLESS THAT YOU HAVE BEEN MOVING AROUND A LOT MORE THAN USUAL: 3
SUM OF ALL RESPONSES TO PHQ QUESTIONS 1-9: 24

## 2023-02-13 ASSESSMENT — COLUMBIA-SUICIDE SEVERITY RATING SCALE - C-SSRS
6. HAVE YOU EVER DONE ANYTHING, STARTED TO DO ANYTHING, OR PREPARED TO DO ANYTHING TO END YOUR LIFE?: NO
2. HAVE YOU ACTUALLY HAD ANY THOUGHTS OF KILLING YOURSELF?: NO
1. WITHIN THE PAST MONTH, HAVE YOU WISHED YOU WERE DEAD OR WISHED YOU COULD GO TO SLEEP AND NOT WAKE UP?: NO

## 2023-02-13 NOTE — TELEPHONE ENCOUNTER
Pt daughter, Freddie Willingham, returned call. Informed daughter to get a  to start eviction process or to help file a protection order if Pt is in fear for her life. Pt daughter stated she understood without further questions.

## 2023-02-13 NOTE — TELEPHONE ENCOUNTER
Pt was seen today in office with daughter. Asked Ric Early for some resources regarding housing concerns. Called daughter, Sandy Crowell, at [phone] to give resources. Number is no good. Will try again later.

## 2023-02-13 NOTE — PROGRESS NOTES
Sarika Calixto  : 1945  Encounter date: 2023    This efrain 65 y.o. female who presents with  Chief Complaint   Patient presents with    Follow-Up from [facility]     - for CHF       History of present illness:    HPI Pt is 65year old with daughter for hospital follow up from [facility] -. Pt initially seen for dyspnea and fluid overload. Pt with CHF exacerbation. Previously not established with cardiologist, has upcoming appt with Dr. Chuyita Phipps, [facility] 23. Labs   Hyponatremia- 134  Troponin- NEG  BNP- 376    Chest xray  Mild cardiomegaly with bilateral interstitial opacities, likely mild pulmonary edema, and small left pleural effusion    ECHO-  Left ventricle is normal in size. Proximal septal thickening is noted. Left ventricular systolic function is normal. (LVEF>/=55%)   Overall left ventricular ejection fraction is estimated to be 55-60%. Diastolic function is impaired and classified as Grade 1 (impaired   relaxation). Left atrium is severely dilated. Mild mitral regurgitation. Mild aortic regurgitation. Mild tricuspid regurgitation is present. ECG  Sinus Tachycardia > 99    Pt diuresed, sent home with lasix 40 mg, farxiga and potassium tablets. Pt with incontinence, being followed by urology, currently taking ditropan. Pt and daughter also reports concerns for patient's safety at home due to granddaughter and boyfriend being threatening. Reports has had police come to house on multiple occassions. Fearful of being pushed downstairs. Reports that police have said due to mail being received at the house an eviction notice needs to be composed and delivered. Pt and daughter are wanting Adult Services to be notified.     Current Outpatient Medications on File Prior to Visit   Medication Sig Dispense Refill    FARXIGA 10 MG tablet       potassium chloride (KLOR-CON) 10 MEQ extended release tablet       clonazePAM (KLONOPIN) 1 MG tablet TAKE 1 TABLET BY MOUTH TWICE DAILY AS NEEDED FOR ANXIETY 60 tablet 0    lisinopril (PRINIVIL;ZESTRIL) 5 MG tablet Take 1 tablet by mouth daily 90 tablet 1    cloNIDine (CATAPRES) 0.1 MG tablet TAKE ONE TABLET BY MOUTH THREE TIMES A DAY 90 tablet 1    apixaban (ELIQUIS) 5 MG TABS tablet TAKE ONE TABLET BY MOUTH TWICE A  tablet 0    mirtazapine (REMERON) 15 MG tablet TAKE 1 TABLET BY MOUTH DAILY 30 tablet 3    oxybutynin (DITROPAN) 5 MG tablet TAKE 1 TABLET BY MOUTH THREE TIMES DAILY 90 tablet 10    carvedilol (COREG) 12.5 MG tablet TAKE 1 TABLET BY MOUTH TWICE DAILY 60 tablet 10    levothyroxine (SYNTHROID) 100 MCG tablet TAKE ONE TABLET BY MOUTH DAILY 90 tablet 0    aspirin 81 MG chewable tablet Take 1 tablet by mouth daily 30 tablet 0     No current facility-administered medications on file prior to visit.      Allergies   Allergen Reactions    Lipitor [Atorvastatin] Other (See Comments)     Weakness, severe    Morphine Shortness Of Breath    Keflex [Cephalexin] Other (See Comments)     SOB & bilateral arms shaking     Hctz [Hydrochlorothiazide] Other (See Comments)     Hyponatremia, severe      Norvasc [Amlodipine Besylate] Swelling     Of neck    Penicillins Hives    Tramadol Itching    Hydralazine Palpitations and Other (See Comments)     Dizzy,fatigue,headaches,palpitations,loss of appetite    Shellfish-Derived Products Swelling and Rash     Swelling of neck     Past Medical History:   Diagnosis Date    Acute DVT (deep venous thrombosis) ([facility]) 07/03/2015    Acute encephalopathy 04/19/2018    Acute on chronic diastolic heart failure ([facility]) 03/30/2019    Alcohol abuse     Anxiety     Arthritis     CAD in native artery     Cellulitis 04/28/2018    Cerebral artery occlusion with cerebral infarction ([facility])     states hx of multiple mini strokes    Cerebrovascular accident (CVA) ([facility])     Chronic fatigue     Complex care coordination     Complicated UTI (urinary tract infection)     Congestive heart failure, unspecified HF  chronicity, unspecified heart failure type ([facility] 2022    COPD (chronic obstructive pulmonary disease) ([facility])     COPD exacerbation ([facility] 2018    Depression     Diabetes mellitus ([facility]     denies    DIMAS (dyspnea on exertion) 2015    DVT (deep venous thrombosis) ([facility])     DVT, lower extremity ([facility])     Fatigue 2015    General weakness 2015    Generalized weakness 2019    Hypercholesteremia     Hypertension     Hyperthyroidism     Incontinence 10/16/2012    Mammogram abnormal 2012    Neuromuscular disorder ([facility]     Osteopenia 2017    PAF (paroxysmal atrial fibrillation) ([facility])     Pneumonia     Recurrent UTI     Right forearm cellulitis     Superficial thrombophlebitis of right upper extremity     Thyroid disease     Tobacco abuse     Tobacco abuse counseling     Trapped lung 2017    Unable to walk 2018      Past Surgical History:   Procedure Laterality Date    COLONOSCOPY  2012    Dr. Gilberto Norman; repeat 5 years    EYE SURGERY      cateracts removed    NERVE SURGERY N/A 2021    Janet Wetzel, FLEXIBLE CYSTOSCOPY performed by Gato Harmon MD at [address] ARTHROSCOPY Right 14    SUBACROMIAL DECOMPRESSION, ROTATOR CUFF REPAIR    STIMULATOR SURGERY N/A 2022    INTERSTIMULATOR INSERTION STAGE 2 - MEDTRONICS performed by Gato Harmon MD at [address] [facility] Right     TONSILLECTOMY        Family History   Problem Relation Age of Onset    Heart Disease Father         MI @ 64    Alcohol Abuse Father       Social History     Tobacco Use    Smoking status: Every Day     Packs/day: 0.50     Years: 52.00     Pack years: 26.00     Types: Cigarettes     Last attempt to quit: 2018     Years since quittin.6    Smokeless tobacco: Never    Tobacco comments:     started smoking at age 26 / smoked up to 2 p,p,d / now smoking 4 to 8 cigarettes a day,   Substance Use Topics    Alcohol use: No        Review of Systems    Objective:    /76 (Site: Right Upper Arm, Position: Sitting, Cuff Size: Medium Adult)   Pulse 74   Temp 97.7 °F (36.5 °C) (Infrared)   Wt 117 lb (53.1 kg)   SpO2 97%   BMI 17.79 kg/m²   Weight: 117 lb (53.1 kg)     BP Readings from Last 3 Encounters:   02/13/23 124/76   01/24/23 (!) 152/94   11/26/22 (!) 156/89     Wt Readings from Last 3 Encounters:   02/13/23 117 lb (53.1 kg)   01/24/23 119 lb (54 kg)   11/26/22 110 lb (49.9 kg)     BMI Readings from Last 3 Encounters:   02/13/23 17.79 kg/m²   01/24/23 18.09 kg/m²   11/26/22 16.73 kg/m²       Physical Exam  Vitals reviewed. Constitutional:       Appearance: Normal appearance. She is well-developed. Cardiovascular:      Rate and Rhythm: Normal rate and regular rhythm. Pulses: Normal pulses. Heart sounds: Normal heart sounds. No murmur heard. Pulmonary:      Effort: Pulmonary effort is normal.      Breath sounds: Normal breath sounds. Musculoskeletal:      Right lower leg: No edema. Left lower leg: No edema. Skin:     General: Skin is warm and dry. Neurological:      Mental Status: She is alert and oriented to person, place, and time. Mental status is at baseline. Psychiatric:         Mood and Affect: Mood normal.       Assessment/Plan    1. Acute on chronic congestive heart failure, unspecified heart failure type (HCC)  Decreased lasix 20 mg daily d/t incontinence  Follow up cardiology  Advised daily weights  Take medication as prescribed    2. Hospital discharge follow-up  Reviewed physician notations, labs, imaging  Questions answered    Time Spent on discharge is more than 30 minutes in the examination, evaluation, counseling and review of medications and discharge plan. No follow-ups on file. This dictation was generated by voice recognition computer software.   Although all attempts are made to edit the dictation for accuracy, there may be errors in the transcription that are not intended.

## 2023-02-19 DIAGNOSIS — E03.9 HYPOTHYROIDISM, UNSPECIFIED TYPE: ICD-10-CM

## 2023-02-19 DIAGNOSIS — F41.9 ANXIETY: ICD-10-CM

## 2023-02-20 RX ORDER — CLONAZEPAM 1 MG/1
TABLET ORAL
Qty: 60 TABLET | Refills: 0 | Status: SHIPPED | OUTPATIENT
Start: 2023-02-20 | End: 2023-03-20

## 2023-02-20 RX ORDER — LEVOTHYROXINE SODIUM 0.1 MG/1
TABLET ORAL
Qty: 30 TABLET | Refills: 0 | Status: SHIPPED | OUTPATIENT
Start: 2023-02-20

## 2023-03-02 ENCOUNTER — TELEPHONE (OUTPATIENT)
Dept: FAMILY MEDICINE CLINIC | Age: 78
End: 2023-03-02

## 2023-03-02 NOTE — TELEPHONE ENCOUNTER
Johnny George from [employer] called to report elevated /103. Called back number [phone]. Please advise.

## 2023-03-02 NOTE — TELEPHONE ENCOUNTER
Called Pt nurse Ana Keys, no answer, LVM with info. Per ANDREAS, can take clonidine x1 to bring down BP.

## 2023-03-27 ENCOUNTER — TELEPHONE (OUTPATIENT)
Dept: FAMILY MEDICINE CLINIC | Age: 78
End: 2023-03-27

## 2023-03-27 NOTE — TELEPHONE ENCOUNTER
Patient is in need of an urgent letter to be sent to [address] [place] [address] Drive did not have the fax number stated they told him it is on the Web Site. He stated she is on a defibrillator and it does not with out power. They are going to cut off power tomorrow. He can be called at [phone] with questions. Please advise.

## 2023-03-27 NOTE — TELEPHONE ENCOUNTER
Called Pt, states he doesn't have the money to pay the bill because of current politics. Has tried to call cardiology but they won't call him back. Pt  seemed very confused and agitated. I called [facility] cardiology, they stated that she  has an internal loop recorder and it does not require a battery because the battery is internal. RN will put a note thru to Cardiology to confirm in the morning. Awaiting their response.

## 2023-03-28 NOTE — TELEPHONE ENCOUNTER
Called [employer], spoke with representative that states the Pt has to initiate the process. Phuc Lozada, advised he needs to call the [employer] number and initiate the process for medical necessity. Avelino Boykin was upset stating he is not good at things like this and would try to give them a call. Had no further needs at this time.

## 2023-04-19 DIAGNOSIS — F41.9 ANXIETY: ICD-10-CM

## 2023-04-19 RX ORDER — CLONAZEPAM 1 MG/1
TABLET ORAL
Qty: 60 TABLET | OUTPATIENT
Start: 2023-04-19

## 2023-04-28 DIAGNOSIS — F41.9 ANXIETY: ICD-10-CM

## 2023-05-01 DIAGNOSIS — F41.9 ANXIETY: ICD-10-CM

## 2023-05-01 RX ORDER — CLONAZEPAM 1 MG/1
TABLET ORAL
Qty: 60 TABLET | OUTPATIENT
Start: 2023-05-01

## 2023-05-01 RX ORDER — CLONAZEPAM 1 MG/1
1 TABLET ORAL 2 TIMES DAILY PRN
Qty: 60 TABLET | Refills: 0 | OUTPATIENT
Start: 2023-05-01 | End: 2023-05-31

## 2023-05-11 DIAGNOSIS — F41.9 ANXIETY: ICD-10-CM

## 2023-05-15 RX ORDER — CLONAZEPAM 1 MG/1
1 TABLET ORAL 2 TIMES DAILY PRN
Qty: 60 TABLET | Refills: 0 | OUTPATIENT
Start: 2023-05-15 | End: 2023-06-12

## 2023-05-16 ENCOUNTER — OFFICE VISIT (OUTPATIENT)
Dept: FAMILY MEDICINE CLINIC | Age: 78
End: 2023-05-16

## 2023-05-16 VITALS
BODY MASS INDEX: 17.79 KG/M2 | DIASTOLIC BLOOD PRESSURE: 74 MMHG | WEIGHT: 117 LBS | HEART RATE: 78 BPM | TEMPERATURE: 97 F | SYSTOLIC BLOOD PRESSURE: 116 MMHG

## 2023-05-16 DIAGNOSIS — F41.9 ANXIETY: ICD-10-CM

## 2023-05-16 RX ORDER — CLONAZEPAM 1 MG/1
1 TABLET ORAL 2 TIMES DAILY PRN
Qty: 60 TABLET | Refills: 0 | Status: SHIPPED | OUTPATIENT
Start: 2023-05-16 | End: 2023-06-13

## 2023-05-16 NOTE — PROGRESS NOTES
Maggie Coma  : 1945  Encounter date: 2023    This efrain 65 y.o. female who presents with  Chief Complaint   Patient presents with    Medication Check     clonazepam    Anxiety       History of present illness:    HPI Pt is 65year old female with  and daughter for medication check on clonazepam.  Doing better,  home situation has improved. Pt's blood pressure well controlled, following lessened stress. , adherent to medications. No new concerns. Due for AWV in November. Current Outpatient Medications on File Prior to Visit   Medication Sig Dispense Refill    levothyroxine (SYNTHROID) 100 MCG tablet TAKE 1 TABLET BY MOUTH ONCE DAILY 30 tablet 0    FARXIGA 10 MG tablet       potassium chloride (KLOR-CON) 10 MEQ extended release tablet       furosemide (LASIX) 40 MG tablet Take 0.5 tablets by mouth daily 60 tablet 0    lisinopril (PRINIVIL;ZESTRIL) 5 MG tablet Take 1 tablet by mouth daily 90 tablet 1    cloNIDine (CATAPRES) 0.1 MG tablet TAKE ONE TABLET BY MOUTH THREE TIMES A DAY 90 tablet 1    apixaban (ELIQUIS) 5 MG TABS tablet TAKE ONE TABLET BY MOUTH TWICE A  tablet 0    mirtazapine (REMERON) 15 MG tablet TAKE 1 TABLET BY MOUTH DAILY 30 tablet 3    oxybutynin (DITROPAN) 5 MG tablet TAKE 1 TABLET BY MOUTH THREE TIMES DAILY 90 tablet 10    carvedilol (COREG) 12.5 MG tablet TAKE 1 TABLET BY MOUTH TWICE DAILY 60 tablet 10    aspirin 81 MG chewable tablet Take 1 tablet by mouth daily 30 tablet 0     No current facility-administered medications on file prior to visit.       Allergies   Allergen Reactions    Lipitor [Atorvastatin] Other (See Comments)     Weakness, severe    Morphine Shortness Of Breath    Keflex [Cephalexin] Other (See Comments)     SOB & bilateral arms shaking     Hctz [Hydrochlorothiazide] Other (See Comments)     Hyponatremia, severe      Norvasc [Amlodipine Besylate] Swelling     Of neck    Penicillins Hives    Tramadol Itching    Hydralazine Palpitations and Other

## 2023-05-17 DIAGNOSIS — F41.9 ANXIETY: ICD-10-CM

## 2023-05-17 RX ORDER — MIRTAZAPINE 15 MG/1
TABLET, FILM COATED ORAL
Qty: 90 TABLET | Refills: 1 | Status: SHIPPED | OUTPATIENT
Start: 2023-05-17

## 2023-06-02 ENCOUNTER — TELEPHONE (OUTPATIENT)
Dept: CARDIOLOGY CLINIC | Age: 78
End: 2023-06-02

## 2023-06-05 NOTE — TELEPHONE ENCOUNTER
Called number provided, Minnie Bowen answered then hung up. Tried calling back again but phone went straight to . Closing call.

## 2023-07-13 DIAGNOSIS — F41.9 ANXIETY: ICD-10-CM

## 2023-07-13 RX ORDER — CLONAZEPAM 1 MG/1
1 TABLET ORAL 2 TIMES DAILY PRN
Qty: 60 TABLET | Refills: 0 | Status: SHIPPED | OUTPATIENT
Start: 2023-07-13 | End: 2023-08-10

## 2023-07-13 NOTE — TELEPHONE ENCOUNTER
Medication:   Requested Prescriptions     Pending Prescriptions Disp Refills    clonazePAM (KLONOPIN) 1 MG tablet 60 tablet 0     Sig: Take 1 tablet by mouth 2 times daily as needed for Anxiety for up to 28 days. Last Filled:  6/14/2023    Patient Phone Number: [phone] (home)     Last appt: 5/16/2023   Next appt: Visit date not found    Last OARRS:   RX Monitoring 5/11/2020   Attestation -   Periodic Controlled Substance Monitoring No signs of potential drug abuse or diversion identified.      PDMP Monitoring:    Last PDMP Daniel Martinez as Reviewed [facility]):  Review User Review Instant Review Result   Arturo Marieg 5/1/2023  8:25 AM Reviewed PDMP [1]     Preferred Pharmacy:   Cornel Glass [id number] [address] [phone] [facility] [phone]  [address] [place] [address]  Phone: [phone] Fax: [address] [facility], [address] Caroline [facility] [phone] - F [phone] 6629 [facility] [address]  Phone: [phone] Fax: 923 [facility] [phone] [address] [facility] [address]  1221 [facility] [address]  Phone: [phone] Fax: [phone]

## 2023-07-13 NOTE — TELEPHONE ENCOUNTER
clonazePAM [facility].) 1 MG tablet       [facility] [id number] [place] [address]   [address], [address]   Phone:  [phone]  Fax:  [phone]

## 2023-07-24 ENCOUNTER — TELEPHONE (OUTPATIENT)
Dept: CARDIOLOGY CLINIC | Age: 78
End: 2023-07-24

## 2023-07-24 DIAGNOSIS — I10 UNCONTROLLED HYPERTENSION: ICD-10-CM

## 2023-07-24 DIAGNOSIS — E03.9 HYPOTHYROIDISM, UNSPECIFIED TYPE: ICD-10-CM

## 2023-07-24 RX ORDER — LISINOPRIL 5 MG/1
5 TABLET ORAL DAILY
Qty: 90 TABLET | Refills: 0 | Status: SHIPPED | OUTPATIENT
Start: 2023-07-24

## 2023-07-24 RX ORDER — FUROSEMIDE 40 MG/1
20 TABLET ORAL DAILY
Qty: 45 TABLET | Refills: 0 | Status: SHIPPED | OUTPATIENT
Start: 2023-07-24

## 2023-07-24 RX ORDER — CLONIDINE HYDROCHLORIDE 0.1 MG/1
TABLET ORAL
Qty: 90 TABLET | Refills: 0 | Status: SHIPPED | OUTPATIENT
Start: 2023-07-24

## 2023-07-24 RX ORDER — LEVOTHYROXINE SODIUM 0.1 MG/1
TABLET ORAL
Qty: 90 TABLET | Refills: 0 | Status: SHIPPED | OUTPATIENT
Start: 2023-07-24

## 2023-07-24 RX ORDER — POTASSIUM CHLORIDE 750 MG/1
10 TABLET, FILM COATED, EXTENDED RELEASE ORAL DAILY
Qty: 90 TABLET | Refills: 0 | Status: SHIPPED | OUTPATIENT
Start: 2023-07-24

## 2023-07-24 RX ORDER — DAPAGLIFLOZIN 10 MG/1
10 TABLET, FILM COATED ORAL EVERY MORNING
Qty: 90 TABLET | Refills: 0 | Status: SHIPPED | OUTPATIENT
Start: 2023-07-24

## 2023-07-24 RX ORDER — CARVEDILOL 12.5 MG/1
TABLET ORAL
Qty: 180 TABLET | Refills: 0 | Status: SHIPPED | OUTPATIENT
Start: 2023-07-24

## 2023-07-24 NOTE — TELEPHONE ENCOUNTER
Disregard. Sending  to pt's PCP for letter to [employer]. He was also suppose to do this back in June.

## 2023-08-14 ENCOUNTER — TELEPHONE (OUTPATIENT)
Dept: FAMILY MEDICINE CLINIC | Age: 78
End: 2023-08-14

## 2023-08-14 DIAGNOSIS — F41.9 ANXIETY: ICD-10-CM

## 2023-08-14 RX ORDER — CLONAZEPAM 1 MG/1
1 TABLET ORAL 2 TIMES DAILY PRN
Qty: 60 TABLET | Refills: 0 | Status: SHIPPED | OUTPATIENT
Start: 2023-08-14 | End: 2023-09-11

## 2023-08-14 NOTE — TELEPHONE ENCOUNTER
Medication:   Requested Prescriptions     Pending Prescriptions Disp Refills    clonazePAM (KLONOPIN) 1 MG tablet 60 tablet 0     Sig: Take 1 tablet by mouth 2 times daily as needed for Anxiety for up to 28 days. Last Filled:  7/13/2023    Patient Phone Number: [phone] (home)     Last appt: 5/16/2023   Next appt: Visit date not found    Last OARRS:   RX Monitoring 5/11/2020   Attestation -   Periodic Controlled Substance Monitoring No signs of potential drug abuse or diversion identified.      PDMP Monitoring:    Last PDMP Cecelia Farhana as Reviewed [facility]):  Review User Review Instant Review Result   Carine Malik 5/1/2023  8:25 AM Reviewed PDMP [1]     Preferred Pharmacy:   [address] [facility] [id number] Bharathi Chamorro, [address] [place] [address] [phone] Aishwarya Corado [phone]  [address] [place] [address]  Phone: [phone] Fax: [address]io, [address] [place] [phone] - F [phone] 6629 [facility] [address]  Phone: [phone] Fax: 723 [facility] [phone] [address] [facility] [address]  1221 [facility] [address]  Phone: [phone] Fax: [phone]

## 2023-08-14 NOTE — TELEPHONE ENCOUNTER
clonazePAM [facility].) 1 MG tablet [[id number]]       Pharmacy    [facility] [id number] [address], [address] [place] [address]  [address]   Phone:  [phone]  Fax:  [phone]

## 2023-08-26 ENCOUNTER — HOSPITAL ENCOUNTER (INPATIENT)
Age: 78
LOS: 3 days | Discharge: HOME HEALTH | DRG: 871 | End: 2023-08-29
Attending: EMERGENCY MEDICINE | Admitting: HOSPITALIST
Payer: COMMERCIAL

## 2023-08-26 ENCOUNTER — APPOINTMENT (OUTPATIENT)
Dept: GENERAL RADIOLOGY | Age: 78
DRG: 871 | End: 2023-08-26
Payer: COMMERCIAL

## 2023-08-26 DIAGNOSIS — J44.1 COPD EXACERBATION (HCC): ICD-10-CM

## 2023-08-26 DIAGNOSIS — J96.01 ACUTE HYPOXEMIC RESPIRATORY FAILURE (HCC): ICD-10-CM

## 2023-08-26 DIAGNOSIS — I10 UNCONTROLLED HYPERTENSION: ICD-10-CM

## 2023-08-26 DIAGNOSIS — J44.1 COPD WITH ACUTE EXACERBATION (HCC): ICD-10-CM

## 2023-08-26 DIAGNOSIS — J18.9 PNEUMONIA DUE TO INFECTIOUS ORGANISM, UNSPECIFIED LATERALITY, UNSPECIFIED PART OF LUNG: Primary | ICD-10-CM

## 2023-08-26 PROBLEM — J96.00 ACUTE RESPIRATORY FAILURE (HCC): Status: ACTIVE | Noted: 2023-08-26

## 2023-08-26 LAB
ALBUMIN SERPL-MCNC: 4.1 G/DL (ref 3.4–5)
ALBUMIN/GLOB SERPL: 1.1 {RATIO} (ref 1.1–2.2)
ALP SERPL-CCNC: 60 U/L (ref 40–129)
ALT SERPL-CCNC: 9 U/L (ref 10–40)
ANION GAP SERPL CALCULATED.3IONS-SCNC: 13 MMOL/L (ref 3–16)
ANION GAP SERPL CALCULATED.3IONS-SCNC: 14 MMOL/L (ref 3–16)
AST SERPL-CCNC: 17 U/L (ref 15–37)
BASE EXCESS BLDV CALC-SCNC: 1.9 MMOL/L (ref -3–3)
BASOPHILS # BLD: 0 K/UL (ref 0–0.2)
BASOPHILS NFR BLD: 0.3 %
BILIRUB SERPL-MCNC: 0.4 MG/DL (ref 0–1)
BUN SERPL-MCNC: 16 MG/DL (ref 7–20)
BUN SERPL-MCNC: 17 MG/DL (ref 7–20)
CALCIUM SERPL-MCNC: 9.1 MG/DL (ref 8.3–10.6)
CALCIUM SERPL-MCNC: 9.4 MG/DL (ref 8.3–10.6)
CHLORIDE SERPL-SCNC: 93 MMOL/L (ref 99–110)
CHLORIDE SERPL-SCNC: 94 MMOL/L (ref 99–110)
CO2 BLDV-SCNC: 58 MMOL/L
CO2 SERPL-SCNC: 23 MMOL/L (ref 21–32)
CO2 SERPL-SCNC: 25 MMOL/L (ref 21–32)
COHGB MFR BLDV: 5.1 % (ref 0–1.5)
CREAT SERPL-MCNC: 0.6 MG/DL (ref 0.6–1.2)
CREAT SERPL-MCNC: 0.6 MG/DL (ref 0.6–1.2)
DEPRECATED RDW RBC AUTO: 14.4 % (ref 12.4–15.4)
EOSINOPHIL # BLD: 0.1 K/UL (ref 0–0.6)
EOSINOPHIL NFR BLD: 0.4 %
GFR SERPLBLD CREATININE-BSD FMLA CKD-EPI: >60 ML/MIN/{1.73_M2}
GFR SERPLBLD CREATININE-BSD FMLA CKD-EPI: >60 ML/MIN/{1.73_M2}
GLUCOSE SERPL-MCNC: 125 MG/DL (ref 70–99)
GLUCOSE SERPL-MCNC: 235 MG/DL (ref 70–99)
HCO3 BLDV-SCNC: 24.9 MMOL/L (ref 23–29)
HCT VFR BLD AUTO: 43.6 % (ref 36–48)
HGB BLD-MCNC: 14.3 G/DL (ref 12–16)
LACTATE BLDV-SCNC: 1.2 MMOL/L (ref 0.4–1.9)
LYMPHOCYTES # BLD: 0.5 K/UL (ref 1–5.1)
LYMPHOCYTES NFR BLD: 3.6 %
MCH RBC QN AUTO: 32.6 PG (ref 26–34)
MCHC RBC AUTO-ENTMCNC: 32.8 G/DL (ref 31–36)
MCV RBC AUTO: 99.4 FL (ref 80–100)
METHGB MFR BLDV: 0.3 %
MONOCYTES # BLD: 0.7 K/UL (ref 0–1.3)
MONOCYTES NFR BLD: 4.8 %
NEUTROPHILS # BLD: 13.3 K/UL (ref 1.7–7.7)
NEUTROPHILS NFR BLD: 90.9 %
NT-PROBNP SERPL-MCNC: 917 PG/ML (ref 0–449)
O2 CT VFR BLDV CALC: 20 VOL %
O2 THERAPY: ABNORMAL
PCO2 BLDV: 33.8 MMHG (ref 40–50)
PH BLDV: 7.48 [PH] (ref 7.35–7.45)
PLATELET # BLD AUTO: 335 K/UL (ref 135–450)
PMV BLD AUTO: 7.6 FL (ref 5–10.5)
PO2 BLDV: 192 MMHG (ref 25–40)
POTASSIUM SERPL-SCNC: 4.2 MMOL/L (ref 3.5–5.1)
POTASSIUM SERPL-SCNC: 4.2 MMOL/L (ref 3.5–5.1)
PROT SERPL-MCNC: 7.9 G/DL (ref 6.4–8.2)
RBC # BLD AUTO: 4.39 M/UL (ref 4–5.2)
SAO2 % BLDV: 100 %
SARS-COV-2 RDRP RESP QL NAA+PROBE: NOT DETECTED
SODIUM SERPL-SCNC: 131 MMOL/L (ref 136–145)
SODIUM SERPL-SCNC: 131 MMOL/L (ref 136–145)
TROPONIN, HIGH SENSITIVITY: 11 NG/L (ref 0–14)
TROPONIN, HIGH SENSITIVITY: 11 NG/L (ref 0–14)
WBC # BLD AUTO: 14.6 K/UL (ref 4–11)

## 2023-08-26 PROCEDURE — 85025 COMPLETE CBC W/AUTO DIFF WBC: CPT

## 2023-08-26 PROCEDURE — 6360000002 HC RX W HCPCS: Performed by: EMERGENCY MEDICINE

## 2023-08-26 PROCEDURE — 83880 ASSAY OF NATRIURETIC PEPTIDE: CPT

## 2023-08-26 PROCEDURE — 83605 ASSAY OF LACTIC ACID: CPT

## 2023-08-26 PROCEDURE — 84484 ASSAY OF TROPONIN QUANT: CPT

## 2023-08-26 PROCEDURE — 6370000000 HC RX 637 (ALT 250 FOR IP): Performed by: HOSPITALIST

## 2023-08-26 PROCEDURE — 94760 N-INVAS EAR/PLS OXIMETRY 1: CPT

## 2023-08-26 PROCEDURE — 2580000003 HC RX 258: Performed by: EMERGENCY MEDICINE

## 2023-08-26 PROCEDURE — 82803 BLOOD GASES ANY COMBINATION: CPT

## 2023-08-26 PROCEDURE — 96374 THER/PROPH/DIAG INJ IV PUSH: CPT

## 2023-08-26 PROCEDURE — 80053 COMPREHEN METABOLIC PANEL: CPT

## 2023-08-26 PROCEDURE — 1200000000 HC SEMI PRIVATE

## 2023-08-26 PROCEDURE — 6370000000 HC RX 637 (ALT 250 FOR IP): Performed by: EMERGENCY MEDICINE

## 2023-08-26 PROCEDURE — 2580000003 HC RX 258: Performed by: HOSPITALIST

## 2023-08-26 PROCEDURE — 93005 ELECTROCARDIOGRAM TRACING: CPT | Performed by: EMERGENCY MEDICINE

## 2023-08-26 PROCEDURE — 71045 X-RAY EXAM CHEST 1 VIEW: CPT

## 2023-08-26 PROCEDURE — 87635 SARS-COV-2 COVID-19 AMP PRB: CPT

## 2023-08-26 PROCEDURE — 2700000000 HC OXYGEN THERAPY PER DAY

## 2023-08-26 PROCEDURE — 94640 AIRWAY INHALATION TREATMENT: CPT

## 2023-08-26 PROCEDURE — 87040 BLOOD CULTURE FOR BACTERIA: CPT

## 2023-08-26 PROCEDURE — 36415 COLL VENOUS BLD VENIPUNCTURE: CPT

## 2023-08-26 PROCEDURE — 96375 TX/PRO/DX INJ NEW DRUG ADDON: CPT

## 2023-08-26 PROCEDURE — 99285 EMERGENCY DEPT VISIT HI MDM: CPT

## 2023-08-26 RX ORDER — METHYLPREDNISOLONE SODIUM SUCCINATE 125 MG/2ML
125 INJECTION, POWDER, LYOPHILIZED, FOR SOLUTION INTRAMUSCULAR; INTRAVENOUS ONCE
Status: COMPLETED | OUTPATIENT
Start: 2023-08-26 | End: 2023-08-26

## 2023-08-26 RX ORDER — IPRATROPIUM BROMIDE AND ALBUTEROL SULFATE 2.5; .5 MG/3ML; MG/3ML
1 SOLUTION RESPIRATORY (INHALATION)
Status: DISCONTINUED | OUTPATIENT
Start: 2023-08-27 | End: 2023-08-26

## 2023-08-26 RX ORDER — MIRTAZAPINE 15 MG/1
15 TABLET, FILM COATED ORAL NIGHTLY
Status: DISCONTINUED | OUTPATIENT
Start: 2023-08-26 | End: 2023-08-29 | Stop reason: HOSPADM

## 2023-08-26 RX ORDER — ACETAMINOPHEN 325 MG/1
650 TABLET ORAL EVERY 6 HOURS PRN
Status: DISCONTINUED | OUTPATIENT
Start: 2023-08-26 | End: 2023-08-29 | Stop reason: HOSPADM

## 2023-08-26 RX ORDER — IPRATROPIUM BROMIDE AND ALBUTEROL SULFATE 2.5; .5 MG/3ML; MG/3ML
1 SOLUTION RESPIRATORY (INHALATION) EVERY 4 HOURS PRN
Status: DISCONTINUED | OUTPATIENT
Start: 2023-08-27 | End: 2023-08-29 | Stop reason: HOSPADM

## 2023-08-26 RX ORDER — ONDANSETRON 4 MG/1
4 TABLET, ORALLY DISINTEGRATING ORAL EVERY 8 HOURS PRN
Status: DISCONTINUED | OUTPATIENT
Start: 2023-08-26 | End: 2023-08-29 | Stop reason: HOSPADM

## 2023-08-26 RX ORDER — ONDANSETRON 2 MG/ML
4 INJECTION INTRAMUSCULAR; INTRAVENOUS EVERY 6 HOURS PRN
Status: DISCONTINUED | OUTPATIENT
Start: 2023-08-26 | End: 2023-08-29 | Stop reason: HOSPADM

## 2023-08-26 RX ORDER — LEVOFLOXACIN 5 MG/ML
750 INJECTION, SOLUTION INTRAVENOUS EVERY 24 HOURS
Status: DISCONTINUED | OUTPATIENT
Start: 2023-08-27 | End: 2023-08-28

## 2023-08-26 RX ORDER — ALBUTEROL SULFATE 2.5 MG/3ML
5 SOLUTION RESPIRATORY (INHALATION) ONCE
Status: COMPLETED | OUTPATIENT
Start: 2023-08-26 | End: 2023-08-26

## 2023-08-26 RX ORDER — LEVOTHYROXINE SODIUM 0.1 MG/1
100 TABLET ORAL DAILY
Status: DISCONTINUED | OUTPATIENT
Start: 2023-08-27 | End: 2023-08-29 | Stop reason: HOSPADM

## 2023-08-26 RX ORDER — ONDANSETRON 2 MG/ML
4 INJECTION INTRAMUSCULAR; INTRAVENOUS ONCE
Status: DISCONTINUED | OUTPATIENT
Start: 2023-08-26 | End: 2023-08-26 | Stop reason: ALTCHOICE

## 2023-08-26 RX ORDER — SODIUM CHLORIDE 0.9 % (FLUSH) 0.9 %
5-40 SYRINGE (ML) INJECTION PRN
Status: DISCONTINUED | OUTPATIENT
Start: 2023-08-26 | End: 2023-08-29 | Stop reason: HOSPADM

## 2023-08-26 RX ORDER — CLONAZEPAM 1 MG/1
1 TABLET ORAL 2 TIMES DAILY PRN
Status: DISCONTINUED | OUTPATIENT
Start: 2023-08-26 | End: 2023-08-29 | Stop reason: HOSPADM

## 2023-08-26 RX ORDER — METHYLPREDNISOLONE SODIUM SUCCINATE 40 MG/ML
40 INJECTION, POWDER, LYOPHILIZED, FOR SOLUTION INTRAMUSCULAR; INTRAVENOUS DAILY
Status: DISCONTINUED | OUTPATIENT
Start: 2023-08-27 | End: 2023-08-27

## 2023-08-26 RX ORDER — CARVEDILOL 6.25 MG/1
12.5 TABLET ORAL 2 TIMES DAILY WITH MEALS
Status: DISCONTINUED | OUTPATIENT
Start: 2023-08-26 | End: 2023-08-29 | Stop reason: HOSPADM

## 2023-08-26 RX ORDER — OXYBUTYNIN CHLORIDE 5 MG/1
5 TABLET ORAL 2 TIMES DAILY
Status: DISCONTINUED | OUTPATIENT
Start: 2023-08-26 | End: 2023-08-29 | Stop reason: HOSPADM

## 2023-08-26 RX ORDER — SODIUM CHLORIDE, SODIUM LACTATE, POTASSIUM CHLORIDE, CALCIUM CHLORIDE 600; 310; 30; 20 MG/100ML; MG/100ML; MG/100ML; MG/100ML
INJECTION, SOLUTION INTRAVENOUS ONCE
Status: COMPLETED | OUTPATIENT
Start: 2023-08-26 | End: 2023-08-26

## 2023-08-26 RX ORDER — ACETAMINOPHEN 650 MG/1
650 SUPPOSITORY RECTAL EVERY 6 HOURS PRN
Status: DISCONTINUED | OUTPATIENT
Start: 2023-08-26 | End: 2023-08-29 | Stop reason: HOSPADM

## 2023-08-26 RX ORDER — CLONIDINE HYDROCHLORIDE 0.1 MG/1
0.1 TABLET ORAL 2 TIMES DAILY
Status: DISCONTINUED | OUTPATIENT
Start: 2023-08-26 | End: 2023-08-29 | Stop reason: HOSPADM

## 2023-08-26 RX ORDER — SODIUM CHLORIDE 0.9 % (FLUSH) 0.9 %
5-40 SYRINGE (ML) INJECTION EVERY 12 HOURS SCHEDULED
Status: DISCONTINUED | OUTPATIENT
Start: 2023-08-26 | End: 2023-08-29 | Stop reason: HOSPADM

## 2023-08-26 RX ORDER — ACETAMINOPHEN 500 MG
1000 TABLET ORAL ONCE
Status: COMPLETED | OUTPATIENT
Start: 2023-08-26 | End: 2023-08-26

## 2023-08-26 RX ORDER — LISINOPRIL 5 MG/1
5 TABLET ORAL DAILY
Status: DISCONTINUED | OUTPATIENT
Start: 2023-08-27 | End: 2023-08-29 | Stop reason: HOSPADM

## 2023-08-26 RX ORDER — LEVOFLOXACIN 5 MG/ML
750 INJECTION, SOLUTION INTRAVENOUS ONCE
Status: COMPLETED | OUTPATIENT
Start: 2023-08-26 | End: 2023-08-26

## 2023-08-26 RX ORDER — ALBUTEROL SULFATE 2.5 MG/3ML
2.5 SOLUTION RESPIRATORY (INHALATION) EVERY 4 HOURS PRN
Status: DISCONTINUED | OUTPATIENT
Start: 2023-08-26 | End: 2023-08-29 | Stop reason: HOSPADM

## 2023-08-26 RX ORDER — SODIUM CHLORIDE 9 MG/ML
INJECTION, SOLUTION INTRAVENOUS PRN
Status: DISCONTINUED | OUTPATIENT
Start: 2023-08-26 | End: 2023-08-29 | Stop reason: HOSPADM

## 2023-08-26 RX ORDER — ASPIRIN 81 MG/1
81 TABLET, CHEWABLE ORAL DAILY
Status: DISCONTINUED | OUTPATIENT
Start: 2023-08-27 | End: 2023-08-29 | Stop reason: HOSPADM

## 2023-08-26 RX ORDER — FUROSEMIDE 20 MG/1
20 TABLET ORAL DAILY
Status: DISCONTINUED | OUTPATIENT
Start: 2023-08-27 | End: 2023-08-29 | Stop reason: HOSPADM

## 2023-08-26 RX ORDER — GUAIFENESIN/DEXTROMETHORPHAN 100-10MG/5
5 SYRUP ORAL EVERY 4 HOURS PRN
Status: DISCONTINUED | OUTPATIENT
Start: 2023-08-26 | End: 2023-08-29 | Stop reason: HOSPADM

## 2023-08-26 RX ORDER — LABETALOL HYDROCHLORIDE 5 MG/ML
10 INJECTION, SOLUTION INTRAVENOUS ONCE
Status: COMPLETED | OUTPATIENT
Start: 2023-08-26 | End: 2023-08-26

## 2023-08-26 RX ORDER — POLYETHYLENE GLYCOL 3350 17 G/17G
17 POWDER, FOR SOLUTION ORAL DAILY PRN
Status: DISCONTINUED | OUTPATIENT
Start: 2023-08-26 | End: 2023-08-29 | Stop reason: HOSPADM

## 2023-08-26 RX ADMIN — OXYBUTYNIN CHLORIDE 5 MG: 5 TABLET ORAL at 21:17

## 2023-08-26 RX ADMIN — METHYLPREDNISOLONE SODIUM SUCCINATE 125 MG: 125 INJECTION INTRAMUSCULAR; INTRAVENOUS at 17:32

## 2023-08-26 RX ADMIN — LEVOFLOXACIN 750 MG: 5 INJECTION, SOLUTION INTRAVENOUS at 17:46

## 2023-08-26 RX ADMIN — CLONIDINE HYDROCHLORIDE 0.1 MG: 0.1 TABLET ORAL at 21:17

## 2023-08-26 RX ADMIN — ACETAMINOPHEN 1000 MG: 500 TABLET ORAL at 18:07

## 2023-08-26 RX ADMIN — CARVEDILOL 12.5 MG: 6.25 TABLET, FILM COATED ORAL at 21:17

## 2023-08-26 RX ADMIN — SODIUM CHLORIDE, POTASSIUM CHLORIDE, SODIUM LACTATE AND CALCIUM CHLORIDE: 600; 310; 30; 20 INJECTION, SOLUTION INTRAVENOUS at 17:57

## 2023-08-26 RX ADMIN — Medication 10 ML: at 21:17

## 2023-08-26 RX ADMIN — LABETALOL HYDROCHLORIDE 10 MG: 5 INJECTION INTRAVENOUS at 18:09

## 2023-08-26 RX ADMIN — ALBUTEROL SULFATE 5 MG: 2.5 SOLUTION RESPIRATORY (INHALATION) at 17:21

## 2023-08-26 RX ADMIN — MIRTAZAPINE 15 MG: 15 TABLET, FILM COATED ORAL at 21:17

## 2023-08-26 ASSESSMENT — ENCOUNTER SYMPTOMS
COUGH: 1
ABDOMINAL PAIN: 0
WHEEZING: 0
VOMITING: 0
DIARRHEA: 0
SHORTNESS OF BREATH: 1
RHINORRHEA: 0
NAUSEA: 0

## 2023-08-26 ASSESSMENT — PAIN SCALES - GENERAL: PAINLEVEL_OUTOF10: 9

## 2023-08-26 ASSESSMENT — PAIN - FUNCTIONAL ASSESSMENT: PAIN_FUNCTIONAL_ASSESSMENT: NONE - DENIES PAIN

## 2023-08-26 NOTE — H&P
will be treated with IV steroids breathing treatment IV antibiotic  -Monitor closely history of CHF with normal lactic acid we will hold off on IV fluid for now  --Supportive care for cough    COPD exacerbation  Secondary to pneumonia. DuoNeb breathing treatment home inhalers IV steroids    Presumed bacterial pneumonia  IV Levaquin for now  Follow-up on cultures.     Chronic CHF  Is compensated resume home medication Lasix Entresto beta-blocker  Monitor closely for any worsening symptoms        Simona Galeas MD    8/26/2023 5:58 PM

## 2023-08-26 NOTE — ED NOTES
Report given to 5T RN. No further questions at this time. Pt transported on oxygen and tele.       Lashonda Juan RN  08/26/23 1601 Aranza Blunt RN  08/26/23 7274

## 2023-08-26 NOTE — ED NOTES
ED TO INPATIENT SBAR HANDOFF    Patient Name: Tiffanie Leonardo   :    65 y.o. MRN:  [id number]  Preferred Name  Willow Brown  ED Room #:  ED-0013/13  Family/Caregiver Present no   Restraints no   Sitter no   Sepsis Risk Score Sepsis Risk Score: 8.51    Situation  Code Status: Prior No additional code details. Allergies: Lipitor [atorvastatin], Morphine, Keflex [cephalexin], Hctz [hydrochlorothiazide], Norvasc [amlodipine besylate], Penicillins, Tramadol, Hydralazine, and Shellfish-derived products  Weight: Patient Vitals for the past 96 hrs (Last 3 readings):   Weight   23 1446 117 lb (53.1 kg)     Arrived from: home  Chief Complaint:   Chief Complaint   Patient presents with    Shortness of Breath     FF EMS from home d/t SOB that started this morning. HX of COPD, does not wear oxygen daily. Denies any chest pain. Hospital Problem/Diagnosis:  Principal Problem:    Acute respiratory failure ([address])  Resolved Problems:    * No resolved hospital problems. *    Imaging:   XR CHEST PORTABLE   Final Result   1. Prominent near confluent opacification in the left mid and lower lung,   worsened in the interval.  Likely representing pneumonia. 2. Small left pleural effusion.            Abnormal labs:   Abnormal Labs Reviewed   CBC WITH AUTO DIFFERENTIAL - Abnormal; Notable for the following components:       Result Value    WBC 14.6 (*)     Neutrophils Absolute 13.3 (*)     Lymphocytes Absolute 0.5 (*)     All other components within normal limits   COMPREHENSIVE METABOLIC PANEL - Abnormal; Notable for the following components:    Sodium 131 (*)     Chloride 94 (*)     Glucose 125 (*)     ALT 9 (*)     All other components within normal limits   BRAIN NATRIURETIC PEPTIDE - Abnormal; Notable for the following components:    Pro- (*)     All other components within normal limits   BLOOD GAS, VENOUS - Abnormal; Notable for the following components:    pH, Charles 7.477 (*)     pCO2, Charles 33.8 (*)     pO2,

## 2023-08-26 NOTE — ED NOTES
Pt states she is having a headache. Dr. Lin Guzman aware, meds ordered and aware of high BP. Will cont to monitor.       Marcos Dueñas RN  08/26/23 7762

## 2023-08-27 LAB
BASOPHILS # BLD: 0.2 K/UL (ref 0–0.2)
BASOPHILS NFR BLD: 1.1 %
DEPRECATED RDW RBC AUTO: 14.3 % (ref 12.4–15.4)
EKG ATRIAL RATE: 105 BPM
EKG DIAGNOSIS: NORMAL
EKG P AXIS: 69 DEGREES
EKG P-R INTERVAL: 140 MS
EKG Q-T INTERVAL: 328 MS
EKG QRS DURATION: 70 MS
EKG QTC CALCULATION (BAZETT): 433 MS
EKG R AXIS: 68 DEGREES
EKG T AXIS: 68 DEGREES
EKG VENTRICULAR RATE: 105 BPM
EOSINOPHIL # BLD: 0 K/UL (ref 0–0.6)
EOSINOPHIL NFR BLD: 0 %
HCT VFR BLD AUTO: 40.7 % (ref 36–48)
HGB BLD-MCNC: 13.4 G/DL (ref 12–16)
LYMPHOCYTES # BLD: 0.5 K/UL (ref 1–5.1)
LYMPHOCYTES NFR BLD: 3.1 %
MCH RBC QN AUTO: 32.3 PG (ref 26–34)
MCHC RBC AUTO-ENTMCNC: 32.8 G/DL (ref 31–36)
MCV RBC AUTO: 98.4 FL (ref 80–100)
MONOCYTES # BLD: 0.6 K/UL (ref 0–1.3)
MONOCYTES NFR BLD: 3.7 %
NEUTROPHILS # BLD: 15.6 K/UL (ref 1.7–7.7)
NEUTROPHILS NFR BLD: 92.1 %
PLATELET # BLD AUTO: 292 K/UL (ref 135–450)
PMV BLD AUTO: 8.1 FL (ref 5–10.5)
RBC # BLD AUTO: 4.13 M/UL (ref 4–5.2)
WBC # BLD AUTO: 16.9 K/UL (ref 4–11)

## 2023-08-27 PROCEDURE — 85025 COMPLETE CBC W/AUTO DIFF WBC: CPT

## 2023-08-27 PROCEDURE — 6370000000 HC RX 637 (ALT 250 FOR IP): Performed by: INTERNAL MEDICINE

## 2023-08-27 PROCEDURE — 87641 MR-STAPH DNA AMP PROBE: CPT

## 2023-08-27 PROCEDURE — 6360000002 HC RX W HCPCS: Performed by: HOSPITALIST

## 2023-08-27 PROCEDURE — 6370000000 HC RX 637 (ALT 250 FOR IP): Performed by: HOSPITALIST

## 2023-08-27 PROCEDURE — 94761 N-INVAS EAR/PLS OXIMETRY MLT: CPT

## 2023-08-27 PROCEDURE — 93010 ELECTROCARDIOGRAM REPORT: CPT | Performed by: INTERNAL MEDICINE

## 2023-08-27 PROCEDURE — 36415 COLL VENOUS BLD VENIPUNCTURE: CPT

## 2023-08-27 PROCEDURE — 94640 AIRWAY INHALATION TREATMENT: CPT

## 2023-08-27 PROCEDURE — 2580000003 HC RX 258: Performed by: HOSPITALIST

## 2023-08-27 PROCEDURE — 1200000000 HC SEMI PRIVATE

## 2023-08-27 PROCEDURE — 87449 NOS EACH ORGANISM AG IA: CPT

## 2023-08-27 RX ORDER — IPRATROPIUM BROMIDE AND ALBUTEROL SULFATE 2.5; .5 MG/3ML; MG/3ML
1 SOLUTION RESPIRATORY (INHALATION)
Status: DISCONTINUED | OUTPATIENT
Start: 2023-08-28 | End: 2023-08-29 | Stop reason: HOSPADM

## 2023-08-27 RX ORDER — IPRATROPIUM BROMIDE AND ALBUTEROL SULFATE 2.5; .5 MG/3ML; MG/3ML
1 SOLUTION RESPIRATORY (INHALATION)
Status: DISCONTINUED | OUTPATIENT
Start: 2023-08-27 | End: 2023-08-27

## 2023-08-27 RX ORDER — FLUTICASONE PROPIONATE 50 MCG
1 SPRAY, SUSPENSION (ML) NASAL 2 TIMES DAILY PRN
Status: DISCONTINUED | OUTPATIENT
Start: 2023-08-27 | End: 2023-08-29 | Stop reason: HOSPADM

## 2023-08-27 RX ADMIN — ASPIRIN 81 MG 81 MG: 81 TABLET ORAL at 08:07

## 2023-08-27 RX ADMIN — FUROSEMIDE 20 MG: 20 TABLET ORAL at 08:07

## 2023-08-27 RX ADMIN — LEVOTHYROXINE SODIUM 100 MCG: 0.15 TABLET ORAL at 05:23

## 2023-08-27 RX ADMIN — LISINOPRIL 5 MG: 5 TABLET ORAL at 08:07

## 2023-08-27 RX ADMIN — LEVOFLOXACIN 750 MG: 5 INJECTION, SOLUTION INTRAVENOUS at 16:58

## 2023-08-27 RX ADMIN — CLONAZEPAM 1 MG: 1 TABLET ORAL at 20:25

## 2023-08-27 RX ADMIN — OXYBUTYNIN CHLORIDE 5 MG: 5 TABLET ORAL at 20:21

## 2023-08-27 RX ADMIN — Medication 10 ML: at 20:22

## 2023-08-27 RX ADMIN — CARVEDILOL 12.5 MG: 6.25 TABLET, FILM COATED ORAL at 08:07

## 2023-08-27 RX ADMIN — CARVEDILOL 12.5 MG: 6.25 TABLET, FILM COATED ORAL at 16:53

## 2023-08-27 RX ADMIN — Medication 2 PUFF: at 20:42

## 2023-08-27 RX ADMIN — IPRATROPIUM BROMIDE AND ALBUTEROL SULFATE 1 DOSE: .5; 3 SOLUTION RESPIRATORY (INHALATION) at 00:04

## 2023-08-27 RX ADMIN — IPRATROPIUM BROMIDE AND ALBUTEROL SULFATE 1 DOSE: .5; 3 SOLUTION RESPIRATORY (INHALATION) at 13:23

## 2023-08-27 RX ADMIN — IPRATROPIUM BROMIDE AND ALBUTEROL SULFATE 1 DOSE: .5; 3 SOLUTION RESPIRATORY (INHALATION) at 20:42

## 2023-08-27 RX ADMIN — METHYLPREDNISOLONE SODIUM SUCCINATE 40 MG: 40 INJECTION INTRAMUSCULAR; INTRAVENOUS at 08:08

## 2023-08-27 RX ADMIN — IPRATROPIUM BROMIDE AND ALBUTEROL SULFATE 1 DOSE: .5; 3 SOLUTION RESPIRATORY (INHALATION) at 16:42

## 2023-08-27 RX ADMIN — MIRTAZAPINE 15 MG: 15 TABLET, FILM COATED ORAL at 20:21

## 2023-08-27 RX ADMIN — CLONIDINE HYDROCHLORIDE 0.1 MG: 0.1 TABLET ORAL at 20:22

## 2023-08-27 RX ADMIN — Medication 10 ML: at 08:07

## 2023-08-27 RX ADMIN — OXYBUTYNIN CHLORIDE 5 MG: 5 TABLET ORAL at 08:07

## 2023-08-27 RX ADMIN — CLONIDINE HYDROCHLORIDE 0.1 MG: 0.1 TABLET ORAL at 08:07

## 2023-08-27 RX ADMIN — IPRATROPIUM BROMIDE AND ALBUTEROL SULFATE 1 DOSE: .5; 3 SOLUTION RESPIRATORY (INHALATION) at 09:16

## 2023-08-27 NOTE — ED PROVIDER NOTES
141 [facility]        Pt Name: Daisy Chaves  MRN: [id number]  [facility] ([place] 1945  Date of evaluation: 8/26/2023  Provider: Jane Hodges PA-C  PCP: QUITA Aguirre NP  Note Started: 2:55 PM EDT 8/26/23       I have seen and evaluated this patient with my supervising physician Dr. Barbaraann Fleischer. CHIEF COMPLAINT       Chief Complaint   Patient presents with    Shortness of Breath     FF EMS from home d/t SOB that started this morning. HX of COPD, does not wear oxygen daily. Denies any chest pain. HISTORY OF PRESENT ILLNESS: 1 or more Elements     History From: patient   Limitations to history : None    Daisy Chaves is a 65 y.o. female who presents for evaluation of shortness breath started this morning. Patient also reports cough congestion runny nose that started this morning. She has history of COPD. EMS reports wheezing upon arrival and gave her DuoNeb breathing treatment in route with some improvement. She denies fevers or chills. No known sick contacts with similar symptoms. No chest pain. No abdominal pain nausea vomiting or diarrhea. No prior oxygen requirement. She has no other complaints or concerns at this time. Nursing Notes were all reviewed and agreed with or any disagreements were addressed in the HPI. REVIEW OF SYSTEMS :      Review of Systems   Constitutional:  Negative for appetite change, chills and fever. HENT:  Positive for congestion. Negative for rhinorrhea. Respiratory:  Positive for cough and shortness of breath. Negative for wheezing. Cardiovascular:  Negative for chest pain. Gastrointestinal:  Negative for abdominal pain, diarrhea, nausea and vomiting. Genitourinary:  Negative for difficulty urinating, dysuria and hematuria. Musculoskeletal:  Negative for neck pain and neck stiffness. Skin:  Negative for rash. Neurological:  Negative for headaches.      Positives and Pertinent
failure, unspecified HF chronicity, unspecified heart failure type ([address]), COPD (chronic obstructive pulmonary disease) ([address]), COPD exacerbation ([address]), Depression, Diabetes mellitus ([address]), DIMAS (dyspnea on exertion), DVT (deep venous thrombosis) ([address]), DVT, lower extremity ([address]), Fatigue, General weakness, Generalized weakness, Hypercholesteremia, Hypertension, Hyperthyroidism, Incontinence, Mammogram abnormal, Neuromuscular disorder (HCC), Osteopenia, PAF (paroxysmal atrial fibrillation) ([address]), Pneumonia, Recurrent UTI, Right forearm cellulitis, Superficial thrombophlebitis of right upper extremity, Thyroid disease, Tobacco abuse, Tobacco abuse counseling, Trapped lung, and Unable to walk. Records Reviewed :  Outpatient Notes    Disposition Considerations (Tests not ordered but considered, Shared Decision Making, Pt Expectation of Test or Tx.):  MRI not deemed clinically necessary in the ED setting    Medications administered:  Medications   aspirin chewable tablet 81 mg (has no administration in time range)   carvedilol (COREG) tablet 12.5 mg (12.5 mg Oral Given 8/26/23 2117)   clonazePAM (KLONOPIN) tablet 1 mg (has no administration in time range)   cloNIDine (CATAPRES) tablet 0.1 mg (0.1 mg Oral Given 8/26/23 2117)   levothyroxine (SYNTHROID) tablet 100 mcg (has no administration in time range)   lisinopril (PRINIVIL;ZESTRIL) tablet 5 mg (has no administration in time range)   mirtazapine (REMERON) tablet 15 mg (15 mg Oral Given 8/26/23 2117)   oxybutynin (DITROPAN) tablet 5 mg (5 mg Oral Given 8/26/23 2117)   furosemide (LASIX) tablet 20 mg (has no administration in time range)   levoFLOXacin (LEVAQUIN) 750 MG/150ML infusion 750 mg (has no administration in time range)   guaiFENesin-dextromethorphan (ROBITUSSIN DM) 100-10 MG/5ML syrup 5 mL (has no administration in time range)   sodium chloride flush 0.9 % injection 5-40 mL (10 mLs IntraVENous Given 8/26/23 2117)   sodium chloride flush 0.9 % injection 5-40 mL

## 2023-08-28 ENCOUNTER — APPOINTMENT (OUTPATIENT)
Dept: CT IMAGING | Age: 78
DRG: 871 | End: 2023-08-28
Payer: COMMERCIAL

## 2023-08-28 LAB
ALBUMIN SERPL-MCNC: 3.3 G/DL (ref 3.4–5)
ALBUMIN/GLOB SERPL: 1 {RATIO} (ref 1.1–2.2)
ALP SERPL-CCNC: 46 U/L (ref 40–129)
ALT SERPL-CCNC: 6 U/L (ref 10–40)
ANION GAP SERPL CALCULATED.3IONS-SCNC: 13 MMOL/L (ref 3–16)
AST SERPL-CCNC: 12 U/L (ref 15–37)
BASOPHILS # BLD: 0 K/UL (ref 0–0.2)
BASOPHILS NFR BLD: 0.1 %
BILIRUB SERPL-MCNC: <0.2 MG/DL (ref 0–1)
BUN SERPL-MCNC: 23 MG/DL (ref 7–20)
CALCIUM SERPL-MCNC: 8.9 MG/DL (ref 8.3–10.6)
CHLORIDE SERPL-SCNC: 96 MMOL/L (ref 99–110)
CO2 SERPL-SCNC: 24 MMOL/L (ref 21–32)
CREAT SERPL-MCNC: 0.7 MG/DL (ref 0.6–1.2)
DEPRECATED RDW RBC AUTO: 14.5 % (ref 12.4–15.4)
EOSINOPHIL # BLD: 0 K/UL (ref 0–0.6)
EOSINOPHIL NFR BLD: 0 %
GFR SERPLBLD CREATININE-BSD FMLA CKD-EPI: >60 ML/MIN/{1.73_M2}
GLUCOSE SERPL-MCNC: 101 MG/DL (ref 70–99)
HCT VFR BLD AUTO: 37.3 % (ref 36–48)
HGB BLD-MCNC: 12.5 G/DL (ref 12–16)
LEGIONELLA AG UR QL: NORMAL
LYMPHOCYTES # BLD: 0.8 K/UL (ref 1–5.1)
LYMPHOCYTES NFR BLD: 4.1 %
MAGNESIUM SERPL-MCNC: 2.1 MG/DL (ref 1.8–2.4)
MCH RBC QN AUTO: 32.5 PG (ref 26–34)
MCHC RBC AUTO-ENTMCNC: 33.6 G/DL (ref 31–36)
MCV RBC AUTO: 97 FL (ref 80–100)
MONOCYTES # BLD: 1.3 K/UL (ref 0–1.3)
MONOCYTES NFR BLD: 6.7 %
MRSA DNA SPEC QL NAA+PROBE: NORMAL
NEUTROPHILS # BLD: 17.7 K/UL (ref 1.7–7.7)
NEUTROPHILS NFR BLD: 89.1 %
PHOSPHATE SERPL-MCNC: 2.9 MG/DL (ref 2.5–4.9)
PLATELET # BLD AUTO: 322 K/UL (ref 135–450)
PMV BLD AUTO: 8.2 FL (ref 5–10.5)
POTASSIUM SERPL-SCNC: 4 MMOL/L (ref 3.5–5.1)
PROT SERPL-MCNC: 6.7 G/DL (ref 6.4–8.2)
RBC # BLD AUTO: 3.85 M/UL (ref 4–5.2)
S PNEUM AG UR QL: NORMAL
SODIUM SERPL-SCNC: 133 MMOL/L (ref 136–145)
WBC # BLD AUTO: 19.8 K/UL (ref 4–11)

## 2023-08-28 PROCEDURE — 97535 SELF CARE MNGMENT TRAINING: CPT

## 2023-08-28 PROCEDURE — 85025 COMPLETE CBC W/AUTO DIFF WBC: CPT

## 2023-08-28 PROCEDURE — 36415 COLL VENOUS BLD VENIPUNCTURE: CPT

## 2023-08-28 PROCEDURE — 97116 GAIT TRAINING THERAPY: CPT

## 2023-08-28 PROCEDURE — 80053 COMPREHEN METABOLIC PANEL: CPT

## 2023-08-28 PROCEDURE — 2580000003 HC RX 258: Performed by: HOSPITALIST

## 2023-08-28 PROCEDURE — 97530 THERAPEUTIC ACTIVITIES: CPT

## 2023-08-28 PROCEDURE — 97161 PT EVAL LOW COMPLEX 20 MIN: CPT

## 2023-08-28 PROCEDURE — 6370000000 HC RX 637 (ALT 250 FOR IP): Performed by: HOSPITALIST

## 2023-08-28 PROCEDURE — 2580000003 HC RX 258: Performed by: INTERNAL MEDICINE

## 2023-08-28 PROCEDURE — 84100 ASSAY OF PHOSPHORUS: CPT

## 2023-08-28 PROCEDURE — 6360000002 HC RX W HCPCS: Performed by: INTERNAL MEDICINE

## 2023-08-28 PROCEDURE — 94761 N-INVAS EAR/PLS OXIMETRY MLT: CPT

## 2023-08-28 PROCEDURE — 94640 AIRWAY INHALATION TREATMENT: CPT

## 2023-08-28 PROCEDURE — 92526 ORAL FUNCTION THERAPY: CPT

## 2023-08-28 PROCEDURE — 97165 OT EVAL LOW COMPLEX 30 MIN: CPT

## 2023-08-28 PROCEDURE — 71250 CT THORAX DX C-: CPT

## 2023-08-28 PROCEDURE — 92610 EVALUATE SWALLOWING FUNCTION: CPT

## 2023-08-28 PROCEDURE — 83735 ASSAY OF MAGNESIUM: CPT

## 2023-08-28 PROCEDURE — 1200000000 HC SEMI PRIVATE

## 2023-08-28 PROCEDURE — 6370000000 HC RX 637 (ALT 250 FOR IP): Performed by: INTERNAL MEDICINE

## 2023-08-28 RX ADMIN — CLONAZEPAM 1 MG: 1 TABLET ORAL at 22:06

## 2023-08-28 RX ADMIN — CLONIDINE HYDROCHLORIDE 0.1 MG: 0.1 TABLET ORAL at 10:46

## 2023-08-28 RX ADMIN — LEVOTHYROXINE SODIUM 100 MCG: 0.15 TABLET ORAL at 06:31

## 2023-08-28 RX ADMIN — OXYBUTYNIN CHLORIDE 5 MG: 5 TABLET ORAL at 10:45

## 2023-08-28 RX ADMIN — LISINOPRIL 5 MG: 5 TABLET ORAL at 10:45

## 2023-08-28 RX ADMIN — CARVEDILOL 12.5 MG: 6.25 TABLET, FILM COATED ORAL at 10:44

## 2023-08-28 RX ADMIN — FUROSEMIDE 20 MG: 20 TABLET ORAL at 10:45

## 2023-08-28 RX ADMIN — ASPIRIN 81 MG 81 MG: 81 TABLET ORAL at 10:45

## 2023-08-28 RX ADMIN — OXYBUTYNIN CHLORIDE 5 MG: 5 TABLET ORAL at 21:55

## 2023-08-28 RX ADMIN — ACETAMINOPHEN 650 MG: 325 TABLET ORAL at 17:19

## 2023-08-28 RX ADMIN — Medication 10 ML: at 10:47

## 2023-08-28 RX ADMIN — MIRTAZAPINE 15 MG: 15 TABLET, FILM COATED ORAL at 21:55

## 2023-08-28 RX ADMIN — Medication 10 ML: at 19:52

## 2023-08-28 RX ADMIN — IPRATROPIUM BROMIDE AND ALBUTEROL SULFATE 1 DOSE: .5; 3 SOLUTION RESPIRATORY (INHALATION) at 11:27

## 2023-08-28 RX ADMIN — ACETAMINOPHEN 650 MG: 325 TABLET ORAL at 22:06

## 2023-08-28 RX ADMIN — CEFEPIME 2000 MG: 2 INJECTION, POWDER, FOR SOLUTION INTRAVENOUS at 12:52

## 2023-08-28 RX ADMIN — CARVEDILOL 12.5 MG: 6.25 TABLET, FILM COATED ORAL at 17:18

## 2023-08-28 RX ADMIN — IPRATROPIUM BROMIDE AND ALBUTEROL SULFATE 1 DOSE: .5; 3 SOLUTION RESPIRATORY (INHALATION) at 15:47

## 2023-08-28 RX ADMIN — CLONIDINE HYDROCHLORIDE 0.1 MG: 0.1 TABLET ORAL at 21:55

## 2023-08-28 ASSESSMENT — PAIN SCALES - GENERAL
PAINLEVEL_OUTOF10: 5
PAINLEVEL_OUTOF10: 8

## 2023-08-28 ASSESSMENT — PAIN DESCRIPTION - LOCATION
LOCATION: HEAD
LOCATION: HEAD

## 2023-08-28 ASSESSMENT — PAIN DESCRIPTION - DESCRIPTORS: DESCRIPTORS: ACHING

## 2023-08-28 NOTE — CASE COMMUNICATION
Discharge Planning:     (CM) reviewed the patient's chart to assess needs. Patient's Readmission Risk Score is 14%. Patient's medical insurance is [id number] [address]. Patient's PCP is [address] [place] [address] No needs anticipated, at this time. CM team to follow. Staff to inform CM if additional discharge needs arise.      Electronically signed by Inge Mari on 8/28/23 at 8:53 AM EDT

## 2023-08-28 NOTE — CARE COORDINATION
Case Management Assessment  Initial Evaluation    Date/Time of Evaluation: 8/28/2023 2:42 PM  Assessment Completed by: Inge Mari    If patient is discharged prior to next notation, then this note serves as note for discharge by case management. Patient Name: Zoe Dubon                   YOB: 1945  Diagnosis: Acute respiratory failure ([address]) [J96.00]  COPD exacerbation ([address]) [J44.1]  Uncontrolled hypertension [I10]  Acute hypoxemic respiratory failure ([address]) [J96.01]  Pneumonia due to infectious organism, unspecified laterality, unspecified part of lung [J18.9]                   Date / Time: 8/26/2023  2:43 PM    Patient Admission Status: Inpatient   Readmission Risk (Low < 19, Mod (19-27), High > 27): Readmission Risk Score: 15.2    Current PCP: QUITA Lind NP  PCP verified by CM? Yes    Chart Reviewed: Yes      History Provided by: Patient  Patient Orientation: Alert and Oriented    Patient Cognition: Alert    Hospitalization in the last 30 days (Readmission):  No    If yes, Readmission Assessment in CM Navigator will be completed.     Advance Directives:      Code Status: Full Code   Patient's Primary Decision Maker is: Legal Next of Kin    Primary Decision Maker: Luli Dawkins Spouse - [phone]    Secondary Decision Maker: Lucita Stephens  Child - [phone]    Discharge Planning:    Patient lives with: Spouse/Significant Other, Children, Family Members Type of Home: House  Primary Care Giver: Self  Patient Support Systems include: Spouse/Significant Other, Children   Current Financial resources: Medicare  Current community resources: None  Current services prior to admission: None            Current DME:              Type of Home Care services:  OT, PT, Nursing Services    ADLS  Prior functional level: Bathing, Dressing, Toileting, Mobility, Independent in ADLs/IADLs  Current functional level: Assistance with the following:, Bathing, Mobility, Toileting,

## 2023-08-28 NOTE — DISCHARGE INSTR - COC
EDT    PHYSICIAN SECTION    Prognosis: Fair    Condition at Discharge: Stable    Rehab Potential (if transferring to Rehab): Fair    Recommended Labs or Other Treatments After Discharge: Follow up with PCP or pulmonologist for repeat CXR in 4-6 weeks to ensure resolution of Pneumonia. Physician Certification: I certify the above information and transfer of Vincenzo He  is necessary for the continuing treatment of the diagnosis listed and that she requires Home Care for greater 30 days.      Update Admission H&P: No change in H&P    PHYSICIAN SIGNATURE:  Electronically signed by Raysa Galvez MD on 8/29/23 at 8:09 AM EDT

## 2023-08-29 ENCOUNTER — TELEPHONE (OUTPATIENT)
Dept: FAMILY MEDICINE CLINIC | Age: 78
End: 2023-08-29

## 2023-08-29 VITALS
TEMPERATURE: 97.1 F | HEART RATE: 75 BPM | BODY MASS INDEX: 20.25 KG/M2 | HEIGHT: 66 IN | WEIGHT: 126 LBS | RESPIRATION RATE: 18 BRPM | DIASTOLIC BLOOD PRESSURE: 69 MMHG | SYSTOLIC BLOOD PRESSURE: 102 MMHG | OXYGEN SATURATION: 95 %

## 2023-08-29 LAB
ALBUMIN SERPL-MCNC: 3.2 G/DL (ref 3.4–5)
ALBUMIN/GLOB SERPL: 1 {RATIO} (ref 1.1–2.2)
ALP SERPL-CCNC: 47 U/L (ref 40–129)
ALT SERPL-CCNC: 28 U/L (ref 10–40)
ANION GAP SERPL CALCULATED.3IONS-SCNC: 11 MMOL/L (ref 3–16)
AST SERPL-CCNC: 44 U/L (ref 15–37)
BASOPHILS # BLD: 0 K/UL (ref 0–0.2)
BASOPHILS NFR BLD: 0.5 %
BILIRUB SERPL-MCNC: <0.2 MG/DL (ref 0–1)
BUN SERPL-MCNC: 27 MG/DL (ref 7–20)
CALCIUM SERPL-MCNC: 8.6 MG/DL (ref 8.3–10.6)
CHLORIDE SERPL-SCNC: 101 MMOL/L (ref 99–110)
CO2 SERPL-SCNC: 26 MMOL/L (ref 21–32)
CREAT SERPL-MCNC: 0.9 MG/DL (ref 0.6–1.2)
DEPRECATED RDW RBC AUTO: 15 % (ref 12.4–15.4)
EOSINOPHIL # BLD: 0.1 K/UL (ref 0–0.6)
EOSINOPHIL NFR BLD: 0.8 %
GFR SERPLBLD CREATININE-BSD FMLA CKD-EPI: >60 ML/MIN/{1.73_M2}
GLUCOSE SERPL-MCNC: 89 MG/DL (ref 70–99)
HCT VFR BLD AUTO: 36.8 % (ref 36–48)
HGB BLD-MCNC: 12.4 G/DL (ref 12–16)
LYMPHOCYTES # BLD: 1 K/UL (ref 1–5.1)
LYMPHOCYTES NFR BLD: 11.3 %
MAGNESIUM SERPL-MCNC: 2.1 MG/DL (ref 1.8–2.4)
MCH RBC QN AUTO: 33.2 PG (ref 26–34)
MCHC RBC AUTO-ENTMCNC: 33.7 G/DL (ref 31–36)
MCV RBC AUTO: 98.4 FL (ref 80–100)
MONOCYTES # BLD: 0.9 K/UL (ref 0–1.3)
MONOCYTES NFR BLD: 10.1 %
NEUTROPHILS # BLD: 6.9 K/UL (ref 1.7–7.7)
NEUTROPHILS NFR BLD: 77.3 %
PHOSPHATE SERPL-MCNC: 3 MG/DL (ref 2.5–4.9)
PLATELET # BLD AUTO: 313 K/UL (ref 135–450)
PMV BLD AUTO: 8.4 FL (ref 5–10.5)
POTASSIUM SERPL-SCNC: 4.4 MMOL/L (ref 3.5–5.1)
PROT SERPL-MCNC: 6.4 G/DL (ref 6.4–8.2)
RBC # BLD AUTO: 3.74 M/UL (ref 4–5.2)
SODIUM SERPL-SCNC: 138 MMOL/L (ref 136–145)
WBC # BLD AUTO: 9 K/UL (ref 4–11)

## 2023-08-29 PROCEDURE — 97110 THERAPEUTIC EXERCISES: CPT

## 2023-08-29 PROCEDURE — 36415 COLL VENOUS BLD VENIPUNCTURE: CPT

## 2023-08-29 PROCEDURE — 2580000003 HC RX 258: Performed by: INTERNAL MEDICINE

## 2023-08-29 PROCEDURE — 6360000002 HC RX W HCPCS: Performed by: INTERNAL MEDICINE

## 2023-08-29 PROCEDURE — 2580000003 HC RX 258: Performed by: HOSPITALIST

## 2023-08-29 PROCEDURE — 92526 ORAL FUNCTION THERAPY: CPT

## 2023-08-29 PROCEDURE — 97116 GAIT TRAINING THERAPY: CPT

## 2023-08-29 PROCEDURE — 80053 COMPREHEN METABOLIC PANEL: CPT

## 2023-08-29 PROCEDURE — 6370000000 HC RX 637 (ALT 250 FOR IP): Performed by: HOSPITALIST

## 2023-08-29 PROCEDURE — 83735 ASSAY OF MAGNESIUM: CPT

## 2023-08-29 PROCEDURE — 84100 ASSAY OF PHOSPHORUS: CPT

## 2023-08-29 PROCEDURE — 85025 COMPLETE CBC W/AUTO DIFF WBC: CPT

## 2023-08-29 RX ORDER — IPRATROPIUM BROMIDE AND ALBUTEROL SULFATE 2.5; .5 MG/3ML; MG/3ML
3 SOLUTION RESPIRATORY (INHALATION) EVERY 6 HOURS PRN
Qty: 360 ML | Refills: 0 | Status: SHIPPED | OUTPATIENT
Start: 2023-08-29

## 2023-08-29 RX ORDER — LEVOFLOXACIN 500 MG/1
500 TABLET, FILM COATED ORAL DAILY
Qty: 7 TABLET | Refills: 0 | Status: SHIPPED | OUTPATIENT
Start: 2023-08-29 | End: 2023-08-29 | Stop reason: SDUPTHER

## 2023-08-29 RX ORDER — LEVOFLOXACIN 750 MG/1
750 TABLET ORAL
Qty: 7 TABLET | Refills: 0 | Status: SHIPPED | OUTPATIENT
Start: 2023-08-29 | End: 2023-09-11

## 2023-08-29 RX ORDER — GUAIFENESIN/DEXTROMETHORPHAN 100-10MG/5
5 SYRUP ORAL EVERY 4 HOURS PRN
Qty: 120 ML | Refills: 0 | Status: SHIPPED | OUTPATIENT
Start: 2023-08-29 | End: 2023-09-08

## 2023-08-29 RX ORDER — ALBUTEROL SULFATE 90 UG/1
2 AEROSOL, METERED RESPIRATORY (INHALATION) 4 TIMES DAILY PRN
Qty: 18 G | Refills: 1 | Status: SHIPPED | OUTPATIENT
Start: 2023-08-29

## 2023-08-29 RX ADMIN — CLONIDINE HYDROCHLORIDE 0.1 MG: 0.1 TABLET ORAL at 09:31

## 2023-08-29 RX ADMIN — OXYBUTYNIN CHLORIDE 5 MG: 5 TABLET ORAL at 09:30

## 2023-08-29 RX ADMIN — FUROSEMIDE 20 MG: 20 TABLET ORAL at 09:30

## 2023-08-29 RX ADMIN — CARVEDILOL 12.5 MG: 6.25 TABLET, FILM COATED ORAL at 09:30

## 2023-08-29 RX ADMIN — CLONAZEPAM 1 MG: 1 TABLET ORAL at 09:36

## 2023-08-29 RX ADMIN — Medication 10 ML: at 09:32

## 2023-08-29 RX ADMIN — ASPIRIN 81 MG 81 MG: 81 TABLET ORAL at 09:30

## 2023-08-29 RX ADMIN — LEVOTHYROXINE SODIUM 100 MCG: 0.15 TABLET ORAL at 05:17

## 2023-08-29 RX ADMIN — LISINOPRIL 5 MG: 5 TABLET ORAL at 09:30

## 2023-08-29 RX ADMIN — CEFEPIME 2000 MG: 2 INJECTION, POWDER, FOR SOLUTION INTRAVENOUS at 00:15

## 2023-08-29 NOTE — CARE COORDINATION
Discharge Planning:     (CM) attempted to call patients ale Peterson [phone] to notify of discharge, unable to leave message.     Electronically signed by Vernell Win on 8/29/23 at 11:13 AM EDT

## 2023-08-29 NOTE — TELEPHONE ENCOUNTER
Care Transitions Initial Follow Up Call    Outreach made within 2 business days of discharge: Yes    Patient: Jimmy Ruiz Patient : 1945   MRN: [id number]  Reason for Admission: Acute Respiratory Failure  Discharge Date: 23       Spoke with: SCOTT    Discharge department/facility: [address] [place] [address]        Scheduled appointment with PCP within 7-14 days    Follow Up  No future appointments.     Julius Ohara LPN

## 2023-08-29 NOTE — PLAN OF CARE
Problem: Skin/Tissue Integrity  Goal: Absence of new skin breakdown  Description: 1. Monitor for areas of redness and/or skin breakdown  2. Assess vascular access sites hourly  3. Every 4-6 hours minimum:  Change oxygen saturation probe site  4. Every 4-6 hours:  If on nasal continuous positive airway pressure, respiratory therapy assess nares and determine need for appliance change or resting period.   8/29/2023 1214 by Mitchel Garcia RN  Outcome: Completed     Problem: Safety - Adult  Goal: Free from fall injury  8/29/2023 1214 by Mitchel Garcia RN  Outcome: Completed     Problem: Pain  Goal: Verbalizes/displays adequate comfort level or baseline comfort level  8/29/2023 1214 by Mitchel Garcia RN  Outcome: Completed

## 2023-08-29 NOTE — CARE COORDINATION
[facility]    Received referral from [facility] for service at discharge. Spoke with patient regarding [facility] services. Patient aware and agreeable to services. Demographics verified. Patient made aware that she will need to answer the phone and/or return calls to [facility] staff when they leave a message. Prior episode in 2022 with [facility] was a non-admit d/t unable to locate. Referral sent to [facility] via direct link. Electronically signed by Kevin Marsh LPN on 4/13/00 at 12:04 AM EDT      [id number] [facility] Transition Nurse  [phone]

## 2023-08-29 NOTE — CARE COORDINATION
Case Management -  Discharge Note      Patient Name: Lluvia Xie                   YOB: 1945            Readmission Risk (Low < 19, Mod (19-27), High > 27): Readmission Risk Score: 15.9    Current PCP: QUITA Lou NP    ([facility]) Important Message from Medicare:    Date: 8/29/23    PT AM-PAC: 25 /24  OT AM-PAC: 20 /24    Patient/patient representative has been educated on the benefits of [address] as well as the possible risks of declining recommended services. Patient/patient representative has acknowledged the information provided and decided on the following discharge plan. Patient/ patient representative has been provided freedom of choice regarding service provider, supported by basic dialogue that supports the patient's individualized plan of care/goals. [address] [facility])  [address] [address]  Phone: [phone]  Fax: [phone]                  Financial    Payor: Girma Serrato / Plan: Girma Serrato / Product Type: *No Product type* /     Pharmacy:  Potential assistance Purchasing Medications: No  Meds-to-Beds request: Yes      Homa Holder [id number] [facility], [address] [place] [address] [phone] Ethan Allison [phone]  [address] [place] [address] Road  [address]  Phone: [phone] Fax: [phone] [facility], [facility]0 [place] [address] [address] [facility] [phone] - F [phone]  6639 [facility] [address]  Phone: [phone] Fax: 428 [facility] [id number] Henry Erickson, [address] [facility] [address] [facility] [address]  1221 [facility] [address]  Phone: [phone] Fax: [phone]      Notes: Additional Case Management Notes: Patient will discharge home today with family. [employer] and will follow. No further needs at this time.     Electronically signed by Gato Salas on 8/29/23 at 11:19 AM EDT

## 2023-08-29 NOTE — DISCHARGE SUMMARY
by PCP or Specialist: Repeat Chest imaging    Discharge Medications:        Medication List        START taking these medications      albuterol sulfate  (90 Base) MCG/ACT inhaler  Commonly known as: Ventolin HFA  Inhale 2 puffs into the lungs 4 times daily as needed for Wheezing     guaiFENesin-dextromethorphan 100-10 MG/5ML syrup  Commonly known as: ROBITUSSIN DM  Take 5 mLs by mouth every 4 hours as needed for Cough     ipratropium 0.5 mg-albuterol 2.5 mg 0.5-2.5 (3) MG/3ML Soln nebulizer solution  Commonly known as: DUONEB  Inhale 3 mLs into the lungs every 6 hours as needed for Shortness of Breath     levoFLOXacin 750 MG tablet  Commonly known as: Levaquin  Take 1 tablet by mouth every 48 hours for 7 doses     mometasone-formoterol 100-5 MCG/ACT inhaler  Commonly known as: DULERA  Inhale 2 puffs into the lungs in the morning and 2 puffs in the evening. CHANGE how you take these medications      mirtazapine 15 MG tablet  Commonly known as: REMERON  TAKE ONE TABLET BY MOUTH DAILY  What changed:   how much to take  how to take this  when to take this  additional instructions     oxybutynin 5 MG tablet  Commonly known as: DITROPAN  TAKE 1 TABLET BY MOUTH THREE TIMES DAILY  What changed:   how much to take  how to take this  when to take this  additional instructions            CONTINUE taking these medications      aspirin 81 MG chewable tablet  Take 1 tablet by mouth daily     carvedilol 12.5 MG tablet  Commonly known as: COREG  TAKE 1 TABLET BY MOUTH TWICE DAILY     clonazePAM 1 MG tablet  Commonly known as: KLONOPIN  Take 1 tablet by mouth 2 times daily as needed for Anxiety for up to 28 days.      cloNIDine 0.1 MG tablet  Commonly known as: CATAPRES  TAKE ONE TABLET BY MOUTH THREE TIMES A DAY     furosemide 40 MG tablet  Commonly known as: LASIX  Take 0.5 tablets by mouth daily     levothyroxine 100 MCG tablet  Commonly known as: SYNTHROID  TAKE 1 TABLET BY MOUTH ONCE DAILY     lisinopril 5 MG

## 2023-08-30 ENCOUNTER — TELEPHONE (OUTPATIENT)
Dept: FAMILY MEDICINE CLINIC | Age: 78
End: 2023-08-30

## 2023-08-30 LAB
BACTERIA BLD CULT ORG #2: NORMAL
BACTERIA BLD CULT: NORMAL

## 2023-08-30 NOTE — TELEPHONE ENCOUNTER
Care Transitions Initial Follow Up Call    Outreach made within 2 business days of discharge: Yes    Patient: Davion Macdonald Patient : 1945   MRN: [id number]  Reason for Admission: Pneumonia  Discharge Date: 23       Spoke with: Brandi Toussaint who asked me to call his daughter Debra Carlton at [phone] to set up appt since she is their transportation. Called Doris, no answer, unable to leave message    Discharge department/facility: [facility] Patient Contact:  Was patient able to fill all prescriptions: No: has one medication to   Was patient instructed to bring all medications to the follow-up visit: Yes  Is patient taking all medications as directed in the discharge summary? No, have not picked up all medications  Does patient understand their discharge instructions: Yes  Does patient have questions or concerns that need addressed prior to 7-14 day follow up office visit: no    Scheduled appointment with PCP within 7-14 days    Follow Up  No future appointments.     Michael Aragon LPN

## 2023-08-31 ENCOUNTER — TELEPHONE (OUTPATIENT)
Dept: FAMILY MEDICINE CLINIC | Age: 78
End: 2023-08-31

## 2023-08-31 NOTE — TELEPHONE ENCOUNTER
Care Transitions Initial Follow Up Call    Outreach made within 2 business days of discharge: Yes    Patient: Stephanie Magallanes Patient : 1945   MRN: [id number]  Reason for Admission: Pneumonia   Discharge Date: 23       Spoke with: Called Pt daughter Trupti Ivey that provides transportation. Phone rings and rings, unable to leave message. Called Florentino, Pt , no answer, unable to leave message. Discharge department/facility: [facility]        Scheduled appointment with PCP within 7-14 days    Follow Up  No future appointments.     Stoney Tillman LPN

## 2023-09-06 ENCOUNTER — TELEPHONE (OUTPATIENT)
Dept: FAMILY MEDICINE CLINIC | Age: 78
End: 2023-09-06

## 2023-09-06 NOTE — TELEPHONE ENCOUNTER
Sneha Carreon from [facility] called and she wants to know if you will be signing order for home care on the patient. She can be reached at [phone] with the verbal order. Please advise.

## 2023-09-07 NOTE — TELEPHONE ENCOUNTER
Michelle Sebastian, gave verbal ok that we will follow home care orders. Pt did have a visit yesterday from PT. Advised Gloria that Pt no showed hospital follow up. Stated she would follow up with Pt nurse and advise them to make another appt. Had no further needs at this time.

## 2023-09-19 DIAGNOSIS — F41.9 ANXIETY: ICD-10-CM

## 2023-09-19 RX ORDER — CLONAZEPAM 1 MG/1
1 TABLET ORAL 2 TIMES DAILY PRN
Qty: 60 TABLET | Refills: 0 | OUTPATIENT
Start: 2023-09-19 | End: 2023-10-17

## 2023-09-19 NOTE — TELEPHONE ENCOUNTER
Medication:   Requested Prescriptions     Pending Prescriptions Disp Refills    clonazePAM (KLONOPIN) 1 MG tablet 60 tablet 0     Sig: Take 1 tablet by mouth 2 times daily as needed for Anxiety for up to 28 days. Last Filled:  8/14/2023    Patient Phone Number: [phone] (home)     Last appt: 5/16/2023   Next appt: Visit date not found    Last OARRS:       5/11/2020     2:20 PM   RX Monitoring   Periodic Controlled Substance Monitoring No signs of potential drug abuse or diversion identified.      PDMP Monitoring:    Last PDMP Les Olvera as Reviewed [facility]):  Review User Review Instant Review Result   Clyde Bah 8/14/2023 12:48 PM Reviewed PDMP [1]     Preferred Pharmacy:   Goldie Howell [id number] [facility], [address] [place] [address] [phone] Kianna [facility] [phone]  [address] [facility] [address]  [address]  Phone: [phone] Fax: [phone] [facility], [facility]0 [place] [address] [address] [facility] at [phone] F [phone]  6662 [facility] [address]  Phone: [phone] Fax: [address] [facility] [phone] Rhonda Dickens, [address] [facility] [address] 1000 [facility]  1221 [facility] [address]  Phone: [phone] Fax: [phone]

## 2023-09-19 NOTE — TELEPHONE ENCOUNTER
clonazePAM (KLONOPIN) 1 MG tablet [[id number]]  [facility]able [id number] Socorro Freed, [address] [place] [address] 20   742 [facility], [address] [place] [address]  [address]   Phone:  [phone]  Fax:  [phone]

## 2023-09-21 ENCOUNTER — OFFICE VISIT (OUTPATIENT)
Dept: FAMILY MEDICINE CLINIC | Age: 78
End: 2023-09-21

## 2023-09-21 VITALS
DIASTOLIC BLOOD PRESSURE: 90 MMHG | SYSTOLIC BLOOD PRESSURE: 150 MMHG | TEMPERATURE: 98.2 F | HEART RATE: 105 BPM | OXYGEN SATURATION: 94 % | BODY MASS INDEX: 18.4 KG/M2 | WEIGHT: 114 LBS

## 2023-09-21 DIAGNOSIS — Z91.89 AT HIGH RISK FOR MALNUTRITION: ICD-10-CM

## 2023-09-21 DIAGNOSIS — F41.9 ANXIETY: Primary | ICD-10-CM

## 2023-09-21 DIAGNOSIS — Z23 NEED FOR VACCINATION: ICD-10-CM

## 2023-09-21 RX ORDER — CLONAZEPAM 1 MG/1
1 TABLET ORAL 2 TIMES DAILY PRN
Qty: 60 TABLET | Refills: 0 | Status: SHIPPED | OUTPATIENT
Start: 2023-09-21 | End: 2023-10-19

## 2023-09-21 NOTE — PROGRESS NOTES
Jaz Curry  : 1945  Encounter date: 2023    This efrain 65 y.o. female who presents with  Chief Complaint   Patient presents with    Medication Check    Anxiety       History of present illness:    HPI Pt is 65year old female with daughter for medication check  on clonazepam for anxiety, well controlled. Pt has recently had 12 lb weight loss. Report decreased appetite, continues to smoke. Previous hospitalization for pneumonia. Denies current respiratory concerns dyspnea or cough. Discussed need for nutrition supplement. Previously completed home PT/OT, no longer wanting any assistance. Current Outpatient Medications on File Prior to Visit   Medication Sig Dispense Refill    mometasone-formoterol (DULERA) 100-5 MCG/ACT inhaler Inhale 2 puffs into the lungs in the morning and 2 puffs in the evening.  1 each 2    ipratropium 0.5 mg-albuterol 2.5 mg (DUONEB) 0.5-2.5 (3) MG/3ML SOLN nebulizer solution Inhale 3 mLs into the lungs every 6 hours as needed for Shortness of Breath 360 mL 0    albuterol sulfate HFA (VENTOLIN HFA) 108 (90 Base) MCG/ACT inhaler Inhale 2 puffs into the lungs 4 times daily as needed for Wheezing 18 g 1    levothyroxine (SYNTHROID) 100 MCG tablet TAKE 1 TABLET BY MOUTH ONCE DAILY 90 tablet 0    potassium chloride (KLOR-CON) 10 MEQ extended release tablet Take 1 tablet by mouth daily 90 tablet 0    furosemide (LASIX) 40 MG tablet Take 0.5 tablets by mouth daily 45 tablet 0    lisinopril (PRINIVIL;ZESTRIL) 5 MG tablet Take 1 tablet by mouth daily 90 tablet 0    cloNIDine (CATAPRES) 0.1 MG tablet TAKE ONE TABLET BY MOUTH THREE TIMES A DAY 90 tablet 0    carvedilol (COREG) 12.5 MG tablet TAKE 1 TABLET BY MOUTH TWICE DAILY 180 tablet 0    mirtazapine (REMERON) 15 MG tablet TAKE ONE TABLET BY MOUTH DAILY (Patient taking differently: Take 1 tablet by mouth nightly Indications: Depression) 90 tablet 1    oxybutynin (DITROPAN) 5 MG tablet TAKE 1 TABLET BY MOUTH THREE TIMES DAILY

## 2023-10-16 DIAGNOSIS — F41.9 ANXIETY: ICD-10-CM

## 2023-10-16 RX ORDER — CLONAZEPAM 1 MG/1
1 TABLET ORAL 2 TIMES DAILY PRN
Qty: 60 TABLET | Refills: 0 | OUTPATIENT
Start: 2023-10-16 | End: 2023-11-13

## 2023-10-16 NOTE — TELEPHONE ENCOUNTER
Medication:   Requested Prescriptions     Pending Prescriptions Disp Refills    clonazePAM (KLONOPIN) 1 MG tablet 60 tablet 0     Sig: Take 1 tablet by mouth 2 times daily as needed for Anxiety for up to 28 days. Last Filled:  9/21/2023    Patient Phone Number: [phone] (home) [phone] (work)    Last appt: 9/21/2023   Next appt: 12/18/2023    Last OARRS:       5/11/2020     2:20 PM   RX Monitoring   Periodic Controlled Substance Monitoring No signs of potential drug abuse or diversion identified.      PDMP Monitoring:    Last PDMP Cecelia Farhana as Reviewed [facility]):  Review User Review Instant Review Result   Carine Malik 9/21/2023  4:22 PM Reviewed PDMP [1]     Preferred Pharmacy:   [facility] [id number] Bharathi Chamorro, [address] [place] [address] [phone] [facility] [phone]  [address] [place] [address] Road  [address]  Phone: [phone] Fax: [phone] [facility], [facility]0 [place] [address] [address] [facility] [phone] - F [phone]  6691 [facility] [address]  Phone: [phone] Fax: 950 [facility] [id number] Jennifer Lemos, [address] [facility] [address] [facility] [address]  1221 [facility] [address]  Phone: [phone] Fax: [phone]

## 2023-10-16 NOTE — TELEPHONE ENCOUNTER
clonazePAM [facility].) 1 MG tablet   [facility] [id number] Keon Lawler, [address] [place] [address]   742 [facility], [address]   Phone:  [phone]  Fax:  [phone]

## 2023-10-18 RX ORDER — FUROSEMIDE 40 MG/1
20 TABLET ORAL DAILY
Qty: 45 TABLET | Refills: 0 | Status: SHIPPED | OUTPATIENT
Start: 2023-10-18

## 2023-10-23 DIAGNOSIS — F41.9 ANXIETY: ICD-10-CM

## 2023-10-23 DIAGNOSIS — E03.9 HYPOTHYROIDISM, UNSPECIFIED TYPE: ICD-10-CM

## 2023-10-23 DIAGNOSIS — I10 UNCONTROLLED HYPERTENSION: ICD-10-CM

## 2023-10-23 RX ORDER — CARVEDILOL 12.5 MG/1
TABLET ORAL
Qty: 180 TABLET | Refills: 1 | Status: SHIPPED | OUTPATIENT
Start: 2023-10-23

## 2023-10-23 RX ORDER — CLONIDINE HYDROCHLORIDE 0.1 MG/1
TABLET ORAL
Qty: 90 TABLET | Refills: 1 | Status: SHIPPED | OUTPATIENT
Start: 2023-10-23

## 2023-10-23 RX ORDER — POTASSIUM CHLORIDE 750 MG/1
10 TABLET, FILM COATED, EXTENDED RELEASE ORAL DAILY
Qty: 90 TABLET | Refills: 1 | Status: SHIPPED | OUTPATIENT
Start: 2023-10-23 | End: 2023-10-25

## 2023-10-23 RX ORDER — MIRTAZAPINE 15 MG/1
TABLET, FILM COATED ORAL
Qty: 90 TABLET | Refills: 1 | Status: SHIPPED | OUTPATIENT
Start: 2023-10-23

## 2023-10-23 RX ORDER — CLONAZEPAM 1 MG/1
1 TABLET ORAL 2 TIMES DAILY PRN
Qty: 60 TABLET | Refills: 0 | Status: SHIPPED | OUTPATIENT
Start: 2023-10-23 | End: 2023-11-20

## 2023-10-23 RX ORDER — LISINOPRIL 5 MG/1
5 TABLET ORAL DAILY
Qty: 90 TABLET | Refills: 1 | Status: SHIPPED | OUTPATIENT
Start: 2023-10-23

## 2023-10-23 RX ORDER — LEVOTHYROXINE SODIUM 0.1 MG/1
TABLET ORAL
Qty: 90 TABLET | Refills: 1 | Status: SHIPPED | OUTPATIENT
Start: 2023-10-23

## 2023-10-23 NOTE — TELEPHONE ENCOUNTER
clonazePAM (KLONOPIN) 1 MG tablet     levothyroxine (SYNTHROID) 100 MCG tablet     potassium chloride (KLOR-CON) 10 MEQ extended release tablet     lisinopril (PRINIVIL;ZESTRIL) 5 MG tablet     cloNIDine (CATAPRES) 0.1 MG tablet     carvedilol (COREG) 12.5 MG tablet     mirtazapine (REMERON) 15 MG tablet       [facility] [id number] Bethel Inoc[address], [address]    Phone:  [phone]  Fax:  [phone]

## 2023-10-23 NOTE — TELEPHONE ENCOUNTER
Medication:   Requested Prescriptions     Pending Prescriptions Disp Refills    clonazePAM (KLONOPIN) 1 MG tablet 60 tablet 0     Sig: Take 1 tablet by mouth 2 times daily as needed for Anxiety for up to 28 days. Signed Prescriptions Disp Refills    levothyroxine (SYNTHROID) 100 MCG tablet 90 tablet 1     Sig: TAKE 1 TABLET BY MOUTH ONCE DAILY     Authorizing Provider: Helder Caraballo [address] [place] [address]     Ordering User: Jalen Wagner    potassium chloride (KLOR-CON) 10 MEQ extended release tablet 90 tablet 1     Sig: Take 1 tablet by mouth daily     Authorizing Provider: Layla Roberto     Ordering User: Jalen Wagner    lisinopril (PRINIVIL;ZESTRIL) 5 MG tablet 90 tablet 1     Sig: Take 1 tablet by mouth daily     Authorizing Provider: Layla Roberto     Ordering User: Jalen Wagner    cloNIDine (CATAPRES) 0.1 MG tablet 90 tablet 1     Sig: TAKE ONE TABLET BY MOUTH THREE TIMES A DAY     Authorizing Provider: Helder Caraballo[address]     Ordering User: Jalen Wagner    carvedilol (COREG) 12.5 MG tablet 180 tablet 1     Sig: TAKE 1 TABLET BY MOUTH TWICE DAILY     Authorizing Provider: Helder Caraballo [address] [place] [address]     Ordering User: Jalen Wagner    mirtazapine (REMERON) 15 MG tablet 90 tablet 1     Sig: TAKE ONE TABLET BY MOUTH DAILY     Authorizing Provider: Helder Caraballo [address] [place] [address]     Ordering User: Jalen Wagner      Last Filled:  9/21/2023    Patient Phone Number: [phone] (home) [phone] (work)    Last appt: 9/21/2023   Next appt: 12/18/2023    Last OARRS:       5/11/2020     2:20 PM   RX Monitoring   Periodic Controlled Substance Monitoring No signs of potential drug abuse or diversion identified.      PDMP Monitoring:    Last PDMP Sanchez Kerr as Reviewed [facility]):  Review User Review Instant Review Result   Nolan Avila 9/21/2023  4:22 PM Reviewed PDMP [1]     Preferred Pharmacy:   Chris Ansari [id number] Edwar Pepe [phone] - F [phone]  [phone]

## 2023-10-25 RX ORDER — POTASSIUM CHLORIDE 750 MG/1
10 TABLET, FILM COATED, EXTENDED RELEASE ORAL DAILY
Qty: 90 TABLET | Refills: 1 | Status: SHIPPED | OUTPATIENT
Start: 2023-10-25

## 2023-10-25 NOTE — TELEPHONE ENCOUNTER
Medication:   Requested Prescriptions     Pending Prescriptions Disp Refills    potassium chloride (KLOR-CON) 10 MEQ extended release tablet [Pharmacy Med Name: POTASSIUM CHLORIDE ER 10MEQ TABLET] 90 tablet 1     Sig: TAKE 1 TABLET BY MOUTH DAILY      Last Filled:  10/23/2023    Patient Phone Number: [phone] (home) [phone] (work)    Last appt: 9/21/2023   Next appt: 12/18/2023    Last OARRS:       5/11/2020     2:20 PM   RX Monitoring   Periodic Controlled Substance Monitoring No signs of potential drug abuse or diversion identified.      PDMP Monitoring:    Last PDMP Alen Bradley as Reviewed [facility]):  Review User Review Instant Review Result   Andrea Singleton 9/21/2023  4:22 PM Reviewed PDMP [1]     Preferred Pharmacy:   [facility] [id number] [address] [phone] [facility] [phone]  [address] [place] [address] Road  [address]  Phone: [phone] Fax: [phone] [facility], [address] [address] [address] [facility] [phone] - F [phone]  [address] [facility] [address]  Phone: [phone] Fax: [phone] [facility] [phone] Graeme Díaz, [address] [place] [address] Rd [address] [facility] [address]  12[address] [facility] [address]  Phone: [phone] Fax: [phone]

## 2023-11-21 DIAGNOSIS — F41.9 ANXIETY: ICD-10-CM

## 2023-11-21 RX ORDER — CLONAZEPAM 1 MG/1
1 TABLET ORAL 2 TIMES DAILY PRN
Qty: 60 TABLET | Refills: 0 | Status: SHIPPED | OUTPATIENT
Start: 2023-11-21 | End: 2023-12-19

## 2023-11-21 NOTE — TELEPHONE ENCOUNTER
Medication:   Requested Prescriptions     Pending Prescriptions Disp Refills    clonazePAM (KLONOPIN) 1 MG tablet 60 tablet 0     Sig: Take 1 tablet by mouth 2 times daily as needed for Anxiety for up to 28 days. Last Filled:  10/23/2023    Patient Phone Number: [phone] (home)     Last appt: 9/21/2023   Next appt: 12/19/2023    Last OARRS:       5/11/2020     2:20 PM   RX Monitoring   Periodic Controlled Substance Monitoring No signs of potential drug abuse or diversion identified.      PDMP Monitoring:    Last PDMP Devante Quinn as Reviewed [facility]):  Review User Review Instant Review Result   Fabianatanner Vidaaida 9/21/2023  4:22 PM Reviewed PDMP [1]     Preferred Pharmacy:   [facility] Stokes [phone] Brannon Vgeas, [address] [place], [place] [phone] Hulan Runner [phone]  [address] [facility] [address]  [address]  Phone: [phone] Fax: [phone] [facility], [address] [place] [address] Drive [address] [place] [address] [phone] - F [phone]  66[address] [facility] [address]  Phone: [phone] Fax: [phone] [facility] [phone] Ulises Gomez, [address] [place] [address] Rd [address] [facility] [address]  1221 [facility] [address]  Phone: [phone] Fax: [phone]

## 2023-11-21 NOTE — TELEPHONE ENCOUNTER
clonazePAM [facility].) 1 MG tablet     [facility] [phone] Amynan Marry, [address] [place] [address]  742 [facility], [address]  Phone: [phone]  Fax: [phone]

## 2023-12-04 ENCOUNTER — APPOINTMENT (OUTPATIENT)
Dept: GENERAL RADIOLOGY | Age: 78
End: 2023-12-04
Payer: MEDICARE

## 2023-12-04 ENCOUNTER — HOSPITAL ENCOUNTER (INPATIENT)
Age: 78
LOS: 4 days | Discharge: HOME | End: 2023-12-08
Attending: EMERGENCY MEDICINE | Admitting: INTERNAL MEDICINE
Payer: MEDICARE

## 2023-12-04 DIAGNOSIS — I95.9 HYPOTENSION, UNSPECIFIED HYPOTENSION TYPE: ICD-10-CM

## 2023-12-04 DIAGNOSIS — J90 PLEURAL EFFUSION: ICD-10-CM

## 2023-12-04 DIAGNOSIS — I48.91 ATRIAL FIBRILLATION, UNSPECIFIED TYPE (HCC): ICD-10-CM

## 2023-12-04 DIAGNOSIS — R79.89 ELEVATED TROPONIN: ICD-10-CM

## 2023-12-04 DIAGNOSIS — R53.1 GENERALIZED WEAKNESS: Primary | ICD-10-CM

## 2023-12-04 DIAGNOSIS — N30.00 ACUTE CYSTITIS WITHOUT HEMATURIA: ICD-10-CM

## 2023-12-04 DIAGNOSIS — A41.9 SEPSIS, DUE TO UNSPECIFIED ORGANISM, UNSPECIFIED WHETHER ACUTE ORGAN DYSFUNCTION PRESENT (HCC): ICD-10-CM

## 2023-12-04 PROBLEM — N39.0 SEPSIS DUE TO URINARY TRACT INFECTION (HCC): Status: ACTIVE | Noted: 2023-12-04

## 2023-12-04 LAB
ALBUMIN SERPL-MCNC: 4.3 G/DL (ref 3.4–5)
ALBUMIN/GLOB SERPL: 1 {RATIO} (ref 1.1–2.2)
ALP SERPL-CCNC: 77 U/L (ref 40–129)
ALT SERPL-CCNC: 10 U/L (ref 10–40)
ANION GAP SERPL CALCULATED.3IONS-SCNC: 12 MMOL/L (ref 3–16)
AST SERPL-CCNC: 26 U/L (ref 15–37)
BACTERIA URNS QL MICRO: ABNORMAL /HPF
BASOPHILS # BLD: 0 K/UL (ref 0–0.2)
BASOPHILS NFR BLD: 0.3 %
BILIRUB SERPL-MCNC: 0.8 MG/DL (ref 0–1)
BILIRUB UR QL STRIP.AUTO: NEGATIVE
BUN SERPL-MCNC: 19 MG/DL (ref 7–20)
CALCIUM SERPL-MCNC: 9.4 MG/DL (ref 8.3–10.6)
CHLORIDE SERPL-SCNC: 96 MMOL/L (ref 99–110)
CLARITY UR: ABNORMAL
CO2 SERPL-SCNC: 29 MMOL/L (ref 21–32)
COLOR UR: YELLOW
CREAT SERPL-MCNC: 1 MG/DL (ref 0.6–1.2)
DEPRECATED RDW RBC AUTO: 14.4 % (ref 12.4–15.4)
EOSINOPHIL # BLD: 0 K/UL (ref 0–0.6)
EOSINOPHIL NFR BLD: 0.1 %
EPI CELLS #/AREA URNS AUTO: 1 /HPF (ref 0–5)
FLUAV RNA RESP QL NAA+PROBE: NOT DETECTED
FLUBV RNA RESP QL NAA+PROBE: NOT DETECTED
GFR SERPLBLD CREATININE-BSD FMLA CKD-EPI: 57 ML/MIN/{1.73_M2}
GLUCOSE SERPL-MCNC: 102 MG/DL (ref 70–99)
GLUCOSE UR STRIP.AUTO-MCNC: NEGATIVE MG/DL
HCT VFR BLD AUTO: 41.9 % (ref 36–48)
HGB BLD-MCNC: 14.3 G/DL (ref 12–16)
HGB UR QL STRIP.AUTO: ABNORMAL
HYALINE CASTS #/AREA URNS AUTO: 8 /LPF (ref 0–8)
KETONES UR STRIP.AUTO-MCNC: NEGATIVE MG/DL
LACTATE BLDV-SCNC: 1.9 MMOL/L (ref 0.4–1.9)
LEUKOCYTE ESTERASE UR QL STRIP.AUTO: ABNORMAL
LIPASE SERPL-CCNC: 10 U/L (ref 13–60)
LYMPHOCYTES # BLD: 0.3 K/UL (ref 1–5.1)
LYMPHOCYTES NFR BLD: 1.8 %
MCH RBC QN AUTO: 34.1 PG (ref 26–34)
MCHC RBC AUTO-ENTMCNC: 34.1 G/DL (ref 31–36)
MCV RBC AUTO: 100.1 FL (ref 80–100)
MONOCYTES # BLD: 1.2 K/UL (ref 0–1.3)
MONOCYTES NFR BLD: 7.7 %
NEUTROPHILS # BLD: 14.4 K/UL (ref 1.7–7.7)
NEUTROPHILS NFR BLD: 90.1 %
NITRITE UR QL STRIP.AUTO: NEGATIVE
PH UR STRIP.AUTO: 5.5 [PH] (ref 5–8)
PLATELET # BLD AUTO: 240 K/UL (ref 135–450)
PMV BLD AUTO: 8.7 FL (ref 5–10.5)
POTASSIUM SERPL-SCNC: 4.4 MMOL/L (ref 3.5–5.1)
PROT SERPL-MCNC: 8.4 G/DL (ref 6.4–8.2)
PROT UR STRIP.AUTO-MCNC: 300 MG/DL
RBC # BLD AUTO: 4.18 M/UL (ref 4–5.2)
RBC CLUMPS #/AREA URNS AUTO: 5 /HPF (ref 0–4)
SARS-COV-2 RNA RESP QL NAA+PROBE: NOT DETECTED
SODIUM SERPL-SCNC: 137 MMOL/L (ref 136–145)
SP GR UR STRIP.AUTO: 1.01 (ref 1–1.03)
TROPONIN, HIGH SENSITIVITY: 50 NG/L (ref 0–14)
TROPONIN, HIGH SENSITIVITY: 63 NG/L (ref 0–14)
UA COMPLETE W REFLEX CULTURE PNL UR: YES
UA DIPSTICK W REFLEX MICRO PNL UR: YES
URN SPEC COLLECT METH UR: ABNORMAL
UROBILINOGEN UR STRIP-ACNC: 0.2 E.U./DL
WBC # BLD AUTO: 15.9 K/UL (ref 4–11)
WBC #/AREA URNS AUTO: 200 /HPF (ref 0–5)

## 2023-12-04 PROCEDURE — 87088 URINE BACTERIA CULTURE: CPT

## 2023-12-04 PROCEDURE — 93005 ELECTROCARDIOGRAM TRACING: CPT | Performed by: PHYSICIAN ASSISTANT

## 2023-12-04 PROCEDURE — 80053 COMPREHEN METABOLIC PANEL: CPT

## 2023-12-04 PROCEDURE — 71045 X-RAY EXAM CHEST 1 VIEW: CPT

## 2023-12-04 PROCEDURE — 81001 URINALYSIS AUTO W/SCOPE: CPT

## 2023-12-04 PROCEDURE — 6360000002 HC RX W HCPCS: Performed by: PHYSICIAN ASSISTANT

## 2023-12-04 PROCEDURE — 83690 ASSAY OF LIPASE: CPT

## 2023-12-04 PROCEDURE — 2580000003 HC RX 258: Performed by: PHYSICIAN ASSISTANT

## 2023-12-04 PROCEDURE — 96360 HYDRATION IV INFUSION INIT: CPT

## 2023-12-04 PROCEDURE — 99285 EMERGENCY DEPT VISIT HI MDM: CPT

## 2023-12-04 PROCEDURE — 87636 SARSCOV2 & INF A&B AMP PRB: CPT

## 2023-12-04 PROCEDURE — 85025 COMPLETE CBC W/AUTO DIFF WBC: CPT

## 2023-12-04 PROCEDURE — 87086 URINE CULTURE/COLONY COUNT: CPT

## 2023-12-04 PROCEDURE — 96361 HYDRATE IV INFUSION ADD-ON: CPT

## 2023-12-04 PROCEDURE — 87040 BLOOD CULTURE FOR BACTERIA: CPT

## 2023-12-04 PROCEDURE — 2060000000 HC ICU INTERMEDIATE R&B

## 2023-12-04 PROCEDURE — 87186 SC STD MICRODIL/AGAR DIL: CPT

## 2023-12-04 PROCEDURE — 83605 ASSAY OF LACTIC ACID: CPT

## 2023-12-04 PROCEDURE — 84484 ASSAY OF TROPONIN QUANT: CPT

## 2023-12-04 PROCEDURE — 84443 ASSAY THYROID STIM HORMONE: CPT

## 2023-12-04 RX ORDER — 0.9 % SODIUM CHLORIDE 0.9 %
500 INTRAVENOUS SOLUTION INTRAVENOUS ONCE
Status: COMPLETED | OUTPATIENT
Start: 2023-12-04 | End: 2023-12-04

## 2023-12-04 RX ADMIN — SODIUM CHLORIDE 500 ML: 9 INJECTION, SOLUTION INTRAVENOUS at 21:51

## 2023-12-04 RX ADMIN — SODIUM CHLORIDE 500 ML: 9 INJECTION, SOLUTION INTRAVENOUS at 20:55

## 2023-12-04 RX ADMIN — CEFEPIME 1000 MG: 1 INJECTION, POWDER, FOR SOLUTION INTRAMUSCULAR; INTRAVENOUS at 23:12

## 2023-12-04 ASSESSMENT — ENCOUNTER SYMPTOMS
EYE DISCHARGE: 0
STRIDOR: 0
EYE ITCHING: 0
EYE REDNESS: 0
COUGH: 0
VOMITING: 0
DIARRHEA: 0
ABDOMINAL PAIN: 0
RHINORRHEA: 0
BACK PAIN: 0
SORE THROAT: 0
SHORTNESS OF BREATH: 0
NAUSEA: 0

## 2023-12-04 ASSESSMENT — PAIN - FUNCTIONAL ASSESSMENT: PAIN_FUNCTIONAL_ASSESSMENT: NONE - DENIES PAIN

## 2023-12-05 LAB
EKG ATRIAL RATE: 159 BPM
EKG ATRIAL RATE: 89 BPM
EKG DIAGNOSIS: NORMAL
EKG DIAGNOSIS: NORMAL
EKG P AXIS: 75 DEGREES
EKG P-R INTERVAL: 144 MS
EKG Q-T INTERVAL: 284 MS
EKG Q-T INTERVAL: 380 MS
EKG QRS DURATION: 64 MS
EKG QRS DURATION: 80 MS
EKG QTC CALCULATION (BAZETT): 396 MS
EKG QTC CALCULATION (BAZETT): 462 MS
EKG R AXIS: 67 DEGREES
EKG R AXIS: 68 DEGREES
EKG T AXIS: 51 DEGREES
EKG T AXIS: 64 DEGREES
EKG VENTRICULAR RATE: 117 BPM
EKG VENTRICULAR RATE: 89 BPM
MAGNESIUM SERPL-MCNC: 1.7 MG/DL (ref 1.8–2.4)
NT-PROBNP SERPL-MCNC: ABNORMAL PG/ML (ref 0–449)
TROPONIN, HIGH SENSITIVITY: 40 NG/L (ref 0–14)

## 2023-12-05 PROCEDURE — 93010 ELECTROCARDIOGRAM REPORT: CPT | Performed by: INTERNAL MEDICINE

## 2023-12-05 PROCEDURE — 92526 ORAL FUNCTION THERAPY: CPT

## 2023-12-05 PROCEDURE — 93005 ELECTROCARDIOGRAM TRACING: CPT | Performed by: INTERNAL MEDICINE

## 2023-12-05 PROCEDURE — 6370000000 HC RX 637 (ALT 250 FOR IP)

## 2023-12-05 PROCEDURE — 82607 VITAMIN B-12: CPT

## 2023-12-05 PROCEDURE — 97161 PT EVAL LOW COMPLEX 20 MIN: CPT

## 2023-12-05 PROCEDURE — 6370000000 HC RX 637 (ALT 250 FOR IP): Performed by: NURSE PRACTITIONER

## 2023-12-05 PROCEDURE — 97165 OT EVAL LOW COMPLEX 30 MIN: CPT

## 2023-12-05 PROCEDURE — 82306 VITAMIN D 25 HYDROXY: CPT

## 2023-12-05 PROCEDURE — 97535 SELF CARE MNGMENT TRAINING: CPT

## 2023-12-05 PROCEDURE — 2580000003 HC RX 258: Performed by: INTERNAL MEDICINE

## 2023-12-05 PROCEDURE — 2580000003 HC RX 258: Performed by: NURSE PRACTITIONER

## 2023-12-05 PROCEDURE — 6360000002 HC RX W HCPCS: Performed by: INTERNAL MEDICINE

## 2023-12-05 PROCEDURE — 83735 ASSAY OF MAGNESIUM: CPT

## 2023-12-05 PROCEDURE — 6360000002 HC RX W HCPCS: Performed by: NURSE PRACTITIONER

## 2023-12-05 PROCEDURE — 97530 THERAPEUTIC ACTIVITIES: CPT

## 2023-12-05 PROCEDURE — 92610 EVALUATE SWALLOWING FUNCTION: CPT

## 2023-12-05 PROCEDURE — 82746 ASSAY OF FOLIC ACID SERUM: CPT

## 2023-12-05 PROCEDURE — 36415 COLL VENOUS BLD VENIPUNCTURE: CPT

## 2023-12-05 PROCEDURE — 84484 ASSAY OF TROPONIN QUANT: CPT

## 2023-12-05 PROCEDURE — 1200000000 HC SEMI PRIVATE

## 2023-12-05 PROCEDURE — 83880 ASSAY OF NATRIURETIC PEPTIDE: CPT

## 2023-12-05 PROCEDURE — 6370000000 HC RX 637 (ALT 250 FOR IP): Performed by: INTERNAL MEDICINE

## 2023-12-05 RX ORDER — POLYETHYLENE GLYCOL 3350 17 G/17G
17 POWDER, FOR SOLUTION ORAL DAILY PRN
Status: DISCONTINUED | OUTPATIENT
Start: 2023-12-05 | End: 2023-12-08 | Stop reason: HOSPADM

## 2023-12-05 RX ORDER — CLONAZEPAM 1 MG/1
1 TABLET ORAL 2 TIMES DAILY PRN
Status: DISCONTINUED | OUTPATIENT
Start: 2023-12-05 | End: 2023-12-08 | Stop reason: HOSPADM

## 2023-12-05 RX ORDER — LEVOTHYROXINE SODIUM 0.1 MG/1
100 TABLET ORAL DAILY
Status: DISCONTINUED | OUTPATIENT
Start: 2023-12-05 | End: 2023-12-08 | Stop reason: HOSPADM

## 2023-12-05 RX ORDER — CARVEDILOL 6.25 MG/1
12.5 TABLET ORAL 2 TIMES DAILY
Status: DISCONTINUED | OUTPATIENT
Start: 2023-12-05 | End: 2023-12-08 | Stop reason: HOSPADM

## 2023-12-05 RX ORDER — HEPARIN SODIUM 5000 [USP'U]/ML
5000 INJECTION, SOLUTION INTRAVENOUS; SUBCUTANEOUS EVERY 8 HOURS SCHEDULED
Status: DISCONTINUED | OUTPATIENT
Start: 2023-12-05 | End: 2023-12-06

## 2023-12-05 RX ORDER — FUROSEMIDE 20 MG/1
20 TABLET ORAL DAILY
Status: DISCONTINUED | OUTPATIENT
Start: 2023-12-05 | End: 2023-12-08 | Stop reason: HOSPADM

## 2023-12-05 RX ORDER — ACETAMINOPHEN 650 MG/1
650 SUPPOSITORY RECTAL EVERY 6 HOURS PRN
Status: DISCONTINUED | OUTPATIENT
Start: 2023-12-05 | End: 2023-12-08 | Stop reason: HOSPADM

## 2023-12-05 RX ORDER — 0.9 % SODIUM CHLORIDE 0.9 %
250 INTRAVENOUS SOLUTION INTRAVENOUS ONCE
Status: COMPLETED | OUTPATIENT
Start: 2023-12-05 | End: 2023-12-05

## 2023-12-05 RX ORDER — ASPIRIN 81 MG/1
81 TABLET, CHEWABLE ORAL DAILY
Status: DISCONTINUED | OUTPATIENT
Start: 2023-12-05 | End: 2023-12-08 | Stop reason: HOSPADM

## 2023-12-05 RX ORDER — SODIUM CHLORIDE 0.9 % (FLUSH) 0.9 %
5-40 SYRINGE (ML) INJECTION EVERY 12 HOURS SCHEDULED
Status: DISCONTINUED | OUTPATIENT
Start: 2023-12-05 | End: 2023-12-08 | Stop reason: HOSPADM

## 2023-12-05 RX ORDER — SODIUM CHLORIDE 9 MG/ML
INJECTION, SOLUTION INTRAVENOUS CONTINUOUS
Status: DISCONTINUED | OUTPATIENT
Start: 2023-12-05 | End: 2023-12-05

## 2023-12-05 RX ORDER — OXYCODONE HYDROCHLORIDE AND ACETAMINOPHEN 5; 325 MG/1; MG/1
1 TABLET ORAL EVERY 6 HOURS PRN
Status: DISCONTINUED | OUTPATIENT
Start: 2023-12-05 | End: 2023-12-08 | Stop reason: HOSPADM

## 2023-12-05 RX ORDER — MAGNESIUM SULFATE IN WATER 40 MG/ML
2000 INJECTION, SOLUTION INTRAVENOUS ONCE
Status: COMPLETED | OUTPATIENT
Start: 2023-12-05 | End: 2023-12-05

## 2023-12-05 RX ORDER — NICOTINE 21 MG/24HR
1 PATCH, TRANSDERMAL 24 HOURS TRANSDERMAL DAILY
Status: DISCONTINUED | OUTPATIENT
Start: 2023-12-05 | End: 2023-12-08 | Stop reason: HOSPADM

## 2023-12-05 RX ORDER — ONDANSETRON 2 MG/ML
4 INJECTION INTRAMUSCULAR; INTRAVENOUS EVERY 6 HOURS PRN
Status: DISCONTINUED | OUTPATIENT
Start: 2023-12-05 | End: 2023-12-08 | Stop reason: HOSPADM

## 2023-12-05 RX ORDER — SODIUM CHLORIDE 9 MG/ML
INJECTION, SOLUTION INTRAVENOUS PRN
Status: DISCONTINUED | OUTPATIENT
Start: 2023-12-05 | End: 2023-12-08 | Stop reason: HOSPADM

## 2023-12-05 RX ORDER — ACETAMINOPHEN 325 MG/1
650 TABLET ORAL EVERY 6 HOURS PRN
Status: DISCONTINUED | OUTPATIENT
Start: 2023-12-05 | End: 2023-12-08 | Stop reason: HOSPADM

## 2023-12-05 RX ORDER — MIRTAZAPINE 15 MG/1
15 TABLET, FILM COATED ORAL NIGHTLY
Status: DISCONTINUED | OUTPATIENT
Start: 2023-12-05 | End: 2023-12-08 | Stop reason: HOSPADM

## 2023-12-05 RX ORDER — LIDOCAINE 4 G/G
1 PATCH TOPICAL DAILY
Status: DISCONTINUED | OUTPATIENT
Start: 2023-12-05 | End: 2023-12-08 | Stop reason: HOSPADM

## 2023-12-05 RX ORDER — SODIUM CHLORIDE 0.9 % (FLUSH) 0.9 %
5-40 SYRINGE (ML) INJECTION PRN
Status: DISCONTINUED | OUTPATIENT
Start: 2023-12-05 | End: 2023-12-08 | Stop reason: HOSPADM

## 2023-12-05 RX ORDER — ONDANSETRON 4 MG/1
4 TABLET, ORALLY DISINTEGRATING ORAL EVERY 8 HOURS PRN
Status: DISCONTINUED | OUTPATIENT
Start: 2023-12-05 | End: 2023-12-08 | Stop reason: HOSPADM

## 2023-12-05 RX ORDER — ACETAMINOPHEN 325 MG/1
TABLET ORAL
Status: COMPLETED
Start: 2023-12-05 | End: 2023-12-05

## 2023-12-05 RX ADMIN — FUROSEMIDE 20 MG: 20 TABLET ORAL at 09:16

## 2023-12-05 RX ADMIN — ACETAMINOPHEN 650 MG: 325 TABLET ORAL at 21:18

## 2023-12-05 RX ADMIN — OXYCODONE AND ACETAMINOPHEN 1 TABLET: 5; 325 TABLET ORAL at 21:19

## 2023-12-05 RX ADMIN — CARVEDILOL 12.5 MG: 6.25 TABLET, FILM COATED ORAL at 09:17

## 2023-12-05 RX ADMIN — LEVOTHYROXINE SODIUM 100 MCG: 0.1 TABLET ORAL at 09:17

## 2023-12-05 RX ADMIN — VANCOMYCIN HYDROCHLORIDE 1000 MG: 1 INJECTION, POWDER, LYOPHILIZED, FOR SOLUTION INTRAVENOUS at 09:26

## 2023-12-05 RX ADMIN — ASPIRIN 81 MG 81 MG: 81 TABLET ORAL at 09:16

## 2023-12-05 RX ADMIN — SODIUM CHLORIDE: 9 INJECTION, SOLUTION INTRAVENOUS at 07:04

## 2023-12-05 RX ADMIN — MIRTAZAPINE 15 MG: 15 TABLET, FILM COATED ORAL at 21:17

## 2023-12-05 RX ADMIN — MAGNESIUM SULFATE HEPTAHYDRATE 2000 MG: 40 INJECTION, SOLUTION INTRAVENOUS at 20:02

## 2023-12-05 RX ADMIN — CLONAZEPAM 1 MG: 1 TABLET ORAL at 21:18

## 2023-12-05 RX ADMIN — SODIUM CHLORIDE 250 ML: 9 INJECTION, SOLUTION INTRAVENOUS at 07:04

## 2023-12-05 RX ADMIN — ACETAMINOPHEN: 325 TABLET ORAL at 06:30

## 2023-12-05 RX ADMIN — ACETAMINOPHEN 650 MG: 325 TABLET ORAL at 13:13

## 2023-12-05 RX ADMIN — HEPARIN SODIUM 5000 UNITS: 5000 INJECTION INTRAVENOUS; SUBCUTANEOUS at 21:17

## 2023-12-05 RX ADMIN — HEPARIN SODIUM 5000 UNITS: 5000 INJECTION INTRAVENOUS; SUBCUTANEOUS at 18:17

## 2023-12-05 RX ADMIN — CARVEDILOL 12.5 MG: 6.25 TABLET, FILM COATED ORAL at 21:17

## 2023-12-05 RX ADMIN — CLONAZEPAM 1 MG: 1 TABLET ORAL at 12:45

## 2023-12-05 RX ADMIN — CEFEPIME 2000 MG: 2 INJECTION, POWDER, FOR SOLUTION INTRAVENOUS at 12:47

## 2023-12-05 RX ADMIN — HEPARIN SODIUM 5000 UNITS: 5000 INJECTION INTRAVENOUS; SUBCUTANEOUS at 09:18

## 2023-12-05 ASSESSMENT — PAIN DESCRIPTION - LOCATION
LOCATION: BACK
LOCATION: BACK

## 2023-12-05 ASSESSMENT — PAIN SCALES - GENERAL
PAINLEVEL_OUTOF10: 10
PAINLEVEL_OUTOF10: 7

## 2023-12-05 NOTE — PROGRESS NOTES
Pharmacy Home Medication Reconciliation Note    A medication reconciliation has been completed for Jimmy Ruiz 1945    Pharmacy: [address] [place] [address] provided by: patient's daughter    The patient's home medication list is as follows: No current facility-administered medications on file prior to encounter. Current Outpatient Medications on File Prior to Encounter   Medication Sig Dispense Refill    clonazePAM (KLONOPIN) 1 MG tablet Take 1 tablet by mouth 2 times daily as needed for Anxiety for up to 28 days. 60 tablet 0    potassium chloride (KLOR-CON) 10 MEQ extended release tablet TAKE 1 TABLET BY MOUTH DAILY 90 tablet 1    levothyroxine (SYNTHROID) 100 MCG tablet TAKE 1 TABLET BY MOUTH ONCE DAILY (Patient taking differently: Take 1 tablet by mouth Daily TAKE 1 TABLET BY MOUTH ONCE DAILY) 90 tablet 1    lisinopril (PRINIVIL;ZESTRIL) 5 MG tablet Take 1 tablet by mouth daily 90 tablet 1    cloNIDine (CATAPRES) 0.1 MG tablet TAKE ONE TABLET BY MOUTH THREE TIMES A DAY (Patient taking differently: Take 1 tablet by mouth 2 times daily) 90 tablet 1    carvedilol (COREG) 12.5 MG tablet TAKE 1 TABLET BY MOUTH TWICE DAILY (Patient taking differently: Take 1 tablet by mouth 2 times daily TAKE 1 TABLET BY MOUTH TWICE DAILY) 180 tablet 1    mirtazapine (REMERON) 15 MG tablet TAKE ONE TABLET BY MOUTH DAILY (Patient taking differently: Take 1 tablet by mouth nightly) 90 tablet 1    furosemide (LASIX) 40 MG tablet TAKE 1/2 TABLET BY MOUTH DAILY 45 tablet 0    mometasone-formoterol (DULERA) 100-5 MCG/ACT inhaler Inhale 2 puffs into the lungs in the morning and 2 puffs in the evening.  (Patient not taking: Reported on 12/4/2023) 1 each 2    ipratropium 0.5 mg-albuterol 2.5 mg (DUONEB) 0.5-2.5 (3) MG/3ML SOLN nebulizer solution Inhale 3 mLs into the lungs every 6 hours as needed for Shortness of Breath (Patient not taking: Reported on 12/4/2023) 360 mL 0    albuterol sulfate HFA (VENTOLIN

## 2023-12-05 NOTE — PROGRESS NOTES
Clinical Pharmacy Note: Pharmacy to Dose Vancomycin    Kory Davidson is a 77 y.o. female started on Vancomycin for UTI, sepsis; consult received from Janel Green NP to manage therapy. Also receiving the following antibiotics: cefepime. Goal AUC: 400-600 mg/L*hr  Goal Trough Level: 10-15 mcg/mL    Assessment/Plan:  Initiate vancomycin 1000 mg IV every 24 hours. Bayesian modeling predicts an AUC of 478 mg/L*hr and a trough of 12.4 mcg/mL at steady state concentration. A vancomycin random level has been ordered for 12/6 at 0600  Changes in regimen will be determined based on culture results, renal function, and clinical response. Pharmacy will continue to monitor and adjust regimen as necessary. Allergies:  Keflex [cephalexin], Lipitor [atorvastatin], Morphine, Hctz [hydrochlorothiazide], Norvasc [amlodipine besylate], Penicillins, Tramadol, Hydralazine, and Shellfish-derived products     Recent Labs     12/04/23 2031   CREATININE 1.0       Recent Labs     12/04/23 2031   WBC 15.9*       Ht Readings from Last 1 Encounters:   12/04/23 1.676 m (5' 6\")        Wt Readings from Last 1 Encounters:   12/04/23 56.2 kg (124 lb)         Estimated Creatinine Clearance: 40 mL/min (based on SCr of 1 mg/dL).       Thank you for the consult,    Aliza Milton, PharmD, BCPS  [id number]  12/5/2023 7:18 AM

## 2023-12-05 NOTE — PROGRESS NOTES
Supervision or touching assistance   [x] Partial or moderate assistance   [] Substantial or maximal assistance  [] Dependent     EDUCATION:   Provided education regarding role of SLP, results of assessment, recommendations and general speech pathology plan of care. [x] Pt verbalized understanding and agreement   [x] Pt requires ongoing learning   [] No evidence of comprehension     If patient discharges prior to next visit, this note will serve as discharge. Treatment time:  Timed Code Treatment Minutes: 0  Total Treatment time: 25 minutes    Electronically signed by: PATSY Moore Speech-Language Pathology Graduate Clinician    The speech-language pathologist was present, directed the patient's care, made skilled judgment and was responsible for assessment and treatment.     [facility] [id number]  Speech Language Pathologist

## 2023-12-05 NOTE — PROGRESS NOTES
Pt admitted at [facility]. [facility] happening at this time. Unable to do admission or release any orders. Pt on admit was stable and feeling okay. Around 0400 pt called out to go to bathroom for BM, when getting pt up she was suddenly extremely weak, weak enough to require 3 people to get her back to bed even with walker. Pt was c/o headache, SBP was elevated to 160's, when prior she was low 80's-90's/50's. Pt was also tachycardic in 100teens-20's. At this time RN noticed rhythm change got EKG and pt was Afib RVR. Rate was controlled, not exceeding 120's. Pt then started to spike lowgrade fevers which she did not previously have. Temp from 99.0-101.6 within an hour and RN has overridden for tylenol at this time. Pt began complaining of severe low back pain as well. Pt showing signs of sepsis and fast.   Call placed to MD directly d/t downtime. All info given to MD KHALIF gave verbal orders for  bolus, then continuous gtt @ 75ml/hr. No further orders given for pain or afib at this time.

## 2023-12-05 NOTE — PROGRESS NOTES
[address] [facility] Department   Phone: [phone]    Physical Therapy    [x] Initial Evaluation            [] Daily Treatment Note         [] Discharge Summary      Patient: Cathryn Dye   : 1945   MRN: [id number]   Date of Service:  2023  Admitting Diagnosis: Sepsis due to urinary tract infection [facility])  Current Admission Summary: 77 y.o. female with a pmh of current smoker, COPD, chronic CHF with preserved EF, prior CVA, hypertension and recurrent falls who presents with generalized weakness, fall at home and found to have sepsis due to UTI    Past Medical History:  has a past medical history of Acute DVT (deep venous thrombosis) ([address]), Acute encephalopathy, Acute on chronic diastolic heart failure ([address]), Alcohol abuse, Anxiety, Arthritis, CAD in native artery, Cellulitis, Cerebral artery occlusion with cerebral infarction [facility]), Cerebrovascular accident (CVA) ([address]), Chronic fatigue, Complex care coordination, Complicated UTI (urinary tract infection), Congestive heart failure, unspecified HF chronicity, unspecified heart failure type ([address]), COPD (chronic obstructive pulmonary disease) ([address]), COPD exacerbation ([address]), Depression, Diabetes mellitus ([address]), DIMAS (dyspnea on exertion), DVT (deep venous thrombosis) ([address]), DVT, lower extremity ([address]), Fatigue, General weakness, Generalized weakness, Hypercholesteremia, Hypertension, Hyperthyroidism, Incontinence, Mammogram abnormal, Neuromuscular disorder ([address]), Osteopenia, PAF (paroxysmal atrial fibrillation) ([address]), Pneumonia, Recurrent UTI, Right forearm cellulitis, Superficial thrombophlebitis of right upper extremity, Thyroid disease, Tobacco abuse, Tobacco abuse counseling, Trapped lung, and Unable to walk. Past Surgical History:  has a past surgical history that includes Tonsillectomy; Colonoscopy (2012); Toe Surgery (Right); Shoulder arthroscopy (Right, 14); eye surgery;  Pacemaker insertion; pacemaker

## 2023-12-05 NOTE — PROGRESS NOTES
Score: 15    Cognition  Overall Cognitive Status: Impaired  Arousal/Alertness: inconsistent responses to stimuli  Following Commands: follows one step commands with increased time  Attention Span: attends with cues to redirect  Memory: decreased short term memory  Safety Judgement: decreased awareness of need for assistance, decreased awareness of need for safety  Problem Solving: assistance required to generate solutions  Insights: decreased awareness of deficits  Initiation: requires cues for some  Sequencing: requires cues for some  Orientation:    oriented to person, oriented to place, oriented to situation, and disoriented to time   Command Following:   accurately follows one step commands     Education  Barriers To Learning: cognition  Patient Education: patient educated on goals, OT role and benefits, plan of care, ADL adaptive strategies, transfer training, discharge recommendations  Learning Assessment:  patient will require reinforcement due to cognitive deficits    Assessment  Activity Tolerance: Tolerated fair, limited by cognition/endurance  Impairments Requiring Therapeutic Intervention: decreased functional mobility, decreased ADL status, decreased ROM, decreased strength, decreased safety awareness, decreased cognition, decreased endurance, decreased balance, decreased IADL  Prognosis: good  Clinical Assessment: The patient is a 77 y.o. female who presents below their baseline level of function due to above deficits, associated with sepsis. Typically, pt is mod I For mobility and is assisted with bathing. Currently, pt is requiring min A for mobility and up to max A for dressing.  Continued OT indicated in order to promote return to PLOF    Safety Interventions: patient left in chair, chair alarm in place, call light within reach, gait belt, and nurse notified    Plan  Frequency: 3-5 x/per week  Current Treatment Recommendations: strengthening, balance training, functional mobility training, transfer

## 2023-12-05 NOTE — H&P
V2.0  History and Physical      Name:  Claudine Santos /Age/Sex: [birth year]  (77 y.o. female)   MRN & CSN:  [id number] & [id number] Encounter Date/Time: 2023 11:13 PM EST   Location:  ED- PCP: QUITA Cramer NP       Hospital Day: 1    Assessment and Plan:   Claudine Santos is a 77 y.o. female with a pmh of current smoker, COPD, chronic CHF with preserved EF, prior CVA, hypertension and recurrent falls who presents with generalized weakness, fall at home and found to have sepsis due to UTI. Hospital Problems             Last Modified POA    * (Principal) Sepsis due to urinary tract infection ([address]) 2023 Yes       Plan:  #Generalized weakness  #Fall at home  -Admit to  [address] with telemetry.  -Consult PT OT.  -Fall precautions. #Sepsis due to UTI. #Urinary tract infection. #Leukocytosis  -Sepsis criteria met with hypotension, tachycardia, leukocytosis and source as a urinary tract infection.  -Received reduced dose of IV bolus fluids due to chronic CHF history and pleural effusion on chest x-ray. Received a total of 1 L bolus fluids.  -Cefepime and Vanco pending blood and urine cultures.  -Pending MRSA swab. #Elevated troponin. Initially 63. Downtrending. No chest pain. No acute ischemia on EKG. Continue telemetry. Repeat troponin with morning labs. #Cardiomegaly with trace pleural effusion. #Chronic CHF with preserved EF.  -EF of 55 to 60% from echo on 2019.  -Not currently on diuretics. -Appears compensated.  -Cautious with IV hydration.  -Monitor. #Chronic groundglass opacities seen in left lower lobe-improved appearance from prior study. Flu and rapid COVID-negative. Continue antibiotics. #COPD. -Not in acute exacerbation. #Current everyday smoker. Reports 45-pack-year history.  -Counseled on the need to quit. Patient verbalized she is decreasing her amounts. -Nicotine patch    #Hypertension. Currently hypotensive. Monitor.   Will treat

## 2023-12-05 NOTE — PLAN OF CARE
Problem: Discharge Planning  Goal: Discharge to home or other facility with appropriate resources  Outcome: Progressing     Problem: Safety - Adult  Goal: Free from fall injury  Outcome: Progressing   Standard safety measures in place as pt is impulsive. Reinforced need for assistance. Recommended that she goes to SNF at this time, but will follow up for final discharge planning recommendations.

## 2023-12-05 NOTE — ED NOTES
ED TO INPATIENT SBAR HANDOFF    Patient Name: Jimmy Ruiz   :    77 y.o. MRN:  [id number]  Preferred Name  Sue Marrero  ED Room #:  ED-0008/08  Family/Caregiver Present no   Restraints no   Sitter no   Sepsis Risk Score Sepsis Risk Score: 4.2    Situation  Code Status: Prior No additional code details. Allergies: Keflex [cephalexin], Lipitor [atorvastatin], Morphine, Hctz [hydrochlorothiazide], Norvasc [amlodipine besylate], Penicillins, Tramadol, Hydralazine, and Shellfish-derived products  Weight: Patient Vitals for the past 96 hrs (Last 3 readings):   Weight   23 56.2 kg (124 lb)     Arrived from: home  Chief Complaint:   Chief Complaint   Patient presents with    Fatigue     Pt had fall at home states d/t increased weakness, knew she was falling and \"fell on my butt\", denies bloodthinners. Denies pain or any other c/o. Hospital Problem/Diagnosis:  Principal Problem:    Sepsis due to urinary tract infection ([address])  Resolved Problems:    * No resolved hospital problems. *    Imaging:   XR CHEST PORTABLE   Final Result   Cardiomegaly and COPD. Trace pleural effusion and chronic ground-glass   opacity in the left lower lung zone. Improved appearance from the prior   study.            Abnormal labs:   Abnormal Labs Reviewed   CBC WITH AUTO DIFFERENTIAL - Abnormal; Notable for the following components:       Result Value    WBC 15.9 (*)     .1 (*)     MCH 34.1 (*)     Neutrophils Absolute 14.4 (*)     Lymphocytes Absolute 0.3 (*)     All other components within normal limits   TROPONIN - Abnormal; Notable for the following components:    Troponin, High Sensitivity 63 (*)     All other components within normal limits   TROPONIN - Abnormal; Notable for the following components:    Troponin, High Sensitivity 50 (*)     All other components within normal limits   COMPREHENSIVE METABOLIC PANEL W/ REFLEX TO MG FOR LOW K - Abnormal; Notable for the following components:    Chloride 96 (*)

## 2023-12-06 ENCOUNTER — TELEPHONE (OUTPATIENT)
Dept: FAMILY MEDICINE CLINIC | Age: 78
End: 2023-12-06

## 2023-12-06 LAB
25(OH)D3 SERPL-MCNC: 16 NG/ML
ANION GAP SERPL CALCULATED.3IONS-SCNC: 8 MMOL/L (ref 3–16)
BACTERIA UR CULT: ABNORMAL
BASOPHILS # BLD: 0.1 K/UL (ref 0–0.2)
BASOPHILS NFR BLD: 0.8 %
BUN SERPL-MCNC: 23 MG/DL (ref 7–20)
CALCIUM SERPL-MCNC: 8.6 MG/DL (ref 8.3–10.6)
CHLORIDE SERPL-SCNC: 102 MMOL/L (ref 99–110)
CO2 SERPL-SCNC: 26 MMOL/L (ref 21–32)
CREAT SERPL-MCNC: 0.8 MG/DL (ref 0.6–1.2)
DEPRECATED RDW RBC AUTO: 14.4 % (ref 12.4–15.4)
EKG ATRIAL RATE: 87 BPM
EKG DIAGNOSIS: NORMAL
EKG Q-T INTERVAL: 330 MS
EKG QRS DURATION: 76 MS
EKG QTC CALCULATION (BAZETT): 479 MS
EKG R AXIS: 65 DEGREES
EKG T AXIS: 59 DEGREES
EKG VENTRICULAR RATE: 127 BPM
EOSINOPHIL # BLD: 0 K/UL (ref 0–0.6)
EOSINOPHIL NFR BLD: 0.2 %
FOLATE SERPL-MCNC: 3.94 NG/ML (ref 4.78–24.2)
GFR SERPLBLD CREATININE-BSD FMLA CKD-EPI: >60 ML/MIN/{1.73_M2}
GLUCOSE SERPL-MCNC: 108 MG/DL (ref 70–99)
HCT VFR BLD AUTO: 35.6 % (ref 36–48)
HGB BLD-MCNC: 11.9 G/DL (ref 12–16)
LYMPHOCYTES # BLD: 0.5 K/UL (ref 1–5.1)
LYMPHOCYTES NFR BLD: 4.2 %
MCH RBC QN AUTO: 33.3 PG (ref 26–34)
MCHC RBC AUTO-ENTMCNC: 33.3 G/DL (ref 31–36)
MCV RBC AUTO: 100 FL (ref 80–100)
MONOCYTES # BLD: 1.4 K/UL (ref 0–1.3)
MONOCYTES NFR BLD: 11.9 %
NEUTROPHILS # BLD: 10 K/UL (ref 1.7–7.7)
NEUTROPHILS NFR BLD: 82.9 %
ORGANISM: ABNORMAL
PLATELET # BLD AUTO: 232 K/UL (ref 135–450)
PMV BLD AUTO: 9 FL (ref 5–10.5)
POTASSIUM SERPL-SCNC: 3.6 MMOL/L (ref 3.5–5.1)
RBC # BLD AUTO: 3.56 M/UL (ref 4–5.2)
SODIUM SERPL-SCNC: 136 MMOL/L (ref 136–145)
TSH SERPL DL<=0.005 MIU/L-ACNC: 0.77 UIU/ML (ref 0.27–4.2)
VANCOMYCIN SERPL-MCNC: 6.5 UG/ML
VIT B12 SERPL-MCNC: 453 PG/ML (ref 211–911)
WBC # BLD AUTO: 12.1 K/UL (ref 4–11)

## 2023-12-06 PROCEDURE — 85025 COMPLETE CBC W/AUTO DIFF WBC: CPT

## 2023-12-06 PROCEDURE — 80202 ASSAY OF VANCOMYCIN: CPT

## 2023-12-06 PROCEDURE — 36415 COLL VENOUS BLD VENIPUNCTURE: CPT

## 2023-12-06 PROCEDURE — 6360000002 HC RX W HCPCS: Performed by: NURSE PRACTITIONER

## 2023-12-06 PROCEDURE — 2580000003 HC RX 258: Performed by: NURSE PRACTITIONER

## 2023-12-06 PROCEDURE — 93010 ELECTROCARDIOGRAM REPORT: CPT | Performed by: INTERNAL MEDICINE

## 2023-12-06 PROCEDURE — 97530 THERAPEUTIC ACTIVITIES: CPT

## 2023-12-06 PROCEDURE — 1200000000 HC SEMI PRIVATE

## 2023-12-06 PROCEDURE — 97535 SELF CARE MNGMENT TRAINING: CPT

## 2023-12-06 PROCEDURE — 6370000000 HC RX 637 (ALT 250 FOR IP): Performed by: INTERNAL MEDICINE

## 2023-12-06 PROCEDURE — 80048 BASIC METABOLIC PNL TOTAL CA: CPT

## 2023-12-06 PROCEDURE — 6370000000 HC RX 637 (ALT 250 FOR IP): Performed by: NURSE PRACTITIONER

## 2023-12-06 PROCEDURE — 80061 LIPID PANEL: CPT

## 2023-12-06 PROCEDURE — 92526 ORAL FUNCTION THERAPY: CPT

## 2023-12-06 RX ORDER — ENOXAPARIN SODIUM 100 MG/ML
40 INJECTION SUBCUTANEOUS DAILY
Status: DISCONTINUED | OUTPATIENT
Start: 2023-12-07 | End: 2023-12-07 | Stop reason: SDUPTHER

## 2023-12-06 RX ADMIN — MIRTAZAPINE 15 MG: 15 TABLET, FILM COATED ORAL at 20:17

## 2023-12-06 RX ADMIN — ACETAMINOPHEN 650 MG: 325 TABLET ORAL at 17:28

## 2023-12-06 RX ADMIN — ACETAMINOPHEN 650 MG: 325 TABLET ORAL at 23:56

## 2023-12-06 RX ADMIN — CARVEDILOL 12.5 MG: 6.25 TABLET, FILM COATED ORAL at 20:17

## 2023-12-06 RX ADMIN — SODIUM CHLORIDE: 9 INJECTION, SOLUTION INTRAVENOUS at 12:15

## 2023-12-06 RX ADMIN — FUROSEMIDE 20 MG: 20 TABLET ORAL at 10:22

## 2023-12-06 RX ADMIN — CARVEDILOL 12.5 MG: 6.25 TABLET, FILM COATED ORAL at 10:22

## 2023-12-06 RX ADMIN — HEPARIN SODIUM 5000 UNITS: 5000 INJECTION INTRAVENOUS; SUBCUTANEOUS at 06:19

## 2023-12-06 RX ADMIN — ASPIRIN 81 MG 81 MG: 81 TABLET ORAL at 10:22

## 2023-12-06 RX ADMIN — ACETAMINOPHEN 650 MG: 325 TABLET ORAL at 10:28

## 2023-12-06 RX ADMIN — HEPARIN SODIUM 5000 UNITS: 5000 INJECTION INTRAVENOUS; SUBCUTANEOUS at 15:42

## 2023-12-06 RX ADMIN — CLONAZEPAM 1 MG: 1 TABLET ORAL at 20:20

## 2023-12-06 RX ADMIN — Medication 10 ML: at 20:17

## 2023-12-06 RX ADMIN — SODIUM CHLORIDE, PRESERVATIVE FREE 10 ML: 5 INJECTION INTRAVENOUS at 19:25

## 2023-12-06 RX ADMIN — CEFEPIME 2000 MG: 2 INJECTION, POWDER, FOR SOLUTION INTRAVENOUS at 03:10

## 2023-12-06 RX ADMIN — CEFEPIME 2000 MG: 2 INJECTION, POWDER, FOR SOLUTION INTRAVENOUS at 12:18

## 2023-12-06 RX ADMIN — LEVOTHYROXINE SODIUM 100 MCG: 0.1 TABLET ORAL at 06:20

## 2023-12-06 RX ADMIN — Medication 10 ML: at 10:23

## 2023-12-06 ASSESSMENT — PAIN - FUNCTIONAL ASSESSMENT: PAIN_FUNCTIONAL_ASSESSMENT: ACTIVITIES ARE NOT PREVENTED

## 2023-12-06 ASSESSMENT — PAIN DESCRIPTION - DESCRIPTORS
DESCRIPTORS: ACHING
DESCRIPTORS: ACHING

## 2023-12-06 ASSESSMENT — PAIN DESCRIPTION - LOCATION
LOCATION: HEAD
LOCATION: HEAD

## 2023-12-06 ASSESSMENT — PAIN SCALES - GENERAL
PAINLEVEL_OUTOF10: 3
PAINLEVEL_OUTOF10: 6
PAINLEVEL_OUTOF10: 5

## 2023-12-06 NOTE — TELEPHONE ENCOUNTER
Care Transitions Initial Follow Up Call    Outreach made within 2 business days of discharge: Yes    Patient: Melissa Evans Patient : 1945   MRN: [id number]  Reason for Admission: sepsis  Discharge Date: 23       Spoke with: LVADRIEN    Discharge department/facility: [facility]        Scheduled appointment with PCP within 7-14 days    Follow Up  Future Appointments   Date Time Provider [address] [place] [address]   2023 11:00 AM QUITA Elliott - ADILIA [address] LPN

## 2023-12-06 NOTE — PROGRESS NOTES
Facility/Department: [address]  Speech Language Pathology   Dysphagia Treatment Note    Patient: Lorraine Mari   : 1945   MRN: [id number]      Evaluation Date: 2023      Admitting Dx: Pleural effusion [J90]  Generalized weakness [R53.1]  Elevated troponin [R79.89]  Acute cystitis without hematuria [N30.00]  Sepsis due to urinary tract infection ([address]) [A41.9, N39.0]  Hypotension, unspecified hypotension type [I95.9]  Atrial fibrillation, unspecified type ([address]) [I48.91]  Sepsis, due to unspecified organism, unspecified whether acute organ dysfunction present [facility]) [A41.9]  Treatment Diagnosis: Oropharyngeal Dysphagia   Pain: Denies                                              Diet and Treatment Recommendations 2023:  Diet Solids Recommendation:  Dysphagia III Soft and bite sized  Liquid Consistency Recommendation: Thin liquids, No straws  Recommended form of Meds: Meds whole with water or Meds in puree        Compensatory strategies:   Upright as possible with all PO intake , No straws , Small bites/sips , Eat/feed slowly, Remain upright 30-45 min     Assessment of Texture Tolerance:  Diet level prior to treatment: Dysphagia III Soft and bite sized , Thin liquids, No straws   Tolerance of Current Diet Level:Per chart, no noted difficulty with current diet level     Impressions: Pt was positioned Upright in chair, awake and alert. Currently on  2L O2 via nasal cannula . Pt stated that she has noticed some changes in her thinking but was unable to verbally express these changes, consider cognitive evaluation. Limited trials of thin liquids and regular solids  were provided to assess swallow function. Pt politely declined other textures stating that she does not eat breakfast and was not hungry. Oral phase characterized by prolonged mastication suspect due to lack of dentition and reduced AP transit evidenced by oral residue post swallow of regular solids cleared with liquid wash.  No

## 2023-12-06 NOTE — PROGRESS NOTES
NP messaged for pain meds as pt in excruciating back pain since last night with only tylenol with no improvement. Verbal order for oxy 5 PRN placed.

## 2023-12-06 NOTE — PROGRESS NOTES
HOSPITALISTS PROGRESS NOTE    12/6/2023 8:06 AM        Name: Annemarie Washington . Admitted: 12/4/2023  Primary Care Provider: QUITA Cobian NP (Tel: [phone])      Brief Course: This 50-year-old female with PMHx of smoking abuse, COPD, chronic HFpEF prior CVA, hypertension and recurrent falls who presented with generalized weakness and near fall at home. Patient was noted to have sepsis secondary to UTI    Interval history:   Pt seen and examined today. Overnight events noted, interval ancillary notes and labs reviewed. On 2 L nasal cannula satting around 98%; wean as tolerated keep sat above 92%  Afebrile, WBCs down to 12.1 K, blood culture NGTD. urine culture growing> 100,000 E. coli. Up in chair; reported she is feeling better today; denies any fever, chills, chest pain or SOB      Assessment & Plan:     Sepsis due to UTI. P/w  with hypotension, tachycardia, leukocytosis and UTI  Blood culture NGTD. urine culture growing> 100,000 E. coli. Cefepime and Vanco DC'd. Switch to Rocephin    Generalized weakness/near fall at home  PT/OT consulted. Maintain fall precautions     Elevated troponin; likely secondary to demand ischemia from sepsis  Hs troponin 63 on admission; trended down to 40   denies any  chest pain. No acute ischemia on EKG. Monitor on telemetrytelemetry. Chronic CHF with preserved EF. Appears compensated  Echo EF of 55 to 60% from echo on 8/26/2019. Continue GDMT    Hypertension. Continue current regimen and monitor BP closely    Hx of CVA-continue aspirin and statins    Chronic groundglass opacities seen in left lower lobe ;improved appearance from prior study. Flu and rapid COVID-negative. Hx of COPD. -Not in acute exacerbation. Continue home inhaler and as needed breathing treatment    Hx of smoking abuse : Reports 45-pack-year history.   Counseled on smoking cessation and

## 2023-12-06 NOTE — PLAN OF CARE
Problem: Discharge Planning  Goal: Discharge to home or other facility with appropriate resources  12/6/2023 0226 by Daniela Wilson RN  Outcome: Progressing  12/5/2023 1837 by Tom Mckeon RN  Outcome: Progressing  Flowsheets (Taken 12/5/2023 0900)  Discharge to home or other facility with appropriate resources:   Identify barriers to discharge with patient and caregiver   Arrange for needed discharge resources and transportation as appropriate   Refer to discharge planning if patient needs post-hospital services based on physician order or complex needs related to functional status, cognitive ability or social support system     Problem: Pain  Goal: Verbalizes/displays adequate comfort level or baseline comfort level  Outcome: Progressing  Flowsheets (Taken 12/6/2023 0226)  Verbalizes/displays adequate comfort level or baseline comfort level:   Encourage patient to monitor pain and request assistance   Assess pain using appropriate pain scale     Problem: Safety - Adult  Goal: Free from fall injury  12/5/2023 1837 by Tom Mckeon RN  Outcome: Progressing

## 2023-12-06 NOTE — PROGRESS NOTES
Pt much more agitated and confused this shift vs previous shift when this RN helped with admission. Pt was trying to hide vape and it was taken from her, pure wick she handed this RN instead of the vape was placed back where it was taken from and oxygen put back on patient for comfort.

## 2023-12-06 NOTE — DISCHARGE INSTR - COC
Continuity of Care Form    Patient Name: Yong Cooks   :    MRN:  [id number]    Admit date:  2023  Discharge date:  2023    Code Status Order: Full Code   Advance Directives:     Admitting Physician:  Wendy Esparza MD  PCP: QUITA Wilson NP    Discharging Nurse: [facility] Unit/Room#: 2ZW-4052/1553-03  Discharging Unit Phone Number: [phone]    Emergency Contact:   Extended Emergency Contact Information  Primary Emergency Contact: Radhareno Melgar  Address: [address] [place] [address], [address] [facility] of [address] [address] Phone: [phone]  Work Phone: [phone]  Mobile Phone: [phone]  Relation: Spouse  Secondary Emergency Contact: Rylan Siddiqui  Home Phone: [phone]  Mobile Phone: [phone]  Relation: Child    Past Surgical History:  Past Surgical History:   Procedure Laterality Date    COLONOSCOPY  2012    Dr. Tyrese Adame; repeat 5 years    EYE SURGERY      cateracts removed    NERVE SURGERY N/A 2021    Nikkie Small, FLEXIBLE CYSTOSCOPY performed by Meme Malloy MD at [facility] ARTHROSCOPY Right 14    SUBACROMIAL DECOMPRESSION, ROTATOR CUFF REPAIR    STIMULATOR SURGERY N/A 2022    INTERSTIMULATOR INSERTION STAGE 2 - MEDTRONICS performed by Meme Malloy MD at [address] [place] [address] Right     TONSILLECTOMY         Immunization History:   Immunization History   Administered Date(s) Administered    COVID-19, PFIZER PURPLE top, DILUTE for use, (age 15 y+), 30mcg/0.3mL 2021    Influenza Virus Vaccine 2015    Influenza, FLUAD, (age 72 y+), Adjuvanted, 0.5mL 2022, 2023    Influenza, High Dose (Fluzone 65 yrs and older) 10/16/2012, 10/27/2014, 2016, 10/16/2017, 2018, 10/08/2019    Pneumococcal Conjugate 7-valent (Prevnar7) 2011    Pneumococcal, PCV-13, PREVNAR 13, (age 6w+), IM, 0.5mL 2015 including  -DME and home safety   -Frontload therapy 5 days, then 3x a week   -OT to evaluate if patient has [address] needs for personal care   - evaluation within 24-48 hours, includes evaluation of resources   and insurance to determine [place], [place], LTC, and Medicaid options   -PCP Visit scheduled within three to seven days of discharge      Patient's personal belongings (please select all that are sent with patient):  Glasses    RN SIGNATURE:  Electronically signed by Genesis Bell RN on 12/8/23 at 12:29 PM EST    CASE MANAGEMENT/SOCIAL WORK SECTION    Inpatient Status Date: 12/04/2023    Readmission Risk Assessment Score: 13%  Readmission Risk              Risk of Unplanned Readmission:  18           Discharging to Facility/ Agency   Name: Bashir Falk Peg will call for Appointment  Phone: [phone]  Fax: [phone]         / signature: Electronically signed by GISELLA Meyer on 12/8/2023 at 11:33 AM      PHYSICIAN SECTION    Prognosis: Fair    Condition at Discharge: Stable    Rehab Potential (if transferring to Rehab): Fair    Recommended Labs or Other Treatments After Discharge:    Physician Certification: I certify the above information and transfer of [place]  is necessary for the continuing treatment of the diagnosis listed and that she requires Home Care for less 30 days.      Update Admission H&P: No change in H&P    PHYSICIAN SIGNATURE:  Electronically signed by Sue Soria MD on 12/8/23 at 11:45 AM EST

## 2023-12-06 NOTE — PROGRESS NOTES
Security has taken vape pen and it is labeled and with security and tag for retrieval of it is in patient chart for when patient leaves or family can get it.

## 2023-12-06 NOTE — PROGRESS NOTES
[address] [facility] Department   Phone: [phone]    Occupational Therapy    [x] Initial Evaluation            [] Daily Treatment Note         [] Discharge Summary      Patient: Palmer Ellington   : 1945   MRN: [id number]   Date of Service:  2023    Admitting Diagnosis:  Sepsis due to urinary tract infection [facility])  Current Admission Summary: 77 y.o. female with a pmh of current smoker, COPD, chronic CHF with preserved EF, prior CVA, hypertension and recurrent falls who presents with generalized weakness, fall at home and found to have sepsis due to UTI   Past Medical History:  has a past medical history of Acute DVT (deep venous thrombosis) ([address]), Acute encephalopathy, Acute on chronic diastolic heart failure ([address]), Alcohol abuse, Anxiety, Arthritis, CAD in native artery, Cellulitis, Cerebral artery occlusion with cerebral infarction [facility]), Cerebrovascular accident (CVA) ([address]), Chronic fatigue, Complex care coordination, Complicated UTI (urinary tract infection), Congestive heart failure, unspecified HF chronicity, unspecified heart failure type ([address]), COPD (chronic obstructive pulmonary disease) ([address]), COPD exacerbation ([address]), Depression, Diabetes mellitus ([address]), DIMAS (dyspnea on exertion), DVT (deep venous thrombosis) ([address]), DVT, lower extremity ([address]), Fatigue, General weakness, Generalized weakness, Hypercholesteremia, Hypertension, Hyperthyroidism, Incontinence, Mammogram abnormal, Neuromuscular disorder ([address]), Osteopenia, PAF (paroxysmal atrial fibrillation) ([address]), Pneumonia, Recurrent UTI, Right forearm cellulitis, Superficial thrombophlebitis of right upper extremity, Thyroid disease, Tobacco abuse, Tobacco abuse counseling, Trapped lung, and Unable to walk. Past Surgical History:  has a past surgical history that includes Tonsillectomy; Colonoscopy (2012); Toe Surgery (Right); Shoulder arthroscopy (Right, 14); eye surgery;  Pacemaker insertion; pacemaker retired. Occupation: Drove cars to [employer]  Hobbies: [address]: 1 fall leading to this admission - reports she slid down the wall to the ground when trying to get up to get more coffee at home      Subjective  General: Pt lying upright in bed upon arrival, sleeping -- easily awoken. Agreeable to tx  Pain: 0/10  Pain Interventions: not applicable and repositioned         Activities of Daily Living  Basic Activities of Daily Living  Feeding: setup assistance  Feeding Comments: breakfast s/u  Lower Extremity Dressing: maximum assistance  Dressing Comments: depends change  Toileting: maximum assistance. Toileting Comments: A for clothing mgmt  Instrumental Activities of Daily Living  No IADL completed on this date. Functional Mobility  Bed Mobility:  Supine to Sit: moderate assistance  Scooting: minimal assistance  Comments:  Transfers:  Sit to stand transfer:minimal assistance  Stand to sit transfer: minimal assistance  Toilet transfer: minimal assistance  Comments:  Functional Mobility  Functional Mobility Activity: to/from bathroom, 15' in room  Device Use: rolling walker  Required Assistance: minimal assistance - impulsive, requires assist for RW mgmt.  Min >>> mod A d/t fatigue  Balance:  Static Sitting Balance: good: independent with functional balance in unsupported position  Dynamic Sitting Balance: fair (+): maintains balance at SBA/supervision without use of UE support  Static Standing Balance: fair (-): maintains balance at CGA with use of UE support  Dynamic Standing Balance: poor (+): requires min (A) to maintain balance  Comments:    Other Therapeutic Interventions    Functional Outcomes  AM-PAC Inpatient Daily Activity Raw Score: 15    Cognition  Overall Cognitive Status: Impaired  Arousal/Alertness: inconsistent responses to stimuli  Following Commands: follows one step commands with increased time  Attention Span: attends with cues to redirect  Memory: decreased short term

## 2023-12-07 LAB
ANION GAP SERPL CALCULATED.3IONS-SCNC: 8 MMOL/L (ref 3–16)
BASOPHILS # BLD: 0 K/UL (ref 0–0.2)
BASOPHILS NFR BLD: 0 %
BUN SERPL-MCNC: 21 MG/DL (ref 7–20)
CALCIUM SERPL-MCNC: 8.1 MG/DL (ref 8.3–10.6)
CHLORIDE SERPL-SCNC: 101 MMOL/L (ref 99–110)
CHOLEST SERPL-MCNC: 154 MG/DL (ref 0–199)
CO2 SERPL-SCNC: 24 MMOL/L (ref 21–32)
CREAT SERPL-MCNC: 0.7 MG/DL (ref 0.6–1.2)
DEPRECATED RDW RBC AUTO: 14.3 % (ref 12.4–15.4)
EOSINOPHIL # BLD: 0.3 K/UL (ref 0–0.6)
EOSINOPHIL NFR BLD: 3 %
GFR SERPLBLD CREATININE-BSD FMLA CKD-EPI: >60 ML/MIN/{1.73_M2}
GLUCOSE SERPL-MCNC: 105 MG/DL (ref 70–99)
HCT VFR BLD AUTO: 36.8 % (ref 36–48)
HDLC SERPL-MCNC: 45 MG/DL (ref 40–60)
HGB BLD-MCNC: 12.4 G/DL (ref 12–16)
LDLC SERPL CALC-MCNC: 85 MG/DL
LYMPHOCYTES # BLD: 0.6 K/UL (ref 1–5.1)
LYMPHOCYTES NFR BLD: 7 %
MCH RBC QN AUTO: 33.8 PG (ref 26–34)
MCHC RBC AUTO-ENTMCNC: 33.8 G/DL (ref 31–36)
MCV RBC AUTO: 100.1 FL (ref 80–100)
MONOCYTES # BLD: 0.9 K/UL (ref 0–1.3)
MONOCYTES NFR BLD: 11 %
MONONUC CELLS NFR BLD MANUAL: 1 %
NEUTROPHILS # BLD: 6.7 K/UL (ref 1.7–7.7)
NEUTROPHILS NFR BLD: 78 %
NEUTS VAC BLD QL SMEAR: PRESENT
PATH INTERP BLD-IMP: NORMAL
PATH INTERP BLD-IMP: YES
PLATELET # BLD AUTO: 233 K/UL (ref 135–450)
PLATELET BLD QL SMEAR: ADEQUATE
PMV BLD AUTO: 8.7 FL (ref 5–10.5)
POTASSIUM SERPL-SCNC: 3.8 MMOL/L (ref 3.5–5.1)
RBC # BLD AUTO: 3.67 M/UL (ref 4–5.2)
SLIDE REVIEW: ABNORMAL
SODIUM SERPL-SCNC: 133 MMOL/L (ref 136–145)
TOXIC GRANULES BLD QL SMEAR: PRESENT
TRIGL SERPL-MCNC: 119 MG/DL (ref 0–150)
VLDLC SERPL CALC-MCNC: 24 MG/DL
WBC # BLD AUTO: 8.6 K/UL (ref 4–11)

## 2023-12-07 PROCEDURE — 6370000000 HC RX 637 (ALT 250 FOR IP): Performed by: INTERNAL MEDICINE

## 2023-12-07 PROCEDURE — 1200000000 HC SEMI PRIVATE

## 2023-12-07 PROCEDURE — 93306 TTE W/DOPPLER COMPLETE: CPT

## 2023-12-07 PROCEDURE — 92526 ORAL FUNCTION THERAPY: CPT

## 2023-12-07 PROCEDURE — 99223 1ST HOSP IP/OBS HIGH 75: CPT | Performed by: INTERNAL MEDICINE

## 2023-12-07 PROCEDURE — 94760 N-INVAS EAR/PLS OXIMETRY 1: CPT

## 2023-12-07 PROCEDURE — 6360000002 HC RX W HCPCS: Performed by: INTERNAL MEDICINE

## 2023-12-07 PROCEDURE — 6370000000 HC RX 637 (ALT 250 FOR IP): Performed by: NURSE PRACTITIONER

## 2023-12-07 PROCEDURE — 87641 MR-STAPH DNA AMP PROBE: CPT

## 2023-12-07 PROCEDURE — 2580000003 HC RX 258: Performed by: INTERNAL MEDICINE

## 2023-12-07 PROCEDURE — 80048 BASIC METABOLIC PNL TOTAL CA: CPT

## 2023-12-07 PROCEDURE — 2580000003 HC RX 258: Performed by: NURSE PRACTITIONER

## 2023-12-07 PROCEDURE — 85025 COMPLETE CBC W/AUTO DIFF WBC: CPT

## 2023-12-07 RX ORDER — FOLIC ACID 1 MG/1
1 TABLET ORAL DAILY
Status: DISCONTINUED | OUTPATIENT
Start: 2023-12-07 | End: 2023-12-08 | Stop reason: HOSPADM

## 2023-12-07 RX ADMIN — ASPIRIN 81 MG 81 MG: 81 TABLET ORAL at 08:22

## 2023-12-07 RX ADMIN — APIXABAN 5 MG: 5 TABLET, FILM COATED ORAL at 21:36

## 2023-12-07 RX ADMIN — CLONAZEPAM 1 MG: 1 TABLET ORAL at 21:38

## 2023-12-07 RX ADMIN — LEVOTHYROXINE SODIUM 100 MCG: 0.1 TABLET ORAL at 05:46

## 2023-12-07 RX ADMIN — FUROSEMIDE 20 MG: 20 TABLET ORAL at 08:21

## 2023-12-07 RX ADMIN — MIRTAZAPINE 15 MG: 15 TABLET, FILM COATED ORAL at 21:37

## 2023-12-07 RX ADMIN — ACETAMINOPHEN 650 MG: 325 TABLET ORAL at 16:43

## 2023-12-07 RX ADMIN — CLONAZEPAM 1 MG: 1 TABLET ORAL at 08:24

## 2023-12-07 RX ADMIN — Medication 10 ML: at 21:40

## 2023-12-07 RX ADMIN — FOLIC ACID 1 MG: 1 TABLET ORAL at 11:27

## 2023-12-07 RX ADMIN — Medication 10 ML: at 08:31

## 2023-12-07 RX ADMIN — ENOXAPARIN SODIUM 40 MG: 40 INJECTION SUBCUTANEOUS at 08:21

## 2023-12-07 RX ADMIN — CARVEDILOL 12.5 MG: 6.25 TABLET, FILM COATED ORAL at 21:40

## 2023-12-07 RX ADMIN — CARVEDILOL 12.5 MG: 6.25 TABLET, FILM COATED ORAL at 08:21

## 2023-12-07 RX ADMIN — APIXABAN 5 MG: 5 TABLET, FILM COATED ORAL at 16:43

## 2023-12-07 RX ADMIN — CEFTRIAXONE SODIUM 1000 MG: 1 INJECTION, POWDER, FOR SOLUTION INTRAMUSCULAR; INTRAVENOUS at 00:00

## 2023-12-07 ASSESSMENT — PAIN SCALES - GENERAL
PAINLEVEL_OUTOF10: 5
PAINLEVEL_OUTOF10: 5
PAINLEVEL_OUTOF10: 0

## 2023-12-07 ASSESSMENT — PAIN DESCRIPTION - LOCATION: LOCATION: HEAD

## 2023-12-07 NOTE — PROGRESS NOTES
Occupational Therapy/Physical Therapy  OT/PT tx attempt this afternoon; however, pt currently SHIRLEY for medical procedure.  Thank you, Stephani Malloy, [address], OTR/L, [address] [place], [place] [id number]

## 2023-12-07 NOTE — PLAN OF CARE
Problem: Discharge Planning  Goal: Discharge to home or other facility with appropriate resources  Flowsheets (Taken 12/7/2023 1444)  Discharge to home or other facility with appropriate resources:   Identify barriers to discharge with patient and caregiver   Identify discharge learning needs (meds, wound care, etc)   Refer to discharge planning if patient needs post-hospital services based on physician order or complex needs related to functional status, cognitive ability or social support system     Problem: Pain  Goal: Verbalizes/displays adequate comfort level or baseline comfort level  Flowsheets (Taken 12/7/2023 1444)  Verbalizes/displays adequate comfort level or baseline comfort level:   Encourage patient to monitor pain and request assistance   Administer analgesics based on type and severity of pain and evaluate response   Assess pain using appropriate pain scale   Implement non-pharmacological measures as appropriate and evaluate response     Problem: Safety - Adult  Goal: Free from fall injury  Outcome: Progressing  Flowsheets (Taken 12/7/2023 1444)  Free From Fall Injury: Instruct family/caregiver on patient safety

## 2023-12-07 NOTE — CONSULTS
[address]  [phone]      Chief Complaint   Patient presents with    Fatigue     Pt had fall at home states d/t increased weakness, knew she was falling and \"fell on my butt\", denies bloodthinners. Denies pain or any other c/o. Reason for consult: hx of PAF & HFPEF; a fib/ elevted trop and BNP     ASSESSMENT AND PLAN:  Chronic diastolic heart failure. Markedly elevated BNP from prior baseline, but no clinical symptoms concerning for acute heart failure. Elevated troponin, likely demand ischemia from heart failure and infection  UTI with sepsis  Centrilobular emphysema  Fatigue  Macrocytosis  PAF  History of embolic stroke  History of alcohol abuse  HTN  HLP  CAD in native artery without angina    [id number] Score for Atrial Fibrillation Stroke Risk   Risk   Factors  Component Value   C CHF Yes 1   H HTN Yes 1   A2 Age >= 76 Yes,  (74 y.o.) 2   D DM No 0   S2 Prior Stroke/TIA Yes 2   V Vascular Disease No 0   A Age 77-78 No,  (74 y.o.) 0   Sc Sex female 1    [id number]  Score  7   Score last updated 12/7/23 11:28 AM EST    Assessment  She is currently in sinus but has known AF (admission EKG was read as AF RVR when it was actually sinus tachy with PACs). She has a very high CHADS-VASC score, and should be on full anticoagulation. I don't see that there is a reason why blood thinners were stopped; I presume it was because she quit going to the cardiologist    Her troponin elevation is likely demand ischemia. Given a reassuring EKG and no ischemia symptoms, will get an echo for further risk-stratification. If this shows normal LVEF and no WMA, there will be no need for inpatient stress testing. Her BNP is markedly elevated, although she isn't really complaining of shortness of breath. This may have spiked due to her severe HTN at admission.   Continue oral diuretics and repeat BNP prior to considering starting IV diuretics    Summary  -Echo  -No need to check more High (any 2)    A. Problems (any 1)  [x] Acute/Chronic Illness/injury posing threat to life or bodily function:  sepsis, known heart failure, elevated troponin  [] Severe exacerbation of chronic illness:    ---------------------------------------------------------------------  B. Risk of Treatment (any 1)   [] Drugs/treatments that require intensive monitoring for toxicity include:    [] Change in code status:    [] Decision to escalate care:    [] Major surgery/procedure with associated risk factors:    ----------------------------------------------------------------------  C. Data (any 2)  [] Discussed management of the case with:    [x] Imaging personally reviewed and interpreted, includes:  cxr, ekg  [x] Data Review (any 3)  [] Collateral history obtained from:    [x] All available Consultant notes from yesterday/today were reviewed  [x] All current labs were reviewed and interpreted for clinical significance   [] Appropriate follow-up labs were ordered    Thank you for allowing to us to participate in the care or [facility]. Further evaluation will be based upon the patient's clinical course and testing results. All questions and concerns were addressed to the patient/family. Alternatives to my treatment were discussed.      Cesar Hardy MD, MD 12/7/2023 11:12 AM

## 2023-12-07 NOTE — PROGRESS NOTES
Facility/Department: [address]  Speech Language Pathology   Dysphagia Treatment Note    Patient: Primo Austin   : 1945   MRN: [id number]      Evaluation Date: 2023      Admitting Dx: Pleural effusion [J90]  Generalized weakness [R53.1]  Elevated troponin [R79.89]  Acute cystitis without hematuria [N30.00]  Sepsis due to urinary tract infection ([address]) [A41.9, N39.0]  Hypotension, unspecified hypotension type [I95.9]  Atrial fibrillation, unspecified type ([address]) [I48.91]  Sepsis, due to unspecified organism, unspecified whether acute organ dysfunction present [facility]) [A41.9]  Treatment Diagnosis: Oropharyngeal Dysphagia   Pain: Denies                                              Diet and Treatment Recommendations 2023:  Diet Solids Recommendation:  Dysphagia III Soft and bite sized  Liquid Consistency Recommendation: Thin liquids, No straws  Recommended form of Meds: Meds whole with water or Meds in puree        Compensatory strategies:   Upright as possible with all PO intake , No straws , Small bites/sips , Eat/feed slowly, Remain upright 30-45 min     Assessment of Texture Tolerance:  Diet level prior to treatment: Dysphagia III Soft and bite sized , Thin liquids, No straws   Tolerance of Current Diet Level:Per chart, no noted difficulty with current diet level     Impressions: Pt was positioned upright in bed, awake and alert. Currently on room air. Trials of thin liquids and soft/bite-sized solids were provided to assess swallow function. Oral phase characterized by slight prolonged mastication which pt reports is due to dentures not in place during trials. Pt stated she did not want to put in dentures. Reduced AP transit time as evidenced by oral residue on lingual surface which cleared with liquid wash. Swallow judged to be timely. No overt s/s of aspiration observed across PO trials. Pt's vocal quality remained clear. Dysphagia risk factors include age, hx of COPD, and hx of CHF. Therefore, aspiration precautions should be in place. Based on today's assessment, recommend continuation of Dysphagia III Soft and Bite-Sized, thin liquids, no straws, meds whole one at a time with water or meds in puree, and ongoing intervention for diet tolerance. *Pt reports cognition seems to be improving yet pt still noticing slight changes since admission. Pt oriented to self, time, and location. Continue to monitor, pt may benefit from cognitive evaluation if deficits continue. Dysphagia Goals:   Pt will functionally tolerate recommended diet with no overt clinical s/s of aspiration (Ongoing 12/07/23)  Pt will demonstrate understanding of aspiration risk and precautions via education/demonstration with occasional prompting (Ongoing 12/07/23)  Pt will advance to least restrictive diet as indicated (Ongoing 12/07/23)    Plan:   3-5 times per week during acute care stay. Patient/Family Education:  Provided education regarding role of SLP, recommendations and general speech pathology plan of care. [x] Pt verbalized understanding and agreement   [x] Pt requires ongoing learning   [] No evidence of comprehension     Discharge Recommendations:    Discharge recommendations to be determined pending ongoing follow-up during acute care stay    Treatment time:  Timed Code Treatment Minutes: 0  Total Treatment time: 13 minutes    If patient discharges prior to next session this note will serve as a discharge summary.      Signature:   Rosalinda Craft, [id number]  Speech Language Pathologist

## 2023-12-08 ENCOUNTER — TELEPHONE (OUTPATIENT)
Dept: FAMILY MEDICINE CLINIC | Age: 78
End: 2023-12-08

## 2023-12-08 VITALS
DIASTOLIC BLOOD PRESSURE: 84 MMHG | HEART RATE: 77 BPM | HEIGHT: 66 IN | OXYGEN SATURATION: 96 % | RESPIRATION RATE: 18 BRPM | BODY MASS INDEX: 19.14 KG/M2 | SYSTOLIC BLOOD PRESSURE: 132 MMHG | TEMPERATURE: 97.9 F | WEIGHT: 119.1 LBS

## 2023-12-08 LAB
ANION GAP SERPL CALCULATED.3IONS-SCNC: 10 MMOL/L (ref 3–16)
BACTERIA BLD CULT ORG #2: NORMAL
BACTERIA BLD CULT: NORMAL
BASOPHILS # BLD: 0.1 K/UL (ref 0–0.2)
BASOPHILS NFR BLD: 0.6 %
BUN SERPL-MCNC: 17 MG/DL (ref 7–20)
CALCIUM SERPL-MCNC: 8.2 MG/DL (ref 8.3–10.6)
CHLORIDE SERPL-SCNC: 101 MMOL/L (ref 99–110)
CO2 SERPL-SCNC: 24 MMOL/L (ref 21–32)
CREAT SERPL-MCNC: 0.6 MG/DL (ref 0.6–1.2)
DEPRECATED RDW RBC AUTO: 14.1 % (ref 12.4–15.4)
EOSINOPHIL # BLD: 0.1 K/UL (ref 0–0.6)
EOSINOPHIL NFR BLD: 1.5 %
GFR SERPLBLD CREATININE-BSD FMLA CKD-EPI: >60 ML/MIN/{1.73_M2}
GLUCOSE SERPL-MCNC: 107 MG/DL (ref 70–99)
HCT VFR BLD AUTO: 37.9 % (ref 36–48)
HGB BLD-MCNC: 13.1 G/DL (ref 12–16)
LYMPHOCYTES # BLD: 0.7 K/UL (ref 1–5.1)
LYMPHOCYTES NFR BLD: 7.8 %
MCH RBC QN AUTO: 34.2 PG (ref 26–34)
MCHC RBC AUTO-ENTMCNC: 34.5 G/DL (ref 31–36)
MCV RBC AUTO: 99.2 FL (ref 80–100)
MONOCYTES # BLD: 1.4 K/UL (ref 0–1.3)
MONOCYTES NFR BLD: 16.4 %
MRSA DNA SPEC QL NAA+PROBE: NORMAL
NEUTROPHILS # BLD: 6.3 K/UL (ref 1.7–7.7)
NEUTROPHILS NFR BLD: 73.7 %
NT-PROBNP SERPL-MCNC: 4270 PG/ML (ref 0–449)
PLATELET # BLD AUTO: 254 K/UL (ref 135–450)
PMV BLD AUTO: 8.4 FL (ref 5–10.5)
POTASSIUM SERPL-SCNC: 3.7 MMOL/L (ref 3.5–5.1)
RBC # BLD AUTO: 3.83 M/UL (ref 4–5.2)
SODIUM SERPL-SCNC: 135 MMOL/L (ref 136–145)
WBC # BLD AUTO: 8.5 K/UL (ref 4–11)

## 2023-12-08 PROCEDURE — 6370000000 HC RX 637 (ALT 250 FOR IP): Performed by: NURSE PRACTITIONER

## 2023-12-08 PROCEDURE — 6360000002 HC RX W HCPCS: Performed by: INTERNAL MEDICINE

## 2023-12-08 PROCEDURE — 6370000000 HC RX 637 (ALT 250 FOR IP): Performed by: INTERNAL MEDICINE

## 2023-12-08 PROCEDURE — 97110 THERAPEUTIC EXERCISES: CPT

## 2023-12-08 PROCEDURE — 85025 COMPLETE CBC W/AUTO DIFF WBC: CPT

## 2023-12-08 PROCEDURE — 36415 COLL VENOUS BLD VENIPUNCTURE: CPT

## 2023-12-08 PROCEDURE — 2580000003 HC RX 258: Performed by: NURSE PRACTITIONER

## 2023-12-08 PROCEDURE — 83880 ASSAY OF NATRIURETIC PEPTIDE: CPT

## 2023-12-08 PROCEDURE — 99233 SBSQ HOSP IP/OBS HIGH 50: CPT | Performed by: INTERNAL MEDICINE

## 2023-12-08 PROCEDURE — 2580000003 HC RX 258: Performed by: INTERNAL MEDICINE

## 2023-12-08 PROCEDURE — 80048 BASIC METABOLIC PNL TOTAL CA: CPT

## 2023-12-08 RX ORDER — NICOTINE 21 MG/24HR
1 PATCH, TRANSDERMAL 24 HOURS TRANSDERMAL DAILY
Qty: 7 PATCH | Refills: 0 | Status: SHIPPED | OUTPATIENT
Start: 2023-12-09 | End: 2023-12-16

## 2023-12-08 RX ORDER — LIDOCAINE 4 G/G
1 PATCH TOPICAL DAILY
Qty: 7 EACH | Refills: 0 | Status: SHIPPED | OUTPATIENT
Start: 2023-12-09 | End: 2023-12-16

## 2023-12-08 RX ORDER — ALBUTEROL SULFATE 90 UG/1
2 AEROSOL, METERED RESPIRATORY (INHALATION) 4 TIMES DAILY PRN
Qty: 18 G | Refills: 1 | Status: SHIPPED | OUTPATIENT
Start: 2023-12-08

## 2023-12-08 RX ORDER — FOLIC ACID 1 MG/1
1 TABLET ORAL DAILY
Qty: 30 TABLET | Refills: 1 | Status: SHIPPED | OUTPATIENT
Start: 2023-12-09

## 2023-12-08 RX ORDER — NICOTINE 21 MG/24HR
1 PATCH, TRANSDERMAL 24 HOURS TRANSDERMAL DAILY
Qty: 7 PATCH | Refills: 0 | Status: SHIPPED | OUTPATIENT
Start: 2023-12-09 | End: 2023-12-08 | Stop reason: SDUPTHER

## 2023-12-08 RX ORDER — ALBUTEROL SULFATE 90 UG/1
2 AEROSOL, METERED RESPIRATORY (INHALATION) 4 TIMES DAILY PRN
Qty: 18 G | Refills: 1 | Status: SHIPPED | OUTPATIENT
Start: 2023-12-08 | End: 2023-12-08 | Stop reason: SDUPTHER

## 2023-12-08 RX ORDER — LIDOCAINE 4 G/G
1 PATCH TOPICAL DAILY
Qty: 7 EACH | Refills: 0 | Status: SHIPPED | OUTPATIENT
Start: 2023-12-09 | End: 2023-12-08 | Stop reason: SDUPTHER

## 2023-12-08 RX ORDER — FOLIC ACID 1 MG/1
1 TABLET ORAL DAILY
Qty: 30 TABLET | Refills: 1 | Status: SHIPPED | OUTPATIENT
Start: 2023-12-09 | End: 2023-12-08 | Stop reason: SDUPTHER

## 2023-12-08 RX ORDER — LABETALOL HYDROCHLORIDE 5 MG/ML
20 INJECTION, SOLUTION INTRAVENOUS ONCE
Status: COMPLETED | OUTPATIENT
Start: 2023-12-08 | End: 2023-12-08

## 2023-12-08 RX ADMIN — CEFTRIAXONE SODIUM 1000 MG: 1 INJECTION, POWDER, FOR SOLUTION INTRAMUSCULAR; INTRAVENOUS at 00:23

## 2023-12-08 RX ADMIN — CLONAZEPAM 1 MG: 1 TABLET ORAL at 10:16

## 2023-12-08 RX ADMIN — FUROSEMIDE 20 MG: 20 TABLET ORAL at 10:16

## 2023-12-08 RX ADMIN — Medication 10 ML: at 10:16

## 2023-12-08 RX ADMIN — APIXABAN 5 MG: 5 TABLET, FILM COATED ORAL at 10:16

## 2023-12-08 RX ADMIN — CARVEDILOL 12.5 MG: 6.25 TABLET, FILM COATED ORAL at 10:16

## 2023-12-08 RX ADMIN — SODIUM CHLORIDE, PRESERVATIVE FREE 10 ML: 5 INJECTION INTRAVENOUS at 00:20

## 2023-12-08 RX ADMIN — ACETAMINOPHEN 650 MG: 325 TABLET ORAL at 10:16

## 2023-12-08 RX ADMIN — FOLIC ACID 1 MG: 1 TABLET ORAL at 10:16

## 2023-12-08 RX ADMIN — LABETALOL HYDROCHLORIDE 20 MG: 5 INJECTION, SOLUTION INTRAVENOUS at 06:44

## 2023-12-08 RX ADMIN — SODIUM CHLORIDE, PRESERVATIVE FREE 10 ML: 5 INJECTION INTRAVENOUS at 06:46

## 2023-12-08 RX ADMIN — LEVOTHYROXINE SODIUM 100 MCG: 0.1 TABLET ORAL at 05:04

## 2023-12-08 RX ADMIN — OXYCODONE AND ACETAMINOPHEN 1 TABLET: 5; 325 TABLET ORAL at 13:10

## 2023-12-08 RX ADMIN — ASPIRIN 81 MG 81 MG: 81 TABLET ORAL at 10:16

## 2023-12-08 ASSESSMENT — PAIN DESCRIPTION - DESCRIPTORS
DESCRIPTORS: ACHING;DISCOMFORT
DESCRIPTORS: ACHING
DESCRIPTORS: ACHING

## 2023-12-08 ASSESSMENT — PAIN DESCRIPTION - LOCATION
LOCATION: HEAD
LOCATION: BACK
LOCATION: HEAD

## 2023-12-08 ASSESSMENT — PAIN DESCRIPTION - ORIENTATION: ORIENTATION: MID;LOWER

## 2023-12-08 ASSESSMENT — PAIN SCALES - GENERAL
PAINLEVEL_OUTOF10: 6
PAINLEVEL_OUTOF10: 7
PAINLEVEL_OUTOF10: 4

## 2023-12-08 ASSESSMENT — PAIN - FUNCTIONAL ASSESSMENT: PAIN_FUNCTIONAL_ASSESSMENT: ACTIVITIES ARE NOT PREVENTED

## 2023-12-08 NOTE — PROGRESS NOTES
[address] [facility] Department   Phone: [phone]    Physical Therapy    [] Initial Evaluation            [x] Daily Treatment Note         [] Discharge Summary      Patient: Corby Turner   : 1945   MRN: [id number]   Date of Service:  2023  Admitting Diagnosis: Sepsis due to urinary tract infection [facility])  Current Admission Summary: 77 y.o. female with a pmh of current smoker, COPD, chronic CHF with preserved EF, prior CVA, hypertension and recurrent falls who presents with generalized weakness, fall at home and found to have sepsis due to UTI    Past Medical History:  has a past medical history of Acute DVT (deep venous thrombosis) ([address]), Acute encephalopathy, Acute on chronic diastolic heart failure ([address]), Alcohol abuse, Anxiety, Arthritis, CAD in native artery, Cellulitis, Cerebral artery occlusion with cerebral infarction [facility]), Cerebrovascular accident (CVA) ([address]), Chronic fatigue, Complex care coordination, Complicated UTI (urinary tract infection), Congestive heart failure, unspecified HF chronicity, unspecified heart failure type ([address]), COPD (chronic obstructive pulmonary disease) ([address]), COPD exacerbation ([address]), Depression, Diabetes mellitus ([address]), DIMAS (dyspnea on exertion), DVT (deep venous thrombosis) ([address]), DVT, lower extremity ([address]), Fatigue, General weakness, Generalized weakness, Hypercholesteremia, Hypertension, Hyperthyroidism, Incontinence, Mammogram abnormal, Neuromuscular disorder ([address]), Osteopenia, PAF (paroxysmal atrial fibrillation) ([address]), Pneumonia, Recurrent UTI, Right forearm cellulitis, Superficial thrombophlebitis of right upper extremity, Thyroid disease, Tobacco abuse, Tobacco abuse counseling, Trapped lung, and Unable to walk. Past Surgical History:  has a past surgical history that includes Tonsillectomy; Colonoscopy (2012); Toe Surgery (Right); Shoulder arthroscopy (Right, 14); eye surgery;  Pacemaker insertion; pacemaker [facility].  Safety Interventions: patient left in chair, chair alarm in place, call light within reach, patient at risk for falls, and nurse notified    Plan  Frequency: 3-5 x/per week  Current Treatment Recommendations: strengthening, ROM, balance training, functional mobility training, transfer training, gait training, stair training, endurance training, and patient/caregiver education    Goals  Patient Goals: Return home   Short Term Goals:  Time Frame: Upon d/c  Patient will complete bed mobility at [facility]   Patient will complete transfers at [facility]   Patient will ambulate 50 ft with use of rolling walker at modified independent  Patient will ascend/descend 8 stairs with (B) handrail at modified independent    Above goals reviewed on 12/8/2023. All goals are ongoing at this time unless indicated above.       Therapy Session Time      Individual Group Co-treatment   Time In 5070       Time Out 1100       Minutes 28         Timed Code Treatment Minutes:  28 Minutes  Total Treatment Minutes:  28 Minutes       Electronically Signed By: Francisco Burns, PT  Francisco Burns PT, DPT, [id number]

## 2023-12-08 NOTE — PLAN OF CARE
Problem: Discharge Planning  Goal: Discharge to home or other facility with appropriate resources  12/8/2023 1418 by Angle Eugene RN  Outcome: Adequate for Discharge  12/8/2023 0626 by Ana Cisse RN  Outcome: Progressing  Flowsheets (Taken 12/7/2023 1954)  Discharge to home or other facility with appropriate resources:   Identify barriers to discharge with patient and caregiver   Arrange for needed discharge resources and transportation as appropriate   Identify discharge learning needs (meds, wound care, etc)   Refer to discharge planning if patient needs post-hospital services based on physician order or complex needs related to functional status, cognitive ability or social support system     Problem: Pain  Goal: Verbalizes/displays adequate comfort level or baseline comfort level  12/8/2023 1418 by Angle Eugene RN  Outcome: Adequate for Discharge  12/8/2023 0626 by Ana Cisse RN  Outcome: Progressing  Flowsheets (Taken 12/7/2023 1954)  Verbalizes/displays adequate comfort level or baseline comfort level:   Encourage patient to monitor pain and request assistance   Assess pain using appropriate pain scale   Administer analgesics based on type and severity of pain and evaluate response   Implement non-pharmacological measures as appropriate and evaluate response   Consider cultural and social influences on pain and pain management   Notify Licensed Independent Practitioner if interventions unsuccessful or patient reports new pain     Problem: Safety - Adult  Goal: Free from fall injury  12/8/2023 1418 by Angle Eugene RN  Outcome: Adequate for Discharge  12/8/2023 5577 by Ana Cisse RN  Outcome: Progressing  Flowsheets (Taken 12/7/2023 1954)  Free From Fall Injury: Instruct family/caregiver on patient safety     Problem: Chronic Conditions and Co-morbidities  Goal: Patient's chronic conditions and co-morbidity symptoms are monitored and maintained or improved  12/8/2023 1418 by Mariela Eugene, RN  Outcome: Adequate for Discharge  12/8/2023 1953 by Rizwan Medellin, RN  Outcome: Progressing  Flowsheets (Taken 12/7/2023 1954)  Care Plan - Patient's Chronic Conditions and Co-Morbidity Symptoms are Monitored and Maintained or Improved:   Monitor and assess patient's chronic conditions and comorbid symptoms for stability, deterioration, or improvement   Collaborate with multidisciplinary team to address chronic and comorbid conditions and prevent exacerbation or deterioration   Update acute care plan with appropriate goals if chronic or comorbid symptoms are exacerbated and prevent overall improvement and discharge     Problem: Skin/Tissue Integrity  Goal: Absence of new skin breakdown  Description: 1. Monitor for areas of redness and/or skin breakdown  2. Assess vascular access sites hourly  3. Every 4-6 hours minimum:  Change oxygen saturation probe site  4. Every 4-6 hours:  If on nasal continuous positive airway pressure, respiratory therapy assess nares and determine need for appliance change or resting period.   12/8/2023 1418 by Zachary Murcia RN  Outcome: Adequate for Discharge  12/8/2023 0626 by Rizwan Medellin, RN  Outcome: Progressing

## 2023-12-08 NOTE — CARE COORDINATION
12/08/23 1133   IMM Letter   IMM Letter given to Patient/Family/Significant other/Guardian/POA/by: IMM Letter given to Patient by SW. Patient signed IMM Letter and is in agreement with D/C even with less than 4 hour notice of D/C.    IMM Letter date given: 12/08/23   IMM Letter time given: 36         Electronically signed by GISELLA Perez on 12/8/2023 at 11:34 AM
[address] Received home care referral. Will follow.
[address] home care referral. Spoke with pt and re: home care plan of care/services. Agreeable. Demographic's verified. Will follow for home care.
Aware of discharge with home care. Home care orders sent to [facility]. Discharge planner notified.
Case Management -  Discharge Note      Patient Name: Ben Ackerman                   YOB: 1945            Readmission Risk (Low < 19, Mod (19-27), High > 27): Readmission Risk Score: 13.1    Current PCP: QUITA Avelar NP    ([facility]) Important Message from Medicare:    Date: 12/08/2023    PT AM-PAC: 12 /24  OT AM-PAC: 15 /24    Patient/patient representative has been educated on the benefits of SNF as well as the possible risks of declining recommended services. Patient/patient representative has acknowledged the information provided and decided on the following discharge plan. Patient/ patient representative has been provided freedom of choice regarding service provider, supported by basic dialogue that supports the patient's individualized plan of care/goals. Home Care Information:   Is patient resuming current home health care services: No    Home Care Agency:   [address] [place] [address] [address] [address]  Phone: [phone]  Fax: [phone]           Services: PT/OT/Nursing  Home Health Order Obtained: CM perfect served Dr. Carey Vann and asked her to please place [address] orders for this Patient. Home health agency notified of discharge.        Financial    Payor: Serafin Rivera / Plan: [employer] GOLD PLUS HMO / Product Type: *No Product type* /     Pharmacy:  Potential assistance Purchasing Medications: No  Meds-to-Beds request:        Jeffry Ortiz [id number] Flores Restrepo [facility] [phone]  [address]  [address]  Phone: [phone] Fax: [phone], [address] [facility],[address]  [address] [facility] [address]  Phone: [phone] Fax: [phone] [facility] [phone] Eldridge Bodily, [address] [facility],[address] [place] [address] Drive [phone] - F [phone]  [address] [place] [address]  Phone: [phone] Fax:
Type of Home Care services:  None    ADLS  Prior functional level: Assistance with the following:, Housework, Mobility, Bathing, Independent in ADLs/IADLs  Current functional level: Assistance with the following:, Housework, Mobility, Bathing, Independent in ADLs/IADLs    PT AM-PAC: 16 /24  OT AM-PAC: 15 /24    Family can provide assistance at DC: Yes (Pt states her daughter is able to help the pt home when she discharges)  Would you like Case Management to discuss the discharge plan with any other family members/significant others, and if so, who? Yes (Daughter- Milo Del Real)  Plans to Return to Present Housing: Unknown at present (PT/OT rec SNF)  Other Identified Issues/Barriers to RETURNING to current housing:   Potential Assistance needed at discharge: [address] [facility]            Potential DME:    Patient expects to discharge to: [address] [place] [address] for transportation at discharge: Self    Financial    Payor: Jhonny Rivera / Plan: [address] / Product Type: *No Product type* /     Does insurance require precert for SNF: Yes    Potential assistance Purchasing Medications: No  Meds-to-Beds request:        Golide Howell [id number] [facility] [phone]  [address]  [address] [address]  Phone: [phone] Fax: [phone] [facility], [address] [place] [address] [address] [facility],[address]  [address] [facility],Building [facility] [address]  Phone: [phone] Fax: [phone] [facility] [phone] [facility],Building [address] [phone] [facility] [phone]  [address] [place] [address],[address] [address]  Phone: [phone] Fax: [phone]      Notes:    Factors facilitating achievement of predicted outcomes: Family support, Motivated, Cooperative, and Pleasant    Barriers to discharge: Medical complications    Additional Case Management Notes: The SW spoke with the pt.  The pt is from home and lives with her  and

## 2023-12-08 NOTE — DISCHARGE SUMMARY
IV and tele removed. No personal items in room. Paperwork explained to pt and daughter. Medications re-routed to their home pharmacy [facility]. Will transport to lobby via wheelchair.
tablet by mouth 2 times daily as needed for Anxiety for up to 28 days. , Disp-60 tablet, R-0Normal      potassium chloride (KLOR-CON) 10 MEQ extended release tablet TAKE 1 TABLET BY MOUTH DAILY, Disp-90 tablet, R-1Normal      levothyroxine (SYNTHROID) 100 MCG tablet TAKE 1 TABLET BY MOUTH ONCE DAILY, Disp-90 tablet, R-1Normal      lisinopril (PRINIVIL;ZESTRIL) 5 MG tablet Take 1 tablet by mouth daily, Disp-90 tablet, R-1Normal      cloNIDine (CATAPRES) 0.1 MG tablet TAKE ONE TABLET BY MOUTH THREE TIMES A DAY, Disp-90 tablet, R-1Normal      carvedilol (COREG) 12.5 MG tablet TAKE 1 TABLET BY MOUTH TWICE DAILY, Disp-180 tablet, R-1Normal      mirtazapine (REMERON) 15 MG tablet TAKE ONE TABLET BY MOUTH DAILY, Disp-90 tablet, R-1Normal      furosemide (LASIX) 40 MG tablet TAKE 1/2 TABLET BY MOUTH DAILY, Disp-45 tablet, R-0Normal      mometasone-formoterol (DULERA) 100-5 MCG/ACT inhaler Inhale 2 puffs into the lungs in the morning and 2 puffs in the evening., Disp-1 each, R-2Normal      ipratropium 0.5 mg-albuterol 2.5 mg (DUONEB) 0.5-2.5 (3) MG/3ML SOLN nebulizer solution Inhale 3 mLs into the lungs every 6 hours as needed for Shortness of Breath, Disp-360 mL, R-0Normal      oxybutynin (DITROPAN) 5 MG tablet TAKE 1 TABLET BY MOUTH THREE TIMES DAILY, Disp-90 tablet, R-10Normal      aspirin 81 MG chewable tablet Take 1 tablet by mouth daily, Disp-30 tablet, R-0Normal           The patient was seen and examined on day of discharge and this discharge summary is in conjunction with any daily progress note from day of discharge. Time Spent on discharge is 45 minutes  in the examination, evaluation, counseling and review of medications and discharge plan.       Note that greater  than 30 minutes was spent in preparing discharge papers, discussing discharge with patient, medication review, etc.     Signed:    Essence Frias MD   12/8/2023      Thank you QUITA López - ADILIA for the opportunity to be involved in

## 2023-12-08 NOTE — DISCHARGE INSTRUCTIONS
Follow up with your PCP within 5-7  days of discharge. Follow up with Cardiology; Outpatient Watchman procedure recommended   Take all your medications as prescribed.

## 2023-12-08 NOTE — PLAN OF CARE
Problem: Discharge Planning  Goal: Discharge to home or other facility with appropriate resources  Outcome: Progressing  Flowsheets (Taken 12/7/2023 1954)  Discharge to home or other facility with appropriate resources:   Identify barriers to discharge with patient and caregiver   Arrange for needed discharge resources and transportation as appropriate   Identify discharge learning needs (meds, wound care, etc)   Refer to discharge planning if patient needs post-hospital services based on physician order or complex needs related to functional status, cognitive ability or social support system     Problem: Pain  Goal: Verbalizes/displays adequate comfort level or baseline comfort level  Outcome: Progressing  Flowsheets (Taken 12/7/2023 1954)  Verbalizes/displays adequate comfort level or baseline comfort level:   Encourage patient to monitor pain and request assistance   Assess pain using appropriate pain scale   Administer analgesics based on type and severity of pain and evaluate response   Implement non-pharmacological measures as appropriate and evaluate response   Consider cultural and social influences on pain and pain management   Notify Licensed Independent Practitioner if interventions unsuccessful or patient reports new pain     Problem: Safety - Adult  Goal: Free from fall injury  Outcome: Progressing  Flowsheets (Taken 12/7/2023 1954)  Free From Fall Injury: Instruct family/caregiver on patient safety     Problem: Chronic Conditions and Co-morbidities  Goal: Patient's chronic conditions and co-morbidity symptoms are monitored and maintained or improved  Outcome: Progressing  Flowsheets (Taken 12/7/2023 1954)  Care Plan - Patient's Chronic Conditions and Co-Morbidity Symptoms are Monitored and Maintained or Improved:   Monitor and assess patient's chronic conditions and comorbid symptoms for stability, deterioration, or improvement   Collaborate with multidisciplinary team to address chronic and comorbid

## 2023-12-08 NOTE — TELEPHONE ENCOUNTER
Patient was in hospital 12/4-12/8 for sepsis due to uti.     Jossue Hurtado from [facility] requesting verbal orders for physical therapy, occupational therapy, speech, and nursing     [phone]

## 2023-12-08 NOTE — PROGRESS NOTES
12/8/23 5:04 AM  [phone] Hospital or Facility: [facility] From: Maria Del Carmen Max RE: [facility] CTR MORGAN 1945 RM: [id number] /97. no PRNs ordered. Give anything? Need Callback: NO CALLBACK REQ 3T ROUTINE  Read 5:07 AM   12/8/23 5:09 AM   Repeat in 30mins  12/8/23 5:49 AM  50 mins. 181/99  Read 5:57 AM   12/8/23 5:59 AM   Hydralazine 10mg iv  12/8/23 6:08 AM  Allergic  Read 6:10 AM   12/8/23 6:17 AM   Labetalol 20mg iv  12/8/23 6:20 AM  Ordered, thanks.   Unread

## 2023-12-22 PROBLEM — J96.00 ACUTE RESPIRATORY FAILURE (HCC): Status: RESOLVED | Noted: 2023-08-26 | Resolved: 2023-12-22

## 2023-12-22 PROBLEM — E87.1 ACUTE HYPONATREMIA: Status: RESOLVED | Noted: 2022-06-07 | Resolved: 2023-12-22

## 2023-12-26 PROBLEM — E87.1 HYPONATREMIA: Status: RESOLVED | Noted: 2019-07-21 | Resolved: 2023-12-26

## 2024-01-09 ENCOUNTER — TELEPHONE (OUTPATIENT)
Dept: FAMILY MEDICINE CLINIC | Age: 79
End: 2024-01-09

## 2024-01-15 ENCOUNTER — OFFICE VISIT (OUTPATIENT)
Dept: FAMILY MEDICINE CLINIC | Age: 79
End: 2024-01-15
Payer: MEDICARE

## 2024-01-15 VITALS
HEART RATE: 76 BPM | TEMPERATURE: 97.2 F | SYSTOLIC BLOOD PRESSURE: 171 MMHG | DIASTOLIC BLOOD PRESSURE: 112 MMHG | BODY MASS INDEX: 18.56 KG/M2 | WEIGHT: 115 LBS

## 2024-01-15 DIAGNOSIS — G47.09 OTHER INSOMNIA: ICD-10-CM

## 2024-01-15 DIAGNOSIS — Z00.00 MEDICARE ANNUAL WELLNESS VISIT, SUBSEQUENT: Primary | ICD-10-CM

## 2024-01-15 DIAGNOSIS — Z87.891 PERSONAL HISTORY OF TOBACCO USE: ICD-10-CM

## 2024-01-15 DIAGNOSIS — F17.200 SMOKER: ICD-10-CM

## 2024-01-15 PROCEDURE — G0296 VISIT TO DETERM LDCT ELIG: HCPCS | Performed by: NURSE PRACTITIONER

## 2024-01-15 PROCEDURE — 3080F DIAST BP >= 90 MM HG: CPT | Performed by: NURSE PRACTITIONER

## 2024-01-15 PROCEDURE — G0439 PPPS, SUBSEQ VISIT: HCPCS | Performed by: NURSE PRACTITIONER

## 2024-01-15 PROCEDURE — G8484 FLU IMMUNIZE NO ADMIN: HCPCS | Performed by: NURSE PRACTITIONER

## 2024-01-15 PROCEDURE — 3077F SYST BP >= 140 MM HG: CPT | Performed by: NURSE PRACTITIONER

## 2024-01-15 PROCEDURE — 1123F ACP DISCUSS/DSCN MKR DOCD: CPT | Performed by: NURSE PRACTITIONER

## 2024-01-15 ASSESSMENT — PATIENT HEALTH QUESTIONNAIRE - PHQ9
SUM OF ALL RESPONSES TO PHQ QUESTIONS 1-9: 0
SUM OF ALL RESPONSES TO PHQ QUESTIONS 1-9: 0
1. LITTLE INTEREST OR PLEASURE IN DOING THINGS: 0
9. THOUGHTS THAT YOU WOULD BE BETTER OFF DEAD, OR OF HURTING YOURSELF: 0
7. TROUBLE CONCENTRATING ON THINGS, SUCH AS READING THE NEWSPAPER OR WATCHING TELEVISION: 0
3. TROUBLE FALLING OR STAYING ASLEEP: 0
4. FEELING TIRED OR HAVING LITTLE ENERGY: 0
SUM OF ALL RESPONSES TO PHQ QUESTIONS 1-9: 0
SUM OF ALL RESPONSES TO PHQ9 QUESTIONS 1 & 2: 0
10. IF YOU CHECKED OFF ANY PROBLEMS, HOW DIFFICULT HAVE THESE PROBLEMS MADE IT FOR YOU TO DO YOUR WORK, TAKE CARE OF THINGS AT HOME, OR GET ALONG WITH OTHER PEOPLE: 0
6. FEELING BAD ABOUT YOURSELF - OR THAT YOU ARE A FAILURE OR HAVE LET YOURSELF OR YOUR FAMILY DOWN: 0
SUM OF ALL RESPONSES TO PHQ QUESTIONS 1-9: 0
8. MOVING OR SPEAKING SO SLOWLY THAT OTHER PEOPLE COULD HAVE NOTICED. OR THE OPPOSITE, BEING SO FIGETY OR RESTLESS THAT YOU HAVE BEEN MOVING AROUND A LOT MORE THAN USUAL: 0
5. POOR APPETITE OR OVEREATING: 0
2. FEELING DOWN, DEPRESSED OR HOPELESS: 0

## 2024-01-15 NOTE — PATIENT INSTRUCTIONS
reliever, such as acetaminophen (Tylenol).   When should you call for help?   Call 911 if you have symptoms of a heart attack. These may include:    Chest pain or pressure, or a strange feeling in the chest.     Sweating.     Shortness of breath.     Pain, pressure, or a strange feeling in the back, neck, jaw, or upper belly or in one or both shoulders or arms.     Lightheadedness or sudden weakness.     A fast or irregular heartbeat.   After you call 911, the  may tell you to chew 1 adult-strength or 2 to 4 low-dose aspirin. Wait for an ambulance. Do not try to drive yourself.  Watch closely for changes in your health, and be sure to contact your doctor if you have any problems.  Where can you learn more?  Go to https://www.[employer].net/patientEd and enter F075 to learn more about \"A Healthy Heart: Care Instructions.\"  Current as of: June 25, 2023               Content Version: 13.9  © 0876-4300 [employer].   Care instructions adapted under license by [employer]. If you have questions about a medical condition or this instruction, always ask your healthcare professional. [employer] disclaims any warranty or liability for your use of this information.      Personalized Preventive Plan for Sharon Kitchen - 1/15/2024  Medicare offers a range of preventive health benefits. Some of the tests and screenings are paid in full while other may be subject to a deductible, co-insurance, and/or copay.    Some of these benefits include a comprehensive review of your medical history including lifestyle, illnesses that may run in your family, and various assessments and screenings as appropriate.    After reviewing your medical record and screening and assessments performed today your provider may have ordered immunizations, labs, imaging, and/or referrals for you.  A list of these orders (if applicable) as well as your Preventive Care list are included within your After Visit Summary for

## 2024-01-15 NOTE — PROGRESS NOTES
MEDICARE ANNUAL WELLNESS VISIT    Patient is here for their Medicare Annual Wellness Visit   Reviewed recent labs, stable.  PT is due for vaccinations.  Medication check on clonazepam, well controlled.  PT continues smoking 1 ppd, has not had CT lung screen.  Reports compliance with medications, reports heated discussion in car on way here, why in office readings are elevated.  No other concerns at this time.    Last eye exam: 2017 maybe, unsure  Last dental exam: has dentures  Exercise:  daily, activities of daily living only  Do you eat balanced/healthy meals regularly? Yes    How would you rate your overall health? : Fair            1/15/2024     1:38 PM 11/17/2022    10:28 AM 7/6/2022    10:21 PM 7/6/2022     9:46 AM 9/23/2021     3:43 PM 8/7/2020    10:41 AM 5/13/2019    12:17 PM   Fall Risk   2 or more falls in past year? no yes  no yes no no   Fall with injury in past year? no yes yes no yes no no           1/15/2024     1:38 PM 2/13/2023     1:21 PM 11/17/2022    10:28 AM 2/2/2022     8:51 AM 3/1/2018    10:54 AM 2/14/2017     1:42 PM 2/14/2017     1:40 PM   PHQ Scores   PHQ2 Score 0 5 1 5 2 2 2   PHQ9 Score 0 24 6 10 2 2 2         Do you always wear a seat belt in the car?: Yes      Have you noted any problems with hearing?: Yes  Have you noted any vision problems?: Yes  Do you have concerns about your sexual health?: no  In the past month how much has pain been an issue for you?:  A little bit  In the past month have you had issues with anxiety, loneliness, irritability or fatigue:  Quite a bit    Do you take opioid medications even sometimes? No (if using assess risk and whether other treatments would be beneficial)    Living Will and/or Healthcare POA: Yes,   Copy on file      Healthcare Decision Maker:    Primary Decision Maker: Florentino Kitchen - Spouse - [phone]    Secondary Decision Maker: Doris Kitchen - Child - [phone]  Click here to complete Healthcare Decision Makers including selection of

## 2024-01-15 NOTE — PROGRESS NOTES
Medicare Annual Wellness Visit    Sharon Kitchen is here for Medicare AWV    Assessment & Plan   Medicare annual wellness visit, subsequent  Smoker  -     CT Lung Screen (Initial or Annual); Future  Personal history of tobacco use  -     FL VISIT TO DISCUSS LUNG CA SCREEN W LDCT  -     FL VISIT TO DISCUSS LUNG CA SCREEN W LDCT  -     CT Lung Screen (Initial/Annual/Baseline); Future    Recommendations for Preventive Services Due: see orders and patient instructions/AVS.  Recommended screening schedule for the next 5-10 years is provided to the patient in written form: see Patient Instructions/AVS.     No follow-ups on file.     Subjective   {OPTIONAL - WILL AUTO-DELETE IF NOT USED:5091817245}    Patient's complete Health Risk Assessment and screening values have been reviewed and are found in Flowsheets. The following problems were reviewed today and where indicated follow up appointments were made and/or referrals ordered.    Positive Risk Factor Screenings with Interventions:    Fall Risk:  Do you feel unsteady or are you worried about falling? : (!) yes (uses a walker)  2 or more falls in past year?: no  Fall with injury in past year?: no     Interventions:    {Fall Interventions:1528329178}             Activity, Diet, and Weight:               Body mass index is 18.56 kg/m².      Inactivity Interventions:  {Inactivity Interventions:033333605}              Tobacco Use:  Tobacco Use: High Risk (1/15/2024)    Patient History    • Smoking Tobacco Use: Every Day    • Smokeless Tobacco Use: Never    • Passive Exposure: Not on file     E-cigarette/Vaping     Questions Responses    E-cigarette/Vaping Use Never User    Start Date     Passive Exposure     Quit Date     Counseling Given     Comments         Interventions:  {Tobacco Interventions:159678415}    {Optional - Use if Billing for Tobacco Counselin}      {OPTIONAL- LDCT, CVD, STI Counseling Statements:7184285223}            Objective   Vitals:

## 2024-01-16 RX ORDER — FUROSEMIDE 40 MG/1
20 TABLET ORAL DAILY
Qty: 45 TABLET | Refills: 0 | Status: SHIPPED | OUTPATIENT
Start: 2024-01-16

## 2024-01-17 ENCOUNTER — TELEPHONE (OUTPATIENT)
Dept: FAMILY MEDICINE CLINIC | Age: 79
End: 2024-01-17

## 2024-01-17 DIAGNOSIS — Z86.73 CHRONIC ISCHEMIC MULTIFOCAL MULTIPLE VASCULAR TERRITORIES STROKE: ICD-10-CM

## 2024-01-17 DIAGNOSIS — J44.9 COPD, MODERATE (HCC): ICD-10-CM

## 2024-01-17 DIAGNOSIS — J44.1 COPD WITH ACUTE EXACERBATION (HCC): ICD-10-CM

## 2024-01-17 DIAGNOSIS — J43.2 CENTRILOBULAR EMPHYSEMA (HCC): ICD-10-CM

## 2024-01-17 DIAGNOSIS — I50.22 CHRONIC SYSTOLIC (CONGESTIVE) HEART FAILURE (HCC): Primary | ICD-10-CM

## 2024-01-17 RX ORDER — IPRATROPIUM BROMIDE AND ALBUTEROL SULFATE 2.5; .5 MG/3ML; MG/3ML
3 SOLUTION RESPIRATORY (INHALATION) EVERY 6 HOURS PRN
Qty: 360 ML | Refills: 0 | Status: SHIPPED | OUTPATIENT
Start: 2024-01-17

## 2024-01-17 RX ORDER — ALBUTEROL SULFATE 90 UG/1
2 AEROSOL, METERED RESPIRATORY (INHALATION) 4 TIMES DAILY PRN
Qty: 18 G | Refills: 1 | Status: SHIPPED | OUTPATIENT
Start: 2024-01-17

## 2024-01-19 PROBLEM — A41.9 SEPSIS DUE TO URINARY TRACT INFECTION (HCC): Status: RESOLVED | Noted: 2023-12-04 | Resolved: 2024-01-19

## 2024-01-19 PROBLEM — N39.0 SEPSIS DUE TO URINARY TRACT INFECTION (HCC): Status: RESOLVED | Noted: 2023-12-04 | Resolved: 2024-01-19

## 2024-01-22 ENCOUNTER — TELEPHONE (OUTPATIENT)
Dept: FAMILY MEDICINE CLINIC | Age: 79
End: 2024-01-22

## 2024-01-22 DIAGNOSIS — F41.9 ANXIETY: ICD-10-CM

## 2024-01-22 RX ORDER — NEBULIZER ACCESSORIES
1 KIT MISCELLANEOUS DAILY PRN
Qty: 1 KIT | Refills: 0 | Status: SHIPPED | OUTPATIENT
Start: 2024-01-22 | End: 2024-01-23 | Stop reason: SDUPTHER

## 2024-01-22 RX ORDER — CLONAZEPAM 1 MG/1
1 TABLET ORAL 2 TIMES DAILY PRN
Qty: 60 TABLET | Refills: 0 | Status: SHIPPED | OUTPATIENT
Start: 2024-01-22 | End: 2024-02-19

## 2024-01-22 NOTE — TELEPHONE ENCOUNTER
Medication:   Requested Prescriptions     Pending Prescriptions Disp Refills    clonazePAM (KLONOPIN) 1 MG tablet 60 tablet 0     Sig: Take 1 tablet by mouth 2 times daily as needed for Anxiety for up to 28 days.      Last Filled:  12/21/2023    Patient Phone Number: [phone] (home)     Last appt: 1/15/2024   Next appt: Visit date not found    Last OARRS:       5/11/2020     2:20 PM   RX Monitoring   Periodic Controlled Substance Monitoring No signs of potential drug abuse or diversion identified.     PDMP Monitoring:    Last PDMP Cristian as Reviewed ([place]):  Review User Review Instant Review Result   VRIGIL GRANT 1/15/2024  1:47 PM Reviewed PDMP [1]     Preferred Pharmacy:   [facility] [id number] - [facility] [address] [phone] - F [phone]  [address]  [facility] [address]  Phone: [phone] Fax: [phone]    [facility] [place] [facility] [address] [facility] -  [phone] - F [phone]  [address] [facility] [address]  Phone: [phone] Fax: [phone]    [facility] [id number] - [facility] [address] - P [phone] - F [phone]  [address] [facility] [address]  Phone: [phone] Fax: [phone]

## 2024-01-22 NOTE — TELEPHONE ENCOUNTER
Tubing and medication for nebulizer refilled to [facility] on [address] as requested.

## 2024-01-22 NOTE — TELEPHONE ENCOUNTER
Pt daughter called in regards to mothers breathing machine. Pt stated that she spoke with Sherrill's nurse and they needed to know the brand of the machine. Pt daughter said its a dalia respiratory equipment brand. Pt needs the tubes and everything it comes with.     Please advise...    [facility] [id number] - [place] [address] [address] [phone] - F [phone] [[id number]]

## 2024-01-22 NOTE — TELEPHONE ENCOUNTER
clonazePAM (KLONOPIN) 1 MG tablet [[id number]]  [facility] [id number] - [place], [place] - [address] - P [phone] - F [phone]  [address], [facility] [address]  Phone: [phone]  Fax: [phone]

## 2024-01-23 ENCOUNTER — TELEPHONE (OUTPATIENT)
Dept: FAMILY MEDICINE CLINIC | Age: 79
End: 2024-01-23

## 2024-01-23 RX ORDER — NEBULIZER ACCESSORIES
1 KIT MISCELLANEOUS DAILY PRN
Qty: 1 KIT | Refills: 0 | Status: SHIPPED | OUTPATIENT
Start: 2024-01-23

## 2024-01-23 NOTE — TELEPHONE ENCOUNTER
----- Message from Christopher Diaz sent at 1/23/2024 12:48 PM EST -----  Subject: Refill Request    QUESTIONS  Name of Medication? Respiratory Therapy Supplies   (NEBULIZER/TUBING/MOUTHPIECE) KIT  Patient-reported dosage and instructions? kit  How many days do you have left? 0  [facility] Pharm[facility]  Pharmacy phone number (if available)? [phone]  ---------------------------------------------------------------------------  --------------  CALL BACK INFO  What is the best way for the office to contact you? OK to leave message on   voicemail  Preferred Call Back Phone Number? [phone]  ---------------------------------------------------------------------------  --------------  SCRIPT ANSWERS  Relationship to Patient? Self

## 2024-02-04 DIAGNOSIS — I10 UNCONTROLLED HYPERTENSION: ICD-10-CM

## 2024-02-05 RX ORDER — CLONIDINE HYDROCHLORIDE 0.1 MG/1
TABLET ORAL
Qty: 90 TABLET | Refills: 1 | Status: SHIPPED | OUTPATIENT
Start: 2024-02-05

## 2024-02-27 DIAGNOSIS — F41.9 ANXIETY: ICD-10-CM

## 2024-02-27 DIAGNOSIS — I10 UNCONTROLLED HYPERTENSION: ICD-10-CM

## 2024-02-27 NOTE — TELEPHONE ENCOUNTER
Pt refill :    cloNIDine (CATAPRES) 0.1 MG tablet     Pharmacy:    [facility] [id number] - [place], [place] - [facility] [address] [phone] - F [phone]  [address], [facility] [address]  Phone: [phone]  Fax: [phone]       Please advise...

## 2024-02-28 RX ORDER — CLONAZEPAM 1 MG/1
1 TABLET ORAL 2 TIMES DAILY PRN
Qty: 60 TABLET | Refills: 0 | Status: SHIPPED | OUTPATIENT
Start: 2024-02-28 | End: 2024-04-16

## 2024-02-28 RX ORDER — CLONIDINE HYDROCHLORIDE 0.1 MG/1
0.1 TABLET ORAL 3 TIMES DAILY
Qty: 90 TABLET | Refills: 1 | Status: ON HOLD | OUTPATIENT
Start: 2024-02-28

## 2024-04-16 DIAGNOSIS — F41.9 ANXIETY: ICD-10-CM

## 2024-04-16 RX ORDER — FUROSEMIDE 40 MG/1
20 TABLET ORAL DAILY
Qty: 45 TABLET | Refills: 0 | Status: SHIPPED | OUTPATIENT
Start: 2024-04-16

## 2024-04-16 RX ORDER — CLONAZEPAM 1 MG/1
1 TABLET ORAL 2 TIMES DAILY
Qty: 60 TABLET | Refills: 0 | Status: SHIPPED | OUTPATIENT
Start: 2024-04-16 | End: 2024-05-16

## 2024-04-22 ENCOUNTER — APPOINTMENT (OUTPATIENT)
Dept: GENERAL RADIOLOGY | Age: 79
DRG: 177 | End: 2024-04-22
Payer: MEDICARE

## 2024-04-22 ENCOUNTER — HOSPITAL ENCOUNTER (INPATIENT)
Age: 79
LOS: 9 days | Discharge: HOME | DRG: 177 | End: 2024-05-01
Attending: INTERNAL MEDICINE | Admitting: INTERNAL MEDICINE
Payer: MEDICARE

## 2024-04-22 DIAGNOSIS — Z86.73 CHRONIC ISCHEMIC MULTIFOCAL MULTIPLE VASCULAR TERRITORIES STROKE: ICD-10-CM

## 2024-04-22 DIAGNOSIS — J43.2 CENTRILOBULAR EMPHYSEMA (HCC): ICD-10-CM

## 2024-04-22 DIAGNOSIS — A41.9 SEPTICEMIA (HCC): ICD-10-CM

## 2024-04-22 DIAGNOSIS — J18.9 MULTIFOCAL PNEUMONIA: Primary | ICD-10-CM

## 2024-04-22 DIAGNOSIS — J44.9 COPD, MODERATE (HCC): ICD-10-CM

## 2024-04-22 DIAGNOSIS — J44.1 COPD WITH ACUTE EXACERBATION (HCC): ICD-10-CM

## 2024-04-22 DIAGNOSIS — I10 UNCONTROLLED HYPERTENSION: ICD-10-CM

## 2024-04-22 DIAGNOSIS — I50.22 CHRONIC SYSTOLIC (CONGESTIVE) HEART FAILURE (HCC): ICD-10-CM

## 2024-04-22 DIAGNOSIS — J96.01 ACUTE RESPIRATORY FAILURE WITH HYPOXIA (HCC): ICD-10-CM

## 2024-04-22 PROBLEM — J15.9 BACTERIAL PNEUMONIA: Status: ACTIVE | Noted: 2024-04-22

## 2024-04-22 LAB
ALBUMIN SERPL-MCNC: 3.8 G/DL (ref 3.4–5)
ALBUMIN/GLOB SERPL: 1.1 {RATIO} (ref 1.1–2.2)
ALP SERPL-CCNC: 55 U/L (ref 40–129)
ALT SERPL-CCNC: <5 U/L (ref 10–40)
ANION GAP SERPL CALCULATED.3IONS-SCNC: 13 MMOL/L (ref 3–16)
AST SERPL-CCNC: 18 U/L (ref 15–37)
BACTERIA URNS QL MICRO: ABNORMAL /HPF
BASE EXCESS BLDV CALC-SCNC: 3.9 MMOL/L (ref -3–3)
BASOPHILS # BLD: 0 K/UL (ref 0–0.2)
BASOPHILS NFR BLD: 0.2 %
BILIRUB SERPL-MCNC: 0.5 MG/DL (ref 0–1)
BILIRUB UR QL STRIP.AUTO: NEGATIVE
BUN SERPL-MCNC: 11 MG/DL (ref 7–20)
CALCIUM SERPL-MCNC: 8.9 MG/DL (ref 8.3–10.6)
CHLORIDE SERPL-SCNC: 92 MMOL/L (ref 99–110)
CLARITY UR: ABNORMAL
CO2 BLDV-SCNC: 73 MMOL/L
CO2 SERPL-SCNC: 27 MMOL/L (ref 21–32)
COHGB MFR BLDV: 5 % (ref 0–1.5)
COLOR UR: YELLOW
CREAT SERPL-MCNC: 0.5 MG/DL (ref 0.6–1.2)
DEPRECATED RDW RBC AUTO: 14 % (ref 12.4–15.4)
DO-HGB MFR BLDV: 3 %
EOSINOPHIL # BLD: 0 K/UL (ref 0–0.6)
EOSINOPHIL NFR BLD: 0.1 %
EPI CELLS #/AREA URNS AUTO: 3 /HPF (ref 0–5)
FLUAV RNA RESP QL NAA+PROBE: NOT DETECTED
FLUBV RNA RESP QL NAA+PROBE: NOT DETECTED
GFR SERPLBLD CREATININE-BSD FMLA CKD-EPI: >90 ML/MIN/{1.73_M2}
GLUCOSE SERPL-MCNC: 116 MG/DL (ref 70–99)
GLUCOSE UR STRIP.AUTO-MCNC: NEGATIVE MG/DL
HCO3 BLDV-SCNC: 31.1 MMOL/L (ref 23–29)
HCT VFR BLD AUTO: 43.5 % (ref 36–48)
HGB BLD-MCNC: 14.8 G/DL (ref 12–16)
HGB UR QL STRIP.AUTO: ABNORMAL
HYALINE CASTS #/AREA URNS AUTO: 1 /LPF (ref 0–8)
KETONES UR STRIP.AUTO-MCNC: NEGATIVE MG/DL
LACTATE BLDV-SCNC: 1.8 MMOL/L (ref 0.4–1.9)
LEUKOCYTE ESTERASE UR QL STRIP.AUTO: ABNORMAL
LYMPHOCYTES # BLD: 0.3 K/UL (ref 1–5.1)
LYMPHOCYTES NFR BLD: 2 %
MCH RBC QN AUTO: 33.3 PG (ref 26–34)
MCHC RBC AUTO-ENTMCNC: 34 G/DL (ref 31–36)
MCV RBC AUTO: 97.7 FL (ref 80–100)
METHGB MFR BLDV: 0.5 %
MONOCYTES # BLD: 0.6 K/UL (ref 0–1.3)
MONOCYTES NFR BLD: 4 %
NEUTROPHILS # BLD: 14.4 K/UL (ref 1.7–7.7)
NEUTROPHILS NFR BLD: 93.7 %
NITRITE UR QL STRIP.AUTO: POSITIVE
NT-PROBNP SERPL-MCNC: 1356 PG/ML (ref 0–449)
O2 CT VFR BLDV CALC: 21 VOL %
O2 THERAPY: ABNORMAL
PCO2 BLDV: 54.9 MMHG (ref 40–50)
PH BLDV: 7.36 [PH] (ref 7.35–7.45)
PH UR STRIP.AUTO: 6 [PH] (ref 5–8)
PLATELET # BLD AUTO: 292 K/UL (ref 135–450)
PMV BLD AUTO: 8.4 FL (ref 5–10.5)
PO2 BLDV: 84.4 MMHG (ref 25–40)
POTASSIUM SERPL-SCNC: 4 MMOL/L (ref 3.5–5.1)
PROCALCITONIN SERPL IA-MCNC: 0.4 NG/ML (ref 0–0.15)
PROT SERPL-MCNC: 7.2 G/DL (ref 6.4–8.2)
PROT UR STRIP.AUTO-MCNC: 30 MG/DL
RBC # BLD AUTO: 4.45 M/UL (ref 4–5.2)
RBC CLUMPS #/AREA URNS AUTO: 20 /HPF (ref 0–4)
SAO2 % BLDV: 97 %
SARS-COV-2 RNA RESP QL NAA+PROBE: NOT DETECTED
SODIUM SERPL-SCNC: 132 MMOL/L (ref 136–145)
SP GR UR STRIP.AUTO: 1.01 (ref 1–1.03)
TROPONIN, HIGH SENSITIVITY: 11 NG/L (ref 0–14)
TROPONIN, HIGH SENSITIVITY: 12 NG/L (ref 0–14)
UA COMPLETE W REFLEX CULTURE PNL UR: YES
UA DIPSTICK W REFLEX MICRO PNL UR: YES
URN SPEC COLLECT METH UR: ABNORMAL
UROBILINOGEN UR STRIP-ACNC: 1 E.U./DL
WBC # BLD AUTO: 15.4 K/UL (ref 4–11)
WBC #/AREA URNS AUTO: 128 /HPF (ref 0–5)

## 2024-04-22 PROCEDURE — 71045 X-RAY EXAM CHEST 1 VIEW: CPT

## 2024-04-22 PROCEDURE — 87636 SARSCOV2 & INF A&B AMP PRB: CPT

## 2024-04-22 PROCEDURE — 87086 URINE CULTURE/COLONY COUNT: CPT

## 2024-04-22 PROCEDURE — 85025 COMPLETE CBC W/AUTO DIFF WBC: CPT

## 2024-04-22 PROCEDURE — 2580000003 HC RX 258: Performed by: INTERNAL MEDICINE

## 2024-04-22 PROCEDURE — 83605 ASSAY OF LACTIC ACID: CPT

## 2024-04-22 PROCEDURE — 81001 URINALYSIS AUTO W/SCOPE: CPT

## 2024-04-22 PROCEDURE — 96374 THER/PROPH/DIAG INJ IV PUSH: CPT

## 2024-04-22 PROCEDURE — 6370000000 HC RX 637 (ALT 250 FOR IP): Performed by: INTERNAL MEDICINE

## 2024-04-22 PROCEDURE — 87040 BLOOD CULTURE FOR BACTERIA: CPT

## 2024-04-22 PROCEDURE — 82803 BLOOD GASES ANY COMBINATION: CPT

## 2024-04-22 PROCEDURE — 84484 ASSAY OF TROPONIN QUANT: CPT

## 2024-04-22 PROCEDURE — 83880 ASSAY OF NATRIURETIC PEPTIDE: CPT

## 2024-04-22 PROCEDURE — 84145 PROCALCITONIN (PCT): CPT

## 2024-04-22 PROCEDURE — 1200000000 HC SEMI PRIVATE

## 2024-04-22 PROCEDURE — 6360000002 HC RX W HCPCS: Performed by: PHYSICIAN ASSISTANT

## 2024-04-22 PROCEDURE — 80053 COMPREHEN METABOLIC PANEL: CPT

## 2024-04-22 PROCEDURE — 99285 EMERGENCY DEPT VISIT HI MDM: CPT

## 2024-04-22 PROCEDURE — 2580000003 HC RX 258: Performed by: PHYSICIAN ASSISTANT

## 2024-04-22 PROCEDURE — 93005 ELECTROCARDIOGRAM TRACING: CPT | Performed by: EMERGENCY MEDICINE

## 2024-04-22 RX ORDER — FUROSEMIDE 20 MG/1
20 TABLET ORAL DAILY
Status: DISCONTINUED | OUTPATIENT
Start: 2024-04-23 | End: 2024-05-01 | Stop reason: HOSPADM

## 2024-04-22 RX ORDER — SODIUM CHLORIDE 9 MG/ML
INJECTION, SOLUTION INTRAVENOUS PRN
Status: DISCONTINUED | OUTPATIENT
Start: 2024-04-22 | End: 2024-05-01 | Stop reason: HOSPADM

## 2024-04-22 RX ORDER — LEVOFLOXACIN 5 MG/ML
750 INJECTION, SOLUTION INTRAVENOUS EVERY 24 HOURS
Status: DISCONTINUED | OUTPATIENT
Start: 2024-04-22 | End: 2024-04-22

## 2024-04-22 RX ORDER — SODIUM CHLORIDE 0.9 % (FLUSH) 0.9 %
5-40 SYRINGE (ML) INJECTION EVERY 12 HOURS SCHEDULED
Status: DISCONTINUED | OUTPATIENT
Start: 2024-04-22 | End: 2024-05-01 | Stop reason: HOSPADM

## 2024-04-22 RX ORDER — LEVOTHYROXINE SODIUM 0.1 MG/1
100 TABLET ORAL DAILY
Status: DISCONTINUED | OUTPATIENT
Start: 2024-04-23 | End: 2024-05-01 | Stop reason: HOSPADM

## 2024-04-22 RX ORDER — NICOTINE 21 MG/24HR
1 PATCH, TRANSDERMAL 24 HOURS TRANSDERMAL DAILY
Status: DISCONTINUED | OUTPATIENT
Start: 2024-04-23 | End: 2024-05-01 | Stop reason: HOSPADM

## 2024-04-22 RX ORDER — ACETAMINOPHEN/DIPHENHYDRAMINE 500MG-25MG
1 TABLET ORAL NIGHTLY PRN
COMMUNITY

## 2024-04-22 RX ORDER — ALBUTEROL SULFATE 90 UG/1
2 AEROSOL, METERED RESPIRATORY (INHALATION) 4 TIMES DAILY PRN
Status: DISCONTINUED | OUTPATIENT
Start: 2024-04-22 | End: 2024-04-22

## 2024-04-22 RX ORDER — LEVOFLOXACIN 5 MG/ML
750 INJECTION, SOLUTION INTRAVENOUS EVERY 24 HOURS
Status: COMPLETED | OUTPATIENT
Start: 2024-04-23 | End: 2024-04-27

## 2024-04-22 RX ORDER — ACETAMINOPHEN 650 MG/1
650 SUPPOSITORY RECTAL EVERY 6 HOURS PRN
Status: DISCONTINUED | OUTPATIENT
Start: 2024-04-22 | End: 2024-05-01 | Stop reason: HOSPADM

## 2024-04-22 RX ORDER — SODIUM CHLORIDE 0.9 % (FLUSH) 0.9 %
5-40 SYRINGE (ML) INJECTION PRN
Status: DISCONTINUED | OUTPATIENT
Start: 2024-04-22 | End: 2024-05-01 | Stop reason: HOSPADM

## 2024-04-22 RX ORDER — ASPIRIN 81 MG/1
81 TABLET, CHEWABLE ORAL DAILY
Status: DISCONTINUED | OUTPATIENT
Start: 2024-04-23 | End: 2024-05-01 | Stop reason: HOSPADM

## 2024-04-22 RX ORDER — ACETAMINOPHEN 325 MG/1
650 TABLET ORAL EVERY 6 HOURS PRN
Status: DISCONTINUED | OUTPATIENT
Start: 2024-04-22 | End: 2024-05-01 | Stop reason: HOSPADM

## 2024-04-22 RX ORDER — POLYETHYLENE GLYCOL 3350 17 G/17G
17 POWDER, FOR SOLUTION ORAL DAILY PRN
Status: DISCONTINUED | OUTPATIENT
Start: 2024-04-22 | End: 2024-05-01 | Stop reason: HOSPADM

## 2024-04-22 RX ORDER — CARVEDILOL 6.25 MG/1
12.5 TABLET ORAL 2 TIMES DAILY WITH MEALS
Status: DISCONTINUED | OUTPATIENT
Start: 2024-04-22 | End: 2024-04-29

## 2024-04-22 RX ORDER — LISINOPRIL 5 MG/1
5 TABLET ORAL DAILY
Status: DISCONTINUED | OUTPATIENT
Start: 2024-04-23 | End: 2024-05-01 | Stop reason: HOSPADM

## 2024-04-22 RX ORDER — MIRTAZAPINE 15 MG/1
15 TABLET, FILM COATED ORAL NIGHTLY
Status: DISCONTINUED | OUTPATIENT
Start: 2024-04-22 | End: 2024-05-01 | Stop reason: HOSPADM

## 2024-04-22 RX ORDER — 0.9 % SODIUM CHLORIDE 0.9 %
1000 INTRAVENOUS SOLUTION INTRAVENOUS ONCE
Status: COMPLETED | OUTPATIENT
Start: 2024-04-22 | End: 2024-04-22

## 2024-04-22 RX ADMIN — LEVOFLOXACIN 750 MG: 5 INJECTION, SOLUTION INTRAVENOUS at 18:35

## 2024-04-22 RX ADMIN — SODIUM CHLORIDE 1000 ML: 9 INJECTION, SOLUTION INTRAVENOUS at 18:33

## 2024-04-22 RX ADMIN — CARVEDILOL 12.5 MG: 6.25 TABLET, FILM COATED ORAL at 23:14

## 2024-04-22 RX ADMIN — APIXABAN 5 MG: 5 TABLET, FILM COATED ORAL at 23:14

## 2024-04-22 RX ADMIN — MIRTAZAPINE 15 MG: 15 TABLET, FILM COATED ORAL at 23:14

## 2024-04-22 RX ADMIN — ACETAMINOPHEN 650 MG: 325 TABLET ORAL at 23:14

## 2024-04-22 RX ADMIN — SODIUM CHLORIDE, PRESERVATIVE FREE 10 ML: 5 INJECTION INTRAVENOUS at 23:14

## 2024-04-22 ASSESSMENT — LIFESTYLE VARIABLES
HOW OFTEN DO YOU HAVE A DRINK CONTAINING ALCOHOL: NEVER
HOW MANY STANDARD DRINKS CONTAINING ALCOHOL DO YOU HAVE ON A TYPICAL DAY: PATIENT DOES NOT DRINK

## 2024-04-22 ASSESSMENT — PAIN DESCRIPTION - LOCATION: LOCATION: HEAD

## 2024-04-22 ASSESSMENT — PAIN - FUNCTIONAL ASSESSMENT: PAIN_FUNCTIONAL_ASSESSMENT: NONE - DENIES PAIN

## 2024-04-22 ASSESSMENT — PAIN SCALES - GENERAL: PAINLEVEL_OUTOF10: 9

## 2024-04-22 ASSESSMENT — PAIN DESCRIPTION - DESCRIPTORS: DESCRIPTORS: ACHING

## 2024-04-22 ASSESSMENT — PAIN DESCRIPTION - ORIENTATION: ORIENTATION: OUTER

## 2024-04-22 NOTE — ED NOTES
How does patient ambulate?   []Low Fall Risk (ambulates by themselves without support)  []Stand by assist   []Contact Guard   [x]Front wheel walker  []Wheelchair   []Steady  []Bed bound  []History of Lower Extremity Amputation  []Unknown, did not assess in the emergency department   How does patient take pills?  [x]Whole with Water  []Crushed in applesauce  []Crushed in pudding  []Other  []Unknown no oral medications were given in the ED  Is patient alert?   [x]Alert  []Drowsy but responds to voice  []Doesn't respond to voice but responds to painful stimuli  []Unresponsive  Is patient oriented?   [x]To person  [x]To place  [x]To time  [x]To situation  []Confused  []Agitated  []Follows commands  If patient is disoriented or from a Skill Nursing Facility has family been notified of admission?   []Yes   [x]No  Patient belongings?   []Cell phone  []Wallet   []Dentures  [x]Clothing  Any specific patient or family belongings/needs/dynamics?   no  Miscellaneous comments/pending orders?  ED orders complete     If there are any additional questions please reach out to the Emergency Department.

## 2024-04-22 NOTE — PROGRESS NOTES
Pharmacy Home Medication Reconciliation Note    A medication reconciliation has been completed for Sharon Kitchen 1945    Pharmacy: [facility] [address] [place] [address]  Information provided by: patient and daughter    The patient's home medication list is as follows:  No current facility-administered medications on file prior to encounter.     Current Outpatient Medications on File Prior to Encounter   Medication Sig Dispense Refill    diphenhydrAMINE-APAP, sleep, (TYLENOL PM EXTRA STRENGTH)  MG tablet Take 1 tablet by mouth nightly as needed for Sleep      furosemide (LASIX) 40 MG tablet TAKE 1/2 TABLET BY MOUTH DAILY 45 tablet 0    clonazePAM (KLONOPIN) 1 MG tablet Take 1 tablet by mouth 2 times daily for 30 days. Max Daily Amount: 2 mg 60 tablet 0    cloNIDine (CATAPRES) 0.1 MG tablet Take 1 tablet by mouth 3 times daily 90 tablet 1    Respiratory Therapy Supplies (NEBULIZER/TUBING/MOUTHPIECE) KIT 1 kit by Does not apply route daily as needed (for shortness of breath) 1 kit 0    ipratropium 0.5 mg-albuterol 2.5 mg (DUONEB) 0.5-2.5 (3) MG/3ML SOLN nebulizer solution Inhale 3 mLs into the lungs every 6 hours as needed for Shortness of Breath (Patient not taking: Reported on 4/22/2024) 360 mL 0    mometasone-formoterol (DULERA) 100-5 MCG/ACT inhaler Inhale 2 puffs into the lungs in the morning and 2 puffs in the evening. (Patient not taking: Reported on 4/22/2024) 1 each 2    albuterol sulfate HFA (VENTOLIN HFA) 108 (90 Base) MCG/ACT inhaler Inhale 2 puffs into the lungs 4 times daily as needed for Wheezing (Patient not taking: Reported on 4/22/2024) 18 g 1    apixaban (ELIQUIS) 5 MG TABS tablet Take 1 tablet by mouth 2 times daily (Patient not taking: Reported on 4/22/2024) 60 tablet 1    folic acid (FOLVITE) 1 MG tablet Take 1 tablet by mouth daily (Patient not taking: Reported on 4/22/2024) 30 tablet 1    nicotine (NICODERM CQ) 21 MG/24HR Place 1 patch onto the skin daily for 7 days 7 patch  70

## 2024-04-22 NOTE — ED NOTES
Initial O2 91% on RA but kept dropping to 88%. No resp distress noted. Patient denied SOB. Placed on 2 lpm supplemental O2 via NC. O2 97%

## 2024-04-22 NOTE — H&P
HOSPITALISTS HISTORY AND PHYSICAL    4/22/2024 6:56 PM    Patient Information:  SANDRA MORGAN is a 78 y.o. female [id number]  PCP:  Sherrill Cross APRN - NP (Tel: [phone] )    Chief complaint:    Chief Complaint   Patient presents with    Nausea     Pt arrived via [facility] from home w c/o nausea from yesterday. Pt got 4 mg IV Zofran en route.        History of Present Illness:  Sandra Morgan is a 78 y.o. female who comes from home for having increased weakness fatigue some shortness of breath denies any cough but does feel nauseous and sick to her stomach.  Denies any sick contacts denies any fevers chills or sweats.  Patient does smoke 1 pack/day    REVIEW OF SYSTEMS:   Constitutional: Negative for fever,chills or night sweats  ENT: Negative for rhinorrhea, epistaxis, hoarseness, sore throat.    Cardiovascular: Negative for chest pain, palpitations   Genitourinary: Negative for polyuria, dysuria   Hematologic/Lymphatic: Negative for bleeding tendency, easy bruising  Musculoskeletal: Negative for myalgias and arthralgias  Neurologic: Negative for confusion,dysarthria.  Skin: Negative for itching,rash  Psychiatric: Negative for depression,anxiety, agitation.  Endocrine: Negative for polydipsia,polyuria,heat /cold intolerance.    Past Medical History:   has a past medical history of Acute DVT (deep venous thrombosis) ([facility]), Acute encephalopathy, Acute on chronic diastolic heart failure ([facility]), Alcohol abuse, Anxiety, Arthritis, CAD in native artery, Cellulitis, Cerebral artery occlusion with cerebral infarction ([facility]), Cerebrovascular accident (CVA) ([facility]), Chronic fatigue, Complex care coordination, Complicated UTI (urinary tract infection), Congestive heart failure, unspecified HF chronicity, unspecified heart failure type ([facility]), COPD (chronic obstructive pulmonary disease) ([facility]), COPD exacerbation  evening. (Patient not taking: Reported on 4/22/2024) 1 each 2    albuterol sulfate HFA (VENTOLIN HFA) 108 (90 Base) MCG/ACT inhaler Inhale 2 puffs into the lungs 4 times daily as needed for Wheezing (Patient not taking: Reported on 4/22/2024) 18 g 1    apixaban (ELIQUIS) 5 MG TABS tablet Take 1 tablet by mouth 2 times daily (Patient not taking: Reported on 4/22/2024) 60 tablet 1    folic acid (FOLVITE) 1 MG tablet Take 1 tablet by mouth daily (Patient not taking: Reported on 4/22/2024) 30 tablet 1    nicotine (NICODERM CQ) 21 MG/24HR Place 1 patch onto the skin daily for 7 days 7 patch 0    potassium chloride (KLOR-CON) 10 MEQ extended release tablet TAKE 1 TABLET BY MOUTH DAILY 90 tablet 1    levothyroxine (SYNTHROID) 100 MCG tablet TAKE 1 TABLET BY MOUTH ONCE DAILY (Patient taking differently: Take 1 tablet by mouth Daily TAKE 1 TABLET BY MOUTH ONCE DAILY) 90 tablet 1    lisinopril (PRINIVIL;ZESTRIL) 5 MG tablet Take 1 tablet by mouth daily 90 tablet 1    carvedilol (COREG) 12.5 MG tablet TAKE 1 TABLET BY MOUTH TWICE DAILY (Patient taking differently: Take 1 tablet by mouth 2 times daily TAKE 1 TABLET BY MOUTH TWICE DAILY) 180 tablet 1    mirtazapine (REMERON) 15 MG tablet TAKE ONE TABLET BY MOUTH DAILY (Patient taking differently: Take 1 tablet by mouth nightly Take one tablet by mouth at bedtime.) 90 tablet 1    oxybutynin (DITROPAN) 5 MG tablet TAKE 1 TABLET BY MOUTH THREE TIMES DAILY (Patient not taking: Reported on 4/22/2024) 90 tablet 10    aspirin 81 MG chewable tablet Take 1 tablet by mouth daily 30 tablet 0       Allergies:  Allergies   Allergen Reactions    Keflex [Cephalexin] Shortness Of Breath and Other (See Comments)     bilateral arms shaking     Lipitor [Atorvastatin] Other (See Comments)     Weakness, severe    Morphine Shortness Of Breath    Hctz [Hydrochlorothiazide] Other (See Comments)     Hyponatremia, severe      Norvasc [Amlodipine Besylate] Swelling     Of neck    Penicillins Hives

## 2024-04-22 NOTE — ED PROVIDER NOTES
[facility] EMERGENCY DEPARTMENT  EMERGENCY DEPARTMENT ENCOUNTER        Pt Name: Sharon Kitchen  MRN: [id number]  Birthdate 1945  Date of evaluation: 4/22/2024  Provider: Kassie Dale PA-C  PCP: Sherrill Cross APRN - NP  Note Started: 4:04 PM EDT 4/22/24      OTTO. I have evaluated this patient.        CHIEF COMPLAINT       Chief Complaint   Patient presents with    Nausea     Pt arrived via [facility] from home w c/o nausea from yesterday. Pt got 4 mg IV Zofran en route.        HISTORY OF PRESENT ILLNESS: 1 or more Elements     History From: patient   Limitations to history : None    Sharon Kitchen is a 78 y.o. female who presents for evaluation of generalized weakness, nausea, diarrhea and shortness of breath.  Patient states that she has been sick for a few days.  She denies vomiting.  No chest pain.  No known sick contacts with similar symptoms.  When asked questions patient \"I just feel sick.\"  No urinary symptoms.  EMS reports mild hypoxia.  No prior oxygen requirements.  No history of CHF or COPD.  She was given Zofran via squad in route prior to arrival.  No other complaints or concerns at this time.    Nursing Notes were all reviewed and agreed with or any disagreements were addressed in the HPI.    REVIEW OF SYSTEMS :      Review of Systems   Constitutional:  Positive for fatigue. Negative for appetite change, chills and fever.   HENT:  Negative for congestion and rhinorrhea.    Eyes:  Negative for visual disturbance.   Respiratory:  Positive for cough and shortness of breath. Negative for wheezing.    Cardiovascular:  Negative for chest pain.   Gastrointestinal:  Positive for diarrhea and nausea. Negative for abdominal pain and vomiting.   Genitourinary:  Negative for difficulty urinating, dysuria and hematuria.   Musculoskeletal:  Negative for neck pain and neck stiffness.   Skin:  Negative for rash.   Neurological:  Positive for weakness. Negative for dizziness,

## 2024-04-23 PROBLEM — J96.01 ACUTE RESPIRATORY FAILURE WITH HYPOXIA (HCC): Status: ACTIVE | Noted: 2024-04-23

## 2024-04-23 LAB
ANION GAP SERPL CALCULATED.3IONS-SCNC: 11 MMOL/L (ref 3–16)
BASOPHILS # BLD: 0.1 K/UL (ref 0–0.2)
BASOPHILS NFR BLD: 0.8 %
BUN SERPL-MCNC: 12 MG/DL (ref 7–20)
CALCIUM SERPL-MCNC: 8.6 MG/DL (ref 8.3–10.6)
CHLORIDE SERPL-SCNC: 97 MMOL/L (ref 99–110)
CO2 SERPL-SCNC: 26 MMOL/L (ref 21–32)
CREAT SERPL-MCNC: 0.7 MG/DL (ref 0.6–1.2)
DEPRECATED RDW RBC AUTO: 13.7 % (ref 12.4–15.4)
EKG ATRIAL RATE: 102 BPM
EKG DIAGNOSIS: NORMAL
EKG P AXIS: 67 DEGREES
EKG P-R INTERVAL: 136 MS
EKG Q-T INTERVAL: 356 MS
EKG QRS DURATION: 72 MS
EKG QTC CALCULATION (BAZETT): 463 MS
EKG R AXIS: 28 DEGREES
EKG T AXIS: 96 DEGREES
EKG VENTRICULAR RATE: 102 BPM
EOSINOPHIL # BLD: 0 K/UL (ref 0–0.6)
EOSINOPHIL NFR BLD: 0.3 %
GFR SERPLBLD CREATININE-BSD FMLA CKD-EPI: 88 ML/MIN/{1.73_M2}
GLUCOSE SERPL-MCNC: 95 MG/DL (ref 70–99)
HCT VFR BLD AUTO: 40.4 % (ref 36–48)
HGB BLD-MCNC: 13.6 G/DL (ref 12–16)
LYMPHOCYTES # BLD: 0.8 K/UL (ref 1–5.1)
LYMPHOCYTES NFR BLD: 5.6 %
MCH RBC QN AUTO: 33.1 PG (ref 26–34)
MCHC RBC AUTO-ENTMCNC: 33.6 G/DL (ref 31–36)
MCV RBC AUTO: 98.3 FL (ref 80–100)
MONOCYTES # BLD: 1.3 K/UL (ref 0–1.3)
MONOCYTES NFR BLD: 8.7 %
NEUTROPHILS # BLD: 12.4 K/UL (ref 1.7–7.7)
NEUTROPHILS NFR BLD: 84.6 %
PLATELET # BLD AUTO: 248 K/UL (ref 135–450)
PMV BLD AUTO: 8.7 FL (ref 5–10.5)
POTASSIUM SERPL-SCNC: 4.9 MMOL/L (ref 3.5–5.1)
RBC # BLD AUTO: 4.11 M/UL (ref 4–5.2)
SODIUM SERPL-SCNC: 134 MMOL/L (ref 136–145)
WBC # BLD AUTO: 14.7 K/UL (ref 4–11)

## 2024-04-23 PROCEDURE — 97530 THERAPEUTIC ACTIVITIES: CPT

## 2024-04-23 PROCEDURE — 85025 COMPLETE CBC W/AUTO DIFF WBC: CPT

## 2024-04-23 PROCEDURE — 2580000003 HC RX 258: Performed by: INTERNAL MEDICINE

## 2024-04-23 PROCEDURE — 6360000002 HC RX W HCPCS: Performed by: INTERNAL MEDICINE

## 2024-04-23 PROCEDURE — 6370000000 HC RX 637 (ALT 250 FOR IP): Performed by: INTERNAL MEDICINE

## 2024-04-23 PROCEDURE — 1200000000 HC SEMI PRIVATE

## 2024-04-23 PROCEDURE — 36415 COLL VENOUS BLD VENIPUNCTURE: CPT

## 2024-04-23 PROCEDURE — 80048 BASIC METABOLIC PNL TOTAL CA: CPT

## 2024-04-23 PROCEDURE — 97161 PT EVAL LOW COMPLEX 20 MIN: CPT

## 2024-04-23 PROCEDURE — 97165 OT EVAL LOW COMPLEX 30 MIN: CPT

## 2024-04-23 PROCEDURE — 93010 ELECTROCARDIOGRAM REPORT: CPT | Performed by: INTERNAL MEDICINE

## 2024-04-23 PROCEDURE — 97535 SELF CARE MNGMENT TRAINING: CPT

## 2024-04-23 RX ORDER — CLONIDINE HYDROCHLORIDE 0.1 MG/1
0.1 TABLET ORAL 3 TIMES DAILY
Status: DISCONTINUED | OUTPATIENT
Start: 2024-04-23 | End: 2024-05-01

## 2024-04-23 RX ORDER — FOLIC ACID 1 MG/1
1 TABLET ORAL DAILY
Status: DISCONTINUED | OUTPATIENT
Start: 2024-04-23 | End: 2024-05-01 | Stop reason: HOSPADM

## 2024-04-23 RX ORDER — CLONAZEPAM 1 MG/1
1 TABLET ORAL 2 TIMES DAILY
Status: DISCONTINUED | OUTPATIENT
Start: 2024-04-23 | End: 2024-05-01 | Stop reason: HOSPADM

## 2024-04-23 RX ADMIN — CLONAZEPAM 1 MG: 1 TABLET ORAL at 20:36

## 2024-04-23 RX ADMIN — FUROSEMIDE 20 MG: 20 TABLET ORAL at 09:01

## 2024-04-23 RX ADMIN — APIXABAN 5 MG: 5 TABLET, FILM COATED ORAL at 09:01

## 2024-04-23 RX ADMIN — ACETAMINOPHEN 650 MG: 325 TABLET ORAL at 17:58

## 2024-04-23 RX ADMIN — SODIUM CHLORIDE, PRESERVATIVE FREE 10 ML: 5 INJECTION INTRAVENOUS at 20:36

## 2024-04-23 RX ADMIN — FOLIC ACID 1 MG: 1 TABLET ORAL at 10:26

## 2024-04-23 RX ADMIN — LEVOFLOXACIN 750 MG: 5 INJECTION, SOLUTION INTRAVENOUS at 18:04

## 2024-04-23 RX ADMIN — CARVEDILOL 12.5 MG: 6.25 TABLET, FILM COATED ORAL at 17:55

## 2024-04-23 RX ADMIN — LEVOTHYROXINE SODIUM 100 MCG: 0.1 TABLET ORAL at 06:11

## 2024-04-23 RX ADMIN — ASPIRIN 81 MG 81 MG: 81 TABLET ORAL at 09:01

## 2024-04-23 RX ADMIN — SODIUM CHLORIDE, PRESERVATIVE FREE 10 ML: 5 INJECTION INTRAVENOUS at 09:07

## 2024-04-23 RX ADMIN — SODIUM CHLORIDE: 9 INJECTION, SOLUTION INTRAVENOUS at 18:02

## 2024-04-23 RX ADMIN — LISINOPRIL 5 MG: 5 TABLET ORAL at 09:01

## 2024-04-23 RX ADMIN — CLONIDINE HYDROCHLORIDE 0.1 MG: 0.1 TABLET ORAL at 10:27

## 2024-04-23 RX ADMIN — MIRTAZAPINE 15 MG: 15 TABLET, FILM COATED ORAL at 20:36

## 2024-04-23 RX ADMIN — CARVEDILOL 12.5 MG: 6.25 TABLET, FILM COATED ORAL at 09:01

## 2024-04-23 RX ADMIN — CLONAZEPAM 1 MG: 1 TABLET ORAL at 10:27

## 2024-04-23 RX ADMIN — APIXABAN 5 MG: 5 TABLET, FILM COATED ORAL at 20:36

## 2024-04-23 ASSESSMENT — PAIN SCALES - GENERAL
PAINLEVEL_OUTOF10: 0
PAINLEVEL_OUTOF10: 0

## 2024-04-23 NOTE — PLAN OF CARE
Problem: Pain  Goal: Verbalizes/displays adequate comfort level or baseline comfort level  4/23/2024 0939 by Asif Wilder, RN  Outcome: Progressing  4/23/2024 0137 by Mervat Alexis, RN  Outcome: Progressing

## 2024-04-23 NOTE — PROGRESS NOTES
Morning assessments and vital signs complete. Patient given scheduled medications as prescribed. Patient is requesting her home medication klonipin 1mg BID. MD/Lillian notified via secure message. Assisted patient to set up for breakfast. External catheter in place. Patient educated on the need for a stool sample. Call light is within reach, bed alarm is on.

## 2024-04-23 NOTE — PROGRESS NOTES
Hospitalist Progress Note      PCP: Sherrill Cross APRN - NP    Date of Admission: 4/22/2024    LOS: 1    Chief Complaint:   Chief Complaint   Patient presents with    Nausea     Pt arrived via [facility] from home w c/o nausea from yesterday. Pt got 4 mg IV Zofran en route.        Case Summary:   78-year-old lady with history of paroxysmal atrial fibrillation, CAD, COPD, type 2 diabetes, hypertension, hyperlipidemia, history of CHF, history of CVA presented with generalized malaise weakness fatigue and shortness of breath found to have acute respiratory failure with hypoxia due to bilateral pneumonia      Active Hospital Problems    Diagnosis Date Noted    Acute respiratory failure with hypoxia (HCC) [J96.01] 04/23/2024    Bacterial pneumonia [J15.9] 04/22/2024    Coronary artery disease due to lipid rich plaque [I25.10, I25.83]     PAF (paroxysmal atrial fibrillation) (HCC) [I48.0] 05/08/2019    Dyslipidemia [E78.5]     HTN (hypertension), benign [I10]     Depression [F32.A]     COPD, moderate (HCC) [J44.9] 06/09/2016    Hypothyroidism [E03.9] 10/16/2012         Principal Problem:    Bacterial pneumonia: Presented with shortness of breath and scant production.  Leukocytosis of 15 improved slightly to 14.  Chest imaging noted for opacities in left upper and midlung as well as reticulonodular opacities throughout the right lung consistent with multifocal pneumonia.  - Continue empiric antibiotic coverage for pneumonia repeat chest x-ray in 2 to 3 days  - Monitor WBC and hemodynamics      Acute respiratory failure with hypoxia (HCC): Ruled out.  Patient saturating 91% on room air on presentation and subsequently placed on O2 for unclear reason      Active Problems:    Hypothyroidism: On thyroid replacement therapy    COPD, moderate (HCC): Without exacerbation.  Continue as needed inhalers    Depression: Stable on mirtazapine, clonazepam    HTN (hypertension), benign: Stable on clonidine, Coreg,

## 2024-04-23 NOTE — CARE COORDINATION
Discharge Planning:     (CM) reviewed the patient's chart to assess needs. Patient's Readmission Risk Score is Calculating. Patient's medical insurance is [employer]. Patient's PCP is VIRGIL GRANT .     PT/OT pending. Patient has used [facility] in the past.    No needs anticipated, at this time. CM team to follow. Staff to inform CM if additional discharge needs arise.     Electronically signed by Anmol Syed on 4/23/24 at 9:44 AM EDT

## 2024-04-23 NOTE — PLAN OF CARE
Problem: Pain  Goal: Verbalizes/displays adequate comfort level or baseline comfort level  Outcome: Progressing     Problem: ABCDS Injury Assessment  Goal: Absence of physical injury  Outcome: Progressing     Problem: Safety - Adult  Goal: Free from fall injury  Outcome: Progressing     Problem: Skin/Tissue Integrity  Goal: Absence of new skin breakdown  Description: 1.  Monitor for areas of redness and/or skin breakdown  2.  Assess vascular access sites hourly  3.  Every 4-6 hours minimum:  Change oxygen saturation probe site  4.  Every 4-6 hours:  If on nasal continuous positive airway pressure, respiratory therapy assess nares and determine need for appliance change or resting period.  Outcome: Progressing

## 2024-04-23 NOTE — PLAN OF CARE
Problem: ABCDS Injury Assessment  Goal: Absence of physical injury  4/23/2024 0939 by Asif Wilder, RN  Outcome: Progressing  4/23/2024 0137 by Mervat Alexis, RN  Outcome: Progressing

## 2024-04-23 NOTE — PROGRESS NOTES
requires assistance with bathing (to wash back/hair, pt able to dress self)              Mostly wears night gowns and house slippers  IADL Assistance: requires assistance with all homemaking tasks, daughter manages medications  Active :        [] Yes                 [x] No  Hand Dominance: [] Left                 [x] Right  Current Employment: retired.  Occupation: Drove cars to [employer]  Hobbies: TV (morning news)  Recent Falls: No falls in last 4 months     Comment: Wears dentures but didn't bring them. Sleeps in flat bed.     Examination   Vision:   Vision Corrective Device: wears glasses for reading  Hearing:   hard of hearing  Perception:   Overall Perceptual Status: Impaired on Right  Unilateral Attention: cues to maintain midline in sitting d/t (R) side lean sitting EOB  Posture:   Fwd head/rounded shoulders  Sensation:   denies numbness and tingling  Proprioception:    diminished proprioception in (L) UE, (L) LE  ROM:   LUE limited at shoulder d/t hx CVA, appears WFL distally -- RUE WFL  Strength:   LUE ~3-/5 at shoulder, appears WFL distally -- RUE WFL    Therapist Clinical Decision Making (Complexity): low complexity  Clinical Presentation: stable      Subjective  General: Pt lying upright in bed upon arrival, finishing lunch - lack of insight into asking for help to set up meal tray as she was finger-feeding d/t plate being further away. Pt agreeable to evaluation  Pain: 0/10  Pain Interventions: not applicable        Activities of Daily Living  Basic Activities of Daily Living  Grooming: stand by assistance  Grooming Comments: hand/oral hygiene in stance at sink, seated to wash face  Upper Extremity Bathing: minimal assistance  Lower Extremity Bathing: moderate assistance   Bathing Comments: A for thoroughness with sponge bathing seated  Upper Extremity Dressing: minimal assistance  Lower Extremity Dressing: moderate assistance  Dressing Comments: A for gown change, managing depends to waist in  educated on goals, OT role and benefits, plan of care, ADL adaptive strategies, IADL safety, orientation, transfer training, discharge recommendations  Learning Assessment:  patient will require reinforcement due to cognitive deficits    Assessment  Activity Tolerance: Tolerated fair, limited by fatigue with activity  Impairments Requiring Therapeutic Intervention: decreased functional mobility, decreased ADL status, decreased strength, decreased safety awareness, decreased cognition, decreased endurance, decreased balance, decreased IADL  Prognosis: good  Clinical Assessment: The patient is a 78 y.o. female who presents below their baseline level of function due to above deficits, associated with Bacterial pneumonia. Typically, pt is mod I for mobility, is assisted for some ADL. Currently, pt is requiring up to min A for balance, mod A for ADL and CGA for mobility/transfers. Pt limited by weakness/fatigue. Continued OT indicated in order to promote return to PLOF    Safety Interventions: patient left in chair, chair alarm in place, call light within reach, gait belt, and nurse notified    Plan  Frequency: 3-5 x/per week  Current Treatment Recommendations: strengthening, balance training, functional mobility training, transfer training, endurance training, patient/caregiver education, ADL/self-care training, IADL training, home management training, and equipment evaluation/education    Goals    Short Term Goals:  Time Frame: by discharge  Patient will complete upper body ADL at stand by assistance   Patient will complete lower body ADL at minimal assistance   Patient will complete toileting at contact guard assistance   Patient will complete grooming at set up assistance   Patient will complete functional transfers at stand by assistance   Patient will complete functional mobility at stand by assistance     Above goals reviewed on 4/23/2024.  All goals are ongoing at this time unless indicated above.       Therapy

## 2024-04-23 NOTE — PROGRESS NOTES
4 Eyes Skin Assessment     NAME:  Sharon Kitchen  YOB: 1945  MEDICAL RECORD NUMBER:  [id number]    The patient is being assessed for  Admission    I agree that at least one RN has performed a thorough Head to Toe Skin Assessment on the patient. ALL assessment sites listed below have been assessed.      Areas assessed by both nurses:    Head, Face, Ears, Shoulders, Back, Chest, Arms, Elbows, Hands, Sacrum. Buttock, Coccyx, Ischium, and Legs. Feet and Heels        Does the Patient have a Wound? Yes wound(s) were present on assessment. LDA wound assessment was Initiated and completed by RN       Rosendo Prevention initiated by RN: Yes  Wound Care Orders initiated by RN: No    Pressure Injury (Stage 3,4, Unstageable, DTI, NWPT, and Complex wounds) if present, place Wound referral order by RN under : No    New Ostomies, if present place, Ostomy referral order under : No     Nurse 1 eSignature: Electronically signed by Mervat Alexis RN on 4/23/24 at 6:51 AM EDT    **SHARE this note so that the co-signing nurse can place an eSignature**    Nurse 2 eSignature: Electronically signed by Enedina Solomon RN on 4/23/24 at 6:54 PM EDT

## 2024-04-23 NOTE — PROGRESS NOTES
2/9/2022).    Discharge Recommendations: Sharon Kitchen scored a 17/24 on the AM-PAC short mobility form. Current research shows that an AM-PAC score of 17 or less is typically not associated with a discharge to the patient's home setting. Based on the patient's AM-PAC score and their current functional mobility deficits, it is recommended that the patient have 3-5 sessions per week of Physical Therapy at d/c to increase the patient's independence.  Please see assessment section for further patient specific details.    If patient discharges prior to next session this note will serve as a discharge summary.  Please see below for the latest assessment towards goals.      DME Required For Discharge: DME to be determined pending patient progress    Precautions/Restrictions: high fall risk, contact plus (c-diff r/o)  Weight Bearing Restrictions: no restrictions  [] Right Upper Extremity  [] Left Upper Extremity [] Right Lower Extremity  [] Left Lower Extremity     Required Braces/Orthotics: no braces required   [] Right  [] Left  Positional Restrictions:no positional restrictions    Pre-Admission Information   Lives With: spouse, daughter (works), grandchildren (18 and 18 yo)                       Type of Home: house  Home Layout: tri-level, bedroom/bathroom upstairs, 7-8 step to third level with (B) rail   Pt spends most of her time on the top floor, comes down stairs at meal times  Home Access:  1 step to enter without rails, family supports pt while negotiating step  Bathroom Layout: walk in shower  Bathroom Equipment: grab bars in shower, shower chair, hand held shower head  Toilet Height: standard height - able to push up from sink  Home Equipment: rolling walker, SPC, reacher, manual w/c  Transfer Assistance: Independent without use of device  Ambulation Assistance: modified independent via furniture walking or with use of RW (stays upstairs)   W/c for community activities  ADL Assistance: requires assistance with  and fatigue, found to have PNA. She typically uses a RW or furniture walks at home for short distances. The patient currently requires CGA for ambulation with consistent cues for safe use of RW. She presents with decreased safety awareness with use of RW and decreased insight into her limited balance. The patient is functioning below her baseline and it is questionable if she has 24/7 assistance at home as she reports her spouse doesn't use a walker but probably should and her daughter works full time. Recommending SNF at discharge to safely progress tolerance to activity and independence with functional mobility back to baseline.   Safety Interventions: patient left in chair, chair alarm in place, call light within reach, gait belt, patient at risk for falls, and nurse notified    Plan  Frequency: 3-5 x/per week  Current Treatment Recommendations: strengthening, balance training, functional mobility training, transfer training, gait training, stair training, endurance training, patient/caregiver education, home exercise program, safety education, and equipment evaluation/education    Goals  Patient Goals: Go home soon   Short Term Goals:  Time Frame: Before discharge  Patient will complete bed mobility at supervision   Patient will complete sit<>stand transfers at supervision, with RW   Patient will ambulate 50 ft with use of rolling walker at supervision  Patient will ascend/descend 8 stairs with (B) handrail at supervision  Patient to maintain standing at supervision, without UE support for 5 minutes in order to complete ADLs    Above goals reviewed on 4/23/2024.  All goals are ongoing at this time unless indicated above.      Therapy Session Time      Individual Group Co-treatment   Time In     1411   Time Out     1536   Minutes     85     Timed Code Treatment Minutes:  70 Minutes  Total Treatment Minutes:  85 Minutes       Electronically Signed By: Tosin Abdullahi, PT      Tosin Abdullahi PT, DPT #[id number]

## 2024-04-24 ENCOUNTER — APPOINTMENT (OUTPATIENT)
Dept: GENERAL RADIOLOGY | Age: 79
DRG: 177 | End: 2024-04-24
Payer: MEDICARE

## 2024-04-24 LAB
ANION GAP SERPL CALCULATED.3IONS-SCNC: 8 MMOL/L (ref 3–16)
BACTERIA UR CULT: NORMAL
BASOPHILS # BLD: 0 K/UL (ref 0–0.2)
BASOPHILS NFR BLD: 0.3 %
BUN SERPL-MCNC: 14 MG/DL (ref 7–20)
CALCIUM SERPL-MCNC: 9 MG/DL (ref 8.3–10.6)
CHLORIDE SERPL-SCNC: 94 MMOL/L (ref 99–110)
CO2 SERPL-SCNC: 28 MMOL/L (ref 21–32)
CREAT SERPL-MCNC: 0.7 MG/DL (ref 0.6–1.2)
DEPRECATED RDW RBC AUTO: 14 % (ref 12.4–15.4)
EOSINOPHIL # BLD: 0.1 K/UL (ref 0–0.6)
EOSINOPHIL NFR BLD: 0.9 %
GFR SERPLBLD CREATININE-BSD FMLA CKD-EPI: 88 ML/MIN/{1.73_M2}
GLUCOSE SERPL-MCNC: 100 MG/DL (ref 70–99)
HCT VFR BLD AUTO: 40.1 % (ref 36–48)
HGB BLD-MCNC: 13.8 G/DL (ref 12–16)
LYMPHOCYTES # BLD: 0.8 K/UL (ref 1–5.1)
LYMPHOCYTES NFR BLD: 7.3 %
MCH RBC QN AUTO: 33.7 PG (ref 26–34)
MCHC RBC AUTO-ENTMCNC: 34.3 G/DL (ref 31–36)
MCV RBC AUTO: 98.2 FL (ref 80–100)
MONOCYTES # BLD: 1.2 K/UL (ref 0–1.3)
MONOCYTES NFR BLD: 11.6 %
NEUTROPHILS # BLD: 8.4 K/UL (ref 1.7–7.7)
NEUTROPHILS NFR BLD: 79.9 %
PLATELET # BLD AUTO: 237 K/UL (ref 135–450)
PMV BLD AUTO: 8.6 FL (ref 5–10.5)
POTASSIUM SERPL-SCNC: 4.8 MMOL/L (ref 3.5–5.1)
RBC # BLD AUTO: 4.09 M/UL (ref 4–5.2)
SODIUM SERPL-SCNC: 130 MMOL/L (ref 136–145)
WBC # BLD AUTO: 10.5 K/UL (ref 4–11)

## 2024-04-24 PROCEDURE — 97530 THERAPEUTIC ACTIVITIES: CPT

## 2024-04-24 PROCEDURE — 71045 X-RAY EXAM CHEST 1 VIEW: CPT

## 2024-04-24 PROCEDURE — 6360000002 HC RX W HCPCS: Performed by: INTERNAL MEDICINE

## 2024-04-24 PROCEDURE — 6370000000 HC RX 637 (ALT 250 FOR IP): Performed by: INTERNAL MEDICINE

## 2024-04-24 PROCEDURE — 97116 GAIT TRAINING THERAPY: CPT

## 2024-04-24 PROCEDURE — 36415 COLL VENOUS BLD VENIPUNCTURE: CPT

## 2024-04-24 PROCEDURE — 85025 COMPLETE CBC W/AUTO DIFF WBC: CPT

## 2024-04-24 PROCEDURE — 80048 BASIC METABOLIC PNL TOTAL CA: CPT

## 2024-04-24 PROCEDURE — 1200000000 HC SEMI PRIVATE

## 2024-04-24 PROCEDURE — 2580000003 HC RX 258: Performed by: INTERNAL MEDICINE

## 2024-04-24 RX ADMIN — LEVOFLOXACIN 750 MG: 5 INJECTION, SOLUTION INTRAVENOUS at 18:04

## 2024-04-24 RX ADMIN — CLONAZEPAM 1 MG: 1 TABLET ORAL at 08:59

## 2024-04-24 RX ADMIN — FOLIC ACID 1 MG: 1 TABLET ORAL at 09:00

## 2024-04-24 RX ADMIN — CARVEDILOL 12.5 MG: 6.25 TABLET, FILM COATED ORAL at 08:59

## 2024-04-24 RX ADMIN — CLONAZEPAM 1 MG: 1 TABLET ORAL at 20:55

## 2024-04-24 RX ADMIN — CARVEDILOL 12.5 MG: 6.25 TABLET, FILM COATED ORAL at 18:04

## 2024-04-24 RX ADMIN — ASPIRIN 81 MG 81 MG: 81 TABLET ORAL at 08:59

## 2024-04-24 RX ADMIN — SODIUM CHLORIDE, PRESERVATIVE FREE 10 ML: 5 INJECTION INTRAVENOUS at 09:02

## 2024-04-24 RX ADMIN — LISINOPRIL 5 MG: 5 TABLET ORAL at 09:00

## 2024-04-24 RX ADMIN — FUROSEMIDE 20 MG: 20 TABLET ORAL at 09:00

## 2024-04-24 RX ADMIN — ACETAMINOPHEN 650 MG: 325 TABLET ORAL at 18:05

## 2024-04-24 RX ADMIN — APIXABAN 5 MG: 5 TABLET, FILM COATED ORAL at 08:59

## 2024-04-24 RX ADMIN — CLONIDINE HYDROCHLORIDE 0.1 MG: 0.1 TABLET ORAL at 08:59

## 2024-04-24 RX ADMIN — MIRTAZAPINE 15 MG: 15 TABLET, FILM COATED ORAL at 20:55

## 2024-04-24 RX ADMIN — SODIUM CHLORIDE, PRESERVATIVE FREE 10 ML: 5 INJECTION INTRAVENOUS at 20:55

## 2024-04-24 RX ADMIN — LEVOTHYROXINE SODIUM 100 MCG: 0.1 TABLET ORAL at 05:28

## 2024-04-24 RX ADMIN — APIXABAN 5 MG: 5 TABLET, FILM COATED ORAL at 20:55

## 2024-04-24 RX ADMIN — CLONIDINE HYDROCHLORIDE 0.1 MG: 0.1 TABLET ORAL at 14:14

## 2024-04-24 NOTE — PROGRESS NOTES
[facility] - Inpatient Rehabilitation Department   Phone: [phone]    Occupational Therapy    [] Initial Evaluation            [x] Daily Treatment Note         [] Discharge Summary      Patient: Sharon Kitchen   : 1945   MRN: [id number]   Date of Service:  2024    Admitting Diagnosis:  Bacterial pneumonia  Current Admission Summary: 77 yo female admitted to [facility] on  for increased weakness/fatigue, cough, and diarrhea. Found to have acute respiratory failure due to PNA. Placed in c-diff r/o.   Past Medical History:  has a past medical history of Acute DVT (deep venous thrombosis) ([facility]), Acute encephalopathy, Acute on chronic diastolic heart failure ([facility]), Alcohol abuse, Anxiety, Arthritis, CAD in native artery, Cellulitis, Cerebral artery occlusion with cerebral infarction ([facility]), Cerebrovascular accident (CVA) ([facility]), Chronic fatigue, Complex care coordination, Complicated UTI (urinary tract infection), Congestive heart failure, unspecified HF chronicity, unspecified heart failure type ([facility]), COPD (chronic obstructive pulmonary disease) ([facility]), COPD exacerbation ([facility]), Depression, Diabetes mellitus ([facility]), DIMAS (dyspnea on exertion), DVT (deep venous thrombosis) ([facility]), DVT, lower extremity ([facility]), Fatigue, General weakness, Generalized weakness, Hypercholesteremia, Hypertension, Hyperthyroidism, Incontinence, Mammogram abnormal, Neuromuscular disorder ([facility]), Osteopenia, PAF (paroxysmal atrial fibrillation) ([facility]), Pneumonia, Recurrent UTI, Right forearm cellulitis, Superficial thrombophlebitis of right upper extremity, Thyroid disease, Tobacco abuse, Tobacco abuse counseling, Trapped lung, and Unable to walk.  Past Surgical History:  has a past surgical history that includes Tonsillectomy; Colonoscopy (2012); Toe Surgery (Right); Shoulder arthroscopy (Right, 14); eye surgery; Pacemaker insertion; pacemaker placement; Nerve Surgery (N/A, 2021); and Stimulator Surgery (N/A,

## 2024-04-24 NOTE — PLAN OF CARE
Problem: Pain  Goal: Verbalizes/displays adequate comfort level or baseline comfort level  4/24/2024 1008 by Yuri Ghotra RN  Outcome: Progressing  4/24/2024 0049 by Mervat Alexis RN  Outcome: Progressing     Problem: ABCDS Injury Assessment  Goal: Absence of physical injury  4/24/2024 1008 by Yuri Ghotra RN  Outcome: Progressing  4/24/2024 0049 by Mervat Alexis RN  Outcome: Progressing     Problem: Safety - Adult  Goal: Free from fall injury  4/24/2024 1008 by Yuri Ghotra RN  Outcome: Progressing  4/24/2024 0049 by Mervat Alexis RN  Outcome: Progressing     Problem: Skin/Tissue Integrity  Goal: Absence of new skin breakdown  Description: 1.  Monitor for areas of redness and/or skin breakdown  2.  Assess vascular access sites hourly  3.  Every 4-6 hours minimum:  Change oxygen saturation probe site  4.  Every 4-6 hours:  If on nasal continuous positive airway pressure, respiratory therapy assess nares and determine need for appliance change or resting period.  4/24/2024 1008 by Yuri Ghotra RN  Outcome: Progressing  4/24/2024 0049 by Mervat Alexis RN  Outcome: Progressing

## 2024-04-24 NOTE — PROGRESS NOTES
Hospitalist Progress Note      PCP: Sherrill Cross APRN - NP    Date of Admission: 4/22/2024    LOS: 2    Chief Complaint:   Chief Complaint   Patient presents with    Nausea     Pt arrived via [facility] from home w c/o nausea from yesterday. Pt got 4 mg IV Zofran en route.        Case Summary:   78-year-old lady with history of paroxysmal atrial fibrillation, CAD, COPD, type 2 diabetes, hypertension, hyperlipidemia, history of CHF, history of CVA presented with generalized malaise weakness fatigue and shortness of breath found to have acute respiratory failure with hypoxia due to bilateral pneumonia      Active Hospital Problems    Diagnosis Date Noted    Acute respiratory failure with hypoxia (HCC) [J96.01] 04/23/2024    Bacterial pneumonia [J15.9] 04/22/2024    Coronary artery disease due to lipid rich plaque [I25.10, I25.83]     PAF (paroxysmal atrial fibrillation) (HCC) [I48.0] 05/08/2019    Dyslipidemia [E78.5]     HTN (hypertension), benign [I10]     Depression [F32.A]     COPD, moderate (HCC) [J44.9] 06/09/2016    Hypothyroidism [E03.9] 10/16/2012         Principal Problem:    Bacterial pneumonia: Slowly improving shortness of breath.  Scant production.  Admission chest imaging noted for opacities in left upper and midlung as well as reticulonodular opacities throughout the right lung consistent with multifocal pneumonia.  Repeat chest x-ray with persistent opacities.  WBC resolved.  Remains afebrile  - Continue antibiotic coverage  - Monitor respiratory status  - Add low-dose steroids for pneumonia      Acute respiratory failure with hypoxia (HCC):  Patient sats dipped to 88% and was placed back on 2 L nasal cannula    Active Problems:    Hypothyroidism: On thyroid replacement therapy    COPD, moderate (HCC): Without exacerbation.  Continue as needed inhalers    Depression: Stable on mirtazapine, clonazepam    HTN (hypertension), benign: Stable on clonidine, Coreg, lisinopril and

## 2024-04-24 NOTE — PROGRESS NOTES
[facility] - Inpatient Rehabilitation Department   Phone: [phone]    Physical Therapy    [] Initial Evaluation            [x] Daily Treatment Note         [] Discharge Summary      Patient: Sharon Kitchen   : 1945   MRN: [id number]   Date of Service:  2024  Admitting Diagnosis: Bacterial pneumonia  Current Admission Summary: 79 yo female admitted to [facility] on  for increased weakness/fatigue, cough, and diarrhea. Found to have acute respiratory failure due to PNA. Placed in c-diff r/o.   Past Medical History:  has a past medical history of Acute DVT (deep venous thrombosis) ([facility]), Acute encephalopathy, Acute on chronic diastolic heart failure ([facility]), Alcohol abuse, Anxiety, Arthritis, CAD in native artery, Cellulitis, Cerebral artery occlusion with cerebral infarction ([facility]), Cerebrovascular accident (CVA) ([facility]), Chronic fatigue, Complex care coordination, Complicated UTI (urinary tract infection), Congestive heart failure, unspecified HF chronicity, unspecified heart failure type ([facility]), COPD (chronic obstructive pulmonary disease) ([facility]), COPD exacerbation ([facility]), Depression, Diabetes mellitus ([facility]), DIMAS (dyspnea on exertion), DVT (deep venous thrombosis) ([facility]), DVT, lower extremity ([facility]), Fatigue, General weakness, Generalized weakness, Hypercholesteremia, Hypertension, Hyperthyroidism, Incontinence, Mammogram abnormal, Neuromuscular disorder ([facility]), Osteopenia, PAF (paroxysmal atrial fibrillation) ([facility]), Pneumonia, Recurrent UTI, Right forearm cellulitis, Superficial thrombophlebitis of right upper extremity, Thyroid disease, Tobacco abuse, Tobacco abuse counseling, Trapped lung, and Unable to walk.  Past Surgical History:  has a past surgical history that includes Tonsillectomy; Colonoscopy (2012); Toe Surgery (Right); Shoulder arthroscopy (Right, 14); eye surgery; Pacemaker insertion; pacemaker placement; Nerve Surgery (N/A, 2021); and Stimulator Surgery (N/A,  at supervision, with RW   Patient will ambulate 50 ft with use of rolling walker at supervision  Patient will ascend/descend 8 stairs with (B) handrail at supervision  Patient to maintain standing at supervision, without UE support for 5 minutes in order to complete ADLs    Above goals reviewed on 4/24/2024.  All goals are ongoing at this time unless indicated above.      Therapy Session Time      Individual Group Co-treatment   Time In 1257       Time Out 1330       Minutes 33         Timed Code Treatment Minutes:   33 minutes   Total Treatment Minutes:  33 Minutes       Electronically Signed By: Tomasa Moore, PT      Tomasa Moore PT, DPT [id number]

## 2024-04-24 NOTE — CONSULTS
Palliative Care:   a 78 y.o. female who comes from home for having increased weakness fatigue some shortness of breath denies any cough but does feel nauseous and sick to her stomach.  Denies any sick contacts denies any fevers chills or sweats.  Patient does smoke 1 pack/day. Patient admitted 4/22/24 for Bacterial Pneumonia. IV ATB initiated. Palliative consult placed 4/24/24.       Past Medical History:   has a past medical history of Acute DVT (deep venous thrombosis) ([facility]), Acute encephalopathy, Acute on chronic diastolic heart failure ([facility]), Alcohol abuse, Anxiety, Arthritis, CAD in native artery, Cellulitis, Cerebral artery occlusion with cerebral infarction ([facility]), Cerebrovascular accident (CVA) ([facility]), Chronic fatigue, Complex care coordination, Complicated UTI (urinary tract infection), Congestive heart failure, unspecified HF chronicity, unspecified heart failure type ([facility]), COPD (chronic obstructive pulmonary disease) ([facility]), COPD exacerbation ([facility]), Depression, Diabetes mellitus ([facility]), DIMAS (dyspnea on exertion), DVT (deep venous thrombosis) ([facility]), DVT, lower extremity ([facility]), Fatigue, General weakness, Generalized weakness, Hypercholesteremia, Hypertension, Hyperthyroidism, Incontinence, Mammogram abnormal, Neuromuscular disorder ([facility]), Osteopenia, PAF (paroxysmal atrial fibrillation) ([facility]), Pneumonia, Recurrent UTI, Right forearm cellulitis, Superficial thrombophlebitis of right upper extremity, Thyroid disease, Tobacco abuse, Tobacco abuse counseling, Trapped lung, and Unable to walk.    Past Surgical History:   has a past surgical history that includes Tonsillectomy; Colonoscopy (1/19/2012); Toe Surgery (Right); Shoulder arthroscopy (Right, 6/18/14); eye surgery; Pacemaker insertion; pacemaker placement; Nerve Surgery (N/A, 12/29/2021); and Stimulator Surgery (N/A, 2/9/2022).    Advance Directives:        Full Code.  Florentino listed as primary DPOA, daughter Doris secondary. ACP paperwork on file.  medically cleared for [facility].     I outlined the various approaches to health care in the setting of life-limiting/life-threatening illness going forward, which broadly consist of:    (1) Continued life-prolonging treatments such as lab monitoring, artificial nutrition/hydration, and disease-modifying treatment with clear benchmarks to regularly assess progress or decline, along with escalation of treatment to include CPR/ACLS, intubation, use of vasopressors, and other forms of life support.    (2) Continued life-prolonging treatments with clear boundaries such as withholding high-morbidity, likely non-beneficial interventions, specifically chest compressions, mechanical ventilation, and defibrillation/cardioversion.    (3) Full commitment to intensive comfort treatment while allowing natural death, in which case hospice may be helpful.      Patient and daughter Aleyda via phone agree with Full Code. Patient does state she would not want long term intubation/ventilator support if such was indicated.     Education provided on hospice vs palliative. Patient goal is to return to home setting with [facility]. Agreeable for Palliative team in community to follow.     Palliative team will continue to follow as needed. Contact information provided on patient communication board. KAREEM Oliver and Nurse Welch updated of these discussions.     Electronically signed by Maricel Huertas, RN, BSN, CHPN (Palliative Care) [phone]

## 2024-04-25 LAB
ANION GAP SERPL CALCULATED.3IONS-SCNC: 8 MMOL/L (ref 3–16)
BASOPHILS # BLD: 0 K/UL (ref 0–0.2)
BASOPHILS NFR BLD: 0.6 %
BUN SERPL-MCNC: 17 MG/DL (ref 7–20)
CALCIUM SERPL-MCNC: 8.7 MG/DL (ref 8.3–10.6)
CHLORIDE SERPL-SCNC: 96 MMOL/L (ref 99–110)
CO2 SERPL-SCNC: 31 MMOL/L (ref 21–32)
CREAT SERPL-MCNC: 0.7 MG/DL (ref 0.6–1.2)
DEPRECATED RDW RBC AUTO: 14 % (ref 12.4–15.4)
EOSINOPHIL # BLD: 0.2 K/UL (ref 0–0.6)
EOSINOPHIL NFR BLD: 2.4 %
GFR SERPLBLD CREATININE-BSD FMLA CKD-EPI: 88 ML/MIN/{1.73_M2}
GLUCOSE SERPL-MCNC: 96 MG/DL (ref 70–99)
HCT VFR BLD AUTO: 37.9 % (ref 36–48)
HGB BLD-MCNC: 12.5 G/DL (ref 12–16)
LYMPHOCYTES # BLD: 0.8 K/UL (ref 1–5.1)
LYMPHOCYTES NFR BLD: 11.1 %
MCH RBC QN AUTO: 32.5 PG (ref 26–34)
MCHC RBC AUTO-ENTMCNC: 33 G/DL (ref 31–36)
MCV RBC AUTO: 98.5 FL (ref 80–100)
MONOCYTES # BLD: 0.9 K/UL (ref 0–1.3)
MONOCYTES NFR BLD: 11.9 %
NEUTROPHILS # BLD: 5.4 K/UL (ref 1.7–7.7)
NEUTROPHILS NFR BLD: 74 %
PLATELET # BLD AUTO: 254 K/UL (ref 135–450)
PMV BLD AUTO: 9.2 FL (ref 5–10.5)
POTASSIUM SERPL-SCNC: 4.4 MMOL/L (ref 3.5–5.1)
RBC # BLD AUTO: 3.85 M/UL (ref 4–5.2)
REPORT: NORMAL
RESP PATH DNA+RNA PNL NPH NAA+NON-PROBE: NORMAL
SODIUM SERPL-SCNC: 135 MMOL/L (ref 136–145)
WBC # BLD AUTO: 7.3 K/UL (ref 4–11)

## 2024-04-25 PROCEDURE — 94640 AIRWAY INHALATION TREATMENT: CPT

## 2024-04-25 PROCEDURE — 6370000000 HC RX 637 (ALT 250 FOR IP): Performed by: INTERNAL MEDICINE

## 2024-04-25 PROCEDURE — 6370000000 HC RX 637 (ALT 250 FOR IP): Performed by: STUDENT IN AN ORGANIZED HEALTH CARE EDUCATION/TRAINING PROGRAM

## 2024-04-25 PROCEDURE — 97530 THERAPEUTIC ACTIVITIES: CPT

## 2024-04-25 PROCEDURE — 97110 THERAPEUTIC EXERCISES: CPT

## 2024-04-25 PROCEDURE — 85025 COMPLETE CBC W/AUTO DIFF WBC: CPT

## 2024-04-25 PROCEDURE — 2580000003 HC RX 258: Performed by: INTERNAL MEDICINE

## 2024-04-25 PROCEDURE — 94761 N-INVAS EAR/PLS OXIMETRY MLT: CPT

## 2024-04-25 PROCEDURE — 0202U NFCT DS 22 TRGT SARS-COV-2: CPT

## 2024-04-25 PROCEDURE — 87449 NOS EACH ORGANISM AG IA: CPT

## 2024-04-25 PROCEDURE — 6360000002 HC RX W HCPCS: Performed by: INTERNAL MEDICINE

## 2024-04-25 PROCEDURE — 97116 GAIT TRAINING THERAPY: CPT

## 2024-04-25 PROCEDURE — 36415 COLL VENOUS BLD VENIPUNCTURE: CPT

## 2024-04-25 PROCEDURE — 1200000000 HC SEMI PRIVATE

## 2024-04-25 PROCEDURE — 2700000000 HC OXYGEN THERAPY PER DAY

## 2024-04-25 PROCEDURE — 97535 SELF CARE MNGMENT TRAINING: CPT

## 2024-04-25 PROCEDURE — 99223 1ST HOSP IP/OBS HIGH 75: CPT | Performed by: STUDENT IN AN ORGANIZED HEALTH CARE EDUCATION/TRAINING PROGRAM

## 2024-04-25 PROCEDURE — 80048 BASIC METABOLIC PNL TOTAL CA: CPT

## 2024-04-25 RX ORDER — IPRATROPIUM BROMIDE AND ALBUTEROL SULFATE 2.5; .5 MG/3ML; MG/3ML
1 SOLUTION RESPIRATORY (INHALATION)
Status: DISCONTINUED | OUTPATIENT
Start: 2024-04-25 | End: 2024-04-25

## 2024-04-25 RX ORDER — IPRATROPIUM BROMIDE AND ALBUTEROL SULFATE 2.5; .5 MG/3ML; MG/3ML
1 SOLUTION RESPIRATORY (INHALATION)
Status: DISCONTINUED | OUTPATIENT
Start: 2024-04-25 | End: 2024-05-01 | Stop reason: HOSPADM

## 2024-04-25 RX ORDER — ALBUTEROL SULFATE 2.5 MG/3ML
2.5 SOLUTION RESPIRATORY (INHALATION) EVERY 6 HOURS PRN
Status: DISCONTINUED | OUTPATIENT
Start: 2024-04-25 | End: 2024-05-01 | Stop reason: HOSPADM

## 2024-04-25 RX ADMIN — LEVOTHYROXINE SODIUM 100 MCG: 0.1 TABLET ORAL at 05:02

## 2024-04-25 RX ADMIN — IPRATROPIUM BROMIDE AND ALBUTEROL SULFATE 1 DOSE: .5; 3 SOLUTION RESPIRATORY (INHALATION) at 21:55

## 2024-04-25 RX ADMIN — CLONIDINE HYDROCHLORIDE 0.1 MG: 0.1 TABLET ORAL at 08:25

## 2024-04-25 RX ADMIN — CLONAZEPAM 1 MG: 1 TABLET ORAL at 08:25

## 2024-04-25 RX ADMIN — APIXABAN 5 MG: 5 TABLET, FILM COATED ORAL at 20:17

## 2024-04-25 RX ADMIN — SODIUM CHLORIDE, PRESERVATIVE FREE 10 ML: 5 INJECTION INTRAVENOUS at 08:29

## 2024-04-25 RX ADMIN — LISINOPRIL 5 MG: 5 TABLET ORAL at 08:25

## 2024-04-25 RX ADMIN — APIXABAN 5 MG: 5 TABLET, FILM COATED ORAL at 08:25

## 2024-04-25 RX ADMIN — CARVEDILOL 12.5 MG: 6.25 TABLET, FILM COATED ORAL at 08:25

## 2024-04-25 RX ADMIN — FUROSEMIDE 20 MG: 20 TABLET ORAL at 08:25

## 2024-04-25 RX ADMIN — CLONIDINE HYDROCHLORIDE 0.1 MG: 0.1 TABLET ORAL at 14:17

## 2024-04-25 RX ADMIN — CARVEDILOL 12.5 MG: 6.25 TABLET, FILM COATED ORAL at 18:23

## 2024-04-25 RX ADMIN — LEVOFLOXACIN 750 MG: 5 INJECTION, SOLUTION INTRAVENOUS at 18:29

## 2024-04-25 RX ADMIN — ACETAMINOPHEN 650 MG: 325 TABLET ORAL at 18:24

## 2024-04-25 RX ADMIN — ASPIRIN 81 MG 81 MG: 81 TABLET ORAL at 08:25

## 2024-04-25 RX ADMIN — FOLIC ACID 1 MG: 1 TABLET ORAL at 08:25

## 2024-04-25 RX ADMIN — CLONAZEPAM 1 MG: 1 TABLET ORAL at 20:17

## 2024-04-25 RX ADMIN — MIRTAZAPINE 15 MG: 15 TABLET, FILM COATED ORAL at 20:17

## 2024-04-25 RX ADMIN — IPRATROPIUM BROMIDE AND ALBUTEROL SULFATE 1 DOSE: .5; 3 SOLUTION RESPIRATORY (INHALATION) at 16:00

## 2024-04-25 ASSESSMENT — PAIN SCALES - GENERAL
PAINLEVEL_OUTOF10: 5
PAINLEVEL_OUTOF10: 6

## 2024-04-25 ASSESSMENT — PAIN DESCRIPTION - LOCATION
LOCATION: HEAD
LOCATION: HEAD

## 2024-04-25 ASSESSMENT — PAIN DESCRIPTION - DESCRIPTORS: DESCRIPTORS: ACHING

## 2024-04-25 NOTE — CONSULTS
Tobacco abuse counseling, Trapped lung, and Unable to walk.   PSH:  has a past surgical history that includes Tonsillectomy; Colonoscopy (2012); Toe Surgery (Right); Shoulder arthroscopy (Right, 14); eye surgery; Pacemaker insertion; pacemaker placement; Nerve Surgery (N/A, 2021); and Stimulator Surgery (N/A, 2022).    SH:  reports that she has been smoking cigarettes. She started smoking about 57 years ago. She has a 104.0 pack-year smoking history. She has never used smokeless tobacco. She reports that she does not drink alcohol and does not use drugs.   FH: family history includes Alcohol Abuse in her father; Heart Disease in her father.    Allergies: Keflex [cephalexin], Lipitor [atorvastatin], Morphine, Hctz [hydrochlorothiazide], Norvasc [amlodipine besylate], Penicillins, Tramadol, Hydralazine, and Shellfish-derived products     OBJECTIVE DATA       PHYSICAL EXAM:   Temp  Av.9 °F (36.6 °C)  Min: 97.1 °F (36.2 °C)  Max: 98.4 °F (36.9 °C)  Pulse  Av.2  Min: 73  Max: 88  BP  Min: 96/66  Max: 151/84  SpO2  Av %  Min: 97 %  Max: 99 %    General: NAD  Neuro: A0x3  Lung: left course BS, equal non labored  Heart: regular rate  Abd: soft  LE: warm perfused    MEDICATIONS: Reviewed  Scheduled Meds:   clonazePAM  1 mg Oral BID    cloNIDine  0.1 mg Oral TID    folic acid  1 mg Oral Daily    levofloxacin  750 mg IntraVENous Q24H    aspirin  81 mg Oral Daily    carvedilol  12.5 mg Oral BID with meals    furosemide  20 mg Oral Daily    levothyroxine  100 mcg Oral Daily    lisinopril  5 mg Oral Daily    mirtazapine  15 mg Oral Nightly    nicotine  1 patch TransDERmal Daily    sodium chloride flush  5-40 mL IntraVENous 2 times per day    apixaban  5 mg Oral BID     Continuous Infusions:   sodium chloride 25 mL/hr at 24 1802     PRN Meds:.sodium chloride flush, sodium chloride, polyethylene glycol, acetaminophen **OR** acetaminophen     LABS: Reviewed.   Recent Labs     24  1982

## 2024-04-25 NOTE — PROGRESS NOTES
04/25/24 1500   RT Protocol   History Pulmonary Disease 2   Respiratory pattern 0   Breath sounds 2   Cough 2   Bronchodilator Assessment Score 6

## 2024-04-25 NOTE — PROGRESS NOTES
04/25/24 1500   RT Protocol   History Pulmonary Disease 2   Respiratory pattern 2   Breath sounds 4   Cough 2   Bronchodilator Assessment Score 10

## 2024-04-25 NOTE — PROGRESS NOTES
[facility] - Inpatient Rehabilitation Department   Phone: [phone]    Physical Therapy    [] Initial Evaluation            [x] Daily Treatment Note         [] Discharge Summary      Patient: Sharon Kitchen   : 1945   MRN: [id number]   Date of Service:  2024  Admitting Diagnosis: Bacterial pneumonia  Current Admission Summary: 77 yo female admitted to [facility] on  for increased weakness/fatigue, cough, and diarrhea. Found to have acute respiratory failure due to PNA. Placed in c-diff r/o.   Past Medical History:  has a past medical history of Acute DVT (deep venous thrombosis) ([facility]), Acute encephalopathy, Acute on chronic diastolic heart failure ([facility]), Alcohol abuse, Anxiety, Arthritis, CAD in native artery, Cellulitis, Cerebral artery occlusion with cerebral infarction ([facility]), Cerebrovascular accident (CVA) ([facility]), Chronic fatigue, Complex care coordination, Complicated UTI (urinary tract infection), Congestive heart failure, unspecified HF chronicity, unspecified heart failure type ([facility]), COPD (chronic obstructive pulmonary disease) ([facility]), COPD exacerbation ([facility]), Depression, Diabetes mellitus ([facility]), DIMAS (dyspnea on exertion), DVT (deep venous thrombosis) ([facility]), DVT, lower extremity ([facility]), Fatigue, General weakness, Generalized weakness, Hypercholesteremia, Hypertension, Hyperthyroidism, Incontinence, Mammogram abnormal, Neuromuscular disorder ([facility]), Osteopenia, PAF (paroxysmal atrial fibrillation) ([facility]), Pneumonia, Recurrent UTI, Right forearm cellulitis, Superficial thrombophlebitis of right upper extremity, Thyroid disease, Tobacco abuse, Tobacco abuse counseling, Trapped lung, and Unable to walk.  Past Surgical History:  has a past surgical history that includes Tonsillectomy; Colonoscopy (2012); Toe Surgery (Right); Shoulder arthroscopy (Right, 14); eye surgery; Pacemaker insertion; pacemaker placement; Nerve Surgery (N/A, 2021); and Stimulator Surgery (N/A,

## 2024-04-25 NOTE — PROGRESS NOTES
Physician Progress Note      PATIENT:               SANDRA MORGAN  [facility] #:                  [id number]  :                       1945  ADMIT DATE:       2024 3:34 PM  DISCH DATE:  RESPONDING  PROVIDER #:        Deny Snyder MD          QUERY TEXT:    Patient admitted with Pneumonia. Documentation reflects Septicemia in ED.  If   possible, please document in the progress notes and discharge summary if   Septicemia was:    The medical record reflects the following:  Risk Factors: Pneumonia  Clinical Indicators: ED-Septicemia. H&P, IM PN -Bacterial pneumonia, Chest   imaging noted for opacities in left upper and midlung as well as   reticulonodular opacities throughout the right lung consistent with multifocal   pneumonia.  WBC-15.4, 14.7  Procalcitonin-0.40  HR-103  RR-26,22  BP-94/60, 97/64, 89/71, 97/66  Treatment: IV Levofloxacin, Serial lab      Thank you,  Kimber Moss, [facility] CDS  Options provided:  -- Sepsis confirmed after study  -- Sepsis ruled out after study  -- Other - I will add my own diagnosis  -- Disagree - Not applicable / Not valid  -- Disagree - Clinically unable to determine / Unknown  -- Refer to Clinical Documentation Reviewer    PROVIDER RESPONSE TEXT:    Sepsis ruled out after study.    Query created by: Kimber Moss on 2024 1:53 AM      QUERY TEXT:    Patient admitted with Bacterial pneumonia. Documentation reflects Acute   hypoxic respiratory failure in H&P .  If possible, please document in the   progress notes and discharge summary if Acute hypoxic respiratory failure was:    The medical record reflects the following:  Risk Factors:  Pneumonia  Clinical Indicators: H&P  Acute hypoxic respiratory failure secondary to   multifocal pneumonia O2 sats of 87% on room air.  IM PN    Acute respiratory failure with hypoxia Ruled out.  Patient   saturating 91% on room air on presentation and subsequently placed on O2 for   unclear reason.  IM PN  Acute

## 2024-04-25 NOTE — PLAN OF CARE
Problem: Pain  Goal: Verbalizes/displays adequate comfort level or baseline comfort level  4/25/2024 1038 by Aracely Alcala RN  Outcome: Progressing  4/25/2024 0058 by Jenna Burgess RN  Outcome: Progressing     Problem: ABCDS Injury Assessment  Goal: Absence of physical injury  4/25/2024 1038 by Aracely Alcala RN  Outcome: Progressing  4/25/2024 0058 by Jenna Burgess RN  Outcome: Progressing     Problem: Safety - Adult  Goal: Free from fall injury  4/25/2024 1038 by Aracely Alcala RN  Outcome: Progressing  4/25/2024 0058 by Jenna Burgess RN  Outcome: Progressing     Problem: Skin/Tissue Integrity  Goal: Absence of new skin breakdown  Description: 1.  Monitor for areas of redness and/or skin breakdown  2.  Assess vascular access sites hourly  3.  Every 4-6 hours minimum:  Change oxygen saturation probe site  4.  Every 4-6 hours:  If on nasal continuous positive airway pressure, respiratory therapy assess nares and determine need for appliance change or resting period.  4/25/2024 1038 by Aracely Alcala RN  Outcome: Progressing  4/25/2024 0058 by Jenna Burgess RN  Outcome: Progressing     Problem: Chronic Conditions and Co-morbidities  Goal: Patient's chronic conditions and co-morbidity symptoms are monitored and maintained or improved  4/25/2024 1038 by Aracely Alcala RN  Outcome: Progressing  4/25/2024 0058 by Jenna Burgess RN  Outcome: Progressing

## 2024-04-25 NOTE — PROGRESS NOTES
[facility] - Inpatient Rehabilitation Department   Phone: [phone]    Occupational Therapy    [] Initial Evaluation            [x] Daily Treatment Note         [] Discharge Summary      Patient: Sharon Kitchen   : 1945   MRN: [id number]   Date of Service:  2024    Admitting Diagnosis:  Bacterial pneumonia  Current Admission Summary: 79 yo female admitted to [facility] on  for increased weakness/fatigue, cough, and diarrhea. Found to have acute respiratory failure due to PNA. Placed in c-diff r/o.   Past Medical History:  has a past medical history of Acute DVT (deep venous thrombosis) ([facility]), Acute encephalopathy, Acute on chronic diastolic heart failure ([facility]), Alcohol abuse, Anxiety, Arthritis, CAD in native artery, Cellulitis, Cerebral artery occlusion with cerebral infarction ([facility]), Cerebrovascular accident (CVA) ([facility]), Chronic fatigue, Complex care coordination, Complicated UTI (urinary tract infection), Congestive heart failure, unspecified HF chronicity, unspecified heart failure type ([facility]), COPD (chronic obstructive pulmonary disease) ([facility]), COPD exacerbation ([facility]), Depression, Diabetes mellitus ([facility]), DIMAS (dyspnea on exertion), DVT (deep venous thrombosis) ([facility]), DVT, lower extremity ([facility]), Fatigue, General weakness, Generalized weakness, Hypercholesteremia, Hypertension, Hyperthyroidism, Incontinence, Mammogram abnormal, Neuromuscular disorder ([facility]), Osteopenia, PAF (paroxysmal atrial fibrillation) ([facility]), Pneumonia, Recurrent UTI, Right forearm cellulitis, Superficial thrombophlebitis of right upper extremity, Thyroid disease, Tobacco abuse, Tobacco abuse counseling, Trapped lung, and Unable to walk.  Past Surgical History:  has a past surgical history that includes Tonsillectomy; Colonoscopy (2012); Toe Surgery (Right); Shoulder arthroscopy (Right, 14); eye surgery; Pacemaker insertion; pacemaker placement; Nerve Surgery (N/A, 2021); and Stimulator Surgery (N/A,  for managing pullup over hips   General Comments: patient required increased time and rest breaks for ADLs. Reported fatigue after toileting.   Instrumental Activities of Daily Living  No IADL completed on this date.    Functional Mobility  Bed Mobility:  Bed mobility not completed on this date.  Comments:  Transfers:  Sit to stand transfer:contact guard assistance  Stand to sit transfer: contact guard assistance  Toilet transfer: minimal assistance  Toilet transfer equipment: standard toilet, grab bars   Comments: to/from recliner with cues for hand placement. Bree to/from standard toilet   Functional Mobility  Functional Mobility Activity: 5 + 5 ft (to/from bathroom)   Device Use: rolling walker  Required Assistance: contact guard assistance, minimal assistance  Comment: Bree progressing to SBA. Declined further mobility due to fatigue   Balance:  Static Sitting Balance: fair (+): maintains balance at SBA/supervision without use of UE support  Dynamic Sitting Balance: fair (-): maintains balance at SBA with use of UE support  Static Standing Balance: fair (-): maintains balance at CGA with use of UE support  Dynamic Standing Balance: poor (+): requires min (A) to maintain balance  Comments:    Other Therapeutic Interventions  PCA in during session to get patient's weight. Patient completed stand from recliner to RW with CGA and completed step onto standing scale with min A, RW and cueing. Patient able to maintain stand on scale with RW and CGA. Patient able to step off scale with RW and CGA.     Functional Outcomes  AM-PAC Inpatient Daily Activity Raw Score: 16                                    Cognition  Overall Cognitive Status: Impaired  Following Commands: follows one step commands with increased time  Attention Span: attends with cues to redirect, difficulty attending to directions, difficulty dividing attention  Memory: decreased recall of recent events, decreased short term memory  Safety Judgement:

## 2024-04-25 NOTE — PLAN OF CARE
Problem: Pain  Goal: Verbalizes/displays adequate comfort level or baseline comfort level  Outcome: Progressing     Problem: ABCDS Injury Assessment  Goal: Absence of physical injury  Outcome: Progressing     Problem: Safety - Adult  Goal: Free from fall injury  Outcome: Progressing     Problem: Skin/Tissue Integrity  Goal: Absence of new skin breakdown  Description: 1.  Monitor for areas of redness and/or skin breakdown  2.  Assess vascular access sites hourly  3.  Every 4-6 hours minimum:  Change oxygen saturation probe site  4.  Every 4-6 hours:  If on nasal continuous positive airway pressure, respiratory therapy assess nares and determine need for appliance change or resting period.  Outcome: Progressing     Problem: Chronic Conditions and Co-morbidities  Goal: Patient's chronic conditions and co-morbidity symptoms are monitored and maintained or improved  Outcome: Progressing

## 2024-04-25 NOTE — PROGRESS NOTES
Palliative Care:        Palliative referral for community support when medically cleared for discharge faxed/called to [facility] Palliative Team. Liaison: Navarro as of 0825 today.     Phone: [phone]  Fax: [phone]  Cell: [phone]    Palliative team will continue to follow as needed. Contact information provided on patient communication board.     Electronically signed by Maricel Huertas RN, BSN, CHPN (Palliative Care) [phone]

## 2024-04-25 NOTE — PROGRESS NOTES
Hospitalist Progress Note      PCP: Sherrill Cross APRN - NP    Date of Admission: 4/22/2024    LOS: 3    Chief Complaint:   Chief Complaint   Patient presents with    Nausea     Pt arrived via [facility] from home w c/o nausea from yesterday. Pt got 4 mg IV Zofran en route.        Case Summary:   78-year-old lady with history of paroxysmal atrial fibrillation, CAD, COPD, type 2 diabetes, hypertension, hyperlipidemia, history of CHF, history of CVA presented with generalized malaise weakness fatigue and shortness of breath found to have acute respiratory failure with hypoxia due to bilateral pneumonia      Active Hospital Problems    Diagnosis Date Noted    Acute respiratory failure with hypoxia (HCC) [J96.01] 04/23/2024    Bacterial pneumonia [J15.9] 04/22/2024    Coronary artery disease due to lipid rich plaque [I25.10, I25.83]     PAF (paroxysmal atrial fibrillation) (HCC) [I48.0] 05/08/2019    Dyslipidemia [E78.5]     HTN (hypertension), benign [I10]     Depression [F32.A]     COPD, moderate (HCC) [J44.9] 06/09/2016    Hypothyroidism [E03.9] 10/16/2012         Principal Problem:    Bacterial pneumonia: Still with cough and difficult to expectorate. Admission chest imaging noted for opacities in left upper and midlung as well as reticulonodular opacities throughout the right lung consistent with multifocal pneumonia.  Repeat chest x-ray with persistent opacities.  Afebrile with normal WBC.  - Continue antibiotic coverage  - Pulmonology consult  - Add low-dose steroids today for pneumonia      Acute respiratory failure with hypoxia (HCC):  Patient sats dipped to 88% and was placed back on 2 L nasal cannula      Urinary tract infection: Urinalysis positive.  Already on Levaquin for pneumonia.  Urine cultures with normal chato    Active Problems:    Hypothyroidism: On thyroid replacement therapy    COPD, moderate (HCC): Without exacerbation.  Continue as needed inhalers    Depression: Stable on

## 2024-04-26 PROBLEM — T17.908A ASPIRATION INTO AIRWAY: Status: ACTIVE | Noted: 2024-04-26

## 2024-04-26 LAB
ANION GAP SERPL CALCULATED.3IONS-SCNC: 10 MMOL/L (ref 3–16)
BACTERIA BLD CULT ORG #2: NORMAL
BACTERIA BLD CULT: NORMAL
BASOPHILS # BLD: 0.1 K/UL (ref 0–0.2)
BASOPHILS NFR BLD: 0.8 %
BUN SERPL-MCNC: 19 MG/DL (ref 7–20)
CALCIUM SERPL-MCNC: 8.8 MG/DL (ref 8.3–10.6)
CHLORIDE SERPL-SCNC: 95 MMOL/L (ref 99–110)
CO2 SERPL-SCNC: 28 MMOL/L (ref 21–32)
CREAT SERPL-MCNC: 0.7 MG/DL (ref 0.6–1.2)
DEPRECATED RDW RBC AUTO: 14 % (ref 12.4–15.4)
EOSINOPHIL # BLD: 0.2 K/UL (ref 0–0.6)
EOSINOPHIL NFR BLD: 2.5 %
GFR SERPLBLD CREATININE-BSD FMLA CKD-EPI: 88 ML/MIN/{1.73_M2}
GLUCOSE SERPL-MCNC: 97 MG/DL (ref 70–99)
HCT VFR BLD AUTO: 37.5 % (ref 36–48)
HGB BLD-MCNC: 12.3 G/DL (ref 12–16)
LEGIONELLA AG UR QL: NORMAL
LYMPHOCYTES # BLD: 0.7 K/UL (ref 1–5.1)
LYMPHOCYTES NFR BLD: 9.6 %
MCH RBC QN AUTO: 32.4 PG (ref 26–34)
MCHC RBC AUTO-ENTMCNC: 32.9 G/DL (ref 31–36)
MCV RBC AUTO: 98.5 FL (ref 80–100)
MONOCYTES # BLD: 0.9 K/UL (ref 0–1.3)
MONOCYTES NFR BLD: 12.4 %
NEUTROPHILS # BLD: 5.3 K/UL (ref 1.7–7.7)
NEUTROPHILS NFR BLD: 74.7 %
PLATELET # BLD AUTO: 259 K/UL (ref 135–450)
PMV BLD AUTO: 9.1 FL (ref 5–10.5)
POTASSIUM SERPL-SCNC: 4.7 MMOL/L (ref 3.5–5.1)
RBC # BLD AUTO: 3.81 M/UL (ref 4–5.2)
SODIUM SERPL-SCNC: 133 MMOL/L (ref 136–145)
WBC # BLD AUTO: 7.1 K/UL (ref 4–11)

## 2024-04-26 PROCEDURE — 6360000002 HC RX W HCPCS: Performed by: INTERNAL MEDICINE

## 2024-04-26 PROCEDURE — 6360000002 HC RX W HCPCS: Performed by: STUDENT IN AN ORGANIZED HEALTH CARE EDUCATION/TRAINING PROGRAM

## 2024-04-26 PROCEDURE — 6370000000 HC RX 637 (ALT 250 FOR IP): Performed by: STUDENT IN AN ORGANIZED HEALTH CARE EDUCATION/TRAINING PROGRAM

## 2024-04-26 PROCEDURE — 97116 GAIT TRAINING THERAPY: CPT

## 2024-04-26 PROCEDURE — 99233 SBSQ HOSP IP/OBS HIGH 50: CPT | Performed by: STUDENT IN AN ORGANIZED HEALTH CARE EDUCATION/TRAINING PROGRAM

## 2024-04-26 PROCEDURE — 6370000000 HC RX 637 (ALT 250 FOR IP): Performed by: INTERNAL MEDICINE

## 2024-04-26 PROCEDURE — 85025 COMPLETE CBC W/AUTO DIFF WBC: CPT

## 2024-04-26 PROCEDURE — 94640 AIRWAY INHALATION TREATMENT: CPT

## 2024-04-26 PROCEDURE — 80048 BASIC METABOLIC PNL TOTAL CA: CPT

## 2024-04-26 PROCEDURE — 1200000000 HC SEMI PRIVATE

## 2024-04-26 PROCEDURE — 36415 COLL VENOUS BLD VENIPUNCTURE: CPT

## 2024-04-26 PROCEDURE — 92526 ORAL FUNCTION THERAPY: CPT

## 2024-04-26 PROCEDURE — 97535 SELF CARE MNGMENT TRAINING: CPT

## 2024-04-26 PROCEDURE — 92610 EVALUATE SWALLOWING FUNCTION: CPT

## 2024-04-26 PROCEDURE — 97530 THERAPEUTIC ACTIVITIES: CPT

## 2024-04-26 PROCEDURE — 2580000003 HC RX 258: Performed by: INTERNAL MEDICINE

## 2024-04-26 PROCEDURE — 2700000000 HC OXYGEN THERAPY PER DAY

## 2024-04-26 PROCEDURE — 94761 N-INVAS EAR/PLS OXIMETRY MLT: CPT

## 2024-04-26 RX ORDER — METRONIDAZOLE 500 MG/100ML
500 INJECTION, SOLUTION INTRAVENOUS EVERY 8 HOURS
Status: DISCONTINUED | OUTPATIENT
Start: 2024-04-26 | End: 2024-05-01 | Stop reason: HOSPADM

## 2024-04-26 RX ADMIN — CARVEDILOL 12.5 MG: 6.25 TABLET, FILM COATED ORAL at 08:31

## 2024-04-26 RX ADMIN — APIXABAN 5 MG: 5 TABLET, FILM COATED ORAL at 08:32

## 2024-04-26 RX ADMIN — CLONAZEPAM 1 MG: 1 TABLET ORAL at 08:32

## 2024-04-26 RX ADMIN — IPRATROPIUM BROMIDE AND ALBUTEROL SULFATE 1 DOSE: .5; 3 SOLUTION RESPIRATORY (INHALATION) at 11:47

## 2024-04-26 RX ADMIN — LEVOFLOXACIN 750 MG: 5 INJECTION, SOLUTION INTRAVENOUS at 18:09

## 2024-04-26 RX ADMIN — ASPIRIN 81 MG 81 MG: 81 TABLET ORAL at 08:32

## 2024-04-26 RX ADMIN — FOLIC ACID 1 MG: 1 TABLET ORAL at 08:32

## 2024-04-26 RX ADMIN — FUROSEMIDE 20 MG: 20 TABLET ORAL at 08:32

## 2024-04-26 RX ADMIN — CARVEDILOL 12.5 MG: 6.25 TABLET, FILM COATED ORAL at 18:10

## 2024-04-26 RX ADMIN — IPRATROPIUM BROMIDE AND ALBUTEROL SULFATE 1 DOSE: .5; 3 SOLUTION RESPIRATORY (INHALATION) at 20:45

## 2024-04-26 RX ADMIN — CLONIDINE HYDROCHLORIDE 0.1 MG: 0.1 TABLET ORAL at 08:32

## 2024-04-26 RX ADMIN — MIRTAZAPINE 15 MG: 15 TABLET, FILM COATED ORAL at 20:28

## 2024-04-26 RX ADMIN — CLONAZEPAM 1 MG: 1 TABLET ORAL at 20:28

## 2024-04-26 RX ADMIN — LEVOTHYROXINE SODIUM 100 MCG: 0.1 TABLET ORAL at 05:19

## 2024-04-26 RX ADMIN — SODIUM CHLORIDE, PRESERVATIVE FREE 10 ML: 5 INJECTION INTRAVENOUS at 20:28

## 2024-04-26 RX ADMIN — METRONIDAZOLE 500 MG: 500 INJECTION, SOLUTION INTRAVENOUS at 12:04

## 2024-04-26 RX ADMIN — LISINOPRIL 5 MG: 5 TABLET ORAL at 08:31

## 2024-04-26 RX ADMIN — ACETAMINOPHEN 650 MG: 325 TABLET ORAL at 05:19

## 2024-04-26 RX ADMIN — APIXABAN 5 MG: 5 TABLET, FILM COATED ORAL at 20:28

## 2024-04-26 RX ADMIN — CLONIDINE HYDROCHLORIDE 0.1 MG: 0.1 TABLET ORAL at 14:32

## 2024-04-26 RX ADMIN — METRONIDAZOLE 500 MG: 500 INJECTION, SOLUTION INTRAVENOUS at 20:31

## 2024-04-26 ASSESSMENT — PAIN SCALES - GENERAL
PAINLEVEL_OUTOF10: 8
PAINLEVEL_OUTOF10: 5

## 2024-04-26 ASSESSMENT — PAIN DESCRIPTION - DESCRIPTORS
DESCRIPTORS: ACHING
DESCRIPTORS: ACHING

## 2024-04-26 ASSESSMENT — PAIN DESCRIPTION - LOCATION
LOCATION: HEAD
LOCATION: HEAD

## 2024-04-26 NOTE — PROGRESS NOTES
Facility/Department: [address] ONCOLOGY  Speech Language Pathology  DYSPHAGIA BEDSIDE SWALLOW EVALUATION     Patient: Sharon Kitchen   : 1945   MRN: [id number]      Evaluation Date: 2024   Admitting Diagnosis: Bacterial pneumonia [J15.9]  Septicemia (HCC) [A41.9]  Acute respiratory failure with hypoxia (HCC) [J96.01]  Multifocal pneumonia [J18.9]  Pain: Denies                                                       H&P: Sharon Kitchen is a 78 y.o. female who comes from home for having increased weakness fatigue some shortness of breath denies any cough but does feel nauseous and sick to her stomach.  Denies any sick contacts denies any fevers chills or sweats.  Patient does smoke 1 pack/day     Imaging:  Chest X-ray:   Persistent opacities in the left lung suggesting pneumonia, not changed     History/Prior Level of Function:   Living Status: Home  Prior Dysphagia History: Pt has been seen by speech in the past. She has a remote history of dysphagia with aspiration per MBS results of 7/3/18 (see report). Most recently she was evaluated 2023 with recommendations for a Dysphagia III Soft and Bite-Sized with thin liquids. Pt reported she was consuming regular texture with thin liquids prior to this admission and reported no concerns with swallowing.  Reason for referral: SLP evaluation orders received due to coughing with intake     Dysphagia Impressions/Diagnosis: Oropharyngeal Dysphagia   Pt was seen sitting upright in chair on 2L O2 via nasal cannula. Pt reported no concerns with swallowing and consumes a regular texture diet with thin liquids at home. Per chart, MD noticed Pt was coughing during breakfast meal. Per Pt report she has dentures at home and normally wears them all the time. Her dentures at not at the hospital currently and Pt was consuming medeiros and toast during breakfast meal which she was unable to adequately masticate per report. Oral mechanism exam revealed largely functional

## 2024-04-26 NOTE — RT PROTOCOL NOTE
RT Inhaler-Nebulizer Bronchodilator Protocol Note    There is a bronchodilator order in the chart from a provider indicating to follow the RT Bronchodilator Protocol and there is an “Initiate RT Inhaler-Nebulizer Bronchodilator Protocol” order as well (see protocol at bottom of note).    CXR Findings:  XR CHEST PORTABLE    Result Date: 4/24/2024  Persistent opacities in the left lung suggesting pneumonia, not changed       The findings from the last RT Protocol Assessment were as follows:   History Pulmonary Disease: Chronic pulmonary disease  Respiratory Pattern: Regular pattern and RR 12-20 bpm  Breath Sounds: Slightly diminished and/or crackles  Cough: Weak, productive  Indication for Bronchodilator Therapy:    Bronchodilator Assessment Score: 6    Aerosolized bronchodilator medication orders have been revised according to the RT Inhaler-Nebulizer Bronchodilator Protocol below.    Respiratory Therapist to perform RT Therapy Protocol Assessment initially then follow the protocol.  Repeat RT Therapy Protocol Assessment PRN for score 0-3 or on second treatment, BID, and PRN for scores above 3.    No Indications - adjust the frequency to every 6 hours PRN wheezing or bronchospasm, if no treatments needed after 48 hours then discontinue using Per Protocol order mode.     If indication present, adjust the RT bronchodilator orders based on the Bronchodilator Assessment Score as indicated below.  Use Inhaler orders unless patient has one or more of the following: on home nebulizer, not able to hold breath for 10 seconds, is not alert and oriented, cannot activate and use MDI correctly, or respiratory rate 25 breaths per minute or more, then use the equivalent nebulizer order(s) with same Frequency and PRN reasons based on the score.  If a patient is on this medication at home then do not decrease Frequency below that used at home.    0-3 - enter or revise RT bronchodilator order(s) to equivalent RT Bronchodilator order  with Frequency of every 4 hours PRN for wheezing or increased work of breathing using Per Protocol order mode.        4-6 - enter or revise RT Bronchodilator order(s) to two equivalent RT bronchodilator orders with one order with BID Frequency and one order with Frequency of every 4 hours PRN wheezing or increased work of breathing using Per Protocol order mode.        7-10 - enter or revise RT Bronchodilator order(s) to two equivalent RT bronchodilator orders with one order with TID Frequency and one order with Frequency of every 4 hours PRN wheezing or increased work of breathing using Per Protocol order mode.       11-13 - enter or revise RT Bronchodilator order(s) to one equivalent RT bronchodilator order with QID Frequency and an Albuterol order with Frequency of every 4 hours PRN wheezing or increased work of breathing using Per Protocol order mode.      Greater than 13 - enter or revise RT Bronchodilator order(s) to one equivalent RT bronchodilator order with every 4 hours Frequency and an Albuterol order with Frequency of every 2 hours PRN wheezing or increased work of breathing using Per Protocol order mode.     RT to enter RT Home Evaluation for COPD & MDI Assessment order using Per Protocol order mode.    Electronically signed by LYLE MARSHALL RCP on 4/26/2024 at 2:43 AM

## 2024-04-26 NOTE — PROGRESS NOTES
[facility] - Inpatient Rehabilitation Department   Phone: [phone]    Occupational Therapy    [] Initial Evaluation            [x] Daily Treatment Note         [] Discharge Summary      Patient: Sharon Kitchen   : 1945   MRN: [id number]   Date of Service:  2024    Admitting Diagnosis:  Bacterial pneumonia  Current Admission Summary: 77 yo female admitted to [facility] on  for increased weakness/fatigue, cough, and diarrhea. Found to have acute respiratory failure due to PNA. Placed in c-diff r/o.   Past Medical History:  has a past medical history of Acute DVT (deep venous thrombosis) ([facility]), Acute encephalopathy, Acute on chronic diastolic heart failure ([facility]), Alcohol abuse, Anxiety, Arthritis, CAD in native artery, Cellulitis, Cerebral artery occlusion with cerebral infarction ([facility]), Cerebrovascular accident (CVA) ([facility]), Chronic fatigue, Complex care coordination, Complicated UTI (urinary tract infection), Congestive heart failure, unspecified HF chronicity, unspecified heart failure type ([facility]), COPD (chronic obstructive pulmonary disease) ([facility]), COPD exacerbation ([facility]), Depression, Diabetes mellitus ([facility]), DIMAS (dyspnea on exertion), DVT (deep venous thrombosis) ([facility]), DVT, lower extremity ([facility]), Fatigue, General weakness, Generalized weakness, Hypercholesteremia, Hypertension, Hyperthyroidism, Incontinence, Mammogram abnormal, Neuromuscular disorder ([facility]), Osteopenia, PAF (paroxysmal atrial fibrillation) ([facility]), Pneumonia, Recurrent UTI, Right forearm cellulitis, Superficial thrombophlebitis of right upper extremity, Thyroid disease, Tobacco abuse, Tobacco abuse counseling, Trapped lung, and Unable to walk.  Past Surgical History:  has a past surgical history that includes Tonsillectomy; Colonoscopy (2012); Toe Surgery (Right); Shoulder arthroscopy (Right, 14); eye surgery; Pacemaker insertion; pacemaker placement; Nerve Surgery (N/A, 2021); and Stimulator Surgery (N/A,  functional transfers at stand by assistance   Patient will complete functional mobility at stand by assistance     Above goals reviewed on 4/26/2024.  All goals are ongoing at this time unless indicated above.       Therapy Session Time     Individual Group Co-treatment   Time In   1050   Time Out   1143   Minutes   53        Timed Code Treatment Minutes:  Timed Code Treatment Minutes: 53 Minutes    Second Session Therapy Time     Individual Co-treatment   Time In  1350   Time Out  1436   Minutes  46        Total Treatment Minutes:  53 + 46 = 101 minutes        Electronically Signed By: BREANNA Rios MOT OTR/L [id number]

## 2024-04-26 NOTE — PROGRESS NOTES
Shift assessment complete; pt comfortably resting in bed eating breakfast. Am medications given per the MAR. Call light and belongings within reach. The care plan and education has been reviewed and mutually agreed upon with the patient.

## 2024-04-26 NOTE — PROGRESS NOTES
[facility] - Inpatient Rehabilitation Department   Phone: [phone]    Physical Therapy    [] Initial Evaluation            [x] Daily Treatment Note         [] Discharge Summary      Patient: Sharon Kitchen   : 1945   MRN: [id number]   Date of Service:  2024  Admitting Diagnosis: Bacterial pneumonia  Current Admission Summary: 77 yo female admitted to [facility] on  for increased weakness/fatigue, cough, and diarrhea. Found to have acute respiratory failure due to PNA. Placed in c-diff r/o.   Past Medical History:  has a past medical history of Acute DVT (deep venous thrombosis) ([facility]), Acute encephalopathy, Acute on chronic diastolic heart failure ([facility]), Alcohol abuse, Anxiety, Arthritis, CAD in native artery, Cellulitis, Cerebral artery occlusion with cerebral infarction ([facility]), Cerebrovascular accident (CVA) ([facility]), Chronic fatigue, Complex care coordination, Complicated UTI (urinary tract infection), Congestive heart failure, unspecified HF chronicity, unspecified heart failure type ([facility]), COPD (chronic obstructive pulmonary disease) ([facility]), COPD exacerbation ([facility]), Depression, Diabetes mellitus ([facility]), DIMAS (dyspnea on exertion), DVT (deep venous thrombosis) ([facility]), DVT, lower extremity ([facility]), Fatigue, General weakness, Generalized weakness, Hypercholesteremia, Hypertension, Hyperthyroidism, Incontinence, Mammogram abnormal, Neuromuscular disorder ([facility]), Osteopenia, PAF (paroxysmal atrial fibrillation) ([facility]), Pneumonia, Recurrent UTI, Right forearm cellulitis, Superficial thrombophlebitis of right upper extremity, Thyroid disease, Tobacco abuse, Tobacco abuse counseling, Trapped lung, and Unable to walk.  Past Surgical History:  has a past surgical history that includes Tonsillectomy; Colonoscopy (2012); Toe Surgery (Right); Shoulder arthroscopy (Right, 14); eye surgery; Pacemaker insertion; pacemaker placement; Nerve Surgery (N/A, 2021); and Stimulator Surgery (N/A,

## 2024-04-26 NOTE — CONSULTS
Palliative Care:        Initial consult completed 4/24/24 and following. Palliative referral for community support when medically cleared for discharge faxed/called to [facility] Palliative Team. Liaison: Navarro as of 0825 today.      Phone: [phone]  Fax: [phone]  Cell: [phone]       Palliative team will continue to follow as needed. Contact information provided on patient communication board. CM and Nurse updated of these discussions.     Electronically signed by Maricel Huertas RN, BSN, CHPN (Palliative Care) [phone]

## 2024-04-26 NOTE — PROGRESS NOTES
Pulmonary and Critical Care Medicine    CC: SOB   Date: 2024  Admit Date:  2024  Reason for Consultation: Persistent left lung opacity  Consult Requesting Physician: Deny Snyder MD       HPI     This is a 77 y/o F w/ a hx of COPD, not on home oxygen, tobacco use, chronic left effusion, HTN, Hypothyroidism who presented with with SOB. Patient was admitted to the medical floor for acute hypoxic respiratory failure and PNA, she was started on abx and oxygen.     Overnight:  Today she is on 2L NC  She was eating breakfast during my evaluation and was coughing with eating     ROS: negative except stated above    OBJECTIVE DATA       PHYSICAL EXAM:   Temp  Av.7 °F (36.5 °C)  Min: 97.3 °F (36.3 °C)  Max: 98.2 °F (36.8 °C)  Pulse  Av.3  Min: 65  Max: 82  BP  Min: 95/52  Max: 128/80  SpO2  Av.3 %  Min: 95 %  Max: 99 %    General: NAD  Neuro: A0x3  Lung: left course BS, equal non labored  Heart: regular rate    MEDICATIONS: Reviewed  Scheduled Meds:   ipratropium 0.5 mg-albuterol 2.5 mg  1 Dose Inhalation BID RT    clonazePAM  1 mg Oral BID    cloNIDine  0.1 mg Oral TID    folic acid  1 mg Oral Daily    levofloxacin  750 mg IntraVENous Q24H    aspirin  81 mg Oral Daily    carvedilol  12.5 mg Oral BID with meals    furosemide  20 mg Oral Daily    levothyroxine  100 mcg Oral Daily    lisinopril  5 mg Oral Daily    mirtazapine  15 mg Oral Nightly    nicotine  1 patch TransDERmal Daily    sodium chloride flush  5-40 mL IntraVENous 2 times per day    apixaban  5 mg Oral BID     Continuous Infusions:   sodium chloride 25 mL/hr at 24 1802     PRN Meds:.albuterol, sodium chloride flush, sodium chloride, polyethylene glycol, acetaminophen **OR** acetaminophen     LABS: Reviewed.   Recent Labs     24  0547 24  0558 24  0604   * 135* 133*   K 4.8 4.4 4.7   CL 94* 96* 95*   CO2 28 31 28   BUN 14 17 19   CREATININE 0.7 0.7 0.7     Recent Labs     24  0547

## 2024-04-26 NOTE — PROGRESS NOTES
Hospitalist Progress Note      PCP: Sherrill Cross APRN - NP    Date of Admission: 4/22/2024    LOS: 4    Chief Complaint:   Chief Complaint   Patient presents with    Nausea     Pt arrived via [facility] from home w c/o nausea from yesterday. Pt got 4 mg IV Zofran en route.        Case Summary:   78-year-old lady with history of paroxysmal atrial fibrillation, CAD, COPD, type 2 diabetes, hypertension, hyperlipidemia, history of CHF, history of CVA presented with generalized malaise weakness fatigue and shortness of breath found to have acute respiratory failure with hypoxia due to bilateral pneumonia      Active Hospital Problems    Diagnosis Date Noted    Aspiration into airway [T17.908A] 04/26/2024    Acute respiratory failure with hypoxia (HCC) [J96.01] 04/23/2024    Bacterial pneumonia [J15.9] 04/22/2024    Coronary artery disease due to lipid rich plaque [I25.10, I25.83]     PAF (paroxysmal atrial fibrillation) (HCC) [I48.0] 05/08/2019    Dyslipidemia [E78.5]     HTN (hypertension), benign [I10]     Depression [F32.A]     COPD, moderate (HCC) [J44.9] 06/09/2016    Hypothyroidism [E03.9] 10/16/2012         Principal Problem:    Bacterial pneumonia: Probable aspiration.  Patient was coughing with meals this morning.  Discussed with pulmonology.  - Continue antibiotic coverage and Flagyl was added by pulmonology this morning  - Get speech pathology evaluation to assess swallowing  - Palliative consult to help address goals of care  - Repeat chest x-ray in a couple days      Acute respiratory failure with hypoxia (HCC): Stable on 2 L nasal cannula and will wean off as tolerated with target sats greater than 90%      Urinary tract infection: Urinalysis positive though urine cultures noted normal chato.  Already on antibiotics for pneumonia    Physical debility: Continue PT OT    Active Problems:    Hypothyroidism: On thyroid replacement therapy    COPD, moderate (HCC): Without exacerbation.

## 2024-04-26 NOTE — PLAN OF CARE
Problem: Pain  Goal: Verbalizes/displays adequate comfort level or baseline comfort level  Outcome: Progressing     Problem: Pain  Goal: Verbalizes/displays adequate comfort level or baseline comfort level  Outcome: Progressing     Problem: ABCDS Injury Assessment  Goal: Absence of physical injury  Outcome: Progressing     Problem: Safety - Adult  Goal: Free from fall injury  Outcome: Progressing     Problem: Skin/Tissue Integrity  Goal: Absence of new skin breakdown  Description: 1.  Monitor for areas of redness and/or skin breakdown  2.  Assess vascular access sites hourly  3.  Every 4-6 hours minimum:  Change oxygen saturation probe site  4.  Every 4-6 hours:  If on nasal continuous positive airway pressure, respiratory therapy assess nares and determine need for appliance change or resting period.  Outcome: Progressing

## 2024-04-27 LAB
ANION GAP SERPL CALCULATED.3IONS-SCNC: 10 MMOL/L (ref 3–16)
BASOPHILS # BLD: 0.1 K/UL (ref 0–0.2)
BASOPHILS NFR BLD: 1.1 %
BUN SERPL-MCNC: 12 MG/DL (ref 7–20)
CALCIUM SERPL-MCNC: 9 MG/DL (ref 8.3–10.6)
CHLORIDE SERPL-SCNC: 93 MMOL/L (ref 99–110)
CO2 SERPL-SCNC: 29 MMOL/L (ref 21–32)
CREAT SERPL-MCNC: 0.6 MG/DL (ref 0.6–1.2)
DEPRECATED RDW RBC AUTO: 14 % (ref 12.4–15.4)
EOSINOPHIL # BLD: 0.2 K/UL (ref 0–0.6)
EOSINOPHIL NFR BLD: 2.7 %
GFR SERPLBLD CREATININE-BSD FMLA CKD-EPI: >90 ML/MIN/{1.73_M2}
GLUCOSE SERPL-MCNC: 86 MG/DL (ref 70–99)
HCT VFR BLD AUTO: 40.3 % (ref 36–48)
HGB BLD-MCNC: 13.1 G/DL (ref 12–16)
LYMPHOCYTES # BLD: 0.7 K/UL (ref 1–5.1)
LYMPHOCYTES NFR BLD: 10.3 %
MCH RBC QN AUTO: 32 PG (ref 26–34)
MCHC RBC AUTO-ENTMCNC: 32.4 G/DL (ref 31–36)
MCV RBC AUTO: 98.6 FL (ref 80–100)
MONOCYTES # BLD: 1.1 K/UL (ref 0–1.3)
MONOCYTES NFR BLD: 17.2 %
NEUTROPHILS # BLD: 4.4 K/UL (ref 1.7–7.7)
NEUTROPHILS NFR BLD: 68.7 %
PLATELET # BLD AUTO: 267 K/UL (ref 135–450)
PMV BLD AUTO: 9 FL (ref 5–10.5)
POTASSIUM SERPL-SCNC: 4.7 MMOL/L (ref 3.5–5.1)
RBC # BLD AUTO: 4.08 M/UL (ref 4–5.2)
SODIUM SERPL-SCNC: 132 MMOL/L (ref 136–145)
WBC # BLD AUTO: 6.4 K/UL (ref 4–11)

## 2024-04-27 PROCEDURE — 85025 COMPLETE CBC W/AUTO DIFF WBC: CPT

## 2024-04-27 PROCEDURE — 6360000002 HC RX W HCPCS: Performed by: INTERNAL MEDICINE

## 2024-04-27 PROCEDURE — 99233 SBSQ HOSP IP/OBS HIGH 50: CPT | Performed by: INTERNAL MEDICINE

## 2024-04-27 PROCEDURE — 6360000002 HC RX W HCPCS: Performed by: STUDENT IN AN ORGANIZED HEALTH CARE EDUCATION/TRAINING PROGRAM

## 2024-04-27 PROCEDURE — 6370000000 HC RX 637 (ALT 250 FOR IP): Performed by: INTERNAL MEDICINE

## 2024-04-27 PROCEDURE — 36415 COLL VENOUS BLD VENIPUNCTURE: CPT

## 2024-04-27 PROCEDURE — 94640 AIRWAY INHALATION TREATMENT: CPT

## 2024-04-27 PROCEDURE — 1200000000 HC SEMI PRIVATE

## 2024-04-27 PROCEDURE — 6370000000 HC RX 637 (ALT 250 FOR IP): Performed by: STUDENT IN AN ORGANIZED HEALTH CARE EDUCATION/TRAINING PROGRAM

## 2024-04-27 PROCEDURE — 2580000003 HC RX 258: Performed by: INTERNAL MEDICINE

## 2024-04-27 PROCEDURE — 80048 BASIC METABOLIC PNL TOTAL CA: CPT

## 2024-04-27 RX ADMIN — LISINOPRIL 5 MG: 5 TABLET ORAL at 08:50

## 2024-04-27 RX ADMIN — MIRTAZAPINE 15 MG: 15 TABLET, FILM COATED ORAL at 23:41

## 2024-04-27 RX ADMIN — APIXABAN 5 MG: 5 TABLET, FILM COATED ORAL at 23:41

## 2024-04-27 RX ADMIN — APIXABAN 5 MG: 5 TABLET, FILM COATED ORAL at 08:50

## 2024-04-27 RX ADMIN — LEVOFLOXACIN 750 MG: 5 INJECTION, SOLUTION INTRAVENOUS at 20:16

## 2024-04-27 RX ADMIN — METRONIDAZOLE 500 MG: 500 INJECTION, SOLUTION INTRAVENOUS at 20:08

## 2024-04-27 RX ADMIN — ACETAMINOPHEN 650 MG: 325 TABLET ORAL at 08:50

## 2024-04-27 RX ADMIN — FOLIC ACID 1 MG: 1 TABLET ORAL at 08:50

## 2024-04-27 RX ADMIN — CARVEDILOL 12.5 MG: 6.25 TABLET, FILM COATED ORAL at 19:52

## 2024-04-27 RX ADMIN — ACETAMINOPHEN 650 MG: 325 TABLET ORAL at 16:54

## 2024-04-27 RX ADMIN — SODIUM CHLORIDE, PRESERVATIVE FREE 10 ML: 5 INJECTION INTRAVENOUS at 20:17

## 2024-04-27 RX ADMIN — CARVEDILOL 12.5 MG: 6.25 TABLET, FILM COATED ORAL at 08:51

## 2024-04-27 RX ADMIN — CLONIDINE HYDROCHLORIDE 0.1 MG: 0.1 TABLET ORAL at 08:50

## 2024-04-27 RX ADMIN — LEVOTHYROXINE SODIUM 100 MCG: 0.1 TABLET ORAL at 06:22

## 2024-04-27 RX ADMIN — CLONAZEPAM 1 MG: 1 TABLET ORAL at 08:51

## 2024-04-27 RX ADMIN — METRONIDAZOLE 500 MG: 500 INJECTION, SOLUTION INTRAVENOUS at 11:24

## 2024-04-27 RX ADMIN — IPRATROPIUM BROMIDE AND ALBUTEROL SULFATE 1 DOSE: .5; 3 SOLUTION RESPIRATORY (INHALATION) at 20:49

## 2024-04-27 RX ADMIN — SODIUM CHLORIDE, PRESERVATIVE FREE 10 ML: 5 INJECTION INTRAVENOUS at 08:52

## 2024-04-27 RX ADMIN — METRONIDAZOLE 500 MG: 500 INJECTION, SOLUTION INTRAVENOUS at 03:09

## 2024-04-27 RX ADMIN — ASPIRIN 81 MG 81 MG: 81 TABLET ORAL at 08:51

## 2024-04-27 RX ADMIN — FUROSEMIDE 20 MG: 20 TABLET ORAL at 08:50

## 2024-04-27 RX ADMIN — CLONAZEPAM 1 MG: 1 TABLET ORAL at 19:53

## 2024-04-27 ASSESSMENT — PAIN DESCRIPTION - LOCATION
LOCATION: HEAD
LOCATION: HEAD

## 2024-04-27 ASSESSMENT — PAIN SCALES - GENERAL
PAINLEVEL_OUTOF10: 9
PAINLEVEL_OUTOF10: 3
PAINLEVEL_OUTOF10: 8

## 2024-04-27 ASSESSMENT — PAIN SCALES - WONG BAKER
WONGBAKER_NUMERICALRESPONSE: HURTS LITTLE MORE
WONGBAKER_NUMERICALRESPONSE: HURTS A LITTLE BIT

## 2024-04-27 NOTE — PROGRESS NOTES
PULMONARY AND CRITICAL CARE MEDICINE PROGRESS NOTE      SUBJECTIVE: Patient is sitting comfortably in the bed.  On 2 L nasal cannula and saturating well.  Denies any complaints.    REVIEW OF SYSTEMS:   CONSTITUTIONAL SYMPTOMS: The patient denies fever, fatigue, night sweats, weight loss or weight gain.   HEENT: No vision changes. No tinnitus, Denies sinus pain. No hoarseness, or dysphagia.   CARDIOVASCULAR: Denies chest pain, palpitation, syncope.  RESPIRATORY: Denies shortness of breath or cough.   GASTROINTESTINAL: Denies nausea, abdominal pain or change in bowel function.  SKIN: No rashes or itching.   MUSCULOSKELETAL: Denies weakness or bone pain.   NEUROLOGICAL: No headaches or seizures.     MEDICATIONS:      metroNIDAZOLE  500 mg IntraVENous Q8H    ipratropium 0.5 mg-albuterol 2.5 mg  1 Dose Inhalation BID RT    clonazePAM  1 mg Oral BID    cloNIDine  0.1 mg Oral TID    folic acid  1 mg Oral Daily    levofloxacin  750 mg IntraVENous Q24H    aspirin  81 mg Oral Daily    carvedilol  12.5 mg Oral BID with meals    furosemide  20 mg Oral Daily    levothyroxine  100 mcg Oral Daily    lisinopril  5 mg Oral Daily    mirtazapine  15 mg Oral Nightly    nicotine  1 patch TransDERmal Daily    sodium chloride flush  5-40 mL IntraVENous 2 times per day    apixaban  5 mg Oral BID      sodium chloride 25 mL/hr at 04/23/24 1802     albuterol, sodium chloride flush, sodium chloride, polyethylene glycol, acetaminophen **OR** acetaminophen     ALLERGIES:   Allergies as of 04/22/2024 - Fully Reviewed 04/22/2024   Allergen Reaction Noted    Keflex [cephalexin] Shortness Of Breath and Other (See Comments) 04/28/2018    Lipitor [atorvastatin] Other (See Comments) 11/11/2015    Morphine Shortness Of Breath 05/24/2012    Hctz [hydrochlorothiazide] Other (See Comments) 08/26/2014    Norvasc [amlodipine besylate] Swelling 05/29/2014    Penicillins Hives 03/26/2011    Tramadol Itching 05/22/2014    Hydralazine Palpitations and Other

## 2024-04-27 NOTE — PROGRESS NOTES
Hospitalist Progress Note      PCP: Sherrill Cross APRN - NP    Date of Admission: 4/22/2024    LOS: 5    Chief Complaint:   Chief Complaint   Patient presents with    Nausea     Pt arrived via [facility] from home w c/o nausea from yesterday. Pt got 4 mg IV Zofran en route.        Case Summary:   78-year-old lady with history of paroxysmal atrial fibrillation, CAD, COPD, type 2 diabetes, hypertension, hyperlipidemia, history of CHF, history of CVA presented with generalized malaise weakness fatigue and shortness of breath found to have acute respiratory failure with hypoxia due to bilateral pneumonia      Active Hospital Problems    Diagnosis Date Noted    Aspiration into airway [T17.908A] 04/26/2024    Acute respiratory failure with hypoxia (HCC) [J96.01] 04/23/2024    Bacterial pneumonia [J15.9] 04/22/2024    Coronary artery disease due to lipid rich plaque [I25.10, I25.83]     PAF (paroxysmal atrial fibrillation) (HCC) [I48.0] 05/08/2019    Dyslipidemia [E78.5]     HTN (hypertension), benign [I10]     Depression [F32.A]     COPD, moderate (HCC) [J44.9] 06/09/2016    Hypothyroidism [E03.9] 10/16/2012         Principal Problem:    Bacterial pneumonia: Probable aspiration.  Remained stable.  On 1 L nasal cannula this morning.  Discussed with pulmonology this morning.  Will switch to oral antibiotics in view of discharge tomorrow.  Seen speech pathology and placed on dysphagia diet.      Acute respiratory failure with hypoxia (HCC): Stable on 1-2L nasal cannula and will wean off as tolerated with target sats greater than 90%      Urinary tract infection: Urinalysis positive though urine cultures noted normal chato.  Already on antibiotics for pneumonia    Physical debility: Continue PT OT    Active Problems:    Hypothyroidism: On thyroid replacement therapy    COPD, moderate (HCC): Without exacerbation.  Continue as needed inhalers    Depression: Stable on mirtazapine, clonazepam    HTN

## 2024-04-28 ENCOUNTER — APPOINTMENT (OUTPATIENT)
Dept: GENERAL RADIOLOGY | Age: 79
DRG: 177 | End: 2024-04-28
Payer: MEDICARE

## 2024-04-28 PROCEDURE — 6370000000 HC RX 637 (ALT 250 FOR IP): Performed by: INTERNAL MEDICINE

## 2024-04-28 PROCEDURE — 6360000002 HC RX W HCPCS: Performed by: STUDENT IN AN ORGANIZED HEALTH CARE EDUCATION/TRAINING PROGRAM

## 2024-04-28 PROCEDURE — 74018 RADEX ABDOMEN 1 VIEW: CPT

## 2024-04-28 PROCEDURE — 94640 AIRWAY INHALATION TREATMENT: CPT

## 2024-04-28 PROCEDURE — 2580000003 HC RX 258: Performed by: INTERNAL MEDICINE

## 2024-04-28 PROCEDURE — 2700000000 HC OXYGEN THERAPY PER DAY

## 2024-04-28 PROCEDURE — 6370000000 HC RX 637 (ALT 250 FOR IP): Performed by: STUDENT IN AN ORGANIZED HEALTH CARE EDUCATION/TRAINING PROGRAM

## 2024-04-28 PROCEDURE — 1200000000 HC SEMI PRIVATE

## 2024-04-28 PROCEDURE — 94680 O2 UPTK RST&XERS DIR SIMPLE: CPT

## 2024-04-28 PROCEDURE — 94761 N-INVAS EAR/PLS OXIMETRY MLT: CPT

## 2024-04-28 PROCEDURE — 6360000002 HC RX W HCPCS: Performed by: INTERNAL MEDICINE

## 2024-04-28 RX ORDER — ONDANSETRON 2 MG/ML
4 INJECTION INTRAMUSCULAR; INTRAVENOUS EVERY 6 HOURS PRN
Status: DISCONTINUED | OUTPATIENT
Start: 2024-04-28 | End: 2024-05-01 | Stop reason: HOSPADM

## 2024-04-28 RX ORDER — ALBUTEROL SULFATE 90 UG/1
2 AEROSOL, METERED RESPIRATORY (INHALATION) EVERY 4 HOURS PRN
Qty: 18 G | Refills: 1 | Status: SHIPPED | OUTPATIENT
Start: 2024-04-28

## 2024-04-28 RX ORDER — LABETALOL HYDROCHLORIDE 5 MG/ML
10 INJECTION, SOLUTION INTRAVENOUS EVERY 4 HOURS PRN
Status: DISCONTINUED | OUTPATIENT
Start: 2024-04-28 | End: 2024-05-01 | Stop reason: HOSPADM

## 2024-04-28 RX ORDER — LEVOFLOXACIN 500 MG/1
500 TABLET, FILM COATED ORAL DAILY
Qty: 7 TABLET | Refills: 0 | Status: SHIPPED | OUTPATIENT
Start: 2024-04-28 | End: 2024-05-05

## 2024-04-28 RX ORDER — LEVOFLOXACIN 5 MG/ML
750 INJECTION, SOLUTION INTRAVENOUS EVERY 24 HOURS
Status: DISCONTINUED | OUTPATIENT
Start: 2024-04-28 | End: 2024-05-01 | Stop reason: HOSPADM

## 2024-04-28 RX ORDER — NICOTINE 21 MG/24HR
1 PATCH, TRANSDERMAL 24 HOURS TRANSDERMAL DAILY
Qty: 7 PATCH | Refills: 0 | Status: SHIPPED | OUTPATIENT
Start: 2024-04-28 | End: 2024-05-05

## 2024-04-28 RX ORDER — IPRATROPIUM BROMIDE AND ALBUTEROL SULFATE 2.5; .5 MG/3ML; MG/3ML
3 SOLUTION RESPIRATORY (INHALATION) EVERY 6 HOURS PRN
Qty: 360 ML | Refills: 0 | Status: SHIPPED | OUTPATIENT
Start: 2024-04-28

## 2024-04-28 RX ADMIN — IPRATROPIUM BROMIDE AND ALBUTEROL SULFATE 1 DOSE: .5; 3 SOLUTION RESPIRATORY (INHALATION) at 08:58

## 2024-04-28 RX ADMIN — ONDANSETRON 4 MG: 2 INJECTION INTRAMUSCULAR; INTRAVENOUS at 18:56

## 2024-04-28 RX ADMIN — IPRATROPIUM BROMIDE AND ALBUTEROL SULFATE 1 DOSE: .5; 3 SOLUTION RESPIRATORY (INHALATION) at 20:38

## 2024-04-28 RX ADMIN — CLONIDINE HYDROCHLORIDE 0.1 MG: 0.1 TABLET ORAL at 17:28

## 2024-04-28 RX ADMIN — CARVEDILOL 12.5 MG: 6.25 TABLET, FILM COATED ORAL at 17:28

## 2024-04-28 RX ADMIN — LEVOTHYROXINE SODIUM 100 MCG: 0.1 TABLET ORAL at 04:35

## 2024-04-28 RX ADMIN — FOLIC ACID 1 MG: 1 TABLET ORAL at 07:38

## 2024-04-28 RX ADMIN — METRONIDAZOLE 500 MG: 500 INJECTION, SOLUTION INTRAVENOUS at 11:22

## 2024-04-28 RX ADMIN — CLONIDINE HYDROCHLORIDE 0.1 MG: 0.1 TABLET ORAL at 07:38

## 2024-04-28 RX ADMIN — CARVEDILOL 12.5 MG: 6.25 TABLET, FILM COATED ORAL at 07:38

## 2024-04-28 RX ADMIN — CLONAZEPAM 1 MG: 1 TABLET ORAL at 07:38

## 2024-04-28 RX ADMIN — ACETAMINOPHEN 650 MG: 325 TABLET ORAL at 18:58

## 2024-04-28 RX ADMIN — ACETAMINOPHEN 650 MG: 325 TABLET ORAL at 07:38

## 2024-04-28 RX ADMIN — ASPIRIN 81 MG 81 MG: 81 TABLET ORAL at 07:38

## 2024-04-28 RX ADMIN — METRONIDAZOLE 500 MG: 500 INJECTION, SOLUTION INTRAVENOUS at 04:39

## 2024-04-28 RX ADMIN — FUROSEMIDE 20 MG: 20 TABLET ORAL at 07:38

## 2024-04-28 RX ADMIN — SODIUM CHLORIDE, PRESERVATIVE FREE 10 ML: 5 INJECTION INTRAVENOUS at 07:39

## 2024-04-28 RX ADMIN — APIXABAN 5 MG: 5 TABLET, FILM COATED ORAL at 07:38

## 2024-04-28 RX ADMIN — LISINOPRIL 5 MG: 5 TABLET ORAL at 07:38

## 2024-04-28 ASSESSMENT — PAIN SCALES - WONG BAKER
WONGBAKER_NUMERICALRESPONSE: HURTS EVEN MORE

## 2024-04-28 ASSESSMENT — PAIN DESCRIPTION - ORIENTATION: ORIENTATION: LOWER;MID

## 2024-04-28 ASSESSMENT — PAIN SCALES - GENERAL
PAINLEVEL_OUTOF10: 8
PAINLEVEL_OUTOF10: 8

## 2024-04-28 ASSESSMENT — PAIN DESCRIPTION - LOCATION
LOCATION: HEAD
LOCATION: BACK
LOCATION: BACK

## 2024-04-28 ASSESSMENT — PAIN DESCRIPTION - DESCRIPTORS
DESCRIPTORS: ACHING;DISCOMFORT
DESCRIPTORS: ACHING
DESCRIPTORS: ACHING

## 2024-04-28 NOTE — CARE COORDINATION
Case Management -  Discharge Note      Patient Name: Sharon Kitchen                   YOB: 1945            Readmission Risk (Low < 19, Mod (19-27), High > 27): Readmission Risk Score: 13.8    Current PCP: Sherrill Cross APRN - NP    ([facility]) Important Message from Medicare:    Date: 4/28/2024    PT AM-PAC: 16 /24  OT AM-PAC: 15 /24    Patient/patient representative has been educated on the benefits of HHC as well as the possible risks of declining recommended services. Patient/patient representative has acknowledged the information provided and decided on the following discharge plan. Patient/ patient representative has been provided freedom of choice regarding service provider, supported by basic dialogue that supports the patient's individualized plan of care/goals.    [facility])  [address] [facility] [address]  Phone: [phone]  Fax: [phone]                  Financial    Payor: [employer] MEDICARE / Plan: [employer] GOLD PLUS HMO / Product Type: *No Product type* /     Pharmacy:  Potential assistance Purchasing Medications:    Meds-to-Beds request:        [facility] [id number] - [facility] [address] [phone] - F [phone]  [facility] [address]  [facility] [address]  Phone: [phone] Fax: [phone]    [facility] [address] -  [phone] - F [phone]  [address] [address]  Phone: [phone] Fax: [phone]    [facility] [id number] - [place], [place] - [address] - P [phone] - F [phone]  [address]  [facility] [address]  Phone: [phone] Fax: [phone]      Notes:    Additional Case Management Notes: KAREEM spoke with Alexx son n law, and Spouse, who is agreeable to discharge plan, and can  pt today.

## 2024-04-28 NOTE — PROGRESS NOTES
Pt complaining of not being able to breath.  When observed, spo2 was at 89% and declining on 1L.    Pt also complained of belly aching as well as having to have BM.   Bp at that time was 204/133 p109.   Respiratory called to monitor oxygen.  Pt oxygen was increased to 4L, spo2 was 80, then began to come up.  Sp02 up to 96%.  This nurse turned pt on her left side so that pt could have a bm.    Pt had a small formed bm.  Dr malhotra was messaged.  Pt bp came down to 187/109 p 111. Respiratory titrated pt oxygen back down to 2L.   Pt was given tylenol for low grade temp and zofran was given for nausea.

## 2024-04-28 NOTE — DISCHARGE SUMMARY
Hospital Medicine Discharge Summary    Patient ID: Sharon Kitchen      Patient's PCP: Sherrill Cross APRN - [place]    Admit Date: 4/22/2024     Discharge Date:   4/28/2024     Admitting Provider: Erin Robles MD     Discharge Provider: Deny Snyder MD     Discharge Diagnoses:       Active Hospital Problems    Diagnosis     Aspiration into airway [T17.908A]     Acute respiratory failure with hypoxia (HCC) [J96.01]     Bacterial pneumonia [J15.9]     Coronary artery disease due to lipid rich plaque [I25.10, I25.83]     PAF (paroxysmal atrial fibrillation) (HCC) [I48.0]     Dyslipidemia [E78.5]     HTN (hypertension), benign [I10]     Depression [F32.A]     COPD, moderate (HCC) [J44.9]     Hypothyroidism [E03.9]        The patient was seen and examined on day of discharge and this discharge summary is in conjunction with any daily progress note from day of discharge.    Hospital Course:   The patient is a 78-year-old lady with history of paroxysmal atrial fibrillation, CAD, COPD, type 2 diabetes, hypertension, hyperlipidemia, history of CHF, history of CVA presented with generalized malaise weakness fatigue and shortness of breath found to have acute respiratory failure with hypoxia due to bilateral pneumonia       Bacterial pneumonia: Probable aspiration.  Remained stable.  On 1 L nasal cannula this morning.  She was found to have coughing with meals concerning for possible aspiration.  She was evaluated by speech pathology and placed on dysphagia diet.  She will continue on 7 more days of oral Levaquin for antibiotic regimen.  She was consulted by pulmonology.  Outpatient follow-up with PCP in 1 week      Acute respiratory failure with hypoxia (HCC): She required O2 supplementation of 1 to 2 L.  Will get home O2 evaluation prior to discharge      Urinary tract infection: Urinalysis positive though urine cultures noted normal chato.  Already on antibiotics for pneumonia

## 2024-04-28 NOTE — CARE COORDINATION
Case Management Assessment  Initial Evaluation    Date/Time of Evaluation: 4/28/2024 2:20 PM  Assessment Completed by: Lucretia Sequeira    If patient is discharged prior to next notation, then this note serves as note for discharge by case management.    Patient Name: Sharon Kitchen                   YOB: 1945  Diagnosis: Bacterial pneumonia [J15.9]  Septicemia (HCC) [A41.9]  Acute respiratory failure with hypoxia (HCC) [J96.01]  Multifocal pneumonia [J18.9]                   Date / Time: 4/22/2024  3:34 PM    Patient Admission Status: Inpatient   Readmission Risk (Low < 19, Mod (19-27), High > 27): Readmission Risk Score: 13.8    Current PCP: Sherrill Cross APRN - NP  PCP verified by CM? (P) Yes    Chart Reviewed: Yes      History Provided by: (P) Patient  Patient Orientation: (P) Alert and Oriented, Person, Place, Situation    Patient Cognition: (P) Alert    Hospitalization in the last 30 days (Readmission):  No    If yes, Readmission Assessment in  Navigator will be completed.    Advance Directives:      Code Status: Full Code   Patient's Primary Decision Maker is: (P) Legal Next of Kin    Primary Decision Maker: Florentino Kitchen - Spouse - [phone]    Secondary Decision Maker: Doris Kitchen  Child - [phone]    Discharge Planning:    Patient lives with: (P) Spouse/Significant Other, Children, Family Members Type of Home: (P) House  Primary Care Giver: (P) Self  Patient Support Systems include: (P) Spouse/Significant Other, Children   Current Financial resources: (P) Medicare  Current community resources: (P) Other (Comment)  Current services prior to admission: (P) Durable Medical Equipment            Current DME: (P) Walker, Shower Chair            Type of Home Care services:  (P) None    ADLS  Prior functional level: (P) Assistance with the following:, Cooking, Housework, Shopping, Bathing, Mobility  Current functional level: (P) Assistance with the following:, Mobility,  Bathing, Cooking, Housework, Shopping    PT AM-PAC: 16 /24  OT AM-PAC: 15 /24    Family can provide assistance at DC: (P) Yes  Would you like Case Management to discuss the discharge plan with any other family members/significant others, and if so, who? (P) Yes  Plans to Return to Present Housing: (P) Yes  Other Identified Issues/Barriers to RETURNING to current housing: none  Potential Assistance needed at discharge: (P) Home Care            Potential DME:    Patient expects to discharge to: (P) House  Plan for transportation at discharge: (P) Family    Financial    Payor: [employer] MEDICARE / Plan: [employer] GOLD PLUS HMO / Product Type: *No Product type* /     Does insurance require precert for SNF: Yes    Potential assistance Purchasing Medications:    Meds-to-Beds request:        [facility] [id number] - [place] [address] DASHA MOSLEY [phone] - F [phone]  [facility] DASHA ORTIZ  [facility] [address]  Phone: [phone] Fax: [phone]    [facility] [place] [id number] [facility] -  [phone] - F [phone]  [address] [facility] [address]  Phone: [phone] Fax: [phone]    [facility] [place] [id number] - [facility] [address] - P [phone] - F [phone]  [address]  [facility] [address]  Phone: [phone] Fax: [phone]      Notes:    Factors facilitating achievement of predicted outcomes: Family support and Has needed Durable Medical Equipment at home    Barriers to discharge: Limited safety awareness and Medical complications    Additional Case Management Notes: Cm to bedside to talk to Pt. Pt reports she lives at home with her daughter and spouse. Pt reports her spouse doesn't drive anymore because he getting dementia.    Discharge Planning Note Re: Skilled Nursing     CM/SW noted consult for discharge planning. Chart reviewed. Noted recommendations for SNF.  CM met with patient.  Introduced self and explained role of CM and discharge

## 2024-04-28 NOTE — PROGRESS NOTES
04/28/24 1526   Resting (Room Air)   SpO2 96   HR 82   During Walk (Room Air)   SpO2 86   HR 95   Walk/Assistance Device Walker   Rate of Dyspnea 0   During Walk (On O2)   SpO2 94   HR 90   O2 Device Nasal cannula   O2 Flow Rate (l/min) 2 l/min   Need Additional O2 Flow Rate Rows No   Walk/Assistance Device Walker   Rate of Dyspnea 0   After Walk   SpO2 96   O2 Flow Rate (l/min) 0 l/min   Does the Patient Qualify for Home O2 Yes   Liter Flow at Rest 0   Liter Flow on Exertion 2   Does the Patient Need Portable Oxygen Tanks Yes

## 2024-04-28 NOTE — PROGRESS NOTES
This nurse had to place a call to Jennifer to get pt an oxygen concentrator. Per RT's note pt requires  2L of O2 while ambulating. CM had already left for the day. This Nurse called KAREEM Severino and I was referred to reach out to Zafar. Called Zafar at [phone] and Zafar's representative indicated that pt could not get a concentrator due to having [employer]'s insurance.Zafar representative gave me [facility]'s information ([phone]) to get a pt a concentrator. This Nurse reached out to KAREEM Severino  and Dr. Kraft. Order was placed and papers were faxed over to [facility]. [facility] will deliver oxygen tank here at facility. Pt will discharge this evening.

## 2024-04-28 NOTE — PROGRESS NOTES
Pt will not d/c today due to Blood pressure being elevated. Dr. Kraft and Mere SERNA, was notified. Mere SERNA, reached out to [facility] to cancel the O2 concentrator. Concentrator will be delivered tomorrow.

## 2024-04-28 NOTE — CARE COORDINATION
04/28/24 1442   IMM Letter   IMM Letter given to Patient/Family/Significant other/Guardian/POA/by: CM provided IMM   IMM Letter date given: 04/28/24   IMM Letter time given: 1443     Electronically signed by Lucretia Sequeira on 4/28/2024 at 2:43 PM

## 2024-04-28 NOTE — DISCHARGE INSTR - COC
Continuity of Care Form    Patient Name: Sharon Kitchen   :  1945  MRN:  [id number]    Admit date:  2024  Discharge date:  24    Code Status Order: Full Code   Advance Directives:     Admitting Physician:  Erin Robels MD  PCP: Sherrill Cross APRN - NP    Discharging Nurse: LIS Stewart  Discharging Hospital Unit/Room#: 5TN-5572/5572-01  Discharging Unit Phone Number: [phone]    Emergency Contact:   Extended Emergency Contact Information  Primary Emergency Contact: Florentino Kitchen  Address: [address] [place] [address]  Home Phone: [phone]  Work Phone: [phone]  Mobile Phone: [phone]  Relation: Spouse  Secondary Emergency Contact: Doris Kitchen  Home Phone: [phone]  Mobile Phone: [phone]  Relation: Child    Past Surgical History:  Past Surgical History:   Procedure Laterality Date    COLONOSCOPY  2012    Dr. Wills; repeat 5 years    EYE SURGERY      cateracts removed    NERVE SURGERY N/A 2021    INTERSTIM STAGE1, FLEXIBLE CYSTOSCOPY performed by Rubens Sevilla MD at [facility] OR    PACEMAKER INSERTION      PACEMAKER PLACEMENT      SHOULDER ARTHROSCOPY Right 14    SUBACROMIAL DECOMPRESSION, ROTATOR CUFF REPAIR    STIMULATOR SURGERY N/A 2022    INTERSTIMULATOR INSERTION STAGE 2 - MEDTRONICS performed by Rubens Sevilla MD at [facility] OR    TOE SURGERY Right     TONSILLECTOMY         Immunization History:   Immunization History   Administered Date(s) Administered    COVID-19, PFIZER PURPLE top, DILUTE for use, (age 12 y+), 30mcg/0.3mL 2021    Influenza Virus Vaccine 2015    Influenza, FLUAD, (age 65 y+), Adjuvanted, 0.5mL 2022, 2023    Influenza, High Dose (Fluzone 65 yrs and older) 10/16/2012, 10/27/2014, 2016, 10/16/2017, 2018, 10/08/2019    Pneumococcal Conjugate 7-valent (Prevnar7) 2011    Pneumococcal, PCV-13, PREVNAR 13, (age 6w+), IM, 0.5mL 2015  Level: 8  Last Weight:   Wt Readings from Last 1 Encounters:   04/27/24 50.5 kg (111 lb 5.3 oz)     Mental Status:  alert    IV Access:  - None    Nursing Mobility/ADLs:  Walking   Assisted  Transfer  Assisted  Bathing  Assisted  Dressing  Assisted  Toileting  Assisted  Feeding  Assisted  Med Admin  Independent  Med Delivery   crushed    Wound Care Documentation and Therapy:  Wound 04/22/24 Sacrum Stage 1 with scabbing (Active)   Wound Etiology Pressure Stage 1 04/24/24 0903   Dressing Status Clean;Dry;Intact 04/27/24 2024   Wound Cleansed Not Cleansed 04/27/24 2024   Dressing/Treatment Foam 04/27/24 2024   Number of days: 5        Elimination:  Continence:   Bowel: Yes  Bladder: Yes  Urinary Catheter: None   Colostomy/Ileostomy/Ileal Conduit: None       Date of Last BM: 04/29/24    Intake/Output Summary (Last 24 hours) at 4/28/2024 1051  Last data filed at 4/28/2024 0602  Gross per 24 hour   Intake --   Output 1300 ml   Net -1300 ml     I/O last 3 completed shifts:  In: -   Out: 2600 [Urine:2600]    Safety Concerns:     At Risk for Falls    Impairments/Disabilities:      None    Nutrition Therapy:  Current Nutrition Therapy:   - Oral Diet:  Dysphagia - Minced and Moist; NO STRAWS    Routes of Feeding: Oral  Liquids: Thin Liquids  Daily Fluid Restriction: no  Last Modified Barium Swallow with Video (Video Swallowing Test): not done    Treatments at the Time of Hospital Discharge:   Respiratory Treatments: Nebulizers/Inhalers  Oxygen Therapy:  is on oxygen at 2 L/min per nasal cannula.  Ventilator:    - No ventilator support    Rehab Therapies: SN,PT,OT,ST  Weight Bearing Status/Restrictions: No weight bearing restrictions  Other Medical Equipment (for information only, NOT a DME order):  wheelchair and walker  Other Treatments: N/A    Patient's personal belongings (please select all that are sent with patient):  None    RN SIGNATURE:  Electronically signed by Pat Ibanez RN on 5/1/24 at 7:43 PM EDT    CASE

## 2024-04-28 NOTE — PROGRESS NOTES
Called pt daughter cindy states that she will be to get her mother at 730 pm    she states she is getting her room together for her.

## 2024-04-29 ENCOUNTER — TELEPHONE (OUTPATIENT)
Dept: FAMILY MEDICINE CLINIC | Age: 79
End: 2024-04-29

## 2024-04-29 LAB
ALBUMIN SERPL-MCNC: 2.8 G/DL (ref 3.4–5)
ALBUMIN/GLOB SERPL: 0.9 {RATIO} (ref 1.1–2.2)
ALP SERPL-CCNC: 43 U/L (ref 40–129)
ALT SERPL-CCNC: 10 U/L (ref 10–40)
ANION GAP SERPL CALCULATED.3IONS-SCNC: 8 MMOL/L (ref 3–16)
AST SERPL-CCNC: 13 U/L (ref 15–37)
BASOPHILS # BLD: 0 K/UL (ref 0–0.2)
BASOPHILS NFR BLD: 0 %
BILIRUB SERPL-MCNC: 0.5 MG/DL (ref 0–1)
BUN SERPL-MCNC: 12 MG/DL (ref 7–20)
CALCIUM SERPL-MCNC: 8.3 MG/DL (ref 8.3–10.6)
CHLORIDE SERPL-SCNC: 94 MMOL/L (ref 99–110)
CO2 SERPL-SCNC: 28 MMOL/L (ref 21–32)
CREAT SERPL-MCNC: <0.5 MG/DL (ref 0.6–1.2)
DEPRECATED RDW RBC AUTO: 13.8 % (ref 12.4–15.4)
EOSINOPHIL # BLD: 0 K/UL (ref 0–0.6)
EOSINOPHIL NFR BLD: 0 %
GFR SERPLBLD CREATININE-BSD FMLA CKD-EPI: >90 ML/MIN/{1.73_M2}
GLUCOSE SERPL-MCNC: 109 MG/DL (ref 70–99)
HCT VFR BLD AUTO: 36.7 % (ref 36–48)
HGB BLD-MCNC: 12.1 G/DL (ref 12–16)
LYMPHOCYTES # BLD: 0.8 K/UL (ref 1–5.1)
LYMPHOCYTES NFR BLD: 6 %
MAGNESIUM SERPL-MCNC: 1.7 MG/DL (ref 1.8–2.4)
MCH RBC QN AUTO: 32.2 PG (ref 26–34)
MCHC RBC AUTO-ENTMCNC: 33.1 G/DL (ref 31–36)
MCV RBC AUTO: 97.4 FL (ref 80–100)
MONOCYTES # BLD: 1.3 K/UL (ref 0–1.3)
MONOCYTES NFR BLD: 9 %
NEUTROPHILS # BLD: 11.9 K/UL (ref 1.7–7.7)
NEUTROPHILS NFR BLD: 78 %
NEUTS BAND NFR BLD MANUAL: 7 % (ref 0–7)
PHOSPHATE SERPL-MCNC: 2.9 MG/DL (ref 2.5–4.9)
PLATELET # BLD AUTO: 281 K/UL (ref 135–450)
PLATELET BLD QL SMEAR: ADEQUATE
PMV BLD AUTO: 7.9 FL (ref 5–10.5)
POTASSIUM SERPL-SCNC: 4.3 MMOL/L (ref 3.5–5.1)
PROT SERPL-MCNC: 5.9 G/DL (ref 6.4–8.2)
RBC # BLD AUTO: 3.77 M/UL (ref 4–5.2)
RBC MORPH BLD: NORMAL
SLIDE REVIEW: ABNORMAL
SODIUM SERPL-SCNC: 130 MMOL/L (ref 136–145)
WBC # BLD AUTO: 14 K/UL (ref 4–11)

## 2024-04-29 PROCEDURE — 6360000002 HC RX W HCPCS: Performed by: INTERNAL MEDICINE

## 2024-04-29 PROCEDURE — 80053 COMPREHEN METABOLIC PANEL: CPT

## 2024-04-29 PROCEDURE — 6370000000 HC RX 637 (ALT 250 FOR IP): Performed by: STUDENT IN AN ORGANIZED HEALTH CARE EDUCATION/TRAINING PROGRAM

## 2024-04-29 PROCEDURE — 94761 N-INVAS EAR/PLS OXIMETRY MLT: CPT

## 2024-04-29 PROCEDURE — 97116 GAIT TRAINING THERAPY: CPT

## 2024-04-29 PROCEDURE — 6370000000 HC RX 637 (ALT 250 FOR IP): Performed by: INTERNAL MEDICINE

## 2024-04-29 PROCEDURE — 36415 COLL VENOUS BLD VENIPUNCTURE: CPT

## 2024-04-29 PROCEDURE — 1200000000 HC SEMI PRIVATE

## 2024-04-29 PROCEDURE — 97535 SELF CARE MNGMENT TRAINING: CPT

## 2024-04-29 PROCEDURE — 83735 ASSAY OF MAGNESIUM: CPT

## 2024-04-29 PROCEDURE — 94640 AIRWAY INHALATION TREATMENT: CPT

## 2024-04-29 PROCEDURE — 85025 COMPLETE CBC W/AUTO DIFF WBC: CPT

## 2024-04-29 PROCEDURE — 2700000000 HC OXYGEN THERAPY PER DAY

## 2024-04-29 PROCEDURE — 84100 ASSAY OF PHOSPHORUS: CPT

## 2024-04-29 PROCEDURE — 97530 THERAPEUTIC ACTIVITIES: CPT

## 2024-04-29 PROCEDURE — 6360000002 HC RX W HCPCS: Performed by: STUDENT IN AN ORGANIZED HEALTH CARE EDUCATION/TRAINING PROGRAM

## 2024-04-29 PROCEDURE — 92526 ORAL FUNCTION THERAPY: CPT

## 2024-04-29 PROCEDURE — 2580000003 HC RX 258: Performed by: INTERNAL MEDICINE

## 2024-04-29 RX ORDER — CARVEDILOL 25 MG/1
TABLET ORAL
Qty: 60 TABLET | Refills: 0 | Status: SHIPPED | OUTPATIENT
Start: 2024-04-29 | End: 2024-05-01 | Stop reason: HOSPADM

## 2024-04-29 RX ORDER — CARVEDILOL 25 MG/1
25 TABLET ORAL 2 TIMES DAILY WITH MEALS
Status: DISCONTINUED | OUTPATIENT
Start: 2024-04-29 | End: 2024-05-01

## 2024-04-29 RX ORDER — CARVEDILOL 6.25 MG/1
TABLET ORAL
Status: DISCONTINUED
Start: 2024-04-29 | End: 2024-04-29 | Stop reason: WASHOUT

## 2024-04-29 RX ORDER — MAGNESIUM SULFATE 1 G/100ML
1000 INJECTION INTRAVENOUS ONCE
Status: COMPLETED | OUTPATIENT
Start: 2024-04-29 | End: 2024-04-29

## 2024-04-29 RX ADMIN — FOLIC ACID 1 MG: 1 TABLET ORAL at 10:37

## 2024-04-29 RX ADMIN — METRONIDAZOLE 500 MG: 500 INJECTION, SOLUTION INTRAVENOUS at 10:58

## 2024-04-29 RX ADMIN — CLONIDINE HYDROCHLORIDE 0.1 MG: 0.1 TABLET ORAL at 00:29

## 2024-04-29 RX ADMIN — METRONIDAZOLE 500 MG: 500 INJECTION, SOLUTION INTRAVENOUS at 19:41

## 2024-04-29 RX ADMIN — ACETAMINOPHEN 650 MG: 325 TABLET ORAL at 00:29

## 2024-04-29 RX ADMIN — SODIUM CHLORIDE 300 ML: 9 INJECTION, SOLUTION INTRAVENOUS at 01:06

## 2024-04-29 RX ADMIN — METRONIDAZOLE 500 MG: 500 INJECTION, SOLUTION INTRAVENOUS at 03:28

## 2024-04-29 RX ADMIN — LISINOPRIL 5 MG: 5 TABLET ORAL at 10:36

## 2024-04-29 RX ADMIN — LEVOFLOXACIN 750 MG: 5 INJECTION, SOLUTION INTRAVENOUS at 23:02

## 2024-04-29 RX ADMIN — LEVOTHYROXINE SODIUM 100 MCG: 0.1 TABLET ORAL at 06:13

## 2024-04-29 RX ADMIN — CLONAZEPAM 1 MG: 1 TABLET ORAL at 00:29

## 2024-04-29 RX ADMIN — IPRATROPIUM BROMIDE AND ALBUTEROL SULFATE 1 DOSE: .5; 3 SOLUTION RESPIRATORY (INHALATION) at 21:22

## 2024-04-29 RX ADMIN — SODIUM CHLORIDE, PRESERVATIVE FREE 10 ML: 5 INJECTION INTRAVENOUS at 17:17

## 2024-04-29 RX ADMIN — MIRTAZAPINE 15 MG: 15 TABLET, FILM COATED ORAL at 22:54

## 2024-04-29 RX ADMIN — MIRTAZAPINE 15 MG: 15 TABLET, FILM COATED ORAL at 00:29

## 2024-04-29 RX ADMIN — SODIUM CHLORIDE, PRESERVATIVE FREE 10 ML: 5 INJECTION INTRAVENOUS at 22:54

## 2024-04-29 RX ADMIN — LEVOFLOXACIN 750 MG: 5 INJECTION, SOLUTION INTRAVENOUS at 01:06

## 2024-04-29 RX ADMIN — FUROSEMIDE 20 MG: 20 TABLET ORAL at 10:37

## 2024-04-29 RX ADMIN — APIXABAN 5 MG: 5 TABLET, FILM COATED ORAL at 10:36

## 2024-04-29 RX ADMIN — SODIUM CHLORIDE, PRESERVATIVE FREE 10 ML: 5 INJECTION INTRAVENOUS at 00:30

## 2024-04-29 RX ADMIN — CLONIDINE HYDROCHLORIDE 0.1 MG: 0.1 TABLET ORAL at 22:54

## 2024-04-29 RX ADMIN — CARVEDILOL 25 MG: 25 TABLET, FILM COATED ORAL at 19:37

## 2024-04-29 RX ADMIN — ASPIRIN 81 MG 81 MG: 81 TABLET ORAL at 10:35

## 2024-04-29 RX ADMIN — ACETAMINOPHEN 650 MG: 325 TABLET ORAL at 10:33

## 2024-04-29 RX ADMIN — APIXABAN 5 MG: 5 TABLET, FILM COATED ORAL at 22:54

## 2024-04-29 RX ADMIN — IPRATROPIUM BROMIDE AND ALBUTEROL SULFATE 1 DOSE: .5; 3 SOLUTION RESPIRATORY (INHALATION) at 08:17

## 2024-04-29 RX ADMIN — CLONAZEPAM 1 MG: 1 TABLET ORAL at 22:54

## 2024-04-29 RX ADMIN — MAGNESIUM SULFATE HEPTAHYDRATE 1000 MG: 1 INJECTION, SOLUTION INTRAVENOUS at 18:13

## 2024-04-29 RX ADMIN — ACETAMINOPHEN 650 MG: 325 TABLET ORAL at 22:54

## 2024-04-29 RX ADMIN — APIXABAN 5 MG: 5 TABLET, FILM COATED ORAL at 00:29

## 2024-04-29 RX ADMIN — CLONAZEPAM 1 MG: 1 TABLET ORAL at 10:36

## 2024-04-29 ASSESSMENT — PAIN SCALES - GENERAL
PAINLEVEL_OUTOF10: 8
PAINLEVEL_OUTOF10: 8
PAINLEVEL_OUTOF10: 0
PAINLEVEL_OUTOF10: 7

## 2024-04-29 ASSESSMENT — PAIN DESCRIPTION - ORIENTATION: ORIENTATION: ANTERIOR

## 2024-04-29 ASSESSMENT — PAIN SCALES - WONG BAKER
WONGBAKER_NUMERICALRESPONSE: HURTS EVEN MORE

## 2024-04-29 ASSESSMENT — PAIN DESCRIPTION - LOCATION
LOCATION: HEAD
LOCATION: HEAD

## 2024-04-29 ASSESSMENT — PAIN DESCRIPTION - DESCRIPTORS: DESCRIPTORS: ACHING

## 2024-04-29 NOTE — PROGRESS NOTES
[facility] - Inpatient Rehabilitation Department   Phone: [phone]    Physical Therapy    [] Initial Evaluation            [x] Daily Treatment Note         [] Discharge Summary      Patient: Sharon Kitchen   : 1945   MRN: [id number]   Date of Service:  2024  Admitting Diagnosis: Bacterial pneumonia  Current Admission Summary: 77 yo female admitted to [facility] on  for increased weakness/fatigue, cough, and diarrhea. Found to have acute respiratory failure due to PNA. Placed in c-diff r/o.   Past Medical History:  has a past medical history of Acute DVT (deep venous thrombosis) ([facility]), Acute encephalopathy, Acute on chronic diastolic heart failure ([facility]), Alcohol abuse, Anxiety, Arthritis, CAD in native artery, Cellulitis, Cerebral artery occlusion with cerebral infarction ([facility]), Cerebrovascular accident (CVA) ([facility]), Chronic fatigue, Complex care coordination, Complicated UTI (urinary tract infection), Congestive heart failure, unspecified HF chronicity, unspecified heart failure type ([facility]), COPD (chronic obstructive pulmonary disease) ([facility]), COPD exacerbation ([facility]), Depression, Diabetes mellitus ([facility]), DIMAS (dyspnea on exertion), DVT (deep venous thrombosis) ([facility]), DVT, lower extremity ([facility]), Fatigue, General weakness, Generalized weakness, Hypercholesteremia, Hypertension, Hyperthyroidism, Incontinence, Mammogram abnormal, Neuromuscular disorder ([facility]), Osteopenia, PAF (paroxysmal atrial fibrillation) ([facility]), Pneumonia, Recurrent UTI, Right forearm cellulitis, Superficial thrombophlebitis of right upper extremity, Thyroid disease, Tobacco abuse, Tobacco abuse counseling, Trapped lung, and Unable to walk.  Past Surgical History:  has a past surgical history that includes Tonsillectomy; Colonoscopy (2012); Toe Surgery (Right); Shoulder arthroscopy (Right, 14); eye surgery; Pacemaker insertion; pacemaker placement; Nerve Surgery (N/A, 2021); and Stimulator Surgery (N/A,

## 2024-04-29 NOTE — PROGRESS NOTES
Hospitalist Progress Note      PCP: Sherrill Cross APRN - NP    Date of Admission: 4/22/2024    LOS: 7    Chief Complaint:   Chief Complaint   Patient presents with    Nausea     Pt arrived via [facility] from home w c/o nausea from yesterday. Pt got 4 mg IV Zofran en route.        Case Summary:   78-year-old lady with history of paroxysmal atrial fibrillation, CAD, COPD, type 2 diabetes, hypertension, hyperlipidemia, history of CHF, history of CVA presented with generalized malaise weakness fatigue and shortness of breath found to have acute respiratory failure with hypoxia due to bilateral pneumonia      Active Hospital Problems    Diagnosis Date Noted    Aspiration into airway [T17.908A] 04/26/2024    Acute respiratory failure with hypoxia (HCC) [J96.01] 04/23/2024    Bacterial pneumonia [J15.9] 04/22/2024    Coronary artery disease due to lipid rich plaque [I25.10, I25.83]     PAF (paroxysmal atrial fibrillation) (HCC) [I48.0] 05/08/2019    Dyslipidemia [E78.5]     HTN (hypertension), benign [I10]     Depression [F32.A]     COPD, moderate (HCC) [J44.9] 06/09/2016    Hypothyroidism [E03.9] 10/16/2012     Patient was meant for discharge yesterday, however it did not happen due to elevated BP.  Will aim to discharge her today.    Principal Problem:    Bacterial pneumonia: Probable aspiration.  Remained stable.  On 1 L nasal cannula this morning.  Discussed with pulmonology this morning.  Seen speech pathology and placed on dysphagia diet.  - Continue antibiotics for 7 more days      Acute respiratory failure with hypoxia (HCC): Stable on 1-2L nasal cannula.  Will arrange for home O2 evaluation.  Discharge planning      Urinary tract infection: Urinalysis positive with normal chato.    Physical debility: Continue PT OT    Active Problems:    Hypothyroidism: On thyroid replacement therapy    COPD, moderate (HCC): Without exacerbation.  Continue as needed inhalers    Depression: Stable on

## 2024-04-29 NOTE — TELEPHONE ENCOUNTER
Pt is getting discharge from [facility], they are requesting verbal order to start pt on Skilled nursing/PT/OT/Speech.     Judy [phone]     Please advised

## 2024-04-29 NOTE — RT PROTOCOL NOTE
RT Inhaler-Nebulizer Bronchodilator Protocol Note    There is a bronchodilator order in the chart from a provider indicating to follow the RT Bronchodilator Protocol and there is an “Initiate RT Inhaler-Nebulizer Bronchodilator Protocol” order as well (see protocol at bottom of note).    CXR Findings:  No results found.    The findings from the last RT Protocol Assessment were as follows:   History Pulmonary Disease: Chronic pulmonary disease  Respiratory Pattern: Regular pattern and RR 12-20 bpm  Breath Sounds: Slightly diminished and/or crackles  Cough: Weak, productive  Indication for Bronchodilator Therapy:    Bronchodilator Assessment Score: 6    Aerosolized bronchodilator medication orders have been revised according to the RT Inhaler-Nebulizer Bronchodilator Protocol below.    Respiratory Therapist to perform RT Therapy Protocol Assessment initially then follow the protocol.  Repeat RT Therapy Protocol Assessment PRN for score 0-3 or on second treatment, BID, and PRN for scores above 3.    No Indications - adjust the frequency to every 6 hours PRN wheezing or bronchospasm, if no treatments needed after 48 hours then discontinue using Per Protocol order mode.     If indication present, adjust the RT bronchodilator orders based on the Bronchodilator Assessment Score as indicated below.  Use Inhaler orders unless patient has one or more of the following: on home nebulizer, not able to hold breath for 10 seconds, is not alert and oriented, cannot activate and use MDI correctly, or respiratory rate 25 breaths per minute or more, then use the equivalent nebulizer order(s) with same Frequency and PRN reasons based on the score.  If a patient is on this medication at home then do not decrease Frequency below that used at home.    0-3 - enter or revise RT bronchodilator order(s) to equivalent RT Bronchodilator order with Frequency of every 4 hours PRN for wheezing or increased work of breathing using Per Protocol  1 = Total assistance

## 2024-04-29 NOTE — PROGRESS NOTES
[facility] - Inpatient Rehabilitation Department   Phone: [phone]    Occupational Therapy    [] Initial Evaluation            [x] Daily Treatment Note         [] Discharge Summary      Patient: Sharon Kitchen   : 1945   MRN: [id number]   Date of Service:  2024    Admitting Diagnosis:  Bacterial pneumonia  Current Admission Summary: 79 yo female admitted to [facility] on  for increased weakness/fatigue, cough, and diarrhea. Found to have acute respiratory failure due to PNA. Placed in c-diff r/o.   Past Medical History:  has a past medical history of Acute DVT (deep venous thrombosis) ([facility]), Acute encephalopathy, Acute on chronic diastolic heart failure ([facility]), Alcohol abuse, Anxiety, Arthritis, CAD in native artery, Cellulitis, Cerebral artery occlusion with cerebral infarction ([facility]), Cerebrovascular accident (CVA) ([facility]), Chronic fatigue, Complex care coordination, Complicated UTI (urinary tract infection), Congestive heart failure, unspecified HF chronicity, unspecified heart failure type ([facility]), COPD (chronic obstructive pulmonary disease) ([facility]), COPD exacerbation ([facility]), Depression, Diabetes mellitus ([facility]), DIMAS (dyspnea on exertion), DVT (deep venous thrombosis) ([facility]), DVT, lower extremity ([facility]), Fatigue, General weakness, Generalized weakness, Hypercholesteremia, Hypertension, Hyperthyroidism, Incontinence, Mammogram abnormal, Neuromuscular disorder ([facility]), Osteopenia, PAF (paroxysmal atrial fibrillation) ([facility]), Pneumonia, Recurrent UTI, Right forearm cellulitis, Superficial thrombophlebitis of right upper extremity, Thyroid disease, Tobacco abuse, Tobacco abuse counseling, Trapped lung, and Unable to walk.  Past Surgical History:  has a past surgical history that includes Tonsillectomy; Colonoscopy (2012); Toe Surgery (Right); Shoulder arthroscopy (Right, 14); eye surgery; Pacemaker insertion; pacemaker placement; Nerve Surgery (N/A, 2021); and Stimulator Surgery (N/A,  management  Balance:  Static Sitting Balance: fair (+): maintains balance at SBA/supervision without use of UE support  Dynamic Sitting Balance: fair (-): maintains balance at SBA with use of UE support  Static Standing Balance: fair (-): maintains balance at CGA with use of UE support  Dynamic Standing Balance: poor: requires mod (A) to maintain balance  Comments:    Other Therapeutic Interventions    Functional Outcomes  AM-PAC Inpatient Daily Activity Raw Score: 15                                    Cognition  Overall Cognitive Status: Impaired  Following Commands: follows one step commands with increased time  Attention Span: attends with cues to redirect, difficulty attending to directions, difficulty dividing attention  Memory: decreased recall of recent events, decreased short term memory  Safety Judgement: decreased awareness of need for assistance  Problem Solving: assistance required to generate solutions, assistance required to implement solutions  Insights: decreased awareness of deficits  Initiation: requires cues for some  Sequencing: requires cues for some  Orientation:    oriented to person, oriented to place, and oriented to situation  Command Following:   accurately follows one step commands with increased time/repetition     Education  Barriers To Learning: cognition  Patient Education: patient educated on goals, OT role and benefits, plan of care, ADL adaptive strategies, IADL safety, orientation, transfer training, discharge recommendations  Learning Assessment:  patient will require reinforcement due to cognitive deficits    Assessment  Activity Tolerance: Tolerated fair, limited by fatigue with activity. Pt requiring 2L/min O2 96% s/p activity. Requires increased time throughout  Impairments Requiring Therapeutic Intervention: decreased functional mobility, decreased ADL status, decreased strength, decreased safety awareness, decreased cognition, decreased endurance, decreased balance,

## 2024-04-29 NOTE — CARE COORDINATION
Chart reviewed for discharge planning    CM/SW has continued to follow patient progress to anticipate potential discharge needs. At this time, patient is not ready for discharge.     Barrier(s) to discharge-patient-   Oxygen -    Other - able blood pressure 4/28, removed d/c order (Per KAREEM Jones)      Tentative discharge plan- [facility] with [facility] and [facility] home 02.     KAREEM reached out to Elvira at [facility] She report that there are tanks in closet to provide to pt when d/c'd     Tentative discharge date- TBD    *Case management will continue to follow progress and update discharge plan as needed.

## 2024-04-29 NOTE — PROGRESS NOTES
Facility/Department: [address] ONCOLOGY  Speech Language Pathology   Dysphagia Treatment Note    Patient: Sharon Kitchen   : 1945   MRN: [id number]      Evaluation Date: 2024      Admitting Dx: Bacterial pneumonia [J15.9]  Septicemia (HCC) [A41.9]  Acute respiratory failure with hypoxia (HCC) [J96.01]  Multifocal pneumonia [J18.9]  Treatment Diagnosis: Oropharyngeal Dysphagia   Pain: Did not state                                              Diet and Treatment Recommendations 2024:  Diet Solids Recommendation:  Dysphagia II Minced and Moist  Liquid Consistency Recommendation:  Thin liquids, No straws  Recommended form of Meds: Meds in puree         Compensatory strategies:   Upright as possible with all PO intake , Small bites/sips , Eat/feed slowly    Assessment of Texture Tolerance:  Diet level prior to treatment: Dysphagia II Minced and Moist , Thin liquids, No straws   Tolerance of Current Diet Level:Per chart, no noted difficulty with current diet level ; Pt had plans to d/c yesterday but held due to hypertension and respiratory compromise. She has since returned to 2L of O2 via nasal canula.     Impressions: Pt was positioned Upright in bed , awake and alert. Currently on  2L O2 via nasal cannula . She remains edentulous and declines to use dentures while in the hospital due to previously losing them. She reported the current consistencies are fine while she doesn't have her teeth but hopes to have regular foods once she gets back home.   Trials of thin liquids, puree , and soft solids were provided to assess swallow function. Pt demonstrated oropharyngeal dysphagia characterized by prolonged mastication, suspected premature bolus loss to the pharynx, mildly delayed swallow initiation, reduced laryngeal elevation, and use of 2-3 swallows.  Pt with 2 episodes of coughing, one resulted in mucous expulsion. It was difficult to definitively attribute to swallow mechanism.   Pt demonstrates

## 2024-04-30 ENCOUNTER — APPOINTMENT (OUTPATIENT)
Dept: GENERAL RADIOLOGY | Age: 79
DRG: 177 | End: 2024-04-30
Payer: MEDICARE

## 2024-04-30 PROCEDURE — 1200000000 HC SEMI PRIVATE

## 2024-04-30 PROCEDURE — 6370000000 HC RX 637 (ALT 250 FOR IP): Performed by: INTERNAL MEDICINE

## 2024-04-30 PROCEDURE — 92526 ORAL FUNCTION THERAPY: CPT

## 2024-04-30 PROCEDURE — 92611 MOTION FLUOROSCOPY/SWALLOW: CPT

## 2024-04-30 PROCEDURE — 2580000003 HC RX 258: Performed by: INTERNAL MEDICINE

## 2024-04-30 PROCEDURE — 97535 SELF CARE MNGMENT TRAINING: CPT

## 2024-04-30 PROCEDURE — 94761 N-INVAS EAR/PLS OXIMETRY MLT: CPT

## 2024-04-30 PROCEDURE — 2700000000 HC OXYGEN THERAPY PER DAY

## 2024-04-30 PROCEDURE — 6360000002 HC RX W HCPCS: Performed by: STUDENT IN AN ORGANIZED HEALTH CARE EDUCATION/TRAINING PROGRAM

## 2024-04-30 PROCEDURE — 94640 AIRWAY INHALATION TREATMENT: CPT

## 2024-04-30 PROCEDURE — 97530 THERAPEUTIC ACTIVITIES: CPT

## 2024-04-30 PROCEDURE — 74230 X-RAY XM SWLNG FUNCJ C+: CPT

## 2024-04-30 PROCEDURE — 6370000000 HC RX 637 (ALT 250 FOR IP): Performed by: STUDENT IN AN ORGANIZED HEALTH CARE EDUCATION/TRAINING PROGRAM

## 2024-04-30 PROCEDURE — 6360000002 HC RX W HCPCS: Performed by: INTERNAL MEDICINE

## 2024-04-30 PROCEDURE — 97110 THERAPEUTIC EXERCISES: CPT

## 2024-04-30 RX ADMIN — CARVEDILOL 25 MG: 25 TABLET, FILM COATED ORAL at 17:32

## 2024-04-30 RX ADMIN — FUROSEMIDE 20 MG: 20 TABLET ORAL at 10:03

## 2024-04-30 RX ADMIN — LISINOPRIL 5 MG: 5 TABLET ORAL at 10:05

## 2024-04-30 RX ADMIN — APIXABAN 5 MG: 5 TABLET, FILM COATED ORAL at 10:03

## 2024-04-30 RX ADMIN — APIXABAN 5 MG: 5 TABLET, FILM COATED ORAL at 21:22

## 2024-04-30 RX ADMIN — SODIUM CHLORIDE, PRESERVATIVE FREE 10 ML: 5 INJECTION INTRAVENOUS at 09:53

## 2024-04-30 RX ADMIN — LEVOFLOXACIN 750 MG: 5 INJECTION, SOLUTION INTRAVENOUS at 21:28

## 2024-04-30 RX ADMIN — CLONAZEPAM 1 MG: 1 TABLET ORAL at 21:22

## 2024-04-30 RX ADMIN — SODIUM CHLORIDE, PRESERVATIVE FREE 10 ML: 5 INJECTION INTRAVENOUS at 21:23

## 2024-04-30 RX ADMIN — LEVOTHYROXINE SODIUM 100 MCG: 0.1 TABLET ORAL at 05:15

## 2024-04-30 RX ADMIN — ASPIRIN 81 MG 81 MG: 81 TABLET ORAL at 10:05

## 2024-04-30 RX ADMIN — IPRATROPIUM BROMIDE AND ALBUTEROL SULFATE 1 DOSE: .5; 3 SOLUTION RESPIRATORY (INHALATION) at 12:55

## 2024-04-30 RX ADMIN — METRONIDAZOLE 500 MG: 500 INJECTION, SOLUTION INTRAVENOUS at 12:54

## 2024-04-30 RX ADMIN — MIRTAZAPINE 15 MG: 15 TABLET, FILM COATED ORAL at 21:22

## 2024-04-30 RX ADMIN — IPRATROPIUM BROMIDE AND ALBUTEROL SULFATE 1 DOSE: .5; 3 SOLUTION RESPIRATORY (INHALATION) at 21:22

## 2024-04-30 RX ADMIN — FOLIC ACID 1 MG: 1 TABLET ORAL at 10:05

## 2024-04-30 RX ADMIN — CLONIDINE HYDROCHLORIDE 0.1 MG: 0.1 TABLET ORAL at 10:04

## 2024-04-30 RX ADMIN — CLONIDINE HYDROCHLORIDE 0.1 MG: 0.1 TABLET ORAL at 17:32

## 2024-04-30 RX ADMIN — METRONIDAZOLE 500 MG: 500 INJECTION, SOLUTION INTRAVENOUS at 03:34

## 2024-04-30 RX ADMIN — CLONAZEPAM 1 MG: 1 TABLET ORAL at 10:05

## 2024-04-30 RX ADMIN — CARVEDILOL 25 MG: 25 TABLET, FILM COATED ORAL at 10:08

## 2024-04-30 RX ADMIN — METRONIDAZOLE 500 MG: 500 INJECTION, SOLUTION INTRAVENOUS at 19:47

## 2024-04-30 NOTE — PROGRESS NOTES
pt able to dress self)   Mostly wears night gowns and house slippers  IADL Assistance: requires assistance with all homemaking tasks, daughter manages medications  Active :        [] Yes                 [x] No  Hand Dominance: [] Left                 [x] Right  Current Employment: retired.  Occupation: Drove cars to [employer]  Hobbies: TV (morning news)  Recent Falls: No falls in last 4 months    Comment: Wears dentures but didn't bring them. Sleeps in flat bed.       Vision:   Vision Corrective Device: wears glasses for reading  Hearing:   hard of hearing  Observation:   General Observation:  Pt on 2L via NC, purewick and telemetry in place. SpO2 above 95% at rest.      Subjective  General: Patient semi-reclined in bed upon therapist arrival, very lethargic, though agreeable to therapy tx session.  Pain: 0/10  Pain Interventions: not applicable       Functional Mobility  Bed Mobility:   Supine to Sit: moderate assistance  Scooting: minimal assistance  Comments: HOB raised, requiring significantly increased time to perform. Pt required mod (A) to scoot hips and trunk support to sit up.   Transfers:  Sit to stand transfer: minimal assistance, maximum assistance  Stand to sit transfer: minimal assistance  Stand step transfer: moderate assistance  Comments: Min (A) for sit>stand from EOB. Max (A) for sit>stand from recliner. Pt spent increased time in a standing, forward flexed position and required verbal cues to stand up straight and to bring hands to walker. Pt had decreased control of descent with stand>sit transfers.  Ambulation:  Surface:level surface  Assistive Device: rolling walker  Assistance: minimal assistance  Distance: 5'  Gait Mechanics: forward flexed posture, narrow KYLE, poor walker management, decreased step length (B), decreased gait speed, increase stance time   Comments: Pt requires increased time to bring one foot in front of the other.    Stair Mobility:  Stair mobility not completed on  this date.  Comments:  Wheelchair Mobility:  No w/c mobility completed on this date.  Comments:  Balance:  Static Sitting Balance: fair (-): maintains balance at CGA with use of UE support  Dynamic Sitting Balance: fair (-): maintains balance at CGA with use of UE support  Static Standing Balance: poor (+): requires min (A) to maintain balance  Dynamic Standing Balance: poor (+): requires min (A) to maintain balance  Comments: Slight posterior and alternating L and R lateral lean observed while seated in the recliner chair.   Other Therapeutic Interventions  Seated Exercises 1x10  - LAQ  - Marches  - Glute sets  - Toe raises     See OT notes for UE and ADL assessment    Functional Outcomes  -PAC Inpatient Mobility Raw Score : 14              Cognition  Overall Cognitive Status: Impaired  Following Commands: follows one step commands with increased time  Attention Span: attends with cues to redirect, difficulty attending to directions, difficulty dividing attention  Memory: decreased recall of recent events, decreased short term memory  Safety Judgement: decreased awareness of need for assistance  Problem Solving: assistance required to generate solutions, assistance required to implement solutions, decreased awareness of errors  Insights: decreased awareness of deficits  Initiation: requires cues for some  Sequencing: requires cues for some  Orientation:    oriented to person, oriented to place, and oriented to situation  Command Following:   accurately follows one step commands with increased time/repetition    Education  Barriers To Learning: cognition  Patient Education: patient educated on goals, PT role and benefits, plan of care, general safety, functional mobility training, proper use of assistive device/equipment, transfer training  Learning Assessment:  patient verbalizes understanding, would benefit from continued reinforcement, patient will require reinforcement due to cognitive

## 2024-04-30 NOTE — PROGRESS NOTES
Physical Therapy  Sharon Kitchen   PT reviewed patient's chart. Patient currently preparing to go [facility] for swallow study. Will re-attempt as the schedule allows.  Thank you.  Keely Young PT, DPT, [id number]

## 2024-04-30 NOTE — CARE COORDINATION
Discharge Planning Note Re: Skilled Nursing     CM/SW noted consult for discharge planning. Chart reviewed. Noted recommendations for SNF.  CM met with patient and Doris via telephone. Introduced self and explained role of CM and discharge planning.    Doris is in agreement with CM that Pt needs to go to SNF, Pt has support at home, but everyone work. Pt's son n law is home from work temporialy and assisting with Pt's .     Pt's decline SNF, with some time spend with Pt, CM educating Pt her fall risk. CM educated Pt this temporary. CM educated Pt there a skilled therapy place that is not a nursing home, it's only skilled therapy. CM offered to have Admission at [facility] meet with pt prior to her decision.   Pt agreeable.     Patient is agreeable to SNF on dc.      Referral made to [facility] [phone].    CM/SW will follow-up on referrals and provide any additional documentation necessary to facilitate placement.     Electronically signed by Lucretia Sequeira on 4/30/2024 at 12:10 PM     CM followed up with [facility], Pt is accepted. Sanjay came to see Pt. Silva will start pre-cert.   CM placed call to Doris Barnhart And report the above, no answer , LVM.     Electronically signed by Lucretia Sequeira on 4/30/2024 at 2:28 PM

## 2024-04-30 NOTE — PROCEDURES
Facility/Department: [address] ONCOLOGY  MODIFIED BARIUM SWALLOW EVALUATION    Patient: Sharon Kitchen   : 1945   MRN: [id number]      Evaluation Date: 2024   Admitting Diagnosis: Bacterial pneumonia [J15.9]  Septicemia (HCC) [A41.9]  Acute respiratory failure with hypoxia (HCC) [J96.01]  Multifocal pneumonia [J18.9]  Treatment Diagnosis: Dysphagia   Pain:  Did not state                           Ordering MD: Dr. WARREN Matthews  Radiologist: Dr. Ham  Date of Evaluation: 2024  Type of Study: Modified Barium Swallowing Study (MBS)  Diet Prior to Study:  Dysphagia II Minced and Moist  Thin liquids        Impression:  Modified Barium Swallow evaluation completed on 2024. Patient presents with mild-moderate oropharyngeal dysphagia secondary to prolonged/reduced mastication, reduced A-P propulsion, premature bolus loss to pharynx, reduced and delayed epiglottic distension, delayed swallow initiation, reduced hyolaryngeal excursion, reduced BOT retraction, reduced pharyngeal clearing, and reduced laryngeal sensation resulting in observed laryngeal penetration with thin liquids with no aspiration viewed. Thin liquids via tsp and cup revealed premature spill over tip of epiglottis with incomplete epiglottic distension resulting in laryngeal penetration before and during the swallow, a mild stain was intermittently noted in laryngeal inlet. With verbal cues to throat clear and re-swallow, Pt able to clear laryngeal inlet. A chin tuck was trialed with thin liquids which appeared to reduce depth of laryngeal penetration.  No laryngeal penetration was viewed with mildly thick liquids. With moderately thick liquids, pyriform residue was noted post swallow. Prolonged mastication with reduced A-P propulsion was noted with solid trials, Pt did expel portion of soft solids due to inability to fully masticate. Overall, Pt is at increased risk for aspiration based on reduced/delayed epiglottis distention and

## 2024-04-30 NOTE — PROGRESS NOTES
W/c for community activities  ADL Assistance: requires assistance with bathing (to wash back/hair, pt able to dress self)              Mostly wears night gowns and house slippers  IADL Assistance: requires assistance with all homemaking tasks, daughter manages medications  Active :        [] Yes                 [x] No  Hand Dominance: [] Left                 [x] Right  Current Employment: retired.  Occupation: Drove cars to [employer]  Hobbies: TV (morning news)  Recent Falls: No falls in last 4 months     Comment: Wears dentures but didn't bring them. Sleeps in flat bed.     Examination   Vision:   Vision Corrective Device: wears glasses for reading  Hearing:   hard of hearing      Subjective  General: Patient supine in bed upon arrival, agreeable to therapy session, reports she tired for MBS this morning  Pain: 0/10  Pain Interventions: not applicable        Activities of Daily Living  Basic Activities of Daily Living  Feeding Comments: set up for lunch at [employer]  Lower Extremity Dressing: maximum assistance cues for clothing orientation, pt able to thread L LE, requires A to thread R LE and dep A to don/doff over hip in stance   Toileting Comments: purewick in place  Instrumental Activities of Daily Living  No IADL completed on this date.    Functional Mobility  Bed Mobility:  Supine to Sit: moderate assistance  Scooting: stand by assistance  Comments: HOB elevated, A to reposition hips and elevate trunk  Transfers:  Sit to stand transfer:min A from EOB, max A from recliner  Stand to sit transfer: minimal assistance  Stand step transfer: minimal assistance   Comments: EOB x1 and recliner x2 to FWW, forward flexed and keeps head flexed as well. Difficulty coming to full stand from recliner, minimal anterior shifting noted. Pivots bed>recliner with FWW, cues for walker management    Functional Mobility  No functional mobility completed on this date secondary to pt fatigued, wanting to eat  [id number]

## 2024-04-30 NOTE — PROGRESS NOTES
V2.0    [facility] Progress Note      Name:  Sharon Kitchen /Age/Sex: 1945  (78 y.o. female)   MRN & CSN:  [id number] & [id number] Encounter Date/Time: 2024 2:10 PM EDT   Location:  [facility]-5572/5572-01 PCP: Sherrill Cross APRN - NP     Attending:Aurelia Matthews MD       Hospital Day: 9    Assessment and Recommendations   Sharon Kitchen is a 78 y.o. female who presents with Bacterial pneumonia      Plan:   78-year-old lady with history of paroxysmal atrial fibrillation, CAD, COPD, type 2 diabetes, hypertension, hyperlipidemia, history of CHF, history of CVA presented with generalized malaise weakness fatigue and shortness of breath found to have acute respiratory failure with hypoxia due to bilateral pneumo medically stable      Bacterial pneumonia likely aspiration  Improving speech evaluation ordered dysphagia diet continue antibiotics for 7 days total    Acute hypoxic respiratory failure stable on 1 to 2 L May need home oxygen      Physical deconditioning.  -PT OT evaluation skilled nursing recommended    Diet ADULT DIET; Dysphagia - Minced and Moist; No Drinking Straws   DVT Prophylaxis    Code Status Full Code   Disposition Medically stable for discharge         Personally reviewed Lab Studies and Imaging         Subjective:     Chief Complaint:     Sharon Kitchen is a 78 y.o. female who presents with admitted pneumonia doing better      Review of Systems:      Pertinent positives and negatives discussed in HPI    Objective:     Intake/Output Summary (Last 24 hours) at 2024 1410  Last data filed at 2024 0953  Gross per 24 hour   Intake 490 ml   Output 800 ml   Net -310 ml      Vitals:   Vitals:    24 0930 24 1003 24 1159 24 1255   BP: 123/78 123/78 107/69    Pulse: 72  80 79   Resp: 18  18 18   Temp: 98.1 °F (36.7 °C)  98.5 °F (36.9 °C)    TempSrc: Oral  Oral    SpO2: 97%  96% 96%   Weight:       Height:             Physical Exam:      General: NAD  Eyes:

## 2024-05-01 VITALS
TEMPERATURE: 97.4 F | DIASTOLIC BLOOD PRESSURE: 83 MMHG | WEIGHT: 118.61 LBS | HEART RATE: 75 BPM | SYSTOLIC BLOOD PRESSURE: 145 MMHG | OXYGEN SATURATION: 100 % | BODY MASS INDEX: 19.06 KG/M2 | RESPIRATION RATE: 16 BRPM | HEIGHT: 66 IN

## 2024-05-01 PROCEDURE — 94640 AIRWAY INHALATION TREATMENT: CPT

## 2024-05-01 PROCEDURE — 6370000000 HC RX 637 (ALT 250 FOR IP): Performed by: STUDENT IN AN ORGANIZED HEALTH CARE EDUCATION/TRAINING PROGRAM

## 2024-05-01 PROCEDURE — 2580000003 HC RX 258: Performed by: INTERNAL MEDICINE

## 2024-05-01 PROCEDURE — 2700000000 HC OXYGEN THERAPY PER DAY

## 2024-05-01 PROCEDURE — 6360000002 HC RX W HCPCS: Performed by: STUDENT IN AN ORGANIZED HEALTH CARE EDUCATION/TRAINING PROGRAM

## 2024-05-01 PROCEDURE — 6370000000 HC RX 637 (ALT 250 FOR IP): Performed by: HOSPITALIST

## 2024-05-01 PROCEDURE — 97535 SELF CARE MNGMENT TRAINING: CPT

## 2024-05-01 PROCEDURE — 94761 N-INVAS EAR/PLS OXIMETRY MLT: CPT

## 2024-05-01 PROCEDURE — 97530 THERAPEUTIC ACTIVITIES: CPT

## 2024-05-01 PROCEDURE — 92526 ORAL FUNCTION THERAPY: CPT

## 2024-05-01 PROCEDURE — 6370000000 HC RX 637 (ALT 250 FOR IP): Performed by: INTERNAL MEDICINE

## 2024-05-01 RX ORDER — CARVEDILOL 6.25 MG/1
6.25 TABLET ORAL 2 TIMES DAILY WITH MEALS
Status: DISCONTINUED | OUTPATIENT
Start: 2024-05-01 | End: 2024-05-01 | Stop reason: HOSPADM

## 2024-05-01 RX ORDER — CARVEDILOL 6.25 MG/1
6.25 TABLET ORAL 2 TIMES DAILY WITH MEALS
Qty: 60 TABLET | Refills: 3 | Status: SHIPPED | OUTPATIENT
Start: 2024-05-01

## 2024-05-01 RX ADMIN — LEVOTHYROXINE SODIUM 100 MCG: 0.1 TABLET ORAL at 05:52

## 2024-05-01 RX ADMIN — FUROSEMIDE 20 MG: 20 TABLET ORAL at 09:44

## 2024-05-01 RX ADMIN — ASPIRIN 81 MG 81 MG: 81 TABLET ORAL at 09:45

## 2024-05-01 RX ADMIN — CLONAZEPAM 1 MG: 1 TABLET ORAL at 09:44

## 2024-05-01 RX ADMIN — ACETAMINOPHEN 650 MG: 325 TABLET ORAL at 04:39

## 2024-05-01 RX ADMIN — CARVEDILOL 25 MG: 25 TABLET, FILM COATED ORAL at 09:44

## 2024-05-01 RX ADMIN — IPRATROPIUM BROMIDE AND ALBUTEROL SULFATE 1 DOSE: .5; 3 SOLUTION RESPIRATORY (INHALATION) at 07:58

## 2024-05-01 RX ADMIN — APIXABAN 5 MG: 5 TABLET, FILM COATED ORAL at 09:45

## 2024-05-01 RX ADMIN — SODIUM CHLORIDE, PRESERVATIVE FREE 10 ML: 5 INJECTION INTRAVENOUS at 09:45

## 2024-05-01 RX ADMIN — METRONIDAZOLE 500 MG: 500 INJECTION, SOLUTION INTRAVENOUS at 15:00

## 2024-05-01 RX ADMIN — CLONIDINE HYDROCHLORIDE 0.1 MG: 0.1 TABLET ORAL at 09:44

## 2024-05-01 RX ADMIN — CARVEDILOL 6.25 MG: 6.25 TABLET, FILM COATED ORAL at 18:04

## 2024-05-01 RX ADMIN — LISINOPRIL 5 MG: 5 TABLET ORAL at 09:45

## 2024-05-01 RX ADMIN — FOLIC ACID 1 MG: 1 TABLET ORAL at 09:45

## 2024-05-01 RX ADMIN — METRONIDAZOLE 500 MG: 500 INJECTION, SOLUTION INTRAVENOUS at 03:24

## 2024-05-01 ASSESSMENT — PAIN DESCRIPTION - LOCATION: LOCATION: NECK

## 2024-05-01 ASSESSMENT — PAIN SCALES - GENERAL: PAINLEVEL_OUTOF10: 9

## 2024-05-01 NOTE — PROGRESS NOTES
[facility] - Inpatient Rehabilitation Department   Phone: [phone]    Physical Therapy    [] Initial Evaluation            [x] Daily Treatment Note         [] Discharge Summary      Patient: Sharon Kitchen   : 1945   MRN: [id number]   Date of Service:  2024  Admitting Diagnosis: Bacterial pneumonia  Current Admission Summary: 77 yo female admitted to [facility] on  for increased weakness/fatigue, cough, and diarrhea. Found to have acute respiratory failure due to PNA. Placed in c-diff r/o.   Past Medical History:  has a past medical history of Acute DVT (deep venous thrombosis) ([facility]), Acute encephalopathy, Acute on chronic diastolic heart failure ([facility]), Alcohol abuse, Anxiety, Arthritis, CAD in native artery, Cellulitis, Cerebral artery occlusion with cerebral infarction ([facility]), Cerebrovascular accident (CVA) ([facility]), Chronic fatigue, Complex care coordination, Complicated UTI (urinary tract infection), Congestive heart failure, unspecified HF chronicity, unspecified heart failure type ([facility]), COPD (chronic obstructive pulmonary disease) ([facility]), COPD exacerbation ([facility]), Depression, Diabetes mellitus ([facility]), DIMAS (dyspnea on exertion), DVT (deep venous thrombosis) ([facility]), DVT, lower extremity ([facility]), Fatigue, General weakness, Generalized weakness, Hypercholesteremia, Hypertension, Hyperthyroidism, Incontinence, Mammogram abnormal, Neuromuscular disorder ([facility]), Osteopenia, PAF (paroxysmal atrial fibrillation) ([facility]), Pneumonia, Recurrent UTI, Right forearm cellulitis, Superficial thrombophlebitis of right upper extremity, Thyroid disease, Tobacco abuse, Tobacco abuse counseling, Trapped lung, and Unable to walk.  Past Surgical History:  has a past surgical history that includes Tonsillectomy; Colonoscopy (2012); Toe Surgery (Right); Shoulder arthroscopy (Right, 14); eye surgery; Pacemaker insertion; pacemaker placement; Nerve Surgery (N/A, 2021); and Stimulator Surgery (N/A,

## 2024-05-01 NOTE — PROGRESS NOTES
Nutrition Note    RECOMMENDATIONS  PO Diet: Diet per SLP  ONS: Pt denied at this time     ASSESSMENT   Pt triggered for LOS assessment. On dysphagia minced and moist diet per SLP with minimal documented meal intakes of >50% throughout admission. Upon visiting, pt reported no issues with appetite or po intake both now or prior to current admission. Does not necessarily like minced and moist diet but denied need for ONS at this time. Wt hx in EMR indicates 8 lb (6.5%) wt loss in almost 5 months. Pt with increased protein needs r/t stage 1 pressure injury. RD will continue to monitor should pt's status change.     Malnutrition Status: No malnutrition  Acute Illness    NUTRITION DIAGNOSIS   Increased nutrient needs related to increase demand for energy/nutrients as evidenced by wounds    Goals: PO intake 75% or greater, by next RD assessment     NUTRITION RELATED FINDINGS  Objective: LBM 4/29. No edema noted.  Wounds: Pressure Injury, Stage I    CURRENT NUTRITION THERAPIES  ADULT DIET; Dysphagia - Minced and Moist; No Drinking Straws     PO Intake: %, 51-75%   PO Supplement Intake:None Ordered    ANTHROPOMETRICS  Current Height: 167.6 cm (5' 6\")  Current Weight - Scale: 53.8 kg (118 lb 9.7 oz)    Admission weight: 52.2 kg (115 lb)  Ideal Body Weight (IBW): 130 lbs  (59 kg)        BMI: 19.1    COMPARATIVE STANDARDS  Total Energy Requirements (kcals/day): 1524     Protein (g):         Fluid (mL/day):  1524    EDUCATION  Education not indicated     The patient will be monitored per nutrition standards of care. Consult dietitian if additional nutrition interventions are needed prior to RD reassessment.     Angie Dutta MS, RD, LD    Contact: [phone]

## 2024-05-01 NOTE — CARE COORDINATION
05/01/24 1704   IMM Letter   IMM Letter given to Patient/Family/Significant other/Guardian/POA/by: Case Management   IMM Letter date given: 05/01/24   IMM Letter time given: 1655     Electronically signed by GISELLA Parish, LSW on 5/1/2024 at 5:04 PM

## 2024-05-01 NOTE — CARE COORDINATION
SW received a call from [facility] at [facility] stating pt's precert was approved.  SW set up transport with [facility] at 7:30pm tonight.  Discharge packet completed.  HENS completed.  Spouse, RN and facility informed of discharge time.  All in agreement.  All documents faxed.    [facility]  [address] [place] [address]  Report = [phone]  Fax = [phone]    [facility] at 7:30pm tonight.    Electronically signed by GISELLA Parish, YIFAN on 5/1/2024 at 5:00 PM\

## 2024-05-01 NOTE — PROGRESS NOTES
Patient discharging to [facility] today. EMS here to transport. IV access removed without complication and dry dressing applied. Patient tolerated well. Report called to facility.

## 2024-05-01 NOTE — PROGRESS NOTES
[facility] - Inpatient Rehabilitation Department   Phone: [phone]    Occupational Therapy    [] Initial Evaluation            [x] Daily Treatment Note         [] Discharge Summary      Patient: Sharon Kitchen   : 1945   MRN: [id number]   Date of Service:  2024    Admitting Diagnosis:  Bacterial pneumonia  Current Admission Summary: 79 yo female admitted to [facility] on  for increased weakness/fatigue, cough, and diarrhea. Found to have acute respiratory failure due to PNA, UTI, generalized weakness.   Past Medical History:  has a past medical history of Acute DVT (deep venous thrombosis) ([facility]), Acute encephalopathy, Acute on chronic diastolic heart failure ([facility]), Alcohol abuse, Anxiety, Arthritis, CAD in native artery, Cellulitis, Cerebral artery occlusion with cerebral infarction ([facility]), Cerebrovascular accident (CVA) ([facility]), Chronic fatigue, Complex care coordination, Complicated UTI (urinary tract infection), Congestive heart failure, unspecified HF chronicity, unspecified heart failure type ([facility]), COPD (chronic obstructive pulmonary disease) ([facility]), COPD exacerbation ([facility]), Depression, Diabetes mellitus ([facility]), DIMAS (dyspnea on exertion), DVT (deep venous thrombosis) ([facility]), DVT, lower extremity ([facility]), Fatigue, General weakness, Generalized weakness, Hypercholesteremia, Hypertension, Hyperthyroidism, Incontinence, Mammogram abnormal, Neuromuscular disorder ([facility]), Osteopenia, PAF (paroxysmal atrial fibrillation) ([facility]), Pneumonia, Recurrent UTI, Right forearm cellulitis, Superficial thrombophlebitis of right upper extremity, Thyroid disease, Tobacco abuse, Tobacco abuse counseling, Trapped lung, and Unable to walk.  Past Surgical History:  has a past surgical history that includes Tonsillectomy; Colonoscopy (2012); Toe Surgery (Right); Shoulder arthroscopy (Right, 14); eye surgery; Pacemaker insertion; pacemaker placement; Nerve Surgery (N/A, 2021); and Stimulator Surgery (N/A,

## 2024-05-01 NOTE — PROGRESS NOTES
Facility/Department: [address] ONCOLOGY  Speech Language Pathology   Dysphagia Treatment Note    Patient: Sharon Kitchen   : 1945   MRN: [id number]      Evaluation Date: 2024      Admitting Dx: Bacterial pneumonia [J15.9]  Septicemia (HCC) [A41.9]  Acute respiratory failure with hypoxia (HCC) [J96.01]  Multifocal pneumonia [J18.9]  Treatment Diagnosis: Oropharyngeal Dysphagia   Pain: Did not state       MBS completed 24 with the following results:       Modified Barium Swallow evaluation completed on 2024. Patient presents with mild-moderate oropharyngeal dysphagia secondary to prolonged/reduced mastication, reduced A-P propulsion, premature bolus loss to pharynx, reduced and delayed epiglottic distension, delayed swallow initiation, reduced hyolaryngeal excursion, reduced BOT retraction, reduced pharyngeal clearing, and reduced laryngeal sensation resulting in observed laryngeal penetration with thin liquids with no aspiration viewed. Thin liquids via tsp and cup revealed premature spill over tip of epiglottis with incomplete epiglottic distension resulting in laryngeal penetration before and during the swallow, a mild stain was intermittently noted in laryngeal inlet. With verbal cues to throat clear and re-swallow, Pt able to clear laryngeal inlet. A chin tuck was trialed with thin liquids which appeared to reduce depth of laryngeal penetration.  No laryngeal penetration was viewed with mildly thick liquids. With moderately thick liquids, pyriform residue was noted post swallow. Prolonged mastication with reduced A-P propulsion was noted with solid trials, Pt did expel portion of soft solids due to inability to fully masticate. Overall, Pt is at increased risk for aspiration based on reduced/delayed epiglottis distention and airway protection, reduced sensation, current respiratory compromise, and overall deconditioning. Recommend strict use of aspiration precautions.

## 2024-05-01 NOTE — PROGRESS NOTES
V2.0    [facility] Progress Note      Name:  Sharon Kitchen /Age/Sex: 1945  (78 y.o. female)   MRN & CSN:  [id number] & [id number] Encounter Date/Time: 2024 2:10 PM EDT   Location:  [facility]-5572/5572-01 PCP: Sherrill Cross APRN - NP     Attending:Aurelia Matthews MD       Hospital Day: 10    Assessment and Recommendations   Sharon Kitchen is a 78 y.o. female who presents with Bacterial pneumonia      Plan:   78-year-old lady with history of paroxysmal atrial fibrillation, CAD, COPD, type 2 diabetes, hypertension, hyperlipidemia, history of CHF, history of CVA presented with generalized malaise weakness fatigue and shortness of breath found to have acute respiratory failure with hypoxia due to bilateral pneumo medically stable      Bacterial pneumonia likely aspiration  Improving speech evaluation ordered dysphagia diet continue antibiotics for 7 days total    Acute hypoxic respiratory failure stable on 1 to 2 L May need home oxygen      Physical deconditioning.  -PT OT evaluation skilled nursing recommended    Diet ADULT DIET; Dysphagia - Minced and Moist; No Drinking Straws   DVT Prophylaxis    Code Status Full Code   Disposition Medically stable for discharge         Personally reviewed Lab Studies and Imaging         Subjective:     Chief Complaint:     Sharon Kitchen is a 78 y.o. female who presents with admitted pneumonia doing better      Review of Systems:      Pertinent positives and negatives discussed in HPI    Objective:     Intake/Output Summary (Last 24 hours) at 2024 1532  Last data filed at 2024 0944  Gross per 24 hour   Intake 10 ml   Output 2100 ml   Net -2090 ml      Vitals:   Vitals:    24 1200 24 1223 24 1255 24 1445   BP: (!) 86/52 94/60 100/67 120/76   Pulse: 61 64 64 67   Resp:       Temp:       TempSrc:       SpO2:       Weight:       Height:             Physical Exam:      General: NAD  Eyes: EOMI  ENT: neck supple  Cardiovascular: Regular  A1C:   Lab Results   Component Value Date/Time    LABA1C 5.2 12/23/2020 11:21 AM     TSH:   Lab Results   Component Value Date/Time    TSH 0.77 12/04/2023 08:31 PM     Troponin: No results found for: \"TROPONINT\"  Lactic Acid: No results for input(s): \"LACTA\" in the last 72 hours.  BNP: No results for input(s): \"PROBNP\" in the last 72 hours.  UA:  Lab Results   Component Value Date/Time    NITRU POSITIVE 04/22/2024 09:27 PM    COLORU Yellow 04/22/2024 09:27 PM    PHUR 6.0 04/22/2024 09:27 PM    WBCUA 128 04/22/2024 09:27 PM    RBCUA 20 04/22/2024 09:27 PM    YEAST 0 07/13/2020 04:10 PM    YEAST Present 09/11/2019 07:31 AM    BACTERIA 4+ 04/22/2024 09:27 PM    CLARITYU CLOUDY 04/22/2024 09:27 PM    SPECGRAV 1.020 07/15/2021 12:00 PM    LEUKOCYTESUR MODERATE 04/22/2024 09:27 PM    UROBILINOGEN 1.0 04/22/2024 09:27 PM    BILIRUBINUR Negative 04/22/2024 09:27 PM    BILIRUBINUR 0 07/15/2021 12:00 PM    BILIRUBINUR NEGATIVE 05/08/2011 12:20 PM    BLOODU SMALL 04/22/2024 09:27 PM    GLUCOSEU Negative 04/22/2024 09:27 PM    GLUCOSEU NEGATIVE 05/08/2011 12:20 PM    KETUA Negative 04/22/2024 09:27 PM     Urine Cultures:   Lab Results   Component Value Date/Time    LABURIN  04/22/2024 09:27 PM     <50,000 CFU/ml mixed skin/urogenital chato. No further workup     Blood Cultures:   Lab Results   Component Value Date/Time    BC No Growth after 4 days of incubation. 04/22/2024 05:01 PM     Lab Results   Component Value Date/Time    BLOODCULT2 No Growth after 4 days of incubation. 04/22/2024 05:06 PM     Organism:   Lab Results   Component Value Date/Time    ORG Escherichia coli 12/04/2023 09:32 PM         Electronically signed by Aurelia Matthews MD on 5/1/2024 at 3:32 PM

## 2024-05-01 NOTE — CARE COORDINATION
Pre-cert to SNF remains pending.    Electronically signed by Pat Garcia RN on 5/1/2024 at 11:08 AM

## 2024-06-05 ENCOUNTER — TELEPHONE (OUTPATIENT)
Dept: FAMILY MEDICINE CLINIC | Age: 79
End: 2024-06-05

## 2024-06-05 NOTE — TELEPHONE ENCOUNTER
Carolina is calling to obtain a Hospice Referral per patient / family request for [facility]. Please give Carolina a call to let her know if that would be ok. The fax number is [phone].     Please advise.

## 2024-06-07 DIAGNOSIS — E03.9 HYPOTHYROIDISM, UNSPECIFIED TYPE: ICD-10-CM

## 2024-06-07 DIAGNOSIS — I10 UNCONTROLLED HYPERTENSION: ICD-10-CM

## 2024-06-07 RX ORDER — LISINOPRIL 5 MG/1
5 TABLET ORAL DAILY
Qty: 90 TABLET | Refills: 1 | Status: SHIPPED | OUTPATIENT
Start: 2024-06-07

## 2024-06-07 RX ORDER — LEVOTHYROXINE SODIUM 0.1 MG/1
TABLET ORAL
Qty: 90 TABLET | Refills: 1 | Status: SHIPPED | OUTPATIENT
Start: 2024-06-07

## 2024-06-07 RX ORDER — POTASSIUM CHLORIDE 750 MG/1
10 TABLET, FILM COATED, EXTENDED RELEASE ORAL DAILY
Qty: 90 TABLET | Refills: 1 | OUTPATIENT
Start: 2024-06-07

## 2024-06-07 RX ORDER — CARVEDILOL 12.5 MG/1
TABLET ORAL
Qty: 180 TABLET | Refills: 1 | OUTPATIENT
Start: 2024-06-07

## 2024-06-11 NOTE — HOME CARE
[facility]    1/23/2022 1:14 PM    Patient Information:  Kyle Knutson is a 67 y.o. female [id number]  PCP:  QUITA Mccrary NP (Tel: [phone] )    Chief complaint:    Chief Complaint   Patient presents with    Fatigue     Pt reports emesis and feeling weak/fatigued for about 1 week now. Denies diarrhea or fever. Fall yesterday d/t weakness. Denies LOC    Emesis        History of Present Illness:  Dara Rasmussen is a 67 y.o. female  w, no, hypertension, hyperlipidemia, CAD s/p PPI hypothyroidism, anxiety, depression and COPD presented with nausea and vomiting. Per chart review, patient was tested positive for COVID about 3 days ago and has been experiencing nausea and vomiting for the past 3 days. Patient reported that because of persistent nausea and vomiting she has been unable to keep anything down and yesterday she was feeling so weak that he fell down. Patient denies any fever, chills, shortness of breath, cough, chest pain, PND, orthopnea, BLE edema, dizziness, syncope, urinary frequency urgency or dysuria. On admission, patient was hemodynamically stable. Initial lab work showed sodium: 126, potassium: 3.3, chloride: 89. CXR negative for any acute pathology; showed no change in the left apical and left basilar scarring and left pleural thickening. CT head for any acute intracranial pathology. CT cervical spine negative for any fractures.         REVIEW OF SYSTEMS:   Detailed 12 point ROS obtained which were negative except what mentioned above in HPI    Past Medical History:   has a past medical history of Acute DVT (deep venous thrombosis) ([address] Acute encephalopathy, Acute on chronic diastolic heart failure ([address] Alcohol abuse, Anxiety, Arthritis, CAD in native artery, Cellulitis, Cerebral artery occlusion with cerebral infarction ([address] Cerebrovascular accident (CVA) ([facility] Chronic fatigue, Complex care coordination, Complicated UTI (urinary tract infection), COPD (chronic obstructive pulmonary disease) ([facility] COPD exacerbation ([facility] Depression, Diabetes mellitus ([facility] DIMAS (dyspnea on exertion), DVT (deep venous thrombosis) ([facility] DVT, lower extremity ([facility] Fatigue, General weakness, Generalized weakness, Hypercholesteremia, Hypertension, Hyperthyroidism, Incontinence, Mammogram abnormal, Neuromuscular disorder ([facility] Osteopenia, Pneumonia, Recurrent UTI, Right forearm cellulitis, Superficial thrombophlebitis of right upper extremity, Thyroid disease, Tobacco abuse, Tobacco abuse counseling, Trapped lung, and Unable to walk. Past Surgical History:   has a past surgical history that includes Tonsillectomy; Colonoscopy (1/19/2012); Toe Surgery (Right); Shoulder arthroscopy (Right, 6/18/14); eye surgery; Pacemaker insertion; pacemaker placement; and Nerve Surgery (N/A, 12/29/2021). Medications:  No current facility-administered medications on file prior to encounter. Current Outpatient Medications on File Prior to Encounter   Medication Sig Dispense Refill    senna-docusate (PERICOLACE) 8.6-50 MG per tablet Take 1 tablet by mouth 2 times daily For constipation while on pain medication. 30 tablet 1    clonazePAM (KLONOPIN) 1 MG tablet Take 1 tablet by mouth 2 times daily as needed for Anxiety for up to 30 days.  60 tablet 0    cloNIDine (CATAPRES) 0.1 MG tablet Take 1 tablet by mouth 3 times daily 90 tablet 2    mirtazapine (REMERON) 15 MG tablet TAKE ONE TABLET BY MOUTH DAILY 30 tablet 1    oxybutynin (DITROPAN) 5 MG tablet TAKE ONE TABLET BY MOUTH THREE TIMES A  tablet 0    carvedilol (COREG) 12.5 MG tablet Take 1 tablet by mouth 2 times daily 60 tablet 3    levothyroxine (SYNTHROID) 100 MCG tablet TAKE ONE TABLET BY MOUTH DAILY 90 tablet 0    atorvastatin (LIPITOR) 80 MG tablet TAKE ONE TABLET BY MOUTH DAILY 90 tablet 0 guarding and positive bowel sounds all four quadrants. Extremities: No clubbing, cyanosis, or edema bilaterally. Skin: Skin color, texture, turgor normal.  No rashes or lesions. Neurologic: Alert and oriented X 3, neurovascularly intact with sensory/motor intact upper extremities/lower extremities, bilaterally. Cranial nerves: II-XII intact, grossly non-focal.  Psychiatry: Appropriate affect. Not agitated  Skin: Warm, dry, normal turgor, no rash  Brisk capillary refill, peripheral pulses palpable     Labs:  CBC:   Lab Results   Component Value Date    WBC 7.4 01/23/2022    RBC 4.22 01/23/2022    HGB 14.8 01/23/2022    HCT 42.7 01/23/2022    .4 01/23/2022    MCH 35.0 01/23/2022    MCHC 34.5 01/23/2022    RDW 13.1 01/23/2022     01/23/2022    MPV 8.1 01/23/2022     BMP:    Lab Results   Component Value Date     01/23/2022    K 3.3 01/23/2022    CL 89 01/23/2022    CO2 26 01/23/2022    BUN 7 01/23/2022    CREATININE <0.5 01/23/2022    CALCIUM 9.0 01/23/2022    GFRAA >60 01/23/2022    GFRAA >60 05/02/2013    LABGLOM >60 01/23/2022    LABGLOM 79 12/11/2017    GLUCOSE 103 01/23/2022     CT ABDOMEN PELVIS WO CONTRAST Additional Contrast? None   Final Result   1. Unchanged small complex left pleural effusion. CT Cervical Spine WO Contrast   Final Result   1. No acute fracture. CT Head WO Contrast   Final Result   1. No acute intracranial abnormality. XR CHEST PORTABLE   Final Result   1. No acute abnormality. Discussed case  with ED physician    Problem List  Active Problems:    Hyponatremia  Resolved Problems:    * No resolved hospital problems.  *        Assessment/Plan:     COVID-19: Tested positive about 3 days ago  Patient currently on room air would not qualify for steroids or Actemra  Maintain droplet plus precautions  Supplemental oxygen if needed to keep sats above 92%  Continue anticoagulation for hypercoagulable state    Nausea and vomiting: Likely secondary to COVID  As needed antiemetics and IV fluid ordered    Hyponatremia: Acute on chronic chronic. Serum sodium 126 on admission  likely secondary from decreased p.o. intake/vomiting//SIADH from nausea/medazepam  Nephrology consulted: Appreciate recs  Urine studies ordered  Monitor sodium every 6 hours  Avoid isotonic fluids    Hypokalemia: Likely secondary from decreased p.o. intake and nausea and vomiting  Repeat potassium as needed    Hypertension: Uncontrolled  On Coreg, clonidine and losartan at home  Losartan on hold in setting of hyponatremia  Monitor blood pressure closely    Hyperlipidemia: Continue statins    Hypothyroidism: Continue Synthroid  TSH ordered    Hx of anxiety and depression: On Klonopin and mirtazapine at home  Only mirtazapine for now    Fall likely secondary to dehydration/generalized weakness  Maintain fall precautions  Orthostatic vitals ordered    Hx of COPD: Not in exacerbation  Continue as needed breathing treatment at home and let us      DVT prophylaxis: Lovenox  Code status: Full      Admit as inpatient I anticipate hospitalization spanning less than  midnights for investigation and treatment of the above medically necessary diagnoses. Please note that some part of this chart was generated using Dragon dictation software. Although every effort was made to ensure the accuracy of this automated transcription, some errors in transcription may have occurred inadvertently. If you may need any clarification, please do not hesitate to contact me through [facility].        Alfred Garcia MD    1/23/2022 1:14 PM [Follow-up Visit ___] : a follow-up visit  for [unfilled]

## 2024-06-13 ENCOUNTER — TELEPHONE (OUTPATIENT)
Dept: FAMILY MEDICINE CLINIC | Age: 79
End: 2024-06-13

## 2024-06-13 DIAGNOSIS — F41.9 ANXIETY: ICD-10-CM

## 2024-06-13 RX ORDER — CLONAZEPAM 1 MG/1
1 TABLET ORAL 2 TIMES DAILY
Qty: 60 TABLET | Refills: 0 | Status: SHIPPED | OUTPATIENT
Start: 2024-06-13 | End: 2024-07-13

## 2024-06-13 NOTE — TELEPHONE ENCOUNTER
clonazePAM (KLONOPIN) 1 MG tablet [[id number]]  [facility] [id number] - [place], [place] - [address] - P [phone] - F [phone]  [address], [facility] [address]  Phone: [phone]  Fax: [phone]

## 2024-07-11 DIAGNOSIS — F41.9 ANXIETY: ICD-10-CM

## 2024-07-11 RX ORDER — CLONAZEPAM 1 MG/1
1 TABLET ORAL 2 TIMES DAILY
Qty: 60 TABLET | Refills: 0 | OUTPATIENT
Start: 2024-07-11 | End: 2024-08-10

## 2024-07-11 RX ORDER — FUROSEMIDE 40 MG/1
20 TABLET ORAL DAILY
Qty: 45 TABLET | Refills: 0 | Status: SHIPPED | OUTPATIENT
Start: 2024-07-11

## 2024-07-16 DIAGNOSIS — F41.9 ANXIETY: ICD-10-CM

## 2024-07-16 RX ORDER — CLONAZEPAM 1 MG/1
TABLET ORAL
Qty: 60 TABLET | OUTPATIENT
Start: 2024-07-16

## 2024-07-16 NOTE — TELEPHONE ENCOUNTER
Medication:   Requested Prescriptions     Pending Prescriptions Disp Refills    clonazePAM (KLONOPIN) 1 MG tablet 60 tablet 0     Sig: Take 1 tablet by mouth 2 times daily for 30 days. Max Daily Amount: 2 mg     Refused Prescriptions Disp Refills    clonazePAM (KLONOPIN) 1 MG tablet [Pharmacy Med Name: clonazePAM 1 MG TABLET] 60 tablet      Sig: TAKE 1 TABLET BY MOUTH 2 TIMES A DAY **MAX DAILY AMOUNT OF 2 TABLETS**     Refused By: ERI GRACE     Reason for Refusal: Patient needs an appointment      Last Filled:  6/13/2024    Patient Phone Number: [phone] (home)     Last appt: 1/15/2024   Next appt: 7/25/2024    Last OARRS:       5/11/2020     2:20 PM   RX Monitoring   Periodic Controlled Substance Monitoring No signs of potential drug abuse or diversion identified.     PDMP Monitoring:    Last PDMP Cristian as Reviewed ([place]):  Review User Review Instant Review Result   VIRGIL GRANT 1/15/2024  1:47 PM Reviewed PDMP [1]     Preferred Pharmacy:   [facility] [id number] - [facility] [address] [phone] - F [phone]  [address]  [facility] [address]  Phone: [phone] Fax: [phone]    [facility] [place] - [address] -  [phone] - F [phone]  [address] [address]  Phone: [phone] Fax: [phone]    [facility] [id number] - [facility] [address] - P [phone] - F [phone]  [address]  [facility] [address]  Phone: [phone] Fax: [phone]

## 2024-07-17 RX ORDER — CLONAZEPAM 1 MG/1
1 TABLET ORAL 2 TIMES DAILY
Qty: 60 TABLET | Refills: 0 | Status: SHIPPED | OUTPATIENT
Start: 2024-07-17 | End: 2024-08-16

## 2024-07-25 ENCOUNTER — OFFICE VISIT (OUTPATIENT)
Dept: FAMILY MEDICINE CLINIC | Age: 79
End: 2024-07-25
Payer: MEDICARE

## 2024-07-25 VITALS
WEIGHT: 107 LBS | HEART RATE: 78 BPM | OXYGEN SATURATION: 94 % | TEMPERATURE: 97.4 F | SYSTOLIC BLOOD PRESSURE: 130 MMHG | DIASTOLIC BLOOD PRESSURE: 80 MMHG | BODY MASS INDEX: 17.27 KG/M2

## 2024-07-25 DIAGNOSIS — R30.0 DYSURIA: ICD-10-CM

## 2024-07-25 DIAGNOSIS — E44.0 MODERATE PROTEIN-CALORIE MALNUTRITION (HCC): ICD-10-CM

## 2024-07-25 DIAGNOSIS — F41.9 ANXIETY: Primary | ICD-10-CM

## 2024-07-25 DIAGNOSIS — N30.01 ACUTE CYSTITIS WITH HEMATURIA: ICD-10-CM

## 2024-07-25 LAB
BILIRUBIN, POC: NORMAL
BLOOD URINE, POC: NORMAL
CLARITY, POC: CLEAR
COLOR, POC: NORMAL
GLUCOSE URINE, POC: NORMAL
KETONES, POC: NORMAL
LEUKOCYTE EST, POC: NORMAL
NITRITE, POC: NORMAL
PH, POC: 6
PROTEIN, POC: NORMAL
SPECIFIC GRAVITY, POC: 1.01
UROBILINOGEN, POC: NORMAL

## 2024-07-25 PROCEDURE — G8419 CALC BMI OUT NRM PARAM NOF/U: HCPCS | Performed by: NURSE PRACTITIONER

## 2024-07-25 PROCEDURE — 81002 URINALYSIS NONAUTO W/O SCOPE: CPT | Performed by: NURSE PRACTITIONER

## 2024-07-25 PROCEDURE — 3079F DIAST BP 80-89 MM HG: CPT | Performed by: NURSE PRACTITIONER

## 2024-07-25 PROCEDURE — 3075F SYST BP GE 130 - 139MM HG: CPT | Performed by: NURSE PRACTITIONER

## 2024-07-25 PROCEDURE — G8427 DOCREV CUR MEDS BY ELIG CLIN: HCPCS | Performed by: NURSE PRACTITIONER

## 2024-07-25 PROCEDURE — 1123F ACP DISCUSS/DSCN MKR DOCD: CPT | Performed by: NURSE PRACTITIONER

## 2024-07-25 PROCEDURE — G8399 PT W/DXA RESULTS DOCUMENT: HCPCS | Performed by: NURSE PRACTITIONER

## 2024-07-25 PROCEDURE — 1090F PRES/ABSN URINE INCON ASSESS: CPT | Performed by: NURSE PRACTITIONER

## 2024-07-25 PROCEDURE — 4004F PT TOBACCO SCREEN RCVD TLK: CPT | Performed by: NURSE PRACTITIONER

## 2024-07-25 PROCEDURE — 99214 OFFICE O/P EST MOD 30 MIN: CPT | Performed by: NURSE PRACTITIONER

## 2024-07-25 RX ORDER — CIPROFLOXACIN 500 MG/1
500 TABLET, FILM COATED ORAL 2 TIMES DAILY
Qty: 6 TABLET | Refills: 0 | Status: SHIPPED | OUTPATIENT
Start: 2024-07-25

## 2024-07-25 RX ORDER — MEGESTROL ACETATE 20 MG/1
20 TABLET ORAL DAILY
Qty: 30 TABLET | Refills: 3 | Status: SHIPPED | OUTPATIENT
Start: 2024-07-25

## 2024-07-25 RX ORDER — CLONAZEPAM 1 MG/1
1 TABLET ORAL 2 TIMES DAILY
Qty: 60 TABLET | Refills: 0 | Status: SHIPPED | OUTPATIENT
Start: 2024-07-25 | End: 2024-07-25

## 2024-07-25 NOTE — PROGRESS NOTES
Sharon Kitchen  : 1945  Encounter date: 2024    This efrain 78 y.o. female who presents with  Chief Complaint   Patient presents with    Medication Check    Anxiety       History of present illness:    HPI PT is 78 year old female with daughter for medication check.  Pt taking clonazepam for anxiety, well controlled.  Concerns regarding weight loss, has lost 11 lbs in 3 months.  Reports taking Boost drinks, low appetite, remains hydrated.  Denies intentional weight loss, reports nothing tastes appealing.  Concerns regarding recent memory changes, fatigue.  Pt prone to UTI due to incontinence and OAB, being followed by urology.    Current Outpatient Medications on File Prior to Visit   Medication Sig Dispense Refill    clonazePAM (KLONOPIN) 1 MG tablet Take 1 tablet by mouth 2 times daily for 30 days. Max Daily Amount: 2 mg 60 tablet 0    furosemide (LASIX) 40 MG tablet TAKE 1/2 TABLET BY MOUTH DAILY 45 tablet 0    lisinopril (PRINIVIL;ZESTRIL) 5 MG tablet TAKE 1 TABLET BY MOUTH DAILY 90 tablet 1    levothyroxine (SYNTHROID) 100 MCG tablet TAKE 1 TABLET BY MOUTH DAILY 90 tablet 1    carvedilol (COREG) 6.25 MG tablet Take 1 tablet by mouth with breakfast and with evening meal 60 tablet 3    albuterol sulfate HFA (VENTOLIN HFA) 108 (90 Base) MCG/ACT inhaler Inhale 2 puffs into the lungs every 4 hours as needed for Wheezing or Shortness of Breath 18 g 1    ipratropium 0.5 mg-albuterol 2.5 mg (DUONEB) 0.5-2.5 (3) MG/3ML SOLN nebulizer solution Inhale 3 mLs into the lungs every 6 hours as needed for Shortness of Breath 360 mL 0    apixaban (ELIQUIS) 5 MG TABS tablet Take 1 tablet by mouth 2 times daily 60 tablet 0    mometasone-formoterol (DULERA) 100-5 MCG/ACT inhaler Inhale 2 puffs into the lungs in the morning and 2 puffs in the evening. 1 each 2    nicotine (NICODERM CQ) 21 MG/24HR Place 1 patch onto the skin daily for 7 days 7 patch 0    diphenhydrAMINE-APAP, sleep, (TYLENOL PM EXTRA STRENGTH)  MG

## 2024-07-27 LAB
BACTERIA UR CULT: ABNORMAL
ORGANISM: ABNORMAL

## 2024-08-16 DIAGNOSIS — F41.9 ANXIETY: ICD-10-CM

## 2024-08-16 RX ORDER — CLONAZEPAM 1 MG/1
1 TABLET ORAL 2 TIMES DAILY
Qty: 60 TABLET | Refills: 0 | Status: SHIPPED | OUTPATIENT
Start: 2024-08-16 | End: 2024-09-15

## 2024-08-16 NOTE — TELEPHONE ENCOUNTER
Medication:   Requested Prescriptions     Pending Prescriptions Disp Refills    clonazePAM (KLONOPIN) 1 MG tablet 60 tablet 0     Sig: Take 1 tablet by mouth 2 times daily for 30 days. Max Daily Amount: 2 mg      Last Filled:  7/17/2024    Patient Phone Number: [phone] (home)     Last appt: 7/25/2024   Next appt: 10/30/2024    Last OARRS:       5/11/2020     2:20 PM   RX Monitoring   Periodic Controlled Substance Monitoring No signs of potential drug abuse or diversion identified.     PDMP Monitoring:    Last PDMP Cristian as Reviewed ([place]):  Review User Review Instant Review Result   VIRGIL GRANT 7/25/2024 12:29 PM Reviewed PDMP [1]     Preferred Pharmacy:   [facility] [id number] - [place] - [address] [phone] - F [phone]  [address] [place] [address]  Phone: [phone] Fax: [phone]

## 2024-08-16 NOTE — TELEPHONE ENCOUNTER
clonazePAM (KLONOPIN) 1 MG tablet     [facility] [id number] - [place], [place] - [address] - P [phone] - F [phone]  [address], [facility] [address]  Phone: [phone]  Fax: [phone]

## 2024-09-05 DIAGNOSIS — F41.9 ANXIETY: ICD-10-CM

## 2024-09-05 RX ORDER — MIRTAZAPINE 15 MG/1
TABLET, FILM COATED ORAL
Qty: 90 TABLET | Refills: 1 | Status: SHIPPED | OUTPATIENT
Start: 2024-09-05

## 2024-09-13 DIAGNOSIS — F41.9 ANXIETY: ICD-10-CM

## 2024-09-13 RX ORDER — CLONAZEPAM 1 MG/1
1 TABLET ORAL 2 TIMES DAILY
Qty: 60 TABLET | Refills: 0 | Status: SHIPPED | OUTPATIENT
Start: 2024-09-13 | End: 2024-10-13

## 2024-10-07 RX ORDER — FUROSEMIDE 40 MG
20 TABLET ORAL DAILY
Qty: 45 TABLET | Refills: 0 | Status: SHIPPED | OUTPATIENT
Start: 2024-10-07

## 2024-10-14 NOTE — TELEPHONE ENCOUNTER
Just refilled on 8/5/21 for 60 pills Please call and speak directly to the patient and make sure she knows this is not spam  This is urgent she has an abnormal mammogram

## 2024-10-21 DIAGNOSIS — F41.9 ANXIETY: ICD-10-CM

## 2024-10-21 RX ORDER — CLONAZEPAM 1 MG/1
1 TABLET ORAL 2 TIMES DAILY
Qty: 60 TABLET | Refills: 0 | Status: SHIPPED | OUTPATIENT
Start: 2024-10-21 | End: 2024-11-20

## 2024-10-21 NOTE — TELEPHONE ENCOUNTER
Medication:   Requested Prescriptions     Pending Prescriptions Disp Refills    clonazePAM (KLONOPIN) 1 MG tablet 60 tablet 0     Sig: Take 1 tablet by mouth 2 times daily for 30 days. Max Daily Amount: 2 mg      Last Filled:  9/13/24    Patient Phone Number: [phone] (home)     Last appt: Visit date not found   Next appt: Visit date not found    Last OARRS:       5/11/2020     2:20 PM   RX Monitoring   Periodic Controlled Substance Monitoring No signs of potential drug abuse or diversion identified.     PDMP Monitoring:    Last PDMP Cristian [place] Reviewed ([place]):  Review User Review Instant Review Result   VIRGIL GRANT 7/25/2024 12:29 PM Reviewed PDMP [1]     Preferred Pharmacy:   [facility] [id number] - [facility] [address] [phone] - F [phone]  [facility] [address]  [facility] [address]  Phone: [phone] Fax: [phone]    [facility] [place] [facility] [address] -  [phone] - F [phone]  [address] [facility] [address]  Phone: [phone] Fax: [phone]    [facility] [id number] - [place], [place] - [address] - P [phone] - F [phone]  [address]  [facility] [address]  Phone: [phone] Fax: [phone]

## 2024-10-21 NOTE — TELEPHONE ENCOUNTER
clonazePAM (KLONOPIN) 1 MG tablet       [facility] [id number] - [place], [place] - [address] - P [phone] - F [phone]  [address], [facility] [address]  Phone: [phone]  Fax: [phone]

## 2024-11-16 ENCOUNTER — HOSPITAL ENCOUNTER (EMERGENCY)
Age: 79
Discharge: HOME | End: 2024-11-16
Attending: STUDENT IN AN ORGANIZED HEALTH CARE EDUCATION/TRAINING PROGRAM
Payer: MEDICARE

## 2024-11-16 VITALS
HEART RATE: 69 BPM | SYSTOLIC BLOOD PRESSURE: 147 MMHG | OXYGEN SATURATION: 92 % | TEMPERATURE: 98.9 F | DIASTOLIC BLOOD PRESSURE: 88 MMHG

## 2024-11-16 DIAGNOSIS — R11.2 NAUSEA AND VOMITING, UNSPECIFIED VOMITING TYPE: Primary | ICD-10-CM

## 2024-11-16 LAB
ANION GAP SERPL CALCULATED.3IONS-SCNC: 15 MMOL/L (ref 3–16)
BACTERIA URNS QL MICRO: NORMAL /HPF
BASOPHILS # BLD: 0.1 K/UL (ref 0–0.2)
BASOPHILS NFR BLD: 1.1 %
BILIRUB UR QL STRIP.AUTO: NEGATIVE
BUN SERPL-MCNC: 14 MG/DL (ref 7–20)
CALCIUM SERPL-MCNC: 9.3 MG/DL (ref 8.3–10.6)
CHLORIDE SERPL-SCNC: 93 MMOL/L (ref 99–110)
CLARITY UR: CLEAR
CO2 SERPL-SCNC: 25 MMOL/L (ref 21–32)
COLOR UR: YELLOW
CREAT SERPL-MCNC: 1 MG/DL (ref 0.6–1.2)
DEPRECATED RDW RBC AUTO: 13.9 % (ref 12.4–15.4)
EKG ATRIAL RATE: 117 BPM
EKG DIAGNOSIS: NORMAL
EKG P AXIS: 68 DEGREES
EKG P-R INTERVAL: 176 MS
EKG Q-T INTERVAL: 360 MS
EKG QRS DURATION: 72 MS
EKG QTC CALCULATION (BAZETT): 469 MS
EKG R AXIS: 51 DEGREES
EKG T AXIS: 60 DEGREES
EKG VENTRICULAR RATE: 102 BPM
EOSINOPHIL # BLD: 0.1 K/UL (ref 0–0.6)
EOSINOPHIL NFR BLD: 1 %
EPI CELLS #/AREA URNS AUTO: 1 /HPF (ref 0–5)
GFR SERPLBLD CREATININE-BSD FMLA CKD-EPI: 57 ML/MIN/{1.73_M2}
GLUCOSE SERPL-MCNC: 77 MG/DL (ref 70–99)
GLUCOSE UR STRIP.AUTO-MCNC: NEGATIVE MG/DL
HCT VFR BLD AUTO: 39.3 % (ref 36–48)
HGB BLD-MCNC: 13.6 G/DL (ref 12–16)
HGB UR QL STRIP.AUTO: NEGATIVE
HYALINE CASTS #/AREA URNS AUTO: 0 /LPF (ref 0–8)
KETONES UR STRIP.AUTO-MCNC: NEGATIVE MG/DL
LEUKOCYTE ESTERASE UR QL STRIP.AUTO: ABNORMAL
LYMPHOCYTES # BLD: 0.7 K/UL (ref 1–5.1)
LYMPHOCYTES NFR BLD: 8.3 %
MCH RBC QN AUTO: 34.5 PG (ref 26–34)
MCHC RBC AUTO-ENTMCNC: 34.7 G/DL (ref 31–36)
MCV RBC AUTO: 99.3 FL (ref 80–100)
MONOCYTES # BLD: 0.6 K/UL (ref 0–1.3)
MONOCYTES NFR BLD: 7.4 %
NEUTROPHILS # BLD: 6.4 K/UL (ref 1.7–7.7)
NEUTROPHILS NFR BLD: 82.2 %
NITRITE UR QL STRIP.AUTO: NEGATIVE
PH UR STRIP.AUTO: 7 [PH] (ref 5–8)
PLATELET # BLD AUTO: 337 K/UL (ref 135–450)
PMV BLD AUTO: 7.8 FL (ref 5–10.5)
POTASSIUM SERPL-SCNC: 3.9 MMOL/L (ref 3.5–5.1)
POTASSIUM SERPL-SCNC: ABNORMAL MMOL/L (ref 3.5–5.1)
PROT UR STRIP.AUTO-MCNC: 30 MG/DL
RBC # BLD AUTO: 3.95 M/UL (ref 4–5.2)
RBC CLUMPS #/AREA URNS AUTO: 2 /HPF (ref 0–4)
SODIUM SERPL-SCNC: 133 MMOL/L (ref 136–145)
SP GR UR STRIP.AUTO: 1.01 (ref 1–1.03)
UA COMPLETE W REFLEX CULTURE PNL UR: ABNORMAL
UA DIPSTICK W REFLEX MICRO PNL UR: YES
URN SPEC COLLECT METH UR: ABNORMAL
UROBILINOGEN UR STRIP-ACNC: 0.2 E.U./DL
WBC # BLD AUTO: 7.8 K/UL (ref 4–11)
WBC #/AREA URNS AUTO: 0 /HPF (ref 0–5)

## 2024-11-16 PROCEDURE — 6370000000 HC RX 637 (ALT 250 FOR IP): Performed by: STUDENT IN AN ORGANIZED HEALTH CARE EDUCATION/TRAINING PROGRAM

## 2024-11-16 PROCEDURE — 80048 BASIC METABOLIC PNL TOTAL CA: CPT

## 2024-11-16 PROCEDURE — 93010 ELECTROCARDIOGRAM REPORT: CPT | Performed by: INTERNAL MEDICINE

## 2024-11-16 PROCEDURE — 6360000002 HC RX W HCPCS: Performed by: STUDENT IN AN ORGANIZED HEALTH CARE EDUCATION/TRAINING PROGRAM

## 2024-11-16 PROCEDURE — 85025 COMPLETE CBC W/AUTO DIFF WBC: CPT

## 2024-11-16 PROCEDURE — 81001 URINALYSIS AUTO W/SCOPE: CPT

## 2024-11-16 PROCEDURE — 84132 ASSAY OF SERUM POTASSIUM: CPT

## 2024-11-16 PROCEDURE — 99284 EMERGENCY DEPT VISIT MOD MDM: CPT

## 2024-11-16 PROCEDURE — 93005 ELECTROCARDIOGRAM TRACING: CPT | Performed by: STUDENT IN AN ORGANIZED HEALTH CARE EDUCATION/TRAINING PROGRAM

## 2024-11-16 PROCEDURE — 96374 THER/PROPH/DIAG INJ IV PUSH: CPT

## 2024-11-16 RX ORDER — ONDANSETRON 4 MG/1
4 TABLET, FILM COATED ORAL EVERY 8 HOURS PRN
Qty: 20 TABLET | Refills: 0 | Status: SHIPPED | OUTPATIENT
Start: 2024-11-16

## 2024-11-16 RX ORDER — ONDANSETRON 2 MG/ML
4 INJECTION INTRAMUSCULAR; INTRAVENOUS ONCE
Status: COMPLETED | OUTPATIENT
Start: 2024-11-16 | End: 2024-11-16

## 2024-11-16 RX ORDER — LISINOPRIL 10 MG/1
5 TABLET ORAL ONCE
Status: COMPLETED | OUTPATIENT
Start: 2024-11-16 | End: 2024-11-16

## 2024-11-16 RX ADMIN — ONDANSETRON 4 MG: 2 INJECTION, SOLUTION INTRAMUSCULAR; INTRAVENOUS at 05:22

## 2024-11-16 RX ADMIN — LISINOPRIL 5 MG: 10 TABLET ORAL at 05:23

## 2024-11-16 ASSESSMENT — LIFESTYLE VARIABLES
HOW MANY STANDARD DRINKS CONTAINING ALCOHOL DO YOU HAVE ON A TYPICAL DAY: PATIENT DOES NOT DRINK
HOW OFTEN DO YOU HAVE A DRINK CONTAINING ALCOHOL: NEVER

## 2024-11-16 NOTE — ED PROVIDER NOTES
a day,   Vaping Use    Vaping status: Never Used   Substance and Sexual Activity    Alcohol use: No    Drug use: No    Sexual activity: Yes     Partners: Male     Social Determinants of Health     Financial Resource Strain: Low Risk  (7/25/2024)    Overall Financial Resource Strain (CARDIA)     Difficulty of Paying Living Expenses: Not hard at all   Food Insecurity: No Food Insecurity (7/25/2024)    Hunger Vital Sign     Worried About Running Out of Food in the Last Year: Never true     Ran Out of Food in the Last Year: Never true   Transportation Needs: No Transportation Needs (7/25/2024)    PRAPARE - Transportation     Lack of Transportation (Medical): No     Lack of Transportation (Non-Medical): No   Physical Activity: Inactive (7/6/2022)    Exercise Vital Sign     Days of Exercise per Week: 0 days     Minutes of Exercise per Session: 0 min   Stress: Stress Concern Present (7/6/2022)    [place] [employer] - Occupational Stress Questionnaire     Feeling of Stress : Very much   Social Connections: Moderately Isolated (7/6/2022)    Social Connection and Isolation Panel [NHANES]     Frequency of Communication with Friends and Family: Three times a week     Frequency of Social Gatherings with Friends and Family: Never     Attends Orthodoxy Services: Never     Active Member of Clubs or Organizations: No     Attends Club or Organization Meetings: Never     Marital Status:    Housing Stability: Low Risk  (7/25/2024)    Housing Stability Vital Sign     Unable to Pay for Housing in the Last Year: No     Number of Places Lived in the Last Year: 1     Unstable Housing in the Last Year: No       SCREENINGS        Ed Coma Scale  Eye Opening: Spontaneous  Best Verbal Response: Oriented  Best Motor Response: Obeys commands  Ed Coma Scale Score: 15               PHYSICAL EXAM    (up to 7 for level 4, 8 or more for level 5)     ED Triage Vitals [11/16/24 7913]   BP Systolic BP Percentile  note:    No orders to display         LABS:  Labs Reviewed   CBC WITH AUTO DIFFERENTIAL - Abnormal; Notable for the following components:       Result Value    RBC 3.95 (*)     MCH 34.5 (*)     Lymphocytes Absolute 0.7 (*)     All other components within normal limits   BASIC METABOLIC PANEL - Abnormal; Notable for the following components:    Sodium 133 (*)     Chloride 93 (*)     Est, Glom Filt Rate 57 (*)     All other components within normal limits   URINALYSIS WITH REFLEX TO CULTURE - Abnormal; Notable for the following components:    Protein, UA 30 (*)     Leukocyte Esterase, Urine TRACE (*)     All other components within normal limits   POTASSIUM   MICROSCOPIC URINALYSIS       All other labs were within normal range or not returned as of this dictation.    EMERGENCY DEPARTMENT COURSE and DIFFERENTIAL DIAGNOSIS/MDM:           Medications   ondansetron (ZOFRAN) injection 4 mg (4 mg IntraVENous Given 11/16/24 0522)   lisinopril (PRINIVIL;ZESTRIL) tablet 5 mg (5 mg Oral Given 11/16/24 0523)       Is this patient to be included in the SEP-1 Core Measure due to severe sepsis or septic shock?   No     Exclusion criteria - the patient is NOT to be included for SEP-1 Core Measure due to:  Viral etiology found or highly suspected (including COVID-19) without concomitant bacterial infection        Course and MDM:  Patient with nausea, vomiting x 3 days.  Arrives comfortable appearing and hemodynamically stable.  She was treated with Zofran here and a dose of her home antihypertensive with improvement in her hypertension.  She admits to not taking her antihypertensive this morning likely causing the initial elevated blood pressure.  She has a benign abdominal exam without any peritoneal signs and is not endorsing any abdominal pain.  I have low suspicion for acute bowel obstruction or perforation or appendicitis or diverticulitis or cholecystitis today do not believe she needs CT imaging.  Laboratory studies

## 2024-11-18 ENCOUNTER — OFFICE VISIT (OUTPATIENT)
Dept: FAMILY MEDICINE CLINIC | Age: 79
End: 2024-11-18

## 2024-11-18 VITALS
OXYGEN SATURATION: 96 % | HEART RATE: 88 BPM | TEMPERATURE: 97.8 F | DIASTOLIC BLOOD PRESSURE: 64 MMHG | BODY MASS INDEX: 17.92 KG/M2 | WEIGHT: 111 LBS | SYSTOLIC BLOOD PRESSURE: 122 MMHG

## 2024-11-18 DIAGNOSIS — E44.0 MODERATE PROTEIN-CALORIE MALNUTRITION (HCC): ICD-10-CM

## 2024-11-18 DIAGNOSIS — R11.2 NAUSEA AND VOMITING, UNSPECIFIED VOMITING TYPE: Primary | ICD-10-CM

## 2024-11-18 DIAGNOSIS — F01.50 VASCULAR DEMENTIA, UNCOMPLICATED (HCC): ICD-10-CM

## 2024-11-18 DIAGNOSIS — Z23 NEED FOR INFLUENZA VACCINATION: ICD-10-CM

## 2024-11-18 DIAGNOSIS — F41.9 ANXIETY: ICD-10-CM

## 2024-11-18 DIAGNOSIS — Z09 HOSPITAL DISCHARGE FOLLOW-UP: ICD-10-CM

## 2024-11-18 DIAGNOSIS — R82.90 MALODOROUS URINE: ICD-10-CM

## 2024-11-18 PROBLEM — I71.40 ABDOMINAL AORTIC ANEURYSM (AAA) WITHOUT RUPTURE, UNSPECIFIED PART (HCC): Status: RESOLVED | Noted: 2024-11-18 | Resolved: 2024-11-18

## 2024-11-18 PROBLEM — I71.40 ABDOMINAL AORTIC ANEURYSM (AAA) WITHOUT RUPTURE, UNSPECIFIED PART (HCC): Status: ACTIVE | Noted: 2024-11-18

## 2024-11-18 RX ORDER — MEGESTROL ACETATE 20 MG/1
20 TABLET ORAL DAILY
Qty: 30 TABLET | Refills: 3 | Status: SHIPPED | OUTPATIENT
Start: 2024-11-18

## 2024-11-18 RX ORDER — CLONAZEPAM 1 MG/1
1 TABLET ORAL 2 TIMES DAILY
Qty: 60 TABLET | Refills: 0 | Status: SHIPPED | OUTPATIENT
Start: 2024-11-18 | End: 2024-12-18

## 2024-11-18 NOTE — PROGRESS NOTES
Sharon Kitchen  : 1945  Encounter date: 2024    This efrain 79 y.o. female who presents with  Chief Complaint   Patient presents with    Medication Check    Anxiety    Dysuria     Foul smelling urine x1mo    ED Follow-up      MFF n/v       History of present illness:    HPI PT is 79 year old female with daughter for hospital follow up.  Pt seen on  for N/V.  Labs unremarkable, negative for UTI.  PT received IV zofran and fluids.  Discharged home.  Pt reports feeling better now.  Pt has been taking megace, has gained 4 lbs.  Follow up on anxiety, taking clonazepam, well controlled.  PT also with dementia, stable    Due for refills.  Pt is also wanting to get urine checked, reports foul smelling.  Denies burning with urination.  History of multiple UTI, being followed by urology.  PT unable to provide sample in office.    Current Outpatient Medications on File Prior to Visit   Medication Sig Dispense Refill    ondansetron (ZOFRAN) 4 MG tablet Take 1 tablet by mouth every 8 hours as needed for Nausea 20 tablet 0    furosemide (LASIX) 40 MG tablet TAKE 1/2 TABLET BY MOUTH DAILY 45 tablet 0    mirtazapine (REMERON) 15 MG tablet TAKE 1 TABLET BY MOUTH DAILY 90 tablet 1    lisinopril (PRINIVIL;ZESTRIL) 5 MG tablet TAKE 1 TABLET BY MOUTH DAILY 90 tablet 1    levothyroxine (SYNTHROID) 100 MCG tablet TAKE 1 TABLET BY MOUTH DAILY 90 tablet 1    carvedilol (COREG) 6.25 MG tablet Take 1 tablet by mouth with breakfast and with evening meal 60 tablet 3    diphenhydrAMINE-APAP, sleep, (TYLENOL PM EXTRA STRENGTH)  MG tablet Take 1 tablet by mouth nightly as needed for Sleep      aspirin 81 MG chewable tablet Take 1 tablet by mouth daily 30 tablet 0    albuterol sulfate HFA (VENTOLIN HFA) 108 (90 Base) MCG/ACT inhaler Inhale 2 puffs into the lungs every 4 hours as needed for Wheezing or Shortness of Breath (Patient not taking: Reported on 2024) 18 g 1    ipratropium 0.5 mg-albuterol 2.5 mg (DUONEB)

## 2024-11-18 NOTE — PROGRESS NOTES
Immunizations Administered       Name Date Dose Route    Influenza, FLUAD, (age 65 y+), IM, Trivalent PF, 0.5mL 11/18/2024 0.5 mL Intramuscular    Site: Deltoid- Left    Lot: [id number]    NDC: 37361-368-82

## 2024-11-27 DIAGNOSIS — F41.9 ANXIETY: ICD-10-CM

## 2024-11-27 RX ORDER — CLONAZEPAM 1 MG/1
1 TABLET ORAL 2 TIMES DAILY
Qty: 60 TABLET | Refills: 0 | Status: SHIPPED | OUTPATIENT
Start: 2024-11-27 | End: 2024-12-27

## 2024-11-27 NOTE — TELEPHONE ENCOUNTER
Medication:   Requested Prescriptions     Pending Prescriptions Disp Refills    clonazePAM (KLONOPIN) 1 MG tablet 60 tablet 0     Sig: Take 1 tablet by mouth 2 times daily for 30 days. Max Daily Amount: 2 mg      Last Filled:  11/18/2024 (not sure if pt picked this up)    Patient Phone Number: [phone] (home)     Last appt: 11/18/2024   Next appt: 2/18/2025    Last OARRS:       5/11/2020     2:20 PM   RX Monitoring   Periodic Controlled Substance Monitoring No signs of potential drug abuse or diversion identified.     PDMP Monitoring:    Last PDMP Cristian as Reviewed ([place]):  Review User Review Instant Review Result   VIRGIL GRANT 11/18/2024  1:48 PM Reviewed PDMP [1]     Preferred Pharmacy:   [facility] [id number] - [facility] [address] [phone] - F [phone]  [facility] [address]  [facility] [address]  Phone: [phone] Fax: [phone]    [facility] [address] [facility] [phone] - F [phone]  [address] [facility] [address]  Phone: [phone] Fax: [phone]    [facility] [id number] [place], [place] - [address] - P [phone] - F [phone]  [address] [facility] [address]  Phone: [phone] Fax: [phone]

## 2024-11-27 NOTE — TELEPHONE ENCOUNTER
clonazePAM (KLONOPIN) 1 MG tablet      [facility] [id number] - [place], [place] - [address] - P [phone] - F [phone]  [address], [facility] [address]  Phone: [phone]  Fax: [phone]      Pt is sleeping, easily arousable. A/ox4. resp e/u. Speaks in full clear sentences w/o difficulty.pt is calm, cooperative. No cyanosis/pallor. Skin warm, dry, and nml color. No apparent distress note. Moves all extremities/feels all extremities. Ambulated to bathroom with a strong steady gait. No hi/ah/vh/si. Will continue to monitor.

## 2024-12-04 DIAGNOSIS — I10 UNCONTROLLED HYPERTENSION: ICD-10-CM

## 2024-12-04 DIAGNOSIS — E03.9 HYPOTHYROIDISM, UNSPECIFIED TYPE: ICD-10-CM

## 2024-12-04 RX ORDER — LEVOTHYROXINE SODIUM 100 UG/1
TABLET ORAL
Qty: 90 TABLET | Refills: 1 | Status: SHIPPED | OUTPATIENT
Start: 2024-12-04

## 2024-12-04 RX ORDER — LISINOPRIL 5 MG/1
5 TABLET ORAL DAILY
Qty: 90 TABLET | Refills: 1 | Status: SHIPPED | OUTPATIENT
Start: 2024-12-04

## 2024-12-23 DIAGNOSIS — F41.9 ANXIETY: ICD-10-CM

## 2024-12-23 RX ORDER — CLONAZEPAM 1 MG/1
1 TABLET ORAL 2 TIMES DAILY
Qty: 60 TABLET | Refills: 0 | Status: SHIPPED | OUTPATIENT
Start: 2024-12-23 | End: 2025-01-22

## 2024-12-23 NOTE — TELEPHONE ENCOUNTER
clonazePAM (KLONOPIN) 1 MG tablet     [facility] [id number] - [place], [place] - [address] - P [phone] - F [phone]  [address], [facility] [address]  Phone: [phone]  Fax: [phone]

## 2024-12-23 NOTE — TELEPHONE ENCOUNTER
Medication:   Requested Prescriptions     Pending Prescriptions Disp Refills    clonazePAM (KLONOPIN) 1 MG tablet 60 tablet 0     Sig: Take 1 tablet by mouth 2 times daily for 30 days. Max Daily Amount: 2 mg      Last Filled:  11/27/2024    Patient Phone Number: [phone] (home)     Last appt: 11/18/2024   Next appt: 2/18/2025    Last OARRS:       5/11/2020     2:20 PM   RX Monitoring   Periodic Controlled Substance Monitoring No signs of potential drug abuse or diversion identified.     PDMP Monitoring:    Last PDMP Cristian as Reviewed ([place]):  Review User Review Instant Review Result   VIRGIL GRANT 11/18/2024  1:48 PM Reviewed PDMP [1]     Preferred Pharmacy:   [facility] [id number] - [facility] [address]  P [phone] - F [phone]  [address]  [facility] [address]  Phone: [phone] Fax: [phone]    [facility] [place] [facility] [address] [facility] -  [phone] - F [phone]  [address] [facility] [address]  Phone: [phone] Fax: [phone]    [facility] - [place], [place] - [address] - P [phone] - F [phone]  [address] [facility] [address]  Phone: [phone] Fax: [phone]

## 2025-01-15 ENCOUNTER — TELEPHONE (OUTPATIENT)
Dept: FAMILY MEDICINE CLINIC | Age: 80
End: 2025-01-15

## 2025-01-15 RX ORDER — FUROSEMIDE 40 MG/1
20 TABLET ORAL DAILY
Qty: 45 TABLET | Refills: 0 | Status: SHIPPED | OUTPATIENT
Start: 2025-01-15

## 2025-01-15 NOTE — TELEPHONE ENCOUNTER
[employer] is requesting a chronic condition form for pt faxed over       Pts daughter is requsting a call once done

## 2025-02-04 ENCOUNTER — APPOINTMENT (OUTPATIENT)
Dept: CT IMAGING | Age: 80
End: 2025-02-04
Payer: MEDICARE

## 2025-02-04 ENCOUNTER — HOSPITAL ENCOUNTER (INPATIENT)
Age: 80
LOS: 6 days | Discharge: HOSPICE | End: 2025-02-11
Attending: STUDENT IN AN ORGANIZED HEALTH CARE EDUCATION/TRAINING PROGRAM | Admitting: INTERNAL MEDICINE
Payer: MEDICARE

## 2025-02-04 DIAGNOSIS — J90 PLEURAL EFFUSION: ICD-10-CM

## 2025-02-04 DIAGNOSIS — R78.81 STREPTOCOCCAL BACTEREMIA: ICD-10-CM

## 2025-02-04 DIAGNOSIS — S22.42XA CLOSED FRACTURE OF MULTIPLE RIBS OF LEFT SIDE, INITIAL ENCOUNTER: ICD-10-CM

## 2025-02-04 DIAGNOSIS — A41.9 SEPTICEMIA (HCC): ICD-10-CM

## 2025-02-04 DIAGNOSIS — B95.5 STREPTOCOCCAL BACTEREMIA: ICD-10-CM

## 2025-02-04 DIAGNOSIS — N30.00 ACUTE CYSTITIS WITHOUT HEMATURIA: ICD-10-CM

## 2025-02-04 DIAGNOSIS — R29.6 FALLS: Primary | ICD-10-CM

## 2025-02-04 DIAGNOSIS — R53.1 GENERAL WEAKNESS: ICD-10-CM

## 2025-02-04 PROCEDURE — 99285 EMERGENCY DEPT VISIT HI MDM: CPT

## 2025-02-04 PROCEDURE — 96365 THER/PROPH/DIAG IV INF INIT: CPT

## 2025-02-04 ASSESSMENT — PAIN - FUNCTIONAL ASSESSMENT: PAIN_FUNCTIONAL_ASSESSMENT: NONE - DENIES PAIN

## 2025-02-05 ENCOUNTER — APPOINTMENT (OUTPATIENT)
Dept: CT IMAGING | Age: 80
End: 2025-02-05
Attending: INTERNAL MEDICINE
Payer: MEDICARE

## 2025-02-05 ENCOUNTER — APPOINTMENT (OUTPATIENT)
Dept: GENERAL RADIOLOGY | Age: 80
End: 2025-02-05
Payer: MEDICARE

## 2025-02-05 ENCOUNTER — APPOINTMENT (OUTPATIENT)
Dept: CT IMAGING | Age: 80
End: 2025-02-05
Payer: MEDICARE

## 2025-02-05 PROBLEM — S22.32XK CLOSED FRACTURE OF ONE RIB OF LEFT SIDE WITH NONUNION: Status: ACTIVE | Noted: 2025-02-05

## 2025-02-05 PROBLEM — R91.1 PULMONARY NODULE: Status: ACTIVE | Noted: 2025-02-05

## 2025-02-05 LAB
ANION GAP SERPL CALCULATED.3IONS-SCNC: 12 MMOL/L (ref 3–16)
BACTERIA URNS QL MICRO: ABNORMAL /HPF
BASOPHILS # BLD: 0.1 K/UL (ref 0–0.2)
BASOPHILS NFR BLD: 0.6 %
BILIRUB UR QL STRIP.AUTO: NEGATIVE
BUN SERPL-MCNC: 34 MG/DL (ref 7–20)
CALCIUM SERPL-MCNC: 9.2 MG/DL (ref 8.3–10.6)
CHLORIDE SERPL-SCNC: 102 MMOL/L (ref 99–110)
CLARITY UR: ABNORMAL
CO2 SERPL-SCNC: 27 MMOL/L (ref 21–32)
COLOR UR: YELLOW
CREAT SERPL-MCNC: 1.2 MG/DL (ref 0.6–1.2)
DEPRECATED RDW RBC AUTO: 14.1 % (ref 12.4–15.4)
EKG ATRIAL RATE: 88 BPM
EKG DIAGNOSIS: NORMAL
EKG P AXIS: 71 DEGREES
EKG P-R INTERVAL: 120 MS
EKG Q-T INTERVAL: 352 MS
EKG QRS DURATION: 74 MS
EKG QTC CALCULATION (BAZETT): 451 MS
EKG R AXIS: 45 DEGREES
EKG T AXIS: 71 DEGREES
EKG VENTRICULAR RATE: 99 BPM
EOSINOPHIL # BLD: 0.1 K/UL (ref 0–0.6)
EOSINOPHIL NFR BLD: 0.6 %
EPI CELLS #/AREA URNS AUTO: 4 /HPF (ref 0–5)
GFR SERPLBLD CREATININE-BSD FMLA CKD-EPI: 46 ML/MIN/{1.73_M2}
GLUCOSE SERPL-MCNC: 106 MG/DL (ref 70–99)
GLUCOSE UR STRIP.AUTO-MCNC: NEGATIVE MG/DL
HCT VFR BLD AUTO: 37.6 % (ref 36–48)
HGB BLD-MCNC: 12.4 G/DL (ref 12–16)
HGB UR QL STRIP.AUTO: NEGATIVE
HYALINE CASTS #/AREA URNS AUTO: 2 /LPF (ref 0–8)
KETONES UR STRIP.AUTO-MCNC: NEGATIVE MG/DL
LACTATE BLDV-SCNC: 1.7 MMOL/L (ref 0.4–2)
LEUKOCYTE ESTERASE UR QL STRIP.AUTO: ABNORMAL
LYMPHOCYTES # BLD: 0.6 K/UL (ref 1–5.1)
LYMPHOCYTES NFR BLD: 5 %
MCH RBC QN AUTO: 33 PG (ref 26–34)
MCHC RBC AUTO-ENTMCNC: 32.9 G/DL (ref 31–36)
MCV RBC AUTO: 100.4 FL (ref 80–100)
MONOCYTES # BLD: 0.9 K/UL (ref 0–1.3)
MONOCYTES NFR BLD: 7 %
NEUTROPHILS # BLD: 10.7 K/UL (ref 1.7–7.7)
NEUTROPHILS NFR BLD: 86.8 %
NITRITE UR QL STRIP.AUTO: POSITIVE
NT-PROBNP SERPL-MCNC: 1772 PG/ML (ref 0–449)
PH UR STRIP.AUTO: 5.5 [PH] (ref 5–8)
PLATELET # BLD AUTO: 281 K/UL (ref 135–450)
PMV BLD AUTO: 8.1 FL (ref 5–10.5)
POTASSIUM SERPL-SCNC: 3.3 MMOL/L (ref 3.5–5.1)
PROT UR STRIP.AUTO-MCNC: 30 MG/DL
RBC # BLD AUTO: 3.75 M/UL (ref 4–5.2)
RBC CLUMPS #/AREA URNS AUTO: 0 /HPF (ref 0–4)
SODIUM SERPL-SCNC: 141 MMOL/L (ref 136–145)
SP GR UR STRIP.AUTO: 1.02 (ref 1–1.03)
TROPONIN, HIGH SENSITIVITY: 21 NG/L (ref 0–14)
TROPONIN, HIGH SENSITIVITY: 29 NG/L (ref 0–14)
UA COMPLETE W REFLEX CULTURE PNL UR: YES
UA DIPSTICK W REFLEX MICRO PNL UR: YES
URN SPEC COLLECT METH UR: ABNORMAL
UROBILINOGEN UR STRIP-ACNC: 1 E.U./DL
WBC # BLD AUTO: 12.3 K/UL (ref 4–11)
WBC #/AREA URNS AUTO: 74 /HPF (ref 0–5)

## 2025-02-05 PROCEDURE — 85025 COMPLETE CBC W/AUTO DIFF WBC: CPT

## 2025-02-05 PROCEDURE — 87150 DNA/RNA AMPLIFIED PROBE: CPT

## 2025-02-05 PROCEDURE — 72125 CT NECK SPINE W/O DYE: CPT

## 2025-02-05 PROCEDURE — 1200000000 HC SEMI PRIVATE

## 2025-02-05 PROCEDURE — 70450 CT HEAD/BRAIN W/O DYE: CPT

## 2025-02-05 PROCEDURE — 97161 PT EVAL LOW COMPLEX 20 MIN: CPT

## 2025-02-05 PROCEDURE — 87186 SC STD MICRODIL/AGAR DIL: CPT

## 2025-02-05 PROCEDURE — 6360000002 HC RX W HCPCS: Performed by: STUDENT IN AN ORGANIZED HEALTH CARE EDUCATION/TRAINING PROGRAM

## 2025-02-05 PROCEDURE — 87086 URINE CULTURE/COLONY COUNT: CPT

## 2025-02-05 PROCEDURE — 87040 BLOOD CULTURE FOR BACTERIA: CPT

## 2025-02-05 PROCEDURE — 97530 THERAPEUTIC ACTIVITIES: CPT

## 2025-02-05 PROCEDURE — 36415 COLL VENOUS BLD VENIPUNCTURE: CPT

## 2025-02-05 PROCEDURE — 80048 BASIC METABOLIC PNL TOTAL CA: CPT

## 2025-02-05 PROCEDURE — 6360000002 HC RX W HCPCS: Performed by: INTERNAL MEDICINE

## 2025-02-05 PROCEDURE — 2500000003 HC RX 250 WO HCPCS: Performed by: INTERNAL MEDICINE

## 2025-02-05 PROCEDURE — 6360000004 HC RX CONTRAST MEDICATION: Performed by: INTERNAL MEDICINE

## 2025-02-05 PROCEDURE — 99223 1ST HOSP IP/OBS HIGH 75: CPT | Performed by: INTERNAL MEDICINE

## 2025-02-05 PROCEDURE — 93010 ELECTROCARDIOGRAM REPORT: CPT | Performed by: INTERNAL MEDICINE

## 2025-02-05 PROCEDURE — 97116 GAIT TRAINING THERAPY: CPT

## 2025-02-05 PROCEDURE — 93005 ELECTROCARDIOGRAM TRACING: CPT | Performed by: STUDENT IN AN ORGANIZED HEALTH CARE EDUCATION/TRAINING PROGRAM

## 2025-02-05 PROCEDURE — 71045 X-RAY EXAM CHEST 1 VIEW: CPT

## 2025-02-05 PROCEDURE — 6370000000 HC RX 637 (ALT 250 FOR IP): Performed by: INTERNAL MEDICINE

## 2025-02-05 PROCEDURE — 87077 CULTURE AEROBIC IDENTIFY: CPT

## 2025-02-05 PROCEDURE — 84484 ASSAY OF TROPONIN QUANT: CPT

## 2025-02-05 PROCEDURE — 83605 ASSAY OF LACTIC ACID: CPT

## 2025-02-05 PROCEDURE — 71260 CT THORAX DX C+: CPT

## 2025-02-05 PROCEDURE — 81001 URINALYSIS AUTO W/SCOPE: CPT

## 2025-02-05 PROCEDURE — 83880 ASSAY OF NATRIURETIC PEPTIDE: CPT

## 2025-02-05 RX ORDER — POTASSIUM CHLORIDE 1500 MG/1
40 TABLET, EXTENDED RELEASE ORAL PRN
Status: DISCONTINUED | OUTPATIENT
Start: 2025-02-05 | End: 2025-02-11 | Stop reason: HOSPADM

## 2025-02-05 RX ORDER — MIRTAZAPINE 15 MG/1
15 TABLET, FILM COATED ORAL NIGHTLY
Status: DISCONTINUED | OUTPATIENT
Start: 2025-02-05 | End: 2025-02-11 | Stop reason: HOSPADM

## 2025-02-05 RX ORDER — IOPAMIDOL 755 MG/ML
75 INJECTION, SOLUTION INTRAVASCULAR
Status: COMPLETED | OUTPATIENT
Start: 2025-02-05 | End: 2025-02-05

## 2025-02-05 RX ORDER — LEVOTHYROXINE SODIUM 100 UG/1
100 TABLET ORAL DAILY
Status: DISCONTINUED | OUTPATIENT
Start: 2025-02-05 | End: 2025-02-11 | Stop reason: HOSPADM

## 2025-02-05 RX ORDER — MAGNESIUM SULFATE IN WATER 40 MG/ML
2000 INJECTION, SOLUTION INTRAVENOUS PRN
Status: DISCONTINUED | OUTPATIENT
Start: 2025-02-05 | End: 2025-02-11 | Stop reason: HOSPADM

## 2025-02-05 RX ORDER — ONDANSETRON 2 MG/ML
4 INJECTION INTRAMUSCULAR; INTRAVENOUS EVERY 6 HOURS PRN
Status: DISCONTINUED | OUTPATIENT
Start: 2025-02-05 | End: 2025-02-11 | Stop reason: HOSPADM

## 2025-02-05 RX ORDER — SODIUM CHLORIDE 0.9 % (FLUSH) 0.9 %
5-40 SYRINGE (ML) INJECTION PRN
Status: DISCONTINUED | OUTPATIENT
Start: 2025-02-05 | End: 2025-02-11 | Stop reason: HOSPADM

## 2025-02-05 RX ORDER — LEVOFLOXACIN 5 MG/ML
750 INJECTION, SOLUTION INTRAVENOUS ONCE
Status: COMPLETED | OUTPATIENT
Start: 2025-02-05 | End: 2025-02-05

## 2025-02-05 RX ORDER — LEVOFLOXACIN 5 MG/ML
750 INJECTION, SOLUTION INTRAVENOUS
Status: DISCONTINUED | OUTPATIENT
Start: 2025-02-07 | End: 2025-02-07

## 2025-02-05 RX ORDER — ASPIRIN 81 MG/1
81 TABLET, CHEWABLE ORAL DAILY
Status: DISCONTINUED | OUTPATIENT
Start: 2025-02-05 | End: 2025-02-11 | Stop reason: HOSPADM

## 2025-02-05 RX ORDER — ACETAMINOPHEN 650 MG/1
650 SUPPOSITORY RECTAL EVERY 6 HOURS PRN
Status: DISCONTINUED | OUTPATIENT
Start: 2025-02-05 | End: 2025-02-11 | Stop reason: HOSPADM

## 2025-02-05 RX ORDER — FUROSEMIDE 40 MG/1
20 TABLET ORAL DAILY
Status: DISCONTINUED | OUTPATIENT
Start: 2025-02-05 | End: 2025-02-09

## 2025-02-05 RX ORDER — CARVEDILOL 6.25 MG/1
6.25 TABLET ORAL 2 TIMES DAILY WITH MEALS
Status: DISCONTINUED | OUTPATIENT
Start: 2025-02-05 | End: 2025-02-09

## 2025-02-05 RX ORDER — SODIUM CHLORIDE 0.9 % (FLUSH) 0.9 %
5-40 SYRINGE (ML) INJECTION EVERY 12 HOURS SCHEDULED
Status: DISCONTINUED | OUTPATIENT
Start: 2025-02-05 | End: 2025-02-11 | Stop reason: HOSPADM

## 2025-02-05 RX ORDER — LISINOPRIL 5 MG/1
5 TABLET ORAL DAILY
Status: DISCONTINUED | OUTPATIENT
Start: 2025-02-05 | End: 2025-02-09

## 2025-02-05 RX ORDER — POLYETHYLENE GLYCOL 3350 17 G/17G
17 POWDER, FOR SOLUTION ORAL DAILY PRN
Status: DISCONTINUED | OUTPATIENT
Start: 2025-02-05 | End: 2025-02-08

## 2025-02-05 RX ORDER — ACETAMINOPHEN 325 MG/1
650 TABLET ORAL EVERY 6 HOURS PRN
Status: DISCONTINUED | OUTPATIENT
Start: 2025-02-05 | End: 2025-02-11 | Stop reason: HOSPADM

## 2025-02-05 RX ORDER — MEGESTROL ACETATE 20 MG/1
20 TABLET ORAL DAILY
Status: DISCONTINUED | OUTPATIENT
Start: 2025-02-05 | End: 2025-02-11 | Stop reason: HOSPADM

## 2025-02-05 RX ORDER — SODIUM CHLORIDE 9 MG/ML
INJECTION, SOLUTION INTRAVENOUS PRN
Status: DISCONTINUED | OUTPATIENT
Start: 2025-02-05 | End: 2025-02-11 | Stop reason: HOSPADM

## 2025-02-05 RX ORDER — ENOXAPARIN SODIUM 100 MG/ML
30 INJECTION SUBCUTANEOUS DAILY
Status: DISCONTINUED | OUTPATIENT
Start: 2025-02-05 | End: 2025-02-11 | Stop reason: HOSPADM

## 2025-02-05 RX ORDER — ONDANSETRON 4 MG/1
4 TABLET, ORALLY DISINTEGRATING ORAL EVERY 8 HOURS PRN
Status: DISCONTINUED | OUTPATIENT
Start: 2025-02-05 | End: 2025-02-11 | Stop reason: HOSPADM

## 2025-02-05 RX ORDER — POTASSIUM CHLORIDE 7.45 MG/ML
10 INJECTION INTRAVENOUS PRN
Status: DISCONTINUED | OUTPATIENT
Start: 2025-02-05 | End: 2025-02-11 | Stop reason: HOSPADM

## 2025-02-05 RX ORDER — CLONAZEPAM 1 MG/1
1 TABLET ORAL EVERY 12 HOURS PRN
Status: DISCONTINUED | OUTPATIENT
Start: 2025-02-05 | End: 2025-02-11 | Stop reason: HOSPADM

## 2025-02-05 RX ADMIN — SODIUM CHLORIDE, PRESERVATIVE FREE 10 ML: 5 INJECTION INTRAVENOUS at 20:33

## 2025-02-05 RX ADMIN — ASPIRIN 81 MG: 81 TABLET, CHEWABLE ORAL at 08:14

## 2025-02-05 RX ADMIN — MEGESTROL ACETATE 20 MG: 20 TABLET ORAL at 08:17

## 2025-02-05 RX ADMIN — FUROSEMIDE 20 MG: 40 TABLET ORAL at 08:16

## 2025-02-05 RX ADMIN — LEVOTHYROXINE SODIUM 100 MCG: 100 TABLET ORAL at 08:17

## 2025-02-05 RX ADMIN — CARVEDILOL 6.25 MG: 6.25 TABLET, FILM COATED ORAL at 17:56

## 2025-02-05 RX ADMIN — LISINOPRIL 5 MG: 5 TABLET ORAL at 08:16

## 2025-02-05 RX ADMIN — CARVEDILOL 6.25 MG: 6.25 TABLET, FILM COATED ORAL at 08:14

## 2025-02-05 RX ADMIN — MIRTAZAPINE 15 MG: 15 TABLET, FILM COATED ORAL at 20:33

## 2025-02-05 RX ADMIN — SODIUM CHLORIDE, PRESERVATIVE FREE 10 ML: 5 INJECTION INTRAVENOUS at 08:21

## 2025-02-05 RX ADMIN — ENOXAPARIN SODIUM 30 MG: 100 INJECTION SUBCUTANEOUS at 08:18

## 2025-02-05 RX ADMIN — LEVOFLOXACIN 750 MG: 750 INJECTION, SOLUTION INTRAVENOUS at 02:34

## 2025-02-05 RX ADMIN — IOPAMIDOL 75 ML: 755 INJECTION, SOLUTION INTRAVENOUS at 10:10

## 2025-02-05 RX ADMIN — POTASSIUM BICARBONATE 40 MEQ: 782 TABLET, EFFERVESCENT ORAL at 20:33

## 2025-02-05 NOTE — PROGRESS NOTES
4 Eyes Skin Assessment     NAME:  Sharon Kitchen  YOB: 1945  MEDICAL RECORD NUMBER:  [id number]    The patient is being assessed for  Admission    I agree that at least one RN has performed a thorough Head to Toe Skin Assessment on the patient. ALL assessment sites listed below have been assessed.      Areas assessed by both nurses:    Head, Face, Ears, Shoulders, Back, Chest, Arms, Elbows, Hands, Sacrum. Buttock, Coccyx, Ischium, Legs. Feet and Heels, and Under Medical Devices         Does the Patient have a Wound? Yes wound(s) were present on assessment. LDA wound assessment was Initiated and completed by RN     Scattered bruising on left arm.   Rosendo Prevention initiated by RN: Yes  Wound Care Orders initiated by RN: No    Pressure Injury (Stage 3,4, Unstageable, DTI, NWPT, and Complex wounds) if present, place Wound referral order by RN under : No    New Ostomies, if present place, Ostomy referral order under : No     Nurse 1 eSignature: Electronically signed by Aliza Sanders RN on 2/5/25 at 6:31 PM EST    **SHARE this note so that the co-signing nurse can place an eSignature**    Nurse 2 eSignature: Electronically signed by Nhi Garcia RN on 2/5/25 at 6:32 PM EST

## 2025-02-05 NOTE — H&P
Lovenox, []  Heparin, [] SCDs, [] Ambulation,  [] Eliquis, [] Xarelto, [] Coumadin   Code Status Full Code   Surrogate Decision Maker/ POA Documented in the chart      Personally reviewed Lab Studies and Imaging     Discussed management of the case with ER provider who recommended admission     EKG interpreted personally and results sinus rhythm     Imaging that was interpreted personally includes chest x-ray and results rib fracture, lung nodule    Drugs that require monitoring for toxicity include IV antibiotic and the method of monitoring was daily labs    History from:       History was obtained from the patient    Chief Complaint:     Chief Complaint   Patient presents with    Fall     Pt arrived via [facility] from home. PER ems pt fell twice today from toilet. PT has hx of falls with UTI's and family belives she may have one today. Pt is Alert and oriented X4  en route. PT denies any pain at this time.        History of Present Illness:     Sharon Kitchen is 79 y.o. female with history of hypertension, A-fib COPD and CHF  She now presents to the ER with complaint of fall at home  She fell twice yesterday in the toilet  From the second fall she sustained a rib fracture which is noted on chest x-ray here in the ED  She has generalized weakness  She said when she failed it was a low impact and slipped to the ground  She denies any syncope or lightheadedness or dizziness  No trauma to the head  She also describes increased urinary frequency and malodorous       Review of Systems:        Pertinent positives and negatives discussed in HPI     Objective:     Intake/Output Summary (Last 24 hours) at 2/5/2025 0519  Last data filed at 2/5/2025 0405  Gross per 24 hour   Intake 150.14 ml   Output --   Net 150.14 ml      Vitals:   Vitals:    02/05/25 0300 02/05/25 0315 02/05/25 0400 02/05/25 0430   BP: 136/84 (!) 136/99 113/71 126/86   Pulse: 93 84 81 82   Resp: 28 21 28 24   Temp:       TempSrc:       SpO2: 95% 97% 96%  02/05/2025 02:27 AM    BLOODU Negative 02/05/2025 02:27 AM    GLUCOSEU Negative 02/05/2025 02:27 AM    GLUCOSEU NEGATIVE 05/08/2011 12:20 PM    KETUA Negative 02/05/2025 02:27 AM     Urine Cultures:   Lab Results   Component Value Date/Time    LABURIN >100,000 CFU/ml 07/25/2024 11:49 AM     Blood Cultures:   Lab Results   Component Value Date/Time    BC No Growth after 4 days of incubation. 04/22/2024 05:01 PM     Lab Results   Component Value Date/Time    BLOODCULT2 No Growth after 4 days of incubation. 04/22/2024 05:06 PM     Organism:   Lab Results   Component Value Date/Time    ORG Escherichia coli 07/25/2024 11:49 AM       Imaging/Diagnostics Last 24 Hours   XR CHEST PORTABLE    Result Date: 2/5/2025  EXAMINATION: ONE XRAY VIEW OF THE CHEST 2/5/2025 12:30 am COMPARISON: None. HISTORY: ORDERING SYSTEM PROVIDED HISTORY: Falls, weakness TECHNOLOGIST PROVIDED HISTORY: Reason for exam:->Falls, weakness Reason for Exam: Falls, weakness FINDINGS: The mediastinum is unremarkable. The cardiac silhouette is normal size. There is new nodule in the right lung base measures approximately 2.7 cm suggesting metastatic lesion.  There is small left pleural effusion with compressive atelectasis in the left lung base.  There are acute nondisplaced fractures at the anterior  end of the left 9th and 10th ribs.     1. Acute nondisplaced fractures at the anterior end of the left 9th and 10th ribs. 2. Small left pleural effusion with compressive atelectasis in the left lung base. 3. New nodule in the right lung base suggesting metastatic lesion.     CT HEAD WO CONTRAST    Result Date: 2/5/2025  EXAMINATION: CT OF THE HEAD WITHOUT CONTRAST; CT OF THE CERVICAL SPINE WITHOUT CONTRAST 2/5/2025 12:03 am TECHNIQUE: CT of the head was performed without the administration of intravenous contrast. Automated exposure control, iterative reconstruction, and/or weight based adjustment of the mA/kV was utilized to reduce the radiation dose to as

## 2025-02-05 NOTE — PROGRESS NOTES
Patient seen in ED, room 26.  Admission completed with the following exceptions:  4 Eyes Assessment, Immunizations, Vaccines, Rights and Responsibilities, Orientation to room, Plan of Care, Education/Learning Assessment and Education Plan, white board, height and weight, pain assessment and head to toe assessment.  Patient is alert and oriented X 3 (unable to state year).  Patient lives in a tri level house with her  and other family members and is being admitted for Closed fracture of one rib of left side.      Patient not too certain of her medical/surgical history unable to validate medical/surgical history.    Home Medication reconciliation will be/has been completed by Pharmacy Staff.  All patient's and/or family's questions answered.   Advance directives are on file. Patient uses a walker to ambulate. Patient reports left side deficits due to previous stroke.

## 2025-02-05 NOTE — ED PROVIDER NOTES
EMERGENCY DEPARTMENT PROVIDER NOTE         PATIENT IDENTIFICATION     Name:   Sharon Kitchen  MRN:   [id number]  YOB: 1945  Date of Evaluation:   2/4/2025  Provider:   Kelsie Mariee NP; Nikhil Zambrano DO  PCP:   Sherrill Cross APRN - NP        CHIEF COMPLAINT       Fall (Pt arrived via [facility] from home. PER ems pt fell twice today from toilet. PT has hx of falls with UTI's and family belives she may have one today. Pt is Alert and oriented X4  en route. PT denies any pain at this time. )        HISTORY OF PRESENT ILLNESS     I independently interviewed patient and/or caretaker(s).  See Advanced Practice Provider (OTTO) note for full HPI.  In summary, Sharon Kitchen  is a(n) 79 y.o. female who presents with recurrent falls.  Patient states that she has fallen twice over the last several hours.  States that the first occurred when she was cleaning stool off of the toilet lid after her  had an accident.  She states that she felt somewhat lightheaded then braced herself against the wall and slid to the floor.  She is unable to describe the second fall, but states that she was able to brace herself to the wall and slide to the floor once again.  She states that she has had increased urinary frequency as well as malodorous urine over the last day or so.  The patient denies syncope, fever, chills, recent illness, headache, rash, chest pain, shortness of breath, cough, abdominal pain, nausea, vomiting, and change in bowel movements.  She affirms history of similar symptoms in the past when she has had a urinary tract infection.  Affirms use of blood thinners.        PHYSICAL EXAM     I reviewed physical exam performed and documented by OTTO.  I performed an independent physical examination with findings as follows:  Overall well-appearing elderly female with no evidence of head trauma.  Normal neurologic examination.  No tenderness to palpation of the torso, back, cervical

## 2025-02-05 NOTE — PROGRESS NOTES
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 female who presents with recurrent falls.  Patient states that she has fallen twice over the last several hours.  States that the first occurred when she was cleaning stool off of the toilet lid after her  had an accident.  She states that she felt somewhat lightheaded then braced herself against the wall and slid to the floor.  She is unable to describe the second fall, but states that she was able to brace herself to the wall and slide to the floor once again.  She states that she has had increased urinary frequency as well as malodorous urine over the last day or so.  The patient denies syncope, fever, chills, recent illness, headache, rash, chest pain, shortness of breath, cough, abdominal pain, nausea, vomiting, and change in bowel movements.  She affirms history of similar symptoms in the past when she has had a urinary tract infection   Past Medical History:  has a past medical history of Abdominal aortic aneurysm (AAA) without rupture, unspecified part ([facility]), Acute DVT (deep venous thrombosis) ([facility]), Acute encephalopathy, Acute on chronic diastolic heart failure ([facility]), Alcohol abuse, Anxiety, Arthritis, CAD in native artery, Cellulitis, Cerebral artery occlusion with cerebral infarction ([facility]), Cerebrovascular accident (CVA) ([facility]), Chronic fatigue, Complex care coordination, Complicated UTI (urinary tract infection), Congestive heart failure, unspecified HF chronicity, unspecified heart failure type ([facility]), COPD (chronic obstructive pulmonary disease) ([facility]), COPD exacerbation ([facility]), Depression, Diabetes mellitus  benefit from continued reinforcement    Assessment  Activity Tolerance: Fair; limited by pain   Impairments Requiring Therapeutic Intervention: decreased functional mobility, decreased strength, decreased safety awareness, decreased cognition, decreased endurance, decreased balance, increased pain, decreased posture  Prognosis: good  Clinical Assessment: Patient is 80 y/o female presenting to [facility] secondary to falls resulting in L sided rib fracture. Patient currently presents below baseline function with all mobility. Patient's PLOF includes being independent for transfers and mod I with use of furniture walking or RW. Today, patient requires CGA for transfers and ambulation of less than household distances with use of RW, intermittent min A for walker management. Patient is limited by cognition and risk of falls at this time. Recommend she d/c to SNF. Patient will continue to benefit from skilled PT in order to return to PLOF with all functional mobility prior to d/c from hospital.  Safety Interventions: patient left in bed, call light within reach, patient at risk for falls, nurse notified, and patient in ED    Plan  Frequency: 3-5 x/per week  Current Treatment Recommendations: strengthening, ROM, balance training, functional mobility training, transfer training, gait training, stair training, endurance training, cognitive reorientation, pain management, home exercise program, and positioning    Goals  Patient Goals: None stated   Short Term Goals:  Time Frame: Upon d/c  Patient will complete bed mobility at supervision   Patient will complete transfers at supervision   Patient will ambulate 50 ft with use of rolling walker at supervision  Patient will ascend/descend 8 stairs with (B) handrail at contact guard assistance    Above goals reviewed on 2/5/2025.  All goals are ongoing at this time unless indicated above.      Therapy Session Time      Individual Group Co-treatment   Time In 1246       Time Out 1340

## 2025-02-05 NOTE — PLAN OF CARE
Patient admitted from ED. Patient is alert to self, place, situation, able to tell full name and date of birth and unable to recall date and year. Vitals checked and recorded. Placed on a purewic.   Patient resting in bed, call light within reach, bed alarm turned on.     Problem: Skin/Tissue Integrity  Goal: Skin integrity remains intact  Description: 1.  Monitor for areas of redness and/or skin breakdown  2.  Assess vascular access sites hourly  3.  Every 4-6 hours minimum:  Change oxygen saturation probe site  4.  Every 4-6 hours:  If on nasal continuous positive airway pressure, respiratory therapy assess nares and determine need for appliance change or resting period  Outcome: Progressing  Flowsheets (Taken 2/5/2025 1823)  Skin Integrity Remains Intact:   Monitor for areas of redness and/or skin breakdown   Assess vascular access sites hourly   Every 4-6 hours: If on nasal continuous positive airway pressure, respiratory therapy assesses nares and determine need for appliance change or resting period   Every 4-6 hours minimum: Change oxygen saturation probe site     Problem: Safety - Adult  Goal: Free from fall injury  Outcome: Progressing     Problem: Chronic Conditions and Co-morbidities  Goal: Patient's chronic conditions and co-morbidity symptoms are monitored and maintained or improved  Outcome: Progressing

## 2025-02-05 NOTE — CARE COORDINATION
Attempted to give pt SNF list as PT scored pt at 15 having walked 30 ft.  Pt refusing to go to SNF at this time.  Wants to go home.  May change mind if needs to stay in hospital for long. Pt requesting ointment for butt area - RN in ED notified.   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    Interval history: patient reports vision in right eye may be slightly better. Sleeping with shield to reduce hypotony    OCT macula right eye with Epiretinal membrane   OCT retinal nerve fiber layer stable    Assessment :  Primary open angle glaucoma (POAG)    Trabeculectomy right eye in 1990    Trabeculectomy left eye 2004    S/p Argon laser trabeculoplasty both eyes 2009   Currently not using eyedrops   Allergy to Alphagan     Posterior chamber intraocular lens (PCIOL) both eyes    Cataract extraction right eye 1998 and Cataract extraction left eye  2002   Post Yag right eye, now with posterior capsular opacity (PCO) left eye but baseline poor visual acuity so will recheck next time    Plan  -Good intraocular pressure on no medications  -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 However last recorded at 20/30 vision with intraocular pressure of 7 right eye.   -Patient would like to continue with denise shield while sleeping   -Does have some Epiretinal membrane right eye may be contributing to visual disturbance- condiser referral to retina     Corneal topography right eye - some irregularities inferiorly    (this fall do LVC--do yearly given low intraocular pressure, inconsistent with 24-2, although does not need frequent visual fields with low intraocular pressure )    Return to clinic glaucoma 4 months for IOP check and LVC both eyes     Attending Physician Attestation:  Complete documentation of historical and exam elements from today's encounter can be found in the full encounter summary report (not reduplicated in this progress note). I personally obtained the chief complaint(s) and history of present illness. I confirmed and edited asnecessary the review of systems, past medical/surgical history, family history, social history, and examination findings as documented by others; and I examined the patient myself. I personally  reviewed the relevant tests, images, and reports as documented above. I formulated and edited as necessary the assessment and plan and discussed the findings and management plan with the patient and family.  - Evelyn Mathis MD 10:33 AM 6/1/2018

## 2025-02-05 NOTE — CONSULTS
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 PER ems pt fell twice today from toilet. PT has hx of falls with UTI's and family belives she may have one today. Pt is Alert and oriented X4  en route. PT denies any pain at this time.        DATE OF CONSULT: 2/4/2025    HISTORY OF PRESENT ILLNESS: 79 y.o. year old female with a past medical history significant for hypertension, atrial fibrillation, COPD, CHF presented to the hospital after sustaining a fall at home.  Apparently patient fell twice within the last 24 hours while using the toilet.  After the second fall she sustained a rib fracture on the left side.  She also reported generalized weakness.  She denied any shortness of breath, cough, discolored expectoration, hemoptysis, fever, night sweats.  She does have increased urinary frequency and the urine has been malodorous.  In the ER  chest x-ray showed abnormal findings and pulmonary was consulted.      On my evaluation, patient sitting in bed eating her lunch.  Reports that her breathing is stable.  She denies any chest pain.  No anorexia.  Has had some weight loss.  Has been smoking for last 20 years.  Just quit recently.  Was smoking up to 1 pack/day.  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  No known family history of cancers.  Patient does have a history of A-fib but has not been taking Eliquis since November 2024.    REVIEW OF SYSTEMS:     CONSTITUTIONAL SYMPTOMS: The patient denies fever, fatigue, night sweats, weight loss or weight gain.   HEENT: No vision changes. No tinnitus, Denies sinus pain. No hoarseness, or dysphagia.   NECK: Patient denies swelling in  generalized weakness from UTI.  -Patient also has had some weight loss.  -Incidentally seen to have a right sided lung mass on chest x-ray.  -I ordered for a CT chest with contrast which showed no PE but a 3 cm lobulated mass in the right middle lobe associated with hilar and mediastinal lymphadenopathy.  -Comparing it with her chest imaging from 2024, this mass was not there.  -Patient recently quit smoking but before that was smoking heavily.  -Possibility of right lung malignancy cannot be ruled out.  -I discussed the option of doing bronchoscopy with transbronchial lung biopsy, EBUS guided TBNA of the hilar mediastinal lymph nodes.  -Patient is willing to undergo this procedure.  -I can schedule the patient for this procedure tomorrow.  -She will stay n.p.o. after midnight tonight.  -Hold Lovenox till the procedure is done.  -Management of UTI as per mental medicine team.    Thank you for your consultation. We will continue to follow the patient.      Gomez Espitia MD  Pulmonary Critical Care and Sleep Medicine  2/4/2025, 3:20 PM    This note was completed using dragon medical speech recognition software. Grammatical errors, random word insertions, pronoun errors and incomplete sentences are occasional consequences of this technology due to software limitations. If there are questions or concerns about the content of this note of information contained within the body of this dictation they should be addressed with the provider for clarification.

## 2025-02-05 NOTE — PROGRESS NOTES
Palliative Care:        Received call from [facility]. Liaison Pat reports patient was prior to admission active with their hospice program. They revocated for admission. This agency is following in [facility] and if patient is to return home and want their services to re-enroll; they request for the CM to notify of discharge.    Contact number: [phone].     CM notified of this conversation for discharge planning. Palliative team will continue to follow as needed.   Electronically signed by Maricel Huertas RN, BSN, CHPN (Palliative Care) [phone]

## 2025-02-05 NOTE — PROGRESS NOTES
[facility]    2/5/2025 10:30 AM        Name: Sharon Kitchen .              Admitted: 2/4/2025  Primary Care Provider: Sherrill Cross APRN - NP (Tel: [phone])      Chief complaint: 78 yo female presented to ER after fall in bathroom at home and concern for UTI. Imaging revealed rib fractures, nodule in RLL concerning for metastatic disease. Admitted with UTI, sepsis, and left rib fracture.    Subjective:  Patient seen in ER, resting on stretcher. Reports left rib discomfort, denies shortness of breath. Denies UTI symptoms.     Reviewed interval ancillary notes    Current Medications  sodium chloride flush 0.9 % injection 5-40 mL, 2 times per day  sodium chloride flush 0.9 % injection 5-40 mL, PRN  0.9 % sodium chloride infusion, PRN  potassium chloride (KLOR-CON M) extended release tablet 40 mEq, PRN   Or  potassium bicarb-citric acid (EFFER-K) effervescent tablet 40 mEq, PRN   Or  potassium chloride 10 mEq/100 mL IVPB (Peripheral Line), PRN  magnesium sulfate 2000 mg in 50 mL IVPB premix, PRN  enoxaparin Sodium (LOVENOX) injection 30 mg, Daily  ondansetron (ZOFRAN-ODT) disintegrating tablet 4 mg, Q8H PRN   Or  ondansetron (ZOFRAN) injection 4 mg, Q6H PRN  polyethylene glycol (GLYCOLAX) packet 17 g, Daily PRN  acetaminophen (TYLENOL) tablet 650 mg, Q6H PRN   Or  acetaminophen (TYLENOL) suppository 650 mg, Q6H PRN  mirtazapine (REMERON) tablet 15 mg, Nightly  megestrol (MEGACE) tablet 20 mg, Daily  lisinopril (PRINIVIL;ZESTRIL) tablet 5 mg, Daily  levothyroxine (SYNTHROID) tablet 100 mcg, Daily  furosemide (LASIX) tablet 20 mg, Daily  clonazePAM (KLONOPIN) tablet 1 mg, Q12H PRN  carvedilol (COREG) tablet 6.25 mg, BID with meals  aspirin chewable tablet 81 mg, Daily  [START ON 2/7/2025] levoFLOXacin (LEVAQUIN) 750 MG/150ML infusion 750 mg, Q48H        Objective:  /88   Pulse 92   Temp 97.6 °F  (36.4 °C) (Oral)   Resp 26   Ht 1.676 m (5' 6\")   Wt 50.8 kg (112 lb)   SpO2 97%   BMI 18.08 kg/m²     Intake/Output Summary (Last 24 hours) at 2/5/2025 1030  Last data filed at 2/5/2025 0821  Gross per 24 hour   Intake 160.14 ml   Output --   Net 160.14 ml      Wt Readings from Last 3 Encounters:   02/04/25 50.8 kg (112 lb)   11/18/24 50.3 kg (111 lb)   07/25/24 48.5 kg (107 lb)       General appearance:  Appears comfortable  Eyes: Sclera clear. Pupils equal.  ENT: Moist oral mucosa. Trachea midline, no adenopathy.  Cardiovascular: Regular rhythm, normal S1, S2. No murmur. No edema in lower extremities, + tenderness left ribs  Respiratory: Not using accessory muscles. Good inspiratory effort. Clear to auscultation bilaterally, no wheeze or crackles.   GI: Abdomen soft, no tenderness, not distended, normal bowel sounds  Musculoskeletal: No cyanosis in digits, neck supple  Neurology: CN 2-12 grossly intact. No speech or motor deficits  Psych: Normal affect. Alert and oriented in time, place and person  Skin: Warm, dry, normal turgor    Labs and Tests:  CBC:   Recent Labs     02/05/25  0005   WBC 12.3*   HGB 12.4        BMP:    Recent Labs     02/05/25  0005      K 3.3*      CO2 27   BUN 34*   CREATININE 1.2   GLUCOSE 106*     Hepatic: No results for input(s): \"AST\", \"ALT\", \"BILITOT\", \"ALKPHOS\" in the last 72 hours.    Invalid input(s): \"ALB\"    Imaging  CT CHEST PULMONARY EMBOLISM W CONTRAST   Final Result   1. Negative for pulmonary embolus.   2. Advanced emphysema with an indeterminate 3 cm middle lobe soft tissue mass   and bilateral hilar/mediastinal adenopathy.  PET scan recommended for further   evaluation.   3. Chronic left basilar pleuroparenchymal changes/fibrosis.         XR CHEST PORTABLE   Final Result   1. Acute nondisplaced fractures at the anterior end of the left 9th and 10th   ribs.   2. Small left pleural effusion with compressive atelectasis in the left lung   base.   3. New

## 2025-02-05 NOTE — ED PROVIDER NOTES
[facility] EMERGENCY DEPARTMENT  EMERGENCY DEPARTMENT ENCOUNTER        Pt Name: Sharon Kitchen  MRN: [id number]  Birthdate 1945  Date of evaluation: 2/4/2025  Provider: QUITA Mary CNP  PCP: Sherrill Cross APRN - NP  Note Started: 4:11 AM EST 2/5/25       I have seen and evaluated this patient with my supervising physician dewayne      CHIEF COMPLAINT       Chief Complaint   Patient presents with    Fall     Pt arrived via  EMS from home. PER ems pt fell twice today from toilet. PT has hx of falls with UTI's and family belives she may have one today. Pt is Alert and oriented X4  en route. PT denies any pain at this time.        HISTORY OF PRESENT ILLNESS: 1 or more Elements     History from : Patient and EMS    Limitations to history : None    Sharon Kitchen is a 79 y.o. female who presents to the emergency department with concern of multiple falls and possible urinary tract infection.  EMS does report the family is concerned for urinary tract infection.  States that she fell this evening from the toilet.    Patient is awake and alert, she denies pain or injury.    Nursing Notes were all reviewed and agreed with or any disagreements were addressed in the HPI.    REVIEW OF SYSTEMS :      Review of Systems   Constitutional:  Negative for activity change, chills and fever.   Respiratory:  Negative for chest tightness and shortness of breath.    Cardiovascular:  Negative for chest pain.   Gastrointestinal:  Negative for abdominal pain, diarrhea, nausea and vomiting.   Genitourinary:  Negative for dysuria.   All other systems reviewed and are negative.      Positives and Pertinent negatives as per HPI.     SURGICAL HISTORY     Past Surgical History:   Procedure Laterality Date    COLONOSCOPY  1/19/2012    Dr. Wills; repeat 5 years    EYE SURGERY      cateracts removed    NERVE SURGERY N/A 12/29/2021    INTERSTIM STAGE1, FLEXIBLE CYSTOSCOPY performed by Rubens CASIANO  (dyspnea on exertion) (07/03/2015), DVT (deep venous thrombosis) ([facility]), DVT, lower extremity ([facility]), Fatigue (11/03/2015), General weakness (11/04/2015), Generalized weakness (08/22/2019), Hypercholesteremia, Hypertension, Hyperthyroidism, Incontinence (10/16/2012), Mammogram abnormal (02/07/2012), Neuromuscular disorder ([facility]), Osteopenia (02/14/2017), PAF (paroxysmal atrial fibrillation) ([facility]), Pneumonia, Recurrent UTI, Right forearm cellulitis, Superficial thrombophlebitis of right upper extremity, Thyroid disease, Tobacco abuse, Tobacco abuse counseling, 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     Chronic Conditions affecting Care:  has a past medical history of Abdominal aortic aneurysm (AAA) without rupture, unspecified part ([facility]) (11/18/2024), Acute DVT (deep venous thrombosis) ([facility]) (07/03/2015), Acute encephalopathy (04/19/2018), Acute on chronic diastolic heart failure ([facility]) (03/30/2019), Alcohol abuse, Anxiety, Arthritis, CAD in native artery, Cellulitis (04/28/2018), Cerebral artery occlusion with cerebral infarction ([facility]), Cerebrovascular accident (CVA) ([facility]), Chronic fatigue, Complex care coordination, Complicated UTI (urinary tract infection), Congestive heart failure, unspecified HF chronicity, unspecified heart failure type ([facility]) (11/17/2022), COPD (chronic obstructive pulmonary disease) ([facility]), COPD exacerbation ([facility]) (02/20/2018), Depression, Diabetes mellitus ([facility]), DIMAS (dyspnea on exertion) (07/03/2015), DVT (deep venous thrombosis) ([facility]), DVT, lower extremity ([facility]), Fatigue (11/03/2015), General weakness (11/04/2015), Generalized weakness (08/22/2019), Hypercholesteremia, Hypertension, Hyperthyroidism, Incontinence (10/16/2012), Mammogram abnormal (02/07/2012), Neuromuscular disorder ([facility]), Osteopenia (02/14/2017), PAF (paroxysmal atrial fibrillation) ([facility]), Pneumonia, Recurrent UTI, Right forearm cellulitis, Superficial thrombophlebitis of right upper extremity, Thyroid

## 2025-02-05 NOTE — PROGRESS NOTES
Patient Name: Sharon Kitchen  : 1945 79 y.o.  MRN: [id number]  ED Room #: ED-0026/     Chief complaint:   Chief Complaint   Patient presents with    Fall     Pt arrived via  EMS from home. PER ems pt fell twice today from toilet. PT has hx of falls with UTI's and family belives she may have one today. Pt is Alert and oriented X4  en route. PT denies any pain at this time.      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 2025 Yes    Chronic systolic (congestive) heart failure 2025 Yes    Hypothyroidism 2025 Yes    Smoker 2025 Yes    COPD, moderate (HCC) 2025 Yes    HTN (hypertension), benign 2025 Yes    PAF (paroxysmal atrial fibrillation) ([facility]) 2025 Yes    Pulmonary nodule 2025 Yes         O2 Flow Rate:O2 Device: None (Room air)   (if applicable)  Cardiac Rhythm:   (if applicable)  Active LDA's:   Peripheral IV 25 Right;Anterior Forearm (Active)   Site Assessment Clean, dry & intact 25 1000   Line Status Blood return noted;Flushed;Normal saline locked 25 1000   Line Care Connections checked and tightened 25 1000   Phlebitis Assessment No symptoms 25 1000   Infiltration Assessment 0 25 1000   Alcohol Cap Used No 25 1000   Dressing Status Clean, dry & intact 25 1000   Dressing Type Transparent 25 1000      External Urinary Catheter (Active)   Site Assessment Clean,dry & intact 25 1000   Placement Repositioned 25 1000   Securement Method Other (Comment) 25 1000   Suction 40 mmgHg continuous 25 1000   Urine Color Yellow 25 1000   Urine Appearance Hazy 25 1000   Output (mL) 300 mL 25 1000          How does patient ambulate?  Assist X 2 people.    2. How does patient take pills? Whole with Water    3. Is patient alert? Alert    4. Is patient oriented? To Person, To Place, To

## 2025-02-06 ENCOUNTER — APPOINTMENT (OUTPATIENT)
Dept: GENERAL RADIOLOGY | Age: 80
End: 2025-02-06
Payer: MEDICARE

## 2025-02-06 ENCOUNTER — ANESTHESIA (OUTPATIENT)
Dept: ENDOSCOPY | Age: 80
End: 2025-02-06
Payer: MEDICARE

## 2025-02-06 ENCOUNTER — ANESTHESIA EVENT (OUTPATIENT)
Dept: ENDOSCOPY | Age: 80
End: 2025-02-06
Payer: MEDICARE

## 2025-02-06 PROBLEM — J44.9 CHRONIC OBSTRUCTIVE PULMONARY DISEASE (HCC): Status: ACTIVE | Noted: 2018-02-20

## 2025-02-06 PROBLEM — R29.6 FALLS: Status: ACTIVE | Noted: 2025-02-06

## 2025-02-06 PROBLEM — B95.5 STREPTOCOCCAL BACTEREMIA: Status: ACTIVE | Noted: 2025-02-06

## 2025-02-06 PROBLEM — R78.81 STREPTOCOCCAL BACTEREMIA: Status: ACTIVE | Noted: 2025-02-06

## 2025-02-06 PROBLEM — Z88.9 MULTIPLE ALLERGIES: Status: ACTIVE | Noted: 2025-02-06

## 2025-02-06 PROBLEM — S22.42XA MULTIPLE CLOSED FRACTURES OF RIBS OF LEFT SIDE: Status: ACTIVE | Noted: 2025-02-06

## 2025-02-06 PROBLEM — Z86.718 HISTORY OF DEEP VEIN THROMBOSIS: Status: ACTIVE | Noted: 2025-02-06

## 2025-02-06 PROBLEM — R55 SYNCOPE AND COLLAPSE: Status: ACTIVE | Noted: 2025-02-06

## 2025-02-06 PROBLEM — I10 ESSENTIAL HYPERTENSION: Status: ACTIVE | Noted: 2020-11-03

## 2025-02-06 LAB
ALBUMIN SERPL-MCNC: 3.7 G/DL (ref 3.4–5)
ALP SERPL-CCNC: 52 U/L (ref 40–129)
ALT SERPL-CCNC: 6 U/L (ref 10–40)
ANION GAP SERPL CALCULATED.3IONS-SCNC: 13 MMOL/L (ref 3–16)
AST SERPL-CCNC: 24 U/L (ref 15–37)
BASOPHILS # BLD: 0 K/UL (ref 0–0.2)
BASOPHILS NFR BLD: 0.5 %
BILIRUB DIRECT SERPL-MCNC: <0.1 MG/DL (ref 0–0.3)
BILIRUB INDIRECT SERPL-MCNC: 0.3 MG/DL (ref 0–1)
BILIRUB SERPL-MCNC: 0.4 MG/DL (ref 0–1)
BUN SERPL-MCNC: 21 MG/DL (ref 7–20)
CALCIUM SERPL-MCNC: 8.8 MG/DL (ref 8.3–10.6)
CHLORIDE SERPL-SCNC: 101 MMOL/L (ref 99–110)
CO2 SERPL-SCNC: 22 MMOL/L (ref 21–32)
CREAT SERPL-MCNC: 0.9 MG/DL (ref 0.6–1.2)
DEPRECATED RDW RBC AUTO: 14.6 % (ref 12.4–15.4)
EOSINOPHIL # BLD: 0.1 K/UL (ref 0–0.6)
EOSINOPHIL NFR BLD: 1.6 %
GFR SERPLBLD CREATININE-BSD FMLA CKD-EPI: 65 ML/MIN/{1.73_M2}
GLUCOSE SERPL-MCNC: 91 MG/DL (ref 70–99)
HCT VFR BLD AUTO: 38 % (ref 36–48)
HGB BLD-MCNC: 12.4 G/DL (ref 12–16)
INR PPP: 1.12 (ref 0.85–1.15)
LYMPHOCYTES # BLD: 0.6 K/UL (ref 1–5.1)
LYMPHOCYTES NFR BLD: 7.6 %
MAGNESIUM SERPL-MCNC: 2.48 MG/DL (ref 1.8–2.4)
MCH RBC QN AUTO: 33.6 PG (ref 26–34)
MCHC RBC AUTO-ENTMCNC: 32.7 G/DL (ref 31–36)
MCV RBC AUTO: 102.9 FL (ref 80–100)
MONOCYTES # BLD: 0.7 K/UL (ref 0–1.3)
MONOCYTES NFR BLD: 8.1 %
NEUTROPHILS # BLD: 6.9 K/UL (ref 1.7–7.7)
NEUTROPHILS NFR BLD: 82.2 %
NT-PROBNP SERPL-MCNC: 1116 PG/ML (ref 0–449)
PHOSPHATE SERPL-MCNC: 2.5 MG/DL (ref 2.5–4.9)
PLATELET # BLD AUTO: 268 K/UL (ref 135–450)
PMV BLD AUTO: 8.3 FL (ref 5–10.5)
POTASSIUM SERPL-SCNC: 4.8 MMOL/L (ref 3.5–5.1)
PROT SERPL-MCNC: 6.8 G/DL (ref 6.4–8.2)
PROTHROMBIN TIME: 14.6 SEC (ref 11.9–14.9)
RBC # BLD AUTO: 3.69 M/UL (ref 4–5.2)
REPORT: NORMAL
SODIUM SERPL-SCNC: 136 MMOL/L (ref 136–145)
WBC # BLD AUTO: 8.4 K/UL (ref 4–11)

## 2025-02-06 PROCEDURE — 31623 DX BRONCHOSCOPE/BRUSH: CPT | Performed by: INTERNAL MEDICINE

## 2025-02-06 PROCEDURE — 83880 ASSAY OF NATRIURETIC PEPTIDE: CPT

## 2025-02-06 PROCEDURE — 97530 THERAPEUTIC ACTIVITIES: CPT

## 2025-02-06 PROCEDURE — 88333 PATH CONSLTJ SURG CYTO XM 1: CPT

## 2025-02-06 PROCEDURE — 85610 PROTHROMBIN TIME: CPT

## 2025-02-06 PROCEDURE — 85025 COMPLETE CBC W/AUTO DIFF WBC: CPT

## 2025-02-06 PROCEDURE — 07D78ZX EXTRACTION OF THORAX LYMPHATIC, VIA NATURAL OR ARTIFICIAL OPENING ENDOSCOPIC, DIAGNOSTIC: ICD-10-PCS | Performed by: INTERNAL MEDICINE

## 2025-02-06 PROCEDURE — 2500000003 HC RX 250 WO HCPCS: Performed by: INTERNAL MEDICINE

## 2025-02-06 PROCEDURE — 71045 X-RAY EXAM CHEST 1 VIEW: CPT

## 2025-02-06 PROCEDURE — 3609011900 HC BRONCHOSCOPY NEEDLE BX TRACHEA MAIN STEM&/BRON: Performed by: INTERNAL MEDICINE

## 2025-02-06 PROCEDURE — 1200000000 HC SEMI PRIVATE

## 2025-02-06 PROCEDURE — 31628 BRONCHOSCOPY/LUNG BX EACH: CPT | Performed by: INTERNAL MEDICINE

## 2025-02-06 PROCEDURE — 3700000000 HC ANESTHESIA ATTENDED CARE: Performed by: INTERNAL MEDICINE

## 2025-02-06 PROCEDURE — 6360000002 HC RX W HCPCS: Performed by: INTERNAL MEDICINE

## 2025-02-06 PROCEDURE — 6370000000 HC RX 637 (ALT 250 FOR IP): Performed by: INTERNAL MEDICINE

## 2025-02-06 PROCEDURE — 3700000001 HC ADD 15 MINUTES (ANESTHESIA): Performed by: INTERNAL MEDICINE

## 2025-02-06 PROCEDURE — 2580000003 HC RX 258: Performed by: INTERNAL MEDICINE

## 2025-02-06 PROCEDURE — 3609010800 HC BRONCHOSCOPY ALVEOLAR LAVAGE: Performed by: INTERNAL MEDICINE

## 2025-02-06 PROCEDURE — 31654 BRONCH EBUS IVNTJ PERPH LES: CPT | Performed by: INTERNAL MEDICINE

## 2025-02-06 PROCEDURE — 2720000010 HC SURG SUPPLY STERILE: Performed by: INTERNAL MEDICINE

## 2025-02-06 PROCEDURE — 84100 ASSAY OF PHOSPHORUS: CPT

## 2025-02-06 PROCEDURE — 2500000003 HC RX 250 WO HCPCS: Performed by: REGISTERED NURSE

## 2025-02-06 PROCEDURE — 31624 DX BRONCHOSCOPE/LAVAGE: CPT | Performed by: INTERNAL MEDICINE

## 2025-02-06 PROCEDURE — 3E033XZ INTRODUCTION OF VASOPRESSOR INTO PERIPHERAL VEIN, PERCUTANEOUS APPROACH: ICD-10-PCS | Performed by: INTERNAL MEDICINE

## 2025-02-06 PROCEDURE — 7100000000 HC PACU RECOVERY - FIRST 15 MIN: Performed by: INTERNAL MEDICINE

## 2025-02-06 PROCEDURE — 88104 CYTOPATH FL NONGYN SMEARS: CPT

## 2025-02-06 PROCEDURE — 88334 PATH CONSLTJ SURG CYTO XM EA: CPT

## 2025-02-06 PROCEDURE — 83735 ASSAY OF MAGNESIUM: CPT

## 2025-02-06 PROCEDURE — 88173 CYTOPATH EVAL FNA REPORT: CPT

## 2025-02-06 PROCEDURE — 80076 HEPATIC FUNCTION PANEL: CPT

## 2025-02-06 PROCEDURE — 80048 BASIC METABOLIC PNL TOTAL CA: CPT

## 2025-02-06 PROCEDURE — 7100000001 HC PACU RECOVERY - ADDTL 15 MIN: Performed by: INTERNAL MEDICINE

## 2025-02-06 PROCEDURE — 88172 CYTP DX EVAL FNA 1ST EA SITE: CPT

## 2025-02-06 PROCEDURE — C1725 CATH, TRANSLUMIN NON-LASER: HCPCS | Performed by: INTERNAL MEDICINE

## 2025-02-06 PROCEDURE — 36415 COLL VENOUS BLD VENIPUNCTURE: CPT

## 2025-02-06 PROCEDURE — 88305 TISSUE EXAM BY PATHOLOGIST: CPT

## 2025-02-06 PROCEDURE — 97116 GAIT TRAINING THERAPY: CPT

## 2025-02-06 PROCEDURE — 31652 BRONCH EBUS SAMPLNG 1/2 NODE: CPT | Performed by: INTERNAL MEDICINE

## 2025-02-06 PROCEDURE — 2709999900 HC NON-CHARGEABLE SUPPLY: Performed by: INTERNAL MEDICINE

## 2025-02-06 PROCEDURE — 88341 IMHCHEM/IMCYTCHM EA ADD ANTB: CPT

## 2025-02-06 PROCEDURE — 88177 CYTP FNA EVAL EA ADDL: CPT

## 2025-02-06 PROCEDURE — 88112 CYTOPATH CELL ENHANCE TECH: CPT

## 2025-02-06 PROCEDURE — 3609020000 HC BRONCHOSCOPY W/EBUS FNA 3/> NODE: Performed by: INTERNAL MEDICINE

## 2025-02-06 PROCEDURE — 6360000002 HC RX W HCPCS: Performed by: REGISTERED NURSE

## 2025-02-06 PROCEDURE — 88342 IMHCHEM/IMCYTCHM 1ST ANTB: CPT

## 2025-02-06 PROCEDURE — 31629 BRONCHOSCOPY/NEEDLE BX EACH: CPT | Performed by: INTERNAL MEDICINE

## 2025-02-06 PROCEDURE — 0BD58ZX EXTRACTION OF RIGHT MIDDLE LOBE BRONCHUS, VIA NATURAL OR ARTIFICIAL OPENING ENDOSCOPIC, DIAGNOSTIC: ICD-10-PCS | Performed by: INTERNAL MEDICINE

## 2025-02-06 PROCEDURE — 99223 1ST HOSP IP/OBS HIGH 75: CPT | Performed by: INTERNAL MEDICINE

## 2025-02-06 PROCEDURE — 0B9D8ZX DRAINAGE OF RIGHT MIDDLE LUNG LOBE, VIA NATURAL OR ARTIFICIAL OPENING ENDOSCOPIC, DIAGNOSTIC: ICD-10-PCS | Performed by: INTERNAL MEDICINE

## 2025-02-06 RX ORDER — LINEZOLID 2 MG/ML
600 INJECTION, SOLUTION INTRAVENOUS EVERY 12 HOURS
Status: DISCONTINUED | OUTPATIENT
Start: 2025-02-06 | End: 2025-02-11 | Stop reason: HOSPADM

## 2025-02-06 RX ORDER — OXYCODONE HYDROCHLORIDE 5 MG/1
5 TABLET ORAL PRN
Status: CANCELLED | OUTPATIENT
Start: 2025-02-06 | End: 2025-02-06

## 2025-02-06 RX ORDER — LABETALOL HYDROCHLORIDE 5 MG/ML
5 INJECTION, SOLUTION INTRAVENOUS EVERY 10 MIN PRN
Status: CANCELLED | OUTPATIENT
Start: 2025-02-06

## 2025-02-06 RX ORDER — SODIUM CHLORIDE 0.9 % (FLUSH) 0.9 %
5-40 SYRINGE (ML) INJECTION EVERY 12 HOURS SCHEDULED
Status: CANCELLED | OUTPATIENT
Start: 2025-02-06

## 2025-02-06 RX ORDER — FENTANYL CITRATE 50 UG/ML
25 INJECTION, SOLUTION INTRAMUSCULAR; INTRAVENOUS EVERY 5 MIN PRN
Status: CANCELLED | OUTPATIENT
Start: 2025-02-06

## 2025-02-06 RX ORDER — SODIUM CHLORIDE 9 MG/ML
INJECTION, SOLUTION INTRAVENOUS PRN
Status: CANCELLED | OUTPATIENT
Start: 2025-02-06

## 2025-02-06 RX ORDER — SODIUM CHLORIDE 0.9 % (FLUSH) 0.9 %
5-40 SYRINGE (ML) INJECTION PRN
Status: CANCELLED | OUTPATIENT
Start: 2025-02-06

## 2025-02-06 RX ORDER — NALOXONE HYDROCHLORIDE 0.4 MG/ML
INJECTION, SOLUTION INTRAMUSCULAR; INTRAVENOUS; SUBCUTANEOUS PRN
Status: CANCELLED | OUTPATIENT
Start: 2025-02-06

## 2025-02-06 RX ORDER — ONDANSETRON 2 MG/ML
INJECTION INTRAMUSCULAR; INTRAVENOUS
Status: DISCONTINUED | OUTPATIENT
Start: 2025-02-06 | End: 2025-02-06 | Stop reason: SDUPTHER

## 2025-02-06 RX ORDER — ONDANSETRON 2 MG/ML
4 INJECTION INTRAMUSCULAR; INTRAVENOUS
Status: CANCELLED | OUTPATIENT
Start: 2025-02-06 | End: 2025-02-07

## 2025-02-06 RX ORDER — DIPHENHYDRAMINE HYDROCHLORIDE 50 MG/ML
12.5 INJECTION INTRAMUSCULAR; INTRAVENOUS
Status: CANCELLED | OUTPATIENT
Start: 2025-02-06 | End: 2025-02-07

## 2025-02-06 RX ORDER — LIDOCAINE HYDROCHLORIDE 20 MG/ML
INJECTION, SOLUTION INFILTRATION; PERINEURAL
Status: DISCONTINUED | OUTPATIENT
Start: 2025-02-06 | End: 2025-02-06 | Stop reason: SDUPTHER

## 2025-02-06 RX ORDER — LIDOCAINE HYDROCHLORIDE 40 MG/ML
SOLUTION TOPICAL PRN
Status: DISCONTINUED | OUTPATIENT
Start: 2025-02-06 | End: 2025-02-06 | Stop reason: ALTCHOICE

## 2025-02-06 RX ORDER — PROPOFOL 10 MG/ML
INJECTION, EMULSION INTRAVENOUS
Status: DISCONTINUED | OUTPATIENT
Start: 2025-02-06 | End: 2025-02-06 | Stop reason: SDUPTHER

## 2025-02-06 RX ORDER — DEXAMETHASONE SODIUM PHOSPHATE 4 MG/ML
INJECTION, SOLUTION INTRA-ARTICULAR; INTRALESIONAL; INTRAMUSCULAR; INTRAVENOUS; SOFT TISSUE
Status: DISCONTINUED | OUTPATIENT
Start: 2025-02-06 | End: 2025-02-06 | Stop reason: SDUPTHER

## 2025-02-06 RX ORDER — OXYCODONE HYDROCHLORIDE 5 MG/1
10 TABLET ORAL PRN
Status: CANCELLED | OUTPATIENT
Start: 2025-02-06 | End: 2025-02-06

## 2025-02-06 RX ORDER — PROCHLORPERAZINE EDISYLATE 5 MG/ML
5 INJECTION INTRAMUSCULAR; INTRAVENOUS
Status: CANCELLED | OUTPATIENT
Start: 2025-02-06 | End: 2025-02-07

## 2025-02-06 RX ORDER — ROCURONIUM BROMIDE 10 MG/ML
INJECTION, SOLUTION INTRAVENOUS
Status: DISCONTINUED | OUTPATIENT
Start: 2025-02-06 | End: 2025-02-06 | Stop reason: SDUPTHER

## 2025-02-06 RX ADMIN — SODIUM CHLORIDE, PRESERVATIVE FREE 10 ML: 5 INJECTION INTRAVENOUS at 21:31

## 2025-02-06 RX ADMIN — LEVOTHYROXINE SODIUM 100 MCG: 100 TABLET ORAL at 05:00

## 2025-02-06 RX ADMIN — SODIUM CHLORIDE: 9 INJECTION, SOLUTION INTRAVENOUS at 13:42

## 2025-02-06 RX ADMIN — ROCURONIUM BROMIDE 50 MG: 10 INJECTION, SOLUTION INTRAVENOUS at 13:46

## 2025-02-06 RX ADMIN — SUGAMMADEX 200 MG: 100 INJECTION, SOLUTION INTRAVENOUS at 15:17

## 2025-02-06 RX ADMIN — MIRTAZAPINE 15 MG: 15 TABLET, FILM COATED ORAL at 21:31

## 2025-02-06 RX ADMIN — LINEZOLID 600 MG: 600 INJECTION, SOLUTION INTRAVENOUS at 17:32

## 2025-02-06 RX ADMIN — ROCURONIUM BROMIDE 10 MG: 10 INJECTION, SOLUTION INTRAVENOUS at 14:49

## 2025-02-06 RX ADMIN — DEXAMETHASONE SODIUM PHOSPHATE 4 MG: 4 INJECTION, SOLUTION INTRAMUSCULAR; INTRAVENOUS at 13:55

## 2025-02-06 RX ADMIN — LIDOCAINE HYDROCHLORIDE 50 MG: 20 INJECTION, SOLUTION INFILTRATION; PERINEURAL at 13:46

## 2025-02-06 RX ADMIN — CARVEDILOL 6.25 MG: 6.25 TABLET, FILM COATED ORAL at 08:50

## 2025-02-06 RX ADMIN — SODIUM CHLORIDE, PRESERVATIVE FREE 10 ML: 5 INJECTION INTRAVENOUS at 08:50

## 2025-02-06 RX ADMIN — ONDANSETRON 4 MG: 2 INJECTION, SOLUTION INTRAMUSCULAR; INTRAVENOUS at 15:17

## 2025-02-06 RX ADMIN — PHENYLEPHRINE HYDROCHLORIDE 100 MCG: 10 INJECTION INTRAVENOUS at 14:19

## 2025-02-06 RX ADMIN — PROPOFOL 50 MG: 10 INJECTION, EMULSION INTRAVENOUS at 13:46

## 2025-02-06 RX ADMIN — CARVEDILOL 6.25 MG: 6.25 TABLET, FILM COATED ORAL at 17:32

## 2025-02-06 ASSESSMENT — PAIN - FUNCTIONAL ASSESSMENT
PAIN_FUNCTIONAL_ASSESSMENT: 0-10
PAIN_FUNCTIONAL_ASSESSMENT: NONE - DENIES PAIN

## 2025-02-06 ASSESSMENT — PAIN SCALES - GENERAL: PAINLEVEL_OUTOF10: 0

## 2025-02-06 ASSESSMENT — ENCOUNTER SYMPTOMS: SHORTNESS OF BREATH: 1

## 2025-02-06 NOTE — CONSULTS
Pharmacy to check patient copay for Eliquis/Xarelto    Copay for patient will be: $295/month for either DOAC.     Thank you for allowing pharmacy to participate in the care of this patient.  Pat Toro, PharmD #[id number]

## 2025-02-06 NOTE — PLAN OF CARE
Problem: Skin/Tissue Integrity  Goal: Skin integrity remains intact  Description: 1.  Monitor for areas of redness and/or skin breakdown  2.  Assess vascular access sites hourly  3.  Every 4-6 hours minimum:  Change oxygen saturation probe site  4.  Every 4-6 hours:  If on nasal continuous positive airway pressure, respiratory therapy assess nares and determine need for appliance change or resting period  2/6/2025 1745 by Belen Blount, RN  Outcome: Progressing  2/6/2025 0412 by Chinedu Wilder, RN  Outcome: Progressing     Problem: Safety - Adult  Goal: Free from fall injury  2/6/2025 1745 by Belen Blount, RN  Outcome: Progressing  2/6/2025 0412 by Chinedu Wilder, RN  Outcome: Progressing     Problem: Chronic Conditions and Co-morbidities  Goal: Patient's chronic conditions and co-morbidity symptoms are monitored and maintained or improved  2/6/2025 1745 by Belen Blount, RN  Outcome: Progressing  2/6/2025 0412 by Chinedu Wilder, RN  Outcome: Progressing     Problem: Pain  Goal: Verbalizes/displays adequate comfort level or baseline comfort level  Outcome: Progressing

## 2025-02-06 NOTE — ANESTHESIA POSTPROCEDURE EVALUATION
Department of Anesthesiology  Postprocedure Note    Patient: Sharon Kitchen  MRN: [id number]  YOB: 1945  Date of evaluation: 2/6/2025    Procedure Summary       Date: 02/06/25 Room / Location: [place] [address] / [facility]    Anesthesia Start: 1342 Anesthesia Stop: 1533    Procedures:       BRONCHOSCOPY ALVEOLAR LAVAGE ROBOTIC (Right)      BRONCHOSCOPY ENDOBRONCHIAL ULTRASOUND FINE NEEDLE ASPIRATION      BRONCHOSCOPY/TRANSBRONCHIAL NEEDLE BIOPSY ROBOTIC Diagnosis:       Pulmonary nodule, right      (Pulmonary nodule, right [R91.1])    Surgeons: Gomez Espitia MD Responsible Provider: Kelby To DO    Anesthesia Type: general ASA Status: 3            Anesthesia Type: No value filed.    Mahnaz Phase I: Mahnaz Score: 9    Mahnaz Phase II:      Anesthesia Post Evaluation    Patient location during evaluation: PACU  Patient participation: complete - patient participated  Level of consciousness: awake  Airway patency: patent  Nausea & Vomiting: no vomiting and no nausea  Cardiovascular status: hemodynamically stable  Respiratory status: acceptable  Hydration status: stable  Multimodal analgesia pain management approach  Pain management: adequate    No notable events documented.

## 2025-02-06 NOTE — CONSULTS
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  Any change in status will be called.    Narrative:      ORDER#: [id number]                          ORDERED BY: ARNEL ARTIS  SOURCE: Blood left forearm                 COLLECTED:  02/05/25 02:27  ANTIBIOTICS AT BRENDA.:                      RECEIVED :  02/05/25 13:55  If child <=2 yrs old please draw pediatric bottle.~Blood Culture #2    Culture, Blood 1 [[id number]]  (Abnormal) Collected: 02/05/25 0227    Order Status: Completed Specimen: Blood Updated: 02/06/25 0209     Blood Culture, Routine --     Gram stain Anaerobic bottle:  Gram positive cocci in chains and/or pairs  resembling Streptococcus  Information to follow       Organism Streptococcus species DNA Detected     Blood Culture, Routine See additional report for complete BCID panel.    Narrative:      ORDER#: [id number]                          ORDERED BY: ARNEL ARTIS  SOURCE: Blood right wrist                  COLLECTED:  02/05/25 02:27  ANTIBIOTICS AT BRENDA.:                      RECEIVED :  02/05/25 13:55  CALL  [facility]  [facility] tel. [phone], [phone]  Microbiology results called to and read back by LIS Leahy

## 2025-02-06 NOTE — PLAN OF CARE
Problem: Skin/Tissue Integrity  Goal: Skin integrity remains intact  Description: 1.  Monitor for areas of redness and/or skin breakdown  2.  Assess vascular access sites hourly  3.  Every 4-6 hours minimum:  Change oxygen saturation probe site  4.  Every 4-6 hours:  If on nasal continuous positive airway pressure, respiratory therapy assess nares and determine need for appliance change or resting period  2/6/2025 0412 by Chinedu Wilder RN  Outcome: Progressing  2/5/2025 1829 by Aliza Sanders RN  Outcome: Progressing  Flowsheets (Taken 2/5/2025 1823)  Skin Integrity Remains Intact:   Monitor for areas of redness and/or skin breakdown   Assess vascular access sites hourly   Every 4-6 hours: If on nasal continuous positive airway pressure, respiratory therapy assesses nares and determine need for appliance change or resting period   Every 4-6 hours minimum: Change oxygen saturation probe site     Problem: Safety - Adult  Goal: Free from fall injury  2/6/2025 0412 by Chinedu Wilder RN  Outcome: Progressing  2/5/2025 1829 by Aliza Sanders RN  Outcome: Progressing     Problem: Chronic Conditions and Co-morbidities  Goal: Patient's chronic conditions and co-morbidity symptoms are monitored and maintained or improved  2/6/2025 0412 by Chinedu Wilder RN  Outcome: Progressing  2/5/2025 1829 by Aliza Sanders RN  Outcome: Progressing

## 2025-02-06 NOTE — PROGRESS NOTES
[facility]    2/6/2025 10:50 AM        Name: Sharon Kitchen .              Admitted: 2/4/2025  Primary Care Provider: Sherrill Cross APRN - NP (Tel: [phone])      Chief complaint: 80 yo female presented to ER after fall in bathroom at home and concern for UTI. Imaging revealed rib fractures, nodule in RLL concerning for metastatic disease. Admitted with UTI, sepsis, and left rib fracture.    Subjective:  Resting in bed. States she feels fine, denies chest pain, rib pain, shortness of breath. Assisted up to bathroom, gait steady with walker.  Denies lightheadedness or dizziness. Requesting coffee, currently NPO for bronch today.     Reviewed interval ancillary notes    Current Medications  sodium chloride flush 0.9 % injection 5-40 mL, 2 times per day  sodium chloride flush 0.9 % injection 5-40 mL, PRN  0.9 % sodium chloride infusion, PRN  potassium chloride (KLOR-CON M) extended release tablet 40 mEq, PRN   Or  potassium bicarb-citric acid (EFFER-K) effervescent tablet 40 mEq, PRN   Or  potassium chloride 10 mEq/100 mL IVPB (Peripheral Line), PRN  magnesium sulfate 2000 mg in 50 mL IVPB premix, PRN  enoxaparin Sodium (LOVENOX) injection 30 mg, Daily  ondansetron (ZOFRAN-ODT) disintegrating tablet 4 mg, Q8H PRN   Or  ondansetron (ZOFRAN) injection 4 mg, Q6H PRN  polyethylene glycol (GLYCOLAX) packet 17 g, Daily PRN  acetaminophen (TYLENOL) tablet 650 mg, Q6H PRN   Or  acetaminophen (TYLENOL) suppository 650 mg, Q6H PRN  mirtazapine (REMERON) tablet 15 mg, Nightly  megestrol (MEGACE) tablet 20 mg, Daily  lisinopril (PRINIVIL;ZESTRIL) tablet 5 mg, Daily  levothyroxine (SYNTHROID) tablet 100 mcg, Daily  furosemide (LASIX) tablet 20 mg, Daily  clonazePAM (KLONOPIN) tablet 1 mg, Q12H PRN  carvedilol (COREG) tablet 6.25 mg, BID with meals  aspirin chewable tablet 81 mg, Daily  [START ON 2/7/2025]  adenopathy  - Pulmonary planning for bronch today    Falls,  Weakness  - Reports recurrent falls   - CT head and C-spine with no acute abnormality  - PT/OT consults pending    PAF  - Presents in sinus rhythm  - At one time was on apixaban but no longer taking due to costs according to daughter  - Remains in sinus rhythm  - [id number] score 7 (CHF, HTN, age, prior CVA, gender)  - Will ask pharmacy to check DOAC coverage  - Monitor on telemetry    HTN  - BP controlled  - Continue carvedilol 6.25 mg, furosemide 20 mg, lisinopril 5 mg  - Continue to follow    Chronic dCHF  - Echo (1/2023) with EF 55-60%, grade I diastolic dysfunction  - CXR with small pleural effusion, proBNP 1772, compared to 1356 (4/2024) but has been as high as 10,968  - Appears compensated  - Continue lisinopril 5 mg, carvedilol 6.25 mg, furosemide 20 mg      Diet: Diet NPO  Code:Full Code  DVT PPX: enoxaparin      Anna Mchugh, QUITA - CNP   2/6/2025 10:50 AM

## 2025-02-06 NOTE — PROGRESS NOTES
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 female who presents with recurrent falls.  Patient states that she has fallen twice over the last several hours.  States that the first occurred when she was cleaning stool off of the toilet lid after her  had an accident.  She states that she felt somewhat lightheaded then braced herself against the wall and slid to the floor.  She is unable to describe the second fall, but states that she was able to brace herself to the wall and slide to the floor once again.  She states that she has had increased urinary frequency as well as malodorous urine over the last day or so.  The patient denies syncope, fever, chills, recent illness, headache, rash, chest pain, shortness of breath, cough, abdominal pain, nausea, vomiting, and change in bowel movements.  She affirms history of similar symptoms in the past when she has had a urinary tract infection    - 2/5 - CT of Chest -     1. Negative for pulmonary embolus.  2. Advanced emphysema with an indeterminate 3 cm middle lobe soft tissue mass  and bilateral hilar/mediastinal adenopathy.  PET scan recommended for further  evaluation.  3. Chronic left basilar pleuroparenchymal changes/fibrosis  - 2/5 CT of C-spine negative for acute abnormality  - 2/5 CT of head no acute abnormality   - 2/ - bronchoscopy scheduled for lung mass biopsy   Past Medical History:  has a past medical history of Abdominal aortic aneurysm (AAA) without rupture, unspecified part (HCC), Acute DVT (deep venous thrombosis) ([facility]), Acute encephalopathy, Acute on chronic diastolic heart failure ([facility]), Alcohol

## 2025-02-06 NOTE — PROGRESS NOTES
Shift assessment completed. Routine vitals obtained. Scheduled medications given. Patient is awake, alert and oriented. Patient is resting in bed at this time. Call light within reach. No further needs expressed.

## 2025-02-06 NOTE — PROCEDURES
Bronchoscopy procedure note    Indications for procedure: 79-year-old WF, chronic smoker incidentally found to have a right lung mass and associated hilar lymphadenopathy.  Preprocedure diagnosis: Right middle lobe lung mass with hilar lymphadenopathy.  Postprocedure diagnosis: Same  Anesthesia: General  ASA: 2  Mallampati Score: 1    Procedure:   1.  Diagnostic bronchoscopy without fluoroscopy.  2.  Robotic bronchoscopy  3.  Transbronchial brushing of the right middle lobe mass  4.  Transbronchial needle aspiration of the right middle lobe mass  5.  Transbronchial lung biopsy of the right middle lobe mass  6.  Bronchoalveolar lavage from right middle lobe  7.  EBUS guided TBNA of station 10R lymphadenopathy    Complications: None were apparent.  Blood loss: <10 mL    Description of procedure: After obtaining informed consent from the patient  was set up for this procedure as an inpatient.  After induction with general anesthesia patient was intubated with a 8.0 endotracheal tube.  Bronchoscope was inserted through this endotracheal tube.  Bronchoscope was used to perform a thorough tracheobronchial toilet.  All secretions were removed.  Main ryan was anesthetized using 1% lidocaine.    Subsequently patient was then set up for a robotic bronchoscopy.  Robotic bronchoscope was used to register the tracheobronchial landmarks .  With the help of software guidance robotic bronchoscope was advanced into the lateral segment of the right middle lobe. Once the bronchoscope was positioned closed to the lesion, its location was confirmed using radial EBUS and fluoroscopy.  Using fluoroscopic guidance, transbronchial biopsy, transbronchial brushing, transbronchial needle aspiration and bronchoalveolar lavage was done.  Once adequate sample was collected, robotic bronchoscope was removed.    Thereafter EBUS bronchsocope was switched on and introduced through the endotracheal tube. Various lymph node stations were visualized.  Significant lymphadenopathy was seen: Yes at stations 10R. These lymph node stations were sampled with Trans bronchial needle aspiration using EBUS guidance. Mild oozing of blood was seen which was controlled with instillation of ice cold saline.    Once adequate sample was collected, bronchoscope was used to perform a thorough pulmonary toilet.  Subsequent to this anesthesia was reversed and patient was sent to recovery.  A postprocedure chest x-ray was ordered which is currently pending.    Endobronchial findings:   Trachea: Normal mucosa   Ryan: Normal mucosa   Right main bronchus: Normal mucosa   Right upper lobe bronchus: Normal mucosa   Right middle lobe bronchus: Normal mucosa   Right lower lobe bronchus: Normal mucosa   Left main bronchus: Normal mucosa   Left upper lobe bronchus: Normal mucosa   Left lower lobe bronchus: Normal mucosa     Main ryan looked sharp.  Bronchial airways were patent on both sides. There were scant mucoid secretions present in bilateral airways.      The patient was taken to the endoscopy recovery area in satisfactory condition.      Recommendation:   1. F/U on culture results   2. F/U on cytology results   3.  F/U on histopathology results      Gomez Espitia MD  Pulmonary Critical Care and Sleep Medicine  2/6/2025, 3:50 PM    This note was completed using dragon medical speech recognition software. Grammatical errors, random word insertions, pronoun errors and incomplete sentences are occasional consequences of this technology due to software limitations. If there are questions or concerns about the content of this note of information contained within the body of this dictation they should be addressed with the provider for clarification.

## 2025-02-06 NOTE — PROGRESS NOTES
Patient in bed, alert and oriented to own ability. Able to follow commands. Daughter called to check on this patient. All question answered. Able to walk to the bathroom, contact guard assist x1. All medicine given without complication. Reminded patient not to drink and eat after midnight. Bed alarm on. Bed lower in position. No further intervention needed at this time. Plan of care ongoing.

## 2025-02-06 NOTE — PROGRESS NOTES
Occupational Therapy/Physical Therapy  Sharon Kitchen    OT/PT orders received and appreciated. Pt currently SHIRLEY. Will hold at this time and follow up as time/schedule allows.    Thank you,  Dylan Ferris, OT

## 2025-02-06 NOTE — ANESTHESIA PRE PROCEDURE
------------------------------------------------------------------   Electronically signed by Jian Hankins MD (Interpreting   physician) on 12/07/2023 at 03:41 PM   ------------------------------------------------------------------        Anesthesia Plan      general     ASA 3       Induction: intravenous.    MIPS: Postoperative opioids intended and Prophylactic antiemetics administered.  Anesthetic plan and risks discussed with patient and child/children.    Use of blood products discussed with patient and child/children whom consented to blood products.    Plan discussed with CRNA.    Attending anesthesiologist reviewed and agrees with Preprocedure content            Kelby To DO   2/6/2025

## 2025-02-07 ENCOUNTER — APPOINTMENT (OUTPATIENT)
Age: 80
End: 2025-02-07
Attending: INTERNAL MEDICINE
Payer: MEDICARE

## 2025-02-07 LAB
BACTERIA UR CULT: ABNORMAL
ECHO AO ASC DIAM: 3.3 CM
ECHO AO ASCENDING AORTA INDEX: 2.12 CM/M2
ECHO AO ROOT DIAM: 3.4 CM
ECHO AO ROOT INDEX: 2.18 CM/M2
ECHO AV AREA PEAK VELOCITY: 2.1 CM2
ECHO AV AREA VTI: 2.1 CM2
ECHO AV AREA/BSA PEAK VELOCITY: 1.3 CM2/M2
ECHO AV AREA/BSA VTI: 1.3 CM2/M2
ECHO AV MEAN GRADIENT: 3 MMHG
ECHO AV MEAN VELOCITY: 0.8 M/S
ECHO AV PEAK GRADIENT: 6 MMHG
ECHO AV PEAK VELOCITY: 1.2 M/S
ECHO AV VELOCITY RATIO: 0.67
ECHO AV VTI: 20.8 CM
ECHO BSA: 1.54 M2
ECHO LA AREA 2C: 27.9 CM2
ECHO LA AREA 4C: 26.6 CM2
ECHO LA MAJOR AXIS: 6.9 CM
ECHO LA MINOR AXIS: 7.4 CM
ECHO LA VOL BP: 85 ML (ref 22–52)
ECHO LA VOL MOD A2C: 83 ML (ref 22–52)
ECHO LA VOL MOD A4C: 83 ML (ref 22–52)
ECHO LA VOL/BSA BIPLANE: 54 ML/M2 (ref 16–34)
ECHO LA VOLUME INDEX MOD A2C: 53 ML/M2 (ref 16–34)
ECHO LA VOLUME INDEX MOD A4C: 53 ML/M2 (ref 16–34)
ECHO LV E' LATERAL VELOCITY: 4.9 CM/S
ECHO LV E' SEPTAL VELOCITY: 3.59 CM/S
ECHO LV EDV A2C: 61 ML
ECHO LV EDV A4C: 73 ML
ECHO LV EDV INDEX A4C: 47 ML/M2
ECHO LV EDV NDEX A2C: 39 ML/M2
ECHO LV EJECTION FRACTION A2C: 47 %
ECHO LV EJECTION FRACTION A4C: 50 %
ECHO LV EJECTION FRACTION BIPLANE: 48 % (ref 55–100)
ECHO LV ESV A2C: 33 ML
ECHO LV ESV A4C: 36 ML
ECHO LV ESV INDEX A2C: 21 ML/M2
ECHO LV ESV INDEX A4C: 23 ML/M2
ECHO LV FRACTIONAL SHORTENING: 28 % (ref 28–44)
ECHO LV INTERNAL DIMENSION DIASTOLE INDEX: 2.31 CM/M2
ECHO LV INTERNAL DIMENSION DIASTOLIC: 3.6 CM (ref 3.9–5.3)
ECHO LV INTERNAL DIMENSION SYSTOLIC INDEX: 1.67 CM/M2
ECHO LV INTERNAL DIMENSION SYSTOLIC: 2.6 CM
ECHO LV IVSD: 0.9 CM (ref 0.6–0.9)
ECHO LV MASS 2D: 85.6 G (ref 67–162)
ECHO LV MASS INDEX 2D: 54.9 G/M2 (ref 43–95)
ECHO LV POSTERIOR WALL DIASTOLIC: 0.8 CM (ref 0.6–0.9)
ECHO LV RELATIVE WALL THICKNESS RATIO: 0.44
ECHO LVOT AREA: 3.1 CM2
ECHO LVOT AV VTI INDEX: 0.67
ECHO LVOT DIAM: 2 CM
ECHO LVOT MEAN GRADIENT: 1 MMHG
ECHO LVOT PEAK GRADIENT: 3 MMHG
ECHO LVOT PEAK VELOCITY: 0.8 M/S
ECHO LVOT STROKE VOLUME INDEX: 28 ML/M2
ECHO LVOT SV: 43.6 ML
ECHO LVOT VTI: 13.9 CM
ECHO MV A VELOCITY: 1.02 M/S
ECHO MV AREA VTI: 1.9 CM2
ECHO MV E DECELERATION TIME (DT): 179 MS
ECHO MV E VELOCITY: 0.69 M/S
ECHO MV E/A RATIO: 0.68
ECHO MV E/E' LATERAL: 14.08
ECHO MV E/E' RATIO (AVERAGED): 16.65
ECHO MV E/E' SEPTAL: 19.22
ECHO MV LVOT VTI INDEX: 1.64
ECHO MV MAX VELOCITY: 1 M/S
ECHO MV MEAN GRADIENT: 1 MMHG
ECHO MV MEAN VELOCITY: 0.6 M/S
ECHO MV PEAK GRADIENT: 4 MMHG
ECHO MV REGURGITANT PEAK GRADIENT: 85 MMHG
ECHO MV REGURGITANT PEAK VELOCITY: 4.6 M/S
ECHO MV REGURGITANT VTIA: 156 CM
ECHO MV VTI: 22.8 CM
ECHO PV MAX VELOCITY: 0.9 M/S
ECHO PV MEAN GRADIENT: 2 MMHG
ECHO PV MEAN VELOCITY: 0.6 M/S
ECHO PV PEAK GRADIENT: 3 MMHG
ECHO PV VTI: 15.3 CM
ECHO RA AREA 4C: 14.9 CM2
ECHO RA END SYSTOLIC VOLUME APICAL 4 CHAMBER INDEX BSA: 24 ML/M2
ECHO RA VOLUME: 37 ML
ECHO RV BASAL DIMENSION: 4.5 CM
ECHO RV FREE WALL PEAK S': 9.7 CM/S
ECHO RV LONGITUDINAL DIMENSION: 5.9 CM
ECHO RV MID DIMENSION: 3.1 CM
ECHO RV TAPSE: 2.1 CM (ref 1.7–?)
ORGANISM: ABNORMAL

## 2025-02-07 PROCEDURE — 97535 SELF CARE MNGMENT TRAINING: CPT

## 2025-02-07 PROCEDURE — 6370000000 HC RX 637 (ALT 250 FOR IP): Performed by: INTERNAL MEDICINE

## 2025-02-07 PROCEDURE — 6360000002 HC RX W HCPCS: Performed by: INTERNAL MEDICINE

## 2025-02-07 PROCEDURE — 99233 SBSQ HOSP IP/OBS HIGH 50: CPT | Performed by: INTERNAL MEDICINE

## 2025-02-07 PROCEDURE — 97165 OT EVAL LOW COMPLEX 30 MIN: CPT

## 2025-02-07 PROCEDURE — 6370000000 HC RX 637 (ALT 250 FOR IP): Performed by: NURSE PRACTITIONER

## 2025-02-07 PROCEDURE — 6360000002 HC RX W HCPCS

## 2025-02-07 PROCEDURE — 87040 BLOOD CULTURE FOR BACTERIA: CPT

## 2025-02-07 PROCEDURE — 93306 TTE W/DOPPLER COMPLETE: CPT

## 2025-02-07 PROCEDURE — 93306 TTE W/DOPPLER COMPLETE: CPT | Performed by: INTERNAL MEDICINE

## 2025-02-07 PROCEDURE — 6360000002 HC RX W HCPCS: Performed by: NURSE PRACTITIONER

## 2025-02-07 PROCEDURE — 36415 COLL VENOUS BLD VENIPUNCTURE: CPT

## 2025-02-07 PROCEDURE — 1200000000 HC SEMI PRIVATE

## 2025-02-07 PROCEDURE — 2500000003 HC RX 250 WO HCPCS: Performed by: INTERNAL MEDICINE

## 2025-02-07 RX ORDER — LABETALOL HYDROCHLORIDE 5 MG/ML
5 INJECTION, SOLUTION INTRAVENOUS
Status: COMPLETED | OUTPATIENT
Start: 2025-02-07 | End: 2025-02-07

## 2025-02-07 RX ORDER — MECLIZINE HCL 12.5 MG 12.5 MG/1
25 TABLET ORAL 3 TIMES DAILY PRN
Status: COMPLETED | OUTPATIENT
Start: 2025-02-07 | End: 2025-02-09

## 2025-02-07 RX ORDER — LABETALOL HYDROCHLORIDE 5 MG/ML
10 INJECTION, SOLUTION INTRAVENOUS
Status: ACTIVE | OUTPATIENT
Start: 2025-02-07 | End: 2025-02-08

## 2025-02-07 RX ORDER — LABETALOL HYDROCHLORIDE 5 MG/ML
INJECTION, SOLUTION INTRAVENOUS
Status: COMPLETED
Start: 2025-02-07 | End: 2025-02-07

## 2025-02-07 RX ORDER — LEVOFLOXACIN 5 MG/ML
750 INJECTION, SOLUTION INTRAVENOUS
Status: DISCONTINUED | OUTPATIENT
Start: 2025-02-07 | End: 2025-02-10

## 2025-02-07 RX ADMIN — LEVOTHYROXINE SODIUM 100 MCG: 100 TABLET ORAL at 05:25

## 2025-02-07 RX ADMIN — LINEZOLID 600 MG: 600 INJECTION, SOLUTION INTRAVENOUS at 02:24

## 2025-02-07 RX ADMIN — MEGESTROL ACETATE 20 MG: 20 TABLET ORAL at 09:29

## 2025-02-07 RX ADMIN — CARVEDILOL 6.25 MG: 6.25 TABLET, FILM COATED ORAL at 08:21

## 2025-02-07 RX ADMIN — SODIUM CHLORIDE, PRESERVATIVE FREE 10 ML: 5 INJECTION INTRAVENOUS at 20:01

## 2025-02-07 RX ADMIN — LEVOFLOXACIN 750 MG: 5 INJECTION, SOLUTION INTRAVENOUS at 02:26

## 2025-02-07 RX ADMIN — LINEZOLID 600 MG: 600 INJECTION, SOLUTION INTRAVENOUS at 15:31

## 2025-02-07 RX ADMIN — LABETALOL HYDROCHLORIDE 5 MG: 5 INJECTION, SOLUTION INTRAVENOUS at 22:45

## 2025-02-07 RX ADMIN — ONDANSETRON 4 MG: 2 INJECTION, SOLUTION INTRAMUSCULAR; INTRAVENOUS at 19:40

## 2025-02-07 RX ADMIN — LABETALOL HYDROCHLORIDE 100 MG: 5 INJECTION, SOLUTION INTRAVENOUS at 20:06

## 2025-02-07 RX ADMIN — CLONAZEPAM 1 MG: 1 TABLET ORAL at 03:33

## 2025-02-07 RX ADMIN — ASPIRIN 81 MG: 81 TABLET, CHEWABLE ORAL at 08:21

## 2025-02-07 RX ADMIN — MIRTAZAPINE 15 MG: 15 TABLET, FILM COATED ORAL at 20:01

## 2025-02-07 RX ADMIN — SODIUM CHLORIDE, PRESERVATIVE FREE 10 ML: 5 INJECTION INTRAVENOUS at 08:30

## 2025-02-07 RX ADMIN — FUROSEMIDE 20 MG: 40 TABLET ORAL at 08:21

## 2025-02-07 RX ADMIN — CLONAZEPAM 1 MG: 1 TABLET ORAL at 22:22

## 2025-02-07 RX ADMIN — CARVEDILOL 6.25 MG: 6.25 TABLET, FILM COATED ORAL at 19:13

## 2025-02-07 RX ADMIN — ENOXAPARIN SODIUM 30 MG: 100 INJECTION SUBCUTANEOUS at 08:20

## 2025-02-07 RX ADMIN — LISINOPRIL 5 MG: 5 TABLET ORAL at 08:21

## 2025-02-07 RX ADMIN — MECLIZINE 25 MG: 12.5 TABLET ORAL at 22:43

## 2025-02-07 NOTE — CONSULTS
Oncology Hematology [facility]   CONSULT NOTE    2/7/2025 5:49 PM    Patient Information: SANDRA MORGAN   Date of Admit:  2/4/2025  Primary Care Physician:  Sherrill Cross APRN - NP  Requesting Physician:  Tran Paredes MD    Reason for consult:    Hematology/oncology was consulted for suspected lung cancer    Chief complaint:    Hematology/oncology was consulted for suspected lung cancer    History of Present Illness:    79-year-old female with Past medical history of hypertension, A-fib, COPD, CHF who presents to the hospital after sustaining a fall at home.  Subsequently incidentally on imaging she was noted to have a lung mass with adenopathy for which hematology/oncology was consulted     Past Medical History:     has a past medical history of Abdominal aortic aneurysm (AAA) without rupture, unspecified part ([facility]), Acute DVT (deep venous thrombosis) ([facility]), Acute encephalopathy, Acute on chronic diastolic heart failure ([facility]), Alcohol abuse, Anxiety, Arthritis, CAD in native artery, Cellulitis, Cerebral artery occlusion with cerebral infarction ([facility]), Cerebrovascular accident (CVA) ([facility]), Chronic fatigue, Complex care coordination, Complicated UTI (urinary tract infection), Congestive heart failure, unspecified HF chronicity, unspecified heart failure type ([facility]), COPD (chronic obstructive pulmonary disease) ([facility]), COPD exacerbation ([facility]), Depression, Diabetes mellitus ([facility]), DIMAS (dyspnea on exertion), DVT (deep venous thrombosis) ([facility]), DVT, lower extremity ([facility]), Fatigue, General weakness, Generalized weakness, History of deep vein thrombosis, Hypercholesteremia, Hypertension, Hyperthyroidism, Incontinence, Mammogram abnormal, Neuromuscular disorder ([facility]), Osteopenia, PAF (paroxysmal atrial fibrillation) ([facility]), Pneumonia, Recurrent UTI, Right forearm cellulitis, Superficial thrombophlebitis of right upper extremity, Thyroid disease, Tobacco abuse, Tobacco abuse counseling, Trapped lung, and

## 2025-02-07 NOTE — PLAN OF CARE
Problem: Skin/Tissue Integrity  Goal: Skin integrity remains intact  Description: 1.  Monitor for areas of redness and/or skin breakdown  2.  Assess vascular access sites hourly  3.  Every 4-6 hours minimum:  Change oxygen saturation probe site  4.  Every 4-6 hours:  If on nasal continuous positive airway pressure, respiratory therapy assess nares and determine need for appliance change or resting period  2/7/2025 0402 by Chinedu Wilder RN  Outcome: Progressing  2/6/2025 1745 by Belen Blount RN  Outcome: Progressing     Problem: Safety - Adult  Goal: Free from fall injury  2/7/2025 0402 by Chinedu Wilder RN  Outcome: Progressing  2/6/2025 1745 by Belen Blount RN  Outcome: Progressing     Problem: Chronic Conditions and Co-morbidities  Goal: Patient's chronic conditions and co-morbidity symptoms are monitored and maintained or improved  2/7/2025 0402 by Chinedu Wilder RN  Outcome: Progressing  2/6/2025 1745 by Belen Blount RN  Outcome: Progressing     Problem: Pain  Goal: Verbalizes/displays adequate comfort level or baseline comfort level  2/7/2025 0402 by Chinedu Wilder RN  Outcome: Progressing  2/6/2025 1745 by Belen Blount RN  Outcome: Progressing

## 2025-02-07 NOTE — CARE COORDINATION
Case Management Assessment  Initial Evaluation    Date/Time of Evaluation: 2/7/2025 2:02 PM  Assessment Completed by: GISELLA Falcon    If patient is discharged prior to next notation, then this note serves as note for discharge by case management.    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 Yes    Chart Reviewed: Yes      History Provided by: Patient  Patient Orientation: Alert and Oriented    Patient Cognition: Alert    Hospitalization in the last 30 days (Readmission):  No    If yes, Readmission Assessment in CM Navigator will be completed.    Advance Directives:      Code Status: Full Code   Patient's Primary Decision Maker is: Legal Next of Kin    Primary Decision Maker: Doris Kitchen - Child - [phone]    Secondary Decision Maker: Florentino Kitchen - Spouse - [phone]    Discharge Planning:    Patient lives with: Spouse/Significant Other Type of Home: House  Primary Care Giver: Self  Patient Support Systems include: Children, Family Members   Current Financial resources: Medicare  Current community resources: None (N/A)  Current services prior to admission: Durable Medical Equipment            Current DME: Walker, Cane, Wheelchair            Type of Home Care services:  None    ADLS  Prior functional level: Independent in ADLs/IADLs  Current functional level: Assistance with the following:, Mobility    PT AM-PAC: 16 /24  OT AM-PAC: 14 /24    Family  can provide assistance at DC: Yes  Would you like Case Management to discuss the discharge plan with any other family members/significant others, and if so, who? Yes  Plans to Return to Present Housing: Yes  Other Identified Issues/Barriers to RETURNING to current housing:   Potential Assistance needed at discharge: Skilled Nursing Facility            Potential DME: Other (Comment) (TBD)  Patient expects to discharge to: Skilled nursing facility  Plan for transportation at discharge: Self    Financial    Payor: [employer] MEDICARE / Plan: [employer] GOLD PLUS HMO / Product Type: *No Product type* /     Does insurance require precert for SNF: Yes    Potential assistance Purchasing Medications: No  Meds-to-Beds request:        [facility] [id number] - [facility] [address] [phone] - F [phone]  [facility] [address]  [place] [address]  Phone: [phone] Fax: [phone]    [facility] [address] [facility] -  [phone] - F [phone]  [address] [address]  Phone: [phone] Fax: [phone]    [facility] [id number] - [facility] [address] - P [phone] - F [phone]  [address]  [facility] [address]  Phone: [phone] Fax: [phone]      Notes:    Factors facilitating achievement of predicted outcomes: Family support, Motivated, Cooperative, and Pleasant    Barriers to discharge: Medical complications    Additional Case Management Notes: CM spoke with the patient. Patient is from home and report she lives with her daughter, spouse, ad grandchildren. Patient reports her  dementia and she care for the patient at home. Patient reports she is not active with any services at home.    PT/OT recommended SNF. CM spoke with the patient about SNF. Patient requested the CM speak with her daughter. Patient stated she does not know her daughter number and the numbers listed on the facesheet is incorrect. CM left a Medicare SNF list

## 2025-02-07 NOTE — PROGRESS NOTES
Dispense Refill    furosemide (LASIX) 40 MG tablet TAKE 1/2 TABLET BY MOUTH DAILY 45 tablet 0    clonazePAM (KLONOPIN) 1 MG tablet Take 1 tablet by mouth 2 times daily for 30 days. Max Daily Amount: 2 mg 60 tablet 0    lisinopril (PRINIVIL;ZESTRIL) 5 MG tablet TAKE 1 TABLET BY MOUTH DAILY 90 tablet 1    levothyroxine (SYNTHROID) 100 MCG tablet TAKE 1 TABLET BY MOUTH DAILY 90 tablet 1    megestrol (MEGACE) 20 MG tablet Take 1 tablet by mouth daily 30 tablet 3    ondansetron (ZOFRAN) 4 MG tablet Take 1 tablet by mouth every 8 hours as needed for Nausea 20 tablet 0    mirtazapine (REMERON) 15 MG tablet TAKE 1 TABLET BY MOUTH DAILY 90 tablet 1    carvedilol (COREG) 6.25 MG tablet Take 1 tablet by mouth with breakfast and with evening meal 60 tablet 3    albuterol sulfate HFA (VENTOLIN HFA) 108 (90 Base) MCG/ACT inhaler Inhale 2 puffs into the lungs every 4 hours as needed for Wheezing or Shortness of Breath (Patient not taking: Reported on 11/18/2024) 18 g 1    ipratropium 0.5 mg-albuterol 2.5 mg (DUONEB) 0.5-2.5 (3) MG/3ML SOLN nebulizer solution Inhale 3 mLs into the lungs every 6 hours as needed for Shortness of Breath (Patient not taking: Reported on 11/18/2024) 360 mL 0    apixaban (ELIQUIS) 5 MG TABS tablet Take 1 tablet by mouth 2 times daily (Patient not taking: Reported on 11/18/2024) 60 tablet 0    mometasone-formoterol (DULERA) 100-5 MCG/ACT inhaler Inhale 2 puffs into the lungs in the morning and 2 puffs in the evening. (Patient not taking: Reported on 11/18/2024) 1 each 2    diphenhydrAMINE-APAP, sleep, (TYLENOL PM EXTRA STRENGTH)  MG tablet Take 1 tablet by mouth nightly as needed for Sleep      Respiratory Therapy Supplies (NEBULIZER/TUBING/MOUTHPIECE) KIT 1 kit by Does not apply route daily as needed (for shortness of breath) 1 kit 0    aspirin 81 MG chewable tablet Take 1 tablet by mouth daily 30 tablet 0        levofloxacin  750 mg IntraVENous Q48H    linezolid  600 mg IntraVENous Q12H    sodium  No Growth to date.  Any change in status will be called.    Narrative:      ORDER#: [id number]                          ORDERED BY: ARNEL ARTIS  SOURCE: Blood left forearm                 COLLECTED:  02/05/25 02:27  ANTIBIOTICS AT BRENDA.:                      RECEIVED :  02/05/25 13:55  If child <=2 yrs old please draw pediatric bottle.~Blood Culture #2    Culture, Blood 1 [[id number]]  (Abnormal) Collected: 02/05/25 0227    Order Status: Completed Specimen: Blood Updated: 02/06/25 0209     Blood Culture, Routine --     Gram stain Anaerobic bottle:  Gram positive cocci in chains and/or pairs  resembling Streptococcus  Information to follow       Organism Streptococcus species DNA Detected     Blood Culture, Routine See additional report for complete BCID panel.    Narrative:      ORDER#: [id number]                          ORDERED BY: ARNEL ARTIS  SOURCE: Blood right wrist                  COLLECTED:  02/05/25 02:27  ANTIBIOTICS AT BRENDA.:                      RECEIVED :  02/05/25 13:55  CALL  [facility] [phone][facility] [phone]  Microbiology results called to and read back by LIS Wilder, 02/06/2025  02:09, by FRITZ  If child <=2 yrs old please draw pediatric bottle.~Blood Culture 1    Culture, Urine [[id number]]  (Abnormal)  (Susceptibility) Collected: 02/05/25 0227    Order Status: Completed Specimen: Urine, clean catch Updated: 02/07/25 0228     Organism Klebsiella pneumoniae     Urine Culture, Routine >100,000 CFU/ml    Narrative:      ORDER#: [id number]                          ORDERED BY: ARNEL ARTIS  SOURCE: Urine Clean Catch                  COLLECTED:  02/05/25 02:27  ANTIBIOTICS AT BRENDA.:                      RECEIVED :  02/05/25 13:55    Susceptibility        Klebsiella pneumoniae      BACTERIAL SUSCEPTIBILITY PANEL BY AMANDA      ampicillin  Resistant      ampicillin-sulbactam 4 mcg/mL Sensitive      ceFAZolin <=4 mcg/mL Sensitive  [1]       cefepime <=0.12 mcg/mL Sensitive      cefTRIAXone <=0.25

## 2025-02-07 NOTE — PROGRESS NOTES
Patient in bed, alert and oriented to own ability. Appears to be not in distress and is eating her dinner. Denies pain at this time. Reminded patient to call this nurse if needed assistance. Patient verbalized understanding. Patient was able to ambulate to the bathroom, contact guard assist x1. Patient is still very unsteady with both of her feet. Scheduled medicine given. No further intervention needed at this time. Plan of care ongoing.

## 2025-02-07 NOTE — PROGRESS NOTES
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 female who presents with recurrent falls.  Patient states that she has fallen twice over the last several hours.  States that the first occurred when she was cleaning stool off of the toilet lid after her  had an accident.  She states that she felt somewhat lightheaded then braced herself against the wall and slid to the floor.  She is unable to describe the second fall, but states that she was able to brace herself to the wall and slide to the floor once again.  She states that she has had increased urinary frequency as well as malodorous urine over the last day or so.  The patient denies syncope, fever, chills, recent illness, headache, rash, chest pain, shortness of breath, cough, abdominal pain, nausea, vomiting, and change in bowel movements.  She affirms history of similar symptoms in the past when she has had a urinary tract infection               - 2/5 - CT of Chest -                1. Negative for pulmonary embolus.  2. Advanced emphysema with an indeterminate 3 cm middle lobe soft tissue mass  and bilateral hilar/mediastinal adenopathy.  PET scan recommended for further  evaluation.  3. Chronic left basilar pleuroparenchymal changes/fibrosis  - 2/5 CT of C-spine negative for acute abnormality  - 2/5 CT of head no acute abnormality              -  - bronchoscopy scheduled for lung mass biopsy   Past Medical History:  has a past medical history of Abdominal aortic aneurysm (AAA) without rupture, unspecified part (HCC), Acute DVT (deep venous thrombosis) ([facility]), Acute encephalopathy, Acute on chronic

## 2025-02-07 NOTE — PROGRESS NOTES
[facility]    2/7/2025 11:23 AM        Name: Sharon Kitchen .              Admitted: 2/4/2025  Primary Care Provider: Sherrill Cross APRN - NP (Tel: [phone])      Chief complaint: 80 yo female presented to ER after fall in bathroom at home and concern for UTI. Imaging revealed rib fractures, nodule in RLL concerning for metastatic disease. Admitted with UTI, sepsis, and left rib fracture.    2/6/2025: Bronchoscopy / lung biopsy    Subjective:  Resting in bed. Says she feels fine, ready to go home. Denies chest pain, shortness of breath, abdominal pain, nausea.     Reviewed interval ancillary notes    Current Medications  levoFLOXacin (LEVAQUIN) 250 MG/50ML infusion 750 mg, Q48H  linezolid (ZYVOX) IVPB 600 mg, Q12H  sodium chloride flush 0.9 % injection 5-40 mL, 2 times per day  sodium chloride flush 0.9 % injection 5-40 mL, PRN  0.9 % sodium chloride infusion, PRN  potassium chloride (KLOR-CON M) extended release tablet 40 mEq, PRN   Or  potassium bicarb-citric acid (EFFER-K) effervescent tablet 40 mEq, PRN   Or  potassium chloride 10 mEq/100 mL IVPB (Peripheral Line), PRN  magnesium sulfate 2000 mg in 50 mL IVPB premix, PRN  enoxaparin Sodium (LOVENOX) injection 30 mg, Daily  ondansetron (ZOFRAN-ODT) disintegrating tablet 4 mg, Q8H PRN   Or  ondansetron (ZOFRAN) injection 4 mg, Q6H PRN  polyethylene glycol (GLYCOLAX) packet 17 g, Daily PRN  acetaminophen (TYLENOL) tablet 650 mg, Q6H PRN   Or  acetaminophen (TYLENOL) suppository 650 mg, Q6H PRN  mirtazapine (REMERON) tablet 15 mg, Nightly  megestrol (MEGACE) tablet 20 mg, Daily  lisinopril (PRINIVIL;ZESTRIL) tablet 5 mg, Daily  levothyroxine (SYNTHROID) tablet 100 mcg, Daily  furosemide (LASIX) tablet 20 mg, Daily  clonazePAM (KLONOPIN) tablet 1 mg, Q12H PRN  carvedilol (COREG) tablet 6.25 mg, BID with meals  aspirin chewable tablet 81 mg,

## 2025-02-07 NOTE — PLAN OF CARE
Problem: Skin/Tissue Integrity  Goal: Skin integrity remains intact  Description: 1.  Monitor for areas of redness and/or skin breakdown  2.  Assess vascular access sites hourly  3.  Every 4-6 hours minimum:  Change oxygen saturation probe site  4.  Every 4-6 hours:  If on nasal continuous positive airway pressure, respiratory therapy assess nares and determine need for appliance change or resting period  2/7/2025 1644 by Pat Ibanez, RN  Outcome: Progressing  2/7/2025 0402 by Chinedu Wilder, RN  Outcome: Progressing     Problem: Safety - Adult  Goal: Free from fall injury  2/7/2025 1644 by Pat Ibanez, RN  Outcome: Progressing  2/7/2025 0402 by Chinedu Wilder RN  Outcome: Progressing     Problem: Chronic Conditions and Co-morbidities  Goal: Patient's chronic conditions and co-morbidity symptoms are monitored and maintained or improved  2/7/2025 1644 by Pat Ibanez, RN  Outcome: Progressing  2/7/2025 0402 by Chinedu Wilder, RN  Outcome: Progressing     Problem: Pain  Goal: Verbalizes/displays adequate comfort level or baseline comfort level  2/7/2025 1644 by Pat Ibanez, RN  Outcome: Progressing  2/7/2025 0402 by Chinedu Wilder, RN  Outcome: Progressing

## 2025-02-07 NOTE — PROGRESS NOTES
Infectious Diseases   Progress Note      Admission Date: 2/4/2025  Hospital Day: Hospital Day: 4   Attending: Tran Paredes MD  Date of service: 2/7/2025     Chief complaint/ Reason for consult:     Streptococcal bacteremia  Sepsis on admission with leukocytosis, tachycardia, tachypnea  Complicated urinary tract infection with Klebsiella  Syncope and falls at home  History of stroke  Coronary artery disease  Type 2 diabetes mellitus    Microbiology:      I have reviewed allavailable micro lab data and cultures    Results       Procedure Component Value Units Date/Time    Culture, Blood 2 [[id number]] Collected: 02/05/25 0227    Order Status: Completed Specimen: Blood Updated: 02/06/25 0315     Culture, Blood 2 No Growth to date.  Any change in status will be called.    Narrative:      ORDER#: [id number]                          ORDERED BY: ARNEL ARTIS  SOURCE: Blood left forearm                 COLLECTED:  02/05/25 02:27  ANTIBIOTICS AT BRENDA.:                      RECEIVED :  02/05/25 13:55  If child <=2 yrs old please draw pediatric bottle.~Blood Culture #2    Culture, Blood 1 [[id number]]  (Abnormal) Collected: 02/05/25 0227    Order Status: Completed Specimen: Blood Updated: 02/06/25 0209     Blood Culture, Routine --     Gram stain Anaerobic bottle:  Gram positive cocci in chains and/or pairs  resembling Streptococcus  Information to follow       Organism Streptococcus species DNA Detected     Blood Culture, Routine See additional report for complete BCID panel.    Narrative:      ORDER#: [id number]                          ORDERED BY: ARNEL ARTIS  SOURCE: Blood right wrist                  COLLECTED:  02/05/25 02:27  ANTIBIOTICS AT BRENDA.:                      RECEIVED :  02/05/25 13:55  CALL  [place] tel. [phone], [phone]  Microbiology results called to and read back by LIS Wilder, 02/06/2025  02:09, by FRITZ  If child <=2 yrs old please draw pediatric bottle.~Blood Culture 1     Cerebral artery occlusion with cerebral infarction (HCC)     states hx of multiple mini strokes    Cerebrovascular accident (CVA) ([facility])     left side impaired    Chronic fatigue     Complex care coordination     Complicated UTI (urinary tract infection)     Congestive heart failure, unspecified HF chronicity, unspecified heart failure type ([facility]) 11/17/2022    COPD (chronic obstructive pulmonary disease) ([facility])     COPD exacerbation ([facility]) 02/20/2018    Depression     Diabetes mellitus ([facility])     denies    DIMAS (dyspnea on exertion) 07/03/2015    DVT (deep venous thrombosis) ([facility])     DVT, lower extremity ([facility])     Fatigue 11/03/2015    General weakness 11/04/2015    Generalized weakness 08/22/2019    History of deep vein thrombosis 2/6/2025    Hypercholesteremia     Hypertension     Hyperthyroidism     Incontinence 10/16/2012    Mammogram abnormal 02/07/2012    Neuromuscular disorder ([facility])     Osteopenia 02/14/2017    PAF (paroxysmal atrial fibrillation) ([facility])     Pneumonia     Recurrent UTI     Right forearm cellulitis     Superficial thrombophlebitis of right upper extremity     Thyroid disease     Tobacco abuse     Tobacco abuse counseling     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    Unable to walk 07/01/2018       Past Surgical History: All past surgical history was reviewed today.    Past Surgical History:   Procedure Laterality Date    BRONCHOSCOPY  2/6/2025    BRONCHOSCOPY Right 2/6/2025    BRONCHOSCOPY ALVEOLAR LAVAGE ROBOTIC performed by Gomez Espitia MD at [facility] ENDOSCOPY    BRONCHOSCOPY N/A 2/6/2025    BRONCHOSCOPY ENDOBRONCHIAL ULTRASOUND FINE NEEDLE ASPIRATION performed by Gomez Espitia MD at [facility] ENDOSCOPY    BRONCHOSCOPY  2/6/2025    BRONCHOSCOPY/TRANSBRONCHIAL NEEDLE BIOPSY ROBOTIC performed by Gomez Espitia MD at [facility] ENDOSCOPY    COLONOSCOPY  1/19/2012    Dr. Wills; repeat 5 years    EYE SURGERY      cateracts removed    NERVE SURGERY N/A 12/29/2021    INTERSTIM STAGE1, FLEXIBLE CYSTOSCOPY

## 2025-02-08 LAB
ANION GAP SERPL CALCULATED.3IONS-SCNC: 11 MMOL/L (ref 3–16)
ANION GAP SERPL CALCULATED.3IONS-SCNC: 9 MMOL/L (ref 3–16)
BACTERIA BLD CULT: ABNORMAL
BACTERIA BLD CULT: ABNORMAL
BUN SERPL-MCNC: 12 MG/DL (ref 7–20)
BUN SERPL-MCNC: 13 MG/DL (ref 7–20)
CALCIUM SERPL-MCNC: 7.6 MG/DL (ref 8.3–10.6)
CALCIUM SERPL-MCNC: 8.4 MG/DL (ref 8.3–10.6)
CHLORIDE SERPL-SCNC: 88 MMOL/L (ref 99–110)
CHLORIDE SERPL-SCNC: 91 MMOL/L (ref 99–110)
CO2 SERPL-SCNC: 23 MMOL/L (ref 21–32)
CO2 SERPL-SCNC: 24 MMOL/L (ref 21–32)
CREAT SERPL-MCNC: 0.6 MG/DL (ref 0.6–1.2)
CREAT SERPL-MCNC: 0.7 MG/DL (ref 0.6–1.2)
DEPRECATED RDW RBC AUTO: 13.2 % (ref 12.4–15.4)
GFR SERPLBLD CREATININE-BSD FMLA CKD-EPI: 87 ML/MIN/{1.73_M2}
GFR SERPLBLD CREATININE-BSD FMLA CKD-EPI: >90 ML/MIN/{1.73_M2}
GLUCOSE SERPL-MCNC: 106 MG/DL (ref 70–99)
GLUCOSE SERPL-MCNC: 162 MG/DL (ref 70–99)
HCT VFR BLD AUTO: 38.1 % (ref 36–48)
HGB BLD-MCNC: 12.9 G/DL (ref 12–16)
MCH RBC QN AUTO: 33.4 PG (ref 26–34)
MCHC RBC AUTO-ENTMCNC: 33.9 G/DL (ref 31–36)
MCV RBC AUTO: 98.6 FL (ref 80–100)
ORGANISM: ABNORMAL
ORGANISM: ABNORMAL
PLATELET # BLD AUTO: 333 K/UL (ref 135–450)
PMV BLD AUTO: 8.2 FL (ref 5–10.5)
POTASSIUM SERPL-SCNC: 4 MMOL/L (ref 3.5–5.1)
POTASSIUM SERPL-SCNC: 4.4 MMOL/L (ref 3.5–5.1)
RBC # BLD AUTO: 3.86 M/UL (ref 4–5.2)
REASON FOR REJECTION: NORMAL
REJECTED TEST: NORMAL
SODIUM SERPL-SCNC: 122 MMOL/L (ref 136–145)
SODIUM SERPL-SCNC: 124 MMOL/L (ref 136–145)
WBC # BLD AUTO: 9.4 K/UL (ref 4–11)

## 2025-02-08 PROCEDURE — 6370000000 HC RX 637 (ALT 250 FOR IP): Performed by: INTERNAL MEDICINE

## 2025-02-08 PROCEDURE — 85027 COMPLETE CBC AUTOMATED: CPT

## 2025-02-08 PROCEDURE — 2580000003 HC RX 258: Performed by: NURSE PRACTITIONER

## 2025-02-08 PROCEDURE — 6370000000 HC RX 637 (ALT 250 FOR IP): Performed by: NURSE PRACTITIONER

## 2025-02-08 PROCEDURE — 6360000002 HC RX W HCPCS: Performed by: NURSE PRACTITIONER

## 2025-02-08 PROCEDURE — 80048 BASIC METABOLIC PNL TOTAL CA: CPT

## 2025-02-08 PROCEDURE — 1200000000 HC SEMI PRIVATE

## 2025-02-08 PROCEDURE — 6360000002 HC RX W HCPCS: Performed by: INTERNAL MEDICINE

## 2025-02-08 PROCEDURE — 36415 COLL VENOUS BLD VENIPUNCTURE: CPT

## 2025-02-08 PROCEDURE — 6360000002 HC RX W HCPCS: Performed by: STUDENT IN AN ORGANIZED HEALTH CARE EDUCATION/TRAINING PROGRAM

## 2025-02-08 PROCEDURE — 2500000003 HC RX 250 WO HCPCS: Performed by: INTERNAL MEDICINE

## 2025-02-08 RX ORDER — POLYETHYLENE GLYCOL 3350 17 G/17G
17 POWDER, FOR SOLUTION ORAL DAILY
Status: DISCONTINUED | OUTPATIENT
Start: 2025-02-08 | End: 2025-02-11 | Stop reason: HOSPADM

## 2025-02-08 RX ORDER — LABETALOL HYDROCHLORIDE 5 MG/ML
10 INJECTION, SOLUTION INTRAVENOUS EVERY 6 HOURS PRN
Status: DISCONTINUED | OUTPATIENT
Start: 2025-02-08 | End: 2025-02-11 | Stop reason: HOSPADM

## 2025-02-08 RX ORDER — SODIUM CHLORIDE 9 MG/ML
INJECTION, SOLUTION INTRAVENOUS CONTINUOUS
Status: DISCONTINUED | OUTPATIENT
Start: 2025-02-08 | End: 2025-02-09

## 2025-02-08 RX ADMIN — LINEZOLID 600 MG: 600 INJECTION, SOLUTION INTRAVENOUS at 03:21

## 2025-02-08 RX ADMIN — LISINOPRIL 5 MG: 5 TABLET ORAL at 10:31

## 2025-02-08 RX ADMIN — ACETAMINOPHEN 650 MG: 325 TABLET ORAL at 10:33

## 2025-02-08 RX ADMIN — SODIUM CHLORIDE 0.5 MG: 9 INJECTION INTRAMUSCULAR; INTRAVENOUS; SUBCUTANEOUS at 00:09

## 2025-02-08 RX ADMIN — ASPIRIN 81 MG: 81 TABLET, CHEWABLE ORAL at 10:31

## 2025-02-08 RX ADMIN — CARVEDILOL 6.25 MG: 6.25 TABLET, FILM COATED ORAL at 10:39

## 2025-02-08 RX ADMIN — SODIUM CHLORIDE: 9 INJECTION, SOLUTION INTRAVENOUS at 14:39

## 2025-02-08 RX ADMIN — ONDANSETRON 4 MG: 2 INJECTION, SOLUTION INTRAMUSCULAR; INTRAVENOUS at 06:41

## 2025-02-08 RX ADMIN — MIRTAZAPINE 15 MG: 15 TABLET, FILM COATED ORAL at 20:57

## 2025-02-08 RX ADMIN — CARVEDILOL 6.25 MG: 6.25 TABLET, FILM COATED ORAL at 19:02

## 2025-02-08 RX ADMIN — MEGESTROL ACETATE 20 MG: 20 TABLET ORAL at 10:49

## 2025-02-08 RX ADMIN — LABETALOL HYDROCHLORIDE 10 MG: 5 INJECTION, SOLUTION INTRAVENOUS at 19:09

## 2025-02-08 RX ADMIN — LINEZOLID 600 MG: 600 INJECTION, SOLUTION INTRAVENOUS at 14:40

## 2025-02-08 RX ADMIN — ONDANSETRON 4 MG: 2 INJECTION, SOLUTION INTRAMUSCULAR; INTRAVENOUS at 19:09

## 2025-02-08 RX ADMIN — SODIUM CHLORIDE, PRESERVATIVE FREE 10 ML: 5 INJECTION INTRAVENOUS at 10:31

## 2025-02-08 RX ADMIN — ENOXAPARIN SODIUM 30 MG: 100 INJECTION SUBCUTANEOUS at 10:31

## 2025-02-08 RX ADMIN — LEVOTHYROXINE SODIUM 100 MCG: 100 TABLET ORAL at 06:24

## 2025-02-08 RX ADMIN — FUROSEMIDE 20 MG: 40 TABLET ORAL at 10:30

## 2025-02-08 RX ADMIN — POLYETHYLENE GLYCOL 3350 17 G: 17 POWDER, FOR SOLUTION ORAL at 11:27

## 2025-02-08 NOTE — PLAN OF CARE
Problem: Skin/Tissue Integrity  Goal: Skin integrity remains intact  Description: 1.  Monitor for areas of redness and/or skin breakdown  2.  Assess vascular access sites hourly  3.  Every 4-6 hours minimum:  Change oxygen saturation probe site  4.  Every 4-6 hours:  If on nasal continuous positive airway pressure, respiratory therapy assess nares and determine need for appliance change or resting period  2/8/2025 0407 by Chinedu Wilder RN  Outcome: Progressing  2/7/2025 1644 by Pat Ibanez RN  Outcome: Progressing     Problem: Safety - Adult  Goal: Free from fall injury  2/8/2025 0407 by Chinedu Wilder RN  Outcome: Progressing  2/7/2025 1644 by Pat Ibanez RN  Outcome: Progressing     Problem: Chronic Conditions and Co-morbidities  Goal: Patient's chronic conditions and co-morbidity symptoms are monitored and maintained or improved  2/8/2025 0407 by Chinedu Wilder RN  Outcome: Progressing  2/7/2025 1644 by Pat Ibanez RN  Outcome: Progressing     Problem: Pain  Goal: Verbalizes/displays adequate comfort level or baseline comfort level  2/8/2025 0407 by Chinedu Wilder RN  Outcome: Progressing  Flowsheets (Taken 2/7/2025 1943)  Verbalizes/displays adequate comfort level or baseline comfort level:   Encourage patient to monitor pain and request assistance   Assess pain using appropriate pain scale   Administer analgesics based on type and severity of pain and evaluate response   Implement non-pharmacological measures as appropriate and evaluate response   Consider cultural and social influences on pain and pain management   Notify Licensed Independent Practitioner if interventions unsuccessful or patient reports new pain  2/7/2025 1644 by Pat Ibanez RN  Outcome: Progressing

## 2025-02-08 NOTE — PROGRESS NOTES
[facility]    2/8/2025 10:48 AM        Name: Sharon Kitchen .              Admitted: 2/4/2025  Primary Care Provider: hSerrill Cross APRN - NP (Tel: [phone])      Chief complaint: 78 yo female presented to ER after fall in bathroom at home and concern for UTI. Imaging revealed rib fractures, nodule in RLL concerning for metastatic disease. Admitted with UTI, sepsis, and left rib fracture.    2/6/2025: Bronchoscopy / lung biopsy    Subjective:  Resting in bed. Says she was nauseated this am, better now. Denies chest pain, shortness of breath, abdominal pain, diarrhea, constipation. Reports last BM 2 days ago.     Reviewed interval ancillary notes    Current Medications  levoFLOXacin (LEVAQUIN) 250 MG/50ML infusion 750 mg, Q48H  labetalol (NORMODYNE;TRANDATE) injection 10 mg, Once PRN  meclizine (ANTIVERT) tablet 25 mg, TID PRN  linezolid (ZYVOX) IVPB 600 mg, Q12H  sodium chloride flush 0.9 % injection 5-40 mL, 2 times per day  sodium chloride flush 0.9 % injection 5-40 mL, PRN  0.9 % sodium chloride infusion, PRN  potassium chloride (KLOR-CON M) extended release tablet 40 mEq, PRN   Or  potassium bicarb-citric acid (EFFER-K) effervescent tablet 40 mEq, PRN   Or  potassium chloride 10 mEq/100 mL IVPB (Peripheral Line), PRN  magnesium sulfate 2000 mg in 50 mL IVPB premix, PRN  enoxaparin Sodium (LOVENOX) injection 30 mg, Daily  ondansetron (ZOFRAN-ODT) disintegrating tablet 4 mg, Q8H PRN   Or  ondansetron (ZOFRAN) injection 4 mg, Q6H PRN  polyethylene glycol (GLYCOLAX) packet 17 g, Daily PRN  acetaminophen (TYLENOL) tablet 650 mg, Q6H PRN   Or  acetaminophen (TYLENOL) suppository 650 mg, Q6H PRN  mirtazapine (REMERON) tablet 15 mg, Nightly  megestrol (MEGACE) tablet 20 mg, Daily  lisinopril (PRINIVIL;ZESTRIL) tablet 5 mg, Daily  levothyroxine (SYNTHROID) tablet 100 mcg, Daily  furosemide (LASIX)

## 2025-02-08 NOTE — PROGRESS NOTES
Patient in bed, alert, agitated and confused. High blood pressure noted. Np notified and ordered Labetalol 10 mg. Blood pressure did went down. Patient still very agitated and feels very dizzy stating that she is falling off the bed. Rechecked her VS, Blood pressure still high see flowsheets. Np notified and ordered another dose of Labetalol 5 mg. Patient is very confused than yesterday. PRN Klonopin given. Camera in the room for safety. Bed alarm on. Bed lower in position. Plan of care ongoing.

## 2025-02-09 ENCOUNTER — APPOINTMENT (OUTPATIENT)
Dept: CT IMAGING | Age: 80
End: 2025-02-09
Payer: MEDICARE

## 2025-02-09 LAB
ANION GAP SERPL CALCULATED.3IONS-SCNC: 10 MMOL/L (ref 3–16)
ANION GAP SERPL CALCULATED.3IONS-SCNC: 12 MMOL/L (ref 3–16)
BACTERIA BLD CULT ORG #2: NORMAL
BUN SERPL-MCNC: 15 MG/DL (ref 7–20)
BUN SERPL-MCNC: 9 MG/DL (ref 7–20)
CALCIUM SERPL-MCNC: 7.4 MG/DL (ref 8.3–10.6)
CALCIUM SERPL-MCNC: 7.6 MG/DL (ref 8.3–10.6)
CHLORIDE SERPL-SCNC: 84 MMOL/L (ref 99–110)
CHLORIDE SERPL-SCNC: 88 MMOL/L (ref 99–110)
CO2 SERPL-SCNC: 26 MMOL/L (ref 21–32)
CO2 SERPL-SCNC: 27 MMOL/L (ref 21–32)
CORTIS AM PEAK SERPL-MCNC: 47.5 UG/DL (ref 4.3–22.4)
CREAT SERPL-MCNC: 0.6 MG/DL (ref 0.6–1.2)
CREAT SERPL-MCNC: 0.7 MG/DL (ref 0.6–1.2)
DEPRECATED RDW RBC AUTO: 13 % (ref 12.4–15.4)
GFR SERPLBLD CREATININE-BSD FMLA CKD-EPI: 87 ML/MIN/{1.73_M2}
GFR SERPLBLD CREATININE-BSD FMLA CKD-EPI: >90 ML/MIN/{1.73_M2}
GLUCOSE SERPL-MCNC: 115 MG/DL (ref 70–99)
GLUCOSE SERPL-MCNC: 98 MG/DL (ref 70–99)
HCT VFR BLD AUTO: 35.6 % (ref 36–48)
HGB BLD-MCNC: 12.5 G/DL (ref 12–16)
MAGNESIUM SERPL-MCNC: 1.77 MG/DL (ref 1.8–2.4)
MCH RBC QN AUTO: 34 PG (ref 26–34)
MCHC RBC AUTO-ENTMCNC: 35.2 G/DL (ref 31–36)
MCV RBC AUTO: 96.5 FL (ref 80–100)
NT-PROBNP SERPL-MCNC: ABNORMAL PG/ML (ref 0–449)
PLATELET # BLD AUTO: 336 K/UL (ref 135–450)
PMV BLD AUTO: 8.2 FL (ref 5–10.5)
POTASSIUM SERPL-SCNC: 3.2 MMOL/L (ref 3.5–5.1)
POTASSIUM SERPL-SCNC: 5.3 MMOL/L (ref 3.5–5.1)
RBC # BLD AUTO: 3.68 M/UL (ref 4–5.2)
SODIUM SERPL-SCNC: 118 MMOL/L (ref 136–145)
SODIUM SERPL-SCNC: 122 MMOL/L (ref 136–145)
SODIUM SERPL-SCNC: 125 MMOL/L (ref 136–145)
URATE SERPL-MCNC: 3.5 MG/DL (ref 2.6–6)
WBC # BLD AUTO: 8.4 K/UL (ref 4–11)

## 2025-02-09 PROCEDURE — 6370000000 HC RX 637 (ALT 250 FOR IP): Performed by: INTERNAL MEDICINE

## 2025-02-09 PROCEDURE — 6370000000 HC RX 637 (ALT 250 FOR IP): Performed by: NURSE PRACTITIONER

## 2025-02-09 PROCEDURE — 6360000002 HC RX W HCPCS: Performed by: STUDENT IN AN ORGANIZED HEALTH CARE EDUCATION/TRAINING PROGRAM

## 2025-02-09 PROCEDURE — 2500000003 HC RX 250 WO HCPCS: Performed by: INTERNAL MEDICINE

## 2025-02-09 PROCEDURE — 6360000002 HC RX W HCPCS: Performed by: NURSE PRACTITIONER

## 2025-02-09 PROCEDURE — 2580000003 HC RX 258: Performed by: INTERNAL MEDICINE

## 2025-02-09 PROCEDURE — 80048 BASIC METABOLIC PNL TOTAL CA: CPT

## 2025-02-09 PROCEDURE — 2000000000 HC ICU R&B

## 2025-02-09 PROCEDURE — 2580000003 HC RX 258: Performed by: NURSE PRACTITIONER

## 2025-02-09 PROCEDURE — 83735 ASSAY OF MAGNESIUM: CPT

## 2025-02-09 PROCEDURE — 85027 COMPLETE CBC AUTOMATED: CPT

## 2025-02-09 PROCEDURE — 70450 CT HEAD/BRAIN W/O DYE: CPT

## 2025-02-09 PROCEDURE — 36415 COLL VENOUS BLD VENIPUNCTURE: CPT

## 2025-02-09 PROCEDURE — 83880 ASSAY OF NATRIURETIC PEPTIDE: CPT

## 2025-02-09 PROCEDURE — 2100000000 HC CCU R&B

## 2025-02-09 PROCEDURE — 6360000002 HC RX W HCPCS: Performed by: INTERNAL MEDICINE

## 2025-02-09 PROCEDURE — 82533 TOTAL CORTISOL: CPT

## 2025-02-09 PROCEDURE — 84295 ASSAY OF SERUM SODIUM: CPT

## 2025-02-09 PROCEDURE — 84550 ASSAY OF BLOOD/URIC ACID: CPT

## 2025-02-09 RX ORDER — 3% SODIUM CHLORIDE 3 G/100ML
25 INJECTION, SOLUTION INTRAVENOUS CONTINUOUS
Status: DISCONTINUED | OUTPATIENT
Start: 2025-02-09 | End: 2025-02-09 | Stop reason: RX

## 2025-02-09 RX ORDER — HYDROCODONE BITARTRATE AND HOMATROPINE METHYLBROMIDE ORAL SOLUTION 5; 1.5 MG/5ML; MG/5ML
5 LIQUID ORAL EVERY 6 HOURS PRN
Status: DISCONTINUED | OUTPATIENT
Start: 2025-02-09 | End: 2025-02-09

## 2025-02-09 RX ORDER — FUROSEMIDE 40 MG/1
40 TABLET ORAL DAILY
Status: DISCONTINUED | OUTPATIENT
Start: 2025-02-09 | End: 2025-02-11 | Stop reason: HOSPADM

## 2025-02-09 RX ORDER — LISINOPRIL 20 MG/1
20 TABLET ORAL DAILY
Status: DISCONTINUED | OUTPATIENT
Start: 2025-02-10 | End: 2025-02-11 | Stop reason: HOSPADM

## 2025-02-09 RX ORDER — HYDROCODONE BITARTRATE AND HOMATROPINE METHYLBROMIDE 5; 1.5 MG/1; MG/1
1 TABLET ORAL EVERY 6 HOURS PRN
Status: DISCONTINUED | OUTPATIENT
Start: 2025-02-09 | End: 2025-02-11 | Stop reason: HOSPADM

## 2025-02-09 RX ORDER — LISINOPRIL 10 MG/1
10 TABLET ORAL ONCE
Status: COMPLETED | OUTPATIENT
Start: 2025-02-09 | End: 2025-02-09

## 2025-02-09 RX ORDER — CARVEDILOL 6.25 MG/1
12.5 TABLET ORAL 2 TIMES DAILY WITH MEALS
Status: DISCONTINUED | OUTPATIENT
Start: 2025-02-09 | End: 2025-02-10

## 2025-02-09 RX ORDER — LABETALOL HYDROCHLORIDE 5 MG/ML
5 INJECTION, SOLUTION INTRAVENOUS ONCE
Status: COMPLETED | OUTPATIENT
Start: 2025-02-09 | End: 2025-02-09

## 2025-02-09 RX ADMIN — SODIUM CHLORIDE, PRESERVATIVE FREE 1 MG: 5 INJECTION INTRAVENOUS at 03:11

## 2025-02-09 RX ADMIN — LABETALOL HYDROCHLORIDE 10 MG: 5 INJECTION, SOLUTION INTRAVENOUS at 23:15

## 2025-02-09 RX ADMIN — ASPIRIN 81 MG: 81 TABLET, CHEWABLE ORAL at 09:44

## 2025-02-09 RX ADMIN — CLONAZEPAM 1 MG: 1 TABLET ORAL at 00:01

## 2025-02-09 RX ADMIN — VASOPRESSIN: 20 INJECTION, SOLUTION INTRAVENOUS at 18:20

## 2025-02-09 RX ADMIN — Medication 15 G: at 14:30

## 2025-02-09 RX ADMIN — CLONAZEPAM 1 MG: 1 TABLET ORAL at 20:37

## 2025-02-09 RX ADMIN — LINEZOLID 600 MG: 600 INJECTION, SOLUTION INTRAVENOUS at 14:40

## 2025-02-09 RX ADMIN — LEVOFLOXACIN 750 MG: 5 INJECTION, SOLUTION INTRAVENOUS at 02:21

## 2025-02-09 RX ADMIN — MIRTAZAPINE 15 MG: 15 TABLET, FILM COATED ORAL at 20:38

## 2025-02-09 RX ADMIN — HYDROCODONE BITARTRATE AND HOMATROPINE METHYLBROMIDE 1 TABLET: 1.5; 5 TABLET ORAL at 20:47

## 2025-02-09 RX ADMIN — SODIUM CHLORIDE, PRESERVATIVE FREE 10 ML: 5 INJECTION INTRAVENOUS at 03:13

## 2025-02-09 RX ADMIN — LEVOTHYROXINE SODIUM 100 MCG: 100 TABLET ORAL at 09:57

## 2025-02-09 RX ADMIN — ONDANSETRON 4 MG: 2 INJECTION, SOLUTION INTRAMUSCULAR; INTRAVENOUS at 02:05

## 2025-02-09 RX ADMIN — SODIUM CHLORIDE, PRESERVATIVE FREE 10 ML: 5 INJECTION INTRAVENOUS at 02:06

## 2025-02-09 RX ADMIN — ENOXAPARIN SODIUM 30 MG: 100 INJECTION SUBCUTANEOUS at 09:44

## 2025-02-09 RX ADMIN — MEGESTROL ACETATE 20 MG: 20 TABLET ORAL at 10:23

## 2025-02-09 RX ADMIN — LISINOPRIL 5 MG: 5 TABLET ORAL at 09:44

## 2025-02-09 RX ADMIN — MECLIZINE 25 MG: 12.5 TABLET ORAL at 20:47

## 2025-02-09 RX ADMIN — FUROSEMIDE 40 MG: 40 TABLET ORAL at 12:09

## 2025-02-09 RX ADMIN — POTASSIUM CHLORIDE 40 MEQ: 1500 TABLET, EXTENDED RELEASE ORAL at 09:44

## 2025-02-09 RX ADMIN — LABETALOL HYDROCHLORIDE 5 MG: 5 INJECTION, SOLUTION INTRAVENOUS at 03:14

## 2025-02-09 RX ADMIN — POLYETHYLENE GLYCOL 3350 17 G: 17 POWDER, FOR SOLUTION ORAL at 09:41

## 2025-02-09 RX ADMIN — LISINOPRIL 10 MG: 10 TABLET ORAL at 12:09

## 2025-02-09 RX ADMIN — LINEZOLID 600 MG: 600 INJECTION, SOLUTION INTRAVENOUS at 02:18

## 2025-02-09 RX ADMIN — SODIUM CHLORIDE, PRESERVATIVE FREE 10 ML: 5 INJECTION INTRAVENOUS at 02:07

## 2025-02-09 RX ADMIN — SODIUM CHLORIDE, PRESERVATIVE FREE 10 ML: 5 INJECTION INTRAVENOUS at 09:44

## 2025-02-09 RX ADMIN — LABETALOL HYDROCHLORIDE 10 MG: 5 INJECTION, SOLUTION INTRAVENOUS at 09:44

## 2025-02-09 RX ADMIN — CARVEDILOL 12.5 MG: 6.25 TABLET, FILM COATED ORAL at 18:26

## 2025-02-09 NOTE — PLAN OF CARE
Responded to rapid response. Patient had been found on floor by nursing, unwitnessed fall. Presently, awake, alert, limited historian. Patient unsure what happened, said she got up and felt herself \"slipping.\" Unsure if she hit head. Denies injury. Patient had been assisted back to bed by nursing, was able to bear weight. Dr Griffin at bedside, recommends CT head.       Blood pressure 162/95, pulse 84    General:  Awake, alert, oriented x 2 in NAD, limited historian   Skin:  No abrasions, no bruising noted  Neck:  Supple, no tenderness on palpation  Chest:  Normal effort.    Cardiovascular:  RRR  Abdomen:  Soft, nontender  Extremities:  No edema  Neurological: Moves all extremities equally  Psychological: Normal mood and affect     Anna Mchugh, QUITA - CNP   2/9/2025 12:00 PM

## 2025-02-09 NOTE — PLAN OF CARE
Problem: Skin/Tissue Integrity  Goal: Skin integrity remains intact  Description: 1.  Monitor for areas of redness and/or skin breakdown  2.  Assess vascular access sites hourly  3.  Every 4-6 hours minimum:  Change oxygen saturation probe site  4.  Every 4-6 hours:  If on nasal continuous positive airway pressure, respiratory therapy assess nares and determine need for appliance change or resting period  Outcome: Progressing     Problem: Safety - Adult  Goal: Free from fall injury  Outcome: Progressing     Problem: Chronic Conditions and Co-morbidities  Goal: Patient's chronic conditions and co-morbidity symptoms are monitored and maintained or improved  Outcome: Progressing     Problem: Pain  Goal: Verbalizes/displays adequate comfort level or baseline comfort level  Outcome: Progressing

## 2025-02-09 NOTE — PLAN OF CARE
Problem: Skin/Tissue Integrity  Goal: Skin integrity remains intact  Description: 1.  Monitor for areas of redness and/or skin breakdown  2.  Assess vascular access sites hourly  3.  Every 4-6 hours minimum:  Change oxygen saturation probe site  4.  Every 4-6 hours:  If on nasal continuous positive airway pressure, respiratory therapy assess nares and determine need for appliance change or resting period  2/9/2025 0251 by Scarlett Duarte, RN  Outcome: Progressing  Flowsheets (Taken 2/8/2025 2354)  Skin Integrity Remains Intact:   Assess vascular access sites hourly   Monitor for areas of redness and/or skin breakdown     Problem: Safety - Adult  Goal: Free from fall injury  2/9/2025 0251 by Scarlett Duarte RN  Outcome: Progressing     Problem: Chronic Conditions and Co-morbidities  Goal: Patient's chronic conditions and co-morbidity symptoms are monitored and maintained or improved  2/9/2025 0251 by Scarlett Duarte, RN  Outcome: Progressing  Flowsheets (Taken 2/8/2025 2354)  Care Plan - Patient's Chronic Conditions and Co-Morbidity Symptoms are Monitored and Maintained or Improved:   Monitor and assess patient's chronic conditions and comorbid symptoms for stability, deterioration, or improvement   Collaborate with multidisciplinary team to address chronic and comorbid conditions and prevent exacerbation or deterioration   Update acute care plan with appropriate goals if chronic or comorbid symptoms are exacerbated and prevent overall improvement and discharge     Problem: Pain  Goal: Verbalizes/displays adequate comfort level or baseline comfort level  2/9/2025 0251 by Scarlett Duarte, RN  Outcome: Progressing  Flowsheets (Taken 2/8/2025 2051)  Verbalizes/displays adequate comfort level or baseline comfort level:   Encourage patient to monitor pain and request assistance   Assess pain using appropriate pain scale   Administer analgesics based on type and severity of pain and evaluate response   Implement

## 2025-02-09 NOTE — PROGRESS NOTES
Ativan is not yet effective.  This nurse is visualizing pt on camera, pt is pulling at sheets, turning side-to-side, constant adjustments of HOB/FOB, putting inanimate objects such as sheet, TELE cords in her mouth. Redirected, will continue to redirect and reorient as needed.

## 2025-02-09 NOTE — CONSULTS
[facility]  Phone: [phone]  Fax: [phone]  [place]         Reason for Consult: Hyponatremia  Requesting Physician:  Dr. 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 Jacobson  She has history of hyponatremia dating back to 2014  She had been on fluid restriction and sodium chloride tablets  She was hospitalized on 4 February with concerns for multiple falls and possible urinary tract infection  Imaging revealed rib fractures and right lower lobe nodule concerning for metastatic disease  Blood cultures are growing Streptococcus  We are consulted for hyponatremia  Noted to have progressive decline in her sodium 141 question accuracy , down to 122 over the last 4 days  Patient is oriented to person and place and is a poor historian but reports she has been drinking lots of water and had not been taking her sodium chloride  Underwent bronchoscopy with biopsy    Past Medical History:        Diagnosis Date    Abdominal aortic aneurysm (AAA) without rupture, unspecified part ([facility]) 11/18/2024    Acute DVT (deep venous thrombosis) ([facility]) 07/03/2015    Acute encephalopathy 04/19/2018    Acute on chronic diastolic heart failure ([facility]) 03/30/2019    Alcohol abuse     Anxiety     Arthritis     CAD in native artery     Cellulitis 04/28/2018    Cerebral artery occlusion with cerebral infarction ([facility])     states hx of multiple mini strokes    Cerebrovascular accident (CVA) ([facility])     left side impaired    Chronic fatigue     Complex care coordination     Complicated UTI (urinary tract infection)     Congestive heart failure, unspecified HF chronicity, unspecified heart failure type ([facility]) 11/17/2022    COPD (chronic obstructive pulmonary disease) ([facility])     COPD exacerbation ([facility]) 02/20/2018    Depression     Diabetes mellitus

## 2025-02-09 NOTE — PROGRESS NOTES
Critical sodium level of 118 reported to writer by lab. NP notified by secure message of current lab value. New orders obtained to transfer patient  to [facility]; Rm [id number] for hypertonic infusion.

## 2025-02-09 NOTE — FLOWSHEET NOTE
02/09/25 0304   Treatment Team Notification   Reason for Communication Abnormal vitals  (/123)   Name of Team Member Notified QUINCY Barr -[place]   Treatment Team Role Advanced Practice Nurse   Method of Communication Secure Message   Response See orders   Notification Time 0302     One time dose Labetalol 5 mg IVP ordered and to be given with Ativan at this time.  Pt is asymptomatic r/t HTN. Confusion and agitation/pulling at lines/cords, attempting to eat tissue box-said it's ice chips.  Pt can be redirected and reoriented briefly.  No s/s of distress noted, call light within reach.  See flow sheets for details.

## 2025-02-09 NOTE — PLAN OF CARE
Problem: Skin/Tissue Integrity  Goal: Skin integrity remains intact  Description: 1.  Monitor for areas of redness and/or skin breakdown  2.  Assess vascular access sites hourly  3.  Every 4-6 hours minimum:  Change oxygen saturation probe site  4.  Every 4-6 hours:  If on nasal continuous positive airway pressure, respiratory therapy assess nares and determine need for appliance change or resting period  2/9/2025 1624 by Pat Ibanez RN  Outcome: Progressing  2/9/2025 0251 by Scarlett Duarte RN  Outcome: Progressing  Flowsheets (Taken 2/8/2025 2354)  Skin Integrity Remains Intact:   Assess vascular access sites hourly   Monitor for areas of redness and/or skin breakdown     Problem: Safety - Adult  Goal: Free from fall injury  2/9/2025 1624 by Pat Ibanez RN  Outcome: Progressing  2/9/2025 0251 by Scarlett Duarte RN  Outcome: Progressing     Problem: Chronic Conditions and Co-morbidities  Goal: Patient's chronic conditions and co-morbidity symptoms are monitored and maintained or improved  2/9/2025 1624 by Pat Ibanez RN  Outcome: Progressing  2/9/2025 0251 by Scarlett Duarte RN  Outcome: Progressing  Flowsheets (Taken 2/8/2025 2354)  Care Plan - Patient's Chronic Conditions and Co-Morbidity Symptoms are Monitored and Maintained or Improved:   Monitor and assess patient's chronic conditions and comorbid symptoms for stability, deterioration, or improvement   Collaborate with multidisciplinary team to address chronic and comorbid conditions and prevent exacerbation or deterioration   Update acute care plan with appropriate goals if chronic or comorbid symptoms are exacerbated and prevent overall improvement and discharge     Problem: Pain  Goal: Verbalizes/displays adequate comfort level or baseline comfort level  2/9/2025 1624 by Pat Ibanez RN  Outcome: Progressing  2/9/2025 0251 by Scarlett Duarte RN  Outcome: Progressing  Flowsheets (Taken 2/8/2025 2051)  Verbalizes/displays adequate comfort level or

## 2025-02-09 NOTE — PROGRESS NOTES
Shift assessment completed. Routine vitals obtained and stable; except BP is high. PRN Labetalol and scheduled medications given. Patient is awake, alert and oriented to own ability. Respirations are easy and unlabored. Patient does not appear to be in distress, resting comfortably in bed at this time. Call light within reach.

## 2025-02-09 NOTE — PROGRESS NOTES
[facility]    2/9/2025 8:01 AM        Name: Sharon Kitchen .              Admitted: 2/4/2025  Primary Care Provider: Sherrill Cross APRN - NP (Tel: [phone])      Chief complaint: 80 yo female presented to ER after fall in bathroom at home and concern for UTI. Imaging revealed rib fractures, nodule in RLL concerning for metastatic disease. Admitted with UTI, sepsis, and left rib fracture.    2/6/2025: Bronchoscopy / lung biopsy    Subjective:  Resting in bed. Patient with confusion and restlessness overnight, was given ativan. Presently alert and oriented to place and self, tells me it is summer and she is 59 years old. Moves all extremities but has trouble following directions. Denies HA, chest pain, shortness of breath.    Reviewed interval ancillary notes    Current Medications  LORazepam (ATIVAN) 1 mg in sodium chloride (PF) 0.9 % 10 mL injection, Q6H PRN  HYDROcodone homatropine (HYCODAN) 5-1.5 MG per tablet 1 tablet, Q6H PRN  phenol 1.4 % mouth spray 1 spray, Q2H PRN  polyethylene glycol (GLYCOLAX) packet 17 g, Daily  0.9 % sodium chloride infusion, Continuous  labetalol (NORMODYNE;TRANDATE) injection 10 mg, Q6H PRN  levoFLOXacin (LEVAQUIN) 250 MG/50ML infusion 750 mg, Q48H  meclizine (ANTIVERT) tablet 25 mg, TID PRN  linezolid (ZYVOX) IVPB 600 mg, Q12H  sodium chloride flush 0.9 % injection 5-40 mL, 2 times per day  sodium chloride flush 0.9 % injection 5-40 mL, PRN  0.9 % sodium chloride infusion, PRN  potassium chloride (KLOR-CON M) extended release tablet 40 mEq, PRN   Or  potassium bicarb-citric acid (EFFER-K) effervescent tablet 40 mEq, PRN   Or  potassium chloride 10 mEq/100 mL IVPB (Peripheral Line), PRN  magnesium sulfate 2000 mg in 50 mL IVPB premix, PRN  enoxaparin Sodium (LOVENOX) injection 30 mg, Daily  ondansetron (ZOFRAN-ODT) disintegrating tablet 4 mg, Q8H PRN   Or  ondansetron  diastolic function.    Right Ventricle: Right ventricle is mildly dilated. Normal systolic function. Normal wall motion. TAPSE is 2.1 cm. RV Free Wall Peak S' is 9.7 cm/s.    Mitral Valve: Moderate regurgitation with an eccentrically directed jet and may underestimate severity.    Tricuspid Valve: Mild regurgitation. Unable to assess RVSP due to inadequate or insignificant tricuspid regurgitation.    Aorta: Dilated descending aorta.    IVC/SVC: IVC diameter is less than or equal to 21 mm and decreases greater than 50% during inspiration; therefore the estimated right atrial pressure is normal (~3 mmHg). IVC size is normal.    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)    Diabetes education, encounter for    History of stroke    HTN (hypertension), benign    PAF (paroxysmal atrial fibrillation) ([facility])    Complicated UTI (urinary tract infection)    Septicemia ([facility])    Pulmonary nodule    Falls    Multiple allergies    History of deep vein thrombosis    Syncope and collapse    Streptococcal bacteremia    Multiple closed fractures of ribs of left side  Resolved Problems:    * No resolved hospital problems. *       Assessment & Plan:     Confusion  - New finding  - Most likely multifactorial: hyponatremia, infection, possible hospital induced delirium  - No gross focal deficits on exam  - Continue to monitor    Hyponatremia  - Serum sodium 141 on presentation, 141->136->124->122  - Started on NS at 50 ml/hr yesterday  - Likely multifactorial poor solute intake, volume depletion, hx hyponatremia presumed to be SIADH, previously on urea tabs   - Hold Lasix  - Continue NS at 50 ml/hr  - Consult nephrology, previously

## 2025-02-09 NOTE — FLOWSHEET NOTE
02/09/25 0342   Vital Signs   BP (!) 171/97   MAP (Calculated) 122   BP Location Right upper arm   BP Method Automatic   Patient Position Semi fowlers   Opioid-Induced Sedation   POSS Score 1   RASS   Odom Agitation Sedation Scale (RASS) -1

## 2025-02-09 NOTE — PROGRESS NOTES
Speech Language Pathology  Attempt    Sharon Kitchen  1945    SLP eval and treat orders appreciated and received. Attempted to see pt for dysphagia evaluation. Per discussion with RN, pt is currently in CT after rapid response. Discussed most recent MBSS findings with RN (completed on 4/30/2024) with diet recommendations. Will attempt to follow-up as schedule allows.     Thank you,     Andria Campos M.S., CCC-SLP [id number]  Speech-Language Pathologist

## 2025-02-09 NOTE — FLOWSHEET NOTE
02/09/25 0303   Treatment Team Notification   Reason for Communication Change in status;Evaluate   Name of Team Member Notified QUINCY Soni   Treatment Team Role Advanced Practice Nurse   Method of Communication Secure Message   Response See orders   Notification Time 0255     Ativan 1 mg IVP ordered.

## 2025-02-09 NOTE — PROGRESS NOTES
Rapid Response Quick Summary    Room: 5TN-5569/5569-01    Assessment of concern / patient:  Unwitnessed fall     Physician involved:  Dr. Griffin     Interventions:  Responded to rapid response on 5 tower. Upon entering room, pt found on floor, responsive and following commands. No complaints of any pain or head ache. Pt repositioned back to bed with 3 RN assistance. Incontinent of bowel and bladder. Hypertensive. Dr. Griffin at bedside and LIZY Castillo and updated. Plan for CT head and addition of anti-hypertensive medications. Pt remains in stable condition on 5 tower.     Disposition:  Responded to rapid response on 5 tower. Upon entering room, pt found on floor, responsive and following commands. No complaints of any pain or head ache. Pt repositioned back to bed with 3 RN assistance. Incontinent of bowel and bladder. Hypertensive. Dr. Griffin at bedside and LIZY Castillo and updated. Plan for CT head and addition of anti-hypertensive medications. Pt remains in stable condition on 5 tower.     BRYANT ANDRADE, RN, BSN, CCRN.

## 2025-02-10 LAB
ANION GAP SERPL CALCULATED.3IONS-SCNC: 9 MMOL/L (ref 3–16)
BUN SERPL-MCNC: 14 MG/DL (ref 7–20)
CALCIUM SERPL-MCNC: 8.2 MG/DL (ref 8.3–10.6)
CHLORIDE SERPL-SCNC: 87 MMOL/L (ref 99–110)
CO2 SERPL-SCNC: 26 MMOL/L (ref 21–32)
CREAT SERPL-MCNC: 0.6 MG/DL (ref 0.6–1.2)
DEPRECATED RDW RBC AUTO: 12.8 % (ref 12.4–15.4)
GFR SERPLBLD CREATININE-BSD FMLA CKD-EPI: >90 ML/MIN/{1.73_M2}
GLUCOSE SERPL-MCNC: 123 MG/DL (ref 70–99)
HCT VFR BLD AUTO: 33.2 % (ref 36–48)
HGB BLD-MCNC: 11.6 G/DL (ref 12–16)
MCH RBC QN AUTO: 34 PG (ref 26–34)
MCHC RBC AUTO-ENTMCNC: 34.9 G/DL (ref 31–36)
MCV RBC AUTO: 97.3 FL (ref 80–100)
PLATELET # BLD AUTO: 325 K/UL (ref 135–450)
PMV BLD AUTO: 8.5 FL (ref 5–10.5)
POTASSIUM SERPL-SCNC: 4.1 MMOL/L (ref 3.5–5.1)
RBC # BLD AUTO: 3.41 M/UL (ref 4–5.2)
REASON FOR REJECTION: NORMAL
REASON FOR REJECTION: NORMAL
REJECTED TEST: NORMAL
REJECTED TEST: NORMAL
SODIUM SERPL-SCNC: 120 MMOL/L (ref 136–145)
SODIUM SERPL-SCNC: 122 MMOL/L (ref 136–145)
SODIUM SERPL-SCNC: 124 MMOL/L (ref 136–145)
SODIUM SERPL-SCNC: 127 MMOL/L (ref 136–145)
SODIUM SERPL-SCNC: 130 MMOL/L (ref 136–145)
SODIUM UR-SCNC: 85 MMOL/L
WBC # BLD AUTO: 9.1 K/UL (ref 4–11)

## 2025-02-10 PROCEDURE — 36415 COLL VENOUS BLD VENIPUNCTURE: CPT

## 2025-02-10 PROCEDURE — 6360000002 HC RX W HCPCS: Performed by: INTERNAL MEDICINE

## 2025-02-10 PROCEDURE — 85027 COMPLETE CBC AUTOMATED: CPT

## 2025-02-10 PROCEDURE — 6360000002 HC RX W HCPCS: Performed by: NURSE PRACTITIONER

## 2025-02-10 PROCEDURE — 2000000000 HC ICU R&B

## 2025-02-10 PROCEDURE — 2500000003 HC RX 250 WO HCPCS: Performed by: INTERNAL MEDICINE

## 2025-02-10 PROCEDURE — 92523 SPEECH SOUND LANG COMPREHEN: CPT

## 2025-02-10 PROCEDURE — 80048 BASIC METABOLIC PNL TOTAL CA: CPT

## 2025-02-10 PROCEDURE — 97530 THERAPEUTIC ACTIVITIES: CPT

## 2025-02-10 PROCEDURE — 83935 ASSAY OF URINE OSMOLALITY: CPT

## 2025-02-10 PROCEDURE — 92610 EVALUATE SWALLOWING FUNCTION: CPT

## 2025-02-10 PROCEDURE — 6370000000 HC RX 637 (ALT 250 FOR IP): Performed by: INTERNAL MEDICINE

## 2025-02-10 PROCEDURE — 6360000002 HC RX W HCPCS: Performed by: STUDENT IN AN ORGANIZED HEALTH CARE EDUCATION/TRAINING PROGRAM

## 2025-02-10 PROCEDURE — 2500000003 HC RX 250 WO HCPCS: Performed by: NURSE PRACTITIONER

## 2025-02-10 PROCEDURE — 84300 ASSAY OF URINE SODIUM: CPT

## 2025-02-10 PROCEDURE — 2580000003 HC RX 258: Performed by: INTERNAL MEDICINE

## 2025-02-10 PROCEDURE — 84295 ASSAY OF SERUM SODIUM: CPT

## 2025-02-10 PROCEDURE — 2580000003 HC RX 258: Performed by: NURSE PRACTITIONER

## 2025-02-10 PROCEDURE — 99233 SBSQ HOSP IP/OBS HIGH 50: CPT | Performed by: INTERNAL MEDICINE

## 2025-02-10 PROCEDURE — 6370000000 HC RX 637 (ALT 250 FOR IP): Performed by: NURSE PRACTITIONER

## 2025-02-10 RX ORDER — LEVOFLOXACIN 5 MG/ML
750 INJECTION, SOLUTION INTRAVENOUS EVERY 24 HOURS
Status: COMPLETED | OUTPATIENT
Start: 2025-02-10 | End: 2025-02-11

## 2025-02-10 RX ORDER — CARVEDILOL 25 MG/1
25 TABLET ORAL 2 TIMES DAILY WITH MEALS
Status: DISCONTINUED | OUTPATIENT
Start: 2025-02-10 | End: 2025-02-11 | Stop reason: HOSPADM

## 2025-02-10 RX ADMIN — LEVOTHYROXINE SODIUM 100 MCG: 100 TABLET ORAL at 06:34

## 2025-02-10 RX ADMIN — WATER 7.5 MG: 1 INJECTION INTRAMUSCULAR; INTRAVENOUS; SUBCUTANEOUS at 03:18

## 2025-02-10 RX ADMIN — CARVEDILOL 12.5 MG: 6.25 TABLET, FILM COATED ORAL at 08:35

## 2025-02-10 RX ADMIN — LABETALOL HYDROCHLORIDE 10 MG: 5 INJECTION, SOLUTION INTRAVENOUS at 06:37

## 2025-02-10 RX ADMIN — MEGESTROL ACETATE 20 MG: 20 TABLET ORAL at 09:36

## 2025-02-10 RX ADMIN — LINEZOLID 600 MG: 600 INJECTION, SOLUTION INTRAVENOUS at 02:18

## 2025-02-10 RX ADMIN — FUROSEMIDE 40 MG: 40 TABLET ORAL at 08:35

## 2025-02-10 RX ADMIN — LEVOFLOXACIN 750 MG: 750 INJECTION, SOLUTION INTRAVENOUS at 09:42

## 2025-02-10 RX ADMIN — SODIUM CHLORIDE, PRESERVATIVE FREE 10 ML: 5 INJECTION INTRAVENOUS at 20:12

## 2025-02-10 RX ADMIN — VASOPRESSIN: 20 INJECTION, SOLUTION INTRAVENOUS at 08:01

## 2025-02-10 RX ADMIN — LISINOPRIL 20 MG: 20 TABLET ORAL at 08:35

## 2025-02-10 RX ADMIN — ENOXAPARIN SODIUM 30 MG: 100 INJECTION SUBCUTANEOUS at 08:35

## 2025-02-10 RX ADMIN — ASPIRIN 81 MG: 81 TABLET, CHEWABLE ORAL at 08:35

## 2025-02-10 RX ADMIN — LINEZOLID 600 MG: 600 INJECTION, SOLUTION INTRAVENOUS at 15:36

## 2025-02-10 RX ADMIN — SODIUM CHLORIDE, PRESERVATIVE FREE 1 MG: 5 INJECTION INTRAVENOUS at 13:44

## 2025-02-10 RX ADMIN — CARVEDILOL 25 MG: 25 TABLET, FILM COATED ORAL at 18:19

## 2025-02-10 NOTE — PROGRESS NOTES
Physician Progress Note      PATIENT:               SANDRA MORGAN  CSN #:                  [id number]  :                       1945  ADMIT DATE:       2025 11:42 PM  DISCH DATE:  RESPONDING  PROVIDER #:        MARTA DEVLIN - CNP          QUERY TEXT:    Pt admitted with  UTI, sepsis, and left rib fracture.Pt noted to have AMS,   confusion. If possible, please document in the progress notes and discharge   summary if you are evaluating and / or treating any of the following:    The medical record reflects the following:  Risk Factors: sepsis, UTI electrolyte imbalances fall rib fx  Clinical Indicators: sepsis, uti-infection, Serum sodium 141 on presentation,   141->136->124->122->122->118->125->120->122  AMS- pt is disoriented x 4 per   daughter she is usually oriented to self at home PN- Confusion- Most likely   multifactorial: hyponatremia, infection, possible hospital induced delirium-   CT head with no acute abnormality  Treatment: Started on IV levofloxacin on admission- Urine culture with > 100K   Klebsiella, sensitivities reviewed- Continue levofloxacin to complete 5 day   course Nephrology managing- Currently on sodium chloride infusion- Continue to   follow q 6 hour sodium level,  in soft restraints for safety as she was   pulling at IV lines (central line), sitter in room.    Thank-You, Elvia Poon RN, BSN, CCDS  Options provided:  -- Multifactorial acute encephalopathy including Metabolic encephalopathy due   to infection, hyponatremia, and possible hospital acquired delirium.  -- Encephalopathy due to, please specify other cause  -- Other - I will add my own diagnosis  -- Disagree - Not applicable / Not valid  -- Disagree - Clinically unable to determine / Unknown  -- Refer to Clinical Documentation Reviewer    PROVIDER RESPONSE TEXT:    This patient has Multifactorial acute encephalopathy including Metabolic   encephalopathy due to UTI, hyponatremia and possible hospital acquired

## 2025-02-10 NOTE — CARE COORDINATION
CM called [facility] [phone] and spoke to Jesica for an updated and she states Suresh Sánchez RN has meeting with daughter here at 9 AM tomorrow. Jesica does not have Suresh's number at this time to give CM but will let her know to follow up with me.    RN updated on the above.    Bharati Li RN, BSN  [phone]

## 2025-02-10 NOTE — PROGRESS NOTES
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 Jacobson  She has history of hyponatremia dating back to 2014  She had been on fluid restriction and sodium chloride tablets  She was hospitalized on 4 February with concerns for multiple falls and possible urinary tract infection  Imaging revealed rib fractures and right lower lobe nodule concerning for metastatic disease  Blood cultures are growing Streptococcus  We are consulted for hyponatremia    No acute events in the last 24 hrs  Sodium improved to 125  with hypertonic saline   but then worsening again to 122    Objective/      /81   Pulse 77   Temp 97.7 °F (36.5 °C) (Temporal)   Resp 22   Ht 1.676 m (5' 6\")   Wt 49.4 kg (109 lb)   SpO2 100%   BMI 17.59 kg/m²   GEN:  NAD  HEENT: EOMI,  Neck: supple  CV: S1, S2 ,rrr,  no edema  RESP: symmetric movement,   breathing wnl  ABD:  soft, nt,   SKIN: warm, no petechial rashes  Neuro:   No asterixis     Data/      Current Medications:    Scheduled Meds:   levofloxacin  750 mg IntraVENous Q24H    carvedilol  25 mg Oral BID with meals    furosemide  40 mg Oral Daily    urea  15 g Oral BID    lisinopril  20 mg Oral Daily    polyethylene glycol  17 g Oral Daily    linezolid  600 mg IntraVENous Q12H    sodium chloride flush  5-40 mL IntraVENous 2 times per day    enoxaparin  30 mg SubCUTAneous Daily    mirtazapine  15 mg Oral Nightly    megestrol  20 mg Oral Daily    levothyroxine  100 mcg Oral Daily    aspirin  81 mg Oral Daily     Continuous Infusions:   sodium chloride 104 mEq in sodium chloride 0.9 % 250 mL infusion 25 mL/hr at 02/10/25 0801    sodium chloride Stopped (02/06/25 1524)     PRN Meds:.HYDROcodone homatropine, phenol, LORazepam, labetalol, sodium chloride flush, sodium chloride, potassium chloride **OR** potassium alternative oral replacement **OR**

## 2025-02-10 NOTE — PROGRESS NOTES
Speech Language Pathology  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 [S22.32XK]  Closed fracture of multiple ribs of left side, initial encounter [S22.42XA]  Treatment Diagnosis: Cognitive-Linguistic Deficits , Speech Language Deficits , Oropharyngeal Dysphagia   Pain: Denies                                  Recommendations      Recommended Diet and Intervention 2/10/2025:  Diet Solids Recommendation:  Dysphagia I Puree  Liquid Consistency Recommendation:  Thin liquids  (small single sips, encourage chin tuck)  Recommended form of Meds: Meds in puree  or Meds crushed as able in puree     If respiratory status declines or s/s of aspiration/penetration continue to be noted recommend downgrade to NPO pending re-assessment   Pt may benefit from repeat Modified Barium Swallow if s/s of pharyngeal phase dysphagia persist.      Compensatory strategies: Alternate solids/liquids , Upright as possible with all PO intake , Small bites/sips , Eat/feed slowly, Aspiration Precautions     Discharge Recommendations:  Recommend ongoing SLP for speech and dysphagia therapy upon discharge from hospital     History/Course of Treatment     H&P: Sharon Kitchen is 79 y.o. female with history of hypertension, A-fib COPD and CHF  She now presents to the ER with complaint of fall at home  She fell twice yesterday in the toilet  From the second fall she sustained a rib fracture which is noted on chest x-ray here in the ED  She has generalized weakness  She said when she failed it was a low impact and slipped to the ground  She denies any

## 2025-02-10 NOTE — PROGRESS NOTES
Patient transferred to CVU from , patient is oriented to self. Received orders to let nephrology know about trending sodium levels.

## 2025-02-10 NOTE — PLAN OF CARE
Problem: Skin/Tissue Integrity  Goal: Skin integrity remains intact  Description: 1.  Monitor for areas of redness and/or skin breakdown  2.  Assess vascular access sites hourly  3.  Every 4-6 hours minimum:  Change oxygen saturation probe site  4.  Every 4-6 hours:  If on nasal continuous positive airway pressure, respiratory therapy assess nares and determine need for appliance change or resting period  2/9/2025 1624 by Pat Ibanez RN  Outcome: Progressing     Problem: Safety - Adult  Goal: Free from fall injury  2/9/2025 1624 by Pat Ibanez RN  Outcome: Progressing     Problem: Chronic Conditions and Co-morbidities  Goal: Patient's chronic conditions and co-morbidity symptoms are monitored and maintained or improved  2/10/2025 0012 by Hafsa Mariee RN  Outcome: Progressing  2/9/2025 1624 by Pat Ibanez RN  Outcome: Progressing     Problem: Pain  Goal: Verbalizes/displays adequate comfort level or baseline comfort level  2/10/2025 0012 by Hafsa Mariee RN  Outcome: Progressing  2/9/2025 1624 by Pat Ibanez RN  Outcome: Progressing     Problem: Safety - Medical Restraint  Goal: Remains free of injury from restraints (Restraint for Interference with Medical Device)  Description: INTERVENTIONS:  1. Determine that other, less restrictive measures have been tried or would not be effective before applying the restraint  2. Evaluate the patient's condition at the time of restraint application  3. Inform patient/family regarding the reason for restraint  4. Q2H: Monitor safety, psychosocial status, comfort, nutrition and hydration  Outcome: Progressing

## 2025-02-10 NOTE — CARE COORDINATION
CM reviewed note for discharge planning.    Per palliative note today pt was active with [facility] ([phone]) and they are recommending tp go to an inpt facility d/t a lot of people in home and money and health issues and recommend [facility].    Per palliative note referral was faxed and called.    CM will continue to follow for discharge plan.     Bharati Li RN, BSN  [phone]

## 2025-02-10 NOTE — PROGRESS NOTES
[facility]    2/10/2025 10:03 AM        Name: Sharon Kitchen .              Admitted: 2/4/2025  Primary Care Provider: Sherrill Cross APRN - NP (Tel: [phone])      Chief complaint: 80 yo female presented to ER after fall in bathroom at home and concern for UTI. Imaging revealed rib fractures, nodule in RLL concerning for metastatic disease. Admitted with UTI, sepsis, and left rib fracture.    2/6/2025: Bronchoscopy / lung biopsy    Subjective:  Unable to obtain secondary to confusion. Resting in bed. Transferred to unit yesterday as sodium continued to trend down and was as low as 118. Patient confused, in soft restraints for safety as she was pulling at IV lines (central line), sitter in room.     Reviewed interval ancillary notes    Current Medications  levoFLOXacin (LEVAQUIN) 750 MG/150ML infusion 750 mg, Q24H  HYDROcodone homatropine (HYCODAN) 5-1.5 MG per tablet 1 tablet, Q6H PRN  phenol 1.4 % mouth spray 1 spray, Q2H PRN  carvedilol (COREG) tablet 12.5 mg, BID with meals  furosemide (LASIX) tablet 40 mg, Daily  urea (URE-NA) packet 15 g, BID  lisinopril (PRINIVIL;ZESTRIL) tablet 20 mg, Daily  sodium chloride 104 mEq in sodium chloride 0.9 % 250 mL infusion, Continuous  LORazepam (ATIVAN) 1 mg in sodium chloride (PF) 0.9 % 10 mL injection, Once PRN  polyethylene glycol (GLYCOLAX) packet 17 g, Daily  labetalol (NORMODYNE;TRANDATE) injection 10 mg, Q6H PRN  linezolid (ZYVOX) IVPB 600 mg, Q12H  sodium chloride flush 0.9 % injection 5-40 mL, 2 times per day  sodium chloride flush 0.9 % injection 5-40 mL, PRN  0.9 % sodium chloride infusion, PRN  potassium chloride (KLOR-CON M) extended release tablet 40 mEq, PRN   Or  potassium bicarb-citric acid (EFFER-K) effervescent tablet 40 mEq, PRN   Or  potassium chloride 10 mEq/100 mL IVPB (Peripheral Line), PRN  magnesium sulfate 2000 mg in 50 mL IVPB

## 2025-02-10 NOTE — PROGRESS NOTES
Patient confused and pulling at lines and attempting to get out of bed. Attempted education and redirection but unsuccessful. MD made aware with order for BSWR. Restraints applied and patient educated on need. Will continue to monitor and assess need for restraints.

## 2025-02-10 NOTE — PROGRESS NOTES
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 female who presents with recurrent falls.  Patient states that she has fallen twice over the last several hours.  States that the first occurred when she was cleaning stool off of the toilet lid after her  had an accident.  She states that she felt somewhat lightheaded then braced herself against the wall and slid to the floor.  She is unable to describe the second fall, but states that she was able to brace herself to the wall and slide to the floor once again.  She states that she has had increased urinary frequency as well as malodorous urine over the last day or so.  The patient denies syncope, fever, chills, recent illness, headache, rash, chest pain, shortness of breath, cough, abdominal pain, nausea, vomiting, and change in bowel movements.  She affirms history of similar symptoms in the past when she has had a urinary tract infection    - 2/5 - CT of Chest -     1. Negative for pulmonary embolus.  2. Advanced emphysema with an indeterminate 3 cm middle lobe soft tissue mass  and bilateral hilar/mediastinal adenopathy.  PET scan recommended for further  evaluation.  3. Chronic left basilar pleuroparenchymal changes/fibrosis  - 2/5 CT of C-spine negative for acute abnormality  - 2/5 CT of head no acute abnormality   -  - bronchoscopy scheduled for lung mass biopsy   Past Medical History:  has a past medical history of Abdominal aortic aneurysm (AAA) without rupture, unspecified part (HCC), Acute DVT (deep venous thrombosis) ([facility]), Acute encephalopathy, Acute on chronic diastolic heart failure ([facility]), Alcohol      Cognition  Overall Cognitive Status: Impaired  Arousal/Alertness: inconsistent responses to stimuli  Following Commands: follows one step commands with repetition  Attention Span: attends with cues to redirect  Memory: decreased recall of recent events, decreased short term memory  Safety Judgement: decreased awareness of need for assistance, decreased awareness of need for safety  Problem Solving: assistance required to generate solutions  Insights: decreased awareness of deficits  Initiation: requires cues for some  Sequencing: requires cues for some  Orientation:    oriented to person, oriented to place, oriented to situation, and disoriented to time   Command Following:   accurately follows one step commands    Education  Barriers To Learning: cognition and hearing  Patient Education: patient educated on goals, PT role and benefits, plan of care, general safety, functional mobility training, family education, pressure relief, injury prevention, transfer training, discharge recommendations  Learning Assessment:  patient verbalizes understanding, would benefit from continued reinforcement    Assessment  Activity Tolerance: Fair; limited by pain   Impairments Requiring Therapeutic Intervention: decreased functional mobility, decreased strength, decreased safety awareness, decreased cognition, decreased endurance, decreased balance, increased pain, decreased posture  Prognosis: good  Clinical Assessment: Patient is 80 y/o female presenting to [facility] secondary to falls resulting in L sided rib fracture. Patient currently presents below baseline function with all mobility. Patient's PLOF includes being independent for transfers and mod I with use of furniture walking or RW. However, this date, pt demonstrates significant decline in overall function, requiring max A x2 for all bed mobility and unable to safely sequence through transfers.   Patient will continue to benefit from skilled PT in order to return to PLOF

## 2025-02-10 NOTE — PROGRESS NOTES
Infectious Diseases   Progress Note      Admission Date: 2/4/2025  Hospital Day: Hospital Day: 7   Attending: Tran Paredes MD  Date of service: 2/10/2025     Chief complaint/ Reason for consult:     Streptococcal bacteremia  Sepsis on admission with leukocytosis, tachycardia, tachypnea  Complicated urinary tract infection with Klebsiella  Syncope and falls at home  History of stroke  Coronary artery disease  Type 2 diabetes mellitus    Microbiology:      I have reviewed allavailable micro lab data and cultures    Results       Procedure Component Value Units Date/Time    Culture, Blood 1 [[id number]] Collected: 02/07/25 1318    Order Status: Completed Specimen: Blood Updated: 02/08/25 1415     Blood Culture, Routine No Growth to date.  Any change in status will be called.    Narrative:      ORDER#: [id number]                          ORDERED BY: GARIMA DURÁN  SOURCE: Blood Antecubital-Rig              COLLECTED:  02/07/25 13:18  ANTIBIOTICS AT BRENDA.:                      RECEIVED :  02/07/25 20:40  If child <=2 yrs old please draw pediatric bottle.~Blood Culture 1    Culture, Blood 2 [[id number]] Collected: 02/07/25 1318    Order Status: Completed Specimen: Blood Updated: 02/08/25 1415     Culture, Blood 2 No Growth to date.  Any change in status will be called.    Narrative:      ORDER#: [id number]                          ORDERED BY: GARIMA DURÁN  SOURCE: Blood Hand, Right                  COLLECTED:  02/07/25 13:18  ANTIBIOTICS AT BRENDA.:                      RECEIVED :  02/07/25 20:40  If child <=2 yrs old please draw pediatric bottle.~Blood Culture #2    Culture, Blood 2 [[id number]] Collected: 02/05/25 0227    Order Status: Completed Specimen: Blood Updated: 02/09/25 0315     Culture, Blood 2 No Growth after 4 days of incubation.    Narrative:      ORDER#: [id number]                          ORDERED BY: ARNEL ARTIS  SOURCE: Blood left forearm                 COLLECTED:  02/05/25  infarction due to embolism of cerebral artery ([facility]) I63.40    Alcohol abuse counseling and surveillance Z71.41    Diabetes education, encounter for Z71.89    History of stroke Z86.73    Depression F32.A    HTN (hypertension), benign I10    Dyslipidemia E78.5    Encounter for electronic analysis of reveal event recorder Z45.09    PAF (paroxysmal atrial fibrillation) ([facility]) I48.0    Vascular dementia, uncomplicated (HCC) F01.50    Other insomnia G47.09    Transient alteration of awareness R40.4    Complicated UTI (urinary tract infection) N39.0    Coronary artery disease due to lipid rich plaque I25.10, I25.83    Ataxia R27.0    Overactive bladder N32.81    Chronic systolic (congestive) heart failure I50.22    Septicemia ([facility]) A41.9    Bacterial pneumonia J15.9    Acute respiratory failure with hypoxia J96.01    Aspiration into airway T17.908A    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 Although every effort was made to ensure the accuracy of this automated transcription, some errors in transcription may have occurred inadvertently. If you may need any clarification, please do not hesitate to contact me through EPIC or at the phone number provided below with my electronic signature.  Any pictures or media included in this note were obtained after taking informed verbal consent from the patient and with their approval to include those in the patient's medical record.        Hakan Abdullahi MD, MPH, FACP, FIDSA  2/10/2025, 11:10 AM  Central Office Phone: [phone]  Central Office Fax: [phone]    [facility] Infectious Disease   [address] ([facility]

## 2025-02-10 NOTE — CONSULTS
PALLIATIVE MEDICINE CONSULTATION     Patient name:Sharon Kitchen   MRN:[id number]    :1945  Room/Bed:[facility]2901/2901-01   LOS: 5 days         Date of consult:2/10/2025    Inpatient consult to Palliative Care  Consult performed by: Jaleesa Garcia APRN - CNP  Consult ordered by: Gordon South MD  Reason for consult: GOC and code status              ASSESSMENT/RECOMMENDATIONS     79 y.o. female with AMS and UTI with lung mass       Symptom Management:  AMS- pt is disoriented x 4 per daughter she is usually oriented to self at home   Lung mass- pathology pending from bronch CT with bilateral hilar/mediastinal adenopathy   Goals of Care- talked to pts daughter Doris pt was active with [facility] prior to her admission to the hospital [phone] they also follow pts spouse. Per family pt is DNRCC at baseline. Daughter states that there are lots of people living in the home and lots of money and health issues and if they are going to take her back they need more help from Hospice. Called and talked to David at Hospice agency they have concerns about the household safety and ability to care for the pts needs. They would recommend pt transfer to a Hospice agency that has inpatient facilities and more expansive network. They will call daughter and explain that a referral will be sent to [facility] at their recommendation.     Patient/Family Goals of Care :    DNRCC at baseline and active with Hospice confirmed with family and Hospice agency. Referral sent to [facility] at current Hospice agencies request.     Disposition/Discharge Plan:   Per families choice referral submitted to [facility] fax# [phone], phone # [phone]. Referral also communicated to liaison, Hospitalist and . Please refer to [facility] for any updates in enrollment or discharge plan.      Advance Directives:    The patient has appointed the following active healthcare agents:     the conversation:  Documented healthcare agent   Other persons present:  Daughter Doris    Legal Healthcare Decision Maker(s):    Primary Decision Maker: Doris Kitchen - Child - [phone]    Secondary Decision Maker: Florentino Kitchen - Spouse - [phone]    ACP documents available in EMR prior to discussion:  [] Advance Medical Directive  [] Portable DNR  [] POLST  [] [place] MOST   [x] None  [] ACP documents have been previously completed by patient or surrogate, but are not available today for review.      Goals of Care Determination: Patient wants comfort care (NO CPR, vent, surgery, HD, trach, PEG)    Resuscitation Status:   Code Status: DNR-CC    Outcomes / Completed Documentation:  An explanation of advance directives and their importance was provided and the following forms completed:    [] Advance Medical Directive  [x] Portable DNR   [] POLST  [] No new documents completed  [] Patient or surrogate was asked to provide previously completed documents to the palliative team    If new document completed, original was provided to patient and/or family member.    Copy was placed for scanning into the [facility] EMR.      I spent 20 minutes providing separately identifiable ACP services with the patient and/or surrogate decision maker in a voluntary, in-person conversation discussing the patient's wishes and goals as detailed in the above note.                    Signed By: Electronically signed by QUITA Eddy CNP on 2/10/2025 at 12:54 PM  Palliative Medicine     February 10, 2025

## 2025-02-10 NOTE — PROGRESS NOTES
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 female who presents with recurrent falls.  Patient states that she has fallen twice over the last several hours.  States that the first occurred when she was cleaning stool off of the toilet lid after her  had an accident.  She states that she felt somewhat lightheaded then braced herself against the wall and slid to the floor.  She is unable to describe the second fall, but states that she was able to brace herself to the wall and slide to the floor once again.  She states that she has had increased urinary frequency as well as malodorous urine over the last day or so.  The patient denies syncope, fever, chills, recent illness, headache, rash, chest pain, shortness of breath, cough, abdominal pain, nausea, vomiting, and change in bowel movements.  She affirms history of similar symptoms in the past when she has had a urinary tract infection               - 2/5 - CT of Chest -                1. Negative for pulmonary embolus.  2. Advanced emphysema with an indeterminate 3 cm middle lobe soft tissue mass  and bilateral hilar/mediastinal adenopathy.  PET scan recommended for further  evaluation.  3. Chronic left basilar pleuroparenchymal changes/fibrosis  - 2/5 CT of C-spine negative for acute abnormality  - 2/5 CT of head no acute abnormality              -  - bronchoscopy scheduled for lung mass biopsy   Past Medical History:  has a past medical history of Abdominal aortic aneurysm (AAA) without rupture, unspecified part (HCC), Acute DVT (deep venous thrombosis) ([facility]), Acute encephalopathy, Acute on chronic

## 2025-02-11 ENCOUNTER — HOSPITAL ENCOUNTER (OUTPATIENT)
Age: 80
Setting detail: OBSERVATION
LOS: 2 days | Discharge: HOSPICE | End: 2025-02-13
Attending: INTERNAL MEDICINE | Admitting: INTERNAL MEDICINE
Payer: MEDICARE

## 2025-02-11 VITALS
BODY MASS INDEX: 17.52 KG/M2 | WEIGHT: 109 LBS | RESPIRATION RATE: 21 BRPM | TEMPERATURE: 97 F | SYSTOLIC BLOOD PRESSURE: 134 MMHG | HEART RATE: 79 BPM | DIASTOLIC BLOOD PRESSURE: 92 MMHG | HEIGHT: 66 IN | OXYGEN SATURATION: 100 %

## 2025-02-11 PROBLEM — N39.0 UTI DUE TO KLEBSIELLA SPECIES: Status: ACTIVE | Noted: 2025-02-11

## 2025-02-11 PROBLEM — B96.89 UTI DUE TO KLEBSIELLA SPECIES: Status: ACTIVE | Noted: 2025-02-11

## 2025-02-11 LAB
ANION GAP SERPL CALCULATED.3IONS-SCNC: 9 MMOL/L (ref 3–16)
BACTERIA BLD CULT ORG #2: NORMAL
BACTERIA BLD CULT: NORMAL
BUN SERPL-MCNC: 13 MG/DL (ref 7–20)
CALCIUM SERPL-MCNC: 7.3 MG/DL (ref 8.3–10.6)
CHLORIDE SERPL-SCNC: 92 MMOL/L (ref 99–110)
CO2 SERPL-SCNC: 27 MMOL/L (ref 21–32)
CREAT SERPL-MCNC: 0.7 MG/DL (ref 0.6–1.2)
GFR SERPLBLD CREATININE-BSD FMLA CKD-EPI: 87 ML/MIN/{1.73_M2}
GLUCOSE SERPL-MCNC: 113 MG/DL (ref 70–99)
OSMOLALITY UR: 600 MOSM/KG (ref 390–1070)
OSMOLALITY UR: >2000 MOSM/KG (ref 390–1070)
POTASSIUM SERPL-SCNC: 3.6 MMOL/L (ref 3.5–5.1)
SODIUM SERPL-SCNC: 128 MMOL/L (ref 136–145)
SODIUM UR-SCNC: <20 MMOL/L

## 2025-02-11 PROCEDURE — 1200000000 HC SEMI PRIVATE

## 2025-02-11 PROCEDURE — 80048 BASIC METABOLIC PNL TOTAL CA: CPT

## 2025-02-11 PROCEDURE — 83935 ASSAY OF URINE OSMOLALITY: CPT

## 2025-02-11 PROCEDURE — 2000000000 HC ICU R&B

## 2025-02-11 PROCEDURE — 6360000002 HC RX W HCPCS: Performed by: INTERNAL MEDICINE

## 2025-02-11 PROCEDURE — 84300 ASSAY OF URINE SODIUM: CPT

## 2025-02-11 PROCEDURE — 2500000003 HC RX 250 WO HCPCS: Performed by: INTERNAL MEDICINE

## 2025-02-11 PROCEDURE — 6360000002 HC RX W HCPCS: Performed by: NURSE PRACTITIONER

## 2025-02-11 RX ORDER — MORPHINE SULFATE 2 MG/ML
2 INJECTION, SOLUTION INTRAMUSCULAR; INTRAVENOUS EVERY 4 HOURS PRN
Status: DISCONTINUED | OUTPATIENT
Start: 2025-02-11 | End: 2025-02-13 | Stop reason: HOSPADM

## 2025-02-11 RX ORDER — ONDANSETRON 2 MG/ML
4 INJECTION INTRAMUSCULAR; INTRAVENOUS EVERY 6 HOURS PRN
Status: DISCONTINUED | OUTPATIENT
Start: 2025-02-11 | End: 2025-02-13 | Stop reason: HOSPADM

## 2025-02-11 RX ORDER — SODIUM CHLORIDE 0.9 % (FLUSH) 0.9 %
5-40 SYRINGE (ML) INJECTION PRN
Status: DISCONTINUED | OUTPATIENT
Start: 2025-02-11 | End: 2025-02-13 | Stop reason: HOSPADM

## 2025-02-11 RX ORDER — POLYETHYLENE GLYCOL 3350 17 G/17G
17 POWDER, FOR SOLUTION ORAL DAILY PRN
Status: DISCONTINUED | OUTPATIENT
Start: 2025-02-11 | End: 2025-02-13 | Stop reason: HOSPADM

## 2025-02-11 RX ORDER — ACETAMINOPHEN 325 MG/1
650 TABLET ORAL EVERY 6 HOURS PRN
Status: DISCONTINUED | OUTPATIENT
Start: 2025-02-11 | End: 2025-02-13 | Stop reason: HOSPADM

## 2025-02-11 RX ORDER — HALOPERIDOL 5 MG/ML
2 INJECTION INTRAMUSCULAR EVERY 4 HOURS PRN
Status: DISCONTINUED | OUTPATIENT
Start: 2025-02-11 | End: 2025-02-13 | Stop reason: HOSPADM

## 2025-02-11 RX ORDER — SODIUM CHLORIDE 0.9 % (FLUSH) 0.9 %
5-40 SYRINGE (ML) INJECTION EVERY 12 HOURS SCHEDULED
Status: DISCONTINUED | OUTPATIENT
Start: 2025-02-11 | End: 2025-02-13 | Stop reason: HOSPADM

## 2025-02-11 RX ORDER — CLONAZEPAM 1 MG/1
1 TABLET ORAL EVERY 12 HOURS PRN
Status: DISCONTINUED | OUTPATIENT
Start: 2025-02-11 | End: 2025-02-12

## 2025-02-11 RX ORDER — FUROSEMIDE 40 MG/1
40 TABLET ORAL DAILY
Status: DISCONTINUED | OUTPATIENT
Start: 2025-02-12 | End: 2025-02-13 | Stop reason: HOSPADM

## 2025-02-11 RX ORDER — HYDROCODONE BITARTRATE AND HOMATROPINE METHYLBROMIDE 5; 1.5 MG/1; MG/1
1 TABLET ORAL EVERY 6 HOURS PRN
Status: DISCONTINUED | OUTPATIENT
Start: 2025-02-11 | End: 2025-02-13 | Stop reason: HOSPADM

## 2025-02-11 RX ORDER — MORPHINE SULFATE 2 MG/ML
2 INJECTION, SOLUTION INTRAMUSCULAR; INTRAVENOUS EVERY 4 HOURS PRN
Status: CANCELLED | OUTPATIENT
Start: 2025-02-11

## 2025-02-11 RX ORDER — SODIUM CHLORIDE 9 MG/ML
INJECTION, SOLUTION INTRAVENOUS PRN
Status: DISCONTINUED | OUTPATIENT
Start: 2025-02-11 | End: 2025-02-13 | Stop reason: HOSPADM

## 2025-02-11 RX ORDER — MAGNESIUM SULFATE IN WATER 40 MG/ML
2000 INJECTION, SOLUTION INTRAVENOUS PRN
Status: DISCONTINUED | OUTPATIENT
Start: 2025-02-11 | End: 2025-02-13 | Stop reason: HOSPADM

## 2025-02-11 RX ORDER — POTASSIUM CHLORIDE 7.45 MG/ML
10 INJECTION INTRAVENOUS PRN
Status: DISCONTINUED | OUTPATIENT
Start: 2025-02-11 | End: 2025-02-13 | Stop reason: HOSPADM

## 2025-02-11 RX ORDER — POTASSIUM CHLORIDE 1500 MG/1
40 TABLET, EXTENDED RELEASE ORAL ONCE
Status: DISCONTINUED | OUTPATIENT
Start: 2025-02-11 | End: 2025-02-11 | Stop reason: HOSPADM

## 2025-02-11 RX ORDER — ACETAMINOPHEN 650 MG/1
650 SUPPOSITORY RECTAL EVERY 6 HOURS PRN
Status: DISCONTINUED | OUTPATIENT
Start: 2025-02-11 | End: 2025-02-13 | Stop reason: HOSPADM

## 2025-02-11 RX ORDER — CLONAZEPAM 1 MG/1
1 TABLET ORAL EVERY 12 HOURS PRN
Status: CANCELLED | OUTPATIENT
Start: 2025-02-11

## 2025-02-11 RX ORDER — ONDANSETRON 4 MG/1
4 TABLET, ORALLY DISINTEGRATING ORAL EVERY 8 HOURS PRN
Status: DISCONTINUED | OUTPATIENT
Start: 2025-02-11 | End: 2025-02-13 | Stop reason: HOSPADM

## 2025-02-11 RX ORDER — ENOXAPARIN SODIUM 100 MG/ML
40 INJECTION SUBCUTANEOUS DAILY
Status: DISCONTINUED | OUTPATIENT
Start: 2025-02-11 | End: 2025-02-13 | Stop reason: HOSPADM

## 2025-02-11 RX ORDER — HYDROCODONE BITARTRATE AND HOMATROPINE METHYLBROMIDE 5; 1.5 MG/1; MG/1
1 TABLET ORAL EVERY 6 HOURS PRN
Status: CANCELLED | OUTPATIENT
Start: 2025-02-11

## 2025-02-11 RX ORDER — FUROSEMIDE 40 MG/1
40 TABLET ORAL DAILY
Status: CANCELLED | OUTPATIENT
Start: 2025-02-12

## 2025-02-11 RX ORDER — POTASSIUM CHLORIDE 1500 MG/1
40 TABLET, EXTENDED RELEASE ORAL PRN
Status: DISCONTINUED | OUTPATIENT
Start: 2025-02-11 | End: 2025-02-13 | Stop reason: HOSPADM

## 2025-02-11 RX ADMIN — MORPHINE SULFATE 2 MG: 2 INJECTION, SOLUTION INTRAMUSCULAR; INTRAVENOUS at 20:05

## 2025-02-11 RX ADMIN — SODIUM CHLORIDE, PRESERVATIVE FREE 10 ML: 5 INJECTION INTRAVENOUS at 20:05

## 2025-02-11 RX ADMIN — LEVOFLOXACIN 750 MG: 750 INJECTION, SOLUTION INTRAVENOUS at 08:51

## 2025-02-11 RX ADMIN — LINEZOLID 600 MG: 600 INJECTION, SOLUTION INTRAVENOUS at 02:18

## 2025-02-11 ASSESSMENT — PAIN DESCRIPTION - LOCATION: LOCATION: HEAD

## 2025-02-11 ASSESSMENT — PAIN SCALES - GENERAL: PAINLEVEL_OUTOF10: 7

## 2025-02-11 ASSESSMENT — PAIN DESCRIPTION - DESCRIPTORS: DESCRIPTORS: ACHING

## 2025-02-11 NOTE — PROGRESS NOTES
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 Aguirre  She has history of hyponatremia dating back to 2014  She had been on fluid restriction and sodium chloride tablets  She was hospitalized on 4 February with concerns for multiple falls and possible urinary tract infection  Imaging revealed rib fractures and right lower lobe nodule concerning for metastatic disease  Blood cultures are growing Streptococcus  We are consulted for hyponatremia    No acute events in the last 24 hrs  Sodium improved to 130  with hypertonic saline   And has stabilized around 128 since being off the hypertonic saline.   Much more awake today.  Alert oriented x self, place person    Objective/      BP (!) 134/92   Pulse 79   Temp 97 °F (36.1 °C) (Temporal)   Resp 21   Ht 1.676 m (5' 6\")   Wt 49.4 kg (109 lb)   SpO2 100%   BMI 17.59 kg/m²   GEN:  NAD  HEENT: EOMI,  Neck: supple  CV: S1, S2 ,rrr,  no edema  RESP: symmetric movement,   breathing wnl  ABD:  soft, nt,   SKIN: warm, no petechial rashes  Neuro:   No asterixis     Data/      Current Medications:    Scheduled Meds:   potassium chloride  40 mEq Oral Once    levofloxacin  750 mg IntraVENous Q24H    carvedilol  25 mg Oral BID with meals    furosemide  40 mg Oral Daily    urea  15 g Oral BID    lisinopril  20 mg Oral Daily    polyethylene glycol  17 g Oral Daily    linezolid  600 mg IntraVENous Q12H    sodium chloride flush  5-40 mL IntraVENous 2 times per day    enoxaparin  30 mg SubCUTAneous Daily    mirtazapine  15 mg Oral Nightly    megestrol  20 mg Oral Daily    levothyroxine  100 mcg Oral Daily    aspirin  81 mg Oral Daily     Continuous Infusions:   sodium chloride Stopped (02/06/25 1524)     PRN Meds:.HYDROcodone homatropine, phenol, labetalol, sodium chloride flush, sodium chloride, potassium chloride **OR** potassium

## 2025-02-11 NOTE — PROGRESS NOTES
Speech Language Pathology  Attempt    Sharon FIONA Kitchen   1945     Attempted to see pt for dysphagia/speech therapy follow-up. Per discussion with RN, pt was changed to NPO due to increased difficulty swallowing last PM. RN also reported there are potential plans for pt to d/c with hospice services. Pt sleeping soundly at this time, will hold follow-up and allow pt to rest. Will follow-up as therapy schedule allows and as pt is able to participate.     Thanks,  Kasey Arvizu MA CCC-SLP #[id number]  Speech Language Pathologist

## 2025-02-11 NOTE — DISCHARGE SUMMARY
Amount: 2 mg 12/23/24 1/22/25  Sherrill Crsos APRN - NP   lisinopril (PRINIVIL;ZESTRIL) 5 MG tablet TAKE 1 TABLET BY MOUTH DAILY 12/4/24   Sherrill Cross APRN - NP   levothyroxine (SYNTHROID) 100 MCG tablet TAKE 1 TABLET BY MOUTH DAILY 12/4/24   Sherrill Cross APRN - NP   megestrol (MEGACE) 20 MG tablet Take 1 tablet by mouth daily 11/18/24   Sherrill Cross APRN - NP   ondansetron (ZOFRAN) 4 MG tablet Take 1 tablet by mouth every 8 hours as needed for Nausea 11/16/24   Promise Farrell MD   mirtazapine (REMERON) 15 MG tablet TAKE 1 TABLET BY MOUTH DAILY 9/5/24   Sherrill Cross APRN - NP   carvedilol (COREG) 6.25 MG tablet Take 1 tablet by mouth with breakfast and with evening meal 5/1/24   Aurelia Matthews MD   albuterol sulfate HFA (VENTOLIN HFA) 108 (90 Base) MCG/ACT inhaler Inhale 2 puffs into the lungs every 4 hours as needed for Wheezing or Shortness of Breath  Patient not taking: Reported on 11/18/2024 4/28/24   Deny Snyder MD   ipratropium 0.5 mg-albuterol 2.5 mg (DUONEB) 0.5-2.5 (3) MG/3ML SOLN nebulizer solution Inhale 3 mLs into the lungs every 6 hours as needed for Shortness of Breath  Patient not taking: Reported on 11/18/2024 4/28/24   Deny Snyder MD   apixaban (ELIQUIS) 5 MG TABS tablet Take 1 tablet by mouth 2 times daily  Patient not taking: Reported on 11/18/2024 4/28/24 11/18/24  Deny Snyder MD   mometasone-formoterol (DULERA) 100-5 MCG/ACT inhaler Inhale 2 puffs into the lungs in the morning and 2 puffs in the evening.  Patient not taking: Reported on 11/18/2024 4/28/24   Deny Snyder MD   diphenhydrAMINE-APAP, sleep, (TYLENOL PM EXTRA STRENGTH)  MG tablet Take 1 tablet by mouth nightly as needed for Sleep    Provider, MD Sara   Respiratory Therapy Supplies (NEBULIZER/TUBING/MOUTHPIECE) KIT 1 kit by Does not apply route daily as needed (for shortness of breath) 1/23/24    intact.        CBC   Recent Labs     02/09/25  0628 02/10/25  0655   WBC 8.4 9.1   HGB 12.5 11.6*   HCT 35.6* 33.2*    325      RENAL  Recent Labs     02/09/25  2205 02/10/25  0250 02/10/25  0807 02/10/25  1132 02/10/25  1537 02/10/25  2028 02/11/25  0600   *   < > 122*   < > 130* 127* 128*   K 5.3*  --  4.1  --   --   --  3.6   CL 88*  --  87*  --   --   --  92*   CO2 27  --  26  --   --   --  27   BUN 15  --  14  --   --   --  13   CREATININE 0.7  --  0.6  --   --   --  0.7    < > = values in this interval not displayed.             Active Hospital Problems    Diagnosis Date Noted    Septicemia ([facility]) [A41.9] 12/04/2023         ASSESSMENT/PLAN:   79 y.o. female with history of COPD, hypertension, hypothyroidism, paroxysmal atrial fibrillation, hyperlipidemia, chronic systolic heart failure, history of CVA who was admitted following history of falls at home sustaining rib fractures as well as altered mentation found to have septicemia with Streptococcus bacteremia complicated by Klebsiella UTI, hyponatremia, pleural effusion, metabolic encephalopathy and lung mass found to be poorly differentiated non-small cell carcinoma.  Patient and family met with hospice care and would like to transition patient to inpatient hospice    Will initiate comfort care measures  Ativan, morphine  Hospice following      Diet: ADULT DIET; Regular  Code Status: DNR-CC       Deny Snyder MD    Thank you Sherrill Cross, QUITA - NP for the opportunity to be involved in this patient's care. If you have any questions or concerns please feel free to contact me at [phone].

## 2025-02-11 NOTE — PROGRESS NOTES
Physical/Occupational Therapy  Sharon Kitchen  1945    02/11/25    Pt on PT/OT caseload. Per chart review, pt's daughter meeting with [facility] hospice service this date.  Later this morning, noting pt's daughter electing for pt to transfer to inpatient hospice unit tomorrow once 24 hours restraint free. Per report from hospice liaison, pt is on contract bed while in hospital with plans to take pt tomorrow. PT/OT will therefore D/C pt from caseload, as plans for transitioning to comfort care. If change in status or pt/family desires warrant therapy services, please re-order therapy evaluations.  Thank you,  Malick Cisse, PT, DPT, [id number]  Jenna Bowden, OTR/L [id number]

## 2025-02-11 NOTE — PLAN OF CARE
Problem: Skin/Tissue Integrity  Goal: Skin integrity remains intact  Description: 1.  Monitor for areas of redness and/or skin breakdown  2.  Assess vascular access sites hourly  3.  Every 4-6 hours minimum:  Change oxygen saturation probe site  4.  Every 4-6 hours:  If on nasal continuous positive airway pressure, respiratory therapy assess nares and determine need for appliance change or resting period  Outcome: Progressing     Problem: Safety - Adult  Goal: Free from fall injury  Outcome: Progressing     Problem: Chronic Conditions and Co-morbidities  Goal: Patient's chronic conditions and co-morbidity symptoms are monitored and maintained or improved  Outcome: Progressing     Problem: Pain  Goal: Verbalizes/displays adequate comfort level or baseline comfort level  Outcome: Progressing     Problem: Safety - Medical Restraint  Goal: Remains free of injury from restraints (Restraint for Interference with Medical Device)  Description: INTERVENTIONS:  1. Determine that other, less restrictive measures have been tried or would not be effective before applying the restraint  2. Evaluate the patient's condition at the time of restraint application  3. Inform patient/family regarding the reason for restraint  4. Q2H: Monitor safety, psychosocial status, comfort, nutrition and hydration  Outcome: Progressing

## 2025-02-11 NOTE — PROGRESS NOTES
PALLIATIVE MEDICINE PROGRESS NOTE     Patient name:Sharon Kitchen    MRN:[id number] :1945  Room/Bed:[facility]-2901/2901-    LOS: 0 days        ASSESSMENT/RECOMMENDATIONS   79 y.o. female with AMS and UTI with lung mass         Symptom Management:  AMS- pt is disoriented x 4 per daughter she is usually oriented to self at home   Lung mass- pathology Non-small cell carcinoma from bronch CT with bilateral hilar/mediastinal adenopathy   Goals of Care- Pt with increased agitation and confusion today. Unable to swallow added Haldol to pts comfort meds. Pt is partnered with [facility] planning to move to the IPU tomorrow after 24hrs restraint free      Patient/Family Goals of Care :    2/10  DNRCC at baseline and active with Hospice confirmed with family and Hospice agency. Referral sent to [facility] at current Hospice agencies request.       Pt with increased agitation and confusion today. Unable to swallow added Haldol to pts comfort meds. Pt is partnered with [employer] planning to move to the IPU tomorrow after 24hrs restraint free      Disposition/Discharge Plan:   Partnered with [employer]         Advance Directives:     The patient has appointed the following active healthcare agents:    Primary Decision Maker: Doris Kitchen - Child - [phone]    Secondary Decision Maker: Florentino Kitchen C - Spouse - [phone]     The Patient has the following current code status:    Code Status: DNR-CC        Interactive exchange regarding medications,tests and procedures with: patient, floor RN, Anna DEVLIN  Thank you for allowing us to participate in the care of this patient.      SUBJECTIVE     Chief Complaint: AMS    Last 24 hours:   Pt increased agitation today     ROS:    Review of Systems   Unable to perform ROS: Mental status change                   Physical Exam  Constitutional:       Appearance: She is ill-appearing.   HENT:      Head: Normocephalic and atraumatic.      Nose: No congestion.      Mouth/Throat:

## 2025-02-11 NOTE — CARE COORDINATION
Per Suresh Sánchez -[phone] daughter signed consents but pt is currently in restraints and has to be restraint free for 24 hours, she is contacting MD.    Bharati Li RN, BSN  [phone]     Updated at 10:53 AM    Per Suresh Sánchez RN pt is partner bed today and will go to inpt unit tomorrow.    Bharati Li RN, BSN  [phone]

## 2025-02-11 NOTE — DISCHARGE SUMMARY
TO NEPHROLOGY  IP CONSULT TO PALLIATIVE CARE    Disposition: Inpatient hospice    Condition at Discharge: Terminal    Discharge Instructions/Follow-up:    Patient will transition to hospice care    Code Status:  DNR-CC     Activity: activity as tolerated    Diet: regular diet      Discharge Medications:     Discharge Medication List as of 2/11/2025 11:02 AM             Details   furosemide (LASIX) 40 MG tablet TAKE 1/2 TABLET BY MOUTH DAILY, Disp-45 tablet, R-0Normal      clonazePAM (KLONOPIN) 1 MG tablet Take 1 tablet by mouth 2 times daily for 30 days. Max Daily Amount: 2 mg, Disp-60 tablet, R-0Normal      lisinopril (PRINIVIL;ZESTRIL) 5 MG tablet TAKE 1 TABLET BY MOUTH DAILY, Disp-90 tablet, R-1Normal      levothyroxine (SYNTHROID) 100 MCG tablet TAKE 1 TABLET BY MOUTH DAILY, Disp-90 tablet, R-1Normal      megestrol (MEGACE) 20 MG tablet Take 1 tablet by mouth daily, Disp-30 tablet, R-3Normal      ondansetron (ZOFRAN) 4 MG tablet Take 1 tablet by mouth every 8 hours as needed for Nausea, Disp-20 tablet, R-0Normal      mirtazapine (REMERON) 15 MG tablet TAKE 1 TABLET BY MOUTH DAILY, Disp-90 tablet, R-1Normal      carvedilol (COREG) 6.25 MG tablet Take 1 tablet by mouth with breakfast and with evening meal, Disp-60 tablet, R-3Normal      albuterol sulfate HFA (VENTOLIN HFA) 108 (90 Base) MCG/ACT inhaler Inhale 2 puffs into the lungs every 4 hours as needed for Wheezing or Shortness of Breath, Disp-18 g, R-1Normal      ipratropium 0.5 mg-albuterol 2.5 mg (DUONEB) 0.5-2.5 (3) MG/3ML SOLN nebulizer solution Inhale 3 mLs into the lungs every 6 hours as needed for Shortness of Breath, Disp-360 mL, R-0Normal      apixaban (ELIQUIS) 5 MG TABS tablet Take 1 tablet by mouth 2 times daily, Disp-60 tablet, R-0Normal      mometasone-formoterol (DULERA) 100-5 MCG/ACT inhaler Inhale 2 puffs into the lungs in the morning and 2 puffs in the evening., Disp-1 each, R-2Normal      diphenhydrAMINE-APAP, sleep, (TYLENOL PM EXTRA

## 2025-02-11 NOTE — H&P
Hospital Medicine History & Physical       PCP: Sherrill Cross APRN - NP     Date of Admission: 2/11/2025     Date of Service: Pt seen/examined on 2/11/2025 and Admitted to Inpatient with expected LOS greater than two midnights due to medical therapy     Chief Complaint: Terminal illness        History Of Present Illness:    The patient is a 79 y.o. female with history of COPD, hypertension, hypothyroidism, paroxysmal atrial fibrillation, hyperlipidemia, chronic systolic heart failure, history of CVA who was admitted following history of falls at home sustaining rib fractures as well as altered mentation found to have septicemia with Streptococcus bacteremia complicated by Klebsiella UTI, hyponatremia, pleural effusion, metabolic encephalopathy and lung mass found to be poorly differentiated non-small cell carcinoma.  Patient and family met with hospice care and would like to transition patient to inpatient hospice     Past Medical History:    Past Medical History            Diagnosis Date    Abdominal aortic aneurysm (AAA) without rupture, unspecified part ([facility]) 11/18/2024    Acute DVT (deep venous thrombosis) ([facility]) 07/03/2015    Acute encephalopathy 04/19/2018    Acute on chronic diastolic heart failure ([facility]) 03/30/2019    Alcohol abuse      Anxiety      Arthritis      CAD in native artery      Cellulitis 04/28/2018    Cerebral artery occlusion with cerebral infarction ([facility])       states hx of multiple mini strokes    Cerebrovascular accident (CVA) ([facility])       left side impaired    Chronic fatigue      Complex care coordination      Complicated UTI (urinary tract infection)      Congestive heart failure, unspecified HF chronicity, unspecified heart failure type ([facility]) 11/17/2022    COPD (chronic obstructive pulmonary disease) ([facility])      COPD exacerbation ([facility]) 02/20/2018    Depression      Diabetes mellitus ([facility])       denies    DIMAS (dyspnea on exertion) 07/03/2015    DVT (deep venous  lesions.  Neurologic:, neurovascularly intact with sensory/motor intact upper extremities/lower extremities, bilaterally.  Cranial nerves: II-XII intact, grossly non-focal. Pupils equal, round, and reactive to light.  Extra ocular muscles intact.          CBC        Recent Labs     02/09/25  0628 02/10/25  0655   WBC 8.4 9.1   HGB 12.5 11.6*   HCT 35.6* 33.2*    325      RENAL            Recent Labs     02/09/25  2205 02/10/25  0250 02/10/25  0807 02/10/25  1132 02/10/25  1537 02/10/25  2028 02/11/25  0600   *   < > 122*   < > 130* 127* 128*   K 5.3*  --  4.1  --   --   --  3.6   CL 88*  --  87*  --   --   --  92*   CO2 27  --  26  --   --   --  27   BUN 15  --  14  --   --   --  13   CREATININE 0.7  --  0.6  --   --   --  0.7    < > = values in this interval not displayed.                       Active Hospital Problems     Diagnosis Date Noted    Septicemia (HCC) [A41.9] 12/04/2023            ASSESSMENT/PLAN:   79 y.o. female with history of COPD, hypertension, hypothyroidism, paroxysmal atrial fibrillation, hyperlipidemia, chronic systolic heart failure, history of CVA who was admitted following history of falls at home sustaining rib fractures as well as altered mentation found to have septicemia with Streptococcus bacteremia complicated by Klebsiella UTI, hyponatremia, pleural effusion, metabolic encephalopathy and lung mass found to be poorly differentiated non-small cell carcinoma.  Patient and family met with hospice care and would like to transition patient to inpatient hospice     Will initiate comfort care measures  Ativan, morphine  Hospice following        Diet: ADULT DIET; Regular  Code Status: DNR-CC         Deny Snyder MD     Thank you Sherrill Cross APRN - NP for the opportunity to be involved in this patient's care. If you have any questions or concerns please feel free to contact me at [phone].

## 2025-02-11 NOTE — CARE COORDINATION
Case Management -  Discharge Note      Patient Name: Sharon Kitchen                   YOB: 1945  Room: [place] [address]            Readmission Risk (Low < 19, Mod (19-27), High > 27): Readmission Risk Score: 17    Current PCP: Sherrill Cross APRN - NP      (IMM) Important Message from Medicare:    Has pt received appropriate compliance notices before being discharged if required: N/A Partner bed with [facility] hospice   Compliance doc:  [] 2nd IMM; [] Code 44 [] Santiago  Date Given:  Given By:     PT AM-PAC: 7 /24  OT AM-PAC: 6 /24    Patient/patient representative has been educated on the benefits of Hospice as well as the possible risks of declining recommended services. Patient/patient representative has acknowledged the information provided and decided on the following discharge plan. Patient/ patient representative has been provided freedom of choice regarding service provider, supported by basic dialogue that supports the patient's individualized plan of care/goals.    [address]    [address]  [place] [address]  Phone: [phone] / [phone]  Fax: [phone]          [facility] agency notified of discharge:  [] Yes [] No  [x] NA    Family notified of discharge:  [x] Yes  [] No  [] NA  Princess RN working with pt's daughter     Facility notified of discharge:  [] Yes  [] No  [x] NA    Pt is being discharged with Outpt IV Antibiotics  [] Yes [] No  [x] NA  If yes, make sure JARED is faxed to [facility] agency, and meds are called in to pharmacy by RN from JARED orders only.      Financial    Payor: [employer] MEDICARE / Plan: [employer] GOLD PLUS HMO / Product Type: *No Product type* /     Pharmacy:  Potential assistance Purchasing Medications: No  Meds-to-Beds request: No      [facility] [id number] - [place], [place] - [address] [phone] - F [phone]  [address] [place] [address]  Phone: [phone] Fax: [phone]    [facility] -  [place], [place] - [address] - P [phone] - F [phone]  [address] [facility] [address]  Phone: [phone] Fax: [phone]    [id number] [place], [place] - [address] - P [phone] - F [phone]  [address]  [facility] [address]  Phone: [phone] Fax: [phone]      Notes:    Additional Case Management Notes: Patient discharged 2/11/2025 and is partner bed with [facility] with plans to go to inpt unit tomorrow.  All discharge needs met per case management .    Bharati Li RN, BSN  [phone]

## 2025-02-12 PROBLEM — C34.92 NON-SMALL CELL CARCINOMA OF LEFT LUNG (HCC): Status: ACTIVE | Noted: 2025-02-12

## 2025-02-12 PROCEDURE — 2700000000 HC OXYGEN THERAPY PER DAY

## 2025-02-12 PROCEDURE — 6360000002 HC RX W HCPCS: Performed by: CLINICAL NURSE SPECIALIST

## 2025-02-12 PROCEDURE — 2500000003 HC RX 250 WO HCPCS: Performed by: INTERNAL MEDICINE

## 2025-02-12 PROCEDURE — 2000000000 HC ICU R&B

## 2025-02-12 PROCEDURE — 6360000002 HC RX W HCPCS: Performed by: INTERNAL MEDICINE

## 2025-02-12 PROCEDURE — 2580000003 HC RX 258: Performed by: INTERNAL MEDICINE

## 2025-02-12 RX ORDER — CLONAZEPAM 1 MG/1
2 TABLET ORAL EVERY 8 HOURS
Status: DISCONTINUED | OUTPATIENT
Start: 2025-02-12 | End: 2025-02-13 | Stop reason: HOSPADM

## 2025-02-12 RX ORDER — DEXMEDETOMIDINE HYDROCHLORIDE 4 UG/ML
.1-1.5 INJECTION, SOLUTION INTRAVENOUS CONTINUOUS
Status: DISCONTINUED | OUTPATIENT
Start: 2025-02-12 | End: 2025-02-13

## 2025-02-12 RX ORDER — MIDAZOLAM HYDROCHLORIDE 1 MG/ML
1-10 INJECTION, SOLUTION INTRAVENOUS CONTINUOUS
Status: DISCONTINUED | OUTPATIENT
Start: 2025-02-12 | End: 2025-02-13

## 2025-02-12 RX ADMIN — SODIUM CHLORIDE, PRESERVATIVE FREE 2 MG: 5 INJECTION INTRAVENOUS at 08:15

## 2025-02-12 RX ADMIN — HALOPERIDOL LACTATE 2 MG: 5 INJECTION, SOLUTION INTRAMUSCULAR at 09:55

## 2025-02-12 RX ADMIN — MORPHINE SULFATE 2 MG: 2 INJECTION, SOLUTION INTRAMUSCULAR; INTRAVENOUS at 20:31

## 2025-02-12 RX ADMIN — SODIUM CHLORIDE, PRESERVATIVE FREE 10 ML: 5 INJECTION INTRAVENOUS at 20:31

## 2025-02-12 RX ADMIN — SODIUM CHLORIDE, PRESERVATIVE FREE 10 ML: 5 INJECTION INTRAVENOUS at 08:16

## 2025-02-12 RX ADMIN — DEXMEDETOMIDINE HYDROCHLORIDE 0.2 MCG/KG/HR: 400 INJECTION, SOLUTION INTRAVENOUS at 12:52

## 2025-02-12 RX ADMIN — ZIPRASIDONE MESYLATE 20 MG: 20 INJECTION, POWDER, LYOPHILIZED, FOR SOLUTION INTRAMUSCULAR at 10:54

## 2025-02-12 ASSESSMENT — PAIN DESCRIPTION - LOCATION: LOCATION: BACK

## 2025-02-12 NOTE — DISCHARGE INSTR - COC
Continuity of Care Form    Patient Name: Sharon Kitchen   :  1945  MRN:  [id number]    Admit date:  2025  Discharge date:  2025    Code Status Order: DNR-CC   Advance Directives:   Advance Care Flowsheet Documentation             Admitting Physician:  Deny Snyder MD  PCP: Sherrill Cross, APRN - NP    Discharging Nurse: Doretha HAYS  Discharging Hospital Unit/Room#: CVU-2901/2901-01  Discharging Unit Phone Number: [phone]    Emergency Contact:   Extended Emergency Contact Information  Primary Emergency Contact: Florentino Kitchen  Address: [address] [place] [address]  Home Phone: [phone]  Work Phone: [phone]  Mobile Phone: [phone]  Relation: Spouse  Secondary Emergency Contact: Doris Kitchen  Home Phone: [phone]  Mobile Phone: [phone]  Relation: Child    Past Surgical History:  Past Surgical History:   Procedure Laterality Date    BRONCHOSCOPY  2025    BRONCHOSCOPY Right 2025    BRONCHOSCOPY ALVEOLAR LAVAGE ROBOTIC performed by Gomez Espitia MD at [facility] ENDOSCOPY    BRONCHOSCOPY N/A 2025    BRONCHOSCOPY ENDOBRONCHIAL ULTRASOUND FINE NEEDLE ASPIRATION performed by Gomez Espitia MD at [facility] ENDOSCOPY    BRONCHOSCOPY  2025    BRONCHOSCOPY/TRANSBRONCHIAL NEEDLE BIOPSY ROBOTIC performed by Gomez Espitia MD at [facility] ENDOSCOPY    COLONOSCOPY  2012    Dr. Wills; repeat 5 years    EYE SURGERY      cateracts removed    NERVE SURGERY N/A 2021    INTERSTIM STAGE1, FLEXIBLE CYSTOSCOPY performed by Rubens Sevilla MD at [facility] OR    PACEMAKER INSERTION      PACEMAKER PLACEMENT      SHOULDER ARTHROSCOPY Right 14    SUBACROMIAL DECOMPRESSION, ROTATOR CUFF REPAIR    STIMULATOR SURGERY N/A 2022    INTERSTIMULATOR INSERTION STAGE 2 - MEDTRONICS performed by Rubens Sevilla MD at [facility] OR    TOE SURGERY Right     TONSILLECTOMY         Immunization History:   Immunization History  ***    Physician Certification: I certify the above information and transfer of Sharon Kitchen  is necessary for the continuing treatment of the diagnosis listed and that she requires {Admit to Appropriate Level of Care:62605} for {GREATER/LESS:433608633} 30 days.     Update Admission H&P: {CHP DME Changes in HandP:795016329}    PHYSICIAN SIGNATURE:  {Esignature:[id number]}

## 2025-02-12 NOTE — PROGRESS NOTES
Hospitalist Progress Note      PCP: Sherrill Cross APRN - NP    Date of Admission: 2/11/2025    LOS: 1    Chief Complaint: No chief complaint on file.      Case Summary:   79 y.o. female with history of COPD, hypertension, hypothyroidism, paroxysmal atrial fibrillation, hyperlipidemia, chronic systolic heart failure, history of CVA who was admitted following history of falls at home sustaining rib fractures as well as altered mentation found to have septicemia with Streptococcus bacteremia complicated by Klebsiella UTI, hyponatremia, pleural effusion, metabolic encephalopathy and lung mass found to be poorly differentiated non-small cell carcinoma.       Active Hospital Problems    Diagnosis Date Noted    Septicemia (HCC) [A41.9] 12/04/2023         Principal Problem:    Septicemia (HCC)  Active Problems:    Essential hypertension    Encephalopathy    Streptococcal bacteremia    Complicated UTI due to Klebsiella species    Non-small cell carcinoma of left lung (HCC)  Resolved Problems:    * No resolved hospital problems. *    Patient and family met with hospice care and transition to inpatient hospice  Will transfer to hospice facility today  Continue comfort care measures        Medications:  Reviewed  Infusion Medications    sodium chloride       Scheduled Medications    furosemide  40 mg Oral Daily    sodium chloride flush  5-40 mL IntraVENous 2 times per day    enoxaparin  40 mg SubCUTAneous Daily     PRN Meds: clonazePAM, HYDROcodone homatropine, LORazepam, morphine, haloperidol lactate, sodium chloride flush, sodium chloride, potassium chloride **OR** potassium alternative oral replacement **OR** potassium chloride, magnesium sulfate, ondansetron **OR** ondansetron, polyethylene glycol, acetaminophen **OR** acetaminophen      DVT Prophylaxis: Subcut enoxaparin  Diet: Diet NPO  Code Status: DNR-CC    Dispo: Anticipate discharge to hospice facility

## 2025-02-12 NOTE — CARE COORDINATION
KAREEM called [facility] office [phone] and spoke to Jesica who states Kassie HAYS is on her way here to see pt. KAREEM provided my number for Kassie to contact me.    KAREEM updated pt's RN.    Bharati Li RN, BSN  [phone]

## 2025-02-12 NOTE — PROGRESS NOTES
Pt very restless and agitated. Message sent to attending. Hospice states they can not take pt when she is restless and agitated at this time. D/C cancelled.   See MAR for interventions.

## 2025-02-12 NOTE — CARE COORDINATION
Per Kassie Sánchez RN patient will stay today, sitter will be removed and pt being mediacated and plan is for darvin Sánchez tomorrow.    Bharati Li RN, BSN  [phone]

## 2025-02-12 NOTE — PROGRESS NOTES
Patient with complaints of headache and back pain. Medicated with prn morphine per orders with positive effect. Patient now resting comfortably in bed with eyes closed without complaints. Safety sit remains at the bedside.

## 2025-02-12 NOTE — PROGRESS NOTES
Pt continued restlessness and agitation. Message sent to MD. See MAR.     Electronically signed by Doretha Fournier RN on 2/12/2025 at 12:41 PM

## 2025-02-13 VITALS
HEART RATE: 80 BPM | WEIGHT: 108.03 LBS | TEMPERATURE: 98.5 F | DIASTOLIC BLOOD PRESSURE: 128 MMHG | OXYGEN SATURATION: 97 % | SYSTOLIC BLOOD PRESSURE: 164 MMHG | BODY MASS INDEX: 17.44 KG/M2 | RESPIRATION RATE: 20 BRPM

## 2025-02-13 PROCEDURE — G0378 HOSPITAL OBSERVATION PER HR: HCPCS

## 2025-02-13 PROCEDURE — 6370000000 HC RX 637 (ALT 250 FOR IP): Performed by: INTERNAL MEDICINE

## 2025-02-13 PROCEDURE — 2500000003 HC RX 250 WO HCPCS: Performed by: INTERNAL MEDICINE

## 2025-02-13 PROCEDURE — 6360000002 HC RX W HCPCS: Performed by: INTERNAL MEDICINE

## 2025-02-13 PROCEDURE — 96372 THER/PROPH/DIAG INJ SC/IM: CPT

## 2025-02-13 RX ADMIN — FUROSEMIDE 40 MG: 40 TABLET ORAL at 09:02

## 2025-02-13 RX ADMIN — MORPHINE SULFATE 2 MG: 2 INJECTION, SOLUTION INTRAMUSCULAR; INTRAVENOUS at 01:09

## 2025-02-13 RX ADMIN — CLONAZEPAM 2 MG: 1 TABLET ORAL at 11:18

## 2025-02-13 RX ADMIN — SODIUM CHLORIDE, PRESERVATIVE FREE 10 ML: 5 INJECTION INTRAVENOUS at 09:03

## 2025-02-13 RX ADMIN — ENOXAPARIN SODIUM 40 MG: 40 INJECTION SUBCUTANEOUS at 09:02

## 2025-02-13 RX ADMIN — MORPHINE SULFATE 2 MG: 2 INJECTION, SOLUTION INTRAMUSCULAR; INTRAVENOUS at 04:59

## 2025-02-13 ASSESSMENT — PAIN DESCRIPTION - LOCATION
LOCATION: GENERALIZED
LOCATION: GENERALIZED

## 2025-02-13 ASSESSMENT — PAIN SCALES - GENERAL: PAINLEVEL_OUTOF10: 7

## 2025-02-13 NOTE — PROGRESS NOTES
Hospital Medicine Discharge Summary    Patient ID: Sharon Kitchen      Patient's PCP: Sherrill Cross, [facility] - [facility]    Admit Date: 2/11/2025     Discharge Date: 2/13/2025      Admitting Provider: Deny Snyder MD     Discharge Provider: Deny Snyder MD     Discharge Diagnoses:       Active Hospital Problems    Diagnosis     Non-small cell carcinoma of left lung (HCC) [C34.92]     Complicated UTI due to Klebsiella species [N39.0, B96.89]     Streptococcal bacteremia [R78.81, B95.5]     Septicemia (HCC) [A41.9]     Essential hypertension [I10]     Encephalopathy [G93.40]        The patient was seen and examined on day of discharge and this discharge summary is in conjunction with any daily progress note from day of discharge.    Hospital Course:     79 y.o. female with history of COPD, hypertension, hypothyroidism, paroxysmal atrial fibrillation, hyperlipidemia, chronic systolic heart failure, history of CVA who was admitted following history of falls at home sustaining rib fractures as well as altered mentation found to have septicemia with Streptococcus bacteremia complicated by Klebsiella UTI, hyponatremia, pleural effusion, metabolic encephalopathy and lung mass found to be poorly differentiated non-small cell carcinoma.  She was seen by hospice and accepted for discharge to inpatient hospice today        Physical Exam Performed:     BP (!) 164/128   Pulse 80   Temp 98.5 °F (36.9 °C) (Temporal)   Resp 20   Wt 49 kg (108 lb 0.4 oz)   SpO2 97%   BMI 17.44 kg/m²     General appearance:  No apparent distress, appears stated age and cooperative.  Respiratory:  Normal respiratory effort. Clear to auscultation, bilaterally without Rales/Wheezes/Rhonchi.  Cardiovascular:  Regular rate and rhythm with normal S1/S2 without murmurs, rubs or gallops.  Abdomen: Soft, non-tender, non-distended with normal bowel sounds.  Musculoskeletal:  No clubbing, cyanosis or edema bilaterally.

## 2025-02-13 NOTE — CARE COORDINATION
02/13/25 1107   IMM Letter   Observation Status Letter date given: 02/13/25   Observation Status Letter time given: 0923  (discussed with daughter Doris on phone and she verbalized understanding, cm emailed to her form per her request to [email or web address]@"[email or web address])   Observation Status Letter given to Patient/Family/Significant other/Guardian/POA/by: Bharati Li RN, CM

## 2025-02-13 NOTE — CARE COORDINATION
Case Management -  Discharge Note      Patient Name: Sharon Kitchen                   YOB: 1945  Room: [place] [address]            Readmission Risk (Low < 19, Mod (19-27), High > 27): Readmission Risk Score: 18.5    Current PCP: Sherrill Cross APRN - NP      (IMM) Important Message from Medicare:    Has pt received appropriate compliance notices before being discharged if required: yes  Compliance doc:  [] 2nd IMM; [] Code 44 [x] [place]  Date Given: 2/13/2025 Given By: Bharati Li RN     PT AM-PAC:   /24  OT AM-PAC:   /24    Patient/patient representative has been educated on the benefits of Hospice as well as the possible risks of declining recommended services. Patient/patient representative has acknowledged the information provided and decided on the following discharge plan. Patient/ patient representative has been provided freedom of choice regarding service provider, supported by basic dialogue that supports the patient's individualized plan of care/goals.    [address]    [address]  [place] [address]  Phone: [phone] / [phone]  Fax: [phone]       Pt discharging to [facility]pt [facility] agency notified of discharge:  [] Yes [] No  [x] NA    Family notified of discharge:  [x] Yes  [] No  [] NA    Facility notified of discharge:  [x] Yes  [] No  [] NA  Princess RN set up   Pt is being discharged with Outpt IV Antibiotics  [] Yes [] No  [x] NA  If yes, make sure JARED is faxed to [facility] agency, and meds are called in to pharmacy by RN from JARED orders only.      Financial    Payor: [employer] MEDICARE / Plan: [employer] GOLD PLUS HMO / Product Type: *No Product type* /     Pharmacy:  Potential assistance Purchasing Medications:    Meds-to-Beds request:        [facility] [id number] - [place], [place] - [address] [phone] - F [phone]  [address] [place] [address]  Phone: [phone] Fax:

## 2025-02-13 NOTE — PROGRESS NOTES
Patient calm and cooperative with care. PRN morphine given throughout night for pain/comfort. Precedex off since last night with no agitation noted.

## 2025-02-13 NOTE — DISCHARGE INSTR - DIET

## 2025-02-13 NOTE — PLAN OF CARE
Problem: Safety - Medical Restraint  Goal: Remains free of injury from restraints (Restraint for Interference with Medical Device)  Outcome: Progressing     Problem: Chronic Conditions and Co-morbidities  Goal: Patient's chronic conditions and co-morbidity symptoms are monitored and maintained or improved  Outcome: Progressing     Problem: Safety - Adult  Goal: Free from fall injury  Outcome: Progressing

## 2025-02-13 NOTE — FLOWSHEET NOTE
Reviewed AVS with patient.  Pt is confused.  Pt needs reinforcement with all instructions.  Daughter called and informed of transfer.

## 2025-02-18 NOTE — DISCHARGE SUMMARY
Hospital Medicine Discharge Summary    Patient ID: Sharon Kitchen      Patient's PCP: Sherrill Cross, [facility]    Admit Date: 2/11/2025     Discharge Date: 2/13/2025      Admitting Provider: Deny Snyder MD     Discharge Provider: Deny Snyder MD     Discharge Diagnoses:       Active Hospital Problems    Diagnosis     Non-small cell carcinoma of left lung (HCC) [C34.92]     Complicated UTI due to Klebsiella species [N39.0, B96.89]     Streptococcal bacteremia [R78.81, B95.5]     Septicemia (HCC) [A41.9]     Essential hypertension [I10]     Encephalopathy [G93.40]        The patient was seen and examined on day of discharge and this discharge summary is in conjunction with any daily progress note from day of discharge.    Hospital Course:   79 y.o. female with history of COPD, hypertension, hypothyroidism, paroxysmal atrial fibrillation, hyperlipidemia, chronic systolic heart failure, history of CVA who was admitted following history of falls at home sustaining rib fractures as well as altered mentation found to have septicemia with Streptococcus bacteremia complicated by Klebsiella UTI, hyponatremia, pleural effusion, metabolic encephalopathy and lung mass found to be poorly differentiated non-small cell carcinoma.  She was seen by hospice and accepted for discharge to inpatient hospice today           Physical Exam Performed:     BP (!) 164/128   Pulse 80   Temp 98.5 °F (36.9 °C) (Temporal)   Resp 20   Wt 49 kg (108 lb 0.4 oz)   SpO2 97%   BMI 17.44 kg/m²     General appearance:  No apparent distress, appears stated age and cooperative.  HEENT:  Normal cephalic, atraumatic without obvious deformity. Pupils equal, round, and reactive to light.  Extra ocular muscles intact. Conjunctivae/corneas clear.  Neck: Supple, with full range of motion. No jugular venous distention. Trachea midline.  Respiratory:  Normal respiratory effort. Clear to auscultation, bilaterally

## 2025-03-08 DIAGNOSIS — F41.9 ANXIETY: ICD-10-CM

## 2025-03-10 RX ORDER — MIRTAZAPINE 15 MG/1
15 TABLET, FILM COATED ORAL DAILY
Qty: 90 TABLET | Refills: 0 | Status: SHIPPED | OUTPATIENT
Start: 2025-03-10

## 2025-04-01 ENCOUNTER — HOSPITAL ENCOUNTER (EMERGENCY)
Age: 80
Discharge: HOME | End: 2025-04-01
Attending: STUDENT IN AN ORGANIZED HEALTH CARE EDUCATION/TRAINING PROGRAM
Payer: MEDICARE

## 2025-04-01 ENCOUNTER — APPOINTMENT (OUTPATIENT)
Dept: GENERAL RADIOLOGY | Age: 80
End: 2025-04-01
Payer: MEDICARE

## 2025-04-01 VITALS
RESPIRATION RATE: 16 BRPM | SYSTOLIC BLOOD PRESSURE: 144 MMHG | OXYGEN SATURATION: 97 % | BODY MASS INDEX: 19.54 KG/M2 | WEIGHT: 121.03 LBS | DIASTOLIC BLOOD PRESSURE: 88 MMHG | HEART RATE: 83 BPM | TEMPERATURE: 98.8 F

## 2025-04-01 DIAGNOSIS — E87.1 HYPONATREMIA: ICD-10-CM

## 2025-04-01 DIAGNOSIS — J96.01 ACUTE HYPOXIC RESPIRATORY FAILURE: Primary | ICD-10-CM

## 2025-04-01 DIAGNOSIS — R79.89 TROPONIN LEVEL ELEVATED: ICD-10-CM

## 2025-04-01 LAB
ALBUMIN SERPL-MCNC: 3.7 G/DL (ref 3.4–5)
ALP SERPL-CCNC: 72 U/L (ref 40–129)
ALT SERPL-CCNC: 25 U/L (ref 10–40)
ANION GAP SERPL CALCULATED.3IONS-SCNC: 12 MMOL/L (ref 3–16)
AST SERPL-CCNC: 109 U/L (ref 15–37)
BACTERIA URNS QL MICRO: NORMAL /HPF
BASE EXCESS BLDV CALC-SCNC: 3.6 MMOL/L
BASOPHILS # BLD: 0.1 K/UL (ref 0–0.2)
BASOPHILS NFR BLD: 0.7 %
BILIRUB DIRECT SERPL-MCNC: <0.1 MG/DL (ref 0–0.3)
BILIRUB INDIRECT SERPL-MCNC: 0.3 MG/DL (ref 0–1)
BILIRUB SERPL-MCNC: 0.4 MG/DL (ref 0–1)
BILIRUB UR QL STRIP.AUTO: NEGATIVE
BUN SERPL-MCNC: 10 MG/DL (ref 7–20)
CALCIUM SERPL-MCNC: 8.5 MG/DL (ref 8.3–10.6)
CHLORIDE SERPL-SCNC: 86 MMOL/L (ref 99–110)
CLARITY UR: CLEAR
CO2 BLDV-SCNC: 33 MMOL/L
CO2 SERPL-SCNC: 30 MMOL/L (ref 21–32)
COHGB MFR BLDV: 1.8 %
COLOR UR: YELLOW
CREAT SERPL-MCNC: 0.6 MG/DL (ref 0.6–1.2)
DEPRECATED RDW RBC AUTO: 15.5 % (ref 12.4–15.4)
EOSINOPHIL # BLD: 0 K/UL (ref 0–0.6)
EOSINOPHIL NFR BLD: 0.4 %
EPI CELLS #/AREA URNS AUTO: 1 /HPF (ref 0–5)
GFR SERPLBLD CREATININE-BSD FMLA CKD-EPI: >90 ML/MIN/{1.73_M2}
GLUCOSE SERPL-MCNC: 129 MG/DL (ref 70–99)
GLUCOSE UR STRIP.AUTO-MCNC: NEGATIVE MG/DL
HCO3 BLDV-SCNC: 31 MMOL/L (ref 23–29)
HCT VFR BLD AUTO: 35.8 % (ref 36–48)
HGB BLD-MCNC: 12 G/DL (ref 12–16)
HGB UR QL STRIP.AUTO: NEGATIVE
HYALINE CASTS #/AREA URNS AUTO: 0 /LPF (ref 0–8)
KETONES UR STRIP.AUTO-MCNC: NEGATIVE MG/DL
LACTATE BLDV-SCNC: 2.4 MMOL/L (ref 0.4–1.9)
LACTATE BLDV-SCNC: 2.9 MMOL/L (ref 0.4–1.9)
LEUKOCYTE ESTERASE UR QL STRIP.AUTO: ABNORMAL
LIPASE SERPL-CCNC: 24 U/L (ref 13–60)
LYMPHOCYTES # BLD: 0.4 K/UL (ref 1–5.1)
LYMPHOCYTES NFR BLD: 3.6 %
MAGNESIUM SERPL-MCNC: 1.84 MG/DL (ref 1.8–2.4)
MCH RBC QN AUTO: 33.5 PG (ref 26–34)
MCHC RBC AUTO-ENTMCNC: 33.6 G/DL (ref 31–36)
MCV RBC AUTO: 99.6 FL (ref 80–100)
METHGB MFR BLDV: 0.5 %
MONOCYTES # BLD: 0.7 K/UL (ref 0–1.3)
MONOCYTES NFR BLD: 7.3 %
NEUTROPHILS # BLD: 9 K/UL (ref 1.7–7.7)
NEUTROPHILS NFR BLD: 88 %
NITRITE UR QL STRIP.AUTO: NEGATIVE
NT-PROBNP SERPL-MCNC: 4031 PG/ML (ref 0–449)
O2 THERAPY: ABNORMAL
PCO2 BLDV: 59.5 MMHG (ref 40–50)
PH BLDV: 7.32 [PH] (ref 7.35–7.45)
PH UR STRIP.AUTO: 7 [PH] (ref 5–8)
PLATELET # BLD AUTO: 350 K/UL (ref 135–450)
PMV BLD AUTO: 8.7 FL (ref 5–10.5)
PO2 BLDV: 47 MMHG
POTASSIUM SERPL-SCNC: 4.4 MMOL/L (ref 3.5–5.1)
PROT SERPL-MCNC: 7.1 G/DL (ref 6.4–8.2)
PROT UR STRIP.AUTO-MCNC: 100 MG/DL
RBC # BLD AUTO: 3.59 M/UL (ref 4–5.2)
RBC CLUMPS #/AREA URNS AUTO: 1 /HPF (ref 0–4)
SAO2 % BLDV: 77 %
SODIUM SERPL-SCNC: 128 MMOL/L (ref 136–145)
SP GR UR STRIP.AUTO: 1.01 (ref 1–1.03)
TROPONIN, HIGH SENSITIVITY: 23 NG/L (ref 0–14)
TROPONIN, HIGH SENSITIVITY: 24 NG/L (ref 0–14)
UA COMPLETE W REFLEX CULTURE PNL UR: ABNORMAL
UA DIPSTICK W REFLEX MICRO PNL UR: YES
URN SPEC COLLECT METH UR: ABNORMAL
UROBILINOGEN UR STRIP-ACNC: 0.2 E.U./DL
WBC # BLD AUTO: 10.2 K/UL (ref 4–11)
WBC #/AREA URNS AUTO: 4 /HPF (ref 0–5)

## 2025-04-01 PROCEDURE — 82803 BLOOD GASES ANY COMBINATION: CPT

## 2025-04-01 PROCEDURE — 80048 BASIC METABOLIC PNL TOTAL CA: CPT

## 2025-04-01 PROCEDURE — 81001 URINALYSIS AUTO W/SCOPE: CPT

## 2025-04-01 PROCEDURE — 83880 ASSAY OF NATRIURETIC PEPTIDE: CPT

## 2025-04-01 PROCEDURE — 85025 COMPLETE CBC W/AUTO DIFF WBC: CPT

## 2025-04-01 PROCEDURE — 93005 ELECTROCARDIOGRAM TRACING: CPT | Performed by: STUDENT IN AN ORGANIZED HEALTH CARE EDUCATION/TRAINING PROGRAM

## 2025-04-01 PROCEDURE — 94660 CPAP INITIATION&MGMT: CPT

## 2025-04-01 PROCEDURE — 83690 ASSAY OF LIPASE: CPT

## 2025-04-01 PROCEDURE — 99285 EMERGENCY DEPT VISIT HI MDM: CPT

## 2025-04-01 PROCEDURE — 84484 ASSAY OF TROPONIN QUANT: CPT

## 2025-04-01 PROCEDURE — 87040 BLOOD CULTURE FOR BACTERIA: CPT

## 2025-04-01 PROCEDURE — 80076 HEPATIC FUNCTION PANEL: CPT

## 2025-04-01 PROCEDURE — 71045 X-RAY EXAM CHEST 1 VIEW: CPT

## 2025-04-01 PROCEDURE — 36415 COLL VENOUS BLD VENIPUNCTURE: CPT

## 2025-04-01 PROCEDURE — 83605 ASSAY OF LACTIC ACID: CPT

## 2025-04-01 PROCEDURE — 83735 ASSAY OF MAGNESIUM: CPT

## 2025-04-01 RX ORDER — AZITHROMYCIN 250 MG/1
TABLET, FILM COATED ORAL
Qty: 1 PACKET | Refills: 0 | Status: SHIPPED | OUTPATIENT
Start: 2025-04-01 | End: 2025-04-05

## 2025-04-01 NOTE — ED PROVIDER NOTES
EMERGENCY DEPARTMENT ENCOUNTER      CHIEF COMPLAINT    Shortness of Breath (Pt presents to the ED via [facility] from [facility]. Per EMS pt called out stating she couldn't breathe. Pt given 1 duoneb at [facility] with no relief. When EMS arrived, pt was 88% on 2LNC. Pt received 4mg Zofran, another breathing treatment and 60mg solumedrol. Pt wears 2LNC at baseline. )    HPI    Sharon Kitchen is a 79 y.o. female with past medical history significant for AAA, DVT, CHF, CAD, CVA who presents with DAMION   Patient with difficulty breathing began approximate 20 minutes ago longstanding history of COPD, lung cancer, received 60 mg of Medrol started on BiPAP as well as receive breathing treatments, by EMS in the prehospital setting  Patient does just feel better compared to earlier since the supportive care measures  CODE STATUS is unclear documentation in [facility] shows DNR CC, will plan to confirm with next of kin    PAST MEDICAL HISTORY    Past Medical History:   Diagnosis Date    Abdominal aortic aneurysm (AAA) without rupture, unspecified part 11/18/2024    Acute DVT (deep venous thrombosis) ([facility]) 07/03/2015    Acute encephalopathy 04/19/2018    Acute on chronic diastolic heart failure ([facility]) 03/30/2019    Alcohol abuse     Anxiety     Arthritis     CAD in native artery     Cellulitis 04/28/2018    Cerebral artery occlusion with cerebral infarction ([facility])     states hx of multiple mini strokes    Cerebrovascular accident (CVA) ([facility])     left side impaired    Chronic fatigue     Complex care coordination     Complicated UTI (urinary tract infection)     Congestive heart failure, unspecified HF chronicity, unspecified heart failure type ([facility]) 11/17/2022    COPD (chronic obstructive pulmonary disease) ([facility])     COPD exacerbation ([facility]) 02/20/2018    Depression     Diabetes mellitus ([facility])     denies    DIMAS (dyspnea on exertion) 07/03/2015    DVT (deep venous thrombosis) ([facility])     DVT, lower extremity ([facility])     Fatigue

## 2025-04-02 LAB
EKG ATRIAL RATE: 94 BPM
EKG DIAGNOSIS: NORMAL
EKG P AXIS: 88 DEGREES
EKG P-R INTERVAL: 140 MS
EKG Q-T INTERVAL: 370 MS
EKG QRS DURATION: 66 MS
EKG QTC CALCULATION (BAZETT): 462 MS
EKG R AXIS: 75 DEGREES
EKG T AXIS: 64 DEGREES
EKG VENTRICULAR RATE: 94 BPM

## 2025-04-02 PROCEDURE — 93010 ELECTROCARDIOGRAM REPORT: CPT | Performed by: INTERNAL MEDICINE

## 2025-04-02 NOTE — ED NOTES
Bedside report given to [facility] BLS crew for transportation back to SNF.     Pt left department in stable condition.

## 2025-04-02 NOTE — ED NOTES
Spoke with pt hospice RN at this time for update. Daughter and hospice nurse agreeable to pt returning back to snf.

## 2025-04-02 NOTE — DISCHARGE INSTRUCTIONS
Today you are seen here in emergency room with difficulty breathing.  You believe is due to multiple things such as a component heart failure or COPD.  He had elevated troponin.  Please continue your hospice protocols, if you have worsening difficulty breathing, please return back to the ER.  Please use antibiotic as directed.  There may be some evidence of pneumonia versus atelectasis here, return for worsening symptoms.  We have ordered a prescription for azithromycin however if you are concerned you can increase the antibiotic to Levaquin instead.  Please have the hospice nurse see the patient in the morning for    XR CHEST 1 VIEW   Final Result   1. Small left pleural effusion with increased opacities in the left lung   base, probably atelectasis.   2. Redemonstration of right mid lung mass.   3. Emphysema.

## 2025-04-02 NOTE — ED NOTES
Purewick placed at this time. Pt also INC of bowel and bladder prior to placement. Pericare performed and new attends placed. Stage 2 noted on coccyx and sacrum.

## 2025-04-05 LAB
BACTERIA BLD CULT ORG #2: NORMAL
BACTERIA BLD CULT: NORMAL

## 2025-04-21 RX ORDER — FUROSEMIDE 40 MG/1
20 TABLET ORAL DAILY
Qty: 45 TABLET | Refills: 0 | Status: SHIPPED | OUTPATIENT
Start: 2025-04-21

## 2025-04-28 ENCOUNTER — TELEPHONE (OUTPATIENT)
Dept: FAMILY MEDICINE CLINIC | Age: 80
End: 2025-04-28

## 2025-04-28 NOTE — TELEPHONE ENCOUNTER
Gisella ward/[facility] called in wanting to know if we could have and MD sign the death certificate for this pt?  They need an MD to sign.

## 2025-06-19 NOTE — TELEPHONE ENCOUNTER
I called and spoke with Summer Walker pt  and informed him of SW note and scheduled pt for her 1 month f/u.
Please notify caregiver that patient is maxed out on clonidine. I have added an additional BP medication to start and would like recorded BP results. Schedule follow up for 1 month.
cloNIDine (CATAPRES) 0.3 MG tablet [[id number]          [facility] [address], [place] - [address] [phone] - f [phone]       Patients daughter called and would like to know if you can increase the dosage because she gives her the medication twice a day because her BP is high      Please advise
.

## 2025-06-20 NOTE — ED PROVIDER NOTES
I independently performed a history and physical on Nitin Day. All diagnostic, treatment, and disposition decisions were made by myself in conjunction with the advanced practice provider. Briefly, this is a 75 y.o. female here for weakness and fatigue; she had a recent admission for hyponatremia associated with a Klebsiella UTI which cleared on following cultures, but has returned feeling ill again. On exam, she is well appearing; a bit dry. Afebrile, normocardiac, nontender to her abdomen, and generally well appearing. EKG  [unfilled] Rate 64, NSR, Normal EKG      Screenings   [place] Coma Scale  Eye Opening: Spontaneous  Best Verbal Response: Oriented  Best Motor Response: Obeys commands  [place] Coma Scale Score: 15        MDM  73F with recurrent Klebsiella UTI; will antibiose, and do ambulatory trial to see if she's safe to go home on oral antibiotics. Labs and imaging reviewed, benign. We trialed ambulation, and she failed. Will admit as UTI secondary to Klebsiella. Continuing hydration, antibiotics. Patient Referrals:  No follow-up provider specified. Discharge Medications:  New Prescriptions    No medications on file       FINAL IMPRESSION  No diagnosis found. Blood pressure (!) 154/78, pulse 65, temperature 98 °F (36.7 °C), temperature source Oral, resp. rate 24, height 5' 8\" (1.727 m), weight 125 lb (56.7 kg), SpO2 97 %, not currently breastfeeding. For further details of [facility] emergency department encounter, please see documentation by advanced practice provider, Key Duff.       Lambert Reynoso MD  08/22/19 3873 Pt in IR for drainage tube placement procedure performed by Dr. Saunders. Pt tolerated procedure well. Right hip accessed and 10.2FR drain placed 120ml dark red output x2 and RLQ [place] 20ml purulent. Specimen collected and sent to lab. IR bedrest one hour start time 1645. Report given to Primary RN.

## (undated) DEVICE — INTENDED FOR TISSUE SEPARATION, AND OTHER PROCEDURES THAT REQUIRE A SHARP SURGICAL BLADE TO PUNCTURE OR CUT.: Brand: BARD-PARKER ® STAINLESS STEEL BLADES

## (undated) DEVICE — NEEDLE ASPIR 19GA HISTOLOGY FLX VIZISHOT 2

## (undated) DEVICE — MACROBORE EXTN SET W/ 1 SMRTSITE NEEDLE-FREE VLV PRT

## (undated) DEVICE — LOTION PREP REMV 5OZ IODO CLR TINC OF BENZ DURAPREP

## (undated) DEVICE — SYRINGE, LUER LOCK, 10ML: Brand: MEDLINE

## (undated) DEVICE — Device: Brand: MEDEX

## (undated) DEVICE — ELECTRODE PT RET AD L9FT HI MOIST COND ADH HYDRGEL CORDED

## (undated) DEVICE — SYRINGE MED 50ML LUERLOCK TIP

## (undated) DEVICE — SINGLE USE ASPIRATION NEEDLE: Brand: SINGLE USE ASPIRATION NEEDLE

## (undated) DEVICE — SYRINGE MED 10ML TRNSLUC BRL PLUNG BLK MRK POLYPR CTRL

## (undated) DEVICE — DRAPE C-ARM 267CM X 152CM

## (undated) DEVICE — SINGLE USE BIOPSY VALVE MAJ-210: Brand: SINGLE USE BIOPSY VALVE (STERILE)

## (undated) DEVICE — Device: Brand: BALLOON

## (undated) DEVICE — MAJOR SET UP PK

## (undated) DEVICE — CONMED CHANNEL MASTER PULMONARY AND PEDIATRIC CLEANING BRUSH, 160 CM X 2.0 MM: Brand: CONMED

## (undated) DEVICE — SUTURE MCRYL + SZ 4-0 L27IN ABSRB UD L19MM PS-2 3/8 CIR MCP426H

## (undated) DEVICE — SHEET, T, LAPAROTOMY, STERILE: Brand: MEDLINE

## (undated) DEVICE — NEEDLE HYPO 22GA L1.5IN BLK POLYPR HUB S STL REG BVL STR

## (undated) DEVICE — SOLUTION IV IRRIG WATER 1000ML POUR BRL 2F7114

## (undated) DEVICE — GOWN AURORA NONREINF LG: Brand: MEDLINE INDUSTRIES, INC.

## (undated) DEVICE — SUTURE VCRL + SZ 3-0 L27IN ABSRB UD L26MM SH 1/2 CIR VCP416H

## (undated) DEVICE — ADHESIVE SKIN CLOSURE TOP 36 CC HI VISC DERMBND MINI

## (undated) DEVICE — C-ARM: Brand: UNBRANDED

## (undated) DEVICE — SUTURE PROL SZ 2-0 L36IN NONABSORBABLE BLU SH L26MM 1/2 CIR 8523H

## (undated) DEVICE — 3M™ TEGADERM™ TRANSPARENT FILM DRESSING FRAME STYLE, 1626W, 4 IN X 4-3/4 IN (10 CM X 12 CM), 50/CT 4CT/CASE: Brand: 3M™ TEGADERM™

## (undated) DEVICE — FORCEP BX STD CAP 1.8 MMX100 CM PULM RADIAL JAW 4 DISP

## (undated) DEVICE — GLOVE ORANGE PI 7   MSG9070

## (undated) DEVICE — TRAY CATH 5FR INTRO NDL CHLOROPREP FUTURA SCALP SYR CAP

## (undated) DEVICE — BRUSH CYTO L115CM DIA1.9MM 3 NDL PULM USE SUPERDIMENSION

## (undated) DEVICE — MERCY FAIRFIELD TURNOVER KIT: Brand: MEDLINE INDUSTRIES, INC.

## (undated) DEVICE — TRAY PREP DRY W/ PREM GLV 2 APPL 6 SPNG 2 UNDPD 1 OVERWRAP

## (undated) DEVICE — 1010 S-DRAPE TOWEL DRAPE 10/BX: Brand: STERI-DRAPE™

## (undated) DEVICE — COVER LT HNDL BLU PLAS

## (undated) DEVICE — 3M™ TEGADERM™ TRANSPARENT FILM DRESSING FRAME STYLE, 1629, 8 IN X 12 IN (20 CM X 30 CM), 10/CT 8CT/CASE: Brand: 3M™ TEGADERM™

## (undated) DEVICE — ARM CRADLE: Brand: DEVON

## (undated) DEVICE — PROGRAMMER PATIENT SMART INTERSTIM

## (undated) DEVICE — VISION PROBE ADAPTER AND SUCTION ADAPTER

## (undated) DEVICE — GAUZE,SPONGE,4"X4",8PLY,STRL,LF,10/TRAY: Brand: MEDLINE

## (undated) DEVICE — Z DISCONTINUED USE 2272117 DRAPE SURG 3 QTR N INVASIVE 2 LAYR DISP

## (undated) DEVICE — ENDOSCOPIC KIT 6X3/16 FT COLON W/ 1.1 OZ 2 GWN W/O BRSH

## (undated) DEVICE — STIMULATOR NERVE 4.32 MM PERC EXTN KT INTERSTIM QUAD

## (undated) DEVICE — ADAPTER TBNG DIA15MM SWVL FBROPT BRONCHSCP TERM 2 AXIS PEEP

## (undated) DEVICE — SUTURE VCRL + SZ 3-0 L18IN ABSRB UD SH 1/2 CIR TAPERCUT NDL VCP864D

## (undated) DEVICE — Device: Brand: ION

## (undated) DEVICE — NEUROSTIMULATOR EXT SM LTWT SGL BTTN H2O RESIST WIRELESS

## (undated) DEVICE — SYRINGE MED 10ML POLYPR LUERSLIP TIP FLAT TOP W/O SFTY DISP

## (undated) DEVICE — HYPODERMIC SAFETY NEEDLE: Brand: MAGELLAN

## (undated) DEVICE — 3M™ IOBAN™ 2 ANTIMICROBIAL INCISE DRAPE 6650EZ: Brand: IOBAN™ 2

## (undated) DEVICE — BIOPSY NEEDLE, 21G: Brand: FLEXISION

## (undated) DEVICE — SWIVEL CONNECTOR

## (undated) DEVICE — SINGLE USE SUCTION VALVE MAJ-209: Brand: SINGLE USE SUCTION VALVE (STERILE)